# Patient Record
Sex: FEMALE | Race: WHITE | NOT HISPANIC OR LATINO | Employment: OTHER | URBAN - METROPOLITAN AREA
[De-identification: names, ages, dates, MRNs, and addresses within clinical notes are randomized per-mention and may not be internally consistent; named-entity substitution may affect disease eponyms.]

---

## 2017-12-20 NOTE — TELEPHONE ENCOUNTER
APPT INFO    Date /Time: 1/9/18 2:30PM   Reason for Appt: Mid- low back pain   Ref Provider/Clinic: TITA VIZCAINO/ [facility]    Are there internal records? Yes/No?  IF YES, list clinic names: NO   Are there outside records? Yes/No? YES   Patient Contact (Y/N) & Call Details: NO- referral   Action: Reviewed records      OUTSIDE RECORDS CHECKLIST     CLINIC NAME COMMENTS REC (x) IMG (x)   [facility]  OFFICE NOTES: 11/6/17, 12/14/16, 3/9/17, 5/8/17, 5/18/17, 9/5/17, 9/25/17, 11/29/16 12/16/16 4/12/17 6/7/17 8/23/17 11/7/17 12/12/17  RADIOLOGY: xray c-arm 12/4/17, CT lumbar 9/5/17, CT thoracic 9/5/17, MRI lumbar 8/1/17, MRI thoracic 7/25/17  CT cervical 4/8/16  CT thoracic 4/8/16  CT lumbar 4/8/16, 9/3/16  MRI lumbar 4/8/16, 8/1/17  MRI cervical 4/8/16  xray thoraculumbar 7/11/16  MRI thoracic 7/25/17  ER/HOSP: 3/2017-9/2017 x x             Records Received From: [facility]     Date/Exam/Location  (specify location if different)   Office Notes: 10/24/17     ACTION    What did you do? Faxed request for additional records

## 2018-01-09 ENCOUNTER — PRE VISIT (OUTPATIENT)
Dept: ORTHOPEDICS | Facility: CLINIC | Age: 53
End: 2018-01-09

## 2018-01-12 NOTE — TELEPHONE ENCOUNTER
Records Received From: [facility]     Date/Exam/Location  (specify location if different)   Office Notes: 5/16/16, 6/16/16, 7/11/16, 7/12/16,    ED/Hosp Notes: 12/4/17-12/8/17   Operative Notes & Implant Records: - T10-L4 posterolateral fusion, allograft, autograft 4/10/16

## 2018-01-19 ENCOUNTER — OFFICE VISIT (OUTPATIENT)
Dept: ORTHOPEDICS | Facility: CLINIC | Age: 53
End: 2018-01-19
Payer: MEDICARE

## 2018-01-19 VITALS — HEIGHT: 71 IN

## 2018-01-19 DIAGNOSIS — G89.29 CHRONIC MIDLINE LOW BACK PAIN WITHOUT SCIATICA: Primary | ICD-10-CM

## 2018-01-19 DIAGNOSIS — M54.50 CHRONIC MIDLINE LOW BACK PAIN WITHOUT SCIATICA: Primary | ICD-10-CM

## 2018-01-19 RX ORDER — ERGOCALCIFEROL 1.25 MG/1
50000 CAPSULE, LIQUID FILLED ORAL DAILY
Status: ON HOLD | COMMUNITY
Start: 2017-09-24 | End: 2024-04-10

## 2018-01-19 RX ORDER — CLONIDINE HYDROCHLORIDE 0.1 MG/1
0.1 TABLET ORAL AT BEDTIME
Status: ON HOLD | COMMUNITY
End: 2024-04-22

## 2018-01-19 RX ORDER — CITALOPRAM HYDROBROMIDE 20 MG/1
20 TABLET ORAL AT BEDTIME
Status: ON HOLD | COMMUNITY
Start: 2015-09-11 | End: 2024-04-22

## 2018-01-19 RX ORDER — ALBUTEROL SULFATE 90 UG/1
2 AEROSOL, METERED RESPIRATORY (INHALATION) PRN
COMMUNITY
Start: 2015-09-11

## 2018-01-19 RX ORDER — ALPRAZOLAM 2 MG
2 TABLET ORAL AT BEDTIME
Status: ON HOLD | COMMUNITY
Start: 2017-12-08 | End: 2024-04-22

## 2018-01-19 RX ORDER — FENOFIBRATE 48 MG/1
48 TABLET, COATED ORAL ONCE
Status: ON HOLD | COMMUNITY
Start: 2017-10-06 | End: 2024-04-11

## 2018-01-19 RX ORDER — CYCLOBENZAPRINE HCL 10 MG
10 TABLET ORAL 3 TIMES DAILY
Status: ON HOLD | COMMUNITY
Start: 2018-01-19 | End: 2024-04-10

## 2018-01-19 RX ORDER — ALPRAZOLAM 0.25 MG
0.25 TABLET ORAL 3 TIMES DAILY
Status: ON HOLD | COMMUNITY
Start: 2015-09-11 | End: 2024-04-11

## 2018-01-19 RX ORDER — CLONIDINE HYDROCHLORIDE 0.3 MG/1
0.9 TABLET ORAL AT BEDTIME
Status: ON HOLD | COMMUNITY
Start: 2015-09-11 | End: 2024-04-22

## 2018-01-19 ASSESSMENT — ENCOUNTER SYMPTOMS
DEPRESSION: 1
SKIN CHANGES: 0
DECREASED APPETITE: 0
WEIGHT GAIN: 0
ARTHRALGIAS: 1
STIFFNESS: 0
SHORTNESS OF BREATH: 0
PALPITATIONS: 1
HYPERTENSION: 1
CHILLS: 1
DIFFICULTY URINATING: 0
BACK PAIN: 1
SPEECH CHANGE: 0
POSTURAL DYSPNEA: 1
JOINT SWELLING: 0
HOT FLASHES: 1
DYSPNEA ON EXERTION: 1
JAUNDICE: 0
PANIC: 1
DIZZINESS: 1
SYNCOPE: 0
BLOATING: 1
HEADACHES: 0
INSOMNIA: 1
COUGH DISTURBING SLEEP: 1
BOWEL INCONTINENCE: 0
NAIL CHANGES: 1
POLYDIPSIA: 1
WHEEZING: 0
SEIZURES: 0
SNORES LOUDLY: 0
DYSURIA: 1
EXERCISE INTOLERANCE: 1
TREMORS: 0
MEMORY LOSS: 1
HYPOTENSION: 0
LEG PAIN: 1
DISTURBANCES IN COORDINATION: 1
NAUSEA: 1
FATIGUE: 1
MUSCLE WEAKNESS: 1
POLYPHAGIA: 0
ORTHOPNEA: 0
WEAKNESS: 1
MYALGIAS: 1
RECTAL PAIN: 0
ALTERED TEMPERATURE REGULATION: 1
BLOOD IN STOOL: 0
INCREASED ENERGY: 0
FEVER: 0
NERVOUS/ANXIOUS: 1
TINGLING: 1
HEMATURIA: 0
COUGH: 1
HALLUCINATIONS: 0
SLEEP DISTURBANCES DUE TO BREATHING: 0
NIGHT SWEATS: 1
VOMITING: 0
DECREASED LIBIDO: 0
NUMBNESS: 1
MUSCLE CRAMPS: 1
POOR WOUND HEALING: 0
SPUTUM PRODUCTION: 1
NECK PAIN: 1
LIGHT-HEADEDNESS: 1
FLANK PAIN: 0
WEIGHT LOSS: 1
PARALYSIS: 0
CONSTIPATION: 0
ABDOMINAL PAIN: 1
DIARRHEA: 1
HEARTBURN: 1
HEMOPTYSIS: 0
LOSS OF CONSCIOUSNESS: 0
DECREASED CONCENTRATION: 1
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
## 2018-01-19 NOTE — LETTER
Date:January 23, 2018      Patient was self referred, no letter generated. Do not send.        [facility] Information
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
## 2023-10-24 ENCOUNTER — HOSPITAL ENCOUNTER (OUTPATIENT)
Age: 58
End: 2023-10-24
Payer: COMMERCIAL

## 2023-10-25 ENCOUNTER — TELEPHONE (OUTPATIENT)
Dept: ORTHOPEDICS | Facility: CLINIC | Age: 58
End: 2023-10-25
Payer: MEDICARE

## 2023-10-25 NOTE — TELEPHONE ENCOUNTER
Received a call from Dr. Garcia at [facility] in [place], [place], asking for a call from Dr. Tian's team regarding patient. Dr. Tian had requested a call from Dr. Garcia to discuss case.    Please call Dr. Garica on his personal cell phone at [phone], and okay to leave a msg, he may be in the OR. Dr. Garcia is asking for a direct number, if possible, back to the care team.

## 2023-10-25 NOTE — TELEPHONE ENCOUNTER
[facility] Call Center    Phone Message    May a detailed message be left on voicemail: yes     Reason for Call: Other: Dr. Devin Corrigan of [place] was reaching out. He has additional information.  No further information is needed on his end.  He can be reached at [phone].      Action Taken: Message routed to:  [facility] ortho     Travel Screening: Not Applicable

## 2023-10-25 NOTE — CONFIDENTIAL NOTE
Dr. Tian consulted on case while on call yesterday. He was able to speak with orthopedic doc at [facility] and it was determined patient would follow up outpatient. LVM relaying information. Clinic phone number provided.    Tara Holter, RNCC

## 2023-10-26 NOTE — CONFIDENTIAL NOTE
Returned call to Devin Corrigan MD. He consulted with Dr. Jaylan Thomas while on call. It was determined patient would follow up outpatient at the [facility]. Asked to place referral and we could schedule with appropriate provider.    Tara Holter RNCC

## 2023-10-27 NOTE — TELEPHONE ENCOUNTER
Records Requested     October 27, 2023 10:04 AM   [address] MMB   Facility  [facility]   Outcome Sent urgent request for all imaging       DIAGNOSIS: Sepsic left elbow following procedure,    APPOINTMENT DATE: 10/30/23   NOTES STATUS DETAILS   OFFICE NOTE from other specialist Care Everywhere [facility] CL[facility]IC:  - 10/12/23    :  - 10/09/23   DISCHARGE REPORT from the ER Care Everywhere [facility]:  - 10/13/23, 09/22/23   OPERATIVE REPORT Care Everywhere [facility]:  -  09/23/23  - Left elbow irrigation and debridement   - Devin Morin MD      - 04/27/21  - Left elbow triceps tendon reconstruction with allograft   - Avery Barker MD     - 03/02/21  - Left elbow total arthroplasty  - Avery Barker MD     - 12/04/17  - Left elbow ulnar nerve decompression, heterotopic bone removal, contracture release  - Avery Barker MD    - 04/21/16  - Repair ligament elbow  - Gato Gabriel MD    + more in Epic     MEDICATION LIST Internal    XRAYS  & INJECTIONS (IMAGES & REPORTS) PACS [facility]:  - XR L ELBOW:  - 10/13/23, 09/23/23, 09/22/23, 04/21/21, 04/13/21, 03/16/21, 01/18/21, 10/24/17, 09/03/16, 08/01/16, 06/20/16, 05/10/16, 04/21/16, 04/13/16, 04/08/16

## 2023-10-30 ENCOUNTER — LAB (OUTPATIENT)
Dept: LAB | Facility: CLINIC | Age: 58
End: 2023-10-30
Payer: COMMERCIAL

## 2023-10-30 ENCOUNTER — PRE VISIT (OUTPATIENT)
Dept: ORTHOPEDICS | Facility: CLINIC | Age: 58
End: 2023-10-30

## 2023-10-30 ENCOUNTER — OFFICE VISIT (OUTPATIENT)
Dept: ORTHOPEDICS | Facility: CLINIC | Age: 58
End: 2023-10-30
Payer: COMMERCIAL

## 2023-10-30 ENCOUNTER — ANCILLARY PROCEDURE (OUTPATIENT)
Dept: GENERAL RADIOLOGY | Facility: CLINIC | Age: 58
End: 2023-10-30
Attending: STUDENT IN AN ORGANIZED HEALTH CARE EDUCATION/TRAINING PROGRAM
Payer: COMMERCIAL

## 2023-10-30 DIAGNOSIS — T84.59XA INFECTION ASSOCIATED WITH PROSTHESIS OF LEFT ELBOW JOINT (H): ICD-10-CM

## 2023-10-30 DIAGNOSIS — M25.522 ELBOW PAIN, LEFT: Primary | ICD-10-CM

## 2023-10-30 DIAGNOSIS — M25.522 ELBOW PAIN, LEFT: ICD-10-CM

## 2023-10-30 DIAGNOSIS — Z96.622 INFECTION ASSOCIATED WITH PROSTHESIS OF LEFT ELBOW JOINT (H): ICD-10-CM

## 2023-10-30 DIAGNOSIS — T84.59XA INFECTION ASSOCIATED WITH PROSTHESIS OF LEFT ELBOW JOINT (H): Primary | ICD-10-CM

## 2023-10-30 DIAGNOSIS — Z96.622 INFECTION ASSOCIATED WITH PROSTHESIS OF LEFT ELBOW JOINT (H): Primary | ICD-10-CM

## 2023-10-30 LAB
ALBUMIN SERPL BCG-MCNC: 4.1 G/DL (ref 3.5–5.2)
ALP SERPL-CCNC: 147 U/L (ref 35–104)
ALT SERPL W P-5'-P-CCNC: 29 U/L (ref 0–50)
ANION GAP SERPL CALCULATED.3IONS-SCNC: 12 MMOL/L (ref 7–15)
APPEARANCE FLD: ABNORMAL
AST SERPL W P-5'-P-CCNC: 40 U/L (ref 0–45)
BILIRUB SERPL-MCNC: 0.3 MG/DL
BUN SERPL-MCNC: 21.7 MG/DL (ref 8–23)
CALCIUM SERPL-MCNC: 9.6 MG/DL (ref 8.6–10)
CELL COUNT BODY FLUID SOURCE: ABNORMAL
CHLORIDE SERPL-SCNC: 96 MMOL/L (ref 98–107)
COLOR FLD: ABNORMAL
CREAT SERPL-MCNC: 1.26 MG/DL (ref 0.51–0.95)
CRP SERPL-MCNC: 37.3 MG/L
CRYSTALS SNV MICRO: NORMAL
DEPRECATED HCO3 PLAS-SCNC: 32 MMOL/L (ref 22–29)
EGFRCR SERPLBLD CKD-EPI 2021: 49 ML/MIN/1.73M2
ERYTHROCYTE [DISTWIDTH] IN BLOOD BY AUTOMATED COUNT: 14.6 % (ref 10–15)
ERYTHROCYTE [SEDIMENTATION RATE] IN BLOOD BY WESTERGREN METHOD: 51 MM/HR (ref 0–30)
GLUCOSE SERPL-MCNC: 159 MG/DL (ref 70–99)
HBA1C MFR BLD: 10.6 %
HCT VFR BLD AUTO: 35.7 % (ref 35–47)
HGB BLD-MCNC: 11.4 G/DL (ref 11.7–15.7)
LYMPHOCYTES NFR FLD MANUAL: 1 %
MCH RBC QN AUTO: 25.3 PG (ref 26.5–33)
MCHC RBC AUTO-ENTMCNC: 31.9 G/DL (ref 31.5–36.5)
MCV RBC AUTO: 79 FL (ref 78–100)
MONOS+MACROS NFR FLD MANUAL: 3 %
NEUTS BAND NFR FLD MANUAL: 97 %
PLATELET # BLD AUTO: 350 10E3/UL (ref 150–450)
POTASSIUM SERPL-SCNC: 3.4 MMOL/L (ref 3.4–5.3)
PROT SERPL-MCNC: 8.2 G/DL (ref 6.4–8.3)
RBC # BLD AUTO: 4.51 10E6/UL (ref 3.8–5.2)
SODIUM SERPL-SCNC: 140 MMOL/L (ref 135–145)
WBC # BLD AUTO: 10.9 10E3/UL (ref 4–11)
WBC # FLD AUTO: ABNORMAL /UL

## 2023-10-30 PROCEDURE — 85027 COMPLETE CBC AUTOMATED: CPT | Performed by: PATHOLOGY

## 2023-10-30 PROCEDURE — 73080 X-RAY EXAM OF ELBOW: CPT | Mod: LT | Performed by: RADIOLOGY

## 2023-10-30 PROCEDURE — 87205 SMEAR GRAM STAIN: CPT | Performed by: STUDENT IN AN ORGANIZED HEALTH CARE EDUCATION/TRAINING PROGRAM

## 2023-10-30 PROCEDURE — 87070 CULTURE OTHR SPECIMN AEROBIC: CPT | Mod: XS | Performed by: STUDENT IN AN ORGANIZED HEALTH CARE EDUCATION/TRAINING PROGRAM

## 2023-10-30 PROCEDURE — 87040 BLOOD CULTURE FOR BACTERIA: CPT | Performed by: STUDENT IN AN ORGANIZED HEALTH CARE EDUCATION/TRAINING PROGRAM

## 2023-10-30 PROCEDURE — 83036 HEMOGLOBIN GLYCOSYLATED A1C: CPT | Performed by: STUDENT IN AN ORGANIZED HEALTH CARE EDUCATION/TRAINING PROGRAM

## 2023-10-30 PROCEDURE — 99000 SPECIMEN HANDLING OFFICE-LAB: CPT | Performed by: PATHOLOGY

## 2023-10-30 PROCEDURE — 80053 COMPREHEN METABOLIC PANEL: CPT | Performed by: PATHOLOGY

## 2023-10-30 PROCEDURE — 87206 SMEAR FLUORESCENT/ACID STAI: CPT | Mod: 90 | Performed by: PATHOLOGY

## 2023-10-30 PROCEDURE — 36415 COLL VENOUS BLD VENIPUNCTURE: CPT | Performed by: PATHOLOGY

## 2023-10-30 PROCEDURE — 85652 RBC SED RATE AUTOMATED: CPT | Performed by: PATHOLOGY

## 2023-10-30 PROCEDURE — 20605 DRAIN/INJ JOINT/BURSA W/O US: CPT | Mod: LT | Performed by: STUDENT IN AN ORGANIZED HEALTH CARE EDUCATION/TRAINING PROGRAM

## 2023-10-30 PROCEDURE — 99205 OFFICE O/P NEW HI 60 MIN: CPT | Mod: 25 | Performed by: STUDENT IN AN ORGANIZED HEALTH CARE EDUCATION/TRAINING PROGRAM

## 2023-10-30 PROCEDURE — 89051 BODY FLUID CELL COUNT: CPT | Performed by: STUDENT IN AN ORGANIZED HEALTH CARE EDUCATION/TRAINING PROGRAM

## 2023-10-30 PROCEDURE — 89060 EXAM SYNOVIAL FLUID CRYSTALS: CPT | Performed by: STUDENT IN AN ORGANIZED HEALTH CARE EDUCATION/TRAINING PROGRAM

## 2023-10-30 PROCEDURE — 87075 CULTR BACTERIA EXCEPT BLOOD: CPT | Mod: XS | Performed by: STUDENT IN AN ORGANIZED HEALTH CARE EDUCATION/TRAINING PROGRAM

## 2023-10-30 PROCEDURE — 87102 FUNGUS ISOLATION CULTURE: CPT | Performed by: STUDENT IN AN ORGANIZED HEALTH CARE EDUCATION/TRAINING PROGRAM

## 2023-10-30 PROCEDURE — 86140 C-REACTIVE PROTEIN: CPT | Performed by: PATHOLOGY

## 2023-10-30 PROCEDURE — 87116 MYCOBACTERIA CULTURE: CPT | Mod: 90 | Performed by: PATHOLOGY

## 2023-10-30 RX ORDER — CLONAZEPAM 2 MG/1
4 TABLET ORAL AT BEDTIME
Status: ON HOLD | COMMUNITY
Start: 2023-09-19 | End: 2024-04-22

## 2023-10-30 RX ORDER — ATORVASTATIN CALCIUM 10 MG/1
10 TABLET, FILM COATED ORAL DAILY
COMMUNITY
Start: 2023-08-31

## 2023-10-30 RX ORDER — LIDOCAINE HYDROCHLORIDE 10 MG/ML
5 INJECTION, SOLUTION EPIDURAL; INFILTRATION; INTRACAUDAL; PERINEURAL
Status: DISCONTINUED | OUTPATIENT
Start: 2023-10-30 | End: 2024-04-22

## 2023-10-30 RX ADMIN — LIDOCAINE HYDROCHLORIDE 5 ML: 10 INJECTION, SOLUTION EPIDURAL; INFILTRATION; INTRACAUDAL; PERINEURAL at 12:34

## 2023-10-30 NOTE — NURSING NOTE
"Reason For Visit:   Chief Complaint   Patient presents with    Consult     Septic Left Elbow post surgery 9/23/23       Primary MD: Alo Giang  Ref. MD: ED    Age: 58 year old    ?  No      There were no vitals taken for this visit.      Pain Assessment  Patient Currently in Pain: Yes  0-10 Pain Scale: 10  Primary Pain Location: Elbow (left)  Pain Descriptors: Constant, Burning, Radiating    Hand Dominance Evaluation  Hand Dominance: Right          QuickDASH Assessment       No data to display                   Current Outpatient Medications   Medication Sig Dispense Refill    atorvastatin (LIPITOR) 10 MG tablet Take 10 mg by mouth      clonazePAM (KLONOPIN) 2 MG tablet       albuterol (PROAIR HFA/PROVENTIL HFA/VENTOLIN HFA) 108 (90 BASE) MCG/ACT Inhaler Inhale 2 puffs into the lungs as needed      ALPRAZolam (XANAX) 0.25 MG tablet Take 0.25 mg by mouth 3 times daily      ALPRAZolam (XANAX) 1 MG tablet Take 2 tablets by mouth At Bedtime      citalopram (CELEXA) 40 MG tablet Take 40 mg by mouth 3 times daily      cloNIDine (CATAPRES) 0.2 MG tablet Take 0.2 mg by mouth 2 times daily      cloNIDine (CATAPRES) 0.3 MG tablet Take 0.3 mg by mouth 2 times daily      cyclobenzaprine (FLEXERIL) 10 MG tablet Take 10 mg by mouth 3 times daily      fenofibrate 48 MG tablet Take 48 mg by mouth once      omeprazole (PRILOSEC) 20 MG CR capsule Take 20 mg by mouth daily      ranitidine (ZANTAC) 150 MG tablet Take 150 mg by mouth 2 times daily      vitamin D (ERGOCALCIFEROL) 89617 UNIT capsule Take 50,000 Units by mouth daily         Allergies   Allergen Reactions    Hydromorphone Anaphylaxis     hallucinations    Morphine Anaphylaxis     Throat swells shut  **Has taken hydrocodone/APAP and hydromorphone in the past during previous hospitalizations with no issues.  Takes oxycodone at home    Succinylcholine Shortness Of Breath     \"it affected my breathing\"    Fentanyl     Methadone Nausea and Vomiting    Prednisone " "Swelling     \"it overrides my psych meds and makes me crazy\"    Pregabalin     Quetiapine     Trazodone     Sumatriptan Rash       MARIVEL BARRETT, ATC    "
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
## 2023-10-30 NOTE — PROGRESS NOTES
Medium Joint Injection/Arthrocentesis: L elbow    Date/Time: 10/30/2023 12:34 PM    Performed by: Marcos Sarmiento MD  Authorized by: Marcos Sarmiento MD    Indications:  Diagnostic evaluation  Needle Size:  25 G  Guidance: surface landmarks    Location:  Elbow  Site:  L elbow  Medications:  5 mL lidocaine (PF) 1 %  Aspirate amount (mL):  7  Aspirate analysis: sent for lab analysis    Outcome:  Tolerated well, no immediate complications  Procedure discussed: discussed risks, benefits, and alternatives    Consent Given by:  Patient  Timeout: timeout called immediately prior to procedure    Prep: patient was prepped and draped in usual sterile fashion        [facility] [address]  [address] [facility] [id number] [phone]  Dept: [phone]  ______________________________________________________________________________    Patient: Mitali Robles   : 1965   MRN: [id number]   2023    INVASIVE PROCEDURE SAFETY CHECKLIST    Date: 10/30/2023   Procedure:Lidocaine 1% injection for pain block for left elbow aspiration.  Patient Name: Mitali Robles  MRN: [id number]  YOB: 1965    Action: Complete sections as appropriate. Any discrepancy results in a HARD COPY until resolved.     PRE PROCEDURE:  Patient ID verified with 2 identifiers (name and  or MRN): Yes  Procedure and site verified with patient/designee (when able): Yes  Accurate consent documentation in medical record: Yes  H&P (or appropriate assessment) documented in medical record: Yes  H&P must be up to 20 days prior to procedure and updates within 24 hours of procedure as applicable: Yes  Relevant diagnostic and radiology test results appropriately labeled and displayed as applicable: NA  Procedure site(s) marked with provider initials: NA    TIMEOUT:  Time-Out performed immediately prior to starting procedure, including verbal and active participation of all team  members addressing the following:Yes  * Correct patient identify  * Confirmed that the correct side and site are marked  * An accurate procedure consent form  * Agreement on the procedure to be done  * Correct patient position  * Relevant images and results are properly labeled and appropriately displayed  * The need to administer antibiotics or fluids for irrigation purposes during the procedure as applicable   * Safety precautions based on patient history or medication use    DURING PROCEDURE: Verification of correct person, site, and procedures any time the responsibility for care of the patient is transferred to another member of the care team.       The following medications were given:         Prior to injection, verified patient identity using patient's name and date of birth.  Due to injection administration, patient instructed to remain in clinic for 15 minutes  afterwards, and to report any adverse reaction to me immediately.    Left elbow aspiration with Lidocaine 1% injection for pain block.      Medication Name Lidocaine 1% NDC 72675-367-66    Scribed by MARIVEL BARRETT, ATC for Dr. Sarmiento on October 30, 2023 at 12:38p based on the provider's statements to me.     MARIVEL BARRETT, ATC
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
## 2023-11-04 LAB
BACTERIA BLD CULT: NO GROWTH
BACTERIA BLD CULT: NO GROWTH
BACTERIA SNV CULT: NO GROWTH
GRAM STAIN RESULT: NORMAL
GRAM STAIN RESULT: NORMAL

## 2023-11-13 LAB — BACTERIA SNV CULT: NORMAL

## 2023-11-27 LAB — BACTERIA SNV CULT: NO GROWTH

## 2023-12-26 LAB
ACID FAST STAIN (ARUP): NORMAL

## 2024-02-06 ENCOUNTER — TELEPHONE (OUTPATIENT)
Dept: ORTHOPEDICS | Facility: CLINIC | Age: 59
End: 2024-02-06
Payer: COMMERCIAL

## 2024-02-06 NOTE — TELEPHONE ENCOUNTER
Hello,    I'm with ortho con.  Pt of Dr. Sarmiento.  Dr. Dejesus is calling from [facility] in [place], ph# [phone].  She is wanting to discuss with the team the pts current condition and having the pt come in sooner.  She is asking about a medical transfer with medical transport.    Pt is currently hospitalized at [facility].

## 2024-02-06 NOTE — TELEPHONE ENCOUNTER
Shruthi MARTIN reviewed and advised that this call should be directed to our on-call ortho team, today is Dr Moran.  Provided  number to Dr Chavez at [phone].  She will call this number today.     Also gave a heads up for this provider that [facility] does not accept [employer] insurance, patient had [employer] insurance last year, so to please verify that she has new plan prior to trying a transfer.  Dr Chavez will check this also. Hailee Oliveira RN

## 2024-02-12 ENCOUNTER — LAB (OUTPATIENT)
Dept: LAB | Facility: CLINIC | Age: 59
End: 2024-02-12
Payer: COMMERCIAL

## 2024-02-12 ENCOUNTER — OFFICE VISIT (OUTPATIENT)
Dept: ORTHOPEDICS | Facility: CLINIC | Age: 59
End: 2024-02-12
Payer: COMMERCIAL

## 2024-02-12 VITALS
DIASTOLIC BLOOD PRESSURE: 84 MMHG | OXYGEN SATURATION: 98 % | TEMPERATURE: 97.8 F | HEART RATE: 72 BPM | SYSTOLIC BLOOD PRESSURE: 154 MMHG

## 2024-02-12 DIAGNOSIS — Z96.622 INFECTION ASSOCIATED WITH PROSTHESIS OF LEFT ELBOW JOINT (H): Primary | ICD-10-CM

## 2024-02-12 DIAGNOSIS — T84.59XA INFECTION ASSOCIATED WITH PROSTHESIS OF LEFT ELBOW JOINT (H): ICD-10-CM

## 2024-02-12 DIAGNOSIS — Z96.622 INFECTION ASSOCIATED WITH PROSTHESIS OF LEFT ELBOW JOINT (H): ICD-10-CM

## 2024-02-12 DIAGNOSIS — Z79.4 TYPE 2 DIABETES MELLITUS WITH HYPERGLYCEMIA, WITH LONG-TERM CURRENT USE OF INSULIN (H): ICD-10-CM

## 2024-02-12 DIAGNOSIS — E11.65 TYPE 2 DIABETES MELLITUS WITH HYPERGLYCEMIA, WITH LONG-TERM CURRENT USE OF INSULIN (H): ICD-10-CM

## 2024-02-12 DIAGNOSIS — T84.59XA INFECTION ASSOCIATED WITH PROSTHESIS OF LEFT ELBOW JOINT (H): Primary | ICD-10-CM

## 2024-02-12 PROBLEM — T84.50XA PROSTHETIC JOINT INFECTION (H): Status: ACTIVE | Noted: 2023-10-13

## 2024-02-12 PROBLEM — E11.9 TYPE 2 DIABETES MELLITUS WITHOUT COMPLICATION, WITH LONG-TERM CURRENT USE OF INSULIN (H): Status: ACTIVE | Noted: 2018-04-24

## 2024-02-12 PROBLEM — R29.898 MUSCULAR DECONDITIONING: Status: ACTIVE | Noted: 2019-09-16

## 2024-02-12 PROBLEM — Z86.19 HISTORY OF CLOSTRIDIOIDES DIFFICILE INFECTION: Status: ACTIVE | Noted: 2020-04-08

## 2024-02-12 PROBLEM — E78.5 DYSLIPIDEMIA: Status: ACTIVE | Noted: 2017-09-26

## 2024-02-12 PROBLEM — A04.0 ENTEROPATHOGENIC ESCHERICHIA COLI INFECTION: Status: ACTIVE | Noted: 2023-01-05

## 2024-02-12 PROBLEM — R07.89 ATYPICAL CHEST PAIN: Status: ACTIVE | Noted: 2020-12-20

## 2024-02-12 PROBLEM — M54.50 CHRONIC LOW BACK PAIN: Status: ACTIVE | Noted: 2017-11-28

## 2024-02-12 PROBLEM — I10 ESSENTIAL HYPERTENSION: Status: ACTIVE | Noted: 2020-01-29

## 2024-02-12 PROBLEM — M25.522 ELBOW PAIN, LEFT: Status: ACTIVE | Noted: 2021-03-03

## 2024-02-12 PROBLEM — E66.01 SEVERE OBESITY (BMI 35.0-39.9) WITH COMORBIDITY (H): Status: ACTIVE | Noted: 2018-06-07

## 2024-02-12 PROBLEM — M19.90 ARTHRITIS: Status: ACTIVE | Noted: 2024-01-31

## 2024-02-12 PROBLEM — G89.29 CHRONIC LOW BACK PAIN: Status: ACTIVE | Noted: 2017-11-28

## 2024-02-12 PROBLEM — R19.7 DIARRHEA OF PRESUMED INFECTIOUS ORIGIN: Status: ACTIVE | Noted: 2018-12-26

## 2024-02-12 PROBLEM — F40.01 PANIC DISORDER WITH AGORAPHOBIA: Status: ACTIVE | Noted: 2017-05-18

## 2024-02-12 PROBLEM — R51.9 INTRACTABLE HEADACHE: Status: ACTIVE | Noted: 2018-07-19

## 2024-02-12 PROBLEM — R31.29 MICROHEMATURIA: Status: ACTIVE | Noted: 2019-09-16

## 2024-02-12 PROBLEM — S54.00XA INJURY OF ULNAR NERVE: Status: ACTIVE | Noted: 2017-12-06

## 2024-02-12 PROBLEM — G31.84 MILD COGNITIVE IMPAIRMENT: Status: ACTIVE | Noted: 2018-06-06

## 2024-02-12 PROBLEM — F43.10 PTSD (POST-TRAUMATIC STRESS DISORDER): Status: ACTIVE | Noted: 2018-07-17

## 2024-02-12 PROBLEM — R74.01 ELEVATED AST (SGOT): Status: ACTIVE | Noted: 2019-09-16

## 2024-02-12 PROBLEM — R11.15 EMESIS, PERSISTENT: Status: ACTIVE | Noted: 2019-11-06

## 2024-02-12 PROBLEM — L03.114 CELLULITIS OF LEFT ELBOW: Status: ACTIVE | Noted: 2023-09-23
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
## 2024-02-12 PROBLEM — G47.00 INSOMNIA: Status: ACTIVE | Noted: 2018-07-18

## 2024-02-12 PROBLEM — E83.42 HYPOMAGNESEMIA: Status: ACTIVE | Noted: 2018-07-19

## 2024-02-12 PROBLEM — E83.51 HYPOCALCEMIA: Status: ACTIVE | Noted: 2024-02-12

## 2024-02-12 PROBLEM — F31.9 BIPOLAR DISEASE, CHRONIC (H): Status: ACTIVE | Noted: 2017-05-18

## 2024-02-12 PROBLEM — R00.1 BRADYCARDIA: Status: ACTIVE | Noted: 2021-01-04

## 2024-02-12 PROBLEM — K21.00 GASTROESOPHAGEAL REFLUX DISEASE WITH ESOPHAGITIS: Status: ACTIVE | Noted: 2017-11-28

## 2024-02-12 PROBLEM — S21.209A BACK WOUND: Status: ACTIVE | Noted: 2017-10-27

## 2024-02-12 PROBLEM — M00.9 SEPTIC ARTHRITIS (H): Status: ACTIVE | Noted: 2023-11-01

## 2024-02-12 PROBLEM — R52 UNCONTROLLED PAIN: Status: ACTIVE | Noted: 2019-11-24

## 2024-02-12 PROBLEM — Z98.1 S/P SPINAL FUSION: Status: ACTIVE | Noted: 2022-09-09

## 2024-02-12 PROBLEM — J44.9 CHRONIC OBSTRUCTIVE PULMONARY DISEASE (H): Status: ACTIVE | Noted: 2023-10-12

## 2024-02-12 PROBLEM — L98.491: Status: ACTIVE | Noted: 2019-06-26

## 2024-02-12 PROBLEM — F33.1 MAJOR DEPRESSIVE DISORDER, RECURRENT EPISODE, MODERATE (H): Chronic | Status: ACTIVE | Noted: 2018-04-06

## 2024-02-12 PROBLEM — E66.01 MORBID OBESITY (H): Status: ACTIVE | Noted: 2019-01-04

## 2024-02-12 LAB
ANION GAP SERPL CALCULATED.3IONS-SCNC: 13 MMOL/L (ref 7–15)
BUN SERPL-MCNC: 11.4 MG/DL (ref 8–23)
CALCIUM SERPL-MCNC: 9.4 MG/DL (ref 8.6–10)
CHLORIDE SERPL-SCNC: 97 MMOL/L (ref 98–107)
CREAT SERPL-MCNC: 0.82 MG/DL (ref 0.51–0.95)
CRP SERPL-MCNC: 140 MG/L
DEPRECATED HCO3 PLAS-SCNC: 27 MMOL/L (ref 22–29)
EGFRCR SERPLBLD CKD-EPI 2021: 82 ML/MIN/1.73M2
ERYTHROCYTE [DISTWIDTH] IN BLOOD BY AUTOMATED COUNT: 20.5 % (ref 10–15)
ERYTHROCYTE [SEDIMENTATION RATE] IN BLOOD BY WESTERGREN METHOD: 59 MM/HR (ref 0–30)
GLUCOSE SERPL-MCNC: 158 MG/DL (ref 70–99)
HBA1C MFR BLD: 7.4 %
HCT VFR BLD AUTO: 33 % (ref 35–47)
HGB BLD-MCNC: 10.5 G/DL (ref 11.7–15.7)
MCH RBC QN AUTO: 24.2 PG (ref 26.5–33)
MCHC RBC AUTO-ENTMCNC: 31.8 G/DL (ref 31.5–36.5)
MCV RBC AUTO: 76 FL (ref 78–100)
PLATELET # BLD AUTO: 493 10E3/UL (ref 150–450)
POTASSIUM SERPL-SCNC: 3.4 MMOL/L (ref 3.4–5.3)
RBC # BLD AUTO: 4.34 10E6/UL (ref 3.8–5.2)
SODIUM SERPL-SCNC: 137 MMOL/L (ref 135–145)
WBC # BLD AUTO: 12.3 10E3/UL (ref 4–11)

## 2024-02-12 PROCEDURE — 99214 OFFICE O/P EST MOD 30 MIN: CPT | Performed by: STUDENT IN AN ORGANIZED HEALTH CARE EDUCATION/TRAINING PROGRAM

## 2024-02-12 PROCEDURE — 99000 SPECIMEN HANDLING OFFICE-LAB: CPT | Performed by: PATHOLOGY

## 2024-02-12 PROCEDURE — 36415 COLL VENOUS BLD VENIPUNCTURE: CPT | Performed by: PATHOLOGY

## 2024-02-12 PROCEDURE — 83036 HEMOGLOBIN GLYCOSYLATED A1C: CPT | Performed by: STUDENT IN AN ORGANIZED HEALTH CARE EDUCATION/TRAINING PROGRAM

## 2024-02-12 PROCEDURE — 85027 COMPLETE CBC AUTOMATED: CPT | Performed by: PATHOLOGY

## 2024-02-12 PROCEDURE — 80048 BASIC METABOLIC PNL TOTAL CA: CPT | Performed by: PATHOLOGY

## 2024-02-12 PROCEDURE — 85652 RBC SED RATE AUTOMATED: CPT | Performed by: PATHOLOGY

## 2024-02-12 PROCEDURE — 86140 C-REACTIVE PROTEIN: CPT | Performed by: PATHOLOGY

## 2024-02-12 NOTE — NURSING NOTE
Teaching Flowsheet   Relevant Diagnosis: Septic left elbow post surgery, 9-23-23  Teaching Topic: Explant left total elbow arthroplasty, irrigation and debridement of left elbow, possible antibiotic spacer insertion     [place] under General anesthesia with Dr Marcos Sarmiento.   Patient from [place], [place]  Will be hospitalized post op.   DM Type 2- Collecting HgbA1C today at [facility]  Patient in wheelchair today, weak, pale.  Vital signs collected per Dr Sarmiento  Getting lab work today, Dr Sarmiento will monitor for results.    Person(s) involved in teaching:   Patient.  Spouse out with the car and will pick her up after lab work.     Motivation Level:  Asks Questions: Yes  Eager to Learn: Yes  Cooperative: Yes  Receptive (willing/able to accept information): Yes  Any cultural factors/[employer] beliefs that may influence understanding or compliance? No    Patient demonstrates understanding of the following:  Reason for the appointment, diagnosis and treatment plan: Yes  Knowledge of proper use of medications and conditions for which they are ordered (with special attention to potential side effects or drug interactions): Yes  Which situations necessitate calling provider and whom to contact: Yes    Teaching Concerns Addressed:   Proper use and care of  (medical equip, care aids, etc.): Yes  Nutritional needs and diet plan: Yes  Pain management techniques: Yes  Wound Care: Yes  How and/when to access community resources: Yes     Instructional Materials Used/Given: Preoperative surgery packet, antibacterial Chlorhexidine soap. Stop Light Tool reviewed, after-hours number provided, patient verbalized understanding, had no immediate questions. Hailee Oliveira RN

## 2024-02-12 NOTE — PROGRESS NOTES
Orthopaedic Surgery Hand and Upper Extremity Clinic H&P Note:  Date: Feb 12, 2024    Patient Name: Mitali Robles  MRN: [id number]    CHIEF COMPLAINT: Left elbow drainage, concern for deep prosthetic infection of TEA    Dominant Hand: Right  Occupation: None      HPI:  Ms. Mitali Robles is a 58 year old female right hand dominant with a very complex medical history who presents for follow-up regarding chronic left elbow drainage. Patient underwent a left TEA for post-traumatic arthritis 3/2/21 by Dr. Avery Barker. She had had multiple prior surgeries for fracture and dislocation in the past. She then underwent a left triceps tendon reconstruction with achilles allograft for rupture 4/27/23. She reports developing a chronic wound and drainage 4-5 months postop.    The patient was admitted to [facility] in [place], [place] for a worsening let elbow wound associated with drainage. The patient was taken to the OR by Dr. Morin who performed a superficial I&D, excision of sinus tract, and olecranon bursectomy on 9/23/23. Intraoperative cultures positive for Staph epi and candida albicans. Blood culture negative. Patient was treated with rocephin, vancomycin, and fluconazole. The patient was admitted for IV antibiotics for about 2 weeks after the surgery.     I last saw the patient October 30, 2023.  At that time I performed an aspiration of the elbow which revealed 24,485 WBCs, 97% PMNs.  Interestingly, all cultures returned negative, likely due to her chronic antibiotic use.  Her hemoglobin A1c was 10.6 and so she was not a surgical candidate at that time.  This is since improved to 6.9.  She is here to discuss options.    The elbow continues to cause her considerable problems.  The patient was admitted 1/31/2024 to 2/8/2024 at [facility] as the elbow flexion was making her sick.  I did appear from notes that she was becoming septic, so she was taken for an irrigation and debridement 2/5/2024 with Dr. Gabriel.   Operative note is not available, however brief op note reports thick purulent material within the olecranon bursa.    She states 2 days after she got home, she has started to develop significant serosanguineous drainage again from the elbow wound.  She is saturating multiple bath towels a night.  Continues to have elbow pain.    The patient's records from her admission 9/22/23-10/6/23 and 1/31/24 to 2/8/24 were reviewed in detail in care everywhere. These included operative reports, ED notes, and infectious disease consult notes.    Surgical History:  1. 4/8/2016 - open reduction internal fixation of left elbow dislocation and left distal radius fracture  2. 4/21/2016 - surgical reduction of left elbow subluxation with repair of lateral ligament complex  3. 6/1/2017- reduction around the left elbow for stiffness and heterotropic ossification  4. 3/2/2021 - left total elbow arthroplasty for posttraumatic arthritis and left ulnar nerve neuropathy  5. 4/7/2021 - Reconstruction of left elbow triceps tendon with Achilles tendon allograft for rupture of the left triceps tendon following total elbow arthroplasty  6. 9/23/2023 - I&D for wound dehiscence and draining sinus tract of the left elbow with olecranon bursectomy and primary closure of the open wound of the left elbow   7. I&D left elbow    VITALS:  There were no vitals filed for this visit.    EXAM:  General: NAD, A&Ox3  HEENT: NC/AT  CV: RRR by peripheral pulse  Pulmonary: Non-labored breathing on RA  LUE:  Mild erythema of posterior elbow incision that is approximated with nylon sutures.  Serosanguineous drainage from the proximal aspect of the incision  No significant warmth of the elbow  Extension limited to 45 (baseline), full flexion  Full pronation, supination  Minimal tenderness along the joint lines  Intact EPL, FPL, HI  SILT m/r/u  WWP CR< 2s      IMAGING:  XRs of the left elbow from 10/30/23 demonstrates total elbow prosthesis without any evidence of  loosening. Cement mantle appears intact    MRI 2/4/24  IMPRESSION:   1. Complex periarticular fluid collections, posterior and lateral to the distal humerus, loculated collection measuring 5 x 2 cm in size is concerning for an abscess. Pockets of signal dropout within the fluid may represent gas relating to gas forming organism.   2.  There is an additional fluid collection noted to insinuate throughout the subcutaneous soft tissues dorsal to the forearm emanating from the region of the subcutaneous soft tissues of the olecranon. This could represent a bursal fluid collection, infected fluid collection difficult to exclude.   3.  There is edema identified within the radial neck. No obvious fracture is seen. No cortical destruction or obvious periosteal changes evident, findings may reflect osteomyelitis. No obvious fracture.   4.  There is fluid surrounding the radial head representing an elbow joint effusion, septic arthritis not excluded.   5.  Edematous changes noted to involve the musculature surrounding the elbow joint suggests infectious myositis without pyomyositis or pyotenosynovitis.     Labs today 2/12/24  CRP 37.30 --> 140  ESR 51 --> 59  WBC 10.9 --> 12.3  Hb/Hct 10.5/33.0    Synovial fluid 10/30/24  No crystals,   24,485 WBCs, 97% PMNs  Cultures negative    I have personally reviewed the above images and labs.       IMPRESSION AND RECOMMENDATIONS:  Ms. Mitali Robles is a 58 year old female right hand dominant with chronic left total elbow prosthetic infection, failing chronic suppressive antibiotic management.      I had a long discussion with the patient regarding the state of her elbow and treatment options.  We discussed both debridement, chronic suppressive antibiotic treatment, and implant retention versus radical debridement and hardware removal.    I am having the MRI from her hospital stay pushed to our system for review.  However given her clinical course and review of outside records,  it seems that the infection is quite aggressive.  I am not hopeful that improvement and implant retention will fully treat this infection, particularly given her complex surgical history on this elbow and her multiple medical comorbidities.  I did advise that she would be at risk of a recurrent infection and may ultimately need explant in the future.  She is not interested in this.    The patient is clearly suffering from the chronic drainage and pain from the infection. She just wants it resolved.  She wishes to proceed with explant of the prosthesis.  I did advise that this would result in a completely unstable elbow and that she would need to use a splint or brace afterwards for life.  She is willing to accept this.  She states she does not want the prosthesis reimplanted. Her HBA1c is now within acceptable limits so we will proceed.    The indications for surgery were discussed with the patient. The benefits, risks, and alternatives of operative management were discussed in detail with the patient. The patient understands that the risks of surgery include, but are not limited to: persistent infection, bleeding, injury to nearby structures (such as nerves, blood vessels, and tendons), elbow instability, need for additional surgery, pain, stiffness, scarring, need for rehabilitation, and anesthetic complications.  The patient understands that she has significant risk for for postoperative complicationsincluding diabetes, multiple medical comorbidities and a poor social situation.     Patient expressed understanding and elected to proceed with surgery. All questions were answered to the patient's satisfaction.    The patient states that she has felt quite fatigued and ill due to the significant drainage.  Her vitals appeared to be within normal limits and were not consistent with anemia. H/H also reassuring.    All questions answered.  The patient voiced understanding and agreement.    Marcos Sarmiento MD  Assistant  Professor Moya, Upper Extremity & Microvascular Surgery  Department of Orthopaedic Surgery  [facility]

## 2024-02-12 NOTE — LETTER
2/12/2024         RE: Mitali Robles  [address]  [facility] [address]  [place] [address]        Dear Colleague,    Thank you for referring your patient, Mitali Robles, to the [facility] [place]. Please see a copy of my visit note below.    Orthopaedic Surgery Hand and Upper Extremity Clinic H&P Note:  Date: Feb 12, 2024    Patient Name: Mitali Robles  MRN: [id number]    CHIEF COMPLAINT: Left elbow drainage, concern for deep prosthetic infection of TEA    Dominant Hand: Right  Occupation: None      HPI:  Ms. Mitali Robles is a 58 year old female right hand dominant with a very complex medical history who presents for follow-up regarding chronic left elbow drainage. Patient underwent a left TEA for post-traumatic arthritis 3/2/21 by Dr. Avery Barker. She had had multiple prior surgeries for fracture and dislocation in the past. She then underwent a left triceps tendon reconstruction with achilles allograft for rupture 4/27/23. She reports developing a chronic wound and drainage 4-5 months postop.    The patient was admitted to [facility] in [place], [place] for a worsening let elbow wound associated with drainage. The patient was taken to the OR by Dr. Morin who performed a superficial I&D, excision of sinus tract, and olecranon bursectomy on 9/23/23. Intraoperative cultures positive for Staph epi and candida albicans. Blood culture negative. Patient was treated with rocephin, vancomycin, and fluconazole. The patient was admitted for IV antibiotics for about 2 weeks after the surgery.     I last saw the patient October 30, 2023.  At that time I performed an aspiration of the elbow which revealed 24,485 WBCs, 97% PMNs.  Interestingly, all cultures returned negative, likely due to her chronic antibiotic use.  Her hemoglobin A1c was 10.6 and so she was not a surgical candidate at that time.  This is since improved to 6.9.  She is here to discuss options.    The elbow continues to cause  her considerable problems.  The patient was admitted 1/31/2024 to 2/8/2024 at [facility] in [place] as the elbow flexion was making her sick.  I did appear from notes that she was becoming septic, so she was taken for an irrigation and debridement 2/5/2024 with Dr. Gabriel.  Operative note is not available, however brief op note reports thick purulent material within the olecranon bursa.    She states 2 days after she got home, she has started to develop significant serosanguineous drainage again from the elbow wound.  She is saturating multiple bath towels a night.  Continues to have elbow pain.    The patient's records from her admission 9/22/23-10/6/23 and 1/31/24 to 2/8/24 were reviewed in detail in care everywhere. These included operative reports, ED notes, and infectious disease consult notes.    Surgical History:  1. 4/8/2016 - open reduction internal fixation of left elbow dislocation and left distal radius fracture  2. 4/21/2016 - surgical reduction of left elbow subluxation with repair of lateral ligament complex  3. 6/1/2017- reduction around the left elbow for stiffness and heterotropic ossification  4. 3/2/2021 - left total elbow arthroplasty for posttraumatic arthritis and left ulnar nerve neuropathy  5. 4/7/2021 - Reconstruction of left elbow triceps tendon with Achilles tendon allograft for rupture of the left triceps tendon following total elbow arthroplasty  6. 9/23/2023 - I&D for wound dehiscence and draining sinus tract of the left elbow with olecranon bursectomy and primary closure of the open wound of the left elbow   7. I&D left elbow    VITALS:  There were no vitals filed for this visit.    EXAM:  General: NAD, A&Ox3  HEENT: NC/AT  CV: RRR by peripheral pulse  Pulmonary: Non-labored breathing on RA  LUE:  Mild erythema of posterior elbow incision that is approximated with nylon sutures.  Serosanguineous drainage from the proximal aspect of the incision  No significant warmth of the elbow  Extension  limited to 45 (baseline), full flexion  Full pronation, supination  Minimal tenderness along the joint lines  Intact EPL, FPL, HI  SILT m/r/u  WWP CR< 2s      IMAGING:  XRs of the left elbow from 10/30/23 demonstrates total elbow prosthesis without any evidence of loosening. Cement mantle appears intact    MRI 2/4/24  IMPRESSION:   1. Complex periarticular fluid collections, posterior and lateral to the distal humerus, loculated collection measuring 5 x 2 cm in size is concerning for an abscess. Pockets of signal dropout within the fluid may represent gas relating to gas forming organism.   2.  There is an additional fluid collection noted to insinuate throughout the subcutaneous soft tissues dorsal to the forearm emanating from the region of the subcutaneous soft tissues of the olecranon. This could represent a bursal fluid collection, infected fluid collection difficult to exclude.   3.  There is edema identified within the radial neck. No obvious fracture is seen. No cortical destruction or obvious periosteal changes evident, findings may reflect osteomyelitis. No obvious fracture.   4.  There is fluid surrounding the radial head representing an elbow joint effusion, septic arthritis not excluded.   5.  Edematous changes noted to involve the musculature surrounding the elbow joint suggests infectious myositis without pyomyositis or pyotenosynovitis.     Labs today 2/12/24  CRP 37.30 --> 140  ESR 51 --> 59  WBC 10.9 --> 12.3  Hb/Hct 10.5/33.0    Synovial fluid 10/30/24  No crystals,   24,485 WBCs, 97% PMNs  Cultures negative    I have personally reviewed the above images and labs.       IMPRESSION AND RECOMMENDATIONS:  Ms. Mitali Robles is a 58 year old female right hand dominant with chronic left total elbow prosthetic infection, failing chronic suppressive antibiotic management.      I had a long discussion with the patient regarding the state of her elbow and treatment options.  We discussed both  debridement, chronic suppressive antibiotic treatment, and implant retention versus radical debridement and hardware removal.    I am having the MRI from her hospital stay pushed to our system for review.  However given her clinical course and review of outside records, it seems that the infection is quite aggressive.  I am not hopeful that improvement and implant retention will fully treat this infection, particularly given her complex surgical history on this elbow and her multiple medical comorbidities.  I did advise that she would be at risk of a recurrent infection and may ultimately need explant in the future.  She is not interested in this.    The patient is clearly suffering from the chronic drainage and pain from the infection. She just wants it resolved.  She wishes to proceed with explant of the prosthesis.  I did advise that this would result in a completely unstable elbow and that she would need to use a splint or brace afterwards for life.  She is willing to accept this.  She states she does not want the prosthesis reimplanted. Her HBA1c is now within acceptable limits so we will proceed.    The indications for surgery were discussed with the patient. The benefits, risks, and alternatives of operative management were discussed in detail with the patient. The patient understands that the risks of surgery include, but are not limited to: persistent infection, bleeding, injury to nearby structures (such as nerves, blood vessels, and tendons), elbow instability, need for additional surgery, pain, stiffness, scarring, need for rehabilitation, and anesthetic complications.  The patient understands that she has significant risk for for postoperative complicationsincluding diabetes, multiple medical comorbidities and a poor social situation.     Patient expressed understanding and elected to proceed with surgery. All questions were answered to the patient's satisfaction.    The patient states that she has felt  quite fatigued and ill due to the significant drainage.  Her vitals appeared to be within normal limits and were not consistent with anemia. H/H also reassuring.    All questions answered.  The patient voiced understanding and agreement.    Marcos Sarmiento MD    Hand, Upper Extremity & Microvascular Surgery  Department of Orthopaedic Surgery  [facility]

## 2024-02-12 NOTE — NURSING NOTE
Reason For Visit:   Chief Complaint   Patient presents with    RECHECK     Follow-up Septic Left Elbow post surgery 9/23/23       Primary MD: Alo Giang  Ref. MD: Mary    Age: 58 year old    ?  No      There were no vitals taken for this visit.      Pain Assessment  Patient Currently in Pain: Yes  0-10 Pain Scale: 10  Primary Pain Location: Elbow (left)  Pain Descriptors: Constant  Alleviating Factors: NSAIDS    Hand Dominance Evaluation  Hand Dominance: Right          QuickDASH Assessment      2/12/2024     3:29 PM   QuickDASH Main   1. Open a tight or new jar Severe difficulty   2. Do heavy household chores (e.g., wash walls, floors) Moderate difficulty   3. Carry a shopping bag or briefcase No difficulty   4. Wash your back Mild difficulty   5. Use a knife to cut food Moderate difficulty   6. Recreational activities in which you take some force or impact through your arm, shoulder or hand (e.g., golf, hammering, tennis, etc.) Severe difficulty   7. During the past week, to what extent has your arm, shoulder or hand problem interfered with your normal social activities with family, friends, neighbours or groups Extremely   8. During the past week, were you limited in your work or other regular daily activities as a result of your arm, shoulder or hand problem Unable   9. Arm, shoulder or hand pain Severe   10.Tingling (pins and needles) in your arm,shoulder or hand Moderate   11. During the past week, how much difficulty have you had sleeping because of the pain in your arm, shoulder or hand Moderate difficulty   Quickdash Ability Score 59.09          Current Outpatient Medications   Medication Sig Dispense Refill    albuterol (PROAIR HFA/PROVENTIL HFA/VENTOLIN HFA) 108 (90 BASE) MCG/ACT Inhaler Inhale 2 puffs into the lungs as needed      ALPRAZolam (XANAX) 0.25 MG tablet Take 0.25 mg by mouth 3 times daily      ALPRAZolam (XANAX) 1 MG tablet Take 2 tablets by mouth At Bedtime      atorvastatin  "(LIPITOR) 10 MG tablet Take 10 mg by mouth      citalopram (CELEXA) 40 MG tablet Take 40 mg by mouth 3 times daily      clonazePAM (KLONOPIN) 2 MG tablet       cloNIDine (CATAPRES) 0.2 MG tablet Take 0.2 mg by mouth 2 times daily      cloNIDine (CATAPRES) 0.3 MG tablet Take 0.3 mg by mouth 2 times daily      cyclobenzaprine (FLEXERIL) 10 MG tablet Take 10 mg by mouth 3 times daily      fenofibrate 48 MG tablet Take 48 mg by mouth once      omeprazole (PRILOSEC) 20 MG CR capsule Take 20 mg by mouth daily      ranitidine (ZANTAC) 150 MG tablet Take 150 mg by mouth 2 times daily      vitamin D (ERGOCALCIFEROL) 04377 UNIT capsule Take 50,000 Units by mouth daily         Allergies   Allergen Reactions    Hydromorphone Anaphylaxis     hallucinations    Morphine Anaphylaxis     Throat swells shut  **Has taken hydrocodone/APAP and hydromorphone in the past during previous hospitalizations with no issues.  Takes oxycodone at home    Succinylcholine Shortness Of Breath     \"it affected my breathing\"    Fentanyl     Methadone Nausea and Vomiting    Prednisone Swelling     \"it overrides my psych meds and makes me crazy\"    Pregabalin     Quetiapine     Trazodone     Sumatriptan Rash       MARIVEL BARRETT, ATC    "

## 2024-02-13 ENCOUNTER — TELEPHONE (OUTPATIENT)
Dept: ORTHOPEDICS | Facility: CLINIC | Age: 59
End: 2024-02-13

## 2024-02-13 NOTE — TELEPHONE ENCOUNTER
Patient has been scheduled for surgery. Details are below.    Date of Surgery: 03/13/24    Approximate Arrival Time: SURGERY CENTER WILL CALL 3/4 DAYS PRIOR TO CONFIRM A TIME   Surgeon:  DR. ALEXANDRA CHA     Procedure: EXPLANT LEFT TOTAL ELBOW ARTHROPLASTY , IRRIGATION AND DEBRIDEMENT, ELBOW, POSSIBLE ANTIBIOTIC SPACER INSERTION   Location: [facility].  [address] [place] [id number]  Surgery Consult: NA  PreOp Physical: CALLING PCP IN [place]  PostOp: 03/29/24  Packet Mailed/MyChart Sent: YES  Added to [facility]: YES

## 2024-02-26 ENCOUNTER — TELEPHONE (OUTPATIENT)
Dept: ORTHOPEDICS | Facility: CLINIC | Age: 59
End: 2024-02-26
Payer: COMMERCIAL

## 2024-02-26 NOTE — TELEPHONE ENCOUNTER
[facility] Call Center    Phone Message    May a detailed message be left on voicemail: no     Reason for Call: VICTORIA having pre op on 02/27/24 @ [facility]

## 2024-03-07 ENCOUNTER — TELEPHONE (OUTPATIENT)
Dept: ORTHOPEDICS | Facility: CLINIC | Age: 59
End: 2024-03-07
Payer: COMMERCIAL

## 2024-03-07 NOTE — TELEPHONE ENCOUNTER
"Spoke with Mitali.  She states she has a medical ride from [place] scheduled for 3-12-24 with an agency called \"[employer]\".  Surgery 3-13-24 at [facility].    The ride will bring her to a hotel so she can get to surgery the next morning.   She does not have access to internet right now, her phone was not working right to find lodging.      Assisted patient with this.  Gave names and numbers of several places, but she settled on options of 2 hotels in the area.  She will call for a reservation.    She was informed she will need to arrange her ride to the hospital on day of surgery.     She states she does not have a ride after surgery, \"Dr. Sarmiento said I will be in the hospital for about 3-5 days and then go back to [place].\"    RN could not find documentation to support this.  Will patient need a hospital bed, and for how long?  If not then she needs to have the medical ride wait and make other arrangements.   Hailee Oliveira RN        "

## 2024-03-07 NOTE — TELEPHONE ENCOUNTER
[facility] Call Center    Phone Message    May a detailed message be left on voicemail: yes     Reason for Call: Other: Patient would like a call back to discuss lodging.     Action Taken: Message routed to:  [facility] ([facility] Orthopedics    Travel Screening: Not Applicable

## 2024-03-07 NOTE — TELEPHONE ENCOUNTER
"Patient has Medicaid through [place]. States her surgery has been pre-approved.       [place] is sending her down here on the 12th through \"[employer] ride.  It costs $1000 but is reimbursable through Medicaid. She does not have a family member to drive her back.     She is on fixed income with social security, only has $1300/month for living expenses and cannot afford a hotel the night before surgery.     On top of this she thought she was told by [place] that the [facility]  would be [facility] her back to [place], but this is listed as a same day surgery, so she is not sure if she will be admitted to the hospital now, or need transportation home.      She is now wondering if this surgery should be scheduled still or moved out to figure out the rides, etc.  Should she be admitted 1 day prior to surgery to help her get prepped?      I told her not to cancel anything yet until I can speak with provider. Patient agrees. Hailee Oliveira RN    "

## 2024-03-07 NOTE — TELEPHONE ENCOUNTER
Pt called with numerous pre surgery concerns.     Pt is not sure how she going to take the pre surgery shower, because she is traveling and cannot afford hotel accomodation at this time.    Pt is also concerned about POST OP transportation, from [place] to [place]. Pt needs transportation from [place] to [place] ([place]).    Please CALL pt to discuss her concerns. Thank you.    Marked as HIGH PRIORITY, due to timing.

## 2024-03-08 ENCOUNTER — TELEPHONE (OUTPATIENT)
Dept: ORTHOPEDICS | Facility: CLINIC | Age: 59
End: 2024-03-08
Payer: COMMERCIAL

## 2024-03-08 NOTE — TELEPHONE ENCOUNTER
Phoned patient back regarding question about pre-op showering.    Patient is scheduled for a left total elbow explant arthroplasty, I&D of elbow and possible abx spacer placement DOS 3/13/24.    Writer clarified that she should take a shower at home before she arrives with the special soap and we will help her with the 2nd scrub once she arrives.  Writer explained to patient she will need to take precautions to avoid her PICC line dressing from getting wet.  Writer explained importance of keeping this area dry.  Writer also explained that after surgery she will likely be admitted to the hospital for a few days or until she is medically stable to go home. Reassured her that we have a team of social workers and care coordinators in the hospital setting that will work on a safe discharge home including medical transportation.    Patient inquires whether the PICC line would be removed at that time. Writer educated patient that PICC antibiotic plan will be determined by infectious disease and they should let her know once it can be removed.  Patient verbalized understanding of this and has no further questions.    Yuliya Bello RN on 3/8/2024 at 12:36 PM

## 2024-03-08 NOTE — TELEPHONE ENCOUNTER
Incoming call from [facility] again to discuss patient's surgery. Yuliya Bello RN took call and discussed PICC line placement and indications. Discussed patient's NPO status, admission to the hospital, and potential discharge disposition. Discussed that social work will meet with patient in the hospital to discuss medical ride coverage, safe discharge planning, etc.     Argelia Vallejo RN

## 2024-03-08 NOTE — TELEPHONE ENCOUNTER
[facility] Call Center    Phone Message    May a detailed message be left on voicemail: yes     Reason for Call: Other: Mitali is calling because she was told by her infectious disease doctor that there is a place for her to shower and use the special soap prior to surgery. Is this true? Please call patient to discuss     Action Taken: Other: [facility]    Travel Screening: Not Applicable

## 2024-03-08 NOTE — TELEPHONE ENCOUNTER
Dr. Suresh Dominguez, Infectious Disease, called to update Dr. Sarmiento and Shruthi Badillo about patient. Please call him at [phone]     Argelia Vallejo RN

## 2024-03-11 NOTE — TELEPHONE ENCOUNTER
Per Dr Sarmiento, will be admitting after her procedure. Length of stay not pre-determined, surgeon suspects will need at least 5 days for final antibiotic recommendation and coordination. She will also need PT/OT and anticipate she will need a TCU.   Dr Sarmiento called and spoke with this patient over the phone about this today. Hailee Oliveira RN         18

## 2024-03-11 NOTE — TELEPHONE ENCOUNTER
[facility] Call Center    Phone Message    May a detailed message be left on voicemail: yes     Reason for Call: Other: April calling with [place], needs CPT code and additional information about pt upcoming surgery. She needs to submit this to the sate within 20 minutes. Please call her back at [phone].      Action Taken: Message routed to:  [facility]    Travel Screening: Not Applicable                                                                   
[facility] Call [facility]    Phone Message    May a detailed message be left on voicemail: yes     Reason for Call: Other: Angela from [facility] Nurse care manager called to confirm the DOS appt. Per pt told Angela that surgery appt was next month, would like clarification from the care team asap. [phone]     Action Taken: Other: te    Travel Screening: Not Applicable                                                                   
Spoke with Uyen Roberts RN Care Coordinator at [facility] @ [phone]. Informed her that the surgery scheduled for 3-13-24 is requiring some additional planning, and that we are moving the surgery date later.  She is the nurse in the clinic there that is coordinating everything pre-operatively.  As soon as we have a new date Uyen will be informed.    Nurse Shanelle from [facility] called back during this call and left a message for RN to return her call at [phone]. Spoke with nurse Dale.  She had obtained the codes for the surgery and does not need anything further at this time. Hailee Oliveira, RN      
no chest pain, no cough, and no shortness of breath.

## 2024-03-15 ENCOUNTER — TELEPHONE (OUTPATIENT)
Dept: ORTHOPEDICS | Facility: CLINIC | Age: 59
End: 2024-03-15
Payer: COMMERCIAL

## 2024-03-15 NOTE — TELEPHONE ENCOUNTER
[facility] Call Center    Phone Message    May a detailed message be left on voicemail: yes     Reason for Call: Sincere Stephen... Patient calling Back . She asking for Call Back about Surgery Date. She Stated She Need to Set up for Ride Since She live [place]. Please call Patient        Action Taken: Message routed to:  [facility]    Travel Screening: Not Applicable

## 2024-03-20 NOTE — TELEPHONE ENCOUNTER
[facility] Call [facility]    Phone Message    May a detailed message be left on voicemail: yes     Reason for Call: Other: pt calling would like to schedule surgery date. Pt is needing to set up for ride. Please call pt.      Action Taken: Other: te    Travel Screening: Not Applicable

## 2024-03-26 ENCOUNTER — TELEPHONE (OUTPATIENT)
Dept: ORTHOPEDICS | Facility: CLINIC | Age: 59
End: 2024-03-26
Payer: COMMERCIAL

## 2024-03-26 NOTE — TELEPHONE ENCOUNTER
Called Uyen thomas and shruthim to fax form requested to ortho fax number, provided direct number to call back with questions.

## 2024-03-26 NOTE — TELEPHONE ENCOUNTER
Uyen Walton is calling with other information they need medcaid ID number and , the admission history and physical, the surgery date, any follow up appt that are scheduled now. Please get faxed today or tomorrrow at [phone] [facility]

## 2024-03-26 NOTE — TELEPHONE ENCOUNTER
Uyen calling in today to get date/time/info for pt's upcoming surgery with Dr. Sarmiento. For some reason, she's unable to see it in Epic. Please call her back

## 2024-03-26 NOTE — TELEPHONE ENCOUNTER
"Uyen calling back to give us an update. Per caller, she spoke with [employer]. Per the [employer] rep, [facility] needs to fill out a \"out of state medical care form\". Surgery will not be approved until this is filled. Please call Uyen back for further details on how to fill out this form properly. Once filled out, it needs to be faxed to [phone]  "

## 2024-04-05 ENCOUNTER — TELEPHONE (OUTPATIENT)
Dept: ORTHOPEDICS | Facility: CLINIC | Age: 59
End: 2024-04-05
Payer: COMMERCIAL

## 2024-04-05 NOTE — TELEPHONE ENCOUNTER
Question regarding specialist protocol  Please follow protocols- only utilize this documentation for questions or concerns that are not clear in the protocol.  Contact clinic directly to clarify question(s) via phone or Appifier message.   Was Clinic Available: no  Question regarding protocol:  Question is does pt arrive 2 or 3 hours early.  Also needs review on when to shower and apply Hibiclens  Is there a referral for the requested specialist/specialty? yes  Name of referring provider: Dr. Sarmiento is surgeon  Location of referring provider: [facility]    Could we send this information to you in [email or web address] or would you prefer to receive a phone call?:   Patient would prefer a phone call   Okay to leave a detailed message?: Yes at Other phone number:  Nurse care manager is calling, Uyen from [facility] is at  [phone].  She is needing to set up a medical assistance ride.  Also pt is stress and she wants to review the prep for cleaning.

## 2024-04-05 NOTE — TELEPHONE ENCOUNTER
Called and spoke to Uyen. Discussed that if patient is traveling from out of state the AM of the procedure she should aim to arrive 3 hours prior to surgery to account for any unexpected delays.     The patient should shower with surgical scrub the night before the procedure. If she is unable to shower the morning of due to travel, the second scrub can be completed in the pre op area.     Uyen reports that they are working on finding the patient a ride to [place] as well as the option for a hotel room the night before surgery. Uyen will contact the clinic should the patient be arriving the night before.    CHANG BULL PA-C  Orthopaedic Surgery

## 2024-04-09 ENCOUNTER — TELEPHONE (OUTPATIENT)
Dept: ORTHOPEDICS | Facility: CLINIC | Age: 59
End: 2024-04-09
Payer: COMMERCIAL

## 2024-04-09 ENCOUNTER — ANESTHESIA EVENT (OUTPATIENT)
Dept: SURGERY | Facility: CLINIC | Age: 59
DRG: 463 | End: 2024-04-09
Payer: COMMERCIAL

## 2024-04-09 NOTE — TELEPHONE ENCOUNTER
"3:29 pm  Uyen LEIVA called back to say she has the patient on the other line.  She was concerned when she heard patient wasn't answering her calls today.    Spoke with Mitali.  Verified she is ready for surgery.  She is packing her things now, has her instructions on fasting and showering.  She states she has a ride arranged for 4:45 AM.      The only thing she wanted to say was that it's almost impossible to start an IV on her.  She remembers \"26 tries once\".  She said someone mentioned that she was going to have a port placed.     She had no further questions.  Advised her that if she does end up coming a little early for her surgery it's OK to try to check in early.  She will do this.      Dr Sarmiento informed that patient confirmed her arrival for tomorrow. Hailee Oliveira RN      "

## 2024-04-09 NOTE — TELEPHONE ENCOUNTER
Dr Sarmiento asking RN if patient ready for tomorrow, informed him of Uyen's call and that patient wasn't answering.   Attempted to reach patient again now but she did not answer. Hailee Oliveira RN

## 2024-04-09 NOTE — TELEPHONE ENCOUNTER
Left voice mail for Uyen Roberts RN just to see if there were any more details about Mitali today, since she has not been answering her phone.  Asked if Uyen might be able to call back today if possible. Hailee Oliveira RN

## 2024-04-09 NOTE — TELEPHONE ENCOUNTER
"Uyen Roberts RN Care Coordinator at patient's clinic in [place] called to leave a voice mail for RN that Mitali is \"very sure\" she will be there for surgery with Dr Sarmiento on 4-10-24.   She states patient plans to arrive to surgery center by 10 AM.  She was just leaving this as an FYI.  Hailee Oliveira RN    4-9-24 11:34 AM  Left voice mail back for Uyen, thanking her for all of the work, if there is anything new today that she needs to let us know please contact us.  Hailee Oliveira RN    4-9-24 11:36 AM  Left voice mail for Mitali that we are expecting her at 10 AM for surgery tomorrow, and if she has any questions to call the RN here, left RN direct number. Hailee Oliveira RN    "

## 2024-04-10 ENCOUNTER — HOSPITAL ENCOUNTER (INPATIENT)
Facility: CLINIC | Age: 59
LOS: 12 days | Discharge: NURSING, REHAB OR LONG-TERM CARE | DRG: 463 | End: 2024-04-22
Attending: STUDENT IN AN ORGANIZED HEALTH CARE EDUCATION/TRAINING PROGRAM | Admitting: STUDENT IN AN ORGANIZED HEALTH CARE EDUCATION/TRAINING PROGRAM
Payer: COMMERCIAL

## 2024-04-10 ENCOUNTER — ANESTHESIA (OUTPATIENT)
Dept: SURGERY | Facility: CLINIC | Age: 59
DRG: 463 | End: 2024-04-10
Payer: COMMERCIAL

## 2024-04-10 ENCOUNTER — APPOINTMENT (OUTPATIENT)
Dept: GENERAL RADIOLOGY | Facility: CLINIC | Age: 59
DRG: 463 | End: 2024-04-10
Attending: STUDENT IN AN ORGANIZED HEALTH CARE EDUCATION/TRAINING PROGRAM
Payer: COMMERCIAL

## 2024-04-10 DIAGNOSIS — Z98.890 STATUS POST SURGERY: Primary | ICD-10-CM

## 2024-04-10 DIAGNOSIS — T84.50XA INFECTION OF TOTAL JOINT PROSTHESIS, INITIAL ENCOUNTER (H): ICD-10-CM

## 2024-04-10 LAB
GLUCOSE BLDC GLUCOMTR-MCNC: 105 MG/DL (ref 70–99)
GLUCOSE BLDC GLUCOMTR-MCNC: 158 MG/DL (ref 70–99)
GLUCOSE BLDC GLUCOMTR-MCNC: 159 MG/DL (ref 70–99)
GRAM STAIN RESULT: NORMAL

## 2024-04-10 PROCEDURE — 370N000017 HC ANESTHESIA TECHNICAL FEE, PER MIN: Performed by: STUDENT IN AN ORGANIZED HEALTH CARE EDUCATION/TRAINING PROGRAM

## 2024-04-10 PROCEDURE — 999N000180 XR SURGERY CARM FLUORO LESS THAN 5 MIN: Mod: TC

## 2024-04-10 PROCEDURE — 250N000013 HC RX MED GY IP 250 OP 250 PS 637: Performed by: ANESTHESIOLOGY

## 2024-04-10 PROCEDURE — 64415 NJX AA&/STRD BRCH PLXS IMG: CPT | Mod: 59 | Performed by: ANESTHESIOLOGY

## 2024-04-10 PROCEDURE — 0MB40ZX EXCISION OF LEFT ELBOW BURSA AND LIGAMENT, OPEN APPROACH, DIAGNOSTIC: ICD-10-PCS | Performed by: STUDENT IN AN ORGANIZED HEALTH CARE EDUCATION/TRAINING PROGRAM

## 2024-04-10 PROCEDURE — 250N000011 HC RX IP 250 OP 636: Performed by: NURSE ANESTHETIST, CERTIFIED REGISTERED

## 2024-04-10 PROCEDURE — 120N000002 HC R&B MED SURG/OB UMMC

## 2024-04-10 PROCEDURE — 999N000065 XR ELBOW LEFT 2 VIEWS: Mod: LT

## 2024-04-10 PROCEDURE — 0RPM0JZ REMOVAL OF SYNTHETIC SUBSTITUTE FROM LEFT ELBOW JOINT, OPEN APPROACH: ICD-10-PCS | Performed by: STUDENT IN AN ORGANIZED HEALTH CARE EDUCATION/TRAINING PROGRAM

## 2024-04-10 PROCEDURE — P9045 ALBUMIN (HUMAN), 5%, 250 ML: HCPCS | Mod: JZ | Performed by: NURSE ANESTHETIST, CERTIFIED REGISTERED

## 2024-04-10 PROCEDURE — 250N000011 HC RX IP 250 OP 636: Performed by: ANESTHESIOLOGY

## 2024-04-10 PROCEDURE — 20670 REMOVAL IMPLANT SUPERFICIAL: CPT | Performed by: STUDENT IN AN ORGANIZED HEALTH CARE EDUCATION/TRAINING PROGRAM

## 2024-04-10 PROCEDURE — 87075 CULTR BACTERIA EXCEPT BLOOD: CPT | Performed by: STUDENT IN AN ORGANIZED HEALTH CARE EDUCATION/TRAINING PROGRAM

## 2024-04-10 PROCEDURE — 87102 FUNGUS ISOLATION CULTURE: CPT | Performed by: STUDENT IN AN ORGANIZED HEALTH CARE EDUCATION/TRAINING PROGRAM

## 2024-04-10 PROCEDURE — 82962 GLUCOSE BLOOD TEST: CPT

## 2024-04-10 PROCEDURE — 0JBH0ZZ EXCISION OF LEFT LOWER ARM SUBCUTANEOUS TISSUE AND FASCIA, OPEN APPROACH: ICD-10-PCS | Performed by: STUDENT IN AN ORGANIZED HEALTH CARE EDUCATION/TRAINING PROGRAM

## 2024-04-10 PROCEDURE — 258N000001 HC RX 258: Performed by: STUDENT IN AN ORGANIZED HEALTH CARE EDUCATION/TRAINING PROGRAM

## 2024-04-10 PROCEDURE — 258N000003 HC RX IP 258 OP 636: Performed by: ANESTHESIOLOGY

## 2024-04-10 PROCEDURE — 250N000025 HC SEVOFLURANE, PER MIN: Performed by: STUDENT IN AN ORGANIZED HEALTH CARE EDUCATION/TRAINING PROGRAM

## 2024-04-10 PROCEDURE — 01N40ZZ RELEASE ULNAR NERVE, OPEN APPROACH: ICD-10-PCS | Performed by: STUDENT IN AN ORGANIZED HEALTH CARE EDUCATION/TRAINING PROGRAM

## 2024-04-10 PROCEDURE — 258N000003 HC RX IP 258 OP 636: Performed by: NURSE ANESTHETIST, CERTIFIED REGISTERED

## 2024-04-10 PROCEDURE — 710N000010 HC RECOVERY PHASE 1, LEVEL 2, PER MIN: Performed by: STUDENT IN AN ORGANIZED HEALTH CARE EDUCATION/TRAINING PROGRAM

## 2024-04-10 PROCEDURE — 250N000011 HC RX IP 250 OP 636: Performed by: STUDENT IN AN ORGANIZED HEALTH CARE EDUCATION/TRAINING PROGRAM

## 2024-04-10 PROCEDURE — 87070 CULTURE OTHR SPECIMN AEROBIC: CPT | Performed by: STUDENT IN AN ORGANIZED HEALTH CARE EDUCATION/TRAINING PROGRAM

## 2024-04-10 PROCEDURE — C9290 INJ, BUPIVACAINE LIPOSOME: HCPCS | Performed by: ANESTHESIOLOGY

## 2024-04-10 PROCEDURE — 87205 SMEAR GRAM STAIN: CPT | Performed by: STUDENT IN AN ORGANIZED HEALTH CARE EDUCATION/TRAINING PROGRAM

## 2024-04-10 PROCEDURE — 20670 REMOVAL IMPLANT SUPERFICIAL: CPT | Performed by: NURSE ANESTHETIST, CERTIFIED REGISTERED

## 2024-04-10 PROCEDURE — 250N000009 HC RX 250: Performed by: ANESTHESIOLOGY

## 2024-04-10 PROCEDURE — 250N000009 HC RX 250: Performed by: NURSE ANESTHETIST, CERTIFIED REGISTERED

## 2024-04-10 PROCEDURE — 250N000011 HC RX IP 250 OP 636: Performed by: PHYSICIAN ASSISTANT

## 2024-04-10 PROCEDURE — 99205 OFFICE O/P NEW HI 60 MIN: CPT | Performed by: INTERNAL MEDICINE

## 2024-04-10 PROCEDURE — 360N000083 HC SURGERY LEVEL 3 W/ FLUORO, PER MIN: Performed by: STUDENT IN AN ORGANIZED HEALTH CARE EDUCATION/TRAINING PROGRAM

## 2024-04-10 PROCEDURE — 272N000001 HC OR GENERAL SUPPLY STERILE: Performed by: STUDENT IN AN ORGANIZED HEALTH CARE EDUCATION/TRAINING PROGRAM

## 2024-04-10 PROCEDURE — 250N000009 HC RX 250: Performed by: STUDENT IN AN ORGANIZED HEALTH CARE EDUCATION/TRAINING PROGRAM

## 2024-04-10 PROCEDURE — 0RHM08Z INSERTION OF SPACER INTO LEFT ELBOW JOINT, OPEN APPROACH: ICD-10-PCS | Performed by: STUDENT IN AN ORGANIZED HEALTH CARE EDUCATION/TRAINING PROGRAM

## 2024-04-10 PROCEDURE — 250N000011 HC RX IP 250 OP 636: Mod: JZ | Performed by: NURSE ANESTHETIST, CERTIFIED REGISTERED

## 2024-04-10 PROCEDURE — C1713 ANCHOR/SCREW BN/BN,TIS/BN: HCPCS | Performed by: STUDENT IN AN ORGANIZED HEALTH CARE EDUCATION/TRAINING PROGRAM

## 2024-04-10 PROCEDURE — 999N000141 HC STATISTIC PRE-PROCEDURE NURSING ASSESSMENT: Performed by: STUDENT IN AN ORGANIZED HEALTH CARE EDUCATION/TRAINING PROGRAM

## 2024-04-10 DEVICE — IMPLANTABLE DEVICE: Type: IMPLANTABLE DEVICE | Site: ELBOW | Status: FUNCTIONAL

## 2024-04-10 DEVICE — TOBRA FULL DOSE ANTIBIOTIC BONE CEMENT, 10 PACK CATALOG NUMBER IS 6197-9-010
Type: IMPLANTABLE DEVICE | Site: ELBOW | Status: FUNCTIONAL
Brand: SIMPLEX

## 2024-04-10 DEVICE — WIRE SURGICAL STEEL 18GA DS-18: Type: IMPLANTABLE DEVICE | Site: ELBOW | Status: FUNCTIONAL

## 2024-04-10 RX ORDER — METOPROLOL TARTRATE 1 MG/ML
1-2 INJECTION, SOLUTION INTRAVENOUS EVERY 5 MIN PRN
Status: DISCONTINUED | OUTPATIENT
Start: 2024-04-10 | End: 2024-04-11 | Stop reason: HOSPADM

## 2024-04-10 RX ORDER — KETOROLAC TROMETHAMINE 15 MG/ML
15 INJECTION, SOLUTION INTRAMUSCULAR; INTRAVENOUS EVERY 6 HOURS
Qty: 4 ML | Refills: 0 | Status: COMPLETED | OUTPATIENT
Start: 2024-04-11 | End: 2024-04-11

## 2024-04-10 RX ORDER — HALOPERIDOL 5 MG/ML
1 INJECTION INTRAMUSCULAR
Status: DISCONTINUED | OUTPATIENT
Start: 2024-04-10 | End: 2024-04-11 | Stop reason: HOSPADM

## 2024-04-10 RX ORDER — NALOXONE HYDROCHLORIDE 0.4 MG/ML
0.2 INJECTION, SOLUTION INTRAMUSCULAR; INTRAVENOUS; SUBCUTANEOUS
Status: DISCONTINUED | OUTPATIENT
Start: 2024-04-10 | End: 2024-04-10 | Stop reason: HOSPADM

## 2024-04-10 RX ORDER — KETOROLAC TROMETHAMINE 30 MG/ML
INJECTION, SOLUTION INTRAMUSCULAR; INTRAVENOUS PRN
Status: DISCONTINUED | OUTPATIENT
Start: 2024-04-10 | End: 2024-04-10

## 2024-04-10 RX ORDER — ESMOLOL HYDROCHLORIDE 10 MG/ML
INJECTION INTRAVENOUS PRN
Status: DISCONTINUED | OUTPATIENT
Start: 2024-04-10 | End: 2024-04-10

## 2024-04-10 RX ORDER — GABAPENTIN 600 MG/1
600 TABLET ORAL 3 TIMES DAILY
Status: DISCONTINUED | OUTPATIENT
Start: 2024-04-11 | End: 2024-04-11

## 2024-04-10 RX ORDER — FLUMAZENIL 0.1 MG/ML
0.2 INJECTION, SOLUTION INTRAVENOUS
Status: DISCONTINUED | OUTPATIENT
Start: 2024-04-10 | End: 2024-04-10 | Stop reason: HOSPADM

## 2024-04-10 RX ORDER — DEXMEDETOMIDINE HYDROCHLORIDE 4 UG/ML
.1-1.2 INJECTION, SOLUTION INTRAVENOUS CONTINUOUS
Status: DISCONTINUED | OUTPATIENT
Start: 2024-04-10 | End: 2024-04-10 | Stop reason: HOSPADM

## 2024-04-10 RX ORDER — DEXAMETHASONE SODIUM PHOSPHATE 4 MG/ML
INJECTION, SOLUTION INTRA-ARTICULAR; INTRALESIONAL; INTRAMUSCULAR; INTRAVENOUS; SOFT TISSUE PRN
Status: DISCONTINUED | OUTPATIENT
Start: 2024-04-10 | End: 2024-04-10

## 2024-04-10 RX ORDER — CEFAZOLIN SODIUM/WATER 2 G/20 ML
2 SYRINGE (ML) INTRAVENOUS SEE ADMIN INSTRUCTIONS
Status: DISCONTINUED | OUTPATIENT
Start: 2024-04-10 | End: 2024-04-10 | Stop reason: HOSPADM

## 2024-04-10 RX ORDER — ONDANSETRON 4 MG/1
4 TABLET, ORALLY DISINTEGRATING ORAL EVERY 6 HOURS PRN
Status: DISCONTINUED | OUTPATIENT
Start: 2024-04-10 | End: 2024-04-22 | Stop reason: HOSPADM

## 2024-04-10 RX ORDER — CEFAZOLIN SODIUM 2 G/100ML
2 INJECTION, SOLUTION INTRAVENOUS EVERY 8 HOURS
Qty: 1200 ML | Refills: 0 | Status: DISCONTINUED | OUTPATIENT
Start: 2024-04-11 | End: 2024-04-12

## 2024-04-10 RX ORDER — NALOXONE HYDROCHLORIDE 0.4 MG/ML
0.4 INJECTION, SOLUTION INTRAMUSCULAR; INTRAVENOUS; SUBCUTANEOUS
Status: DISCONTINUED | OUTPATIENT
Start: 2024-04-10 | End: 2024-04-10 | Stop reason: HOSPADM

## 2024-04-10 RX ORDER — INSULIN ASPART 100 [IU]/ML
4-12 INJECTION, SOLUTION INTRAVENOUS; SUBCUTANEOUS
COMMUNITY

## 2024-04-10 RX ORDER — ACETAMINOPHEN 325 MG/1
650 TABLET ORAL EVERY 4 HOURS PRN
Status: DISCONTINUED | OUTPATIENT
Start: 2024-04-13 | End: 2024-04-22 | Stop reason: HOSPADM

## 2024-04-10 RX ORDER — SODIUM CHLORIDE, SODIUM LACTATE, POTASSIUM CHLORIDE, CALCIUM CHLORIDE 600; 310; 30; 20 MG/100ML; MG/100ML; MG/100ML; MG/100ML
INJECTION, SOLUTION INTRAVENOUS CONTINUOUS
Status: ACTIVE | OUTPATIENT
Start: 2024-04-11 | End: 2024-04-11

## 2024-04-10 RX ORDER — ONDANSETRON 2 MG/ML
4 INJECTION INTRAMUSCULAR; INTRAVENOUS EVERY 6 HOURS PRN
Status: DISCONTINUED | OUTPATIENT
Start: 2024-04-10 | End: 2024-04-22 | Stop reason: HOSPADM

## 2024-04-10 RX ORDER — ASPIRIN 81 MG/1
162 TABLET ORAL DAILY
Status: DISCONTINUED | OUTPATIENT
Start: 2024-04-11 | End: 2024-04-22 | Stop reason: HOSPADM

## 2024-04-10 RX ORDER — DIPHENHYDRAMINE HCL 25 MG
25 CAPSULE ORAL EVERY 6 HOURS PRN
Status: ON HOLD | COMMUNITY
End: 2024-05-23

## 2024-04-10 RX ORDER — LIDOCAINE HYDROCHLORIDE 20 MG/ML
INJECTION, SOLUTION INFILTRATION; PERINEURAL PRN
Status: DISCONTINUED | OUTPATIENT
Start: 2024-04-10 | End: 2024-04-10

## 2024-04-10 RX ORDER — ACETAMINOPHEN 325 MG/1
975 TABLET ORAL ONCE
Status: COMPLETED | OUTPATIENT
Start: 2024-04-10 | End: 2024-04-11

## 2024-04-10 RX ORDER — BISACODYL 10 MG
10 SUPPOSITORY, RECTAL RECTAL DAILY PRN
Status: DISCONTINUED | OUTPATIENT
Start: 2024-04-13 | End: 2024-04-22 | Stop reason: HOSPADM

## 2024-04-10 RX ORDER — DIAZEPAM 10 MG
10 TABLET ORAL
Status: DISCONTINUED | OUTPATIENT
Start: 2024-04-11 | End: 2024-04-15

## 2024-04-10 RX ORDER — MAGNESIUM SULFATE HEPTAHYDRATE 40 MG/ML
2 INJECTION, SOLUTION INTRAVENOUS ONCE
Status: COMPLETED | OUTPATIENT
Start: 2024-04-10 | End: 2024-04-10

## 2024-04-10 RX ORDER — ACETAMINOPHEN 325 MG/1
975 TABLET ORAL EVERY 8 HOURS
Qty: 27 TABLET | Refills: 0 | Status: COMPLETED | OUTPATIENT
Start: 2024-04-11 | End: 2024-04-13

## 2024-04-10 RX ORDER — VANCOMYCIN HYDROCHLORIDE 1 G/20ML
INJECTION, POWDER, LYOPHILIZED, FOR SOLUTION INTRAVENOUS PRN
Status: DISCONTINUED | OUTPATIENT
Start: 2024-04-10 | End: 2024-04-10 | Stop reason: HOSPADM

## 2024-04-10 RX ORDER — TOBRAMYCIN 1.2 G/30ML
INJECTION, POWDER, LYOPHILIZED, FOR SOLUTION INTRAVENOUS PRN
Status: DISCONTINUED | OUTPATIENT
Start: 2024-04-10 | End: 2024-04-10 | Stop reason: HOSPADM

## 2024-04-10 RX ORDER — LORAZEPAM 2 MG/ML
0.5 INJECTION INTRAMUSCULAR ONCE
Status: COMPLETED | OUTPATIENT
Start: 2024-04-10 | End: 2024-04-10

## 2024-04-10 RX ORDER — ALPRAZOLAM 0.25 MG
0.25 TABLET ORAL 3 TIMES DAILY
Status: DISCONTINUED | OUTPATIENT
Start: 2024-04-11 | End: 2024-04-15

## 2024-04-10 RX ORDER — GLYCOPYRROLATE 0.2 MG/ML
INJECTION, SOLUTION INTRAMUSCULAR; INTRAVENOUS PRN
Status: DISCONTINUED | OUTPATIENT
Start: 2024-04-10 | End: 2024-04-10

## 2024-04-10 RX ORDER — SODIUM CHLORIDE, SODIUM LACTATE, POTASSIUM CHLORIDE, CALCIUM CHLORIDE 600; 310; 30; 20 MG/100ML; MG/100ML; MG/100ML; MG/100ML
INJECTION, SOLUTION INTRAVENOUS CONTINUOUS
Status: DISCONTINUED | OUTPATIENT
Start: 2024-04-10 | End: 2024-04-11 | Stop reason: HOSPADM

## 2024-04-10 RX ORDER — ONDANSETRON 2 MG/ML
INJECTION INTRAMUSCULAR; INTRAVENOUS PRN
Status: DISCONTINUED | OUTPATIENT
Start: 2024-04-10 | End: 2024-04-10

## 2024-04-10 RX ORDER — DIAZEPAM 10 MG
30 TABLET ORAL AT BEDTIME
Status: ON HOLD | COMMUNITY
End: 2024-04-22

## 2024-04-10 RX ORDER — PROPOFOL 10 MG/ML
INJECTION, EMULSION INTRAVENOUS CONTINUOUS PRN
Status: DISCONTINUED | OUTPATIENT
Start: 2024-04-10 | End: 2024-04-10

## 2024-04-10 RX ORDER — PROPOFOL 10 MG/ML
INJECTION, EMULSION INTRAVENOUS PRN
Status: DISCONTINUED | OUTPATIENT
Start: 2024-04-10 | End: 2024-04-10

## 2024-04-10 RX ORDER — METHOCARBAMOL 500 MG/1
500 TABLET, FILM COATED ORAL EVERY 6 HOURS PRN
Status: DISCONTINUED | OUTPATIENT
Start: 2024-04-10 | End: 2024-04-22 | Stop reason: HOSPADM

## 2024-04-10 RX ORDER — BUPIVACAINE HYDROCHLORIDE 2.5 MG/ML
INJECTION, SOLUTION EPIDURAL; INFILTRATION; INTRACAUDAL
Status: COMPLETED | OUTPATIENT
Start: 2024-04-10 | End: 2024-04-10

## 2024-04-10 RX ORDER — ONDANSETRON 2 MG/ML
4 INJECTION INTRAMUSCULAR; INTRAVENOUS ONCE
Status: COMPLETED | OUTPATIENT
Start: 2024-04-10 | End: 2024-04-10

## 2024-04-10 RX ORDER — METOCLOPRAMIDE 5 MG/1
5 TABLET ORAL 4 TIMES DAILY PRN
Status: ON HOLD | COMMUNITY
End: 2024-04-22

## 2024-04-10 RX ORDER — FENTANYL CITRATE 50 UG/ML
25-50 INJECTION, SOLUTION INTRAMUSCULAR; INTRAVENOUS
Status: DISCONTINUED | OUTPATIENT
Start: 2024-04-10 | End: 2024-04-10 | Stop reason: HOSPADM

## 2024-04-10 RX ORDER — CLONAZEPAM 2 MG/1
2 TABLET ORAL
Status: DISCONTINUED | OUTPATIENT
Start: 2024-04-11 | End: 2024-04-15

## 2024-04-10 RX ORDER — GABAPENTIN 400 MG/1
800 CAPSULE ORAL 3 TIMES DAILY
Status: ON HOLD | COMMUNITY
End: 2024-04-22

## 2024-04-10 RX ORDER — SODIUM CHLORIDE, SODIUM LACTATE, POTASSIUM CHLORIDE, CALCIUM CHLORIDE 600; 310; 30; 20 MG/100ML; MG/100ML; MG/100ML; MG/100ML
INJECTION, SOLUTION INTRAVENOUS CONTINUOUS PRN
Status: DISCONTINUED | OUTPATIENT
Start: 2024-04-10 | End: 2024-04-10

## 2024-04-10 RX ORDER — MAGNESIUM HYDROXIDE 1200 MG/15ML
LIQUID ORAL PRN
Status: DISCONTINUED | OUTPATIENT
Start: 2024-04-10 | End: 2024-04-10 | Stop reason: HOSPADM

## 2024-04-10 RX ORDER — CELECOXIB 200 MG/1
200 CAPSULE ORAL ONCE
Status: COMPLETED | OUTPATIENT
Start: 2024-04-10 | End: 2024-04-10

## 2024-04-10 RX ORDER — EPHEDRINE SULFATE 50 MG/ML
INJECTION, SOLUTION INTRAMUSCULAR; INTRAVENOUS; SUBCUTANEOUS PRN
Status: DISCONTINUED | OUTPATIENT
Start: 2024-04-10 | End: 2024-04-10

## 2024-04-10 RX ORDER — MAGNESIUM SULFATE HEPTAHYDRATE 40 MG/ML
2 INJECTION, SOLUTION INTRAVENOUS ONCE
Status: CANCELLED | OUTPATIENT
Start: 2024-04-10 | End: 2024-04-10

## 2024-04-10 RX ORDER — HYDRALAZINE HYDROCHLORIDE 20 MG/ML
2.5-5 INJECTION INTRAMUSCULAR; INTRAVENOUS EVERY 10 MIN PRN
Status: DISCONTINUED | OUTPATIENT
Start: 2024-04-10 | End: 2024-04-11 | Stop reason: HOSPADM

## 2024-04-10 RX ORDER — ACETAMINOPHEN 325 MG/1
975 TABLET ORAL ONCE
Status: COMPLETED | OUTPATIENT
Start: 2024-04-10 | End: 2024-04-10

## 2024-04-10 RX ORDER — CEFAZOLIN SODIUM/WATER 2 G/20 ML
2 SYRINGE (ML) INTRAVENOUS
Status: COMPLETED | OUTPATIENT
Start: 2024-04-10 | End: 2024-04-10

## 2024-04-10 RX ORDER — NALOXONE HYDROCHLORIDE 0.4 MG/ML
0.1 INJECTION, SOLUTION INTRAMUSCULAR; INTRAVENOUS; SUBCUTANEOUS
Status: DISCONTINUED | OUTPATIENT
Start: 2024-04-10 | End: 2024-04-11 | Stop reason: HOSPADM

## 2024-04-10 RX ORDER — ALBUTEROL SULFATE 90 UG/1
2 AEROSOL, METERED RESPIRATORY (INHALATION)
Status: DISCONTINUED | OUTPATIENT
Start: 2024-04-10 | End: 2024-04-22 | Stop reason: HOSPADM

## 2024-04-10 RX ORDER — PROCHLORPERAZINE MALEATE 10 MG
10 TABLET ORAL EVERY 6 HOURS PRN
Status: DISCONTINUED | OUTPATIENT
Start: 2024-04-10 | End: 2024-04-14

## 2024-04-10 RX ADMIN — FENTANYL CITRATE 50 MCG: 50 INJECTION, SOLUTION INTRAMUSCULAR; INTRAVENOUS at 11:20

## 2024-04-10 RX ADMIN — DEXAMETHASONE SODIUM PHOSPHATE 4 MG: 4 INJECTION, SOLUTION INTRA-ARTICULAR; INTRALESIONAL; INTRAMUSCULAR; INTRAVENOUS; SOFT TISSUE at 14:50

## 2024-04-10 RX ADMIN — PHENYLEPHRINE HYDROCHLORIDE 100 MCG: 10 INJECTION INTRAVENOUS at 18:49

## 2024-04-10 RX ADMIN — DEXMEDETOMIDINE HYDROCHLORIDE 0.3 MCG/KG/HR: 4 INJECTION, SOLUTION INTRAVENOUS at 20:02

## 2024-04-10 RX ADMIN — Medication 20 MG: at 17:10

## 2024-04-10 RX ADMIN — LIDOCAINE HYDROCHLORIDE 100 MG: 20 INJECTION, SOLUTION INFILTRATION; PERINEURAL at 12:21

## 2024-04-10 RX ADMIN — SODIUM CHLORIDE 150 MG: 9 INJECTION, SOLUTION INTRAVENOUS at 13:10

## 2024-04-10 RX ADMIN — GLYCOPYRROLATE 0.2 MG: 0.2 INJECTION, SOLUTION INTRAMUSCULAR; INTRAVENOUS at 12:21

## 2024-04-10 RX ADMIN — MIDAZOLAM HYDROCHLORIDE 1 MG: 1 INJECTION, SOLUTION INTRAMUSCULAR; INTRAVENOUS at 11:20

## 2024-04-10 RX ADMIN — ACETAMINOPHEN 975 MG: 325 TABLET ORAL at 10:34

## 2024-04-10 RX ADMIN — PROPOFOL 100 MCG/KG/MIN: 10 INJECTION, EMULSION INTRAVENOUS at 16:33

## 2024-04-10 RX ADMIN — EPHEDRINE SULFATE 5 MG: 5 INJECTION INTRAVENOUS at 18:55

## 2024-04-10 RX ADMIN — ALBUMIN HUMAN: 0.05 INJECTION, SOLUTION INTRAVENOUS at 16:59

## 2024-04-10 RX ADMIN — Medication 2 G: at 16:25

## 2024-04-10 RX ADMIN — PROPOFOL 30 MG: 10 INJECTION, EMULSION INTRAVENOUS at 17:30

## 2024-04-10 RX ADMIN — Medication 20 MG: at 13:24

## 2024-04-10 RX ADMIN — ONDANSETRON 4 MG: 2 INJECTION INTRAMUSCULAR; INTRAVENOUS at 21:29

## 2024-04-10 RX ADMIN — HYDROMORPHONE HYDROCHLORIDE 0.5 MG: 1 INJECTION, SOLUTION INTRAMUSCULAR; INTRAVENOUS; SUBCUTANEOUS at 21:44

## 2024-04-10 RX ADMIN — KETOROLAC TROMETHAMINE 30 MG: 30 INJECTION, SOLUTION INTRAMUSCULAR at 21:37

## 2024-04-10 RX ADMIN — PROPOFOL 200 MG: 10 INJECTION, EMULSION INTRAVENOUS at 12:21

## 2024-04-10 RX ADMIN — BUPIVACAINE HYDROCHLORIDE 10 ML: 2.5 INJECTION, SOLUTION EPIDURAL; INFILTRATION; INTRACAUDAL at 11:54

## 2024-04-10 RX ADMIN — ESMOLOL HYDROCHLORIDE 20 MG: 10 INJECTION, SOLUTION INTRAVENOUS at 18:10

## 2024-04-10 RX ADMIN — Medication 10 MG: at 15:18

## 2024-04-10 RX ADMIN — GLYCOPYRROLATE 0.1 MG: 0.2 INJECTION, SOLUTION INTRAMUSCULAR; INTRAVENOUS at 14:19

## 2024-04-10 RX ADMIN — ESMOLOL HYDROCHLORIDE 20 MG: 10 INJECTION, SOLUTION INTRAVENOUS at 18:22

## 2024-04-10 RX ADMIN — MIDAZOLAM 2 MG: 1 INJECTION INTRAMUSCULAR; INTRAVENOUS at 12:17

## 2024-04-10 RX ADMIN — MAGNESIUM SULFATE HEPTAHYDRATE 2 G: 40 INJECTION, SOLUTION INTRAVENOUS at 13:58

## 2024-04-10 RX ADMIN — PROPOFOL 100 MCG/KG/MIN: 10 INJECTION, EMULSION INTRAVENOUS at 12:30

## 2024-04-10 RX ADMIN — SODIUM CHLORIDE, POTASSIUM CHLORIDE, SODIUM LACTATE AND CALCIUM CHLORIDE: 600; 310; 30; 20 INJECTION, SOLUTION INTRAVENOUS at 12:11

## 2024-04-10 RX ADMIN — Medication 20 MG: at 14:16

## 2024-04-10 RX ADMIN — BUPIVACAINE 10 ML: 13.3 INJECTION, SUSPENSION, LIPOSOMAL INFILTRATION at 11:54

## 2024-04-10 RX ADMIN — PROPOFOL 120 MCG/KG/MIN: 10 INJECTION, EMULSION INTRAVENOUS at 15:10

## 2024-04-10 RX ADMIN — PROPOFOL 50 MG: 10 INJECTION, EMULSION INTRAVENOUS at 21:19

## 2024-04-10 RX ADMIN — PHENYLEPHRINE HYDROCHLORIDE 100 MCG: 10 INJECTION INTRAVENOUS at 18:44

## 2024-04-10 RX ADMIN — Medication 50 MG: at 12:21

## 2024-04-10 RX ADMIN — Medication 2 G: at 20:25

## 2024-04-10 RX ADMIN — LORAZEPAM 0.5 MG: 2 INJECTION, SOLUTION INTRAMUSCULAR; INTRAVENOUS at 11:05

## 2024-04-10 RX ADMIN — PROPOFOL 50 MCG/KG/MIN: 10 INJECTION, EMULSION INTRAVENOUS at 20:11

## 2024-04-10 RX ADMIN — HYDROMORPHONE HYDROCHLORIDE 0.5 MG: 1 INJECTION, SOLUTION INTRAMUSCULAR; INTRAVENOUS; SUBCUTANEOUS at 21:20

## 2024-04-10 RX ADMIN — PHENYLEPHRINE HYDROCHLORIDE 200 MCG: 10 INJECTION INTRAVENOUS at 18:52

## 2024-04-10 RX ADMIN — SODIUM CHLORIDE, POTASSIUM CHLORIDE, SODIUM LACTATE AND CALCIUM CHLORIDE: 600; 310; 30; 20 INJECTION, SOLUTION INTRAVENOUS at 16:00

## 2024-04-10 RX ADMIN — Medication 2 G: at 12:21

## 2024-04-10 RX ADMIN — DEXMEDETOMIDINE HYDROCHLORIDE 0.5 MCG/KG/HR: 4 INJECTION, SOLUTION INTRAVENOUS at 13:22

## 2024-04-10 RX ADMIN — EPHEDRINE SULFATE 5 MG: 5 INJECTION INTRAVENOUS at 20:47

## 2024-04-10 RX ADMIN — Medication 20 MG: at 16:03

## 2024-04-10 RX ADMIN — PHENYLEPHRINE HYDROCHLORIDE 100 MCG: 10 INJECTION INTRAVENOUS at 12:21

## 2024-04-10 RX ADMIN — ONDANSETRON 4 MG: 2 INJECTION INTRAMUSCULAR; INTRAVENOUS at 11:07

## 2024-04-10 ASSESSMENT — ACTIVITIES OF DAILY LIVING (ADL)
ADLS_ACUITY_SCORE: 33
ADLS_ACUITY_SCORE: 35
ADLS_ACUITY_SCORE: 33
ADLS_ACUITY_SCORE: 31
ADLS_ACUITY_SCORE: 33

## 2024-04-10 ASSESSMENT — COPD QUESTIONNAIRES: COPD: 1

## 2024-04-10 NOTE — ANESTHESIA PROCEDURE NOTES
Airway       Patient location during procedure: OR       Procedure Start/Stop Times: 4/10/2024 12:28 PM  Staff -        Performed By: CRNA  Consent for Airway        Urgency: elective  Indications and Patient Condition       Induction type:intravenous       Mask difficulty assessment: 1 - vent by mask    Final Airway Details       Final airway type: endotracheal airway       Successful airway: ETT - single and Oral  Endotracheal Airway Details        ETT size (mm): 7.0       Cuffed: yes       Successful intubation technique: video laryngoscopy       VL Blade Size: Glidescope 3       Grade View of Cords: 1       Adjucts: stylet       Position: Right    Post intubation assessment        Placement verified by: capnometry, equal breath sounds and chest rise        Number of attempts at approach: 1       Number of other approaches attempted: 0       Secured with: tape       Ease of procedure: easy    Medication(s) Administered   Medication Administration Time: 4/10/2024 12:28 PM

## 2024-04-10 NOTE — ANESTHESIA PREPROCEDURE EVALUATION
"Anesthesia Pre-Procedure Evaluation    Patient: Mitali Robles   MRN: [id number] : 1965        Procedure : Procedure(s):  EXPLANT LEFT TOTAL ELBOW ARTHROPLASTY  IRRIGATION AND DEBRIDEMENT, ELBOW, POSSIBLE ANTIBIOTIC SPACER INSERTION          Past Medical History:   Diagnosis Date    Back injury     Depressive disorder     Hearing problem     Hyperlipidemia     Hypertension     Neck injuries     Stomach ulcer       Past Surgical History:   Procedure Laterality Date    BACK SURGERY      ME SPINE SURGERY PROCEDURE UNLISTED      ZZC HAND/FINGER SURGERY UNLISTED      ZZC SHOULDER SURG PROC UNLISTED      ZZC STOMACH SURGERY PROCEDURE UNLISTED        Allergies   Allergen Reactions    Hydromorphone Anaphylaxis     hallucinations    Morphine Anaphylaxis     Throat swells shut  **Has taken hydrocodone/APAP and hydromorphone in the past during previous hospitalizations with no issues.  Takes oxycodone at home    Succinylcholine Shortness Of Breath     \"it affected my breathing\"    Fentanyl     Methadone Nausea and Vomiting    Prednisone Swelling     \"it overrides my psych meds and makes me crazy\"    Pregabalin     Quetiapine     Trazodone     Sumatriptan Rash      Social History     Tobacco Use    Smoking status: Former     Current packs/day: 0.00     Average packs/day: 1.5 packs/day for 40.2 years (60.3 ttl pk-yrs)     Types: Cigarettes     Start date: 1983     Quit date: 2023     Years since quittin.0    Smokeless tobacco: Never   Substance Use Topics    Alcohol use: No      Wt Readings from Last 1 Encounters:   No data found for Wt        Anesthesia Evaluation   Pt has had prior anesthetic. Type: General.    History of anesthetic complications  - PONV.  shivering.    ROS/MED HX  ENT/Pulmonary:     (+)                          COPD,    recent URI,          Neurologic:     (+)    peripheral neuropathy,  migraines,                          Cardiovascular:     (+)  hypertension- -   -  - -                " "                   (-) CAD   METS/Exercise Tolerance:     Hematologic:     (+)      anemia,          Musculoskeletal: Comment: Left elbow prosthesis infection s/p washouts      GI/Hepatic:       Renal/Genitourinary:       Endo:     (+)  type II DM,             Obesity,       Psychiatric/Substance Use:     (+) psychiatric history depression, bipolar and anxiety       Infectious Disease:       Malignancy:       Other:            Physical Exam    Airway        Mallampati: II   TM distance: > 3 FB    Mouth opening: > 3 cm    Respiratory Devices and Support         Dental       (+) Multiple visibly decayed, broken teeth    B=Bridge, C=Chipped, L=Loose, M=Missing    Cardiovascular          Rhythm and rate: regular and normal     Pulmonary           breath sounds clear to auscultation           OUTSIDE LABS:  CBC:   Lab Results   Component Value Date    WBC 12.3 (H) 02/12/2024    WBC 10.9 10/30/2023    HGB 10.5 (L) 02/12/2024    HGB 11.4 (L) 10/30/2023    HCT 33.0 (L) 02/12/2024    HCT 35.7 10/30/2023     (H) 02/12/2024     10/30/2023     BMP:   Lab Results   Component Value Date     02/12/2024     10/30/2023    POTASSIUM 3.4 02/12/2024    POTASSIUM 3.4 10/30/2023    CHLORIDE 97 (L) 02/12/2024    CHLORIDE 96 (L) 10/30/2023    CO2 27 02/12/2024    CO2 32 (H) 10/30/2023    BUN 11.4 02/12/2024    BUN 21.7 10/30/2023    CR 0.82 02/12/2024    CR 1.26 (H) 10/30/2023     (H) 02/12/2024     (H) 10/30/2023     COAGS: No results found for: \"PTT\", \"INR\", \"FIBR\"  POC: No results found for: \"BGM\", \"HCG\", \"HCGS\"  HEPATIC:   Lab Results   Component Value Date    ALBUMIN 4.1 10/30/2023    PROTTOTAL 8.2 10/30/2023    ALT 29 10/30/2023    AST 40 10/30/2023    ALKPHOS 147 (H) 10/30/2023    BILITOTAL 0.3 10/30/2023     OTHER:   Lab Results   Component Value Date    A1C 7.4 (H) 02/12/2024    DIANE 9.4 02/12/2024    SED 59 (H) 02/12/2024       Anesthesia Plan    ASA Status:  3    NPO Status:  NPO " Appropriate    Anesthesia Type: General.     - Airway: ETT   Induction: Intravenous, Propofol.   Maintenance: Balanced.   Techniques and Equipment:     - Airway: Video-Laryngoscope     - Lines/Monitors: BIS     - Drips/Meds: Dexmed. infusion     Consents    Anesthesia Plan(s) and associated risks, benefits, and realistic alternatives discussed. Questions answered and patient/representative(s) expressed understanding.     - Discussed:     - Discussed with:  Patient      - Extended Intubation/Ventilatory Support Discussed: No.      - Patient is DNR/DNI Status: Yes             Suspend during perioperative period? Yes.             Agree to: chemical resuscitation, chest compression/defibrillation.        Postoperative Care    Pain management: IV analgesics, Oral pain medications, Multi-modal analgesia, Peripheral nerve block (Single Shot).   PONV prophylaxis: Ondansetron (or other 5HT-3), Dexamethasone or Solumedrol, Background Propofol Infusion, Aprepitant     Comments:               AMENA COLLINS MD    I have reviewed the pertinent notes and labs in the chart from the past 30 days and (re)examined the patient.  Any updates or changes from those notes are reflected in this note.              # DMII: A1C = 7.4 % (Ref range: <5.7 %) within past 6 months

## 2024-04-10 NOTE — ANESTHESIA PROCEDURE NOTES
Brachial plexus Procedure Note    Pre-Procedure   Staff -        Anesthesiologist:  Ashok Morrison MD       Performed By: anesthesiologist       Location: pre-op       Procedure Start/Stop Times: 4/10/2024 11:54 AM       Pre-Anesthestic Checklist: patient identified, IV checked, site marked, risks and benefits discussed, informed consent, monitors and equipment checked, pre-op evaluation, at physician/surgeon's request and post-op pain management  Timeout:       Correct Patient: Yes        Correct Procedure: Yes        Correct Site: Yes        Correct Position: Yes        Correct Laterality: Yes        Site Marked: Yes  Procedure Documentation  Procedure: Brachial plexus       Diagnosis: LEFT ELBOW HARDWARE REMOVAL       Laterality: left       Patient Position: supine       Skin prep: Chloraprep       Local skin infiltrated with 4 mL of 1% lidocaine.  (interscalene approach).       Needle Type: short bevel       Needle Gauge: 21.        Needle Length (Inches): 4        Ultrasound guided       1. Ultrasound was used to identify targeted nerve, plexus, vascular marker, or fascial plane and place a needle adjacent to it in real-time.       2. Ultrasound was used to visualize the spread of anesthetic in close proximity to the above referenced structure.       3. A permanent image is entered into the patient's record.    Assessment/Narrative         The placement was negative for: blood aspirated, painful injection and site bleeding       Paresthesias: No.       Bolus given via needle..        Secured via.        Insertion/Infusion Method: Single Shot       Complications: none    Medication(s) Administered   Bupivacaine 0.25% PF (Infiltration) - Infiltration   10 mL - 4/10/2024 11:54:00 AM  Bupivacaine liposome (Exparel) 1.3% LA inj susp (Infiltration) - Infiltration   10 mL - 4/10/2024 11:54:00 AM  Medication Administration Time: 4/10/2024 11:54 AM      FOR [facility] ([facility] ONLY:   Pain Team Contact  "information: please page the Pain Team Via [facility]. Search \"Pain\". During daytime hours, please page the attending first. At night please page the resident first.      "

## 2024-04-10 NOTE — BRIEF OP NOTE
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  (H24) [T84.59XA, Z96.622]  Post-operative diagnosis Same as pre-operative diagnosis    Procedure: EXPLANT LEFT TOTAL ELBOW ARTHROPLASTY, Left - Arm  IRRIGATION AND DEBRIDEMENT, ELBOW, ANTIBIOTIC SPACER INSERTION, Left - Elbow    Surgeon: Surgeons and Role:     * Marcos Sarmiento MD - Primary     * Diana Pleitez MD - Resident - Assisting     * Only, MD Philip - Resident - Assisting  Anesthesia: General   Estimated Blood Loss: 600 ml   Tourniquet Time: up for 120 min at 250 mm hg, down for 30 min, then up for 120 min at 250 mm Hg    Drains: Hemovac  Specimens:   ID Type Source Tests Collected by Time Destination   A : Olecranon bursa Tissue Elbow, Left ANAEROBIC BACTERIAL CULTURE ROUTINE, GRAM STAIN, FUNGAL OR YEAST CULTURE ROUTINE, AEROBIC BACTERIAL CULTURE ROUTINE Marcos Sarmiento MD 4/10/2024  1:58 PM    B : Triceps Allograft Tissue Elbow, Left ANAEROBIC BACTERIAL CULTURE ROUTINE, GRAM STAIN, FUNGAL OR YEAST CULTURE ROUTINE, AEROBIC BACTERIAL CULTURE ROUTINE Marcos Sarmiento MD 4/10/2024  1:58 PM    C : Left Elbow joint Fluid Synovial fluid Elbow, Left ANAEROBIC BACTERIAL CULTURE ROUTINE, GRAM STAIN, FUNGAL OR YEAST CULTURE ROUTINE, AEROBIC BACTERIAL CULTURE ROUTINE Marcos Sarmiento MD 4/10/2024  2:00 PM    D : Left Elbow Bone Tissue Elbow, Left ANAEROBIC BACTERIAL CULTURE ROUTINE, GRAM STAIN, FUNGAL OR YEAST CULTURE ROUTINE, AEROBIC BACTERIAL CULTURE ROUTINE Marcos Sarmiento MD 4/10/2024  2:53 PM    E : Left Elbow Synovium Tissue Elbow, Left ANAEROBIC BACTERIAL CULTURE ROUTINE, GRAM STAIN, FUNGAL OR YEAST CULTURE ROUTINE, AEROBIC BACTERIAL CULTURE ROUTINE Marcos Sarmiento MD 4/10/2024  3:14 PM    F : Left Humerus Canal Tissue Elbow, Left ANAEROBIC BACTERIAL CULTURE ROUTINE, GRAM STAIN, FUNGAL OR YEAST CULTURE ROUTINE, AEROBIC BACTERIAL CULTURE ROUTINE Marcos Sarmiento MD 4/10/2024   3:30 PM      Findings:   See op report .  Complications: None.  Implants:   Implant Name Type Inv. Item Serial No.  Lot No. LRB No. Used Action   TOTAL ELBOW ARTHROPLASTY AND CEMENT Total Joint Component/Insert   FARRAH  Left 5 Explanted   BONE CEMENT SIMPLEX W/TOBRAMYCIN 6197-9-001 - [id number] Cement, Bone BONE CEMENT SIMPLEX W/TOBRAMYCIN 6197-9-001  [employer] [id number] Left 2 Implanted   WIRE SURGICAL STEEL 18GA DS-18 - [id number] Wire WIRE SURGICAL STEEL 18GA DS-18  [employer]- N/A Left 4 Implanted   WIRE SURGICAL STEEL 18GA DS-18 - [id number] Wire WIRE SURGICAL STEEL 18GA DS-18  [employer]- N/A Left 1 Used as a Supply   PIN FIXATION 7/64IN STEINMANN 1 END THREAD 9IN -88-64D - [id number] Metallic Hardware/[place] PIN FIXATION 7/64IN STEINMANN 1 END THREAD 9IN -20-10Z  [employer]  Left 1 Implanted   PIN FIXATION 3/32IN STEINMANN 1 END THREAD 9IN -88-71A - [id number] Metallic Hardware/[place] PIN FIXATION 3/32IN STEINMANN 1 END THREAD 9IN -63-06W  [employer]  Left 2 Implanted   PIN FIXATION 5/64IN STEINMANN 1 END THREAD 9IN -91-15D - [id number] Metallic Hardware/[place] PIN FIXATION 5/64IN STEINMANN 1 END THREAD 9IN -48-38H  [employer]  Left 1 Implanted         Post-Op Plan:  Assessment/Plan: Mitali Robles is a 59 year old female with complex PMH including fracture dislocation of the left elbow c/b hardware failure and 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 Sarmiento.    . Monitor vitals closely overnight. Check hgb if gets tachycardic or hypotensive.     6 cultures sent.     Activity: Up with assist.  Weight bearing status: Minimize use of LUE  Antibiotics: Continue ancef. Appreciate ID recs.  Diet: Begin with clear fluids and progress diet as tolerated.   DVT prophylaxis: aspirin 162 qd  and mechanical while in the hospital, discharge on aspirin 162 x 4 weeks.  Bracing/Splinting: LUE posterior slab splint to be kept clean, dry, and intact until follow-up.   Elevation: Elevate LUE on pillows to keep on pillows as much as possible.   Wound Care: Keep splint c/d/I until 2 week follow up.  Drains: Document output per shift, discontinue at discretion of orthopedics  Pain management: transition from IV to orals as tolerated.    X-rays:  POD1 left elbow XR (AP and lateral)  Physical Therapy:  Eval and treat  Occupational Therapy:   Labs: Trend Hgb on POD #1, 2, then PRN  Cultures: Pending, follow culture results closely.   Consults: PT, OT. Medicine, Infectious disease.  appreciate assistance in caring for this patient.  May need pain team as has many narcotic allergies listed  Follow-up: Clinic with Dr. Sarmiento's team in 2 weeks for wound check with left elbow xrays    Disposition: Pending progress with therapies, pain control on orals, and medical stability, anticipate discharge to TCU on POD #4-5.     Diana Pleitez MD, PGY-4  Orthopedics  Pager: [phone]

## 2024-04-10 NOTE — LETTER
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          Discharge Needs Assessment:  Needs      Flowsheet Row Most Recent Value   Anticipated Changes Related to Illness other (see comments)  [TBD]   Equipment Currently Used at Home walker, rolling, wheelchair, manual, shower chair   Current Discharge Risk lives alone   # of Referrals Placed by CM --  [[facility] Liaison]              Follow-up plan:    Future Appointments   Date Time Provider Department Center   2024  8:15 AM Maximino Denson, PT [facility] [place] [facility]   2024 11:00 AM Keira Garcia OT [facility] [place] [facility]   2024  3:00 PM Boni Long MD [facility] [place]       Any outstanding tests or procedures:        Referrals       Future Labs/Procedures    Occupational Therapy  Referral     Process Instructions:    If ordering DME please utilize the DME ordering process. If you are ordering services for pediatric feeding, please utilize order: Speech Therapy  Referral [[id number]]    Comments:    If you have not heard from the scheduling office within 2 business days, please call [phone] for all locations, with the exception of [place], please call [phone] and [place], please call [phone].    Please be aware that  coverage of these services is subject to the terms and limitations of your health insurance plan. Call member services at your health plan with any benefit or coverage questions.  [facility] will call you to coordinate your care as prescribed by your provider. If you don't hear from a representative within 2 business days, please call [phone].    OPAT enrollment and ID Clinic Referral     Scheduling Instructions:    Patient has appointment scheduled 5/7/24 with Dr. Fernandez - referral ordered for OPAT episode creation    Comments:    You are being prescribed a strong antibiotic treatment for an infection. This treatment requires close monitoring, and you have been recommended to follow up with a specialist in the Infectious Diseases Clinic within 2 weeks of your hospital discharge.   Our team of Infectious Diseases specialists looks forward to seeing you in clinic. A representative will call you within 3 business days to help schedule your appointment. If you do not receive a call to schedule, or if you have any questions about or problems with your clinical care, please contact us by phone at [phone].                [place] Recommendations:      KADEN Agustin    AVS/Discharge Summary is the source of truth; this is a helpful guide for improved communication of patient story

## 2024-04-11 ENCOUNTER — APPOINTMENT (OUTPATIENT)
Dept: OCCUPATIONAL THERAPY | Facility: CLINIC | Age: 59
DRG: 463 | End: 2024-04-11
Attending: STUDENT IN AN ORGANIZED HEALTH CARE EDUCATION/TRAINING PROGRAM
Payer: COMMERCIAL

## 2024-04-11 PROBLEM — T84.50XA INFECTION OF TOTAL JOINT PROSTHESIS (H): Status: ACTIVE | Noted: 2024-04-11

## 2024-04-11 LAB
ANION GAP SERPL CALCULATED.3IONS-SCNC: 14 MMOL/L (ref 7–15)
ANION GAP SERPL CALCULATED.3IONS-SCNC: 14 MMOL/L (ref 7–15)
BASE EXCESS BLDV CALC-SCNC: 0.9 MMOL/L (ref -3–3)
BUN SERPL-MCNC: 11.1 MG/DL (ref 8–23)
BUN SERPL-MCNC: 8 MG/DL (ref 8–23)
CALCIUM SERPL-MCNC: 8.2 MG/DL (ref 8.8–10.2)
CALCIUM SERPL-MCNC: 8.3 MG/DL (ref 8.8–10.2)
CHLORIDE SERPL-SCNC: 103 MMOL/L (ref 98–107)
CHLORIDE SERPL-SCNC: 104 MMOL/L (ref 98–107)
CREAT SERPL-MCNC: 0.98 MG/DL (ref 0.51–0.95)
CREAT SERPL-MCNC: 1.03 MG/DL (ref 0.51–0.95)
DEPRECATED HCO3 PLAS-SCNC: 19 MMOL/L (ref 22–29)
DEPRECATED HCO3 PLAS-SCNC: 22 MMOL/L (ref 22–29)
EGFRCR SERPLBLD CKD-EPI 2021: 62 ML/MIN/1.73M2
EGFRCR SERPLBLD CKD-EPI 2021: 66 ML/MIN/1.73M2
ERYTHROCYTE [DISTWIDTH] IN BLOOD BY AUTOMATED COUNT: 17.7 % (ref 10–15)
GLUCOSE BLDC GLUCOMTR-MCNC: 147 MG/DL (ref 70–99)
GLUCOSE BLDC GLUCOMTR-MCNC: 166 MG/DL (ref 70–99)
GLUCOSE BLDC GLUCOMTR-MCNC: 172 MG/DL (ref 70–99)
GLUCOSE BLDC GLUCOMTR-MCNC: 172 MG/DL (ref 70–99)
GLUCOSE BLDC GLUCOMTR-MCNC: 173 MG/DL (ref 70–99)
GLUCOSE BLDC GLUCOMTR-MCNC: 233 MG/DL (ref 70–99)
GLUCOSE SERPL-MCNC: 160 MG/DL (ref 70–99)
GLUCOSE SERPL-MCNC: 161 MG/DL (ref 70–99)
HCO3 BLDV-SCNC: 27 MMOL/L (ref 21–28)
HCT VFR BLD AUTO: 32.3 % (ref 35–47)
HGB BLD-MCNC: 10.1 G/DL (ref 11.7–15.7)
HGB BLD-MCNC: 9.1 G/DL (ref 11.7–15.7)
MCH RBC QN AUTO: 24.3 PG (ref 26.5–33)
MCHC RBC AUTO-ENTMCNC: 31.3 G/DL (ref 31.5–36.5)
MCV RBC AUTO: 78 FL (ref 78–100)
O2/TOTAL GAS SETTING VFR VENT: 2 %
OXYHGB MFR BLDV: 78 % (ref 70–75)
PCO2 BLDV: 50 MM HG (ref 40–50)
PH BLDV: 7.34 [PH] (ref 7.32–7.43)
PLATELET # BLD AUTO: 324 10E3/UL (ref 150–450)
PO2 BLDV: 49 MM HG (ref 25–47)
POTASSIUM SERPL-SCNC: 3.6 MMOL/L (ref 3.4–5.3)
POTASSIUM SERPL-SCNC: 3.7 MMOL/L (ref 3.4–5.3)
RBC # BLD AUTO: 4.16 10E6/UL (ref 3.8–5.2)
SAO2 % BLDV: 79.8 % (ref 70–75)
SODIUM SERPL-SCNC: 136 MMOL/L (ref 135–145)
SODIUM SERPL-SCNC: 140 MMOL/L (ref 135–145)
WBC # BLD AUTO: 10.5 10E3/UL (ref 4–11)

## 2024-04-11 PROCEDURE — 99223 1ST HOSP IP/OBS HIGH 75: CPT | Performed by: INTERNAL MEDICINE

## 2024-04-11 PROCEDURE — 250N000012 HC RX MED GY IP 250 OP 636 PS 637: Performed by: INTERNAL MEDICINE

## 2024-04-11 PROCEDURE — 97165 OT EVAL LOW COMPLEX 30 MIN: CPT | Mod: GO

## 2024-04-11 PROCEDURE — 99207 PR NO CHARGE LOS: CPT | Performed by: STUDENT IN AN ORGANIZED HEALTH CARE EDUCATION/TRAINING PROGRAM

## 2024-04-11 PROCEDURE — 250N000013 HC RX MED GY IP 250 OP 250 PS 637: Performed by: ANESTHESIOLOGY

## 2024-04-11 PROCEDURE — 250N000011 HC RX IP 250 OP 636: Performed by: STUDENT IN AN ORGANIZED HEALTH CARE EDUCATION/TRAINING PROGRAM

## 2024-04-11 PROCEDURE — 97535 SELF CARE MNGMENT TRAINING: CPT | Mod: GO

## 2024-04-11 PROCEDURE — 82374 ASSAY BLOOD CARBON DIOXIDE: CPT | Performed by: STUDENT IN AN ORGANIZED HEALTH CARE EDUCATION/TRAINING PROGRAM

## 2024-04-11 PROCEDURE — 82805 BLOOD GASES W/O2 SATURATION: CPT | Performed by: INTERNAL MEDICINE

## 2024-04-11 PROCEDURE — 99233 SBSQ HOSP IP/OBS HIGH 50: CPT | Performed by: STUDENT IN AN ORGANIZED HEALTH CARE EDUCATION/TRAINING PROGRAM

## 2024-04-11 PROCEDURE — 85027 COMPLETE CBC AUTOMATED: CPT | Performed by: INTERNAL MEDICINE

## 2024-04-11 PROCEDURE — 85018 HEMOGLOBIN: CPT | Performed by: STUDENT IN AN ORGANIZED HEALTH CARE EDUCATION/TRAINING PROGRAM

## 2024-04-11 PROCEDURE — 250N000013 HC RX MED GY IP 250 OP 250 PS 637: Performed by: STUDENT IN AN ORGANIZED HEALTH CARE EDUCATION/TRAINING PROGRAM

## 2024-04-11 PROCEDURE — 97530 THERAPEUTIC ACTIVITIES: CPT | Mod: GO

## 2024-04-11 PROCEDURE — 80048 BASIC METABOLIC PNL TOTAL CA: CPT | Performed by: INTERNAL MEDICINE

## 2024-04-11 PROCEDURE — 250N000013 HC RX MED GY IP 250 OP 250 PS 637: Performed by: INTERNAL MEDICINE

## 2024-04-11 PROCEDURE — 120N000002 HC R&B MED SURG/OB UMMC

## 2024-04-11 PROCEDURE — 36415 COLL VENOUS BLD VENIPUNCTURE: CPT | Performed by: STUDENT IN AN ORGANIZED HEALTH CARE EDUCATION/TRAINING PROGRAM

## 2024-04-11 RX ORDER — GABAPENTIN 800 MG/1
800 TABLET ORAL 3 TIMES DAILY
Status: DISCONTINUED | OUTPATIENT
Start: 2024-04-11 | End: 2024-04-15

## 2024-04-11 RX ORDER — DEXTROSE MONOHYDRATE 25 G/50ML
25-50 INJECTION, SOLUTION INTRAVENOUS
Status: DISCONTINUED | OUTPATIENT
Start: 2024-04-11 | End: 2024-04-22 | Stop reason: HOSPADM

## 2024-04-11 RX ORDER — NICOTINE POLACRILEX 4 MG
15-30 LOZENGE BUCCAL
Status: DISCONTINUED | OUTPATIENT
Start: 2024-04-11 | End: 2024-04-22 | Stop reason: HOSPADM

## 2024-04-11 RX ORDER — CITALOPRAM HYDROBROMIDE 20 MG/1
20 TABLET ORAL AT BEDTIME
Status: DISCONTINUED | OUTPATIENT
Start: 2024-04-11 | End: 2024-04-22 | Stop reason: HOSPADM

## 2024-04-11 RX ORDER — CLONIDINE HYDROCHLORIDE 0.1 MG/1
0.2 TABLET ORAL 2 TIMES DAILY
Status: DISCONTINUED | OUTPATIENT
Start: 2024-04-11 | End: 2024-04-14

## 2024-04-11 RX ORDER — FLUTICASONE PROPIONATE AND SALMETEROL 100; 50 UG/1; UG/1
1 POWDER RESPIRATORY (INHALATION) EVERY 12 HOURS
Status: ON HOLD | COMMUNITY
End: 2024-04-11

## 2024-04-11 RX ORDER — LIDOCAINE 40 MG/G
CREAM TOPICAL
Status: DISCONTINUED | OUTPATIENT
Start: 2024-04-11 | End: 2024-04-13

## 2024-04-11 RX ORDER — POLYETHYLENE GLYCOL 3350 17 G/17G
17 POWDER, FOR SOLUTION ORAL DAILY
Status: DISCONTINUED | OUTPATIENT
Start: 2024-04-11 | End: 2024-04-13

## 2024-04-11 RX ORDER — NALOXONE HYDROCHLORIDE 0.4 MG/ML
0.2 INJECTION, SOLUTION INTRAMUSCULAR; INTRAVENOUS; SUBCUTANEOUS
Status: DISCONTINUED | OUTPATIENT
Start: 2024-04-11 | End: 2024-04-22 | Stop reason: HOSPADM

## 2024-04-11 RX ORDER — NALOXONE HYDROCHLORIDE 0.4 MG/ML
0.4 INJECTION, SOLUTION INTRAMUSCULAR; INTRAVENOUS; SUBCUTANEOUS
Status: DISCONTINUED | OUTPATIENT
Start: 2024-04-11 | End: 2024-04-22 | Stop reason: HOSPADM

## 2024-04-11 RX ORDER — HYDRALAZINE HYDROCHLORIDE 20 MG/ML
10 INJECTION INTRAMUSCULAR; INTRAVENOUS EVERY 6 HOURS PRN
Status: DISCONTINUED | OUTPATIENT
Start: 2024-04-11 | End: 2024-04-22 | Stop reason: HOSPADM

## 2024-04-11 RX ORDER — FERROUS SULFATE 325(65) MG
325 TABLET, DELAYED RELEASE (ENTERIC COATED) ORAL EVERY OTHER DAY
Status: ON HOLD | COMMUNITY
End: 2024-04-22

## 2024-04-11 RX ORDER — AMOXICILLIN 250 MG
1 CAPSULE ORAL 2 TIMES DAILY
Status: DISCONTINUED | OUTPATIENT
Start: 2024-04-11 | End: 2024-04-22 | Stop reason: HOSPADM

## 2024-04-11 RX ORDER — HYDROMORPHONE HCL IN WATER/PF 6 MG/30 ML
.2-.4 PATIENT CONTROLLED ANALGESIA SYRINGE INTRAVENOUS
Status: DISCONTINUED | OUTPATIENT
Start: 2024-04-11 | End: 2024-04-15

## 2024-04-11 RX ADMIN — METHOCARBAMOL 500 MG: 500 TABLET ORAL at 19:54

## 2024-04-11 RX ADMIN — ACETAMINOPHEN 975 MG: 325 TABLET, FILM COATED ORAL at 08:01

## 2024-04-11 RX ADMIN — ACETAMINOPHEN 975 MG: 325 TABLET, FILM COATED ORAL at 13:50

## 2024-04-11 RX ADMIN — ALPRAZOLAM 0.25 MG: 0.25 TABLET ORAL at 13:50

## 2024-04-11 RX ADMIN — INSULIN ASPART 1 UNITS: 100 INJECTION, SOLUTION INTRAVENOUS; SUBCUTANEOUS at 22:18

## 2024-04-11 RX ADMIN — HYDROMORPHONE HYDROCHLORIDE 0.4 MG: 0.2 INJECTION, SOLUTION INTRAMUSCULAR; INTRAVENOUS; SUBCUTANEOUS at 14:52

## 2024-04-11 RX ADMIN — KETOROLAC TROMETHAMINE 15 MG: 15 INJECTION, SOLUTION INTRAMUSCULAR; INTRAVENOUS at 15:53

## 2024-04-11 RX ADMIN — KETOROLAC TROMETHAMINE 15 MG: 15 INJECTION, SOLUTION INTRAMUSCULAR; INTRAVENOUS at 22:18

## 2024-04-11 RX ADMIN — SENNOSIDES AND DOCUSATE SODIUM 1 TABLET: 8.6; 5 TABLET ORAL at 08:02

## 2024-04-11 RX ADMIN — GABAPENTIN 800 MG: 800 TABLET, FILM COATED ORAL at 15:54

## 2024-04-11 RX ADMIN — CEFAZOLIN SODIUM 2 G: 2 INJECTION, SOLUTION INTRAVENOUS at 03:17

## 2024-04-11 RX ADMIN — ACETAMINOPHEN 975 MG: 325 TABLET, FILM COATED ORAL at 22:19

## 2024-04-11 RX ADMIN — GABAPENTIN 800 MG: 800 TABLET, FILM COATED ORAL at 12:09

## 2024-04-11 RX ADMIN — CITALOPRAM 20 MG: 20 TABLET ORAL at 22:19

## 2024-04-11 RX ADMIN — ALPRAZOLAM 0.25 MG: 0.25 TABLET ORAL at 08:24

## 2024-04-11 RX ADMIN — METHOCARBAMOL 500 MG: 500 TABLET ORAL at 13:50

## 2024-04-11 RX ADMIN — KETOROLAC TROMETHAMINE 15 MG: 15 INJECTION, SOLUTION INTRAMUSCULAR; INTRAVENOUS at 09:40

## 2024-04-11 RX ADMIN — ALPRAZOLAM 0.25 MG: 0.25 TABLET ORAL at 19:55

## 2024-04-11 RX ADMIN — CEFAZOLIN SODIUM 2 G: 2 INJECTION, SOLUTION INTRAVENOUS at 21:17

## 2024-04-11 RX ADMIN — CLONIDINE HYDROCHLORIDE 0.2 MG: 0.2 TABLET ORAL at 19:55

## 2024-04-11 RX ADMIN — OMEPRAZOLE 20 MG: 20 CAPSULE, DELAYED RELEASE ORAL at 08:01

## 2024-04-11 RX ADMIN — HYDRALAZINE HYDROCHLORIDE 10 MG: 20 INJECTION INTRAMUSCULAR; INTRAVENOUS at 15:52

## 2024-04-11 RX ADMIN — ACETAMINOPHEN 975 MG: 325 TABLET, FILM COATED ORAL at 00:44

## 2024-04-11 RX ADMIN — CEFAZOLIN SODIUM 2 G: 2 INJECTION, SOLUTION INTRAVENOUS at 12:40

## 2024-04-11 RX ADMIN — ASPIRIN 162 MG: 81 TABLET, COATED ORAL at 08:01

## 2024-04-11 RX ADMIN — CLONIDINE HYDROCHLORIDE 0.2 MG: 0.2 TABLET ORAL at 05:35

## 2024-04-11 RX ADMIN — KETOROLAC TROMETHAMINE 15 MG: 15 INJECTION, SOLUTION INTRAMUSCULAR; INTRAVENOUS at 03:17

## 2024-04-11 RX ADMIN — METHOCARBAMOL 500 MG: 500 TABLET ORAL at 04:46

## 2024-04-11 ASSESSMENT — ACTIVITIES OF DAILY LIVING (ADL)
ADLS_ACUITY_SCORE: 41
ADLS_ACUITY_SCORE: 42
ADLS_ACUITY_SCORE: 42
ADLS_ACUITY_SCORE: 41
ADLS_ACUITY_SCORE: 41
ADLS_ACUITY_SCORE: 42
ADLS_ACUITY_SCORE: 41
ADLS_ACUITY_SCORE: 42
ADLS_ACUITY_SCORE: 42
ADLS_ACUITY_SCORE: 41
ADLS_ACUITY_SCORE: 41
ADLS_ACUITY_SCORE: 42
ADLS_ACUITY_SCORE: 35
ADLS_ACUITY_SCORE: 41
ADLS_ACUITY_SCORE: 35
ADLS_ACUITY_SCORE: 42
ADLS_ACUITY_SCORE: 41
ADLS_ACUITY_SCORE: 42
ADLS_ACUITY_SCORE: 41
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
## 2024-04-11 NOTE — PLAN OF CARE
"Goal Outcome Evaluation:          VS: Elevated BP, other vitals stable    O2: Wean O2 to 1L .  Pt tolerated  well.    Output: Galvan cath inplace.    Last BM: 4/10   Activity: Not OOB   Pt stated she can walk short distance without aide at baseline.  Spent most of the time in the wheelchair per pt report    Skin: Redness to groin and under breast    Pain: Given Toradol and tylenol schedule.    Pt is asking  more pain meds aside for above med s. On call resident ortho informed and responded\" pt is allergic to narcotics supposedly. Likey need a pain med consult. Day team will order.    Neuro: Alert and orientedx4- forgetful  No sensation to L arm due to block    Dressing: ACE bandage: CDI    Diet: Regular    LDA: R hand PIV: infusing   BALIEY    Equipment: 1 large black bag, cellphone and personal belongings at bedside    Plan: Cont POC      Additional Info:      "

## 2024-04-11 NOTE — ANESTHESIA POSTPROCEDURE EVALUATION
Patient: Mitali Robles    Procedure: Procedure(s):  EXPLANT LEFT TOTAL ELBOW ARTHROPLASTY  IRRIGATION AND DEBRIDEMENT, ELBOW, ANTIBIOTIC SPACER INSERTION       Anesthesia Type:  General    Note:  Disposition: Admission   Postop Pain Control: Uneventful            Sign Out: Well controlled pain   PONV: No   Neuro/Psych: Uneventful            Sign Out: Acceptable/Baseline neuro status   Airway/Respiratory: Uneventful            Sign Out: Acceptable/Baseline resp. status   CV/Hemodynamics: Uneventful            Sign Out: Acceptable CV status; No obvious hypovolemia; No obvious fluid overload   Other NRE: NONE   DID A NON-ROUTINE EVENT OCCUR? No           Last vitals:  Vitals Value Taken Time   /89 04/11/24 0115   Temp 36.7  C (98.1  F) 04/11/24 0045   Pulse 67 04/11/24 0118   Resp 11 04/11/24 0118   SpO2 97 % 04/11/24 0118   Vitals shown include unfiled device data.    Electronically Signed By: Neha Cobian MD  April 11, 2024  1:18 AM

## 2024-04-11 NOTE — PROGRESS NOTES
Orthopaedic Surgery Progress Note 04/11/2024    S: OR yesterday. HTN overnight, medicine assisting.  Pain well controlled this AM, block still in place and patient reports this is the best pain control she has had from an elbow surgery. Did not sleep well, hoping to sleep better today. Questions answered. POC reviewed.    O:  Temp: 98  F (36.7  C) Temp src: Oral BP: (!) 188/88 (Clonidine given) Pulse: 72   Resp: 14 SpO2: 99 % O2 Device: Nasal cannula Oxygen Delivery: 1 LPM    Exam:  Gen: No acute distress, resting comfortably in bed.  Resp: Non-labored breathing  MSK:  LUE:  - Splint/Dressings c/d/i  - SILT medial/radial/ulnar/axillary nerves  - Fires EPL, FPL, Intrinsics  - Radial pulse 2+, hand wwp      Drain output:   Output by Drain (mL) 04/09/24 0700 - 04/09/24 1459 04/09/24 1500 - 04/09/24 2259 04/09/24 2300 - 04/10/24 0659 04/10/24 0700 - 04/10/24 1459 04/10/24 1500 - 04/10/24 2259 04/10/24 2300 - 04/11/24 0640   Closed/Suction Drain 1 Left Elbow Bulb 15 Guyanese      230     .    Recent Labs   Lab 04/11/24  0030   WBC 10.5   HGB 10.1*          Culture results: pending    Assessment/Plan: Mitali Robles is a 59 year old female with complex PMH including fracture dislocation of the left elbow c/b hardware failure and 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 Sarmiento.        Activity: Up with assist.  Weight bearing status: Minimize use of LUE  Antibiotics: Continue ancef. Appreciate ID recs.  Diet: Begin with clear fluids and progress diet as tolerated.   DVT prophylaxis: aspirin 162 qd and mechanical while in the hospital, discharge on aspirin 162 x 4 weeks.  Bracing/Splinting: LUE posterior slab splint to be kept clean, dry, and intact until follow-up.   Elevation: Elevate LUE on pillows to keep on pillows as much as possible.   Wound Care: Keep splint c/d/I until 2 week follow  up.  Drains: Document output per shift, discontinue at discretion of orthopedics  Pain management: transition from IV to orals as tolerated.    X-rays:  POD1 left elbow XR (AP and lateral)  Physical Therapy:  Eval and treat  Occupational Therapy:   Labs: Trend Hgb on POD #1, 2, then PRN  Cultures: Pending, follow culture results closely.   Consults: PT, OT. Medicine, Infectious disease.  appreciate assistance in caring for this patient.  May need pain team as has many narcotic allergies listed  Follow-up: Clinic with Dr. Sarmiento's team in 2 weeks for wound check with left elbow xrays     Disposition: Pending progress with therapies, pain control on orals, and medical stability, anticipate discharge to TCU on POD #4-5.     Richard Dunaway MD  Orthopaedic Surgery, PGY-4  Pager: [phone]

## 2024-04-11 NOTE — CONSULTS
[facility]  Consult Note - Hospitalist Service, GOLD TEAM   Date of Admission:  4/10/2024  Consult Requested by: Dr. Sarmiento  Reason for Consult: post op co-management    Assessment & Plan   Mitali Robles is a 59 year old female admitted on 4/10/2024. She has hx of bipolar disorder, PTSD, MDD, on high dose benzodiazepines, hyperlipidemia, hypertension, and type 2 diabetes on insulin with recurrent left elbow prosthetic joint infection s/p explantation of L total elbow arthroplasty admitted to ortho post op.    L elbow prosthetic joint infection s/p explantation of L total elbow arthroplasty  Reported EBL 600ml per OR note. Per outside ID note, s/p 7 weeks of IV daptomycin, ertapnem and fluconazole. currently ordered cefazolin 2g IV 8h per ortho.   - post op wound care and pain control per ortho  - check CBC, BMP, VBG now (ordered)  - follow up cultures  - consider ID consultation     Somnolence   Still waking up from surgery. Bradypnea with RR 8-10 with occasional desaturation.  - capnography overnight    Complex psychiatric history including bipolar disorder, PTSD, MDD, on high dose benzodiazepines  PTA meds include valium 30mg HS, xanax 1mg HS prn, clonazepam 2mg BID prn, celexa 20mg hs, and lumateperone 84mg hs.  checked, and last filled xanax 2mg 30 tabs, valiume 10mg 90 tabs and clonazepam 2mg 90 tabs on 2/12/24. Currently ordered xanax 0.25mg TID per ortho.   - at this time, still quite sedated post op and was on precedex, received ativan intra-op, so will hold off resuming home dose, but will need adjustment to prevent withdrawal  - recommend psych consult    Type 2 DM  A1c 7.4% on 2/12. On tresiba hs and aspart  - sliding scale insulin for now  - resume tresiba in am    Hypertension  On Clonidine 1mg HS. Currently BP soft post op  - monitor BP and resume at lower dose tomorrow    Neuropathic pain  - PTA gabapentin 800mg TID    Hyperlipidemia  - continue  "atorvastatin       Clinically Significant Risk Factors Present on Admission                  # Hypertension: Noted on problem list     # DMII: A1C = 7.4 % (Ref range: <5.7 %) within past 6 months   # Obesity: Estimated body mass index is 32.23 kg/m  as calculated from the following:    Height as of this encounter: 1.753 m (5' 9\").    Weight as of this encounter: 99 kg (218 lb 4.1 oz).              Odalys Weeks MD  Hospitalist Service, GOLD TEAM   Securely message with Vocera (more info)  Text page via [facility] Paging/Directory   See signed in provider for up to date coverage information  ______________________________________________________________________    Chief Complaint   Post op    History is obtained from the patient and chart review    History of Present Illness   Mitali Robles is a 59 year old female who has hx of bipolar disorder, PTSD, MDD, on high dose benzodiazepines, hyperlipidemia, hypertension, and type 2 diabetes on insulin with recurrent left elbow prosthetic joint infection s/p explantation of L total elbow arthroplasty admitted to ortho post op. She has been having multiple surgeries to L elbow with ongoing infection. Per outside ID note, s/p 7 weeks course of iv ertapenem and daptomycin. She underwent explantation by ortho today with EBL 600ml. When seen in the PACU, she is somnolent, still waking up from anesthesia and unable to provide much history. She staes she has headache, and abdominal discomfort.  Per chart review, she is on multiple benzodiazepines including scheduled nightly valium 30mg, prn xanax hs and clonazepam prn for anxiety and PTSD. She also takes clonazepam, clonidine 1mg and gabapentin 800mg TID.       Past Medical History    Past Medical History:   Diagnosis Date    Back injury     Depressive disorder     Hearing problem     Hyperlipidemia     Hypertension     Neck injuries     Stomach ulcer        Past Surgical History   Past Surgical History:   Procedure Laterality " "Date    BACK SURGERY      [facility] SPINE SURGERY PROCEDURE UNLISTED      [facility] HAND/FINGER SURGERY UNLISTED      [facility] SHOULDER SURG PROC UNLISTED      [facility] STOMACH SURGERY PROCEDURE UNLISTED         Medications   I have reviewed this patient's current medications       Allergies   Allergies   Allergen Reactions    Morphine Anaphylaxis     Throat swells shut  **Has taken hydrocodone/APAP and hydromorphone in the past during previous hospitalizations with no issues.  Takes oxycodone at home    Succinylcholine Shortness Of Breath     \"it affected my breathing\"    Fentanyl Nausea and Vomiting    Hydromorphone      Received 4/10 in OR without issue (was listed as allergy but has had in past without incident)    Methadone Nausea and Vomiting    Prednisone Swelling     \"it overrides my psych meds and makes me crazy\"    Pregabalin     Quetiapine     Trazodone     Sumatriptan Rash        Physical Exam   Vital Signs: Temp: 98.4  F (36.9  C) Temp src: Oral BP: 110/85 Pulse: 67   Resp: 10 SpO2: 97 % O2 Device: Nasal cannula Oxygen Delivery: 2 LPM  Weight: 218 lbs 4.09 oz    General Appearance: Pale appearing, somnolent, easily arousable to verbal stimuli  Respiratory: bradypnea, shallow breathing lungs clear bilaterally, no wheezing or stridor,   Cardiovascular: regular rate, no murmur  GI: soft non tender  Skin: no edema  Other: Oriented to time place     Medical Decision Making       70 MINUTES SPENT BY ME on the date of service doing chart review, history, exam, documentation & further activities per the note.      Data         Imaging results reviewed over the past 24 hrs:   Recent Results (from the past 24 hour(s))   XR Surgery CASSIE L/T 5 Min Fluoro    Narrative    This exam was marked as non-reportable because it will not be read by a   radiologist or a [facility] non-radiologist provider.           "

## 2024-04-11 NOTE — OR NURSING
"PACU to Inpatient Nursing Handoff    Patient Mitali Robles is a 59 year old female who speaks English.   Procedure Procedure(s):  EXPLANT LEFT TOTAL ELBOW ARTHROPLASTY  IRRIGATION AND DEBRIDEMENT, ELBOW, ANTIBIOTIC SPACER INSERTION   Surgeon(s) Primary: Marcos Sarmiento MD  Resident - Assisting: Philip Godoy MD; Diana Pleitez MD     Allergies   Allergen Reactions    Morphine Anaphylaxis     Throat swells shut  **Has taken hydrocodone/APAP and hydromorphone in the past during previous hospitalizations with no issues.  Takes oxycodone at home    Succinylcholine Shortness Of Breath     \"it affected my breathing\"    Fentanyl Nausea and Vomiting    Hydromorphone      Received 4/10 in OR without issue (was listed as allergy but has had in past without incident)    Methadone Nausea and Vomiting    Prednisone Swelling     \"it overrides my psych meds and makes me crazy\"    Pregabalin     Quetiapine     Trazodone     Sumatriptan Rash       Isolation  [unfilled]     Past Medical History   has a past medical history of Back injury, Depressive disorder, Hearing problem, Hyperlipidemia, Hypertension, Neck injuries, and Stomach ulcer.    Anesthesia General   Dermatome Level     Preop Meds Tylenol 975 mg at  10:34 Am  Fentanyl 50 mcg at 11:20 am  Versed 2 mg at 11:20 am  Zofran 4 mg at 11:07 am  Ativan 0.5 mg at 1105 am   Nerve block Brachial plexus  .  Location:left. Med:Exparel (liposomal bupivacaine). Time given: 1154   Intraop Meds OR meds are as follows:   Local Meds midazolam 1 mg/mL (mg)   Total dose: 2 mg  Date/Time Rate/Dose/Volume Action Route Admin User Audit    04/10/24 1217 2 mg Given Intravenous Allison Richmond APRN CRNA     lidocaine 2% (mg)   Total dose: 100 mg  Date/Time Rate/Dose/Volume Action Route Admin User Audit    04/10/24 1221 100 mg Given Intravenous Allison Richmond APRN CRNA     propofol 10 mg/mL (mg)   Total dose: 280 mg  Date/Time Rate/Dose/Volume Action Route Admin User Audit  "   04/10/24 1221 200 mg Given Intravenous Allison Richmond APRN CRNA edited    1730 30 mg Given Intravenous Ar Rasheed APRN CRNA     2119 50 mg Given Intravenous Isabel Wu APRN CRNA edited    propofol drip mcg/kg/min (mcg/kg/min)   Total dose: 4,836.15 mg Dosing weight: 99  Date/Time Rate/Dose/Volume Action Route Admin User Audit    04/10/24 1230 100 mcg/kg/min - 59.4 mL/hr New Bag Intravenous Allison Richmond APRN CRNA     1250 120 mcg/kg/min - 71.28 mL/hr Rate Change Intravenous Allison Richmond APRN CRNA     1435 100 mcg/kg/min - 59.4 mL/hr Rate Change Intravenous Ar Rasheed APRN CRNA     1510 120 mcg/kg/min - 71.28 mL/hr New Bag Intravenous Ar Rasheed APRN CRNA     1517 120 mcg/kg/min - 71.28 mL/hr Rate Change Intravenous Ar Rasheed APRN CRNA     1547 100 mcg/kg/min - 59.4 mL/hr Rate Change Intravenous Ar Rasheed APRN CRNA     1633 100 mcg/kg/min - 59.4 mL/hr New Bag Intravenous Ar Rasheed APRN CRNA     1710 120 mcg/kg/min - 71.28 mL/hr Rate Change Intravenous Ar Rasheed APRN CRNA     1830 110 mcg/kg/min - 65.34 mL/hr Rate Change Intravenous Isabel Wu APRESTELA CRNA     1849 75 mcg/kg/min - 44.55 mL/hr Rate Change Intravenous Isabel Wu APRN CRNA     1853 30 mcg/kg/min - 17.82 mL/hr Rate Change Intravenous Isabel Wu APRN CRNA     1857  Stopped Intravenous Isabel Wu APRN CRNA     1900 50 mcg/kg/min - 29.7 mL/hr Restarted Intravenous Isabel Wu APRN CRNA     2011 50 mcg/kg/min - 29.7 mL/hr New Bag Intravenous Isabel Wu APRN CRNA     2058  Stopped Intravenous Isabel Wu APRN CRNA     rocuronium 10 mg/mL (mg)   Total dose: 140 mg  Date/Time Rate/Dose/Volume Action Route Admin User Audit    04/10/24 1221 50 mg Given Intravenous Matilda Segal MD edited    1324 20 mg Given  Intravenous Ar Rasheed APRN CRNA     1416 20 mg Given Intravenous Ar Rasheed APRN CRNA     1518 10 mg Given Intravenous Ar Rasheed APRN CRNA     1603 20 mg Given Intravenous Ar Rasheed APRN CRNA     1710 20 mg Given Intravenous Ar Rasheed APRN CRNA     ePHEDrine 5 mg/mL in NS (mg)   Total dose: 10 mg  Date/Time Rate/Dose/Volume Action Route Admin User Audit    04/10/24 1855 5 mg Given Intravenous Isabel Wu APRN CRNA     2047 5 mg Given Intravenous Isabel Wu APRN CRNA     phenylephrine (PARKER-SYNEPHRINE) injection (mcg)   Total dose: 500 mcg  Date/Time Rate/Dose/Volume Action Route Admin User Audit    04/10/24 1221 100 mcg New Bag Intravenous Matilda Segal MD     1844 100 mcg Bolus Intravenous Isabel Wu APRN CRNA     1849 100 mcg Bolus Intravenous Isabel Wu APRN CRNA     1852 200 mcg Bolus Intravenous Isabel Wu APRN CRNA     ondansetron 2 mg/mL (mg)   Total dose: 4 mg  Date/Time Rate/Dose/Volume Action Route Admin User Audit    04/10/24 2129 4 mg Given Intravenous Isabel Wu APRN CRNA     glycopyrrolate 0.2 mg/mL (mg)   Total dose: 0.3 mg  Date/Time Rate/Dose/Volume Action Route Admin User Audit    04/10/24 1221 0.2 mg Given Intravenous Allison Richmond APRN CRNA edited    1419 0.1 mg Given Intravenous Ar Rasheed APRN CRNA     ceFAZolin Sodium (ANCEF) injection 2 g (g)   Total dose: 6 g  Date/Time Rate/Dose/Volume Action Route Admin User Audit    04/10/24 1221 2 g (over 5 min) Given Intravenous Matilda Segal MD edited    1625 2 g (over 5 min) Given Intravenous Ar Rasheed APRN CRNA     2025 2 g Given Intravenous Park Ko APRN CRNA     Bupivacaine 0.25% PF (Infiltration) (mL)   Total volume: 10 mL  Date/Time Rate/Dose/Volume Action Route Admin User Audit    04/10/24 1154 10 mL Given Infiltration Dustin Johnson,  Ashok Shoemaker MD     Bupivacaine liposome (Exparel) 1.3% LA inj susp (Infiltration) (mL)   Total volume: 10 mL  Date/Time Rate/Dose/Volume Action Route Admin User Audit    04/10/24 1154 10 mL Given Infiltration Ashok Morrison MD     dexmedeTOMIDine (PRECEDEX) 4 mcg/mL in sodium chloride 0.9 % 100 mL infusion (mcg/kg/hr)   Total dose: 447.48 mcg Dosing weight: 99  Date/Time Rate/Dose/Volume Action Route Admin User Audit    04/10/24 1322 0.5 mcg/kg/hr - 12.375 mL/hr New Bag Intravenous Ar Rasheed APRN CRNA     1348 0.7 mcg/kg/hr - 17.325 mL/hr Rate Change Intravenous Ar Rasheed APRN CRNA     1540 0.5 mcg/kg/hr - 12.375 mL/hr Rate Change Intravenous Ar Rasheed APRN CRNA     1657 0.3 mcg/kg/hr - 7.425 mL/hr Rate Change Intravenous Ar Rasheed APRN CRNA     1721 0.5 mcg/kg/hr - 12.375 mL/hr Rate Change Intravenous Ar Rasheed APRN CRNA     1751 0.7 mcg/kg/hr - 17.325 mL/hr Rate Change Intravenous Isabel Wu APRN CRNA edited    1849 0.3 mcg/kg/hr - 7.425 mL/hr Rate Change Intravenous Isabel Wu APRN CRNA     1853  Stopped Intravenous Isabel Wu APRN CRNA     1956 0.3 mcg/kg/hr - 7.425 mL/hr Restarted Intravenous Isabel Wu APRN CRNA     2002 0.3 mcg/kg/hr - 7.425 mL/hr New Bag Intravenous Isabel Wu APRN CRNA     2008 0.5 mcg/kg/hr - 12.375 mL/hr Rate Change Intravenous Isabel Wu APRN CRNA     2110 0.7 mcg/kg/hr - 17.325 mL/hr Rate Change Intravenous Isabel Wu APRN CRNA     fosaprepitant (EMEND) 150 mg in sodium chloride 0.9 % 275 mL intermittent infusion (mg)   Total dose: 150 mg Dosing weight: 99  Date/Time Rate/Dose/Volume Action Route Admin User Audit    04/10/24 1310 150 mg (over 20 min) - 275 mL New Bag Intravenous Ar Rasheed APRN CRNA edited    magnesium sulfate 2 g in 50 mL sterile water intermittent infusion (g)    Total dose: 2 g Dosing weight: 99  Date/Time Rate/Dose/Volume Action Route Admin User Audit    04/10/24 1358 2 g - 50 mL Given Intravenous Allison Richmond APRN CRNA edited    dexAMETHasone (DECADRON) 4 mg/mL (mg)   Total dose: 4 mg  Date/Time Rate/Dose/Volume Action Route Admin User Audit    04/10/24 1450 4 mg Given Intravenous Ar Rasheed APRN CRNA edited    Comment: per Dr Philipp MD request     esmolol 10 mg/mL (mg)   Total dose: 40 mg  Date/Time Rate/Dose/Volume Action Route Admin User Audit    04/10/24 1810 20 mg Given Intravenous JasonimaIsabel jacobs APRN CRNA     1822 20 mg Given Intravenous Isabel Wu APRN CRNA     HYDROmorphone 1 mg/ml (mg)   Total dose: 1 mg  Date/Time Rate/Dose/Volume Action Route Admin User Audit    04/10/24 2120 0.5 mg Given Intravenous Isabel Wu APRN CRNA     2144 0.5 mg Given Intravenous Isabel Wu APRN CRNA edited    ketorolac 30 mg/mL (mg)   Total dose: 30 mg  Date/Time Rate/Dose/Volume Action Route Admin User Audit    04/10/24 2137 30 mg Given Intravenous Isabel Wu APRN CRNA     LR (mL)   Total volume: 2,000 mL  Date/Time Rate/Dose/Volume Action Route Admin User Audit    04/10/24 1211  New Bag Intravenous Allison Richmond APRN CRNA     1330 350 mL Anesthesia Volume Adjustment Intravenous Ar Rasheed APRN CRNA     1600 650 mL New Bag Intravenous Ar Rasheed APRN CRNA     1700 600 mL Anesthesia Volume Adjustment Intravenous Ar Rasheed APRN CRNA     2113 300 mL Anesthesia Volume Adjustment Intravenous Isabel Wu APRN CRNA     2130 100 mL Anesthesia Volume Adjustment Intravenous Isabel Wu APRN CRNA     albumin 5% (mL)   Total volume: 250 mL  Date/Time Rate/Dose/Volume Action Route Admin User Audit    04/10/24 1659  New Bag Intravenous Ar Rasheed APRN CRNA     1730 250 mL Stopped Intravenous Ar Rasheed  "SAI Alba CRNA        Antibiotics cefazolin (Ancef) - last given at 2025     Pain Patient Currently in Pain: yes   PACU meds  acetaminophen (Tylenol): 975 mg (total dose) last given at 0044    PCA / epidural No   Capnography  Ordered for the floor     Telemetry ECG Rhythm: Sinus rhythm;Bundle branch block (BBB)   Inpatient Telemetry Monitor Ordered? No        Labs Glucose Lab Results   Component Value Date     04/11/2024     04/10/2024       Hgb Lab Results   Component Value Date    HGB 10.1 04/11/2024       INR No results found for: \"INR\"   PACU Imaging Completed     Wound/Incision Incision/Surgical Site 04/10/24 Left Elbow (Active)   Closure Sutures;Approximated;Liquid bandage;Adhesive strip(s);Other (Comment) 04/11/24 0051   Dressing Intervention New dressing applied 04/11/24 0051   Number of days: 1       Incision/Surgical Site 04/10/24 Left;Upper Arm (Active)   Closure Other (Comment) 04/11/24 0051   Dressing Intervention Clean, dry, intact 04/11/24 0051   Number of days: 1      CMS        Equipment ice pack and shoulder sling   Other LDA       IV Access Peripheral IV 04/10/24 Anterior;Right Lower forearm (Active)   Site Assessment WDL 04/10/24 2155   Line Status Infusing 04/10/24 2155   Dressing Transparent 04/10/24 2155   Dressing Status clean;dry;intact 04/10/24 2155   Dressing Intervention New dressing  04/10/24 1000   Line Intervention Flushed 04/10/24 1000   Phlebitis Scale 0-->no symptoms 04/10/24 1000   Number of days: 1      Blood Products Not applicable  mL   Intake/Output    Drains / Galvan Closed/Suction Drain 1 Left Elbow Bulb 15 [place] (Active)   Site Description UTV 04/11/24 0051   Dressing Status Normal: Clean, Dry & Intact 04/11/24 0051   Drainage Appearance Serosanguenous 04/11/24 0051   Status To bulb suction 04/11/24 0051   Output (ml) 130 ml 04/11/24 0051   Number of days: 1       Urethral Catheter 04/10/24 Non-latex;Double-lumen;Straight-tip 16 fr (Active)   Tube " Description UTV 04/10/24 2300   Collection Container Standard 04/11/24 0051   Securement Method Securing device (Describe) 04/11/24 0051   Rationale for Continued Use Surgical procedure 04/11/24 0051   Urine Output 90 mL 04/11/24 0051   Number of days: 1      Time of void PreOp Time of Void Prior to Procedure: 0930 (04/10/24 1009)    PostOp  Galvan in place, emptied 90 mL at 0051     Diapered? No   Bladder Scan     PO    tolerating sips and apple sauce     Vitals    B/P: (!) 165/101  T: 98.1  F (36.7  C)    Temp src: Oral  P:  Pulse: 73 (04/11/24 0045)          R: 13  O2:  SpO2: 98 %    O2 Device: Nasal cannula (04/11/24 0045)    Oxygen Delivery: 2 LPM (04/11/24 0045)         Family/support present No family in facility, left a message with aunt per pt request   Patient belongings  Large suitcase and one belongings bag transferred with pt, purse and cell phone included.   Patient transported on bed   DC meds/scripts (obs/outpt) Not applicable   Inpatient Pain Meds Released? Yes       Special needs/considerations Resident verbally stated the pt's EBL was 600 mL, it is documented at 200 mL on the OR record. The pt received dilaudid in the OR which is listed as an allergy with anaphylaxix as a result. The pt did tolerate the 2 doses in the OR without a problem per CRNA and PADMAJA Cardenas.    Tasks needing completion None       Hoda Crowe RN  ASCOM [id number]

## 2024-04-11 NOTE — PROGRESS NOTES
Cross Cover    Notified regarding hypertension and low urine output.  Will restart clonidine and follow as intake improves in the morning.    Will need to closely monitor BZD and hopefully clarify regimen    Davin Valentine MD

## 2024-04-11 NOTE — PROGRESS NOTES
[facility]    Medicine Progress Note - Hospitalist Service, GOLD TEAM 16    Date of Admission:  4/10/2024    Assessment & Plan      Mitali Robles is a 59 year old female admitted on 4/10/2024. She has hx of bipolar disorder, PTSD, MDD, on high dose benzodiazepines, hyperlipidemia, hypertension, and type 2 diabetes on insulin with recurrent left elbow prosthetic joint infection s/p explantation of L total elbow arthroplasty admitted to ortho post op.    New today   - Elevated blood pressure, as needed hydralazine  - Continue IV antibiotic therapy     L elbow prosthetic joint infection s/p explantation of L total elbow arthroplasty  Reported EBL 600ml per OR note. Per outside ID note, s/p 7 weeks of IV daptomycin, ertapnem and fluconazole. currently ordered cefazolin 2g IV 8h per ortho.   - post op wound care and pain control per ortho  -Monitor routine labs   - follow up cultures  - consider ID consultation      Somnolence improved  Still waking up from surgery. Bradypnea with RR 8-10 with occasional desaturation.  - capnography overnight  -This is improved.     Complex psychiatric history including bipolar disorder, PTSD, MDD, on high dose benzodiazepines  PTA meds include valium 30mg HS, xanax 1mg HS prn, clonazepam 2mg BID prn, celexa 20mg hs, and lumateperone 84mg hs.  checked, and last filled xanax 2mg 30 tabs, valiume 10mg 90 tabs and clonazepam 2mg 90 tabs on 2/12/24. Currently ordered xanax 0.25mg TID per ortho.   -Postop mentation improved now on the floor.  -Previously sedated post op and was on precedex, received ativan intra-op,  -Currently on Xanax 0.25 mg 3 times daily   - Possibly need adjustment to prevent withdrawal (no signs of withdrawal currently)  - recommend psych consult     Type 2 DM  A1c 7.4% on 2/12. On tresiba hs and aspart  - sliding scale insulin for now  - resume tresiba in am  -Blood glucose in acceptable limits  Recent Labs   Lab  "04/11/24  1205 04/11/24  0814 04/11/24  0235 04/11/24  0030 04/10/24  2159 04/10/24  2038   * 166* 147* 160* 159* 158*          Hypertension  On Clonidine 1mg HS. Currently BP soft post op  -Resume home BP meds including clonidine.  -Elevated blood pressure into the 190s today  - Address underlying pain.  - Add IV hydralazine for SBP greater than 180       Neuropathic pain  - PTA gabapentin 800mg TID     Hyperlipidemia  - continue atorvastatin              Diet: Advance Diet as Tolerated: Regular Diet Adult    DVT Prophylaxis: ASA per primary team  Galvan Catheter: PRESENT, indication: Surgical procedure  Lines: None     Cardiac Monitoring: None  Code Status: Full Code      Clinically Significant Risk Factors Present on Admission                  # Hypertension: Noted on problem list     # DMII: A1C = 7.4 % (Ref range: <5.7 %) within past 6 months   # Obesity: Estimated body mass index is 32.23 kg/m  as calculated from the following:    Height as of this encounter: 1.753 m (5' 9\").    Weight as of this encounter: 99 kg (218 lb 4.1 oz).              Disposition Plan     Medically Ready for Discharge: Anticipated in 2-4 Days             Arvind Falcon MD  Hospitalist Service, GOLD TEAM 16  [facility]  Securely message with [employer] (more info)  Text page via [employer] Paging/Directory   See signed in provider for up to date coverage information  ______________________________________________________________________    Interval History   Patient seen and examined at bedside no acute distress.  She denies any new symptoms.  States she is in pain however this is managed on current regimen.  Denies any headache lightheadedness or dizziness.    Physical Exam   Vital Signs: Temp: 97.9  F (36.6  C) Temp src: Oral BP: (!) 159/82 Pulse: 53   Resp: 16 SpO2: 99 % O2 Device: Nasal cannula Oxygen Delivery: 1 LPM  Weight: 218 lbs 4.09 oz    General Appearance: Appears " comfortable.  Respiratory: Without wheezes rhonchi or rales.  CTA  Cardiovascular: RRR, without murmurs  GI: Soft, nontender, plus BS  Skin: Without obvious bleeding, bruising or excoriations      Medical Decision Making       57 MINUTES SPENT BY ME on the date of service doing chart review, history, exam, documentation & further activities per the note.      Data   ------------------------- PAST 24 HR DATA REVIEWED -----------------------------------------------

## 2024-04-11 NOTE — ANESTHESIA CARE TRANSFER NOTE
Patient: Mitali Robles    Procedure: Procedure(s):  EXPLANT LEFT TOTAL ELBOW ARTHROPLASTY  IRRIGATION AND DEBRIDEMENT, ELBOW, ANTIBIOTIC SPACER INSERTION       Diagnosis: Infection associated with prosthesis of left elbow joint  (H24) [T84.59XA, Z96.622]  Diagnosis Additional Information: No value filed.    Anesthesia Type:   General     Note:    Oropharynx: oropharynx clear of all foreign objects  Level of Consciousness: drowsy and awake  Oxygen Supplementation: face mask  Level of Supplemental Oxygen (L/min / FiO2): 6  Independent Airway: airway patency satisfactory and stable  Dentition: dentition unchanged  Vital Signs Stable: post-procedure vital signs reviewed and stable    Patient transferred to: PACU  Comments: Regular respirations and patent airway. VSS. IV patent and infusing. Pt resting comfortably. Report given to RN    Handoff Report: Identifed the Patient, Identified the Reponsible Provider, Reviewed the pertinent medical history, Discussed the surgical course, Reviewed Intra-OP anesthesia mangement and issues during anesthesia, Set expectations for post-procedure period and Allowed opportunity for questions and acknowledgement of understanding      Vitals:  Vitals Value Taken Time   /67 04/10/24 2154   Temp 37    Pulse 68 04/10/24 2200   Resp 11 04/10/24 2200   SpO2 99 % 04/10/24 2200   Vitals shown include unfiled device data.    Electronically Signed By: SAI Goddard CRNA  April 10, 2024  10:01 PM

## 2024-04-11 NOTE — PROGRESS NOTES
"   Occupational Therapy Evaluation: 04/11/24 0928   Appointment Info   Signing Clinician's Name / Credentials (OT) Lydia Acosta OTD, OTR/L   Rehab Comments (OT) NWB LUE, no L elbow ROM (shoulder/wrist/hand ok)   Living Environment   People in Home alone   Current Living Arrangements apartment   Home Accessibility stairs to enter home   Number of Stairs, Main Entrance 6   Stair Railings, Main Entrance railings safe and in good condition;railings on both sides of stairs   Transportation Anticipated agency   Living Environment Comments Pt reports living alone in small apartment. Once inside, no stairs. Bathroom with tub shower in apartment. Uses 4ww at baseline for mobility, has manual WC for when feeling weak/longer distances.   Self-Care   Usual Activity Tolerance fair   Current Activity Tolerance poor   Regular Exercise No   Equipment Currently Used at Home wheelchair, manual;walker, rolling;hospital bed;tub bench;grab bar, tub/shower;grab bar, toilet   Fall history within last six months yes   Number of times patient has fallen within last six months   (Pt reports has fallen approx 1x each month)   Activity/Exercise/Self-Care Comment Pt reports mod I with ADLs at baseline, uses WC at night to/from bathroom.   Instrumental Activities of Daily Living (IADL)   Previous Responsibilities housekeeping;meal prep;laundry;shopping;yardwork;medication management;finances   IADL Comments Pt reports ind with most IADLs at baseline, has  to assist with cleaning, has not driven since first surgery in Oct.   General Information   Onset of Illness/Injury or Date of Surgery 04/10/24   Referring Physician Diana Pleitez MD   Patient/Family Therapy Goal Statement (OT) Gain strength prior to return home   Additional Occupational Profile Info/Pertinent History of Current Problem \"Mitali Robles is a 59 year old female admitted on 4/10/2024. She has hx of bipolar disorder, PTSD, MDD, on high dose benzodiazepines, " "hyperlipidemia, hypertension, and type 2 diabetes on insulin with recurrent left elbow prosthetic joint infection s/p explantation of L total elbow arthroplasty admitted to ortho post op.\"   Existing Precautions/Restrictions fall;weight bearing;other (see comments)  (NWB LUE, no L elbow ROM)   Left Upper Extremity (Weight-bearing Status) non weight-bearing (NWB)   Right Upper Extremity (Weight-bearing Status) full weight-bearing (FWB)   Left Lower Extremity (Weight-bearing Status) full weight-bearing (FWB)   Right Lower Extremity (Weight-bearing Status) full weight-bearing (FWB)   General Observations and Info Activity: up with assist   Cognitive Status Examination   Orientation Status place;person   Cognitive Status Comments Pt not aware of date/day when asked, however throughout eval and treatment did not seem confused. Will continue to monitor.   Visual Perception   Visual Impairment/Limitations corrective lenses full-time   Sensory   Sensory Quick Adds sensation intact   Pain Assessment   Patient Currently in Pain Yes, see Vital Sign flowsheet   Posture   Posture forward head position;protracted shoulders   Range of Motion Comprehensive   Comment, General Range of Motion R WNL this date, no L elbow ROM, L shoulder/wrist/hand ROM limited this date due to nerve block and surgery.   Strength Comprehensive (MMT)   Comment, General Manual Muscle Testing (MMT) Assessment R strength WNL, LUE not assessed due to precautions   Coordination   Upper Extremity Coordination Left UE impaired   Bed Mobility   Bed Mobility supine-sit   Comment (Bed Mobility) CGA-Min A   Transfers   Transfers toilet transfer   Transfer Comments CGA-Min A per clinical judgement   Activities of Daily Living   BADL Assessment/Intervention bathing;upper body dressing;lower body dressing;grooming;toileting   Bathing Assessment/Intervention   [place] Level (Bathing) minimum assist (75% patient effort)   Comment, (Bathing) Per clinical judgement "   Upper Body Dressing Assessment/Training   Comment, (Upper Body Dressing) Per clinical judgement   [place] Level (Upper Body Dressing) maximum assist (25% patient effort)   Lower Body Dressing Assessment/Training   Comment, (Lower Body Dressing) Per clinical judgement   [place] Level (Lower Body Dressing) maximum assist (25% patient effort)   Grooming Assessment/Training   [place] Level (Grooming) contact guard assist   Comment, (Grooming) Per clinical judgement   Toileting   Comment, (Toileting) Per clinical judgement   [place] Level (Toileting) minimum assist (75% patient effort)   Clinical Impression   Criteria for Skilled Therapeutic Interventions Met (OT) Yes, treatment indicated   OT Diagnosis Decreased ind with ADL/IADLs following surgery, decreased activity tolerance due to fatigue   OT Problem List-Impairments impacting ADL problems related to;activity tolerance impaired;strength;pain;post-surgical precautions;mobility   Assessment of Occupational Performance 5 or more Performance Deficits   Identified Performance Deficits bathing transfer, dressing, g/h, toileting, decreased g/h, decrease coordination L hand   Planned Therapy Interventions (OT) ADL retraining;IADL retraining;bed mobility training;progressive activity/exercise;home program guidelines;risk factor education;fine motor coordination training;ROM;strengthening   Clinical Decision Making Complexity (OT) problem focused assessment/low complexity   Risk & Benefits of therapy have been explained evaluation/treatment results reviewed;care plan/treatment goals reviewed;risks/benefits reviewed;current/potential barriers reviewed;participants voiced agreement with care plan;participants included;patient   Clinical Impression Comments Pt would benefit from skilled IP OT services to increase ind with ADL/IADLs and activity tolerance/ROM within precautions.   OT Total Evaluation Time   OT Eval, Low Complexity Minutes (45497) 6   OT  Goals   Therapy Frequency (OT) Daily   OT Predicted Duration/Target Date for Goal Attainment 04/25/24   OT Goals Hygiene/Grooming;Upper Body Dressing;Lower Body Dressing;Transfers;Bed Mobility;Toilet Transfer/Toileting;Meal Preparation;Home Management;OT Goal 1;OT Goal 2   OT: Hygiene/Grooming modified independent   OT: Upper Body Dressing Modified independent   OT: Lower Body Dressing Modified independent   OT: Bed Mobility Modified independent   OT: Transfer Modified independent  (Tub transfer)   OT: Toilet Transfer/Toileting Modified independent   OT: Meal Preparation Modified independent   OT: Goal 1 Pt will complete 6 stairs ind prior to discharge from IP to increase safety with household mobility.   OT Discharge Planning   OT Plan Ambulation trial with cane (PT needs?), BSC transfer, FB dressing, L shoulder/wrist/hand ROM (review hand ROM sheet in room)   OT Discharge Recommendation (DC Rec) Transitional Care Facility;home with assist;home with home care occupational therapy   OT Rationale for DC Rec Anticipate if pt were to discharge this date, would need TCU to progress ind and safety with ADL/IADLs and activity tolerance prior to return home. Pending progress with IP OT, pt may be safe to discharge home with assist and HHOT /PT for safety eval. Will update recs as pt progresses. Pt limited by pain and extreme fatigue this date.   OT Brief overview of current status CGA-Min A bed mobility and STS

## 2024-04-11 NOTE — PHARMACY-ADMISSION MEDICATION HISTORY
Pharmacist Admission Medication History    Admission medication history is complete. The information provided in this note is only as accurate as the sources available at the time of the update.    Information Source(s): Patient and Patient's pharmacy via in-person    Pertinent Information: Very good historian. Reported taking all meds at [facility] except for gabapentin. Confirmed benzo dosages with patient and pharmacy - takes 3 different ones: xanaz, klonopin and valium.     Changes made to PTA medication list:  Added: Caplyta, ferrous sulfate  Deleted: fenofibrate, ranitidine  Changed: alprazolam, gabapentin, clonidine    Allergies reviewed with patient and updates made in EHR: unable to assess    Medication History Completed By: Harpreet Serrano [facility] 4/11/2024 10:52 AM    Prior to Admission medications    Medication Sig Last Dose Taking? Auth Provider Long Term End Date   albuterol (PROAIR HFA/PROVENTIL HFA/VENTOLIN HFA) 108 (90 BASE) MCG/ACT Inhaler Inhale 2 puffs into the lungs as needed More than a month Yes Reported, Patient Yes    ALPRAZolam (XANAX) 2 MG tablet Take 2 mg by mouth at bedtime 4/9/2024 at 2000 Yes Reported, Patient     atorvastatin (LIPITOR) 10 MG tablet Take 10 mg by mouth 4/9/2024 at 2000 Yes Reported, Patient     citalopram (CELEXA) 20 MG tablet Take 20 mg by mouth at bedtime 4/9/2024 at 2000 Yes Reported, Patient Yes    clonazePAM (KLONOPIN) 2 MG tablet Take 4 mg by mouth at bedtime 4/9/2024 at 2000 Yes Reported, Patient Yes    cloNIDine (CATAPRES) 0.1 MG tablet Take 0.1 mg by mouth at bedtime Take with 0.9mg clonidine for total of 1mg at bedtime 4/9/2024 at 2000 Yes Reported, Patient Yes    diazepam (VALIUM) 10 MG tablet Take 30 mg by mouth at bedtime 4/9/2024 at 2000 Yes Reported, Patient     diphenhydrAMINE (BENADRYL) 25 MG capsule Take 25 mg by mouth every 6 hours as needed for itching or allergies Past Month Yes Reported, Patient     ferrous sulfate (FE TABS) 325 (65 Fe) MG EC tablet Take  325 mg by mouth every other day 4/9/2024 at 2000 Yes Unknown, Entered By History     gabapentin (NEURONTIN) 400 MG capsule Take 800 mg by mouth 3 times daily Take at breakfast, noon and supper 4/9/2024 at 1600 Yes Reported, Patient     insulin aspart (NOVOLOG FLEXPEN) 100 UNIT/ML pen Inject 4-12 Units Subcutaneous 3 times daily (with meals) + correction 4/9/2024 at 1700 Yes Reported, Patient     insulin degludec (TRESIBA) 100 UNIT/ML pen Inject 25 Units Subcutaneous at bedtime 4/9/2024 at 2000 Yes Reported, Patient     lumateperone (CAPLYTA) 42 MG capsule Take 84 mg by mouth at bedtime 4/9/2024 at 2000 Yes Unknown, Entered By History     metoclopramide (REGLAN) 5 MG tablet Take 5 mg by mouth 4 times daily as needed Past Week Yes Reported, Patient     omeprazole (PRILOSEC) 20 MG DR capsule Take 20 mg by mouth daily 4/9/2024 at 2000 Yes Unknown, Entered By History     cloNIDine (CATAPRES) 0.3 MG tablet Take 0.9 mg by mouth at bedtime Take with 0.1mg clonidine for total dose of 1mg at bedtime 4/9/2024 at 2000  Reported, Patient Yes

## 2024-04-11 NOTE — PLAN OF CARE
"  VS: Blood pressure (!) 159/82, pulse 53, temperature 97.9  F (36.6  C), resp. rate 16, height 1.753 m (5' 9\"), weight 99 kg (218 lb 4.1 oz), SpO2 99%.   O2: RA   Output: Galvan catheter in place   Last BM: 4/10   Activity: Ax1 w/ walker+GB   Skin: L arm surgical incision, R hand bruise   Pain: Managed with scheduled Tylenol, Toradol, and PRN dilaudid  Per chart, pt was allergic to narcotics supposedly. Pt states being able to receive narcotics without problem. Hospitalist aware.     CMS: A&O x 4; numbness and tingling in LUE   Dressing: ACE bandage, CDI   Diet: Regular   LDA: R PIV SL; BAILEY bulb   Equipment: IV pole, personal belongings   Plan: Continue POC.   Additional Info: The pt received Dilaudid in the OR which is listed as an allergy with anaphylaxis as result.The pt did tolerate the 2 doses int he OR without a problem per CRNA and PADMAJA Cardenas.          "

## 2024-04-11 NOTE — PLAN OF CARE
"Goal Outcome Evaluation:          VS: Elevated BP: MD aware, clonidine given  other vitals stable    O2: Wean O2 to 1L.  Tolerating well.   Output: Galvan cath inplace.   5am: Urine output since 0200 was 100mls-MD aware. COnt to monitor.     Last BM: 4/10   Activity: Not OOB   Pt stated she can walk short distance without aide at baseline.  Spent most of the time in the wheelchair per pt report    Skin: Redness to groin and under breast    Pain: Given Toradol and tylenol schedule.    Pt is asking  more pain meds aside for above med s. On call resident ortho informed and responded\" pt is allergic to narcotics supposedly. Likey need a pain med consult. Day team will order.      Neuro: Alert and orientedx4-forgetful at times per pt report  No sensation to L arm due to block    Dressing: ACE bandage: CDI    Diet: Regular    LDA: R hand PIV: infusing   BAILEY    Equipment: 1 large black luggage bag, cellphone and personal belongings at bedside    Plan: Cont POC    Additional Info: Per PACU nurse \" Resident verbally stated the pt's EBL was 600mls, its documented at 200mls on the OR record. The pt re ceived Dialudid in the OR which is listed as an allergy with anaphylaxis as result.The pt did tolerate the 2 doses int he OR without a problem per CRNA and PADMAJA Cardenas. \"          "

## 2024-04-11 NOTE — OR NURSING
Pt transferred to [address] [place] [address] without incident. Pt monitored with capnography during transport. Transfer of care complete at 0210 to Angella Nguyễn RN

## 2024-04-12 ENCOUNTER — APPOINTMENT (OUTPATIENT)
Dept: OCCUPATIONAL THERAPY | Facility: CLINIC | Age: 59
DRG: 463 | End: 2024-04-12
Attending: STUDENT IN AN ORGANIZED HEALTH CARE EDUCATION/TRAINING PROGRAM
Payer: COMMERCIAL

## 2024-04-12 LAB
ALBUMIN SERPL BCG-MCNC: 2.9 G/DL (ref 3.5–5.2)
ALP SERPL-CCNC: 122 U/L (ref 40–150)
ALT SERPL W P-5'-P-CCNC: <5 U/L (ref 0–50)
AST SERPL W P-5'-P-CCNC: 20 U/L (ref 0–45)
BACTERIA TISS BX CULT: ABNORMAL
BILIRUB DIRECT SERPL-MCNC: <0.2 MG/DL (ref 0–0.3)
BILIRUB SERPL-MCNC: <0.2 MG/DL
CREAT SERPL-MCNC: 1.11 MG/DL (ref 0.51–0.95)
EGFRCR SERPLBLD CKD-EPI 2021: 57 ML/MIN/1.73M2
GLUCOSE BLDC GLUCOMTR-MCNC: 148 MG/DL (ref 70–99)
GLUCOSE BLDC GLUCOMTR-MCNC: 149 MG/DL (ref 70–99)
GLUCOSE BLDC GLUCOMTR-MCNC: 172 MG/DL (ref 70–99)
GLUCOSE BLDC GLUCOMTR-MCNC: 183 MG/DL (ref 70–99)
GLUCOSE BLDC GLUCOMTR-MCNC: 235 MG/DL (ref 70–99)
HGB BLD-MCNC: 8.4 G/DL (ref 11.7–15.7)
PROT SERPL-MCNC: 5.4 G/DL (ref 6.4–8.3)

## 2024-04-12 PROCEDURE — 93010 ELECTROCARDIOGRAM REPORT: CPT | Performed by: INTERNAL MEDICINE

## 2024-04-12 PROCEDURE — 120N000002 HC R&B MED SURG/OB UMMC

## 2024-04-12 PROCEDURE — 250N000013 HC RX MED GY IP 250 OP 250 PS 637: Performed by: INTERNAL MEDICINE

## 2024-04-12 PROCEDURE — 80076 HEPATIC FUNCTION PANEL: CPT | Performed by: STUDENT IN AN ORGANIZED HEALTH CARE EDUCATION/TRAINING PROGRAM

## 2024-04-12 PROCEDURE — 36415 COLL VENOUS BLD VENIPUNCTURE: CPT | Performed by: STUDENT IN AN ORGANIZED HEALTH CARE EDUCATION/TRAINING PROGRAM

## 2024-04-12 PROCEDURE — 250N000011 HC RX IP 250 OP 636: Performed by: STUDENT IN AN ORGANIZED HEALTH CARE EDUCATION/TRAINING PROGRAM

## 2024-04-12 PROCEDURE — 258N000003 HC RX IP 258 OP 636: Performed by: STUDENT IN AN ORGANIZED HEALTH CARE EDUCATION/TRAINING PROGRAM

## 2024-04-12 PROCEDURE — 250N000011 HC RX IP 250 OP 636: Mod: JZ | Performed by: STUDENT IN AN ORGANIZED HEALTH CARE EDUCATION/TRAINING PROGRAM

## 2024-04-12 PROCEDURE — 250N000013 HC RX MED GY IP 250 OP 250 PS 637: Performed by: STUDENT IN AN ORGANIZED HEALTH CARE EDUCATION/TRAINING PROGRAM

## 2024-04-12 PROCEDURE — 82565 ASSAY OF CREATININE: CPT | Performed by: STUDENT IN AN ORGANIZED HEALTH CARE EDUCATION/TRAINING PROGRAM

## 2024-04-12 PROCEDURE — 99232 SBSQ HOSP IP/OBS MODERATE 35: CPT | Performed by: STUDENT IN AN ORGANIZED HEALTH CARE EDUCATION/TRAINING PROGRAM

## 2024-04-12 PROCEDURE — 999N000127 HC STATISTIC PERIPHERAL IV START W US GUIDANCE

## 2024-04-12 PROCEDURE — 99232 SBSQ HOSP IP/OBS MODERATE 35: CPT | Performed by: INTERNAL MEDICINE

## 2024-04-12 PROCEDURE — 85018 HEMOGLOBIN: CPT | Performed by: STUDENT IN AN ORGANIZED HEALTH CARE EDUCATION/TRAINING PROGRAM

## 2024-04-12 PROCEDURE — 97535 SELF CARE MNGMENT TRAINING: CPT | Mod: GO

## 2024-04-12 RX ORDER — CEFAZOLIN SODIUM 1 G/50ML
1750 SOLUTION INTRAVENOUS EVERY 24 HOURS
Status: DISCONTINUED | OUTPATIENT
Start: 2024-04-13 | End: 2024-04-17

## 2024-04-12 RX ORDER — LIDOCAINE 40 MG/G
CREAM TOPICAL
Status: DISCONTINUED | OUTPATIENT
Start: 2024-04-12 | End: 2024-04-12

## 2024-04-12 RX ORDER — METHOCARBAMOL 500 MG/1
500 TABLET, FILM COATED ORAL EVERY 6 HOURS PRN
Qty: 20 TABLET | Refills: 0 | Status: ON HOLD | DISCHARGE
Start: 2024-04-12 | End: 2024-05-23

## 2024-04-12 RX ORDER — POLYETHYLENE GLYCOL 3350 17 G/17G
17 POWDER, FOR SOLUTION ORAL DAILY
DISCHARGE
Start: 2024-04-12

## 2024-04-12 RX ORDER — ACETAMINOPHEN 325 MG/1
650 TABLET ORAL EVERY 4 HOURS PRN
Qty: 100 TABLET | DISCHARGE
Start: 2024-04-13

## 2024-04-12 RX ORDER — ASPIRIN 81 MG/1
81 TABLET ORAL 2 TIMES DAILY
Qty: 60 TABLET | Status: ON HOLD | DISCHARGE
Start: 2024-04-12 | End: 2024-05-23

## 2024-04-12 RX ORDER — ERTAPENEM 1 G/1
1 INJECTION, POWDER, LYOPHILIZED, FOR SOLUTION INTRAMUSCULAR; INTRAVENOUS EVERY 24 HOURS
Status: DISCONTINUED | OUTPATIENT
Start: 2024-04-12 | End: 2024-04-22 | Stop reason: HOSPADM

## 2024-04-12 RX ORDER — ASPIRIN 81 MG/1
81 TABLET ORAL 2 TIMES DAILY
Qty: 60 TABLET | Refills: 0 | Status: SHIPPED | OUTPATIENT
Start: 2024-04-12 | End: 2024-04-12

## 2024-04-12 RX ORDER — CEFAZOLIN SODIUM 1 G/50ML
2000 SOLUTION INTRAVENOUS ONCE
Status: COMPLETED | OUTPATIENT
Start: 2024-04-12 | End: 2024-04-12

## 2024-04-12 RX ORDER — FLUCONAZOLE 200 MG/1
400 TABLET ORAL DAILY
Status: DISCONTINUED | OUTPATIENT
Start: 2024-04-12 | End: 2024-04-16

## 2024-04-12 RX ADMIN — VANCOMYCIN HYDROCHLORIDE 2000 MG: 1 INJECTION, POWDER, LYOPHILIZED, FOR SOLUTION INTRAVENOUS at 18:02

## 2024-04-12 RX ADMIN — ACETAMINOPHEN 975 MG: 325 TABLET, FILM COATED ORAL at 08:17

## 2024-04-12 RX ADMIN — GABAPENTIN 800 MG: 800 TABLET, FILM COATED ORAL at 12:05

## 2024-04-12 RX ADMIN — GABAPENTIN 800 MG: 800 TABLET, FILM COATED ORAL at 15:48

## 2024-04-12 RX ADMIN — OMEPRAZOLE 20 MG: 20 CAPSULE, DELAYED RELEASE ORAL at 08:19

## 2024-04-12 RX ADMIN — ALPRAZOLAM 0.25 MG: 0.25 TABLET ORAL at 08:18

## 2024-04-12 RX ADMIN — ERTAPENEM SODIUM 1 G: 1 INJECTION, POWDER, LYOPHILIZED, FOR SOLUTION INTRAMUSCULAR; INTRAVENOUS at 16:57

## 2024-04-12 RX ADMIN — ALPRAZOLAM 0.25 MG: 0.25 TABLET ORAL at 20:08

## 2024-04-12 RX ADMIN — SENNOSIDES AND DOCUSATE SODIUM 1 TABLET: 8.6; 5 TABLET ORAL at 20:08

## 2024-04-12 RX ADMIN — CEFAZOLIN SODIUM 2 G: 2 INJECTION, SOLUTION INTRAVENOUS at 04:52

## 2024-04-12 RX ADMIN — FLUCONAZOLE 400 MG: 200 TABLET ORAL at 16:41

## 2024-04-12 RX ADMIN — HYDROMORPHONE HYDROCHLORIDE 0.2 MG: 0.2 INJECTION, SOLUTION INTRAMUSCULAR; INTRAVENOUS; SUBCUTANEOUS at 01:32

## 2024-04-12 RX ADMIN — CITALOPRAM 20 MG: 20 TABLET ORAL at 21:41

## 2024-04-12 RX ADMIN — ASPIRIN 162 MG: 81 TABLET, COATED ORAL at 08:18

## 2024-04-12 RX ADMIN — HYDROMORPHONE HYDROCHLORIDE 0.4 MG: 0.2 INJECTION, SOLUTION INTRAMUSCULAR; INTRAVENOUS; SUBCUTANEOUS at 08:18

## 2024-04-12 RX ADMIN — HYDROMORPHONE HYDROCHLORIDE 0.4 MG: 0.2 INJECTION, SOLUTION INTRAMUSCULAR; INTRAVENOUS; SUBCUTANEOUS at 19:22

## 2024-04-12 RX ADMIN — GABAPENTIN 800 MG: 800 TABLET, FILM COATED ORAL at 08:18

## 2024-04-12 RX ADMIN — CLONIDINE HYDROCHLORIDE 0.2 MG: 0.2 TABLET ORAL at 20:08

## 2024-04-12 RX ADMIN — CEFAZOLIN SODIUM 2 G: 2 INJECTION, SOLUTION INTRAVENOUS at 12:06

## 2024-04-12 RX ADMIN — CLONIDINE HYDROCHLORIDE 0.2 MG: 0.2 TABLET ORAL at 08:17

## 2024-04-12 RX ADMIN — SENNOSIDES AND DOCUSATE SODIUM 1 TABLET: 8.6; 5 TABLET ORAL at 08:17

## 2024-04-12 RX ADMIN — LUMATEPERONE 42 MG: 42 CAPSULE ORAL at 21:41

## 2024-04-12 ASSESSMENT — ACTIVITIES OF DAILY LIVING (ADL)
ADLS_ACUITY_SCORE: 44
ADLS_ACUITY_SCORE: 41
ADLS_ACUITY_SCORE: 41
ADLS_ACUITY_SCORE: 44
ADLS_ACUITY_SCORE: 44
ADLS_ACUITY_SCORE: 41
ADLS_ACUITY_SCORE: 44
ADLS_ACUITY_SCORE: 44
ADLS_ACUITY_SCORE: 41
ADLS_ACUITY_SCORE: 44
ADLS_ACUITY_SCORE: 41
ADLS_ACUITY_SCORE: 44
ADLS_ACUITY_SCORE: 41
ADLS_ACUITY_SCORE: 44
DEPENDENT_IADLS:: INDEPENDENT
ADLS_ACUITY_SCORE: 41
ADLS_ACUITY_SCORE: 44
ADLS_ACUITY_SCORE: 41
ADLS_ACUITY_SCORE: 44
ADLS_ACUITY_SCORE: 41
ADLS_ACUITY_SCORE: 44

## 2024-04-12 NOTE — CONSULTS
"Consult received for Vascular access care.  See LDA for details.  For additional needs place \"Nursing to Consult for Vascular Access\" [id number] order in EPIC.  "

## 2024-04-12 NOTE — PLAN OF CARE
"[id number]     Goal Outcome Evaluation:      Plan of Care Reviewed With: patient    Overall Patient Progress: no changeOverall Patient Progress: no change    Outcome Evaluation: Pt AOx4 but forgetful at times. During initial meeting and vitals - found to have elevated BP of 213/95, all other vital signs stable. Pt reported a headache but otherwise \"felt ok\". PRN hydralazine ordered, MD notified. Advised to continue monitoring, recheck BP which would eventually trand down to 146/74 post hydralazine admin. Plan to continue POC for pain management and intermittent IV antibiotics.    BP (!) 146/74 (BP Location: Right arm)   Pulse 73   Temp 97.6  F (36.4  C) (Oral)   Resp 18   Ht 1.753 m (5' 9\")   Wt 99 kg (218 lb 4.1 oz)   SpO2 98%   BMI 32.23 kg/m    O2>90% ORA, denies feeling SOB, lightheadedness. Lungs clear, equal bilateral    Output: Galvan catheter in place.   Last BM: 4/10   Activity: Ax1 w/ walker and GB   Skin: L shoulder surgical incision   Pain: Per chart pt states being allergic to narcotics, was given IV dilaudid - tolerated well during stay. No dilaudid given by author this shift.   CMS: CMS intact, denies N&T except to LUE    Dressing: ACE bandaged L shoulder dressing   Diet: Reg, denies nausea and vomiting   On glucose checks before meals   LDA: R PIV SL  BAILEY drain          "

## 2024-04-12 NOTE — CONSULTS
Care Management Initial Consult    General Information  Assessment completed with: Patient,    Type of CM/SW Visit: Initial Assessment    Primary Care Provider verified and updated as needed: Yes   Readmission within the last 30 days: no previous admission in last 30 days      Reason for Consult: discharge planning  Advance Care Planning: Advance Care Planning Reviewed: questions answered          Communication Assessment  Patient's communication style: spoken language (English or Bilingual)    Hearing Difficulty or Deaf: no   Wear Glasses or Blind: yes    Cognitive  Cognitive/Neuro/Behavioral: WDL  Level of Consciousness: alert  Arousal Level: opens eyes spontaneously  Orientation: oriented x 4  Mood/Behavior: calm, cooperative  Best Language: 0 - No aphasia  Speech: spontaneous, logical    Living Environment:   People in home: alone     Current living Arrangements: apartment      Able to return to prior arrangements: yes       Family/Social Support:  Care provided by: self  Provides care for: no one  Marital Status:  (Pt engaged)  Other (specify) (Fiance,  and community support.)          Description of Support System: Supportive, Involved    Support Assessment: Adequate family and caregiver support    Current Resources:   Patient receiving home care services: No     Community Resources: None  Equipment currently used at home: walker, rolling, wheelchair, manual, shower chair  Supplies currently used at home: Other (Home IV)    Employment/Financial:  Employment Status: disabled        Financial Concerns: none   Referral to Financial Worker: No       Does the patient's insurance plan have a 3 day qualifying hospital stay waiver?  No    Lifestyle & Psychosocial Needs:  Social Determinants of Health     Food Insecurity: No Food Insecurity (2/19/2024)    Received from [facility] and [facility], [facility]    Hunger Vital Sign     Worried About Running Out of Food in the Last Year:  Never true     Ran Out of Food in the Last Year: Never true   Depression: Not at risk (2/12/2024)    PHQ-2     PHQ-2 Score: 0   Recent Concern: Depression - At risk (1/31/2024)    Received from [facility], [facility]    PHQ-2     PHQ-9 Total (Adult): 19   Housing Stability: Low Risk  (2/19/2024)    Received from [facility], [facility]    Housing Stability Vital Sign     Unable to Pay for Housing in the Last Year: No     Number of Places Lived in the Last Year: 1     In the last 12 months, was there a time when you did not have a steady place to sleep or slept in a shelter (including now)?: No   Tobacco Use: Medium Risk (4/9/2024)    Received from [facility], [facility]    Patient History     Smoking Tobacco Use: Former     Smokeless Tobacco Use: Never     Passive Exposure: Not on file   Financial Resource Strain: Low Risk  (2/19/2024)    Received from [facility], [facility]    Overall Financial Resource Strain (CARDIA)     Difficulty of Paying Living Expenses: Not hard at all   Alcohol Use: Not At Risk (2/17/2024)    Received from [facility], [facility]    AUDIT-C     Frequency of Alcohol Consumption: Never     Average Number of Drinks: Patient does not drink     Frequency of Binge Drinking: Never   Transportation Needs: No Transportation Needs (2/19/2024)    Received from [facility], [facility]    PRAPARE - Transportation     Lack of Transportation (Medical): No     Lack of Transportation (Non-Medical): No   Physical Activity: Inactive (5/12/2021)    Received from [facility], [facility]    Exercise Vital Sign     Days of Exercise per Week: 0 days     Minutes of Exercise per Session: 0 min   Interpersonal Safety: Not At Risk (5/12/2021)    Received from  [facility], [facility]    Humiliation, Afraid, Rape, and Kick questionnaire     Fear of Current or Ex-Partner: No     Emotionally Abused: No     Physically Abused: No     Sexually Abused: No   Stress: No Stress Concern Present (5/12/2021)    [facility] from [facility], [facility]    [employer] [place] [employer] - Occupational Stress Questionnaire     Feeling of Stress : Only a little   Social Connections: Socially Isolated (5/12/2021)    Received from [facility], [facility]    Social Connection and Isolation Panel [NHANES]     Frequency of Communication with Friends and Family: More than three times a week     Frequency of Social Gatherings with Friends and Family: More than three times a week     Attends Judaism Services: Never     Active Member of Clubs or Organizations: No     Attends Club or Organization Meetings: Never     Marital Status:    Health Literacy: Not on file       Functional Status:  Prior to admission patient needed assistance:   Dependent ADLs:: Independent  Dependent IADLs:: Independent       Mental Health Status:  Mental Health Status: Current Concern  Mental Health Management: Medication, Psychiatrist    Chemical Dependency Status:  Chemical Dependency Status: No Current Concerns             Values/Beliefs:  Spiritual, Cultural Beliefs, Judaism Practices, Values that affect care: yes (Pt is [place])               Additional Information:  SW met with Pt at bedside.  SW introduced himself and his role on the unit.  Pt is a 59 year old engaged female, who lives independently in an apartment in [place], [place].  Pt identified that she completes her own IV-infusion and that she has a  receiving transportation thru [facility]. Pt identified being independent with ADLs and IADls. Pt identified some mental health sxs and identified that she sees a  psychiatrist for sxs management including medications. Pt identifies that she understands she will likely need TCU and is requesting a [facility] TCU referral.  Pt identified that originally her Fiance was going to provide discharge transportation back to [place] but he was in a car accident and Pt is identifying that she will likely require a Greyhound ride home. Pt identified that she is on SSDI and has not received a check for the last  2  months. SW will  continue to follow for safe discharge placement and planning.            Alvin Wright, MaineGeneral Medical CenterSW  [facility] ICU & [facility]   Ph: [phone]  Pager: [phone]

## 2024-04-12 NOTE — PLAN OF CARE
"Goal Outcome Evaluation:           Overall Patient Progress: no changeOverall Patient Progress: no change         Pt stated she wanted Xanax and IV Dilaudid at the same time this AM. This writer monitored for sedation.     Pt had OT today and became too weak to sit up in bed. Eventually was able to sit up after much insistence. Pt stood for several seconds but sat down.     VS: BP (!) 142/55   Pulse 63   Temp 97.5  F (36.4  C)   Resp 16   Ht 1.753 m (5' 9\")   Wt 99 kg (218 lb 4.1 oz)   SpO2 96%   BMI 32.23 kg/m      O2: Stats >92% on RA   Output: Galvan removed at 830 am. 2x Bladder scanners completed during shift. Pt denies    Last BM: 4/10 per pt report    Activity: Non WB on L arm  Up with A2 with walker and gait belt. Not OOB this shift   Skin: Intact except redness to groin and under breast    Pain: Given 1x Dilaudid PRN    Neuro: Alert and oriented x4. Denies N/T   Dressing: LUE dressing: CDI    Diet: Regular    LDA: Loss of PIV  BAILYE drain   Equipment: Cellphone, purse and personal belongings at bedside    Plan: TBD    Additional Info:        "

## 2024-04-12 NOTE — PROGRESS NOTES
Orthopaedic Surgery Progress Note 04/12/2024    S: Patient states she is doing very well, up and out of bed. Block beginning to wear most of her pain is in her back, minimal arm pain. Remains intermittently HTN, medicine assisting. ID saw and made recs.  Questions answered. POC reviewed.    O:  Temp: 98  F (36.7  C) Temp src: Oral BP: 132/66 Pulse: 91   Resp: 16 SpO2: 98 % O2 Device: None (Room air) Oxygen Delivery: 1 LPM    Exam:  Gen: No acute distress, resting comfortably in bed.  Resp: Non-labored breathing  MSK:  LUE:  - Splint/Dressings c/d/i  - SILT medial/radial/ulnar/axillary nerves  - Fires EPL, FPL, Intrinsics  - Radial pulse 2+, hand wwp      Drain output:   Output by Drain (mL) 04/10/24 0700 - 04/10/24 1459 04/10/24 1500 - 04/10/24 2259 04/10/24 2300 - 04/11/24 0659 04/11/24 0700 - 04/11/24 1459 04/11/24 1500 - 04/11/24 2259 04/11/24 2300 - 04/12/24 0633   Closed/Suction Drain 1 Left Elbow Bulb 15 Faroese   230 125 225 60     .    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 Sarmiento.     Activity: Up with assist.  Weight bearing status: Minimize use of LUE  Antibiotics: Per ID recs:  Recommendations:  - Stop Cefazolin   - Start IV Vancomycin (pharmacy to dose)   - Start IV Ertapenem 1g Q24H   - Start Fluconazole PO/IV 400mg Q24H   - Will follow intraoperative tissue cultures   Diet: Begin with clear fluids and progress diet as tolerated.   DVT prophylaxis: aspirin 162 qd and mechanical while in the hospital, discharge on aspirin 162 x 4 weeks.  Bracing/Splinting: LUE posterior slab  splint to be kept clean, dry, and intact until follow-up.   Elevation: Elevate LUE on pillows to keep on pillows as much as possible.   Wound Care: Keep splint c/d/I until 2 week follow up.  Drains: Document output per shift, discontinue at discretion of orthopedics  Pain management: transition from IV to orals as tolerated.    X-rays:  POD1 left elbow XR (AP and lateral)  Physical Therapy:  Eval and treat  Occupational Therapy:   Labs: Trend Hgb on POD #1, 2, then PRN  Cultures: Pending, follow culture results closely.   Consults: PT, OT. Medicine, Infectious disease.  appreciate assistance in caring for this patient.  May need pain team as has many narcotic allergies listed  Follow-up: Clinic with Dr. Sarmiento's team in 2 weeks for wound check with left elbow xrays     Disposition: Pending progress with therapies, pain control on orals, and medical stability, anticipate discharge to TCU on POD #4-5.     Richard Dunaway MD  Orthopaedic Surgery, PGY-4  Pager: [phone]

## 2024-04-12 NOTE — PLAN OF CARE
Goal Outcome Evaluation:                        VS: VSS and afebrile    O2: Stats >92% on RA   Output: Adequate output in vargas cath    Last BM: 4/10 per pt report    Activity: Non WB on L arm  Up with A1   Skin: Intact except redness to groin and under breast    Pain: Given 1x Dilaudid PRN   Neuro: Alert and oriented x4.  No sensation to L arm due to block.    Dressing: LUE dressing: CDI    Diet: Regular    LDA: R hand PIV: SL  BAILEY drain  Vargas cath   Equipment: Cellphone, purse and personal belongings at bedside    Plan: TBD    Additional Info:
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
## 2024-04-12 NOTE — PROGRESS NOTES
[facility]    Medicine Progress Note - Hospitalist Service, GOLD TEAM 16    Date of Admission:  4/10/2024    Assessment & Plan      Mitali Robles is a 59 year old female admitted on 4/10/2024. She has hx of bipolar disorder, PTSD, MDD, on high dose benzodiazepines, hyperlipidemia, hypertension, and type 2 diabetes on insulin with recurrent left elbow prosthetic joint infection s/p explantation of L total elbow arthroplasty admitted to ortho post op.    New today   -Continue therapies.  -Twelve-lead EKG, post tachycardia and right-sided chest pain.     L elbow prosthetic joint infection s/p explantation of L total elbow arthroplasty  Reported EBL 600ml per OR note. Per outside ID note, s/p 7 weeks of IV daptomycin, ertapnem and fluconazole. currently ordered cefazolin 2g IV 8h per ortho.   - post op wound care and pain control per ortho  -Monitor routine labs   - follow up cultures  - consider ID consultation   -Primary team is planning per below  Activity: Up with assist.  Weight bearing status: Minimize use of LUE  Antibiotics: Per ID recs:  Recommendations:  - Stop Cefazolin   - Start IV Vancomycin (pharmacy to dose)   - Start IV Ertapenem 1g Q24H   - Start Fluconazole PO/IV 400mg Q24H   - Will follow intraoperative tissue cultures   Diet: Begin with clear fluids and progress diet as tolerated.   DVT prophylaxis: aspirin 162 qd and mechanical while in the hospital, discharge on aspirin 162 x 4 weeks.  Bracing/Splinting: LUE posterior slab splint to be kept clean, dry, and intact until follow-up.   Elevation: Elevate LUE on pillows to keep on pillows as much as possible.   Wound Care: Keep splint c/d/I until 2 week follow up.  Drains: Document output per shift, discontinue at discretion of orthopedics  Pain management: transition from IV to orals as tolerated.    X-rays:  POD1 left elbow XR (AP and lateral)  Physical Therapy:  Eval and treat  Occupational Therapy:    Labs: Trend Hgb on POD #1, 2, then PRN  Cultures: Pending, follow culture results closely.   Consults: PT, OT. Medicine, Infectious disease.  appreciate assistance in caring for this patient.  May need pain team as has many narcotic allergies listed  Follow-up: Clinic with Dr. Sarmiento's team in 2 weeks for wound check with left elbow xrays     Chest pain  Diaphoresis  Tachycardia  -- Likely as a result of exertion, was self-limiting.  -- On examination right-sided chest pain was reproducible on exam.  -- Vitals self resolved.  -- Obtain a twelve-lead EKG.  Monitor for continued symptoms    Somnolence improved  Still waking up from surgery. Bradypnea with RR 8-10 with occasional desaturation.  - capnography overnight  -This is improved.     Complex psychiatric history including bipolar disorder, PTSD, MDD, on high dose benzodiazepines  PTA meds include valium 30mg HS, xanax 1mg HS prn, clonazepam 2mg BID prn, celexa 20mg hs, and lumateperone 84mg hs.  checked, and last filled xanax 2mg 30 tabs, valiume 10mg 90 tabs and clonazepam 2mg 90 tabs on 2/12/24. Currently ordered xanax 0.25mg TID per ortho.   -Postop mentation improved now on the floor.  -Previously sedated post op and was on precedex, received ativan intra-op,  -Currently on Xanax 0.25 mg 3 times daily   - Possibly need adjustment to prevent withdrawal (no signs of withdrawal currently)  - recommend psych consult     Type 2 DM  A1c 7.4% on 2/12. On tresiba hs and aspart  - sliding scale insulin for now  - resume tresiba in am  -Blood glucose in acceptable limits  Recent Labs   Lab 04/12/24  0720 04/12/24  0151 04/11/24  2147 04/11/24  1731 04/11/24  1635 04/11/24  1530   * 149* 233* 172* 161* 173*          Hypertension  On Clonidine 1mg HS. Currently BP soft post op  -Resume home BP meds including clonidine.  -Elevated blood pressure into the 190s today  - Address underlying pain.  - Add IV hydralazine for SBP greater than 180       Neuropathic  "pain  - PTA gabapentin 800mg TID     Hyperlipidemia  - continue atorvastatin              Diet: Advance Diet as Tolerated: Regular Diet Adult    DVT Prophylaxis: ASA per primary team  Galvan Catheter: Not present  Lines: None     Cardiac Monitoring: None  Code Status: Full Code      Clinically Significant Risk Factors                  # Hypertension: Noted on problem list       # DMII: A1C = 7.4 % (Ref range: <5.7 %) within past 6 months   # Obesity: Estimated body mass index is 32.23 kg/m  as calculated from the following:    Height as of this encounter: 1.753 m (5' 9\").    Weight as of this encounter: 99 kg (218 lb 4.1 oz)., PRESENT ON ADMISSION            Disposition Plan     Medically Ready for Discharge: Anticipated in 2-4 Days             Arvind Falcon MD  Hospitalist Service, GOLD TEAM 16  [facility]  Securely message with [employer] (more info)  Text page via [facility] Paging/Directory   See signed in provider for up to date coverage information  ______________________________________________________________________    Interval History   Seen and examined, no overnight events.  Nursing documentation reviewed.  No new symptoms.  Primary team's recommendations and plan reviewed.  Patient seen in conjunction with physical therapy, patient apparently was diaphoretic and complains of right-sided chest soreness and chest pain.  Was tachycardic into the 130s.  Repeat vitals normalized.  Physical Exam   Vital Signs: Temp: 97.5  F (36.4  C) Temp src: Oral BP: (!) 142/55 Pulse: 63   Resp: 16 SpO2: 97 % O2 Device: None (Room air)    Weight: 218 lbs 4.09 oz    General Appearance: Appears comfortable.  Respiratory: Without wheezes rhonchi or rales.  CTA  Cardiovascular: RRR, without murmurs  GI: Soft, nontender, plus BS  Skin: Without obvious bleeding, bruising or excoriations      Medical Decision Making       57 MINUTES SPENT BY ME on the date of service doing chart review, " history, exam, documentation & further activities per the note.      Data   ------------------------- PAST 24 HR DATA REVIEWED -----------------------------------------------

## 2024-04-12 NOTE — PROGRESS NOTES
Pt stating that she was having more back pain than L arm pain this AM. 9 out of 10 pain. Ortho paged.

## 2024-04-12 NOTE — PHARMACY-VANCOMYCIN DOSING SERVICE
Pharmacy Vancomycin Initial Note  Date of Service 2024  Patient's  1965  59 year old, female    Indication: Bone and Joint Infection and Skin and Soft Tissue Infection    Current estimated CrCl = Estimated Creatinine Clearance: 72.7 mL/min (A) (based on SCr of 1.03 mg/dL (H)).    Creatinine for last 3 days  2024: 12:30 AM Creatinine 0.98 mg/dL;  4:35 PM Creatinine 1.03 mg/dL    Recent Vancomycin Level(s) for last 3 days  No results found for requested labs within last 3 days.      Vancomycin IV Administrations (past 72 hours)                     vancomycin (VANCOCIN) topical powder (g) 3 g Given 04/10/24 2003                    Nephrotoxins and other renal medications (From now, onward)      None            Contrast Orders - past 72 hours (72h ago, onward)      None            [employer] Prediction of Planned Initial Vancomycin Regimen  Loading dose: 2000mg IV x 1 dose  Regimen: 1750 mg IV every 24 hours.  Start time: 18:00 on 2024  Exposure target: AUC24 (range)400-600 mg/L.hr   AUC24,ss: 506 mg/L.hr  Probability of AUC24 > 400: 75 %  Ctrough,ss: 14.6 mg/L  Probability of Ctrough,ss > 20: 24 %  Probability of nephrotoxicity (Lodise CHANDAN ): 10 %        Plan:  Start vancomycin 2000mg x 1 dose given before creat was drawn & came back in case it took a while for the lab draw (~20mg/kg/dose). Then will continue dosing at 1750 IV q24h (17.7mg/kg/dose) tomorrow since creat has been slowly trending up.   Vancomycin monitoring method: AUC  Vancomycin therapeutic monitoring goal: 400-600 mg*h/L  Pharmacy will check vancomycin levels as appropriate in 1-3 Days.    Serum creatinine levels will be ordered every 48 hours.      Sissy Nobles, PharmD, BCPS

## 2024-04-13 ENCOUNTER — APPOINTMENT (OUTPATIENT)
Dept: GENERAL RADIOLOGY | Facility: CLINIC | Age: 59
DRG: 463 | End: 2024-04-13
Payer: COMMERCIAL

## 2024-04-13 ENCOUNTER — APPOINTMENT (OUTPATIENT)
Dept: ULTRASOUND IMAGING | Facility: CLINIC | Age: 59
DRG: 463 | End: 2024-04-13
Payer: COMMERCIAL

## 2024-04-13 ENCOUNTER — APPOINTMENT (OUTPATIENT)
Dept: PHYSICAL THERAPY | Facility: CLINIC | Age: 59
DRG: 463 | End: 2024-04-13
Attending: STUDENT IN AN ORGANIZED HEALTH CARE EDUCATION/TRAINING PROGRAM
Payer: COMMERCIAL

## 2024-04-13 ENCOUNTER — APPOINTMENT (OUTPATIENT)
Dept: CT IMAGING | Facility: CLINIC | Age: 59
DRG: 463 | End: 2024-04-13
Payer: COMMERCIAL

## 2024-04-13 LAB
ALBUMIN SERPL BCG-MCNC: 3.1 G/DL (ref 3.5–5.2)
ALBUMIN UR-MCNC: NEGATIVE MG/DL
ALLEN'S TEST: NO
ALLEN'S TEST: YES
ALP SERPL-CCNC: 120 U/L (ref 40–150)
ALT SERPL W P-5'-P-CCNC: <5 U/L (ref 0–50)
ANION GAP SERPL CALCULATED.3IONS-SCNC: 9 MMOL/L (ref 7–15)
APPEARANCE UR: CLEAR
AST SERPL W P-5'-P-CCNC: 20 U/L (ref 0–45)
BASE EXCESS BLDA CALC-SCNC: 1.2 MMOL/L (ref -3–3)
BASE EXCESS BLDA CALC-SCNC: 1.8 MMOL/L (ref -3–3)
BASE EXCESS BLDV CALC-SCNC: 1.8 MMOL/L (ref -3–3)
BASE EXCESS BLDV CALC-SCNC: 2.3 MMOL/L (ref -3–3)
BILIRUB SERPL-MCNC: 0.9 MG/DL
BILIRUB UR QL STRIP: NEGATIVE
BUN SERPL-MCNC: 19.3 MG/DL (ref 8–23)
CA-I BLD-MCNC: 4.8 MG/DL (ref 4.4–5.2)
CALCIUM SERPL-MCNC: 8.4 MG/DL (ref 8.8–10.2)
CHLORIDE SERPL-SCNC: 102 MMOL/L (ref 98–107)
COHGB MFR BLD: 92.1 % (ref 96–97)
COHGB MFR BLD: 99.4 % (ref 96–97)
COLOR UR AUTO: ABNORMAL
CREAT SERPL-MCNC: 0.94 MG/DL (ref 0.51–0.95)
CREAT SERPL-MCNC: 0.96 MG/DL (ref 0.51–0.95)
DEPRECATED HCO3 PLAS-SCNC: 26 MMOL/L (ref 22–29)
EGFRCR SERPLBLD CKD-EPI 2021: 67 ML/MIN/1.73M2
EGFRCR SERPLBLD CKD-EPI 2021: 69 ML/MIN/1.73M2
ERYTHROCYTE [DISTWIDTH] IN BLOOD BY AUTOMATED COUNT: 18.7 % (ref 10–15)
FLUAV RNA SPEC QL NAA+PROBE: NEGATIVE
FLUBV RNA RESP QL NAA+PROBE: NEGATIVE
GLUCOSE BLDC GLUCOMTR-MCNC: 132 MG/DL (ref 70–99)
GLUCOSE BLDC GLUCOMTR-MCNC: 137 MG/DL (ref 70–99)
GLUCOSE BLDC GLUCOMTR-MCNC: 140 MG/DL (ref 70–99)
GLUCOSE BLDC GLUCOMTR-MCNC: 146 MG/DL (ref 70–99)
GLUCOSE BLDC GLUCOMTR-MCNC: 160 MG/DL (ref 70–99)
GLUCOSE BLDC GLUCOMTR-MCNC: 167 MG/DL (ref 70–99)
GLUCOSE BLDC GLUCOMTR-MCNC: 175 MG/DL (ref 70–99)
GLUCOSE SERPL-MCNC: 154 MG/DL (ref 70–99)
GLUCOSE UR STRIP-MCNC: NEGATIVE MG/DL
HCO3 BLD-SCNC: 27 MMOL/L (ref 21–28)
HCO3 BLD-SCNC: 27 MMOL/L (ref 21–28)
HCO3 BLDV-SCNC: 29 MMOL/L (ref 21–28)
HCO3 BLDV-SCNC: 29 MMOL/L (ref 21–28)
HCT VFR BLD AUTO: 27.5 % (ref 35–47)
HGB BLD-MCNC: 8.3 G/DL (ref 11.7–15.7)
HGB UR QL STRIP: NEGATIVE
KETONES UR STRIP-MCNC: NEGATIVE MG/DL
LACTATE SERPL-SCNC: 0.6 MMOL/L (ref 0.7–2)
LACTATE SERPL-SCNC: 1.3 MMOL/L (ref 0.7–2)
LEUKOCYTE ESTERASE UR QL STRIP: ABNORMAL
MAGNESIUM SERPL-MCNC: 1.6 MG/DL (ref 1.7–2.3)
MCH RBC QN AUTO: 24.5 PG (ref 26.5–33)
MCHC RBC AUTO-ENTMCNC: 30.2 G/DL (ref 31.5–36.5)
MCV RBC AUTO: 81 FL (ref 78–100)
MRSA DNA SPEC QL NAA+PROBE: NEGATIVE
MUCOUS THREADS #/AREA URNS LPF: PRESENT /LPF
NITRATE UR QL: NEGATIVE
NT-PROBNP SERPL-MCNC: 879 PG/ML (ref 0–900)
O2/TOTAL GAS SETTING VFR VENT: 20 %
O2/TOTAL GAS SETTING VFR VENT: 28 %
O2/TOTAL GAS SETTING VFR VENT: 35 %
O2/TOTAL GAS SETTING VFR VENT: 35 %
OXYHGB MFR BLDV: 22 % (ref 70–75)
OXYHGB MFR BLDV: 43 % (ref 70–75)
PCO2 BLD: 46 MM HG (ref 35–45)
PCO2 BLD: 49 MM HG (ref 35–45)
PCO2 BLDV: 61 MM HG (ref 40–50)
PCO2 BLDV: 61 MM HG (ref 40–50)
PEEP: 5 CM H2O
PH BLD: 7.35 [PH] (ref 7.35–7.45)
PH BLD: 7.38 [PH] (ref 7.35–7.45)
PH BLDV: 7.29 [PH] (ref 7.32–7.43)
PH BLDV: 7.29 [PH] (ref 7.32–7.43)
PH UR STRIP: 6.5 [PH] (ref 5–7)
PHOSPHATE SERPL-MCNC: 2.7 MG/DL (ref 2.5–4.5)
PLATELET # BLD AUTO: 270 10E3/UL (ref 150–450)
PO2 BLD: 127 MM HG (ref 80–105)
PO2 BLD: 65 MM HG (ref 80–105)
PO2 BLDV: 20 MM HG (ref 25–47)
PO2 BLDV: 29 MM HG (ref 25–47)
POTASSIUM SERPL-SCNC: 3.3 MMOL/L (ref 3.4–5.3)
PROT SERPL-MCNC: 6.1 G/DL (ref 6.4–8.3)
RBC # BLD AUTO: 3.39 10E6/UL (ref 3.8–5.2)
RBC URINE: 1 /HPF
RSV RNA SPEC NAA+PROBE: NEGATIVE
SA TARGET DNA: NEGATIVE
SAO2 % BLDA: 90 % (ref 92–100)
SAO2 % BLDA: 98 % (ref 92–100)
SAO2 % BLDV: 22.2 % (ref 70–75)
SAO2 % BLDV: 44.1 % (ref 70–75)
SARS-COV-2 RNA RESP QL NAA+PROBE: NEGATIVE
SODIUM SERPL-SCNC: 137 MMOL/L (ref 135–145)
SP GR UR STRIP: 1.01 (ref 1–1.03)
SQUAMOUS EPITHELIAL: 1 /HPF
T4 FREE SERPL-MCNC: 0.76 NG/DL (ref 0.9–1.7)
TROPONIN T SERPL HS-MCNC: 25 NG/L
TSH SERPL DL<=0.005 MIU/L-ACNC: 6.71 UIU/ML (ref 0.3–4.2)
UROBILINOGEN UR STRIP-MCNC: NORMAL MG/DL
WBC # BLD AUTO: 12.1 10E3/UL (ref 4–11)
WBC URINE: 1 /HPF

## 2024-04-13 PROCEDURE — 250N000011 HC RX IP 250 OP 636: Mod: JZ | Performed by: STUDENT IN AN ORGANIZED HEALTH CARE EDUCATION/TRAINING PROGRAM

## 2024-04-13 PROCEDURE — 120N000003 HC R&B IMCU UMMC

## 2024-04-13 PROCEDURE — 82565 ASSAY OF CREATININE: CPT | Performed by: STUDENT IN AN ORGANIZED HEALTH CARE EDUCATION/TRAINING PROGRAM

## 2024-04-13 PROCEDURE — 258N000003 HC RX IP 258 OP 636: Performed by: STUDENT IN AN ORGANIZED HEALTH CARE EDUCATION/TRAINING PROGRAM

## 2024-04-13 PROCEDURE — 82565 ASSAY OF CREATININE: CPT

## 2024-04-13 PROCEDURE — 84100 ASSAY OF PHOSPHORUS: CPT

## 2024-04-13 PROCEDURE — 5A09357 ASSISTANCE WITH RESPIRATORY VENTILATION, LESS THAN 24 CONSECUTIVE HOURS, CONTINUOUS POSITIVE AIRWAY PRESSURE: ICD-10-PCS

## 2024-04-13 PROCEDURE — 71045 X-RAY EXAM CHEST 1 VIEW: CPT | Mod: 26 | Performed by: RADIOLOGY

## 2024-04-13 PROCEDURE — 87640 STAPH A DNA AMP PROBE: CPT

## 2024-04-13 PROCEDURE — 250N000011 HC RX IP 250 OP 636: Performed by: STUDENT IN AN ORGANIZED HEALTH CARE EDUCATION/TRAINING PROGRAM

## 2024-04-13 PROCEDURE — 97530 THERAPEUTIC ACTIVITIES: CPT | Mod: GP

## 2024-04-13 PROCEDURE — 83605 ASSAY OF LACTIC ACID: CPT

## 2024-04-13 PROCEDURE — 250N000009 HC RX 250: Performed by: STUDENT IN AN ORGANIZED HEALTH CARE EDUCATION/TRAINING PROGRAM

## 2024-04-13 PROCEDURE — 71045 X-RAY EXAM CHEST 1 VIEW: CPT

## 2024-04-13 PROCEDURE — 84146 ASSAY OF PROLACTIN: CPT

## 2024-04-13 PROCEDURE — 82805 BLOOD GASES W/O2 SATURATION: CPT

## 2024-04-13 PROCEDURE — 99232 SBSQ HOSP IP/OBS MODERATE 35: CPT | Performed by: STUDENT IN AN ORGANIZED HEALTH CARE EDUCATION/TRAINING PROGRAM

## 2024-04-13 PROCEDURE — 36415 COLL VENOUS BLD VENIPUNCTURE: CPT

## 2024-04-13 PROCEDURE — 85027 COMPLETE CBC AUTOMATED: CPT

## 2024-04-13 PROCEDURE — 70450 CT HEAD/BRAIN W/O DYE: CPT | Mod: 26 | Performed by: RADIOLOGY

## 2024-04-13 PROCEDURE — 97161 PT EVAL LOW COMPLEX 20 MIN: CPT | Mod: GP

## 2024-04-13 PROCEDURE — 82330 ASSAY OF CALCIUM: CPT

## 2024-04-13 PROCEDURE — 36600 WITHDRAWAL OF ARTERIAL BLOOD: CPT

## 2024-04-13 PROCEDURE — 83880 ASSAY OF NATRIURETIC PEPTIDE: CPT

## 2024-04-13 PROCEDURE — 250N000013 HC RX MED GY IP 250 OP 250 PS 637: Performed by: STUDENT IN AN ORGANIZED HEALTH CARE EDUCATION/TRAINING PROGRAM

## 2024-04-13 PROCEDURE — 93970 EXTREMITY STUDY: CPT

## 2024-04-13 PROCEDURE — 87637 SARSCOV2&INF A&B&RSV AMP PRB: CPT

## 2024-04-13 PROCEDURE — 999N000127 HC STATISTIC PERIPHERAL IV START W US GUIDANCE

## 2024-04-13 PROCEDURE — 84484 ASSAY OF TROPONIN QUANT: CPT

## 2024-04-13 PROCEDURE — 250N000013 HC RX MED GY IP 250 OP 250 PS 637

## 2024-04-13 PROCEDURE — 84439 ASSAY OF FREE THYROXINE: CPT

## 2024-04-13 PROCEDURE — 70450 CT HEAD/BRAIN W/O DYE: CPT

## 2024-04-13 PROCEDURE — 93010 ELECTROCARDIOGRAM REPORT: CPT | Performed by: INTERNAL MEDICINE

## 2024-04-13 PROCEDURE — 81001 URINALYSIS AUTO W/SCOPE: CPT

## 2024-04-13 PROCEDURE — 84443 ASSAY THYROID STIM HORMONE: CPT

## 2024-04-13 PROCEDURE — 999N000157 HC STATISTIC RCP TIME EA 10 MIN

## 2024-04-13 PROCEDURE — 36415 COLL VENOUS BLD VENIPUNCTURE: CPT | Performed by: STUDENT IN AN ORGANIZED HEALTH CARE EDUCATION/TRAINING PROGRAM

## 2024-04-13 PROCEDURE — 99291 CRITICAL CARE FIRST HOUR: CPT

## 2024-04-13 PROCEDURE — 93970 EXTREMITY STUDY: CPT | Mod: 26 | Performed by: RADIOLOGY

## 2024-04-13 PROCEDURE — 97542 WHEELCHAIR MNGMENT TRAINING: CPT | Mod: GP

## 2024-04-13 PROCEDURE — 999N000285 HC STATISTIC VASC ACCESS LAB DRAW WITH PIV START

## 2024-04-13 PROCEDURE — 87040 BLOOD CULTURE FOR BACTERIA: CPT

## 2024-04-13 PROCEDURE — 250N000013 HC RX MED GY IP 250 OP 250 PS 637: Performed by: INTERNAL MEDICINE

## 2024-04-13 PROCEDURE — 94660 CPAP INITIATION&MGMT: CPT

## 2024-04-13 PROCEDURE — 83735 ASSAY OF MAGNESIUM: CPT

## 2024-04-13 RX ORDER — POTASSIUM CHLORIDE 1.5 G/1.58G
40 POWDER, FOR SOLUTION ORAL ONCE
Status: COMPLETED | OUTPATIENT
Start: 2024-04-13 | End: 2024-04-13

## 2024-04-13 RX ORDER — NICOTINE POLACRILEX 4 MG
15-30 LOZENGE BUCCAL
Status: DISCONTINUED | OUTPATIENT
Start: 2024-04-13 | End: 2024-04-13

## 2024-04-13 RX ORDER — LIDOCAINE 40 MG/G
CREAM TOPICAL
Status: DISCONTINUED | OUTPATIENT
Start: 2024-04-13 | End: 2024-04-22 | Stop reason: HOSPADM

## 2024-04-13 RX ORDER — POLYETHYLENE GLYCOL 3350 17 G/17G
17 POWDER, FOR SOLUTION ORAL 2 TIMES DAILY
Status: DISCONTINUED | OUTPATIENT
Start: 2024-04-13 | End: 2024-04-22 | Stop reason: HOSPADM

## 2024-04-13 RX ORDER — BISACODYL 10 MG
10 SUPPOSITORY, RECTAL RECTAL DAILY PRN
Status: DISCONTINUED | OUTPATIENT
Start: 2024-04-13 | End: 2024-04-22 | Stop reason: HOSPADM

## 2024-04-13 RX ORDER — IOPAMIDOL 755 MG/ML
100 INJECTION, SOLUTION INTRAVASCULAR ONCE
Status: COMPLETED | OUTPATIENT
Start: 2024-04-13 | End: 2024-04-13

## 2024-04-13 RX ORDER — POTASSIUM CHLORIDE 7.45 MG/ML
10 INJECTION INTRAVENOUS
Status: DISCONTINUED | OUTPATIENT
Start: 2024-04-13 | End: 2024-04-13

## 2024-04-13 RX ORDER — DEXTROSE MONOHYDRATE 25 G/50ML
25-50 INJECTION, SOLUTION INTRAVENOUS
Status: DISCONTINUED | OUTPATIENT
Start: 2024-04-13 | End: 2024-04-13

## 2024-04-13 RX ORDER — MAGNESIUM SULFATE HEPTAHYDRATE 40 MG/ML
2 INJECTION, SOLUTION INTRAVENOUS ONCE
Status: COMPLETED | OUTPATIENT
Start: 2024-04-13 | End: 2024-04-14

## 2024-04-13 RX ORDER — FLUMAZENIL 0.1 MG/ML
0.2 INJECTION, SOLUTION INTRAVENOUS ONCE
Status: DISCONTINUED | OUTPATIENT
Start: 2024-04-13 | End: 2024-04-13

## 2024-04-13 RX ADMIN — POTASSIUM CHLORIDE 40 MEQ: 1.5 POWDER, FOR SOLUTION ORAL at 20:29

## 2024-04-13 RX ADMIN — VANCOMYCIN HYDROCHLORIDE 1750 MG: 10 INJECTION, POWDER, LYOPHILIZED, FOR SOLUTION INTRAVENOUS at 21:01

## 2024-04-13 RX ADMIN — PROCHLORPERAZINE EDISYLATE 10 MG: 5 INJECTION INTRAMUSCULAR; INTRAVENOUS at 11:56

## 2024-04-13 RX ADMIN — SENNOSIDES AND DOCUSATE SODIUM 1 TABLET: 8.6; 5 TABLET ORAL at 20:01

## 2024-04-13 RX ADMIN — ONDANSETRON 4 MG: 4 TABLET, ORALLY DISINTEGRATING ORAL at 06:18

## 2024-04-13 RX ADMIN — NALOXONE HYDROCHLORIDE 0.8 MG: 0.4 INJECTION, SOLUTION INTRAMUSCULAR; INTRAVENOUS; SUBCUTANEOUS at 21:55

## 2024-04-13 RX ADMIN — ASPIRIN 162 MG: 81 TABLET, COATED ORAL at 08:00

## 2024-04-13 RX ADMIN — NALOXONE HYDROCHLORIDE 0.4 MG: 0.4 INJECTION, SOLUTION INTRAMUSCULAR; INTRAVENOUS; SUBCUTANEOUS at 13:46

## 2024-04-13 RX ADMIN — NALOXONE HYDROCHLORIDE 0.4 MG: 0.4 INJECTION, SOLUTION INTRAMUSCULAR; INTRAVENOUS; SUBCUTANEOUS at 14:56

## 2024-04-13 RX ADMIN — HYDROMORPHONE HYDROCHLORIDE 0.4 MG: 0.2 INJECTION, SOLUTION INTRAMUSCULAR; INTRAVENOUS; SUBCUTANEOUS at 09:20

## 2024-04-13 RX ADMIN — CLONIDINE HYDROCHLORIDE 0.2 MG: 0.2 TABLET ORAL at 07:59

## 2024-04-13 RX ADMIN — GABAPENTIN 800 MG: 800 TABLET, FILM COATED ORAL at 11:31

## 2024-04-13 RX ADMIN — NALOXONE HYDROCHLORIDE 0.2 MG: 0.4 INJECTION, SOLUTION INTRAMUSCULAR; INTRAVENOUS; SUBCUTANEOUS at 20:55

## 2024-04-13 RX ADMIN — HYDRALAZINE HYDROCHLORIDE 10 MG: 20 INJECTION INTRAMUSCULAR; INTRAVENOUS at 20:20

## 2024-04-13 RX ADMIN — ONDANSETRON 4 MG: 2 INJECTION INTRAMUSCULAR; INTRAVENOUS at 13:06

## 2024-04-13 RX ADMIN — NALOXONE HYDROCHLORIDE 0.4 MG: 0.4 INJECTION, SOLUTION INTRAMUSCULAR; INTRAVENOUS; SUBCUTANEOUS at 13:52

## 2024-04-13 RX ADMIN — SODIUM CHLORIDE 50 ML: 9 INJECTION, SOLUTION INTRAVENOUS at 19:32

## 2024-04-13 RX ADMIN — HYDROMORPHONE HYDROCHLORIDE 0.4 MG: 0.2 INJECTION, SOLUTION INTRAMUSCULAR; INTRAVENOUS; SUBCUTANEOUS at 05:34

## 2024-04-13 RX ADMIN — ERTAPENEM SODIUM 1 G: 1 INJECTION, POWDER, LYOPHILIZED, FOR SOLUTION INTRAMUSCULAR; INTRAVENOUS at 20:22

## 2024-04-13 RX ADMIN — GABAPENTIN 800 MG: 800 TABLET, FILM COATED ORAL at 08:00

## 2024-04-13 RX ADMIN — FLUCONAZOLE 400 MG: 200 TABLET ORAL at 08:00

## 2024-04-13 RX ADMIN — NALOXONE HYDROCHLORIDE 0.2 MG: 0.4 INJECTION, SOLUTION INTRAMUSCULAR; INTRAVENOUS; SUBCUTANEOUS at 20:53

## 2024-04-13 RX ADMIN — HYDROMORPHONE HYDROCHLORIDE 0.4 MG: 0.2 INJECTION, SOLUTION INTRAMUSCULAR; INTRAVENOUS; SUBCUTANEOUS at 13:06

## 2024-04-13 RX ADMIN — OMEPRAZOLE 20 MG: 20 CAPSULE, DELAYED RELEASE ORAL at 08:00

## 2024-04-13 RX ADMIN — IOPAMIDOL 50 ML: 755 INJECTION, SOLUTION INTRAVENOUS at 19:31

## 2024-04-13 RX ADMIN — NALOXONE HYDROCHLORIDE 0.4 MG: 0.4 INJECTION, SOLUTION INTRAMUSCULAR; INTRAVENOUS; SUBCUTANEOUS at 15:41

## 2024-04-13 RX ADMIN — ALPRAZOLAM 0.25 MG: 0.25 TABLET ORAL at 08:00

## 2024-04-13 RX ADMIN — ACETAMINOPHEN 975 MG: 325 TABLET, FILM COATED ORAL at 08:00

## 2024-04-13 RX ADMIN — ACETAMINOPHEN 650 MG: 325 TABLET, FILM COATED ORAL at 20:01

## 2024-04-13 ASSESSMENT — ACTIVITIES OF DAILY LIVING (ADL)
ADLS_ACUITY_SCORE: 44
ADLS_ACUITY_SCORE: 43
ADLS_ACUITY_SCORE: 45
ADLS_ACUITY_SCORE: 43
ADLS_ACUITY_SCORE: 45
ADLS_ACUITY_SCORE: 44
ADLS_ACUITY_SCORE: 43
ADLS_ACUITY_SCORE: 45
ADLS_ACUITY_SCORE: 45
ADLS_ACUITY_SCORE: 44
ADLS_ACUITY_SCORE: 43
ADLS_ACUITY_SCORE: 43
ADLS_ACUITY_SCORE: 44
ADLS_ACUITY_SCORE: 45
ADLS_ACUITY_SCORE: 43
ADLS_ACUITY_SCORE: 45
ADLS_ACUITY_SCORE: 43
ADLS_ACUITY_SCORE: 44
ADLS_ACUITY_SCORE: 45

## 2024-04-13 NOTE — CODE DOCUMENTATION
Rapid response called 1343.  Patient had change in LOC after adminstration of dilaudid.  . VS wdl. Narcan admin .4mg x2.  Patient became alert and was able to answer questions.  Disoriented to time.  Labs were collected.  Patient then became only responsive again to painful stimuli.  Additional .4mg narcan administered. She became responsive again to verbal commands without.  Patient is very labile.  Taken to CT with RN transport.  Then settled in ICU.  Patient became only responsive to painful stimuli.  An add'l .4mg narcan was administered.  Patient once again alert and cooperative.  Transfer of care to ICU staff at 1600.    Jona Perez RN

## 2024-04-13 NOTE — PROGRESS NOTES
ED placed and labs obtained.  2nd set of blood cultures were unable to be obtained due to pt leaving to go to CT

## 2024-04-13 NOTE — PROGRESS NOTES
Orthopaedic Surgery Progress Note 04/13/2024    S: Patient reports flu like symptoms. Requests some nausea medication this AM. No other acute events overnight. Patient states she is doing very well overall, up and out of bed although with some difficulties imposed by arm. Questions answered regarding utility of antibiotic spacer to help clear as much infection as possible.    O:  Temp: 97.4  F (36.3  C) Temp src: Oral BP: 123/63 Pulse: 77   Resp: 16 SpO2: 94 % O2 Device: None (Room air)      Exam:  Gen: No acute distress, resting comfortably in bed.  Resp: Non-labored breathing  MSK:  LUE:  - Splint/Dressings c/d/i  - SILT medial/radial/ulnar/axillary nerves  - Fires EPL, FPL, Intrinsics  - Hand wwp    Drain output:   Output by Drain (mL) 04/11/24 0700 - 04/11/24 1459 04/11/24 1500 - 04/11/24 2259 04/11/24 2300 - 04/12/24 0659 04/12/24 0700 - 04/12/24 1459 04/12/24 1500 - 04/12/24 2259 04/12/24 2300 - 04/13/24 0537   Closed/Suction Drain 1 Left Elbow Bulb 15 Nigerian 125 225 60 65 50      Recent Labs   Lab 04/12/24  0541 04/11/24  1635 04/11/24  0030   WBC  --   --  10.5   HGB 8.4* 9.1* 10.1*   PLT  --   --  324     Recent Labs   Lab 04/10/24  1530 04/10/24  1514 04/10/24  1453 04/10/24  1400 04/10/24  1358   CULTURE No growth after 2 days  No anaerobic organisms isolated after 2 days  Culture in progress  1+ Staphylococcus epidermidis*  1+ Staphylococcus capitis* No growth after 2 days  No anaerobic organisms isolated after 2 days  1+ Staphylococcus epidermidis* No growth after 2 days  No anaerobic organisms isolated after 2 days  No growth after 1 day No growth after 2 days  No anaerobic organisms isolated after 2 days  No growth after 1 day No growth after 2 days  No anaerobic organisms isolated after 2 days  No growth after 2 days  No anaerobic organisms isolated after 1 day  Isolated in broth only Gram positive cocci*  Culture in progress  1+ Staphylococcus capitis*  No growth after 1 day       Assessment/Plan:     Mitali Robles is a 59 year old female with complex PMH including fracture dislocation of the left elbow c/b hardware failure and 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 Sarmiento.     ID recommends continued Vanco, Meropenem, and Fluconazole.     Activity: Up with assist.  Weight bearing status: Minimize use of LUE  Antibiotics: Per ID recs:  Recommendations:  - Stop Cefazolin   - Start IV Vancomycin (pharmacy to dose)   - Start IV Ertapenem 1g Q24H   - Start Fluconazole PO/IV 400mg Q24H   - Will follow intraoperative tissue cultures   Diet: Begin with clear fluids and progress diet as tolerated.   DVT prophylaxis: aspirin 162 qd and mechanical while in the hospital, discharge on aspirin 162 x 4 weeks.  Bracing/Splinting: LUE posterior slab splint to be kept clean, dry, and intact until follow-up.   Elevation: Elevate LUE on pillows to keep on pillows as much as possible.   Wound Care: Keep splint c/d/I until 2 week follow up.  Drains: Document output per shift, discontinue at discretion of orthopedics  Pain management: transition from IV to orals as tolerated.    X-rays:  POD1 left elbow XR (AP and lateral)  Physical Therapy:  Eval and treat  Occupational Therapy:   Labs: Trend Hgb on POD #1, 2, then PRN  Cultures: Pending, follow culture results closely.   Consults: PT, OT. Medicine, Infectious disease.  appreciate assistance in caring for this patient.  May need pain team as has many narcotic allergies listed  Follow-up: Clinic with Dr. Sarmiento's team in 2 weeks for wound check with left elbow xrays     Disposition: Pending progress with therapies, pain control on orals, and medical stability, anticipate discharge to TCU on POD #4-5.     Douglas Perez MD  Resident Physician, PGY-1  Orthopedic Surgery

## 2024-04-13 NOTE — CODE/RAPID RESPONSE
[facility]    RRT Note  4/13/2024   Time Called: 1443    RRT called for: minimal responsiveness x 2    Assessment & Plan     - After 2nd RRT pt transferred to ICU, see todays H&P for details.    Rita Loja, CNP

## 2024-04-13 NOTE — PROGRESS NOTES
"   04/13/24 1103   Appointment Info   Signing Clinician's Name / Credentials (PT) Fallon Rhodes, PT, DPT   Living Environment   People in Home alone   Current Living Arrangements apartment   Home Accessibility stairs to enter home   Number of Stairs, Main Entrance 6   Stair Railings, Main Entrance railings safe and in good condition;railings on both sides of stairs   Transportation Anticipated other (see comments)   Self-Care   Usual Activity Tolerance good   Current Activity Tolerance moderate   Equipment Currently Used at Home walker, rolling;wheelchair, manual;shower chair  (4ww)   Fall history within last six months yes   Number of times patient has fallen within last six months 7   Activity/Exercise/Self-Care Comment Patient reports previous mod I with 4WW for mobility and ADLs. Has cleaning lady to help with laundry and cleaning, does own cooking.   General Information   Onset of Illness/Injury or Date of Surgery 04/10/24   Referring Physician Diana Pleitez MD   Patient/Family Therapy Goals Statement (PT) To go to TCU   Pertinent History of Current Problem (include personal factors and/or comorbidities that impact the POC) Per chart review \"Mitali Robles is a 59 year old female with complex PMH including fracture dislocation of the left elbow c/b hardware failure and 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 Sarmiento.\"   Existing Precautions/Restrictions fall;weight bearing   Weight-Bearing Status - LUE nonweight-bearing   Weight-Bearing Status - RUE full weight-bearing   Weight-Bearing Status - LLE full weight-bearing   Weight-Bearing Status - RLE full weight-bearing   Cognition   Affect/Mental Status (Cognition) WFL   Orientation Status (Cognition) oriented x 3   Follows Commands (Cognition) WFL   Range of Motion (ROM)   Range of Motion ROM deficits secondary to weakness   Strength " (Manual Muscle Testing)   Strength (Manual Muscle Testing) Deficits observed during functional mobility   Bed Mobility   Bed Mobility supine-sit   Supine-Sit [place] (Bed Mobility) minimum assist (75% patient effort);1 person assist   Bed Mobility Limitations decreased ability to use arms for pushing/pulling;decreased ability to use legs for bridging/pushing   Impairments Contributing to Impaired Bed Mobility other (see comments);decreased strength  (LUE WB status)   Assistive Device (Bed Mobility) bed rails   Transfers   Transfers sit-stand transfer   Sit-Stand Transfer   Sit-Stand [place] (Transfers) contact guard;1 person assist   Assistive Device (Sit-Stand Transfers) other (see comments)  (Bed rail)   Gait/Stairs (Locomotion)   [place] Level (Gait) maximum assist (25% patient effort);1 person assist   Assistive Device (Gait) cane, straight   Distance in Feet (Gait) 1   Comment, (Gait/Stairs) Attempted gait with SEC, patient demonstrated a significant LOB in posterior direction, max aX 1 required to prevent a fall. Unable to progress to stairs this session due safety concerns.   Balance   Balance Comments Significant posterior lean noted in standing, max Ax  1 required to prevent a LOB backwards   Clinical Impression   Criteria for Skilled Therapeutic Intervention Yes, treatment indicated   PT Diagnosis (PT) Impaired functional mobility   Influenced by the following impairments Impaired balance, UE WB status, weakness   Functional limitations due to impairments Bed mobility, transfers, gait, stairs   Clinical Presentation (PT Evaluation Complexity) stable   Clinical Presentation Rationale Per clinical judgement   Clinical Decision Making (Complexity) low complexity   Planned Therapy Interventions (PT) balance training;bed mobility training;gait training;home exercise program;neuromuscular re-education;patient/family education;stair training;strengthening;transfer training;wheelchair  management/propulsion training   Risk & Benefits of therapy have been explained evaluation/treatment results reviewed;care plan/treatment goals reviewed;participants voiced agreement with care plan;participants included;patient   PT Total Evaluation Time   PT Eval, Low Complexity Minutes (38942) 10   Physical Therapy Goals   PT Frequency 6x/week   PT Predicted Duration/Target Date for Goal Attainment 04/19/24   PT Goals Bed Mobility;Transfers;Gait;Wheelchair Mobility   PT: Bed Mobility Supervision/stand-by assist;Supine to/from sit;Within precautions   PT: Transfers Supervision/stand-by assist;Sit to/from stand;Bed to/from chair;Assistive device   PT: Gait 25 feet;Moderate assist;Assistive device;Within precautions   PT: Wheelchair Mobility Greater than 500 feet;Caregiver SBA;manual wheelchair   Interventions   Interventions Quick Adds Therapeutic Activity;Wheelchair Mgmt/Training   Therapeutic Activity   Therapeutic Activities: dynamic activities to improve functional performance Minutes (03099) 26   Symptoms Noted During/After Treatment Fatigue   Treatment Detail/Skilled Intervention Patient supine in bed on arrival, agreeable to therapy. Hands on physical assistance required at BLE and trunk for bed mobility. Patient performed an additional 7 sit <> stands with CGA  x 1 and use of the bed rail. Pivot transfer to the R with mod ax 1 to wheelchair. Stand step transfer from wheelchair to bed surface with min - mod Ax 1. Educated patient on safe transfer technique, patient verbalized understanding. Sit to supine with SBA x 1. Patient supine in bed with call light within reach at end of session.   Wheelchair Managment/Training   Wheelchair Mgmt/Training Minutes (65910) 10   Symptoms Noted During/After Treatment Fatigue   Treatment Detail/Skilled Intervention Patient self propelled a manual wheelchair in her room and hallway with SBA x 1 for 100'. Increased LE fatigue reported at end of session.   PT Discharge Planning    PT Plan Transfers and gait with hemiwalker, wheelchair mobility   PT Discharge Recommendation (DC Rec) Transitional Care Facility   PT Rationale for DC Rec Patient currently requires assist of 1 for bed mobility and transfers. She is currently unable to ambulate safely due to impaired balance and weakness. Recommend patient discharges to TCU at this time to improve sterngth and overall independence with mobility.   PT Brief overview of current status Assist of 1 for bed mobility, transfers; recommend nursing uses assist of 1-2 for pivot transfers the R to commode and ehsan stedy back to bed.   Total Session Time   Timed Code Treatment Minutes 36   Total Session Time (sum of timed and untimed services) 46     Fallon Rhodes, PT, DPT

## 2024-04-13 NOTE — PLAN OF CARE
"Goal Outcome Evaluation:           Overall Patient Progress: no changeOverall Patient Progress: no change         VS: /49 (BP Location: Right arm)   Pulse 75   Temp 97.5  F (36.4  C) (Oral)   Resp 16   Ht 1.753 m (5' 9\")   Wt 99 kg (218 lb 4.1 oz)   SpO2 93%   BMI 32.23 kg/m      O2: Stats >92% on RA   Output: Urinated 1 x during shift; output of 50ml. Bladder scan was 0.   Last BM: 4/10 per pt report. Pt states having mild abdominal pain.    Activity: Non WB on L arm  Up with A2 with walker and gait belt. Worked with OT and was in wheelchair today.    Skin: Intact except blanchable redness to groin and under breast   Pressure injury R arm. Possibly due to repeated BP's during code.    Pain: Given 2x Dilaudid PRN    Neuro: Alert and oriented x4 at beginning of shift. At end of shift, was Ax4 after 3 Narcan had been given. Denies N/T   Dressing: LUE dressing: CDI    Diet: Regular   Had nausea. Zofran was given prior to start of shift.  Nausea continued and pt stated she felt \"bile in her throat\" and requested IV Compazine. IV Compazine given at 12 pm.    LDA: R PIV  BAILEY drain   Equipment: Cellphone, purse and personal belongings at bedside    Plan: TBD    Additional Info:  Rapid response occurred two times during shift. The first rapid response, 2 Narcan was given. The second rapid response, 1 narcan was given. See other progress note.                   "

## 2024-04-13 NOTE — H&P
[facility]    ICU History and Physical    Primary Team: Ortho; [facility] [place] [facility]  Reason for Critical Care Admission: Lethargy  Date of Admission:  4/10/2024    Assessment: Critical Care   Mitali Robles is a 59 year old female admitted on 4/10/2024. She has a pmhx of hld, neuropathic pain, htn, complex psychiatric hx including bipolar, PTSD, MDD, on high dose benzodiazepines, who is s/p Left elbow prosthetic joint infection s/p explantation of L total elbow arthroplasty 4/10/24 w Dr. Sarmiento d/t recurrent infections and s/p mx I&Ds (last 2/24), transferred to [facility] [place] [facility] d/t  RRT x 2 after periods of minimal responsiveness refractory to narcan.     Plan: Critical Care   Neuro/ pain/ sedation:  # Encephalopathy d/t hypercapnia d/t sedatives vs polypharmacy  RRT x 2 4/13 for being found minimally responsive w respiratory rate of 6-8 breaths per minute by nursing. Initially thought this was from combination of compazine and IV dilaudid. First RRT received 2 doses of Narcan 0.4 mg and was awake, alert and orientated x 4. Second RRT, again minimally responsive narcan give and pt woke up again alert and orientated, VBG back with CO2 61, most likely CO2 Narcosis, ABG sent while pt minimally responsive. Plan to bring pt to ICU for increased monitoring and bipap.  - 4/13 Head CT: w no acute intercranial pathology  - ETCO2 monitoring  - Hold all sedating medications  - Monitor neurological status. Notify provider for any acute changes in exam  - Check VBG, ABG, tsh, cmp, mg, phos to look for metabolic derangement to explain encephalopathy    #bipolar   #PTSD   #MDD  # Neuropathic pain  # Post operative pain s/p left elbow prosthetic joint infection s/p explantation of L total elbow arthroplasty 4/10/24  - Hold xanax 0.25 mg TID  - Hold Clonidine 0.2 mg BID  - Hold Neurontin 800 mg TID  - Hold Lumateperone 42 mg at HS  - Holding PRN clonazepam 2 mg at HS, PRN valium 10 mg at HS, PRN  Dilaudid 0.2-0.4 mg q 3 hours    Pulmonary:  # Acute hypoxic and hypercarbic respiratory failure; possibility d/t sedatives, polypharmacy   - Minimal c/f PE given no tachycardia, but given post op and hypoxia will start with scanning lower extremities.   - Bipap for hypercapnia  - supplemental oxygen to keep saturation about 92%  - ETCO2 monitoring  - ABG/VBG as needed  - CXR    Cardiovascular:  # HTN  # HLD  - Monitor hemodynamic status, stable bp and heart rate.   - Lactic wnl.  - Check EKG    GI/Nutrition:  # GERD  - Diet: Advance Diet as Tolerated: Regular Diet Adult  Diet    - Nutrition consulted. Appreciate recommendations.   - Continue PTA prilosec  - NPO except meds and ice chips for now    Renal/ Fluid Balance:   # Hypokalemia  # Hypomagnesia   - Will monitor strict intake and output  - ICU electrolyte replacement protocol  - Trend bmp, mag and phos    Endocrine:  #DM 2  - BG q 4 hours  - sliding scale insulin available   - check TSH    ID:  # S/p explantation of L total elbow arthroplasty, d/t recurrent infections and s/p mx I&Ds (last 2/24)   - Pt afebrile, w slight leukocytosis, stable vital signs other than RR; ABX continued; cx from OR w + cutibacterium acnes and mx staph  - Send blood cx, urine and sputum, check mrsa, covid, flu on ICU admit  - Ertapenam 1 gm q day  - Vancomycin q day  - Fluconazole 400 q day    Hematology:  # Leukocytosis  # Anemia  - hgb 8.3 this afternoon, stable 8.4 yesterday am  - monitor cbc  - Lower extremity ultrasounds    MSK:   #Explant of TEA and placement of antibiotic spacer on 4/10 with Dr. Sarmiento.   Per ortho 4/13: Activity: Up with assist.  Weight bearing status: Minimize use of LUE  Antibiotics: Per ID recs:  Recommendations:  - Stop Cefazolin   - Start IV Vancomycin (pharmacy to dose)   - Start IV Ertapenem 1g Q24H   - Start Fluconazole PO/IV 400mg Q24H   - Will follow intraoperative tissue cultures   Diet: Begin with clear fluids and progress diet as tolerated.   DVT  "prophylaxis: aspirin 162 qd and mechanical while in the hospital, discharge on aspirin 162 x 4 weeks.  Bracing/Splinting: LUE posterior slab splint to be kept clean, dry, and intact until follow-up.   Elevation: Elevate LUE on pillows to keep on pillows as much as possible.   Wound Care: Keep splint c/d/I until 2 week follow up.  Drains: Document output per shift, discontinue at discretion of orthopedics  Pain management: transition from IV to orals as tolerated.    X-rays:  POD1 left elbow XR (AP and lateral)  Physical Therapy:  Eval and treat  Occupational Therapy:   Labs: Trend Hgb on POD #1, 2, then PRN  Cultures: Pending, follow culture results closely.   Consults: PT, OT. Medicine, Infectious disease.  appreciate assistance in caring for this patient.  May need pain team as has many narcotic allergies listed  Follow-up: Clinic with Dr. Sarmiento's team in 2 weeks for wound check with left elbow xrays     Disposition: Pending progress with therapies, pain control on orals, and medical stability, anticipate discharge to TCU on POD #4-5    Lines/ tubes/ drains:  Galvan Catheter: Not present  Lines: None       Prophylaxis:  - DVT Prophylaxis: Defer to primary service- per ortho 162 q day and mechanical devices  - PUD Prophylaxis: PTA prilosec    Code Status: Full Code      Disposition:  - [facility] [place] [facility]    The patient's care was discussed with the Attending Physician, Dr. Mueller .  Critical care time exclusive of procedures: 55 minutes    Clinically Significant Risk Factors              # Hypoalbuminemia: Lowest albumin = 2.9 g/dL at 4/12/2024  5:17 PM, will monitor as appropriate     # Hypertension: Noted on problem list       # DMII: A1C = 7.4 % (Ref range: <5.7 %) within past 6 months   # Obesity: Estimated body mass index is 32.23 kg/m  as calculated from the following:    Height as of this encounter: 1.753 m (5' 9\").    Weight as of this encounter: 99 kg (218 lb 4.1 oz)., PRESENT ON ADMISSION     # " Financial/Environmental Concerns: none              SAI Summers CNP  M [facility]  Securely message with [employer] (more info)  Text page via [facility] Paging/Directory     ______________________________________________________________________    Chief Complaint   Minimally responsiveness    Unable to obtain a history from the patient due to lethargy    History of Present Illness     Mitali Robles is a 59 year old female admitted on 4/10/2024. She has a pmhx of hld, neuropathic pain, htn, complex psychiatric hx including bipolar, PTSD, MDD, on high dose benzodiazepines, who is s/p Left elbow prosthetic joint infection s/p explantation of L total elbow arthroplasty 4/10/24 w Dr. Sarmiento d/t recurrent infections and s/p mx I&Ds (last 2/24), transferred to [facility] ICU d/t period of minimal responsiveness.     RRT x 2 4/13 for being found minimally responsive w respiratory rate of 6-8 breaths per minute by nursing. Initially thought this was from combination of compazine and IV dilaudid. First RRT received 2 doses of Narcan 0.4 mg and was awake, alert and orientated x 4. Second RRT, again minimally responsive narcan give and pt woke up again alert and orientated, VBG back with CO2 61, most likely CO2 Narcosis, ABG sent while pt minimally responsive. Stat head CT. Plan to bring pt to ICU for increased monitoring and bipap.      Review of Systems    The 10 point Review of Systems is negative other than noted in the HPI or here.     Past Medical History    I have reviewed this patient's medical history and updated it with pertinent information if needed.   Past Medical History:   Diagnosis Date    Back injury     Depressive disorder     Hearing problem     Hyperlipidemia     Hypertension     Neck injuries     Stomach ulcer        Past Surgical History   I have reviewed this patient's surgical history and updated it with pertinent information if needed.  Past Surgical History:    Procedure Laterality Date    BACK SURGERY      IRRIGATION AND DEBRIDEMENT ELBOW, PLACE ANTIBIOTIC CEMENT BEADS / SPAC Left 4/10/2024    Procedure: IRRIGATION AND DEBRIDEMENT LEFT ELBOW WITH ANTIBIOTIC SPACER INSERTION;  Surgeon: Marcos Sarmiento MD;  Location: [facility] OR    DC SPINE SURGERY PROCEDURE UNLISTED      REMOVE HARDWARE ELBOW Left 4/10/2024    Procedure: EXPLANT LEFT TOTAL ELBOW ARTHROPLASTY;  Surgeon: Marcos Sarmiento MD;  Location: [facility] OR    [facility] HAND/FINGER SURGERY UNLISTED      [facility] SHOULDER SURG PROC UNLISTED      [facility] STOMACH SURGERY PROCEDURE UNLISTED         Social History   I have reviewed this patient's social history and updated it with pertinent information if needed.  Social History     Tobacco Use    Smoking status: Former     Current packs/day: 0.00     Average packs/day: 1.5 packs/day for 40.2 years (60.3 ttl pk-yrs)     Types: Cigarettes     Start date: 1983     Quit date: 2023     Years since quittin.0    Smokeless tobacco: Never   Substance Use Topics    Alcohol use: No    Drug use: No       Family History   I have reviewed this patient's family history and updated it with pertinent information if needed.  Family History   Problem Relation Age of Onset    Anxiety Disorder Sister     Hypertension Sister     Hyperlipidemia Sister        Prior to Admission Medications   Prior to Admission Medications   Prescriptions Last Dose Informant Patient Reported? Taking?   ALPRAZolam (XANAX) 2 MG tablet 4/10/2024 at   Yes Yes   Sig: Take 2 mg by mouth at bedtime   albuterol (PROAIR HFA/PROVENTIL HFA/VENTOLIN HFA) 108 (90 BASE) MCG/ACT Inhaler More than a month  Yes Yes   Sig: Inhale 2 puffs into the lungs as needed   atorvastatin (LIPITOR) 10 MG tablet 2024 at   Yes Yes   Sig: Take 10 mg by mouth daily   citalopram (CELEXA) 20 MG tablet 2024 at   Yes Yes   Sig: Take 20 mg by mouth at bedtime   cloNIDine (CATAPRES) 0.1 MG tablet 2024 at   Yes Yes   Sig: Take 0.1 mg by mouth at  "bedtime Take with 0.9mg clonidine for total of 1mg at bedtime   cloNIDine (CATAPRES) 0.3 MG tablet 4/9/2024 at 2000  Yes No   Sig: Take 0.9 mg by mouth at bedtime Take with 0.1mg clonidine for total dose of 1mg at bedtime   clonazePAM (KLONOPIN) 2 MG tablet 4/9/2024 at 2000  Yes Yes   Sig: Take 4 mg by mouth at bedtime   diazepam (VALIUM) 10 MG tablet 4/9/2024 at 2000  Yes Yes   Sig: Take 30 mg by mouth at bedtime   diphenhydrAMINE (BENADRYL) 25 MG capsule Past Month  Yes Yes   Sig: Take 25 mg by mouth every 6 hours as needed for itching or allergies   ferrous sulfate (FE TABS) 325 (65 Fe) MG EC tablet 4/9/2024 at 2000  Yes Yes   Sig: Take 325 mg by mouth every other day   gabapentin (NEURONTIN) 400 MG capsule 4/9/2024 at 1600  Yes Yes   Sig: Take 800 mg by mouth 3 times daily Take at breakfast, noon and supper   insulin aspart (NOVOLOG FLEXPEN) 100 UNIT/ML pen 4/9/2024 at 1700  Yes Yes   Sig: Inject 4-12 Units Subcutaneous 3 times daily (with meals) + correction   insulin degludec (TRESIBA) 100 UNIT/ML pen 4/9/2024 at 2000  Yes Yes   Sig: Inject 25 Units Subcutaneous at bedtime   lumateperone (CAPLYTA) 42 MG capsule 4/9/2024 at 2000  Yes Yes   Sig: Take 84 mg by mouth at bedtime   metoclopramide (REGLAN) 5 MG tablet Past Week  Yes Yes   Sig: Take 5 mg by mouth 4 times daily as needed   omeprazole (PRILOSEC) 20 MG DR capsule 4/9/2024 at 2000  Yes Yes   Sig: Take 20 mg by mouth daily      Facility-Administered Medications Last Administration Doses Remaining   lidocaine (PF) (XYLOCAINE) 1 % injection 5 mL 10/30/2023 12:34 PM         Allergies   Allergies   Allergen Reactions    Morphine Anaphylaxis     Throat swells shut  **Has taken hydrocodone/APAP and hydromorphone in the past during previous hospitalizations with no issues.  Takes oxycodone at home    Succinylcholine Shortness Of Breath     \"it affected my breathing\"    Fentanyl Nausea and Vomiting    Hydromorphone      Received 4/10 in OR without issue (was " "listed as allergy but has had in past without incident)    Methadone Nausea and Vomiting    Prednisone Swelling     \"it overrides my psych meds and makes me crazy\"    Pregabalin     Quetiapine     Trazodone     Sumatriptan Rash       Physical Exam   Vital Signs: Temp: 97.8  F (36.6  C) Temp src: Oral BP: 113/63 Pulse: 63   Resp: 12 SpO2: 100 % O2 Device: Nasal cannula Oxygen Delivery: 2 LPM  Weight: 218 lbs 4.09 oz    Physical Exam  Vitals and nursing note reviewed.   Constitutional:       Appearance: She is ill-appearing and toxic-appearing.      Interventions: Face mask in place.   Cardiovascular:      Rate and Rhythm: Normal rate and regular rhythm.      Pulses:           Radial pulses are 1+ on the right side and 1+ on the left side.        Dorsalis pedis pulses are 1+ on the right side and 1+ on the left side.      Heart sounds: Normal heart sounds.   Pulmonary:      Effort: Bradypnea present.      Breath sounds: Normal breath sounds.   Abdominal:      General: Bowel sounds are normal.   Musculoskeletal:      Right lower leg: No edema.      Left lower leg: No edema.      Comments: Left arm in sling and splint   Skin:     General: Skin is warm and dry.      Capillary Refill: Capillary refill takes less than 2 seconds.      Coloration: Skin is pale.   Neurological:      Mental Status: She is lethargic.      Comments: When awake follows commands and ROSARIO.         Data   I reviewed all medications, new labs and imaging results over the last 24 hours.  Arterial Blood Gases   Recent Labs   Lab 04/13/24  1504   PH 7.35   PCO2 49*   PO2 65*   HCO3 27       Complete Blood Count   Recent Labs   Lab 04/13/24  1442 04/12/24  0541 04/11/24  1635 04/11/24  0030   WBC 12.1*  --   --  10.5   HGB 8.3* 8.4* 9.1* 10.1*     --   --  324       Basic Metabolic Panel  Recent Labs   Lab 04/13/24  1445 04/13/24  1345 04/13/24  0827 04/13/24  0615 04/13/24  0204 04/12/24  1821 04/12/24  1717 04/11/24  1731 04/11/24  1635 " 04/11/24  0235 04/11/24  0030   NA  --   --   --   --   --   --   --   --  136  --  140   POTASSIUM  --   --   --   --   --   --   --   --  3.7  --  3.6   CHLORIDE  --   --   --   --   --   --   --   --  103  --  104   CO2  --   --   --   --   --   --   --   --  19*  --  22   BUN  --   --   --   --   --   --   --   --  11.1  --  8.0   CR  --   --   --  0.96*  --   --  1.11*  --  1.03*  --  0.98*   * 175* 167*  --  146*   < >  --    < > 161*   < > 160*    < > = values in this interval not displayed.       Liver Function Tests  Recent Labs   Lab 04/12/24  1717   AST 20   ALT <5   ALKPHOS 122   BILITOTAL <0.2   ALBUMIN 2.9*       Pancreatic Enzymes  No lab results found in last 7 days.    Coagulation Profile  No lab results found in last 7 days.    IMAGING:  No results found for this or any previous visit (from the past 24 hour(s)).

## 2024-04-13 NOTE — PLAN OF CARE
Goal Outcome Evaluation:      Plan of Care Reviewed With: patient    Overall Patient Progress: improvingOverall Patient Progress: improving    Outcome Evaluation: IV dilaudid given x1 this shift for pain. Pt up heavy 1 assist with gait belt to bedside commode. Pt very unsteady. Arm in sling, NWB .

## 2024-04-13 NOTE — PROGRESS NOTES
"Pt states she is having abdominal pain. Began this morning. Pt also has a cough. Pt states, \"I feel like I have the flu.\" MD paged.   "

## 2024-04-13 NOTE — PROGRESS NOTES
At 12 pm, pt completed OT.     IV Compazine was given at 12 pm. IV Dilaudid was given at 1300. At 1300, pt was given a bed bath. Pt then developed low RR and low O2. Pt did not respond to stimulation.  Rapid response was called. 2 Narcan was administered and pt became Ax4. Code decided IV Compazine should not be given again as pt may be sensitive to sedative effects.     About 15 minutes after last Narcan was administered, pt stopped responding to stimuli. Ax0. Another rapid response was called and another Narcan was given.     Pt transferred to CT scan and ICU.    Paged Ortho to see if pain meds need to be adjusted.

## 2024-04-13 NOTE — PROGRESS NOTES
[facility]    Medicine Progress Note - Hospitalist Service, GOLD TEAM 16    Date of Admission:  4/10/2024    Assessment & Plan      Mitali Robles is a 59 year old female admitted on 4/10/2024. She has hx of bipolar disorder, PTSD, MDD, on high dose benzodiazepines, hyperlipidemia, hypertension, and type 2 diabetes on insulin with recurrent left elbow prosthetic joint infection s/p explantation of L total elbow arthroplasty admitted to ortho post op.    New today   -ID adjusting antibiotic therapy  -Continue therapies       L elbow prosthetic joint infection s/p explantation of L total elbow arthroplasty  Reported EBL 600ml per OR note. Per outside ID note, s/p 7 weeks of IV daptomycin, ertapnem and fluconazole. currently ordered cefazolin 2g IV 8h per ortho.   - post op wound care and pain control per ortho  -Monitor routine labs   - follow up cultures  - consider ID consultation   -Primary team is planning per below  Activity: Up with assist.  Weight bearing status: Minimize use of LUE  Antibiotics: Per ID recs:  Recommendations:  - Stop Cefazolin   - Start IV Vancomycin (pharmacy to dose)   - Start IV Ertapenem 1g Q24H   - Start Fluconazole PO/IV 400mg Q24H   - Will follow intraoperative tissue cultures   Diet: Begin with clear fluids and progress diet as tolerated.   DVT prophylaxis: aspirin 162 qd and mechanical while in the hospital, discharge on aspirin 162 x 4 weeks.  Bracing/Splinting: LUE posterior slab splint to be kept clean, dry, and intact until follow-up.   Elevation: Elevate LUE on pillows to keep on pillows as much as possible.   Wound Care: Keep splint c/d/I until 2 week follow up.  Drains: Document output per shift, discontinue at discretion of orthopedics  Pain management: transition from IV to orals as tolerated.    X-rays:  POD1 left elbow XR (AP and lateral)  Physical Therapy:  Eval and treat  Occupational Therapy:   Labs: Trend Hgb on POD #1,  2, then PRN  Cultures: Pending, follow culture results closely.   Consults: PT, OT. Medicine, Infectious disease.  appreciate assistance in caring for this patient.  May need pain team as has many narcotic allergies listed  Follow-up: Clinic with Dr. Sarmiento's team in 2 weeks for wound check with left elbow xrays     Chest pain  Diaphoresis  Tachycardia  -- Likely as a result of exertion, was self-limiting.  -- On examination right-sided chest pain was reproducible on exam.  -- Vitals self resolved.  -- Obtain a twelve-lead EKG.  Monitor for continued symptoms (normal sinus, right bundle branch block)  -- Some subsided    Somnolence improved  Still waking up from surgery. Bradypnea with RR 8-10 with occasional desaturation.  - capnography overnight  -This is improved.     Complex psychiatric history including bipolar disorder, PTSD, MDD, on high dose benzodiazepines  PTA meds include valium 30mg HS, xanax 1mg HS prn, clonazepam 2mg BID prn, celexa 20mg hs, and lumateperone 84mg hs.  checked, and last filled xanax 2mg 30 tabs, valiume 10mg 90 tabs and clonazepam 2mg 90 tabs on 2/12/24. Currently ordered xanax 0.25mg TID per ortho.   -Postop mentation improved now on the floor.  -Previously sedated post op and was on precedex, received ativan intra-op,  -Currently on Xanax 0.25 mg 3 times daily   - Possibly need adjustment to prevent withdrawal (no signs of withdrawal currently)  - recommend psych consult     Type 2 DM  A1c 7.4% on 2/12. On tresiba hs and aspart  - sliding scale insulin for now  - resume tresiba in am  -Blood glucose in acceptable limits  Recent Labs   Lab 04/13/24  0827 04/13/24  0204 04/12/24  2139 04/12/24  1821 04/12/24  1339 04/12/24  0720   * 146* 148* 235* 172* 183*          Hypertension  On Clonidine 1mg HS. Currently BP soft post op  -Resume home BP meds including clonidine.  -Elevated blood pressure into the 190s today  - Address underlying pain.  - Add IV hydralazine for SBP greater  "than 180       Neuropathic pain  - PTA gabapentin 800mg TID     Hyperlipidemia  - continue atorvastatin              Diet: Advance Diet as Tolerated: Regular Diet Adult  Diet    DVT Prophylaxis: ASA per primary team  Galvan Catheter: Not present  Lines: None     Cardiac Monitoring: None  Code Status: Full Code      Clinically Significant Risk Factors              # Hypoalbuminemia: Lowest albumin = 2.9 g/dL at 4/12/2024  5:17 PM, will monitor as appropriate     # Hypertension: Noted on problem list       # DMII: A1C = 7.4 % (Ref range: <5.7 %) within past 6 months   # Obesity: Estimated body mass index is 32.23 kg/m  as calculated from the following:    Height as of this encounter: 1.753 m (5' 9\").    Weight as of this encounter: 99 kg (218 lb 4.1 oz)., PRESENT ON ADMISSION     # Financial/Environmental Concerns: none         Disposition Plan     Medically Ready for Discharge: Anticipated in 2-4 Days             Arvind Falcon MD  Hospitalist Service, GOLD TEAM 16  M [facility]  Securely message with [employer] (more info)  Text page via [employer] Paging/Directory   See signed in provider for up to date coverage information  ______________________________________________________________________    Interval History   Patient seen and examined at bedside no acute distress.  During my exam patient states she is feel like she is improving.  Has ongoing pain.  Patient reported to nursing cough symptoms of the flu.  Physical Exam   Vital Signs: Temp: 97.5  F (36.4  C) Temp src: Oral BP: 102/49 Pulse: 75     SpO2: 93 % O2 Device: None (Room air)    Weight: 218 lbs 4.09 oz    General Appearance: Appears comfortable.  Respiratory: Without wheezes rhonchi or rales.  CTA  Cardiovascular: RRR, without murmurs  GI: Soft, nontender, plus BS  Skin: Without obvious bleeding, bruising or excoriations      Medical Decision Making       57 MINUTES SPENT BY ME on the date of service doing chart " review, history, exam, documentation & further activities per the note.      Data   ------------------------- PAST 24 HR DATA REVIEWED -----------------------------------------------

## 2024-04-13 NOTE — PLAN OF CARE
"[id number]    /63 (BP Location: Right arm, Patient Position: Semi-Amaya's, Cuff Size: Adult Large)   Pulse 71   Temp 97.5  F (36.4  C) (Oral)   Resp 16   Ht 1.753 m (5' 9\")   Wt 99 kg (218 lb 4.1 oz)   SpO2 96%   BMI 32.23 kg/m       Orientation: Alert and oriented, able to make needs known  Bowel: Last BM 4/10/24  Bladder: Continent. Voided x1 BS PVR  Pain:10/10 Left arm/ PRN IV Dilaudid given x1  Ambulation/Transfers: NWB LUE, Assist of 2/ BSC  Blood sugars:Latest blood sugar 235mg/dl, Insulin given per scale  Diet/ Liquids:Regular diet, take medications whole   Tubes/ Lines/ Drains: Right PIV/ BAILEY Drain  Oxygen:On room air, denies chest pain or shortness of breath  Call light within reach. Continue with POC.  "

## 2024-04-14 LAB
AMMONIA PLAS-SCNC: 34 UMOL/L (ref 11–51)
ANION GAP SERPL CALCULATED.3IONS-SCNC: 7 MMOL/L (ref 7–15)
BASE EXCESS BLDV CALC-SCNC: 1.6 MMOL/L (ref -3–3)
BUN SERPL-MCNC: 13.4 MG/DL (ref 8–23)
CALCIUM SERPL-MCNC: 8 MG/DL (ref 8.8–10.2)
CHLORIDE SERPL-SCNC: 104 MMOL/L (ref 98–107)
CREAT SERPL-MCNC: 0.72 MG/DL (ref 0.51–0.95)
DEPRECATED HCO3 PLAS-SCNC: 25 MMOL/L (ref 22–29)
EGFRCR SERPLBLD CKD-EPI 2021: >90 ML/MIN/1.73M2
ERYTHROCYTE [DISTWIDTH] IN BLOOD BY AUTOMATED COUNT: 18.2 % (ref 10–15)
GLUCOSE BLDC GLUCOMTR-MCNC: 120 MG/DL (ref 70–99)
GLUCOSE BLDC GLUCOMTR-MCNC: 128 MG/DL (ref 70–99)
GLUCOSE BLDC GLUCOMTR-MCNC: 130 MG/DL (ref 70–99)
GLUCOSE BLDC GLUCOMTR-MCNC: 139 MG/DL (ref 70–99)
GLUCOSE BLDC GLUCOMTR-MCNC: 140 MG/DL (ref 70–99)
GLUCOSE BLDC GLUCOMTR-MCNC: 147 MG/DL (ref 70–99)
GLUCOSE SERPL-MCNC: 123 MG/DL (ref 70–99)
HCO3 BLDV-SCNC: 27 MMOL/L (ref 21–28)
HCT VFR BLD AUTO: 22.9 % (ref 35–47)
HGB BLD-MCNC: 7.3 G/DL (ref 11.7–15.7)
MAGNESIUM SERPL-MCNC: 1.8 MG/DL (ref 1.7–2.3)
MCH RBC QN AUTO: 25.2 PG (ref 26.5–33)
MCHC RBC AUTO-ENTMCNC: 31.9 G/DL (ref 31.5–36.5)
MCV RBC AUTO: 79 FL (ref 78–100)
O2/TOTAL GAS SETTING VFR VENT: 2 %
OXYHGB MFR BLDV: 89 % (ref 70–75)
PCO2 BLDV: 47 MM HG (ref 40–50)
PH BLDV: 7.37 [PH] (ref 7.32–7.43)
PHOSPHATE SERPL-MCNC: 2.8 MG/DL (ref 2.5–4.5)
PLATELET # BLD AUTO: 264 10E3/UL (ref 150–450)
PO2 BLDV: 59 MM HG (ref 25–47)
POTASSIUM SERPL-SCNC: 3.6 MMOL/L (ref 3.4–5.3)
POTASSIUM SERPL-SCNC: 3.6 MMOL/L (ref 3.4–5.3)
PROLACTIN SERPL 3RD IS-MCNC: 26 NG/ML (ref 5–23)
PROLACTIN SERPL 3RD IS-MCNC: 78 NG/ML (ref 5–23)
RBC # BLD AUTO: 2.9 10E6/UL (ref 3.8–5.2)
SAO2 % BLDV: 90 % (ref 70–75)
SODIUM SERPL-SCNC: 136 MMOL/L (ref 135–145)
TROPONIN T SERPL HS-MCNC: 17 NG/L
VANCOMYCIN SERPL-MCNC: 26 UG/ML
WBC # BLD AUTO: 8.8 10E3/UL (ref 4–11)

## 2024-04-14 PROCEDURE — 36415 COLL VENOUS BLD VENIPUNCTURE: CPT

## 2024-04-14 PROCEDURE — 84484 ASSAY OF TROPONIN QUANT: CPT

## 2024-04-14 PROCEDURE — 120N000002 HC R&B MED SURG/OB UMMC

## 2024-04-14 PROCEDURE — 85027 COMPLETE CBC AUTOMATED: CPT

## 2024-04-14 PROCEDURE — 250N000013 HC RX MED GY IP 250 OP 250 PS 637: Performed by: PHYSICIAN ASSISTANT

## 2024-04-14 PROCEDURE — 250N000013 HC RX MED GY IP 250 OP 250 PS 637

## 2024-04-14 PROCEDURE — 84146 ASSAY OF PROLACTIN: CPT

## 2024-04-14 PROCEDURE — 250N000011 HC RX IP 250 OP 636: Mod: JZ | Performed by: STUDENT IN AN ORGANIZED HEALTH CARE EDUCATION/TRAINING PROGRAM

## 2024-04-14 PROCEDURE — 82140 ASSAY OF AMMONIA: CPT

## 2024-04-14 PROCEDURE — 80202 ASSAY OF VANCOMYCIN: CPT | Performed by: STUDENT IN AN ORGANIZED HEALTH CARE EDUCATION/TRAINING PROGRAM

## 2024-04-14 PROCEDURE — 80048 BASIC METABOLIC PNL TOTAL CA: CPT

## 2024-04-14 PROCEDURE — 250N000011 HC RX IP 250 OP 636

## 2024-04-14 PROCEDURE — 250N000011 HC RX IP 250 OP 636: Performed by: STUDENT IN AN ORGANIZED HEALTH CARE EDUCATION/TRAINING PROGRAM

## 2024-04-14 PROCEDURE — 83735 ASSAY OF MAGNESIUM: CPT

## 2024-04-14 PROCEDURE — 250N000013 HC RX MED GY IP 250 OP 250 PS 637: Performed by: STUDENT IN AN ORGANIZED HEALTH CARE EDUCATION/TRAINING PROGRAM

## 2024-04-14 PROCEDURE — 93010 ELECTROCARDIOGRAM REPORT: CPT | Mod: GC | Performed by: INTERNAL MEDICINE

## 2024-04-14 PROCEDURE — 99233 SBSQ HOSP IP/OBS HIGH 50: CPT

## 2024-04-14 PROCEDURE — 84100 ASSAY OF PHOSPHORUS: CPT

## 2024-04-14 PROCEDURE — 93005 ELECTROCARDIOGRAM TRACING: CPT

## 2024-04-14 PROCEDURE — 87040 BLOOD CULTURE FOR BACTERIA: CPT

## 2024-04-14 PROCEDURE — 250N000013 HC RX MED GY IP 250 OP 250 PS 637: Performed by: INTERNAL MEDICINE

## 2024-04-14 PROCEDURE — 258N000003 HC RX IP 258 OP 636: Performed by: STUDENT IN AN ORGANIZED HEALTH CARE EDUCATION/TRAINING PROGRAM

## 2024-04-14 PROCEDURE — 36416 COLLJ CAPILLARY BLOOD SPEC: CPT

## 2024-04-14 PROCEDURE — 82805 BLOOD GASES W/O2 SATURATION: CPT

## 2024-04-14 RX ORDER — MAGNESIUM SULFATE HEPTAHYDRATE 40 MG/ML
2 INJECTION, SOLUTION INTRAVENOUS ONCE
Status: COMPLETED | OUTPATIENT
Start: 2024-04-14 | End: 2024-04-14

## 2024-04-14 RX ORDER — POTASSIUM CHLORIDE 1.5 G/1.58G
20 POWDER, FOR SOLUTION ORAL ONCE
Status: COMPLETED | OUTPATIENT
Start: 2024-04-14 | End: 2024-04-14

## 2024-04-14 RX ORDER — ACETAMINOPHEN 325 MG/1
975 TABLET ORAL EVERY 8 HOURS
Status: DISCONTINUED | OUTPATIENT
Start: 2024-04-14 | End: 2024-04-22 | Stop reason: HOSPADM

## 2024-04-14 RX ORDER — KETOROLAC TROMETHAMINE 15 MG/ML
15 INJECTION, SOLUTION INTRAMUSCULAR; INTRAVENOUS ONCE
Status: DISCONTINUED | OUTPATIENT
Start: 2024-04-14 | End: 2024-04-14

## 2024-04-14 RX ORDER — GABAPENTIN 400 MG/1
400 CAPSULE ORAL ONCE
Status: COMPLETED | OUTPATIENT
Start: 2024-04-14 | End: 2024-04-14

## 2024-04-14 RX ORDER — KETOROLAC TROMETHAMINE 30 MG/ML
30 INJECTION, SOLUTION INTRAMUSCULAR; INTRAVENOUS EVERY 6 HOURS
Status: COMPLETED | OUTPATIENT
Start: 2024-04-14 | End: 2024-04-19

## 2024-04-14 RX ORDER — FERROUS SULFATE 325(65) MG
325 TABLET ORAL EVERY OTHER DAY
Status: DISCONTINUED | OUTPATIENT
Start: 2024-04-14 | End: 2024-04-22 | Stop reason: HOSPADM

## 2024-04-14 RX ORDER — ATORVASTATIN CALCIUM 10 MG/1
10 TABLET, FILM COATED ORAL EVERY EVENING
Status: DISCONTINUED | OUTPATIENT
Start: 2024-04-14 | End: 2024-04-22 | Stop reason: HOSPADM

## 2024-04-14 RX ORDER — CLONIDINE HYDROCHLORIDE 0.1 MG/1
0.1 TABLET ORAL 2 TIMES DAILY
Status: DISCONTINUED | OUTPATIENT
Start: 2024-04-14 | End: 2024-04-15

## 2024-04-14 RX ADMIN — ACETAMINOPHEN 650 MG: 325 TABLET, FILM COATED ORAL at 05:09

## 2024-04-14 RX ADMIN — MAGNESIUM SULFATE HEPTAHYDRATE 2 G: 40 INJECTION, SOLUTION INTRAVENOUS at 04:41

## 2024-04-14 RX ADMIN — MAGNESIUM SULFATE HEPTAHYDRATE 2 G: 40 INJECTION, SOLUTION INTRAVENOUS at 01:12

## 2024-04-14 RX ADMIN — HYDRALAZINE HYDROCHLORIDE 10 MG: 20 INJECTION INTRAMUSCULAR; INTRAVENOUS at 17:05

## 2024-04-14 RX ADMIN — KETOROLAC TROMETHAMINE 30 MG: 30 INJECTION, SOLUTION INTRAMUSCULAR at 11:43

## 2024-04-14 RX ADMIN — SENNOSIDES AND DOCUSATE SODIUM 1 TABLET: 8.6; 5 TABLET ORAL at 08:27

## 2024-04-14 RX ADMIN — FLUCONAZOLE 400 MG: 200 TABLET ORAL at 08:27

## 2024-04-14 RX ADMIN — ACETAMINOPHEN 975 MG: 325 TABLET, FILM COATED ORAL at 12:48

## 2024-04-14 RX ADMIN — HYDRALAZINE HYDROCHLORIDE 10 MG: 20 INJECTION INTRAMUSCULAR; INTRAVENOUS at 23:15

## 2024-04-14 RX ADMIN — ACETAMINOPHEN 650 MG: 325 TABLET, FILM COATED ORAL at 01:08

## 2024-04-14 RX ADMIN — KETOROLAC TROMETHAMINE 30 MG: 30 INJECTION, SOLUTION INTRAMUSCULAR at 23:16

## 2024-04-14 RX ADMIN — CLONIDINE HYDROCHLORIDE 0.1 MG: 0.1 TABLET ORAL at 21:06

## 2024-04-14 RX ADMIN — CLONIDINE HYDROCHLORIDE 0.1 MG: 0.1 TABLET ORAL at 11:43

## 2024-04-14 RX ADMIN — CITALOPRAM 20 MG: 20 TABLET ORAL at 21:37

## 2024-04-14 RX ADMIN — ACETAMINOPHEN 975 MG: 325 TABLET, FILM COATED ORAL at 21:06

## 2024-04-14 RX ADMIN — VANCOMYCIN HYDROCHLORIDE 1750 MG: 10 INJECTION, POWDER, LYOPHILIZED, FOR SOLUTION INTRAVENOUS at 18:07

## 2024-04-14 RX ADMIN — ASPIRIN 162 MG: 81 TABLET, COATED ORAL at 08:27

## 2024-04-14 RX ADMIN — FERROUS SULFATE TAB 325 MG (65 MG ELEMENTAL FE) 325 MG: 325 (65 FE) TAB at 12:48

## 2024-04-14 RX ADMIN — ATORVASTATIN CALCIUM 10 MG: 10 TABLET, FILM COATED ORAL at 21:06

## 2024-04-14 RX ADMIN — OMEPRAZOLE 20 MG: 20 CAPSULE, DELAYED RELEASE ORAL at 08:27

## 2024-04-14 RX ADMIN — Medication 2.5 MG: at 21:20

## 2024-04-14 RX ADMIN — POTASSIUM CHLORIDE 20 MEQ: 1.5 POWDER, FOR SOLUTION ORAL at 04:40

## 2024-04-14 RX ADMIN — Medication 2.5 MG: at 12:48

## 2024-04-14 RX ADMIN — KETOROLAC TROMETHAMINE 30 MG: 30 INJECTION, SOLUTION INTRAMUSCULAR at 18:06

## 2024-04-14 RX ADMIN — ERTAPENEM SODIUM 1 G: 1 INJECTION, POWDER, LYOPHILIZED, FOR SOLUTION INTRAMUSCULAR; INTRAVENOUS at 17:10

## 2024-04-14 RX ADMIN — GABAPENTIN 400 MG: 400 CAPSULE ORAL at 21:39

## 2024-04-14 ASSESSMENT — ACTIVITIES OF DAILY LIVING (ADL)
ADLS_ACUITY_SCORE: 46
ADLS_ACUITY_SCORE: 44
ADLS_ACUITY_SCORE: 43
ADLS_ACUITY_SCORE: 44
ADLS_ACUITY_SCORE: 43
ADLS_ACUITY_SCORE: 46
ADLS_ACUITY_SCORE: 44
ADLS_ACUITY_SCORE: 46
ADLS_ACUITY_SCORE: 46
ADLS_ACUITY_SCORE: 44
ADLS_ACUITY_SCORE: 44
ADLS_ACUITY_SCORE: 46
ADLS_ACUITY_SCORE: 43
ADLS_ACUITY_SCORE: 44
ADLS_ACUITY_SCORE: 46
ADLS_ACUITY_SCORE: 46
ADLS_ACUITY_SCORE: 44
ADLS_ACUITY_SCORE: 46
ADLS_ACUITY_SCORE: 44
ADLS_ACUITY_SCORE: 43
ADLS_ACUITY_SCORE: 46
ADLS_ACUITY_SCORE: 46
ADLS_ACUITY_SCORE: 44

## 2024-04-14 NOTE — PLAN OF CARE
ICU End of Shift Summary. See flowsheets for vital signs and detailed assessment.    Changes this shift:     Neuro: Patient intermittently becomes lethargic and then would become alert shortly after. 1.2 mg total of narcan given overnight, with minimal effects. Patient woke up at 0430 stating that she is DNR and did not need to be started on BiPAP, she also expressed that she did not want to receive CPR. Staff explained to patient that she did not receive CPR, patient continued to disagree. A&O x4 when alert    Cardiac: NSR to SB overnight. Lowest HR 58 overnight. Hypertensive during shift, requiring PRN hydralazine.     Respiratory: Patient initially on BiPAP, but requested to switch 2L Nasal Cannula, currently on Room air. Patient verbalized not wanting BiPAP even with increased O2 need.     GI/: Adequate urine output from vargas. Good urine output.No BM overnight    Diet/appetite: NPO. Passed bedside swallow evaluation when alert.     Activity: Patient refused turns overnight    Pain: Unable to control pain overnight due to somnolence. PRN tylenol given instead  Skin: Bruising from venipuncture in R upper extremity. Domingo of L upper extremity.      Plan: Continue with POC. Notify primary team with changes.

## 2024-04-14 NOTE — PROGRESS NOTES
Critical Care Update  Patient has had waxing and waning LOC overnight and yesterday. Per nursing she was alert and oriented and conversational at 2000, then became completely obtunded again. She had also done this earlier during the day. ABG was completed 2110 on 4/13 and had normalized in comparison to VBG at 1847 on 4/13 after ipap had been increased. Patient was given several doses of Narcan which was ineffective at improving her responsiveness. Repeated stimulation and sternal rubbing could get the patient to briefly intermittently respond. Through this it was noted she had no focal deficits, she would move all four extremities. She was able to briefly open her eyes and at one point could answer that she was at the hospital. Her pupils were also equal and reactive. At 0110 on 4/14 patient was again completely awake, alert, oriented. Was able to fully participate in a neurological exam and had no deficits. Patient reported she had not slept well in the last several days. She did have the BiPap off for a while, but when she fell asleep she was again hypoventilating and bedside apnea alarms were going off. BiPap was placed back on. As of 3 am, she remains alert and oriented with BiPap on.     Etiology unclear, given that despite normalization of blood gas patient was again unresponsive, but suspect 2/2 polypharmacy/medication accumulation and poor quality of sleep in the previous days. Was concerned for nonconvulsive seizures, but patient very clear and alert and oriented when she is awake. EEG was contacted but would be unable to get her an EEG until Monday. At this time given that she is currently alert and oriented, no urgent need to transfer her for an EEG.     Plan:   - Patient had previously had a CT PE study ordered but it was unable to be completed because her extended dwell IV infiltrated and she did not have a big enough IV to administer contrast through. She has remained vitally stable, no tachycardia,  afebrile, no hypotension, and bilateral lower extremity US was negative for PE.  - Troponin, BNP, and Prolactin were added on to lab samples from the RRT on 4/13.   Troponin mildly elevated at 25. EKG showing right bundle branch block and t wave abnormality, but this is unchanged from previous EKG on 4/12 and also had a similar EKG in February 2024.   - Repeat troponin ordered but lab has been unable to obtain the sample.   - .   - Ammonia ordered but lab has been unable to obtain the sample  - Prolactin add on is pending.   - Lactic was added on to ABG collected at 2110 and resulted WNL. It was also WNL during rapid response earlier during the day.    - Tried to order EEG, but as above was unable to be obtained. Patient is now currently alert and oriented.   - Will need to continue to monitor closely. Due to apnea when sleeping she should continue to wear BiPap when asleep.   - Continue to hold any sedating medications      Delaney Harp NP

## 2024-04-14 NOTE — PROGRESS NOTES
Admission to ICU   End of Shift Summary. See flowsheets for vital signs and detailed assessment.      Major Events this shift: Patient came up from 5 ortho 1545 after an RRT not responsive needed vigorous sternal rub to wake, she received a dose of narcan from the flyer post head CT and was briefly awake responded to a couple questions and went back to not being responsive.    Neuro: Alert & oriented now  CV: normotensive but bradied down to 38 while in CT but came right back up into the 60s and remained there.   PU: On BiPAP 14/7 FiO2 35%  GI:  No BM since she came up  : Galvan placed on arrival and has adequate output.   Skin: R upper arm skin tear abrasion what appears to be from blood pressure cuff    Lines: R PIV, R extended dwell infiltrated while down at CT with contrast administration        Drip/BAILEY drain: Draining     Plan: Replace Mag, Potassium and administer, ABXs  which are late but provider aware and ok to administer when patient comes back from CT, call vascular and get another line or LILIYA to place a central line as patient is a very hark poke

## 2024-04-14 NOTE — PLAN OF CARE
"Goal Outcome Evaluation:  [id number]    Plan of Care Reviewed With: patient    Overall Patient Progress: no changeOverall Patient Progress: no change       ADMISSION to [facility]:    Mitali Robles was admitted from ICU for Infection of total joint prosthesis at 1600.  2 RN skin assessment: completed by Cha Good and Hailee Banda  Result of skin assessment and interventions/actions: Bruise to right upper forearm and Left surgical incision to Left arm  Height, weight: completed? yes  Patient belongings & admission documents: see Flowsheets, completed? No  MDRO education added to care plan: Yes       Pt is A&Ox4, able to make needs known, c/o pain scheduled toradol given. Denied chest pain, SOB, N/V, N/T. Pt is up with 2 assist using stand pivot to BSC. Pt is continent of bowel and bladder-LBM 4/14. On regular diet/thin liquids, takes pills whole. Cast to Left arm. Pt is on RN managed- lab check in the AM. BG check and sliding scale ( 130). Hyrdalazine given for high bp rechecked after still elevated. BAILEY drain with minimal output. Right PIV x2. No other concerns as of now, continue with POC.     Patient most recent vitals;  BP (!) 165/91 (BP Location: Right arm, Patient Position: Semi-Amaya's, Cuff Size: Adult Large)   Pulse 88   Temp 98.7  F (37.1  C) (Oral)   Resp 16   Ht 1.753 m (5' 9\")   Wt 105.5 kg (232 lb 9.4 oz)   SpO2 97%   BMI 34.35 kg/m     "

## 2024-04-14 NOTE — PROGRESS NOTES
Saw patient bedside and discussed code status, narcotics, and oxygen supplementation.    The patient would like to be DNR - full DNR, without any aides, supplementations, or interventions. She is fine with nasal cannula oxygen supplementation, but under no circumstances should she receive oxygen mask, BiPap, or anything other than nasal cannula oxygen supplementation.    The patient and I discussed the pros and cons of narcotics. Although they are able to relieve pain, they have respiratory depression side effects. The patient understands this upon our discussion (the ICU never discussed the pros and cons of narcotics with the patient). Patient would like to try low dose narcotics with nasal cannula oxygen supplementation.    Plan:    - Low dose oxycodone (2.5 mg Q 6 hours PRN)  - RN to place nasal cannula in patient's nostrils, start supplemental oxygen accordingly  - RN/ICU team to upload patient DNR document to chart, patient has it bedside    Discussed with Dr. Sarmiento and Dr. Pleitez.    Douglas Perez MD  Resident Physician, PGY-1  Orthopedic Surgery  Pager: [phone]     Please page me directly with any questions/concerns during regular weekday hours before 5 pm. If there is no response, if it is a weekend, or if it is during evening hours then please page the orthopedic surgery resident on call.

## 2024-04-14 NOTE — PHARMACY-VANCOMYCIN DOSING SERVICE
Pharmacy Vancomycin Note  Date of Service 2024  Patient's  1965   59 year old, female    Indication: Bone and Joint Infection and Skin and Soft Tissue Infection  Day of Therapy: Since 24   Current vancomycin regimen:  1750 mg IV q24h  Current vancomycin monitoring method: AUC  Current vancomycin therapeutic monitoring goal: 400-600 mg*h/L    Insight[employer] Prediction of Current Vancomycin Regimen  Loading dose: N/A  Regimen: 1750 mg IV every 24 hours.  Start time: 21:01 on 2024  Exposure target: AUC24 (range)400-600 mg/L.hr   AUC24,ss: 451 mg/L.hr  Probability of AUC24 > 400: 75 %  Ctrough,ss: 10.6 mg/L  Probability of Ctrough,ss > 20: 2 %  Probability of nephrotoxicity (Lodise CHANDAN ): 6 %      Current estimated CrCl = Estimated Creatinine Clearance: 107.4 mL/min (based on SCr of 0.72 mg/dL).    Creatinine for last 3 days  2024:  4:35 PM Creatinine 1.03 mg/dL  2024:  5:17 PM Creatinine 1.11 mg/dL  2024:  6:15 AM Creatinine 0.96 mg/dL;  2:42 PM Creatinine 0.94 mg/dL  2024:  2:57 AM Creatinine 0.72 mg/dL    Recent Vancomycin Levels (past 3 days)  2024:  2:57 AM Vancomycin 26.0 ug/mL    Vancomycin IV Administrations (past 72 hours)                     vancomycin (VANCOCIN) 1,750 mg in sodium chloride 0.9 % 500 mL intermittent infusion (mg) 1,750 mg Given 24 210    vancomycin (VANCOCIN) 2,000 mg in sodium chloride 0.9 % 500 mL intermittent infusion (mg) 2,000 mg New Bag 24 180                    Nephrotoxins and other renal medications (From now, onward)      Start     Dose/Rate Route Frequency Ordered Stop    24 1800  vancomycin (VANCOCIN) 1,750 mg in sodium chloride 0.9 % 500 mL intermittent infusion         1,750 mg  over 2 Hours Intravenous EVERY 24 HOURS 24 1817                 Contrast Orders - past 72 hours (72h ago, onward)      Start     Dose/Rate Route Frequency Stop    24 1900  iopamidol (ISOVUE-370) solution 100 mL          100 mL Intravenous ONCE 04/13/24 1931            Interpretation of levels and current regimen:  Vancomycin level is reflective of -600    Has serum creatinine changed greater than 50% in last 72 hours: No    Urine output:  unable to determine    Renal Function: Stable      Plan:  Continue Current Dose  Vancomycin monitoring method: AUC  Vancomycin therapeutic monitoring goal: 400-600 mg*h/L  Pharmacy will check vancomycin levels as appropriate in 1-3 Days.  Serum creatinine levels will be ordered a minimum of twice weekly.    Bren Kamara RPH

## 2024-04-14 NOTE — PROGRESS NOTES
Orthopaedic Surgery Progress Note 04/14/2024    S: Patient highly somnolent yesterday. Believed to be due to pain medication administration. Some response to Narcan. Currently receiving only Tylenol in the ICU (she was transferred to ICU for closer monitoring). Not receiving any pressors or other infusions. In the ICU for closer monitoring.    We will be sure to respect DNR going forward and to clarify to patient that sternal rub is not chest compressions (there was some confusion about this). After a discussion with patient, she does not want any interventions going forward. No oxygen supplementation, no chest compressions, no oxygen mask, no supplemental oxygen. Will revisit discussion later in the day about pros and cons of narcotics and need to supplement oxygen if breathing status declines.    As far as left arm, patient states she is doing well overall. Questions answered regarding utility of antibiotic spacer to help clear as much infection as possible. Somnolent throughout encounter.    O:  Temp: 97.6  F (36.4  C) Temp src: Axillary BP: (!) 166/78 Pulse: 77   Resp: 13 SpO2: 95 % O2 Device: BiPAP/CPAP Oxygen Delivery: 2 LPM    Exam:  Gen: No acute distress, resting comfortably in bed.  Resp: Non-labored breathing  MSK:  LUE:  - Splint/Dressings c/d/i  - SILT medial/radial/ulnar/axillary nerves  - Fires EPL, FPL, Intrinsics  - Hand wwp    Drain output:   Output by Drain (mL) 04/12/24 0700 - 04/12/24 1459 04/12/24 1500 - 04/12/24 2259 04/12/24 2300 - 04/13/24 0659 04/13/24 0700 - 04/13/24 1459 04/13/24 1500 - 04/13/24 2259 04/13/24 2300 - 04/14/24 0659   Closed/Suction Drain 1 Left Elbow Bulb 15 Kiswahili 65 50 75 90 110 40     Recent Labs   Lab 04/14/24  0257 04/13/24  1442 04/12/24  0541 04/11/24  1635 04/11/24  0030   WBC 8.8 12.1*  --   --  10.5   HGB 7.3* 8.3* 8.4*   < > 10.1*    270  --   --  324    < > = values in this interval not displayed.     Recent Labs   Lab 04/10/24  1530 04/10/24  1516  04/10/24  1453 04/10/24  1400 04/10/24  1358   CULTURE No growth after 3 days  1+ Cutibacterium (Propionibacterium) acnes*  Culture in progress  1+ Staphylococcus epidermidis*  1+ Staphylococcus capitis* No growth after 3 days  1+ Cutibacterium (Propionibacterium) acnes*  1+ Staphylococcus epidermidis* No growth after 3 days  No anaerobic organisms isolated after 3 days  Culture in progress  Isolated in broth only Gram positive cocci* No growth after 3 days  No anaerobic organisms isolated after 3 days  No growth after 2 days No growth after 3 days  No anaerobic organisms isolated after 3 days  No growth after 3 days  Culture in progress  Isolated in broth only Staphylococcus epidermidis*  1+ Cutibacterium (Propionibacterium) acnes*  No growth after 2 days  Culture in progress  1+ Staphylococcus capitis*      Assessment/Plan:     Mitali Robles is a 59 year old female with complex PMH including fracture dislocation of the left elbow c/b hardware failure and 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 Sarmiento.     ID recommends continued Vanco, Meropenem, and Fluconazole. Continue to respect DNR wishes.    Activity: Up with assist.  Weight bearing status: Minimize use of LUE  Antibiotics: Per ID recs:  Recommendations:  - Stop Cefazolin   - Start IV Vancomycin (pharmacy to dose)   - Start IV Ertapenem 1g Q24H   - Start Fluconazole PO/IV 400mg Q24H   - Will follow intraoperative tissue cultures   Diet: Begin with clear fluids and progress diet as tolerated.   DVT prophylaxis: aspirin 162 qd and mechanical while in the hospital, discharge on aspirin 162 x 4 weeks.  Bracing/Splinting: LUE posterior slab splint to be kept clean, dry, and intact until follow-up.   Elevation: Elevate LUE on pillows to keep on pillows as much as possible.   Wound Care: Keep splint c/d/I until 2 week  follow up.  Drains: Document output per shift, discontinue at discretion of orthopedics  Pain management: transition from IV to orals as tolerated.    X-rays:  POD1 left elbow XR (AP and lateral)  Physical Therapy:  Eval and treat  Occupational Therapy:   Labs: Trend Hgb on POD #1, 2, then PRN  Cultures: Pending, follow culture results closely.   Consults: PT, OT. Medicine, Infectious disease.  appreciate assistance in caring for this patient.  May need pain team as has many narcotic allergies listed  Follow-up: Clinic with Dr. Sarmiento's team in 2 weeks for wound check with left elbow xrays     Disposition: Pending progress with therapies, pain control on orals, and medical stability, anticipate discharge to TCU on POD #4-5.     Douglas Perez MD  Resident Physician, PGY-1  Orthopedic Surgery

## 2024-04-14 NOTE — PROGRESS NOTES
MEDICAL ICU PROGRESS NOTE  04/14/2024      Date of Service (when I saw the patient): 04/14/2024    ASSESSMENT: Mitali Robles is a 59 year old female admitted on 4/10/2024. She has a pmhx of hld, neuropathic pain, htn, complex psychiatric hx including bipolar, PTSD, MDD, on high dose benzodiazepines, who is s/p Left elbow prosthetic joint infection s/p explantation of L total elbow arthroplasty 4/10/24 w Dr. Sarmiento d/t recurrent infections and s/p mx I&Ds (last 2/24), transferred to [facility] ICU d/t  RRT x 2 after periods of minimal responsiveness refractory to narcan.        CHANGES and MAJOR THINGS TODAY:   - Psych consult for medication review  - Start scheduled ketorolac for pain  - Recommend bipap when sleeping  - Restart clonidine at 0.1 mg BID for HTN  - Restart PTA Lipitor and Iron   - Regular diet  - Continue to hold sedating medications   - Transfer back to medicine       PLAN:    Neuro:  # Encephalopathy likely multifactorial 2/2 REM rebound, hypercapnia, sedatives, and polypharmacy   Initially admitted after 2 RRTs on 4/13 for being minimally responsive, refractory to narcan. Continued to have episodes of somnolence overnight. Patient likely has undiagnosed KENNY/OHS and has not worn a BIPAP when sleeping prior to this hospitalization. The increased somnolence likely d/t REM rebound effect and combination of the sedative/benzos/antipsychotics.   - 4/13 Head CT: w/ no acute intercranial pathology  - Hold sedating medications  - Monitor neurological status. Notify provider for any acute changes in exam  - BIPAP when sleeping     # Bipolar Disorder   #PTSD   #MDD  # Neuropathic pain  # Post operative pain  S/p left elbow prosthetic joint infection s/p explantation of L total elbow arthroplasty 4/10/24. Patient very verbal that she thought yesterday was her time to die and upset that we brought her to the ICU.  - Psych consult for medication review and to assess patient   - Continue PTA Citalopram   -  Continue scheduled tylenol  - Start scheduled ketorolac for pain control 30 mg q6hrs for 5 days   - Currently holding xanax 0.25 mg TID, Neurontin 800 mg TID, Lumateperone 42 mg at HS, PRN clonazepam, valium, and dilaudid.      Pulmonary:  # Acute hypoxic and hypercarbic respiratory failure likely 2/2 undiagnosed KENNY, OHS, sedatives, and polypharmacy   VBG on admission showing some hypercapnia that corrected with bipap adjustments. CT PE was ordered; however, unable to complete d/t access issues. Low c/f PE at this time. Bilateral US of LE's negative for DVT. CXR without any acute findings.   - Intermittent BIPAP for when patient sleeps - if patient refusing bipap, use NC   - Supplemental oxygen to keep saturation about 92%  - ABG/VBG as needed  - Avoid sedating medications       Cardiovascular:  # HTN  # Mild tropinemia, likely demand   Patient has remain hemodynamically stable during her somnolent episodes. EKG on admission showed RBBB with T wave abnormality, unchanged from previous EKG. Troponin up to 25 with repeat at 17. . Negative lactic acid.   - Monitor hemodynamic status  - Restart clonidine at 0.1 mg BID  - PRN hydralazine for HTN ordered       GI/Nutrition:  # GERD  # HLD  # Obesity  # Hypoalbuminemia   - Diet: Regular diet     - Nutrition consulted. Appreciate recommendations.   - Continue PTA Prilosec and Lipitor        Renal/Fluids/Electrolytes:  # Hypokalemia  # Hypomagnesia   - Strict I/O, daily weights   - RN managed electrolyte replacement protocol  - Trend bmp, mag and phos  - Remove vargas today      Endocrine:  # DM 2  # Hypothyroidism   TSH 6.71 and free T4 0.76.   - BG q 4 hours  - Sliding scale insulin available   - Recommend follow up with PCP to recheck thyroid studies and for treatment if necessary       ID:  # S/p explantation of L total elbow arthroplasty, d/t recurrent infections and s/p mx I&Ds (last 2/24)   Pt afebrile, w/ slight leukocytosis.  - ID following-  recommendations:  - Continue IV Vanco and Ertapenem 1 g Q24hrs  - Continue Fluconazole PO/ mg Q24hrs  - Culture from OR positive with cutibacterium acnes and mx staph   - UA- bland, BC- NGTD, COVID/RSV/FLU- NEG on admission  - Daily CBC  - CTM fever curve and WBC trend       Hematology:    # Leukocytosis  # Acute on chronic anemia  Hgb 8.3 on admission. Drain remains in place with serosanguinous output   - Daily CBC  - Transfuse for hgb < 7 or s/s of bleeding  - Continue PTA iron       Musculoskeletal:  #Explant of TEA and placement of antibiotic spacer on 4/10   - Per ortho 4/14:   - Activity: Up with assist.  - Weight bearing status: Minimize use of LUE  - PT/OT eval and treat     Skin:  # Post op incisions and drains   - Keep splint c/d/I until 2 week follow up  - Drain: Document output per shift, discontinue at discretion of orthopedics       General Cares/Prophylaxis:    DVT Prophylaxis: Defer to primary- per ortho aspirin 162 qd and mechanical while in the hospital, discharge on aspirin 162 x 4 weeks   GI Prophylaxis: PTA Prilosec   Restraints: NA  Family Communication: Patient updated   Code Status: DNR/DNI    Per ortho note from today: The patient would like to be DNR - full DNR, without any aides, supplementations, or interventions. She is fine with nasal cannula oxygen supplementation, but under no circumstances should she receive oxygen mask, BiPap, or anything other than nasal cannula oxygen supplementation.     Lines/tubes/drains:  - PIVs  - Galvan- remove today  - BAILEY drain     Disposition:  - Transfer back to medicine     Patient seen and findings/plan discussed with medical ICU staff, Dr. Mueller.    Critical Care time: 50 min     SAI Jeffers CNP    Clinically Significant Risk Factors        # Hypokalemia: Lowest K = 3.3 mmol/L in last 2 days, will replace as needed     # Hypomagnesemia: Lowest Mg = 1.6 mg/dL in last 2 days, will replace as needed   # Hypoalbuminemia: Lowest albumin = 2.9  "g/dL at 4/12/2024  5:17 PM, will monitor as appropriate     # Hypertension: Noted on problem list       # DMII: A1C = 7.4 % (Ref range: <5.7 %) within past 6 months   # Obesity: Estimated body mass index is 34.35 kg/m  as calculated from the following:    Height as of this encounter: 1.753 m (5' 9\").    Weight as of this encounter: 105.5 kg (232 lb 9.4 oz)., PRESENT ON ADMISSION     # Financial/Environmental Concerns: none            ====================================  INTERVAL HISTORY:   Patient intermittently somnolent t/o the night. However, patient has woken up this morning and is alert and oriented. She is very addiment that she does not want any further life sustaining care. Ortho discussed with patient and made her a DNR/DNI. Will transfer back to med/surg today.     OBJECTIVE:   1. VITAL SIGNS:   Temp:  [97  F (36.1  C)-98  F (36.7  C)] 98  F (36.7  C)  Pulse:  [50-80] 77  Resp:  [7-22] 13  BP: (103-195)/(54-98) 166/78  FiO2 (%):  [35 %] 35 %  SpO2:  [77 %-100 %] 95 %  FiO2 (%): 35 %  Resp: 13    2. INTAKE/ OUTPUT:   I/O last 3 completed shifts:  In: 650 [I.V.:650]  Out: 1605 [Urine:1365; Drains:240]    3. PHYSICAL EXAMINATION:  General: Awake, laying in bed, in no acute distress   HEENT: Normocephalic, nodule to forehead, decay t/o teeth, moist mucous membranes  Neuro:  A/Ox4, no focal deficits, following commands   Pulm/Resp: Clear breath sounds bilaterally without rhonchi, crackles or wheeze, breathing non-labored on RA   CV: RRR, no murmur or rub   Abdomen: Soft, non-distended, non-tender; normoactive BS  : Galvan catheter in place, urine yellow and clear  Incisions/Skin: Pale, warm, dry; left arm in sling and splint, unable to visualize incision     4. LABS:   Arterial Blood Gases   Recent Labs   Lab 04/13/24  2110 04/13/24  1504   PH 7.38 7.35   PCO2 46* 49*   PO2 127* 65*   HCO3 27 27     Complete Blood Count   Recent Labs   Lab 04/14/24  0257 04/13/24  1442 04/12/24  0541 04/11/24  1635 " 04/11/24  0030   WBC 8.8 12.1*  --   --  10.5   HGB 7.3* 8.3* 8.4* 9.1* 10.1*    270  --   --  324     Basic Metabolic Panel  Recent Labs   Lab 04/14/24  0756 04/14/24  0406 04/14/24  0257 04/14/24  0002 04/13/24  1445 04/13/24  1442 04/13/24  0827 04/13/24  0615 04/12/24  1821 04/12/24  1717 04/11/24  1731 04/11/24  1635 04/11/24  0235 04/11/24  0030   NA  --   --  136  --   --  137  --   --   --   --   --  136  --  140   POTASSIUM  --   --  3.6  3.6  --   --  3.3*  --   --   --   --   --  3.7  --  3.6   CHLORIDE  --   --  104  --   --  102  --   --   --   --   --  103  --  104   CO2  --   --  25  --   --  26  --   --   --   --   --  19*  --  22   BUN  --   --  13.4  --   --  19.3  --   --   --   --   --  11.1  --  8.0   CR  --   --  0.72  --   --  0.94  --  0.96*  --  1.11*  --  1.03*  --  0.98*   * 120* 123* 128*   < > 154*   < >  --    < >  --    < > 161*   < > 160*    < > = values in this interval not displayed.     Liver Function Tests  Recent Labs   Lab 04/13/24  1442 04/12/24  1717   AST 20 20   ALT <5 <5   ALKPHOS 120 122   BILITOTAL 0.9 <0.2   ALBUMIN 3.1* 2.9*     Coagulation Profile  No lab results found in last 7 days.    5. RADIOLOGY:   Recent Results (from the past 24 hour(s))   CT Head w/o Contrast    Narrative    EXAM: CT HEAD W/O CONTRAST  4/13/2024 3:35 PM     HISTORY: Altered mental status       COMPARISON:     TECHNIQUE: Using multidetector thin collimation helical acquisition  technique, axial, coronal and sagittal CT images from the skull base  to the vertex were obtained without intravenous contrast.   (topogram) image(s) also obtained and reviewed.    FINDINGS:  No acute intracranial hemorrhage, mass effect, or midline shift. No  acute loss of gray-white matter differentiation in the cerebral  hemispheres. Ventricles are proportionate to the cerebral sulci. Clear  basal cisterns.    The bony calvaria and the bones of the skull base are normal. The  visualized portions of  the paranasal sinuses and mastoid air cells are  clear. Grossly normal orbits.       Impression    IMPRESSION: No acute intracranial pathology.    I have personally reviewed the examination and initial interpretation  and I agree with the findings.    KARMA MEDINA MD         SYSTEM ID:  [id number]   XR Chest Port 1 View    Narrative    Portable chest    INDICATION: Altered mental status    COMPARISON: None    FINDINGS: Heart size normal. Multiple rib plating septal left.  Thoracolumbar spinal rodding. No suspicious opacities or abnormal air  collections in the chest. Mild atherosclerotic calcification at the  aortic knob. Heart size upper normal.      Impression    IMPRESSION: Multiple previous plate settings left rib fractures with  thoracolumbar spinal rodding. Borderline cardiomegaly. Aortic  atherosclerosis. Otherwise negative.    MANUEL ROCHA MD         SYSTEM ID:  [id number]   US Lower Extremity Venous Duplex Bilateral    Narrative    EXAMINATION: DOPPLER VENOUS ULTRASOUND OF BILATERAL LOWER EXTREMITIES,  4/13/2024 6:45 PM     COMPARISON: None.    HISTORY: Swelling    TECHNIQUE:  Gray-scale evaluation with compression, spectral flow and  color Doppler assessment of the deep venous system of both legs from  groin to knee, and then at the ankles.    FINDINGS:  In both lower extremities, the common femoral, femoral, popliteal and  posterior tibial veins demonstrate normal compressibility and blood  flow.      Impression    IMPRESSION: No evidence of deep venous thrombosis in either lower  extremity.    I have personally reviewed the examination and initial interpretation  and I agree with the findings.    MANUEL ROCHA MD         SYSTEM ID:  [id number]

## 2024-04-14 NOTE — PLAN OF CARE
ICU End of Shift Summary. See flowsheets for vital signs and detailed assessment.      Major Events this shift: Patient was lethargic and somnolent initially at the beginning of the shift. However, woke up and was able to be reoriented and have conversation. Patient was agitated and was complaining that she shouldn't be up in the ICU because she was sleeping yesterday. Explained to the patient that she was having trouble breathing and unable to arouse when the rapid was called and she needed closer monitoring so she was sent to ICU.     Neuro: A&Ox4, complained of pain and received prn pain medications but she says it was not effective. Providers are hesitant giving her a lot of opioids due to patient's unresponsiveness yesterday. They did however schedule her tylenol.   CV: Hypertensive but restarted her clonidine at 0.1mg, SR  PU: RA  GI:  Regular diet, she has had BM this shift.   : Galvan removed this morning and patient is voiding spontaneously   Skin: R upper arm bruising remains in place, doing BPs on RLL to let arm heal.     Lines: R PIVx2     Drip/drian: BAILEY on L arm- serosanguineous output     Plan: Downgrade to med/surg, transfer to 5 Med/Surg and continue current POC    Goal Outcome Evaluation:      Plan of Care Reviewed With: patient

## 2024-04-14 NOTE — PROGRESS NOTES
We received a medicine consult at 1:40PM today from the ICU for the [facility] team to take back over as primary for Mrs. Robles. She was sent to the ICU from [place] [id number] on 4/13/24 and will continue care with Dr. Falcon of [place] [id number] ongoing.     VERONICA Boyce

## 2024-04-15 LAB
ATRIAL RATE - MUSE: 62 BPM
ATRIAL RATE - MUSE: 63 BPM
ATRIAL RATE - MUSE: 94 BPM
BACTERIA SNV CULT: NO GROWTH
BACTERIA TISS BX CULT: ABNORMAL
BACTERIA TISS BX CULT: NO GROWTH
DIASTOLIC BLOOD PRESSURE - MUSE: NORMAL MMHG
GLUCOSE BLDC GLUCOMTR-MCNC: 145 MG/DL (ref 70–99)
GLUCOSE BLDC GLUCOMTR-MCNC: 147 MG/DL (ref 70–99)
GLUCOSE BLDC GLUCOMTR-MCNC: 154 MG/DL (ref 70–99)
GLUCOSE BLDC GLUCOMTR-MCNC: 171 MG/DL (ref 70–99)
GLUCOSE BLDC GLUCOMTR-MCNC: 180 MG/DL (ref 70–99)
GLUCOSE BLDC GLUCOMTR-MCNC: 185 MG/DL (ref 70–99)
INTERPRETATION ECG - MUSE: NORMAL
P AXIS - MUSE: 87 DEGREES
P AXIS - MUSE: 92 DEGREES
P AXIS - MUSE: 92 DEGREES
PR INTERVAL - MUSE: 172 MS
PR INTERVAL - MUSE: 176 MS
PR INTERVAL - MUSE: 180 MS
QRS DURATION - MUSE: 132 MS
QRS DURATION - MUSE: 138 MS
QRS DURATION - MUSE: 142 MS
QT - MUSE: 408 MS
QT - MUSE: 496 MS
QT - MUSE: 502 MS
QTC - MUSE: 507 MS
QTC - MUSE: 509 MS
QTC - MUSE: 510 MS
R AXIS - MUSE: 20 DEGREES
R AXIS - MUSE: 27 DEGREES
R AXIS - MUSE: 47 DEGREES
SYSTOLIC BLOOD PRESSURE - MUSE: NORMAL MMHG
T AXIS - MUSE: 44 DEGREES
T AXIS - MUSE: 68 DEGREES
T AXIS - MUSE: 92 DEGREES
VENTRICULAR RATE- MUSE: 62 BPM
VENTRICULAR RATE- MUSE: 63 BPM
VENTRICULAR RATE- MUSE: 94 BPM

## 2024-04-15 PROCEDURE — 250N000013 HC RX MED GY IP 250 OP 250 PS 637: Performed by: STUDENT IN AN ORGANIZED HEALTH CARE EDUCATION/TRAINING PROGRAM

## 2024-04-15 PROCEDURE — 250N000013 HC RX MED GY IP 250 OP 250 PS 637

## 2024-04-15 PROCEDURE — 999N000127 HC STATISTIC PERIPHERAL IV START W US GUIDANCE

## 2024-04-15 PROCEDURE — 99232 SBSQ HOSP IP/OBS MODERATE 35: CPT | Performed by: STUDENT IN AN ORGANIZED HEALTH CARE EDUCATION/TRAINING PROGRAM

## 2024-04-15 PROCEDURE — 250N000011 HC RX IP 250 OP 636: Performed by: STUDENT IN AN ORGANIZED HEALTH CARE EDUCATION/TRAINING PROGRAM

## 2024-04-15 PROCEDURE — 250N000011 HC RX IP 250 OP 636

## 2024-04-15 PROCEDURE — 258N000003 HC RX IP 258 OP 636: Performed by: STUDENT IN AN ORGANIZED HEALTH CARE EDUCATION/TRAINING PROGRAM

## 2024-04-15 PROCEDURE — 250N000011 HC RX IP 250 OP 636: Mod: JZ | Performed by: STUDENT IN AN ORGANIZED HEALTH CARE EDUCATION/TRAINING PROGRAM

## 2024-04-15 PROCEDURE — 250N000013 HC RX MED GY IP 250 OP 250 PS 637: Performed by: INTERNAL MEDICINE

## 2024-04-15 PROCEDURE — 99232 SBSQ HOSP IP/OBS MODERATE 35: CPT | Performed by: INTERNAL MEDICINE

## 2024-04-15 PROCEDURE — 99223 1ST HOSP IP/OBS HIGH 75: CPT

## 2024-04-15 PROCEDURE — 99222 1ST HOSP IP/OBS MODERATE 55: CPT | Performed by: ANESTHESIOLOGY

## 2024-04-15 PROCEDURE — 120N000002 HC R&B MED SURG/OB UMMC

## 2024-04-15 PROCEDURE — 999N000007 HC SITE CHECK

## 2024-04-15 RX ORDER — CLONIDINE HYDROCHLORIDE 0.1 MG/1
0.1 TABLET ORAL 3 TIMES DAILY
Status: DISCONTINUED | OUTPATIENT
Start: 2024-04-15 | End: 2024-04-18

## 2024-04-15 RX ORDER — HYDROMORPHONE HCL IN WATER/PF 6 MG/30 ML
.1-.2 PATIENT CONTROLLED ANALGESIA SYRINGE INTRAVENOUS DAILY PRN
Status: DISCONTINUED | OUTPATIENT
Start: 2024-04-15 | End: 2024-04-22 | Stop reason: HOSPADM

## 2024-04-15 RX ORDER — DIAZEPAM 5 MG
5 TABLET ORAL AT BEDTIME
Status: DISCONTINUED | OUTPATIENT
Start: 2024-04-15 | End: 2024-04-22 | Stop reason: HOSPADM

## 2024-04-15 RX ORDER — OXYCODONE HYDROCHLORIDE 5 MG/1
5-10 TABLET ORAL EVERY 6 HOURS PRN
Status: DISCONTINUED | OUTPATIENT
Start: 2024-04-15 | End: 2024-04-22 | Stop reason: HOSPADM

## 2024-04-15 RX ORDER — NYSTATIN 100000 U/G
OINTMENT TOPICAL 2 TIMES DAILY
Status: DISCONTINUED | OUTPATIENT
Start: 2024-04-15 | End: 2024-04-22 | Stop reason: HOSPADM

## 2024-04-15 RX ORDER — GABAPENTIN 800 MG/1
800 TABLET ORAL 3 TIMES DAILY
Status: DISCONTINUED | OUTPATIENT
Start: 2024-04-15 | End: 2024-04-22 | Stop reason: HOSPADM

## 2024-04-15 RX ORDER — FLUMAZENIL 0.1 MG/ML
0.2 INJECTION, SOLUTION INTRAVENOUS DAILY PRN
Status: DISCONTINUED | OUTPATIENT
Start: 2024-04-15 | End: 2024-04-22 | Stop reason: HOSPADM

## 2024-04-15 RX ADMIN — OXYCODONE HYDROCHLORIDE 10 MG: 5 TABLET ORAL at 12:24

## 2024-04-15 RX ADMIN — ACETAMINOPHEN 975 MG: 325 TABLET, FILM COATED ORAL at 12:24

## 2024-04-15 RX ADMIN — GABAPENTIN 800 MG: 800 TABLET, FILM COATED ORAL at 16:30

## 2024-04-15 RX ADMIN — OXYCODONE HYDROCHLORIDE 10 MG: 5 TABLET ORAL at 20:15

## 2024-04-15 RX ADMIN — FLUCONAZOLE 400 MG: 200 TABLET ORAL at 10:14

## 2024-04-15 RX ADMIN — CLONIDINE HYDROCHLORIDE 0.1 MG: 0.1 TABLET ORAL at 20:03

## 2024-04-15 RX ADMIN — CLONIDINE HYDROCHLORIDE 0.1 MG: 0.1 TABLET ORAL at 14:09

## 2024-04-15 RX ADMIN — OMEPRAZOLE 20 MG: 20 CAPSULE, DELAYED RELEASE ORAL at 08:07

## 2024-04-15 RX ADMIN — CLONIDINE HYDROCHLORIDE 0.1 MG: 0.1 TABLET ORAL at 08:07

## 2024-04-15 RX ADMIN — KETOROLAC TROMETHAMINE 30 MG: 30 INJECTION, SOLUTION INTRAMUSCULAR at 12:24

## 2024-04-15 RX ADMIN — ERTAPENEM SODIUM 1 G: 1 INJECTION, POWDER, LYOPHILIZED, FOR SOLUTION INTRAMUSCULAR; INTRAVENOUS at 17:38

## 2024-04-15 RX ADMIN — ATORVASTATIN CALCIUM 10 MG: 10 TABLET, FILM COATED ORAL at 20:03

## 2024-04-15 RX ADMIN — HYDROMORPHONE HYDROCHLORIDE 0.2 MG: 0.2 INJECTION, SOLUTION INTRAMUSCULAR; INTRAVENOUS; SUBCUTANEOUS at 14:16

## 2024-04-15 RX ADMIN — ASPIRIN 162 MG: 81 TABLET, COATED ORAL at 08:07

## 2024-04-15 RX ADMIN — CITALOPRAM 20 MG: 20 TABLET ORAL at 21:54

## 2024-04-15 RX ADMIN — DIAZEPAM 5 MG: 5 TABLET ORAL at 21:54

## 2024-04-15 RX ADMIN — ACETAMINOPHEN 975 MG: 325 TABLET, FILM COATED ORAL at 20:03

## 2024-04-15 RX ADMIN — HYDRALAZINE HYDROCHLORIDE 10 MG: 20 INJECTION INTRAMUSCULAR; INTRAVENOUS at 10:15

## 2024-04-15 RX ADMIN — LUMATEPERONE 42 MG: 42 CAPSULE ORAL at 22:13

## 2024-04-15 RX ADMIN — KETOROLAC TROMETHAMINE 30 MG: 30 INJECTION, SOLUTION INTRAMUSCULAR at 18:36

## 2024-04-15 RX ADMIN — VANCOMYCIN HYDROCHLORIDE 1750 MG: 10 INJECTION, POWDER, LYOPHILIZED, FOR SOLUTION INTRAVENOUS at 18:37

## 2024-04-15 ASSESSMENT — ACTIVITIES OF DAILY LIVING (ADL)
ADLS_ACUITY_SCORE: 44
ADLS_ACUITY_SCORE: 40
ADLS_ACUITY_SCORE: 44
ADLS_ACUITY_SCORE: 40
ADLS_ACUITY_SCORE: 44
ADLS_ACUITY_SCORE: 40
ADLS_ACUITY_SCORE: 44
ADLS_ACUITY_SCORE: 44
ADLS_ACUITY_SCORE: 40
ADLS_ACUITY_SCORE: 44
ADLS_ACUITY_SCORE: 40
ADLS_ACUITY_SCORE: 44
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
## 2024-04-15 NOTE — CONSULTS
Pain Service Consultation Note  [facility]      Patient Name: Mitali Robles  MRN: [id number]   Age: 59 year old  Sex: female  Date: April 15, 2024                                      Reviewed: Yes    Referring Service:  Ortho  Reason for Consultation: Post operative pain    Assessment/Recommendations:  59 year old female admitted on 4/10/2024. She has hx of bipolar disorder, PTSD, MDD, on multiple high dose benzodiazepines, hyperlipidemia, hypertension, and type 2 diabetes on insulin with recurrent left elbow prosthetic joint infection s/p explantation of L total elbow arthroplasty admitted to ortho post op.   Over the weekend had 2 RRT called on her where she was given narcan.    Long history of PTSD currently managed by Dr. Nilo Butler from [facility] in [place].     Patient currently in 15/10 pain in her arm and back. Stating she needs to be restarted on her benzo medications and talking about suicide.    Patient stated she had been on methadone in the past for a month but is no longer taking any opioids. PDMP is negative for [place] but does not cover [place].     Plan:   Increase her oxycodone to 5-10mg q6  Hydromorphone 0.1-0.2mg for breakthrough once a day  Encourage oral use before any IV  Keep on a pulse ox or capnography   Restart Gabapentin  Schedule acetaminophen   Continue to hold robaxin  Consider pysch and addiction consults.   Possibly discuss benzos with her primary    Thank you for the opportunity to participate in the care of Mitali Robles  Pain Service will continue to follow.    Primary Service Contacted with Recommendations? Yes    Jacuqes Velasquez, DO  4/15/2024      Chief Complaint:  Back and left arm pain      History of Present Illness:  Mitali Robles is a 59 year old female.  The pain is reported to be acute on chronic, located in thebacka nd left arm.  Current pain is rated at 15/10 and goal is 8/10. The patient is with  "chronic pain. The patient has a unknown opioid tolerance. Opioid induced side effects noted, including sedation.     Pharmacological treatments (in past) have included methadone.      Pain Assessment:   Description of Pain: ache  Location: left arm  Current Pain: 1510  Goal: 8/10  Baseline Pain Level: 8/10  Onset:chronic     Per [place]  review pulled from system on 04/15/24.       Past Medical History:  Past Medical History:   Diagnosis Date    Back injury     Depressive disorder     Hearing problem     Hyperlipidemia     Hypertension     Neck injuries     Stomach ulcer          Family History:    Family History   Problem Relation Age of Onset    Anxiety Disorder Sister     Hypertension Sister     Hyperlipidemia Sister        Social History:  Social History     Tobacco Use    Smoking status: Former     Current packs/day: 0.00     Average packs/day: 1.5 packs/day for 40.2 years (60.3 ttl pk-yrs)     Types: Cigarettes     Start date: 1983     Quit date: 2023     Years since quittin.0    Smokeless tobacco: Never   Substance Use Topics    Alcohol use: No             Review of Systems:  Complete ROS reviewed. Unless otherwise noted, all other systems found to be negative.        Laboratory Results:  Recent Labs   Lab Test 24  0257      BUN 13.4       Allergies:  Allergies   Allergen Reactions    Morphine Anaphylaxis     Throat swells shut  **Has taken hydrocodone/APAP and hydromorphone in the past during previous hospitalizations with no issues.  Takes oxycodone at home    Succinylcholine Shortness Of Breath     \"it affected my breathing\"    Fentanyl Nausea and Vomiting    Hydromorphone      Received 4/10 in OR without issue (was listed as allergy but has had in past without incident)    Methadone Nausea and Vomiting    Prednisone Swelling     \"it overrides my psych meds and makes me crazy\"    Pregabalin     Quetiapine     Trazodone     Sumatriptan Rash         Current Pain Related " Medications:  Medications related to Pain Management (From now, onward)      Start     Dose/Rate Route Frequency Ordered Stop    04/14/24 1230  acetaminophen (TYLENOL) tablet 975 mg         975 mg Oral EVERY 8 HOURS 04/14/24 1206      04/14/24 1215  oxyCODONE IR (ROXICODONE) half-tab 2.5 mg         2.5 mg Oral EVERY 6 HOURS PRN 04/14/24 1216      04/14/24 1200  ketorolac (TORADOL) injection 30 mg         30 mg Intravenous EVERY 6 HOURS 04/14/24 1132 04/19/24 1129    04/13/24 2000  polyethylene glycol (MIRALAX) Packet 17 g         17 g Oral 2 TIMES DAILY 04/13/24 1536      04/13/24 1534  bisacodyl (DULCOLAX) suppository 10 mg         10 mg Rectal DAILY PRN 04/13/24 1536      04/13/24 1445  lidocaine 1 % 0.1-1 mL         0.1-1 mL Other EVERY 1 HOUR PRN 04/13/24 1446      04/13/24 1445  lidocaine (LMX4) cream          Topical EVERY 1 HOUR PRN 04/13/24 1446      04/13/24 0000  bisacodyl (DULCOLAX) suppository 10 mg         10 mg Rectal DAILY PRN 04/10/24 2300      04/13/24 0000  acetaminophen (TYLENOL) tablet 650 mg         650 mg Oral EVERY 4 HOURS PRN 04/10/24 2300      04/13/24 0000  acetaminophen (TYLENOL) 325 MG tablet         650 mg Oral EVERY 4 HOURS PRN 04/12/24 1427      04/12/24 0000  magnesium hydroxide (MILK OF MAGNESIA) suspension 30 mL         30 mL Oral DAILY PRN 04/10/24 2300      04/12/24 0000  methocarbamol (ROBAXIN) 500 MG tablet         500 mg Oral EVERY 6 HOURS PRN 04/12/24 1427      04/12/24 0000  polyethylene glycol (MIRALAX) 17 GM/Dose powder         17 g Oral DAILY 04/12/24 1427      04/12/24 0000  aspirin 81 MG EC tablet         81 mg Oral 2 TIMES DAILY 04/12/24 1427      04/11/24 1800  [Held by provider]  clonazePAM (klonoPIN) tablet 2 mg        (On hold since Sat 4/13/2024 at 1525 until manually unheld; held by Rita Loja APRN CNPHold Reason: Other)    2 mg Oral AT BEDTIME PRN 04/10/24 2300      04/11/24 1800  [Held by provider]  diazepam (VALIUM) tablet 10 mg        (On hold since Sat  "4/13/2024 at 1525 until manually unheld; held by Rita Loja APRN CNPHold Reason: Other)   Note to Pharmacy: PTA Sig:Take 30 mg by mouth at bedtime      10 mg Oral AT BEDTIME PRN 04/10/24 2300      04/11/24 1200  [Held by provider]  gabapentin (NEURONTIN) tablet 800 mg        (On hold since Sat 4/13/2024 at 1525 until manually unheld; held by Rita Loja APRN CNPHold Reason: Other)   Note to Pharmacy: PTA Sig:Take 600 mg by mouth 3 times daily      800 mg Oral 3 TIMES DAILY 04/11/24 0009      04/11/24 0800  [Held by provider]  ALPRAZolam (XANAX) tablet 0.25 mg        (On hold since Sat 4/13/2024 at 1525 until manually unheld; held by Rita Loja APRN CNPHold Reason: Other)   Note to Pharmacy: PTA Sig:Take 0.25 mg by mouth 3 times daily      0.25 mg Oral 3 TIMES DAILY 04/10/24 2300      04/11/24 0800  senna-docusate (SENOKOT-S/PERICOLACE) 8.6-50 MG per tablet 1 tablet         1 tablet Oral 2 TIMES DAILY 04/11/24 0008      04/11/24 0800  aspirin EC tablet 162 mg         162 mg Oral DAILY 04/10/24 2300      04/11/24 0552  [Held by provider]  HYDROmorphone (DILAUDID) injection 0.2-0.4 mg        (On hold since Sat 4/13/2024 at 1525 until manually unheld; held by Rita Loja APRN CNPHold Reason: Other)   Note to Pharmacy: Discussed w/ chief resident Diana Pleitez.    0.2-0.4 mg Intravenous EVERY 3 HOURS PRN 04/11/24 0554      04/10/24 2300  [Held by provider]  methocarbamol (ROBAXIN) tablet 500 mg        (On hold since Sat 4/13/2024 at 1525 until manually unheld; held by Rita Loja APRN CNPHold Reason: Other)    500 mg Oral EVERY 6 HOURS PRN 04/10/24 2300                Physical Exam:  Vitals: BP (!) 180/79 (BP Location: Right leg)   Pulse 92   Temp 97.9  F (36.6  C) (Oral)   Resp 18   Ht 1.753 m (5' 9\")   Wt 105.5 kg (232 lb 9.4 oz)   SpO2 91%   BMI 34.35 kg/m      Physical Exam:     CONSTITUTIONAL/GENERAL APPEARANCE:  NAD  EYES: EOMI, sclera anicteric,   ENT/NECK: atraumatic, lips and " "oral mucous membranes dry  RESPIRATORY: non-labored breathing. No cough, wheeze  CV: RRR, no audible rubs, gallops, or murmurs  ABDOMEN: non-distended  MUSCULOSKELETAL/BACK/SPINE/EXTREMITIES: Moves all extremities purposefully.  No edema or obvious joint deformities   NEURO: Alert and Oriented x3. Answers questions appropriately  SKIN/VASCULAR EXAM:  No jaundice, no visible rashes or lesions      Please see A&P for additional details of medical decision making.      Acute Inpatient Pain Service [facility]  Hours of pain coverage 24/7   Page via Amcom- Please Page the Pain Team Via Amcom: \"PAIN MANAGEMENT ACUTE INPATIENT/ [facility] [place]\"            "

## 2024-04-15 NOTE — PROGRESS NOTES
Brief cross cover note    Please note that patient is a gold 16 patient encouraged to go to the respective provider    RN notified that patient is experiencing markedly elevated blood pressure in the setting of pain and was taken off opioids due to significant sedation.  She was recently transferred out from ICU    Hypertensive urgency  Occurs in the setting of significant pain response.  Patient is receiving multiple antihypertensives and is about to receive clonidine  -Consider increasing clonidine to 3 times daily given potential for rebound effect  -Gabapentin 400 mg x 1 as patient is currently alert (was taking 800 mg per dose previously)  -Twelve-lead EKG  -Troponin  -Repeat blood pressure now and approximately 45 to 60 minutes after medication administration  -Patient has as needed hydralazine for refractory hypertension which may be needed    Ana Choudhary PA-C  [facility]  Contact information available via [facility] Paging/Directory

## 2024-04-15 NOTE — PROGRESS NOTES
[facility]    Medicine Progress Note - Hospitalist Service, GOLD TEAM 16    Date of Admission:  4/10/2024    Assessment & Plan      Mitali Robles is a 59 year old female admitted on 4/10/2024. She has hx of bipolar disorder, PTSD, MDD, on high dose benzodiazepines, hyperlipidemia, hypertension, and type 2 diabetes on insulin with recurrent left elbow prosthetic joint infection s/p explantation of L total elbow arthroplasty admitted to ortho post op.    New today   -Transferred out of the ICU 4/14/2024 for hypercapnic respiratory failure requiring BiPAP  -Psych consultation pending regarding psychiatry meds  -Recommend pain management consultation        L elbow prosthetic joint infection s/p explantation of L total elbow arthroplasty  Reported EBL 600ml per OR note. Per outside ID note, s/p 7 weeks of IV daptomycin, ertapnem and fluconazole. currently ordered cefazolin 2g IV 8h per ortho.   - post op wound care and pain control per ortho  -Monitor routine labs   - follow up cultures  - consider ID consultation   -Primary team is planning per below  Activity: Up with assist.  Weight bearing status: Minimize use of LUE  Antibiotics: Per ID recs:  Recommendations:  - Stop Cefazolin   - Start IV Vancomycin (pharmacy to dose)   - Start IV Ertapenem 1g Q24H   - Start Fluconazole PO/IV 400mg Q24H   - Will follow intraoperative tissue cultures   Diet: Begin with clear fluids and progress diet as tolerated.   DVT prophylaxis: aspirin 162 qd and mechanical while in the hospital, discharge on aspirin 162 x 4 weeks.  Bracing/Splinting: LUE posterior slab splint to be kept clean, dry, and intact until follow-up.   Elevation: Elevate LUE on pillows to keep on pillows as much as possible.   Wound Care: Keep splint c/d/I until 2 week follow up.  Drains: Document output per shift, discontinue at discretion of orthopedics  Pain management: transition from IV to orals as tolerated.     X-rays:  POD1 left elbow XR (AP and lateral)  Physical Therapy:  Eval and treat  Occupational Therapy:   Labs: Trend Hgb on POD #1, 2, then PRN  Cultures: Pending, follow culture results closely.   Consults: PT, OT. Medicine, Infectious disease.  appreciate assistance in caring for this patient.  May need pain team as has many narcotic allergies listed  Follow-up: Clinic with Dr. Sarmiento's team in 2 weeks for wound check with left elbow xrays     Chest pain-result  Diaphoresis-resolved  Tachycardia-resolved  -- Likely as a result of exertion, was self-limiting.  -- On examination right-sided chest pain was reproducible on exam.  -- Vitals self resolved.  -- Obtain a twelve-lead EKG.  Monitor for continued symptoms (normal sinus, right bundle branch block)  -- Some subsided    Somnolence   Hypercapnic respiratory failure  CO2 narcosis  Patient became acutely altered less responsive on 4/13/2024, multiple rapids called.  ICU team and myself responded.  Multiple rounds of Narcan, patient momentarily improved however ultimately became too unstable for the floor and was transferred to the ICU.  In the ICU patient was placed on BiPAP.  Pain medication was discontinued.  Ultimately transferred back to the medicine team on 4/14/2024.  While in the ICU CODE STATUS was addressed.  Patient is now DNR as she discussed this with multiple teams including ICU and primary service orthopedic surgery.    -BiPAP overnight (if she is agreeable)  -Refer to outpatient pulmonary medicine on discharge  -Monitor for lethargy, somnolence     Complex psychiatric history including bipolar disorder, PTSD, MDD, on high dose benzodiazepines  PTA meds include valium 30mg HS, xanax 1mg HS prn, clonazepam 2mg BID prn, celexa 20mg hs, and lumateperone 84mg hs.  checked, and last filled xanax 2mg 30 tabs, valiume 10mg 90 tabs and clonazepam 2mg 90 tabs on 2/12/24. Currently ordered xanax 0.25mg TID per ortho.   -Postop mentation improved now on the  "floor.  -Previously sedated post op and was on precedex, received ativan intra-op,  -Currently on Xanax 0.25 mg 3 times daily   - Possibly need adjustment to prevent withdrawal (no signs of withdrawal currently)  - recommend psych consult     Type 2 DM  A1c 7.4% on 2/12. On tresiba hs and aspart  - sliding scale insulin for now  - resume tresiba in am  -Blood glucose in acceptable limits  Recent Labs   Lab 04/15/24  1021 04/15/24  0814 04/14/24  2210 04/14/24  1724 04/14/24  1148 04/14/24  0756   * 154* 140* 130* 139* 147*          Hypertension  On Clonidine 1mg HS. Currently BP soft post op  -Now significantly elevated above 180 systolic, and 1 instance above 200s.  -Likely related to ongoing pain  -Continue clonidine. (Increased 2.1 mg 3 times daily)  -As needed hydralazine  - Address underlying pain.  - Add IV hydralazine for SBP greater than 180       Neuropathic pain  - PTA gabapentin 800mg TID-->     Hyperlipidemia  - continue atorvastatin              Diet: Diet  Regular Diet Adult    DVT Prophylaxis: ASA per primary team  Galvan Catheter: Not present  Lines: None     Cardiac Monitoring: ACTIVE order. Indication: ICU  Code Status: No CPR- Do NOT Intubate      Clinically Significant Risk Factors        # Hypokalemia: Lowest K = 3.3 mmol/L in last 2 days, will replace as needed     # Hypomagnesemia: Lowest Mg = 1.6 mg/dL in last 2 days, will replace as needed   # Hypoalbuminemia: Lowest albumin = 2.9 g/dL at 4/12/2024  5:17 PM, will monitor as appropriate     # Hypertension: Noted on problem list       # DMII: A1C = 7.4 % (Ref range: <5.7 %) within past 6 months   # Obesity: Estimated body mass index is 34.35 kg/m  as calculated from the following:    Height as of this encounter: 1.753 m (5' 9\").    Weight as of this encounter: 105.5 kg (232 lb 9.4 oz).      # Financial/Environmental Concerns: none         Disposition Plan     Medically Ready for Discharge: Anticipated in 2-4 Days             Mustaf M. " MD E[place]  Hospitalist Service, GOLD TEAM 16  [facility]  Securely message with [employer] (more info)  Text page via [employer] Paging/Directory   See signed in provider for up to date coverage information  ______________________________________________________________________    Interval History   Patient seen and examined at bedside no acute distress.  Patient complains about worsening pain in her left arm, states she is unable to move her arm.  Denies any headache lightheadedness or dizziness.    Physical Exam   Vital Signs: Temp: 97.9  F (36.6  C) Temp src: Oral BP: (!) 180/81 Pulse: 92   Resp: 18 SpO2: 91 % O2 Device: None (Room air)    Weight: 232 lbs 9.36 oz    General Appearance: Appears comfortable.  Respiratory: Without wheezes rhonchi or rales.  CTA  Cardiovascular: RRR, without murmurs  GI: Soft, nontender, plus BS  Skin: Without obvious bleeding, bruising or excoriations      Medical Decision Making       57 MINUTES SPENT BY ME on the date of service doing chart review, history, exam, documentation & further activities per the note.      Data   ------------------------- PAST 24 HR DATA REVIEWED -----------------------------------------------

## 2024-04-15 NOTE — PLAN OF CARE
"Mitali Robles is a 59 year old female patient.  1. Status post surgery      Past Medical History:   Diagnosis Date    Back injury     Depressive disorder     Hearing problem     Hyperlipidemia     Hypertension     Neck injuries     Stomach ulcer      Current Outpatient Medications   Medication Sig Dispense Refill    acetaminophen (TYLENOL) 325 MG tablet Take 2 tablets (650 mg) by mouth every 4 hours as needed for other (For optimal non-opioid multimodal pain management to improve pain control.) 100 tablet     aspirin 81 MG EC tablet Take 1 tablet (81 mg) by mouth 2 times daily 60 tablet     methocarbamol (ROBAXIN) 500 MG tablet Take 1 tablet (500 mg) by mouth every 6 hours as needed for muscle spasms 20 tablet 0    polyethylene glycol (MIRALAX) 17 GM/Dose powder Take 17 g by mouth daily       Allergies   Allergen Reactions    Morphine Anaphylaxis     Throat swells shut  **Has taken hydrocodone/APAP and hydromorphone in the past during previous hospitalizations with no issues.  Takes oxycodone at home    Succinylcholine Shortness Of Breath     \"it affected my breathing\"    Fentanyl Nausea and Vomiting    Hydromorphone      Received 4/10 in OR without issue (was listed as allergy but has had in past without incident)    Methadone Nausea and Vomiting    Prednisone Swelling     \"it overrides my psych meds and makes me crazy\"    Pregabalin     Quetiapine     Trazodone     Sumatriptan Rash     Active Problems:    Infection of total joint prosthesis (H24)    Blood pressure (!) 211/172, pulse 89, temperature 98.3  F (36.8  C), temperature source Oral, resp. rate 18, height 1.753 m (5' 9\"), weight 105.5 kg (232 lb 9.4 oz), SpO2 100%.    Pain management and blood pressure management during the shift. Over all no acute changes. Bp currently elevated. Other  VS in normal range.      Florida Barlow, RN  4/15/2024        "

## 2024-04-15 NOTE — PROGRESS NOTES
Orthopaedic Surgery Progress Note 04/15/2024    S: Transferred out of ICU yesterday. Stating her pain has not been well controlled, intermittently tearful this AM.  Continues to have severe HTN overnight, medicine assisting.    O:  Temp: 98.3  F (36.8  C) Temp src: Oral BP: (!) 166/65 Pulse: 89   Resp: 18 SpO2: 100 % O2 Device: None (Room air)      Exam:  Gen: No acute distress, resting comfortably in bed.  Resp: Non-labored breathing  MSK:  LUE:  - Splint/Dressings c/d/i  - SILT medial/radial/ulnar/axillary nerves  - Fires EPL, FPL, Intrinsics  - Hand wwp    Drain output:   Output by Drain (mL) 04/13/24 0700 - 04/13/24 1459 04/13/24 1500 - 04/13/24 2259 04/13/24 2300 - 04/14/24 0659 04/14/24 0700 - 04/14/24 1459 04/14/24 1500 - 04/14/24 2259 04/14/24 2300 - 04/15/24 0659 04/15/24 0700 - 04/15/24 0700   Closed/Suction Drain 1 Left Elbow Bulb 15 Upper sorbian 90 110 40 60  60      Recent Labs   Lab 04/14/24  0257 04/13/24  1442 04/12/24  0541 04/11/24  1635 04/11/24  0030   WBC 8.8 12.1*  --   --  10.5   HGB 7.3* 8.3* 8.4*   < > 10.1*    270  --   --  324    < > = values in this interval not displayed.     Recent Labs   Lab 04/14/24  0621 04/13/24  1847 04/10/24  1530 04/10/24  1514 04/10/24  1453 04/10/24  1400 04/10/24  1358   CULTURE No growth after 12 hours No growth after 1 day No growth after 4 days  Culture in progress  1+ Staphylococcus epidermidis*  1+ Staphylococcus capitis*  1+ Cutibacterium (Propionibacterium) acnes* No growth after 4 days  1+ Cutibacterium (Propionibacterium) acnes*  1+ Staphylococcus epidermidis* Culture in progress  Isolated in broth only Staphylococcus epidermidis*  No growth after 4 days  No anaerobic organisms isolated after 4 days No growth after 4 days  No anaerobic organisms isolated after 4 days  No growth after 3 days No growth after 4 days  No anaerobic organisms isolated after 4 days  No growth after 4 days  No growth after 3 days  Culture in progress  1+  Staphylococcus capitis*  Culture in progress  Isolated in broth only Staphylococcus epidermidis*  1+ Cutibacterium (Propionibacterium) acnes*      Assessment/Plan:     Mitali Robles is a 59 year old female with complex PMH including fracture dislocation of the left elbow c/b hardware failure and 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 Sarmiento.     ID recommends continued Vanco, Meropenem, and Fluconazole. Continue to respect DNR wishes.    Will consult pain team today to assist with pain control.    Activity: Up with assist.  Weight bearing status: Minimize use of LUE  Antibiotics: Per ID recs:  Recommendations:  - Stop Cefazolin   - Start IV Vancomycin (pharmacy to dose)   - Start IV Ertapenem 1g Q24H   - Start Fluconazole PO/IV 400mg Q24H   - Will follow intraoperative tissue cultures   Diet: Begin with clear fluids and progress diet as tolerated.   DVT prophylaxis: aspirin 162 qd and mechanical while in the hospital, discharge on aspirin 162 x 4 weeks.  Bracing/Splinting: LUE posterior slab splint to be kept clean, dry, and intact until follow-up.   Elevation: Elevate LUE on pillows to keep on pillows as much as possible.   Wound Care: Keep splint c/d/I until 2 week follow up.  Drains: Document output per shift, discontinue at discretion of orthopedics  Pain management: transition from IV to orals as tolerated.    X-rays:  POD1 left elbow XR (AP and lateral)  Physical Therapy:  Eval and treat  Occupational Therapy:   Labs: Trend Hgb on POD #1, 2, then PRN  Cultures: Pending, follow culture results closely.   Consults: PT, OT. Medicine, Infectious disease.  appreciate assistance in caring for this patient.  May need pain team as has many narcotic allergies listed  Follow-up: Clinic with Dr. Sarmiento's team in 2 weeks for wound check with left elbow xrays     Disposition: Pending progress  with therapies, pain control on orals, and medical stability, anticipate discharge to TCU on POD #4-5.     Richard Dunaway MD  Orthopaedic Surgery, PGY-4  Pager: [phone]

## 2024-04-15 NOTE — PLAN OF CARE
Goal Outcome Evaluation:      Plan of Care Reviewed With: patient    Overall Patient Progress: no change    End of shift Summary: See flowsheet for VS and detail assessments.     Changes this Shift: A&Ox4, VSS x HTN, sats maintained on RA.  Tele NSR. HR WDL. PRN hydralazine given x1. Clonidine changed to TID. BP remains elevated after 1400 dose of clonidine, MD made aware.  Denies chest pain, SOB, cough. RRR.  Voiding well. LBM this shift.  Pivoting to commode, unsteady, SBA.  Bruise to RUE. LUE casted and in sling. Skin otherwise intact.  Pain unmanaged in AM. Accepting scheduled tylenol and Toradol once oxy and dilaudid were added. PRNs given once this shift.  2 PIV infiltrated. 1 RUE PIV placed by vascular access. Fluids TKO and abx.  Refused labs x2, MD aware.  Call light remains within reach, able to make needs known.     Plan: HTN management. Pain management. Cont POC.
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
## 2024-04-15 NOTE — PROGRESS NOTES
Pain Service Consultation Note  [facility]      Patient Name: Mitali Robles  MRN: [id number]   Age: 59 year old  Sex: female  Date: April 15, 2024                                      Reviewed: Yes    Referring Service:  Ortho  Reason for Consultation: Post operative pain    Assessment/Recommendations:  59 year old female admitted on 4/10/2024. She has hx of bipolar disorder, PTSD, MDD, on multiple high dose benzodiazepines, hyperlipidemia, hypertension, and type 2 diabetes on insulin with recurrent left elbow prosthetic joint infection s/p explantation of L total elbow arthroplasty admitted to ortho post op.   Over the weekend had 2 RRT called on her where she was given narcan.    Long history of PTSD currently managed by Dr. Nilo Butler from [facility] in [place].     Patient currently in 15/10 pain in her arm and back. Stating she needs to be restarted on her benzo medications and talking about suicide.    Patient stated she had been on methadone in the past for a month but is no longer taking any opioids. PDMP is negative for [place] but does not cover [place].     Plan:   Increase her oxycodone to 5-10mg q6  Hydromorphone 0.1-0.2mg for breakthrough once a day  Encourage oral use before any IV  Keep on a pulse ox or capnography   Restart Gabapentin  Schedule acetaminophen   Continue to hold robaxin  Consider pysch and addiction consults.   Possibly discuss benzos with her primary    Thank you for the opportunity to participate in the care of Mitali Robles  Pain Service will continue to follow.    Primary Service Contacted with Recommendations? Yes    Jacques Velasquez, DO  4/15/2024      Chief Complaint:  Back and left arm pain      History of Present Illness:  Mitali Robles is a 59 year old female.  The pain is reported to be acute on chronic, located in thebacka nd left arm.  Current pain is rated at 15/10 and goal is 8/10. The patient is with  "chronic pain. The patient has a unknown opioid tolerance. Opioid induced side effects noted, including sedation.     Pharmacological treatments (in past) have included methadone.      Pain Assessment:   Description of Pain: ache  Location: left arm  Current Pain: 1510  Goal: 8/10  Baseline Pain Level: 8/10  Onset:chronic     Per [place]  review pulled from system on 04/15/24.       Past Medical History:  Past Medical History:   Diagnosis Date    Back injury     Depressive disorder     Hearing problem     Hyperlipidemia     Hypertension     Neck injuries     Stomach ulcer          Family History:    Family History   Problem Relation Age of Onset    Anxiety Disorder Sister     Hypertension Sister     Hyperlipidemia Sister        Social History:  Social History     Tobacco Use    Smoking status: Former     Current packs/day: 0.00     Average packs/day: 1.5 packs/day for 40.2 years (60.3 ttl pk-yrs)     Types: Cigarettes     Start date: 1983     Quit date: 2023     Years since quittin.0    Smokeless tobacco: Never   Substance Use Topics    Alcohol use: No             Review of Systems:  Complete ROS reviewed. Unless otherwise noted, all other systems found to be negative.        Laboratory Results:  Recent Labs   Lab Test 24  0257      BUN 13.4       Allergies:  Allergies   Allergen Reactions    Morphine Anaphylaxis     Throat swells shut  **Has taken hydrocodone/APAP and hydromorphone in the past during previous hospitalizations with no issues.  Takes oxycodone at home    Succinylcholine Shortness Of Breath     \"it affected my breathing\"    Fentanyl Nausea and Vomiting    Hydromorphone      Received 4/10 in OR without issue (was listed as allergy but has had in past without incident)    Methadone Nausea and Vomiting    Prednisone Swelling     \"it overrides my psych meds and makes me crazy\"    Pregabalin     Quetiapine     Trazodone     Sumatriptan Rash         Current Pain Related " Medications:  Medications related to Pain Management (From now, onward)      Start     Dose/Rate Route Frequency Ordered Stop    04/14/24 1230  acetaminophen (TYLENOL) tablet 975 mg         975 mg Oral EVERY 8 HOURS 04/14/24 1206      04/14/24 1215  oxyCODONE IR (ROXICODONE) half-tab 2.5 mg         2.5 mg Oral EVERY 6 HOURS PRN 04/14/24 1216      04/14/24 1200  ketorolac (TORADOL) injection 30 mg         30 mg Intravenous EVERY 6 HOURS 04/14/24 1132 04/19/24 1129    04/13/24 2000  polyethylene glycol (MIRALAX) Packet 17 g         17 g Oral 2 TIMES DAILY 04/13/24 1536      04/13/24 1534  bisacodyl (DULCOLAX) suppository 10 mg         10 mg Rectal DAILY PRN 04/13/24 1536      04/13/24 1445  lidocaine 1 % 0.1-1 mL         0.1-1 mL Other EVERY 1 HOUR PRN 04/13/24 1446      04/13/24 1445  lidocaine (LMX4) cream          Topical EVERY 1 HOUR PRN 04/13/24 1446      04/13/24 0000  bisacodyl (DULCOLAX) suppository 10 mg         10 mg Rectal DAILY PRN 04/10/24 2300      04/13/24 0000  acetaminophen (TYLENOL) tablet 650 mg         650 mg Oral EVERY 4 HOURS PRN 04/10/24 2300      04/13/24 0000  acetaminophen (TYLENOL) 325 MG tablet         650 mg Oral EVERY 4 HOURS PRN 04/12/24 1427      04/12/24 0000  magnesium hydroxide (MILK OF MAGNESIA) suspension 30 mL         30 mL Oral DAILY PRN 04/10/24 2300      04/12/24 0000  methocarbamol (ROBAXIN) 500 MG tablet         500 mg Oral EVERY 6 HOURS PRN 04/12/24 1427      04/12/24 0000  polyethylene glycol (MIRALAX) 17 GM/Dose powder         17 g Oral DAILY 04/12/24 1427      04/12/24 0000  aspirin 81 MG EC tablet         81 mg Oral 2 TIMES DAILY 04/12/24 1427      04/11/24 1800  [Held by provider]  clonazePAM (klonoPIN) tablet 2 mg        (On hold since Sat 4/13/2024 at 1525 until manually unheld; held by Rita Loja APRN CNPHold Reason: Other)    2 mg Oral AT BEDTIME PRN 04/10/24 2300      04/11/24 1800  [Held by provider]  diazepam (VALIUM) tablet 10 mg        (On hold since Sat  "4/13/2024 at 1525 until manually unheld; held by Rita Ljoa APRN CNPHold Reason: Other)   Note to Pharmacy: PTA Sig:Take 30 mg by mouth at bedtime      10 mg Oral AT BEDTIME PRN 04/10/24 2300      04/11/24 1200  [Held by provider]  gabapentin (NEURONTIN) tablet 800 mg        (On hold since Sat 4/13/2024 at 1525 until manually unheld; held by Rita Loja APRN CNPHold Reason: Other)   Note to Pharmacy: PTA Sig:Take 600 mg by mouth 3 times daily      800 mg Oral 3 TIMES DAILY 04/11/24 0009      04/11/24 0800  [Held by provider]  ALPRAZolam (XANAX) tablet 0.25 mg        (On hold since Sat 4/13/2024 at 1525 until manually unheld; held by Rita Loja APRN CNPHold Reason: Other)   Note to Pharmacy: PTA Sig:Take 0.25 mg by mouth 3 times daily      0.25 mg Oral 3 TIMES DAILY 04/10/24 2300      04/11/24 0800  senna-docusate (SENOKOT-S/PERICOLACE) 8.6-50 MG per tablet 1 tablet         1 tablet Oral 2 TIMES DAILY 04/11/24 0008      04/11/24 0800  aspirin EC tablet 162 mg         162 mg Oral DAILY 04/10/24 2300      04/11/24 0552  [Held by provider]  HYDROmorphone (DILAUDID) injection 0.2-0.4 mg        (On hold since Sat 4/13/2024 at 1525 until manually unheld; held by Rita Loja APRN CNPHold Reason: Other)   Note to Pharmacy: Discussed w/ chief resident Diana Pleitez.    0.2-0.4 mg Intravenous EVERY 3 HOURS PRN 04/11/24 0554      04/10/24 2300  [Held by provider]  methocarbamol (ROBAXIN) tablet 500 mg        (On hold since Sat 4/13/2024 at 1525 until manually unheld; held by Rita Loja APRN CNPHold Reason: Other)    500 mg Oral EVERY 6 HOURS PRN 04/10/24 2300                Physical Exam:  Vitals: BP (!) 180/79 (BP Location: Right leg)   Pulse 92   Temp 97.9  F (36.6  C) (Oral)   Resp 18   Ht 1.753 m (5' 9\")   Wt 105.5 kg (232 lb 9.4 oz)   SpO2 91%   BMI 34.35 kg/m      Physical Exam:     CONSTITUTIONAL/GENERAL APPEARANCE:  NAD  EYES: EOMI, sclera anicteric,   ENT/NECK: atraumatic, lips and " "oral mucous membranes dry  RESPIRATORY: non-labored breathing. No cough, wheeze  CV: RRR, no audible rubs, gallops, or murmurs  ABDOMEN: non-distended  MUSCULOSKELETAL/BACK/SPINE/EXTREMITIES: Moves all extremities purposefully.  No edema or obvious joint deformities   NEURO: Alert and Oriented x3. Answers questions appropriately  SKIN/VASCULAR EXAM:  No jaundice, no visible rashes or lesions      Please see A&P for additional details of medical decision making.      Acute Inpatient Pain Service [facility]  Hours of pain coverage 24/7   Page via Amcom- Please Page the Pain Team Via Amcom: \"PAIN MANAGEMENT ACUTE INPATIENT/ [facility] [place]\"            "

## 2024-04-15 NOTE — OP NOTE
Patient: Mitali Robles  : 1965  MRN: [id number]    DATE OF OPERATION: April 10, 2024      OPERATIVE REPORT       PREOPERATIVE DIAGNOSIS:  Deep infection left total elbow arthroplasty     POSTOPERATIVE DIAGNOSIS:  Deep infection left total elbow arthroplasty     PROCEDURE:  1) Removal of left total elbow prosthesis with radical debridement and synovectomy, CPT 66060, 22 modifier   2) Resection of achilles tendon allograft from prior triceps tendon reconstruction CPT 80171  3) Ulnar nerve neurolysis CPT 30074  4) Placement of custom articulating antibiotic spacer    22 modifier is indicated due to significantly greater effort and time spent on prosthesis and allograft removal due to severe scarring from multiple prior surgeries. Additional time was also spent on debridement and cement removal due to the severe nature of the infection. The surgery required significantly greater time and effort to remove the prosthesis, fix the ulnar and humeral fractures resulting from removal of the hardware, and fabricate the custom articulating spacer.    SURGEON:  Marcos Sarmiento MD     ASSISTANT(S):  Diana Pleitez MD    ANESTHESIA:  General + Regional      IMPLANTS:   18 gauge stainless steel cerclage wire x5  Steinmann pins x3    ESTIMATED BLOOD LOSS:  600cc    DVT PROPHYLAXIS:  SCDs    TOURNIQUET TIME:  120 minutes, down for 48 minutes, then re-inflated for 120 minutes    SPECIMENS REMOVED:  Cultures of olecranon bursa, triceps allograft, synovial fluid, bone (medial condyle), intra-articular synovium, and humeral canal for aerobic, anaerobic, AFB, fungal    INTRAOPERATIVE FINDINGS:  Draining sinus through middle of prior incision communicating with olecranon bursa, elbow joint, and prosthesis through rent in the medial capsule. Gross purulence and loose flakes of chronically inflamed synovium within the joint. Pseudomembrane around the olecranon bursa and joint. Well-fixed ulnar and humeral components. Prior ulnar nerve  transposition scarred to the outside of the medial capsule.    COMPLICATIONS:  Fracture of the humerus and ulna during explantation of the components.    DISPOSITION:  Stable to PACU.     INDICATIONS:  The patient is a 59 year old female with multiple medical comorbidities who was referred to me for a chronically draining elbow wound in the setting of prior left total elbow arthroplasty. The patient has a complex surgical history of the left elbow including multiple prior surgeries for fracture and instability. She underwent a left total elbow arthroplasty for post-traumatic arthritis 3/2/21 and then triceps tendon reconstruction with achilles allograft 4/27/21. She developed the draining wound about 4-5 months postop. I first saw her in October 2023 and performed an aspiration of the elbow consistent with deep prosthetic joint infection. Unfortunately her diabetes was not well-controlled making any surgery high risk. She was managed with antibiotics by her infectious disease doctor locally while working to correct her HbA1c. She underwent two superficial I&Ds at [place] with recurrence each time of drainage. She returned to see me after getting her HbA1c under control. She had recently been hospitalized for sepsis due to the elbow and continues to have copious drainage from her elbow wound. Given the chronic deep prosthetic joint infection causing systemic illness, she is indicated for surgical intervention.    I had a long discussion in the clinic with the patient regarding surgical options. Debridement, antibiotics, and implant retention is unlikely to be successful given the chronicity and severity of her infection. We therefore discussed explant of the prosthesis, irrigation and debridement, and antibiotic spacer placement. The indications for surgery were discussed with the patient. The benefits, risks, and alternatives of operative management were discussed in detail with the patient. The patient understands  that the risks of surgery include, but are not limited to: persistent infection, bleeding, injury to nearby structures (such as nerves, blood vessels, and tendons), elbow instability, need for additional surgery, pain, stiffness, scarring, need for rehabilitation, and anesthetic complications.  The patient understands that she has significant risk for for postoperative complications  including diabetes, multiple medical comorbidities and a poor social situation.      Patient expressed understanding and elected to proceed with surgery. All questions were answered to the patient's satisfaction.     Consent was obtained for the procedure.     DESCRIPTION OF PROCEDURE IN DETAIL:  The patient was seen in the preoperative care unit. Patient identity, consent, procedure to be performed, and operative site were verified with the patient. The left upper extremity was marked. The regional anesthesia team performed a preoperative block.     The patient was brought to the operating room and placed supine on the operating room table. General anesthesia was induced. Galvan was placed. The patient was then positioned right lateral decubitus on a half body bean bag. All bony prominences were well-padded. The left upper extremity was supported on a radiolucent armboard. SCDs were placed on the bilateral lower extremities. A well-padded tourniquet was placed on the upper arm.     Retained nylon sutures from her most recent surgery were removed.  A draining sinus was noted along the subcutaneous border of the ulna in the center of the incision.  The left upper extremity was prepped and draped in the usual sterile fashion. A timeout was performed confirming the correct patient, procedure, and operative site.  All were in agreement.  Prophylactic antibiotics were mistakenly administered despite instructions not to do so.    The limb was exsanguinated and the tourniquet inflated to 250 mmHg.  A longitudinal incision was made at the  posterior humerus through the prior scar, curving laterally around the olecranon.  This was continued along the subcutaneous border of the ulna where the prior incision and draining sinus tract was ellipsed and excised.  We immediately encountered an empty space well encapsulated by chronically inflamed pseudomembrane where prior fluid had collected.  There were loose chronically inflamed flakes of synovium in the space.  These along with a portion of the pseudomembrane of the olecranon bursa was excised and sent for culture.  Full-thickness subcutaneous flaps were then elevated radially and ulnarly.  We observed that the draining sinus communicated with the space as well as a rent in the medial capsule.  We expressed at least 10 to 15 cc of purulent fluid with more flakes of chronically inflamed synovium  from within the joint.  The fluid was aspirated and sent for culture.  The triceps allograft was then identified and noted to have nearly ruptured again off of the insertion on the olecranon.  It appeared unhealthy and in poor condition.  The allograft was therefore elevated and released from the olecranon.  It was carefully released from postoperative scar proximally.  The Ethibond sutures where it had been sutured to the triceps were identified and removed with a rongeur.  The allograft was then excised in full with electrocautery until healthy muscle was encountered.  Parts of the allograft were sent for culture.  This exposed the total elbow prosthesis.  The entire articular space was encapsulated in the same chronically inflamed appearing pseudomembrane.  This was carefully peeled off of the bone medially and laterally.  Portions of this were sent for culture.  The anchor in the medial epicondyle was identified and removed with a rongeur.  Bone at the medial epicondyle communicating with the capsular disruption was then sent for culture.  Attention was then turned to finding the ulnar nerve.  Dissection was  performed in the anterior subcutaneous tissues and the nerve identified proximally.  It was carefully neurolysed distally where it had been transposed.  It appeared that it had been sutured to the anterior flexor pronator fascia with a nerve wrap and Prolene sutures.  The nerve was carefully dissected free of this as well as the surrounding scar and intact with a vessel loop.  The nerve wrap and the Prolene sutures were removed.  Set screws were then removed and the prosthesis was next disarticulated.  The polyethylene bearings were removed.  Both the humeral and ulnar components were well-fixed without any loosening.  We next proceeded to remove the humeral component.  Osteotomes and a high-speed bur were used to remove the cement around the canal.  Flexible osteotomes and a pencil tip mayur were then used within the intramedullary canal around the proximal edge of the implant. While impacting the osteotomes, a fracture was created along the medial metaphysis extending across the posterior cortex. While not the cortical window we had planned, this allowed exposure of the humeral stem within the canal. The high speed mayur was used to remove the cement along the implant. The proximal extent of the stem remained within the canal and was not visualized.  We tried to remove the cement around the stem here with flexible osteotomes but unfortunately propagated the fracture. However this fully exposed the implant. After further cement removal, we finally were able to back slap the stem out of the bone.  Membranous tissue from the inside of the condyles and the proximal edge of the humeral condyle were then excised and sent for culture.  We next used a high-speed bur to remove as much of the cement from within the canal as we could.  Curettes, cement removal instruments, drills, and flexible reamers were used to remove the remaining cement plug from within the canal proximally.  Attention was then turned to the ulnar  component.  The cement around the stem proximally was removed with flexible osteotomes and a high-speed bur.  Flexible osteotomes were then used circumferentially around the ulnar stem within the canal. We finally were able to back slap the stem out of the ulna, along with a large amount of cement around the prosthesis. Unfortunately this caused a fracture of the coronoid and anterior cortex of the ulna.  A high-speed bur, curettes, cement removal instruments, and flexible reamers were then used to remove as much of the remaining cement plug as possible from within the canal distally.  Next, the remaining pseudomembrane encapsulating the joint was carefully excised circumferentially around the elbow joint.  The wound was then copiously irrigated with 3 L of normal saline using pulse lavage.  It was then irrigated with 2 L of Bactisure, taking care to scrub all bony surfaces well.  It was irrigated with an additional 3 L of normal saline, followed by a 3-minute soak with dilute Betadine.  This was followed by a final 3 L of normal saline.  We next proceeded to fix the fracture of the posterior humeral cortex to restore integrity of the canal to allow for spacer placement.  3 18-gauge stainless steel cerclage wires were looped around the humerus and tightened down along the posterior cortex.  This provided stable and excellent fixation.  We next did the same for the ulna, placing 2 18-gauge cerclage wires around the ulnar diaphysis and tightening the wires down along the medial cortex of the ulna.  Excellent fixation was achieved. Two drill holes were then made in the posterior subcutaneous border of the ulna with a 1 cm bone bridge. A #2 PDS suture was then placed through the holes and used to suture the brachialis insertion and capsule attached to the coronoid. These sutures were tagged for later fixation.  We next turned our attention to fabricating the custom spacer. A Steinmann pin was first bent and fashioned to  create a U with with the proximal ends fitting into the canal. Two additional Steinmann pins were then cut to appropriate length to fit into the humerus and ulnar canals. 3 additional grams of vancomycin and an additional gram of tobramycin were added to 2 batches of cement each containing 1 gram of tobramycin.  The cement was mixed and then placed into suction tubing and a chest tube using a 50cc syringe. The cut Steinmann pins were then placed into the cement within the tubes and the cement allowed to harden.  The tubes were then cut with a scalpel and the spacers removed. The other batch of cement was placed around the U shaped Steinmann pin and fashioned to fit between the lateral and medial condyles.  The spacer and pin  to go into the humeral canal was then placed into this cement, which was allowed to harden. This spacer was then placed up the humeral canal. The pin for the spacer to into the ulna was then bent into a loop which was linked to the distal end of U shaped Steinnman pin in the humeral spacer. The spacer was then impacted into the ulnar canal, completing the placement of the articulating spacer. This provided some stability to the elbow and allowed for some flexion and extension as well. The PDS sutures were then used to reduce the coronoid, and the sutures tied over the bone bridge on the subcutaneous border of the ulna. The wound was then copiously irrigated.  The ECU and FCU interval along the subcutaneous border of the ulna was closed as much as possible with 0 PDS.  Full coverage of the cerclage wires was achieved.  Proximally we closed as much of the triceps musculature over the bone as possible.  Full coverage of the cerclage wires was achieved.  A deep drain was placed and sutured in place.  The ulnar nerve was left transposed in the anterior subcutaneous tissues. The incision was then reapproximated with deep dermal 2-0 PDS sutures.  The skin was then closed with interrupted horizontal  mattress 3-0 nylon sutures.  Prineo and a sterile dressing were applied.  The left upper extremity was then placed in a well-padded long-arm splint.    All needle and sponge counts were correct at the end of the procedure. There were no complications.     The patient was awoken from anesthesia and taken to the postoperative recovery unit in stable condition.     I was present and scrubbed for the entire procedure.     POSTOPERATIVE PLAN:  The patient will be admitted for pain control and postoperative monitoring.  Cultures will be followed and infectious disease consulted.  Final antibiotic plan will be confirmed before discharge.  Anticipate she will need to be on chronic suppression.  Drain output will be monitored and removed prior to discharge.  The patient will work with PT and OT.  The patient will return at the 3-week postoperative marcellus.  X-rays of the left elbow needed out of plaster.  Sutures will be removed.  She will then see hand therapy for custom thermoplastic long-arm splint.    Marcos Sarmiento MD     Hand, Upper Extremity & Microvascular Surgery  Department of Orthopedic Surgery  [facility]

## 2024-04-15 NOTE — CONSULTS
"Consult received for Vascular access care.  See LDA for details.  For additional needs place \"Nursing to Consult for Vascular Access\" [id number] order in EPIC.  "

## 2024-04-16 ENCOUNTER — APPOINTMENT (OUTPATIENT)
Dept: OCCUPATIONAL THERAPY | Facility: CLINIC | Age: 59
DRG: 463 | End: 2024-04-16
Attending: STUDENT IN AN ORGANIZED HEALTH CARE EDUCATION/TRAINING PROGRAM
Payer: COMMERCIAL

## 2024-04-16 LAB
ABO/RH(D): NORMAL
ANION GAP SERPL CALCULATED.3IONS-SCNC: 9 MMOL/L (ref 7–15)
ANTIBODY SCREEN: NEGATIVE
ATRIAL RATE - MUSE: 90 BPM
BASE EXCESS BLDV CALC-SCNC: -3.1 MMOL/L (ref -3–3)
BLD PROD TYP BPU: NORMAL
BLOOD COMPONENT TYPE: NORMAL
BUN SERPL-MCNC: 15.1 MG/DL (ref 8–23)
CALCIUM SERPL-MCNC: 8.3 MG/DL (ref 8.8–10.2)
CHLORIDE SERPL-SCNC: 104 MMOL/L (ref 98–107)
CODING SYSTEM: NORMAL
CREAT SERPL-MCNC: 0.83 MG/DL (ref 0.51–0.95)
CREAT SERPL-MCNC: 0.83 MG/DL (ref 0.51–0.95)
CROSSMATCH: NORMAL
DEPRECATED HCO3 PLAS-SCNC: 24 MMOL/L (ref 22–29)
DIASTOLIC BLOOD PRESSURE - MUSE: NORMAL MMHG
EGFRCR SERPLBLD CKD-EPI 2021: 80 ML/MIN/1.73M2
EGFRCR SERPLBLD CKD-EPI 2021: 80 ML/MIN/1.73M2
ERYTHROCYTE [DISTWIDTH] IN BLOOD BY AUTOMATED COUNT: 19 % (ref 10–15)
GLUCOSE BLDC GLUCOMTR-MCNC: 146 MG/DL (ref 70–99)
GLUCOSE BLDC GLUCOMTR-MCNC: 147 MG/DL (ref 70–99)
GLUCOSE BLDC GLUCOMTR-MCNC: 154 MG/DL (ref 70–99)
GLUCOSE BLDC GLUCOMTR-MCNC: 162 MG/DL (ref 70–99)
GLUCOSE BLDC GLUCOMTR-MCNC: 167 MG/DL (ref 70–99)
GLUCOSE BLDC GLUCOMTR-MCNC: 187 MG/DL (ref 70–99)
GLUCOSE SERPL-MCNC: 163 MG/DL (ref 70–99)
HCO3 BLDV-SCNC: 25 MMOL/L (ref 21–28)
HCT VFR BLD AUTO: 21.6 % (ref 35–47)
HGB BLD-MCNC: 6.6 G/DL (ref 11.7–15.7)
INTERPRETATION ECG - MUSE: NORMAL
ISSUE DATE AND TIME: NORMAL
LACTATE SERPL-SCNC: 1.9 MMOL/L (ref 0.7–2)
MAGNESIUM SERPL-MCNC: 1.4 MG/DL (ref 1.7–2.3)
MCH RBC QN AUTO: 24.9 PG (ref 26.5–33)
MCHC RBC AUTO-ENTMCNC: 30.6 G/DL (ref 31.5–36.5)
MCV RBC AUTO: 82 FL (ref 78–100)
O2/TOTAL GAS SETTING VFR VENT: 28 %
OXYHGB MFR BLDV: 69 % (ref 70–75)
P AXIS - MUSE: 94 DEGREES
PCO2 BLDV: 67 MM HG (ref 40–50)
PH BLDV: 7.19 [PH] (ref 7.32–7.43)
PHOSPHATE SERPL-MCNC: 3.9 MG/DL (ref 2.5–4.5)
PLATELET # BLD AUTO: 272 10E3/UL (ref 150–450)
PO2 BLDV: 43 MM HG (ref 25–47)
POTASSIUM SERPL-SCNC: 4.2 MMOL/L (ref 3.4–5.3)
PR INTERVAL - MUSE: 182 MS
QRS DURATION - MUSE: 134 MS
QT - MUSE: 420 MS
QTC - MUSE: 513 MS
R AXIS - MUSE: 25 DEGREES
RBC # BLD AUTO: 2.65 10E6/UL (ref 3.8–5.2)
SAO2 % BLDV: 70 % (ref 70–75)
SODIUM SERPL-SCNC: 137 MMOL/L (ref 135–145)
SPECIMEN EXPIRATION DATE: NORMAL
SYSTOLIC BLOOD PRESSURE - MUSE: NORMAL MMHG
T AXIS - MUSE: 72 DEGREES
TROPONIN T SERPL HS-MCNC: 21 NG/L
UNIT ABO/RH: NORMAL
UNIT NUMBER: NORMAL
UNIT STATUS: NORMAL
UNIT TYPE ISBT: 5100
VANCOMYCIN SERPL-MCNC: 14.6 UG/ML
VENTRICULAR RATE- MUSE: 90 BPM
WBC # BLD AUTO: 12.6 10E3/UL (ref 4–11)

## 2024-04-16 PROCEDURE — 83735 ASSAY OF MAGNESIUM: CPT | Performed by: INTERNAL MEDICINE

## 2024-04-16 PROCEDURE — 99233 SBSQ HOSP IP/OBS HIGH 50: CPT | Performed by: INTERNAL MEDICINE

## 2024-04-16 PROCEDURE — 83605 ASSAY OF LACTIC ACID: CPT | Performed by: INTERNAL MEDICINE

## 2024-04-16 PROCEDURE — 36569 INSJ PICC 5 YR+ W/O IMAGING: CPT

## 2024-04-16 PROCEDURE — 272N000454 HC KIT, 3FR SV SINGLE LUMEN POWERPICC

## 2024-04-16 PROCEDURE — 999N000007 HC SITE CHECK

## 2024-04-16 PROCEDURE — 36415 COLL VENOUS BLD VENIPUNCTURE: CPT

## 2024-04-16 PROCEDURE — 80202 ASSAY OF VANCOMYCIN: CPT | Performed by: INTERNAL MEDICINE

## 2024-04-16 PROCEDURE — 250N000013 HC RX MED GY IP 250 OP 250 PS 637

## 2024-04-16 PROCEDURE — 999N000040 HC STATISTIC CONSULT NO CHARGE VASC ACCESS

## 2024-04-16 PROCEDURE — 272N000276 HC DEVICE 3FR SECURACATH

## 2024-04-16 PROCEDURE — 250N000013 HC RX MED GY IP 250 OP 250 PS 637: Performed by: STUDENT IN AN ORGANIZED HEALTH CARE EDUCATION/TRAINING PROGRAM

## 2024-04-16 PROCEDURE — 999N000157 HC STATISTIC RCP TIME EA 10 MIN

## 2024-04-16 PROCEDURE — 84100 ASSAY OF PHOSPHORUS: CPT | Performed by: INTERNAL MEDICINE

## 2024-04-16 PROCEDURE — 84484 ASSAY OF TROPONIN QUANT: CPT | Performed by: INTERNAL MEDICINE

## 2024-04-16 PROCEDURE — 99233 SBSQ HOSP IP/OBS HIGH 50: CPT

## 2024-04-16 PROCEDURE — 250N000011 HC RX IP 250 OP 636

## 2024-04-16 PROCEDURE — 94660 CPAP INITIATION&MGMT: CPT

## 2024-04-16 PROCEDURE — 250N000011 HC RX IP 250 OP 636: Performed by: STUDENT IN AN ORGANIZED HEALTH CARE EDUCATION/TRAINING PROGRAM

## 2024-04-16 PROCEDURE — 82805 BLOOD GASES W/O2 SATURATION: CPT | Performed by: INTERNAL MEDICINE

## 2024-04-16 PROCEDURE — 99232 SBSQ HOSP IP/OBS MODERATE 35: CPT | Performed by: PHYSICIAN ASSISTANT

## 2024-04-16 PROCEDURE — 86900 BLOOD TYPING SEROLOGIC ABO: CPT | Performed by: STUDENT IN AN ORGANIZED HEALTH CARE EDUCATION/TRAINING PROGRAM

## 2024-04-16 PROCEDURE — 258N000003 HC RX IP 258 OP 636: Performed by: STUDENT IN AN ORGANIZED HEALTH CARE EDUCATION/TRAINING PROGRAM

## 2024-04-16 PROCEDURE — 250N000009 HC RX 250

## 2024-04-16 PROCEDURE — 80048 BASIC METABOLIC PNL TOTAL CA: CPT | Performed by: INTERNAL MEDICINE

## 2024-04-16 PROCEDURE — 86923 COMPATIBILITY TEST ELECTRIC: CPT | Performed by: INTERNAL MEDICINE

## 2024-04-16 PROCEDURE — 85027 COMPLETE CBC AUTOMATED: CPT | Performed by: INTERNAL MEDICINE

## 2024-04-16 PROCEDURE — 258N000003 HC RX IP 258 OP 636: Performed by: INTERNAL MEDICINE

## 2024-04-16 PROCEDURE — 250N000011 HC RX IP 250 OP 636: Mod: JZ | Performed by: STUDENT IN AN ORGANIZED HEALTH CARE EDUCATION/TRAINING PROGRAM

## 2024-04-16 PROCEDURE — 120N000002 HC R&B MED SURG/OB UMMC

## 2024-04-16 PROCEDURE — 97535 SELF CARE MNGMENT TRAINING: CPT | Mod: GO | Performed by: OCCUPATIONAL THERAPIST

## 2024-04-16 PROCEDURE — P9016 RBC LEUKOCYTES REDUCED: HCPCS | Performed by: INTERNAL MEDICINE

## 2024-04-16 RX ORDER — HEPARIN SODIUM,PORCINE 10 UNIT/ML
5-20 VIAL (ML) INTRAVENOUS
Status: DISCONTINUED | OUTPATIENT
Start: 2024-04-16 | End: 2024-04-22 | Stop reason: HOSPADM

## 2024-04-16 RX ORDER — METOCLOPRAMIDE HYDROCHLORIDE 5 MG/ML
5 INJECTION INTRAMUSCULAR; INTRAVENOUS EVERY 6 HOURS PRN
Status: DISCONTINUED | OUTPATIENT
Start: 2024-04-16 | End: 2024-04-22 | Stop reason: HOSPADM

## 2024-04-16 RX ORDER — CARBOXYMETHYLCELLULOSE SODIUM 5 MG/ML
1 SOLUTION/ DROPS OPHTHALMIC
Status: DISCONTINUED | OUTPATIENT
Start: 2024-04-16 | End: 2024-04-22 | Stop reason: HOSPADM

## 2024-04-16 RX ORDER — HEPARIN SODIUM,PORCINE 10 UNIT/ML
5-20 VIAL (ML) INTRAVENOUS EVERY 24 HOURS
Status: DISCONTINUED | OUTPATIENT
Start: 2024-04-16 | End: 2024-04-22 | Stop reason: HOSPADM

## 2024-04-16 RX ORDER — LIDOCAINE 40 MG/G
CREAM TOPICAL
Status: ACTIVE | OUTPATIENT
Start: 2024-04-16 | End: 2024-04-19

## 2024-04-16 RX ADMIN — KETOROLAC TROMETHAMINE 30 MG: 30 INJECTION, SOLUTION INTRAMUSCULAR at 06:12

## 2024-04-16 RX ADMIN — NYSTATIN: 100000 OINTMENT TOPICAL at 08:18

## 2024-04-16 RX ADMIN — ACETAMINOPHEN 975 MG: 325 TABLET, FILM COATED ORAL at 12:23

## 2024-04-16 RX ADMIN — GABAPENTIN 800 MG: 800 TABLET, FILM COATED ORAL at 12:22

## 2024-04-16 RX ADMIN — ACETAMINOPHEN 975 MG: 325 TABLET, FILM COATED ORAL at 03:47

## 2024-04-16 RX ADMIN — VANCOMYCIN HYDROCHLORIDE 1750 MG: 10 INJECTION, POWDER, LYOPHILIZED, FOR SOLUTION INTRAVENOUS at 23:47

## 2024-04-16 RX ADMIN — NYSTATIN: 100000 OINTMENT TOPICAL at 00:11

## 2024-04-16 RX ADMIN — ERTAPENEM SODIUM 1 G: 1 INJECTION, POWDER, LYOPHILIZED, FOR SOLUTION INTRAMUSCULAR; INTRAVENOUS at 17:18

## 2024-04-16 RX ADMIN — KETOROLAC TROMETHAMINE 30 MG: 30 INJECTION, SOLUTION INTRAMUSCULAR at 17:22

## 2024-04-16 RX ADMIN — LIDOCAINE HYDROCHLORIDE ANHYDROUS 2 ML: 10 INJECTION, SOLUTION INFILTRATION at 15:09

## 2024-04-16 RX ADMIN — GABAPENTIN 800 MG: 800 TABLET, FILM COATED ORAL at 17:20

## 2024-04-16 RX ADMIN — KETOROLAC TROMETHAMINE 30 MG: 30 INJECTION, SOLUTION INTRAMUSCULAR at 23:47

## 2024-04-16 RX ADMIN — OXYCODONE HYDROCHLORIDE 10 MG: 5 TABLET ORAL at 04:19

## 2024-04-16 RX ADMIN — KETOROLAC TROMETHAMINE 30 MG: 30 INJECTION, SOLUTION INTRAMUSCULAR at 00:11

## 2024-04-16 RX ADMIN — ASPIRIN 162 MG: 81 TABLET, COATED ORAL at 08:18

## 2024-04-16 RX ADMIN — OMEPRAZOLE 20 MG: 20 CAPSULE, DELAYED RELEASE ORAL at 08:18

## 2024-04-16 RX ADMIN — SODIUM CHLORIDE, POTASSIUM CHLORIDE, SODIUM LACTATE AND CALCIUM CHLORIDE 500 ML: 600; 310; 30; 20 INJECTION, SOLUTION INTRAVENOUS at 22:10

## 2024-04-16 RX ADMIN — FERROUS SULFATE TAB 325 MG (65 MG ELEMENTAL FE) 325 MG: 325 (65 FE) TAB at 10:27

## 2024-04-16 RX ADMIN — GABAPENTIN 800 MG: 800 TABLET, FILM COATED ORAL at 08:18

## 2024-04-16 RX ADMIN — FLUCONAZOLE 400 MG: 200 TABLET ORAL at 08:18

## 2024-04-16 RX ADMIN — OXYCODONE HYDROCHLORIDE 10 MG: 5 TABLET ORAL at 10:24

## 2024-04-16 RX ADMIN — KETOROLAC TROMETHAMINE 30 MG: 30 INJECTION, SOLUTION INTRAMUSCULAR at 12:22

## 2024-04-16 ASSESSMENT — ACTIVITIES OF DAILY LIVING (ADL)
ADLS_ACUITY_SCORE: 40
ADLS_ACUITY_SCORE: 39
ADLS_ACUITY_SCORE: 40
ADLS_ACUITY_SCORE: 39
ADLS_ACUITY_SCORE: 40
ADLS_ACUITY_SCORE: 40
ADLS_ACUITY_SCORE: 39

## 2024-04-16 NOTE — CONSULTS
"Consult received for Vascular access care.  For additional needs place \"Nursing to Consult for Vascular Access\" [id number] order in EPIC.  "

## 2024-04-16 NOTE — CONSULTS
"Consult received for Vascular access care.  See LDA for details.  For additional needs place \"Nursing to Consult for Vascular Access\" [id number] order in EPIC.  "

## 2024-04-16 NOTE — PROGRESS NOTES
[facility]    Medicine Progress Note - Hospitalist Service, GOLD TEAM 16    Date of Admission:  4/10/2024    Assessment & Plan      Mitali Robles is a 59 year old female admitted on 4/10/2024. She has hx of bipolar disorder, PTSD, MDD, on high dose benzodiazepines, hyperlipidemia, hypertension, and type 2 diabetes on insulin with recurrent left elbow prosthetic joint infection s/p explantation of L total elbow arthroplasty admitted to ortho post op.        Acute perioperative blood loss anemia.  Hemoglobin down to 6.6 on 4/16/2024.  Patient is symptomatic.  Has been having low blood pressure, fatigue.  She has left arm surgical incision site drain still in place.  Consent to transfer is obtained.  Will do type and screen, transfuse with 1 unit of packed red blood cell and check H&H tomorrow in AM.     L elbow prosthetic joint infection s/p explantation of L total elbow arthroplasty  Reported EBL 600ml per OR note. Per outside ID note, s/p 7 weeks of IV daptomycin, ertapnem and fluconazole. currently ordered cefazolin 2g IV 8h per ortho.   - post op wound care and pain control per ortho  -Monitor routine labs   - follow up cultures  Infectious disease consult was obtained prior to transfer.  Recommendations:  - Stop Cefazolin   - Start IV Vancomycin (pharmacy to dose)   - Start IV Ertapenem 1g Q24H   - Start Fluconazole PO/IV 400mg Q24H   - Will follow intraoperative tissue cultures   Hypercapnic respiratory failure with CO2 narcosis and lethargy.  Currently on 2 L of oxygen and satting okay.  Awake alert and oriented x 4.  No benzodiazepine per psych recommendation.  Acute metabolic encephalopathy, CO2 narcosis.  Resolved.  Patient became acutely altered less responsive on 4/13/2024, multiple rapids called.  ICU team and myself responded.  Multiple rounds of Narcan, patient momentarily improved however ultimately became too unstable for the floor and was transferred  to the ICU.  In the ICU patient was placed on BiPAP.  Pain medication was discontinued.  Ultimately transferred back to the medicine team on 4/14/2024.  While in the ICU CODE STATUS was addressed.  Patient is now DNR as she discussed this with multiple teams including ICU and primary service orthopedic surgery.  Dyspepsia, epigastric discomfort.  Patient complains ferrous sulfate and potassium oral supplementation causing her GI upset and requested Reglan IV as needed.  Ordered Reglan 5 mg IV every 6 as needed to be administered for the symptoms.  Low blood pressure episodes.  Complex psychiatric history including bipolar disorder, PTSD, MDD, on high dose benzodiazepines  PTA meds include valium 30mg HS, xanax 1mg HS prn, clonazepam 2mg BID prn, celexa 20mg hs, and lumateperone 84mg hs.  checked, and last filled xanax 2mg 30 tabs, valiume 10mg 90 tabs and clonazepam 2mg 90 tabs on 2/12/24. Currently ordered xanax 0.25mg TID per ortho.   -Postop mentation improved now on the floor.  -Previously sedated post op and was on precedex, received ativan intra-op,  -Currently on Xanax 0.25 mg 3 times daily   Psychiatry was consulted and seen patient.  Psychiatry recommendation include spiritual health consult for emotional support, continue Caplyta 42 mg at bedtime dose adjusted from 84 mg daily due to interaction with fluconazole, continue Celexa 20 mg at bedtime, continue Valium 5 mg at bedtime.  Psychiatry recommend not to restart any other benzodiazepines at this time.  Also ordered flumazenil as needed.  Recommended to continue pulse ox.     Type 2 DM  A1c 7.4% on 2/12. On tresiba hs and aspart  - sliding scale insulin for now  - resume tresiba in am  -Blood glucose in acceptable limits  Recent Labs   Lab 04/16/24  0742 04/16/24  0213 04/15/24  2212 04/15/24  2113 04/15/24  1635 04/15/24  1252   * 147* 145* 147* 180* 171*          Hypertension  On Clonidine 1mg HS. Currently BP soft post op  -Now significantly  "elevated above 180 systolic, and 1 instance above 200s.  -Likely related to ongoing pain  -Continue clonidine. (Increased 2.1 mg 3 times daily)  -As needed hydralazine  - Address underlying pain.  - Add IV hydralazine for SBP greater than 180       Neuropathic pain  - PTA gabapentin 800mg TID-->     Hyperlipidemia  - continue atorvastatin              Diet: Diet  Regular Diet Adult    DVT Prophylaxis: ASA per primary team  Galvan Catheter: Not present  Lines: None     Cardiac Monitoring: ACTIVE order. Indication: hypoxia  Code Status: No CPR- Do NOT Intubate      Clinically Significant Risk Factors              # Hypoalbuminemia: Lowest albumin = 2.9 g/dL at 4/12/2024  5:17 PM, will monitor as appropriate     # Hypertension: Noted on problem list       # DMII: A1C = 7.4 % (Ref range: <5.7 %) within past 6 months   # Obesity: Estimated body mass index is 34.35 kg/m  as calculated from the following:    Height as of this encounter: 1.753 m (5' 9\").    Weight as of this encounter: 105.5 kg (232 lb 9.4 oz).      # Financial/Environmental Concerns: none         Disposition Plan     Medically Ready for Discharge: Anticipated in 2-4 Days             Radha Banks MD  Hospitalist Service, GOLD TEAM 16  [facility]  Securely message with [employer] (more info)  Text page via [employer] Paging/Directory   See signed in provider for up to date coverage information  ______________________________________________________________________    Interval History   Is awake alert and oriented x 3.  She did have difficulty with finding veins for a.m. lab draw.  She did have a PICC line prior to her current hospitalization which was removed.  Currently has a right forearm small peripheral IV to which IV medications being given.  She is a hard stick.  She would like a PICC line placed if possible for antibiotics and also for blood draws.  Patient seen and examined at bedside no acute distress.  Patient " complains about worsening pain in her left arm, states she is unable to move her arm.  Denies any headache lightheadedness or dizziness.    Physical Exam   Vital Signs: Temp: 98.2  F (36.8  C) Temp src: Oral BP: 95/56 Pulse: 107   Resp: 16 SpO2: 98 % O2 Device: Nasal cannula Oxygen Delivery: 2 LPM  Weight: 232 lbs 9.36 oz    General Appearance: Appears comfortable.  Respiratory: Without wheezes rhonchi or rales.  CTA  Cardiovascular: RRR, without murmurs  GI: Soft, nontender, plus BS  Skin: Without obvious bleeding, bruising or excoriations      Medical Decision Making   55 minutes spent to review her chart, examining the patient, and formulating plan of care.  Data   ------------------------- PAST 24 HR DATA REVIEWED -----------------------------------------------  CBC RESULTS:   Recent Labs   Lab Test 04/16/24  1145   WBC 12.6*   RBC 2.65*   HGB 6.6*   HCT 21.6*   MCV 82   MCH 24.9*   MCHC 30.6*   RDW 19.0*        Last Comprehensive Metabolic Panel:  Lab Results   Component Value Date     04/16/2024    POTASSIUM 4.2 04/16/2024    CHLORIDE 104 04/16/2024    CO2 24 04/16/2024    ANIONGAP 9 04/16/2024     (H) 04/16/2024    BUN 15.1 04/16/2024    CR 0.83 04/16/2024    CR 0.83 04/16/2024    GFRESTIMATED 80 04/16/2024    GFRESTIMATED 80 04/16/2024    DIANE 8.3 (L) 04/16/2024

## 2024-04-16 NOTE — PROGRESS NOTES
Alert and Oriented x4.  Assist x1.  Monitoring tele, HR, & BP.  Troponin elevated-21/Tachy.  BP low Clonidine-held.  Vitals on 2L O2 nasal cannula.  Right-PIV infusing NS and antibiotics.  Left arm wrapped in a sling.  BAILEY drain in place- 100 ml output.  RUE hematoma occurred from a blown vein  Pain rated a 9/10-managed with Tylenol, Tramadol, and Oxycodone.  Loose bowel movement today held laxative meds.  Redness assessed on groin, nystatin cream applied.  Pt did not eat only drank fluids-regular diet.  Hemoglobin lab low 6.6-blood transfusion-ordered obtained 1 unit of RBC-consent obtained. Reassess hemoglobin  Poor veins-PICC line will be inserted.  PT/OT canceled.

## 2024-04-16 NOTE — PROGRESS NOTES
Care Management Follow Up    Length of Stay (days): 6    Expected Discharge Date: 4/18/24     Concerns to be Addressed: Discharge planning     Patient plan of care discussed at interdisciplinary rounds: Yes    Anticipated Discharge Disposition: Transitional Care     Anticipated Discharge Services: None  Anticipated Discharge DME: None    Patient/family educated on Medicare website which has current facility and service quality ratings: No  Education Provided on the Discharge Plan: Yes  Patient/Family in Agreement with the Plan: Yes    Referrals Placed by CM/SW: External Care Coordination  Private pay costs discussed: Not applicable    Additional Information:  Patient not yet medically ready to discharge. Per IDT rounds, JAYANT is 2 days. At this time, patient has recs for TCU per PT/OT. Previous SW (Alvin) had sent a referral to  TCU when patient was still admitted to the ICU. SW confirmed with  TCU admissions that they received the referral. Today I met with patient at bedside to confirm her plan. Patient plans to discharge to a local TCU with a preferred for  TCU. After she is stable enough and IV ABX duration is complete, patient plans to take a bus back to [place], [place] where she resides.     Care management will continue to follow and assist with discharge planning.    Pending:  [facility]  [address] [place] [address]  [id number]  P: [phone]  F: [phone]    MAX Mauro, LGSW  5 Med Surg   [facility]  Phone: [phone]  Pager: [phone]

## 2024-04-16 NOTE — CONSULTS
"      Psychiatry Consultation; Follow up              Reason for Consult, requesting source:    Follow up   Requesting source: Marcos Sarmiento    Labs and imaging reviewed, seen by SAI Paz CNP    Total time spent in chart review, patient interview and coordination of care; 60 minutes - all time was spent on the date of the encounter that I saw patient             Interim history:    Mitali Robles is a 59 year old female who lives in [place] admitted on 4/10/2024 for L elbow prosthetic joint infection s/p explantation of L total elbow arthroplasty.      Patient has a history of severe PTSD managed by a  In [place] with a complex psychotropic regimen:   --Xanax 2mg at bedtime  --Celexa 20mg at bedtime  --Klonopin 4mg at bedtime  --Clonidine 1mg at bedtime  --Valium 30mg at bedtime  --Gabapentin 800mg TID  --Caplyta 84mg at bedtime     Patient became acutely altered less responsive on 4/13/2024, multiple rapids called. Multiple rounds of Narcan, was transferred to the ICU. In the ICU patient was placed on BiPAP. Opioids were discontinued. Ultimately transferred back to the medicine team on 4/14/2024. While in the ICU CODE STATUS was addressed. Patient is now DNR as she discussed this with multiple teams including ICU and primary service orthopedic surgery. Her Psych meds were placed on hold 4/13. Pain consult 4/15 recommended oxycodone 5-10mg PO q6 and Dilaudid 0.1-0.2mg PO IV daily PRN.      Patient reported to me 4/15 her need for her PTA medications, she's been following with Dr. Butler and on that regimen for 15 years. She reported her severe PTSD and trauma (documented below) and stated that without the medications her PTSD would take over and she would \"surely become suicidal.\" She received Dilaudid 0.2mg IV today 1416 and was tired on exam. Since admission 4/10, she has received Xanax 0.25mg TID x6 doses with the last dose being 0800 on 4/13. She has not received any klonopin during this " "hospitalization. Clonidine was restarted at a lower dose.     I restarted Valium 5mg and no other benzodiazepines 4/15 at bedtime. Also restarted Capylta at a lower dose due to interaction with flucanozole. This morning, she reports she is \"doing well\" and got sleep last night because \"I received the right medications.\" She denies feeling anxious and her blood pressures have decreased. Denies feeling suicidal         Current Medications:     Current Facility-Administered Medications   Medication Dose Route Frequency Provider Last Rate Last Admin    acetaminophen (TYLENOL) tablet 975 mg  975 mg Oral Q8H Sveta Mcqueen APRN CNP   975 mg at 04/16/24 0347    aspirin EC tablet 162 mg  162 mg Oral Daily Marcos Sarmiento MD   162 mg at 04/16/24 0818    atorvastatin (LIPITOR) tablet 10 mg  10 mg Oral QPM Sveta Mcqueen APRN CNP   10 mg at 04/15/24 2003    citalopram (celeXA) tablet 20 mg  20 mg Oral At Bedtime Odalys Weeks MD   20 mg at 04/15/24 2154    cloNIDine (CATAPRES) tablet 0.1 mg  0.1 mg Oral TID Radha Banks MD   0.1 mg at 04/15/24 2003    diazepam (VALIUM) tablet 5 mg  5 mg Oral At Bedtime Nenita Garcia APRN CNP   5 mg at 04/15/24 2154    ertapenem (INVanz) 1 g vial to attach to  mL bag  1 g Intravenous Q24H Richard Dunaway  mL/hr at 04/14/24 1710 1 g at 04/15/24 1738    ferrous sulfate (FEROSUL) tablet 325 mg  325 mg Oral Every Other Day Sveta Mcqueen APRN CNP   325 mg at 04/16/24 1027    fluconazole (DIFLUCAN) tablet 400 mg  400 mg Oral Daily Richard Dunaway MD   400 mg at 04/16/24 0818    gabapentin (NEURONTIN) tablet 800 mg  800 mg Oral TID Sloan Stanford APRN CNP   800 mg at 04/16/24 0818    insulin aspart (NovoLOG) injection (RAPID ACTING)  1-7 Units Subcutaneous TID AC Odalys Weeks MD   1 Units at 04/16/24 0817    insulin aspart (NovoLOG) injection (RAPID ACTING)  1-5 Units Subcutaneous At Bedtime Odalys Weeks MD   1 Units at 04/11/24 2218    " ketorolac (TORADOL) injection 30 mg  30 mg Intravenous Q6H Sveta Mcqueen APRN CNP   30 mg at 04/16/24 0612    lumateperone (CAPLYTA) capsule 42 mg  42 mg Oral At Bedtime Nenita Garcia APRN CNP   42 mg at 04/15/24 2213    nystatin (MYCOSTATIN) ointment   Topical BID Only, MD Philip   Given at 04/16/24 0818    omeprazole (PriLOSEC) CR capsule 20 mg  20 mg Oral Daily Marcos Sarmiento MD   20 mg at 04/16/24 0818    polyethylene glycol (MIRALAX) Packet 17 g  17 g Oral BID Rita Loja APRN CNP        senna-docusate (SENOKOT-S/PERICOLACE) 8.6-50 MG per tablet 1 tablet  1 tablet Oral BID Marcos Sarmiento MD   1 tablet at 04/14/24 0827    sodium chloride (PF) 0.9% PF flush 3 mL  3 mL Intracatheter Q8H Arvind Falcon MD   3 mL at 04/16/24 0837    sodium chloride (PF) 0.9% PF flush 3 mL  3 mL Intracatheter Q8H Arvind Falcon MD   3 mL at 04/14/24 1600    sodium chloride (PF) 0.9% PF flush 3 mL  3 mL Intracatheter Q8H Marcos Sarmiento MD   3 mL at 04/15/24 1644    vancomycin (VANCOCIN) 1,750 mg in sodium chloride 0.9 % 500 mL intermittent infusion  1,750 mg Intravenous Q24H Marcos Sarmiento MD   1,750 mg at 04/15/24 1837              MSE:   Appearance: awake, alert  Attitude:  cooperative  Eye Contact:  good  Mood:   ok  Affect:  appropriate and in normal range and mood congruent  Speech:  clear, coherent  Language: Fluent in english   Psychomotor Behavior:  no evidence of tardive dyskinesia, dystonia, or tics  Thought Process:  logical and tangental  Associations:  no loose associations  Thought Content:  no evidence of suicidal ideation or homicidal ideation and no evidence of psychotic thought  Insight:  fair  Judgement:  fair  Oriented to:  time, person, and place  Attention Span and Concentration:  intact  Recent and Remote Memory:  intact  Fund of Knowledge: Appropriate   Gait and Station: baseline    Vital signs:  Temp: 98.2  F (36.8  C) Temp src: Oral BP: 95/56 Pulse: 107   Resp: 16 SpO2: 98 % O2 Device: Nasal  "cannula Oxygen Delivery: 2 LPM Height: 175.3 cm (5' 9\") Weight: 105.5 kg (232 lb 9.4 oz)  Estimated body mass index is 34.35 kg/m  as calculated from the following:    Height as of this encounter: 1.753 m (5' 9\").    Weight as of this encounter: 105.5 kg (232 lb 9.4 oz).             DSM-5 Diagnosis:   PTSD          Assessment:   Mitali is a 59 year old female with history of severe PTSD on a home regimen of 3 different benzodiazepines at very high doses. She has not received any of her home Klonopin, and has received very little xanax compared to her home dose. Over the weekend, she required ICU transfer due to respiratory failure related to opioids. There is concern restarting her home meds that she is requesting given continued opioid agonist use, lack of obvious benzodiazepine withdrawal sxs, and recently changing her code status to DNR/DNI. I did restart her Valium at a lower dose of 5mg and today she states she feels much better, denies anxiety/suicidality, BP improved, states she slept well and is still tired. I will not be increasing any further given he subjective report today and lack of objective signs of benzodiazepine use. She denies having any home supply of medications in her room. Will continue to monitor her closely for withdrawal symptoms.             Summary of Recommendations:     Ordered spiritual health consult for emotional support   Continue Caplyta 42mg at bedtime (dose adjusted from 84mg daily due to interaction with Flucanozole)  Continue Celexa 20mg at bedtime   Continue Valium 5mg at bedtime. Will not restart any other benzodiazepines at this time given reasons above  Ordered Flumazenil PRN   Recommend continuous pulse ox         Nenita Garcia, PMHNP-BC  Consult/Liaison Psychiatry   [facility]           "
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
## 2024-04-16 NOTE — PROGRESS NOTES
"Pain Service Progress Note  [facility]  Date: 04/16/2024       Patient Name: Mitali Robles  MRN: [id number]  Age: 59 year old  Sex: female      Assessment:  Mitali Robles is a 59 year old female who has PMH of bipolar disorder, PTSD, MDD, on multiple high dose benzodiazepines, hyperlipidemia, hypertension, and type 2 diabetes on insulin with recurrent left elbow prosthetic joint infection admitted on 4/10/24 for explantation of L total elbow arthroplasty admitted to ortho post op.   Over the weekend had 2 RRT called on her where she was given narcan.    Mitali states pain is \"much better\" today with pain level at 9.5/10 whereas yesterday it was >>>10/10.  She has been using oxycodone 10mg doses.  She is requesting not to have her pain medication regimen changed.  She is trying to avoid using IV Dilaudid but would still like to have it there.       Plan/Recommendations:  Oxycodone 5-10mg PO Q 6 hours PRN.  Dilaudid 0.1-0.2mg IV Once per day PRN  Continue gabapentin  Acetaminophen 975mg PO Q 8 hours  Discontinue Robaxin-been held due to hypotension concerns.  Not using and pain is manageable.  Bowel regimen-using senna.   BMs x 3 yesterday.    Pain Service will sign off.    Discussed with attending anesthesiologist    Ekaterina Bledsoe PA-C  04/16/2024         Diet: Diet  Regular Diet Adult    Relevant Labs:  Recent Labs   Lab Test 04/16/24  1145      BUN 15.1       Physical Exam:  Vitals: BP 95/56 (BP Location: Left leg)   Pulse 107   Temp 98.2  F (36.8  C)   Resp 16   Ht 1.753 m (5' 9\")   Wt 105.5 kg (232 lb 9.4 oz)   SpO2 98%   BMI 34.35 kg/m      Physical Exam:   CONSTITUTIONAL/GENERAL APPEARANCE: Conversant.  EYES: EOMI, sclerae clear  ENT/NECK: neck is supple  RESPIRATORY: on nasal cannula  CARDIOVASCULAR: HR within normal limits  GI:soft, nontender,   MUSCULOSKELETAL/BACK/SPINE/EXTREMITIES: Moving UE and LE independently.     NEURO:  AAOx3.   SKIN/VASCULAR EXAM:  " Dry and warm.  PSYCHIATRIC/BEHAVIORAL/OBSERVATIONS:  No objective signs of pain observed during our interview.   Judgment/Insight -fair   Orientation - x3   Memory -fair   Mood and affect - calm, pleasant, cooperative         Relevant Medications:  Current Pain Medications:  Medications related to Pain Management (From now, onward)      Start     Dose/Rate Route Frequency Ordered Stop    04/15/24 2200  diazepam (VALIUM) tablet 5 mg         5 mg Oral AT BEDTIME 04/15/24 1658      04/15/24 1600  gabapentin (NEURONTIN) tablet 800 mg        Note to Pharmacy: PTA Sig:Take 600 mg by mouth 3 times daily      800 mg Oral 3 TIMES DAILY 04/15/24 1204      04/15/24 1202  HYDROmorphone (DILAUDID) injection 0.1-0.2 mg        Note to Pharmacy: Discussed w/ chief resident Diana Pleitez.    0.1-0.2 mg Intravenous DAILY PRN 04/15/24 1204      04/15/24 1202  oxyCODONE (ROXICODONE) tablet 5-10 mg         5-10 mg Oral EVERY 6 HOURS PRN 04/15/24 1204      04/14/24 1230  acetaminophen (TYLENOL) tablet 975 mg         975 mg Oral EVERY 8 HOURS 04/14/24 1206      04/14/24 1200  ketorolac (TORADOL) injection 30 mg         30 mg Intravenous EVERY 6 HOURS 04/14/24 1132 04/19/24 1129    04/13/24 2000  polyethylene glycol (MIRALAX) Packet 17 g         17 g Oral 2 TIMES DAILY 04/13/24 1536      04/13/24 1534  bisacodyl (DULCOLAX) suppository 10 mg         10 mg Rectal DAILY PRN 04/13/24 1536      04/13/24 1445  lidocaine 1 % 0.1-1 mL         0.1-1 mL Other EVERY 1 HOUR PRN 04/13/24 1446      04/13/24 1445  lidocaine (LMX4) cream          Topical EVERY 1 HOUR PRN 04/13/24 1446      04/13/24 0000  bisacodyl (DULCOLAX) suppository 10 mg         10 mg Rectal DAILY PRN 04/10/24 2300      04/13/24 0000  acetaminophen (TYLENOL) tablet 650 mg         650 mg Oral EVERY 4 HOURS PRN 04/10/24 2300      04/13/24 0000  acetaminophen (TYLENOL) 325 MG tablet         650 mg Oral EVERY 4 HOURS PRN 04/12/24 1427      04/12/24 0000  magnesium hydroxide (MILK OF  "MAGNESIA) suspension 30 mL         30 mL Oral DAILY PRN 04/10/24 2300      04/12/24 0000  methocarbamol (ROBAXIN) 500 MG tablet         500 mg Oral EVERY 6 HOURS PRN 04/12/24 1427      04/12/24 0000  polyethylene glycol (MIRALAX) 17 GM/Dose powder         17 g Oral DAILY 04/12/24 1427      04/12/24 0000  aspirin 81 MG EC tablet         81 mg Oral 2 TIMES DAILY 04/12/24 1427      04/11/24 0800  senna-docusate (SENOKOT-S/PERICOLACE) 8.6-50 MG per tablet 1 tablet         1 tablet Oral 2 TIMES DAILY 04/11/24 0008      04/11/24 0800  aspirin EC tablet 162 mg         162 mg Oral DAILY 04/10/24 2300      04/10/24 2300  [Held by provider]  methocarbamol (ROBAXIN) tablet 500 mg        (On hold since Sat 4/13/2024 at 1525 until manually unheld; held by Rita Loja APRN CNPHold Reason: Other)    500 mg Oral EVERY 6 HOURS PRN 04/10/24 2300                      Acute Inpatient Pain Service [facility]  Hours of pain coverage 24/7   Page via Amcom- Please Page the Pain Team Via Amcom: \"PAIN MANAGEMENT ACUTE INPATIENT/ [facility]\"            "

## 2024-04-16 NOTE — PROCEDURES
[facility]    Single Lumen PICC Placement    Date/Time: 4/16/2024 3:12 PM    Performed by: Lyn Buchanan RN  Authorized by: Sloan Stanford APRN CNP  Indications: vascular access      UNIVERSAL PROTOCOL   Site Marked: Yes  Prior Images Obtained and Reviewed:  Yes  Required items: Required blood products, implants, devices and special equipment available    Patient identity confirmed:  Verbally with patient, arm band and hospital-assigned identification number  NA - No sedation, light sedation, or local anesthesia  Confirmation Checklist:  Patient's identity using two indicators, relevant allergies, procedure was appropriate and matched the consent or emergent situation and correct equipment/implants were available  Time out: Immediately prior to the procedure a time out was called    Universal Protocol: the Joint Commission Universal Protocol was followed    Preparation: Patient was prepped and draped in usual sterile fashion    ESBL (mL):  1     ANESTHESIA    Anesthesia:  Local infiltration  Local Anesthetic:  Lidocaine 1% without epinephrine  Anesthetic Total (mL):  2      SEDATION    Patient Sedated: No        Skin prep agent: skin prep agent completely dried prior to procedure  Sterile barriers: maximum sterile barriers were used: cap, mask, sterile gown, sterile gloves, and large sterile sheet  Hand hygiene: hand hygiene performed prior to central venous catheter insertion  Type of line used: PICC  Catheter type: single lumen  Lumen type: non-valved and power PICC  Catheter size: 3 Fr  Brand: Darudar  Lot number: [id number]  Placement method: venipuncture, MST, tip navigation system and ultrasound  Number of attempts: 1  Difficulty threading catheter: no  Successful placement: yes  Orientation: right  Catheter to Vein (%): 20  Location: basilic vein  Tip Location: SVC/RA Junction  : 12.  Extremity circumference: 43  Visible catheter length: 5  Total catheter length:  45  Dressing and securement: blood cleaned with CHG, chlorhexidine disc applied, gloves changed prior to final dressing, site cleansed, subcutaneous anchor securement system and transparent securement dressing  Post procedure assessment: blood return through all ports, free fluid flow and placement verified by 3CG technology  PROCEDURE   Patient Tolerance:  Patient tolerated the procedure well with no immediate complicationsDescribe Procedure: Single lumen 3Fr placed in the (R) basilic vein. CVR of 20%. Tip placement verified in the SVC/RA junction using 3CG Technology. PICC line is good to use.  Disposal: sharps and needle count correct at the end of procedure, needles and guidewire disposed in sharps container

## 2024-04-16 NOTE — PROGRESS NOTES
"Mitali Robles is a 59 year old female admitted on 4/10/2024. PMH including bipolar disorder, PTSD, MDD,  hyperlipidemia, hypertension, and type 2 diabetes on insulin fracture dislocation of the left elbow c/b hardware failure and conversion to TEA in 2021, recurrent infections and s/p multiple I&Ds (last 2/2024) now s/p explant of TEA and placement of antibiotic spacer on 4/10 with Dr. Sarmiento.     Blood pressure 96/64, pulse 107, temperature 98.2  F (36.8  C), resp. rate 16, height 1.753 m (5' 9\"), weight 105.5 kg (232 lb 9.4 oz), SpO2 98%.    Hgb 6.6 today 4/16/24  1 unit blood ordered and recheck in AM    Gen: Awake and in NAD  Resp: Non labored  LUE: Splint/dressing is c/di    Seen by psych and pain team yesterday and a slight modification made to her medications  Patient stated pain control is better than yesterday and was able to sleep last night.  .     ID following:   Recommendations:  continue  IV Vancomycin and Ertapenem 1g Q24H 6 weeks post surgery  Stop Fluconazole due to prolonged QTc ( unclear if Candida albicans isolated in 9/2023 from a wound dehiscence was secondary to contamination vs infection). so far no Candida isolated   Will follow intraoperative tissue cultures   uncontrolled pain   need need PICC line before discharge    PICC line ordered  Per patient's preference she would like to transition to TCU in [place] before going back to [facility]  ID physician Dr. Dominguez in [place] updated by inpatient ID regarding antibiotic plan when the patient finally returned to [facility]-See ID note.     Sloan Stanford DNP-CNP  4/16/2024 3:12 PM  Orthopaedic Surgery  Thank you for allowing me to participate in this patient's care. Please page me directly any questions/concerns.   Securely message with the Vocera Web Console (learn more here).Text page via [employer] Paging/Directory  If there is no response, if it is a weekend, or if it is during evening hours, please page the orthopaedic surgery resident on call via [employer] " Paging/Directory

## 2024-04-16 NOTE — CONSULTS
"SPIRITUAL HEALTH SERVICES  SPIRITUAL ASSESSMENT consult Note  [facility] ([facility] [place]) 5MED  ON-CALL     REFERRAL SOURCE: Routine consult    Spoke with Mitali in her room who shared that \"I wish they would have let the Lord take me\" and how \"My wishes are to leave the world with everything I came here with, but that won't be happening now. I don't want anymore. No more amputations, no more oxygen, no more. I'm ready.\" She also shared that she recently learned that she needs a blood transfusion.     I asked Mitali if she had her paperwork done to make her wishes clear and she affirmed that she did.     Mitali shared that \"I've lost three children\" and that she's ready to see them again.     She also asked for communion, which I assured her I would work to make sure she got on a list. Mitali is from [facility] and is too far for her [employer]  to visit.     I oriented pt to spiritual health services and they know they can request a visit at any time.     Our visit ended short as she was getting a PIC Line put in.     PLAN: Will communicate communion request to spiritual care team and care to unit chaplain Klein. Spiritual health services remains available for any follow-up or requests. For further visits please place spiritual health consults.    Niki Guo, [facility]   Pager: [phone]    "

## 2024-04-16 NOTE — PROGRESS NOTES
Orthopaedic Surgery Progress Note 04/16/2024    S: Pain and psych teams saw yesterday and made their recs. Doing much better this AM with new pain regimen. Remains intermittently HTN, medicine assisting. Awaiting PICC line and placement. POC reviewed.    O:  Temp: 98.1  F (36.7  C) Temp src: Oral BP: (!) 140/83 Pulse: 107   Resp: 16 SpO2: 98 % O2 Device: Nasal cannula Oxygen Delivery: 2 LPM    Exam:  Gen: No acute distress, resting comfortably in bed.  Resp: Non-labored breathing  MSK:  LUE:  - Splint/Dressings c/d/i  - SILT medial/radial/ulnar/axillary nerves  - Fires EPL, FPL, Intrinsics  - Hand wwp    Drain output:   Output by Drain (mL) 04/14/24 0700 - 04/14/24 1459 04/14/24 1500 - 04/14/24 2259 04/14/24 2300 - 04/15/24 0659 04/15/24 0700 - 04/15/24 1459 04/15/24 1500 - 04/15/24 2259 04/15/24 2300 - 04/16/24 0659 04/16/24 0700 - 04/16/24 0718   Closed/Suction Drain 1 Left Elbow Bulb 15 [place] 60  60  40 150      Recent Labs   Lab 04/14/24  0257 04/13/24  1442 04/12/24  0541 04/11/24  1635 04/11/24  0030   WBC 8.8 12.1*  --   --  10.5   HGB 7.3* 8.3* 8.4*   < > 10.1*    270  --   --  324    < > = values in this interval not displayed.     Recent Labs   Lab 04/14/24  0621 04/13/24  1847 04/10/24  1530 04/10/24  1514 04/10/24  1453 04/10/24  1400 04/10/24  1358   CULTURE No growth after 1 day No growth after 2 days No growth after 5 days  Culture in progress  1+ Staphylococcus epidermidis*  1+ Staphylococcus capitis*  1+ Cutibacterium (Propionibacterium) acnes* No growth after 5 days  1+ Cutibacterium (Propionibacterium) acnes*  1+ Staphylococcus epidermidis* No growth after 5 days  No anaerobic organisms isolated after 5 days  Isolated in broth only Staphylococcus epidermidis* No growth after 5 days  No anaerobic organisms isolated after 5 days  No Growth No growth after 5 days  No anaerobic organisms isolated after 5 days  No growth after 5 days  Culture in progress  1+ Staphylococcus  capitis*  No Growth  Culture in progress  Isolated in broth only Staphylococcus epidermidis*  1+ Cutibacterium (Propionibacterium) acnes*      Assessment/Plan:     Mitali Robles is a 59 year old female with complex PMH including fracture dislocation of the left elbow c/b hardware failure and 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 Sarmiento.     ID recommends continued Vanco, Meropenem, and Fluconazole. Continue to respect DNR wishes. Pain team and psych teams on board and assisting with pain/psychogenic med regimen. Awaiting PICC line and placement.    Activity: Up with assist.  Weight bearing status: Minimize use of LUE  Antibiotics: Per ID recs:  Recommendations:  - Stop Cefazolin   - Start IV Vancomycin (pharmacy to dose)   - Start IV Ertapenem 1g Q24H   - Start Fluconazole PO/IV 400mg Q24H   - Will follow intraoperative tissue cultures   Diet: Begin with clear fluids and progress diet as tolerated.   DVT prophylaxis: aspirin 162 qd and mechanical while in the hospital, discharge on aspirin 162 x 4 weeks.  Bracing/Splinting: LUE posterior slab splint to be kept clean, dry, and intact until follow-up.   Elevation: Elevate LUE on pillows to keep on pillows as much as possible.   Wound Care: Keep splint c/d/I until 2 week follow up.  Drains: Document output per shift, discontinue at discretion of orthopedics  Pain management: transition from IV to orals as tolerated.    X-rays:  POD1 left elbow XR (AP and lateral)  Physical Therapy:  Eval and treat  Occupational Therapy:   Labs: Trend Hgb on POD #1, 2, then PRN  Cultures: Pending, follow culture results closely.   Consults: PT, OT. Medicine, Infectious disease.  appreciate assistance in caring for this patient.  May need pain team as has many narcotic allergies listed  Follow-up: Clinic with Dr. Sarmiento's team in 2 weeks for wound check with  left elbow xrays     Disposition: Pending progress with therapies, pain control on orals, and medical stability, anticipate discharge to TCU on POD #4-5.     Richard Dunaway MD  Orthopaedic Surgery, PGY-4  Pager: [phone]

## 2024-04-16 NOTE — PLAN OF CARE
End of Shift Summary: See flowsheets for VS and assessment details.      1900-0730    A&Ox4, calm and cooperative; able to make needs known with call light in reach.  Stand pivot to bedside commode or ehsan steady assist of one with weakness.  VSS on tele x HR & BP; see flowsheets and MAR, scheduled BP meds given, sats maintained WNL on 1LO2 nasal cannula, voided spontaneously, LBM 4/16.  RPIV infused NS TKO and antibiotics.  LUE wrapped and in sling, dressings CDI.  RUE bruising unchanged.  Redness blanchable to groin, nystatin cream applied.  Pain managed with scheduled meds, tramadol & oxycodone 10mg.  Pt hair washed and clothes changed this morning.  BAILEY drain in place, patent.  Sleep and hydration promoted.  Continue POC.

## 2024-04-17 ENCOUNTER — APPOINTMENT (OUTPATIENT)
Dept: PHYSICAL THERAPY | Facility: CLINIC | Age: 59
DRG: 463 | End: 2024-04-17
Attending: STUDENT IN AN ORGANIZED HEALTH CARE EDUCATION/TRAINING PROGRAM
Payer: COMMERCIAL

## 2024-04-17 ENCOUNTER — APPOINTMENT (OUTPATIENT)
Dept: CT IMAGING | Facility: CLINIC | Age: 59
DRG: 463 | End: 2024-04-17
Attending: PHYSICIAN ASSISTANT
Payer: COMMERCIAL

## 2024-04-17 ENCOUNTER — APPOINTMENT (OUTPATIENT)
Dept: GENERAL RADIOLOGY | Facility: CLINIC | Age: 59
DRG: 463 | End: 2024-04-17
Attending: INTERNAL MEDICINE
Payer: COMMERCIAL

## 2024-04-17 ENCOUNTER — APPOINTMENT (OUTPATIENT)
Dept: CT IMAGING | Facility: CLINIC | Age: 59
DRG: 463 | End: 2024-04-17
Attending: INTERNAL MEDICINE
Payer: COMMERCIAL

## 2024-04-17 PROBLEM — N17.9 ACUTE KIDNEY FAILURE, UNSPECIFIED (H): Status: ACTIVE | Noted: 2024-04-17

## 2024-04-17 LAB
ANION GAP SERPL CALCULATED.3IONS-SCNC: 12 MMOL/L (ref 7–15)
BACTERIA TISS BX CULT: ABNORMAL
BACTERIA TISS BX CULT: NORMAL
BACTERIA TISS BX CULT: NORMAL
BASE EXCESS BLDV CALC-SCNC: -3.4 MMOL/L (ref -3–3)
BASE EXCESS BLDV CALC-SCNC: -5.5 MMOL/L (ref -3–3)
BUN SERPL-MCNC: 17.9 MG/DL (ref 8–23)
CALCIUM SERPL-MCNC: 8.6 MG/DL (ref 8.8–10.2)
CHLORIDE SERPL-SCNC: 103 MMOL/L (ref 98–107)
CREAT SERPL-MCNC: 1.09 MG/DL (ref 0.51–0.95)
DEPRECATED HCO3 PLAS-SCNC: 21 MMOL/L (ref 22–29)
EGFRCR SERPLBLD CKD-EPI 2021: 58 ML/MIN/1.73M2
ERYTHROCYTE [DISTWIDTH] IN BLOOD BY AUTOMATED COUNT: 19.1 % (ref 10–15)
GLUCOSE BLDC GLUCOMTR-MCNC: 106 MG/DL (ref 70–99)
GLUCOSE BLDC GLUCOMTR-MCNC: 121 MG/DL (ref 70–99)
GLUCOSE BLDC GLUCOMTR-MCNC: 128 MG/DL (ref 70–99)
GLUCOSE BLDC GLUCOMTR-MCNC: 145 MG/DL (ref 70–99)
GLUCOSE BLDC GLUCOMTR-MCNC: 176 MG/DL (ref 70–99)
GLUCOSE BLDC GLUCOMTR-MCNC: 176 MG/DL (ref 70–99)
GLUCOSE SERPL-MCNC: 140 MG/DL (ref 70–99)
HCO3 BLDV-SCNC: 22 MMOL/L (ref 21–28)
HCO3 BLDV-SCNC: 22 MMOL/L (ref 21–28)
HCT VFR BLD AUTO: 24.7 % (ref 35–47)
HGB BLD-MCNC: 6.6 G/DL (ref 11.7–15.7)
HGB BLD-MCNC: 7.4 G/DL (ref 11.7–15.7)
MAGNESIUM SERPL-MCNC: 1.4 MG/DL (ref 1.7–2.3)
MCH RBC QN AUTO: 25.2 PG (ref 26.5–33)
MCHC RBC AUTO-ENTMCNC: 30 G/DL (ref 31.5–36.5)
MCV RBC AUTO: 84 FL (ref 78–100)
O2/TOTAL GAS SETTING VFR VENT: 30 %
O2/TOTAL GAS SETTING VFR VENT: 88 %
OXYHGB MFR BLDV: 65 % (ref 70–75)
OXYHGB MFR BLDV: 82 % (ref 70–75)
PCO2 BLDV: 43 MM HG (ref 40–50)
PCO2 BLDV: 52 MM HG (ref 40–50)
PH BLDV: 7.23 [PH] (ref 7.32–7.43)
PH BLDV: 7.32 [PH] (ref 7.32–7.43)
PLATELET # BLD AUTO: 323 10E3/UL (ref 150–450)
PO2 BLDV: 38 MM HG (ref 25–47)
PO2 BLDV: 49 MM HG (ref 25–47)
POTASSIUM SERPL-SCNC: 4.2 MMOL/L (ref 3.4–5.3)
RBC # BLD AUTO: 2.94 10E6/UL (ref 3.8–5.2)
SAO2 % BLDV: 66.7 % (ref 70–75)
SAO2 % BLDV: 83.2 % (ref 70–75)
SODIUM SERPL-SCNC: 136 MMOL/L (ref 135–145)
WBC # BLD AUTO: 13.5 10E3/UL (ref 4–11)

## 2024-04-17 PROCEDURE — 82805 BLOOD GASES W/O2 SATURATION: CPT | Performed by: STUDENT IN AN ORGANIZED HEALTH CARE EDUCATION/TRAINING PROGRAM

## 2024-04-17 PROCEDURE — 71045 X-RAY EXAM CHEST 1 VIEW: CPT | Mod: 26 | Performed by: RADIOLOGY

## 2024-04-17 PROCEDURE — 85027 COMPLETE CBC AUTOMATED: CPT | Performed by: INTERNAL MEDICINE

## 2024-04-17 PROCEDURE — 70496 CT ANGIOGRAPHY HEAD: CPT | Mod: 26 | Performed by: RADIOLOGY

## 2024-04-17 PROCEDURE — 0042T CT HEAD PERFUSION W CONTRAST: CPT

## 2024-04-17 PROCEDURE — 97530 THERAPEUTIC ACTIVITIES: CPT | Mod: GP

## 2024-04-17 PROCEDURE — 83735 ASSAY OF MAGNESIUM: CPT | Performed by: INTERNAL MEDICINE

## 2024-04-17 PROCEDURE — 250N000013 HC RX MED GY IP 250 OP 250 PS 637: Performed by: INTERNAL MEDICINE

## 2024-04-17 PROCEDURE — 0042T CT HEAD PERFUSION W CONTRAST: CPT | Mod: GC | Performed by: RADIOLOGY

## 2024-04-17 PROCEDURE — 70496 CT ANGIOGRAPHY HEAD: CPT

## 2024-04-17 PROCEDURE — 99233 SBSQ HOSP IP/OBS HIGH 50: CPT

## 2024-04-17 PROCEDURE — 250N000013 HC RX MED GY IP 250 OP 250 PS 637

## 2024-04-17 PROCEDURE — 71045 X-RAY EXAM CHEST 1 VIEW: CPT

## 2024-04-17 PROCEDURE — 250N000013 HC RX MED GY IP 250 OP 250 PS 637: Performed by: STUDENT IN AN ORGANIZED HEALTH CARE EDUCATION/TRAINING PROGRAM

## 2024-04-17 PROCEDURE — 250N000011 HC RX IP 250 OP 636

## 2024-04-17 PROCEDURE — 250N000011 HC RX IP 250 OP 636: Performed by: STUDENT IN AN ORGANIZED HEALTH CARE EDUCATION/TRAINING PROGRAM

## 2024-04-17 PROCEDURE — 999N000157 HC STATISTIC RCP TIME EA 10 MIN

## 2024-04-17 PROCEDURE — 80048 BASIC METABOLIC PNL TOTAL CA: CPT | Performed by: INTERNAL MEDICINE

## 2024-04-17 PROCEDURE — 250N000011 HC RX IP 250 OP 636: Mod: JZ | Performed by: STUDENT IN AN ORGANIZED HEALTH CARE EDUCATION/TRAINING PROGRAM

## 2024-04-17 PROCEDURE — 70498 CT ANGIOGRAPHY NECK: CPT | Mod: 26 | Performed by: RADIOLOGY

## 2024-04-17 PROCEDURE — 250N000009 HC RX 250: Performed by: STUDENT IN AN ORGANIZED HEALTH CARE EDUCATION/TRAINING PROGRAM

## 2024-04-17 PROCEDURE — 258N000003 HC RX IP 258 OP 636: Performed by: STUDENT IN AN ORGANIZED HEALTH CARE EDUCATION/TRAINING PROGRAM

## 2024-04-17 PROCEDURE — 99207 PR NO CHARGE LOS: CPT | Performed by: PHYSICIAN ASSISTANT

## 2024-04-17 PROCEDURE — 999N000127 HC STATISTIC PERIPHERAL IV START W US GUIDANCE

## 2024-04-17 PROCEDURE — 36415 COLL VENOUS BLD VENIPUNCTURE: CPT | Performed by: STUDENT IN AN ORGANIZED HEALTH CARE EDUCATION/TRAINING PROGRAM

## 2024-04-17 PROCEDURE — 70450 CT HEAD/BRAIN W/O DYE: CPT

## 2024-04-17 PROCEDURE — 99232 SBSQ HOSP IP/OBS MODERATE 35: CPT | Performed by: INTERNAL MEDICINE

## 2024-04-17 PROCEDURE — 250N000011 HC RX IP 250 OP 636: Performed by: INTERNAL MEDICINE

## 2024-04-17 PROCEDURE — 94660 CPAP INITIATION&MGMT: CPT

## 2024-04-17 PROCEDURE — 120N000002 HC R&B MED SURG/OB UMMC

## 2024-04-17 RX ORDER — IOPAMIDOL 755 MG/ML
50 INJECTION, SOLUTION INTRAVASCULAR ONCE
Status: COMPLETED | OUTPATIENT
Start: 2024-04-17 | End: 2024-04-17

## 2024-04-17 RX ORDER — IOPAMIDOL 755 MG/ML
100 INJECTION, SOLUTION INTRAVASCULAR ONCE
Status: COMPLETED | OUTPATIENT
Start: 2024-04-17 | End: 2024-04-17

## 2024-04-17 RX ORDER — MAGNESIUM SULFATE HEPTAHYDRATE 40 MG/ML
4 INJECTION, SOLUTION INTRAVENOUS ONCE
Qty: 100 ML | Refills: 0 | Status: COMPLETED | OUTPATIENT
Start: 2024-04-17 | End: 2024-04-17

## 2024-04-17 RX ADMIN — CITALOPRAM 20 MG: 20 TABLET ORAL at 22:04

## 2024-04-17 RX ADMIN — IOPAMIDOL 50 ML: 755 INJECTION, SOLUTION INTRAVENOUS at 15:50

## 2024-04-17 RX ADMIN — NYSTATIN: 100000 OINTMENT TOPICAL at 19:50

## 2024-04-17 RX ADMIN — SENNOSIDES AND DOCUSATE SODIUM 1 TABLET: 8.6; 5 TABLET ORAL at 19:40

## 2024-04-17 RX ADMIN — OMEPRAZOLE 20 MG: 20 CAPSULE, DELAYED RELEASE ORAL at 08:18

## 2024-04-17 RX ADMIN — KETOROLAC TROMETHAMINE 30 MG: 30 INJECTION, SOLUTION INTRAMUSCULAR at 19:40

## 2024-04-17 RX ADMIN — SODIUM CHLORIDE 99 ML: 9 INJECTION, SOLUTION INTRAVENOUS at 15:38

## 2024-04-17 RX ADMIN — HEPARIN, PORCINE (PF) 10 UNIT/ML INTRAVENOUS SYRINGE 5 ML: at 07:58

## 2024-04-17 RX ADMIN — KETOROLAC TROMETHAMINE 30 MG: 30 INJECTION, SOLUTION INTRAMUSCULAR at 08:18

## 2024-04-17 RX ADMIN — ERTAPENEM SODIUM 1 G: 1 INJECTION, POWDER, LYOPHILIZED, FOR SOLUTION INTRAMUSCULAR; INTRAVENOUS at 16:40

## 2024-04-17 RX ADMIN — POLYETHYLENE GLYCOL 3350 17 G: 17 POWDER, FOR SOLUTION ORAL at 19:39

## 2024-04-17 RX ADMIN — ASPIRIN 162 MG: 81 TABLET, COATED ORAL at 08:18

## 2024-04-17 RX ADMIN — ATORVASTATIN CALCIUM 10 MG: 10 TABLET, FILM COATED ORAL at 19:40

## 2024-04-17 RX ADMIN — ACETAMINOPHEN 975 MG: 325 TABLET, FILM COATED ORAL at 15:08

## 2024-04-17 RX ADMIN — KETOROLAC TROMETHAMINE 30 MG: 30 INJECTION, SOLUTION INTRAMUSCULAR at 13:32

## 2024-04-17 RX ADMIN — ACETAMINOPHEN 975 MG: 325 TABLET, FILM COATED ORAL at 08:19

## 2024-04-17 RX ADMIN — IOPAMIDOL 67 ML: 755 INJECTION, SOLUTION INTRAVENOUS at 15:36

## 2024-04-17 RX ADMIN — HYDROMORPHONE HYDROCHLORIDE 0.2 MG: 0.2 INJECTION, SOLUTION INTRAMUSCULAR; INTRAVENOUS; SUBCUTANEOUS at 15:09

## 2024-04-17 RX ADMIN — LUMATEPERONE 42 MG: 42 CAPSULE ORAL at 22:04

## 2024-04-17 RX ADMIN — VANCOMYCIN HYDROCHLORIDE 1500 MG: 10 INJECTION, POWDER, LYOPHILIZED, FOR SOLUTION INTRAVENOUS at 19:52

## 2024-04-17 RX ADMIN — MAGNESIUM SULFATE HEPTAHYDRATE 4 G: 40 INJECTION, SOLUTION INTRAVENOUS at 12:16

## 2024-04-17 RX ADMIN — NYSTATIN: 100000 OINTMENT TOPICAL at 08:29

## 2024-04-17 RX ADMIN — DIAZEPAM 5 MG: 5 TABLET ORAL at 22:04

## 2024-04-17 ASSESSMENT — ACTIVITIES OF DAILY LIVING (ADL)
ADLS_ACUITY_SCORE: 40
ADLS_ACUITY_SCORE: 47
ADLS_ACUITY_SCORE: 47
ADLS_ACUITY_SCORE: 39
ADLS_ACUITY_SCORE: 40
ADLS_ACUITY_SCORE: 39
ADLS_ACUITY_SCORE: 40
ADLS_ACUITY_SCORE: 39
ADLS_ACUITY_SCORE: 40
ADLS_ACUITY_SCORE: 47
ADLS_ACUITY_SCORE: 41
ADLS_ACUITY_SCORE: 48
ADLS_ACUITY_SCORE: 47
ADLS_ACUITY_SCORE: 40
ADLS_ACUITY_SCORE: 39
ADLS_ACUITY_SCORE: 40
ADLS_ACUITY_SCORE: 47

## 2024-04-17 NOTE — CONSULTS
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      Patient has a history of severe PTSD managed by a  In [place] with a complex psychotropic regimen:   --Xanax 2mg at bedtime  --Celexa 20mg at bedtime  --Klonopin 4mg at bedtime  --Clonidine 1mg at bedtime  --Valium 30mg at bedtime  --Gabapentin 800mg TID  --Caplyta 84mg at bedtime     Patient became acutely altered less responsive on 4/13/2024, multiple rapids called. Multiple rounds of Narcan, was transferred to the ICU. In the ICU patient was placed on BiPAP. Opioids were discontinued. Ultimately transferred back to the medicine team on 4/14/2024. While in the ICU CODE STATUS was addressed. Patient is now DNR as she discussed this with multiple teams including ICU and primary service orthopedic surgery. Her Psych meds were placed on hold 4/13. Pain consult 4/15 recommended oxycodone 5-10mg PO q6 and Dilaudid 0.1-0.2mg PO IV daily PRN.      Patient reported to me 4/15 her need for her PTA medications, she's been following with Dr. Butler and on that regimen for 15 years. She reported her severe PTSD and trauma (documented below) and stated that without the medications her PTSD would take over and she would \"surely become suicidal.\" She received Dilaudid 0.2mg IV today 1416 and was tired on exam. Since admission 4/10, she has received Xanax 0.25mg TID x6 doses with the last dose being 0800 on 4/13. She has not received any klonopin during this " "hospitalization. Clonidine was restarted at a lower dose.      I restarted Valium 5mg and no other benzodiazepines 4/15 at bedtime. Also restarted Capylta at a lower dose due to interaction with flucanozole. This morning, she reports she is \"doing well\" and got sleep last night because \"I received the right medications.\" She denies feeling anxious and her blood pressures have decreased. Denies feeling suicidal     Update 4/17 valium was held last night due to somnolence, pt needed CPAP and a blood transfusion. She states she's feeling fine now, just wishes she didn't have to be NPO. Denies anxiety, states she is tired, slept a lot last night. Spiritual health consult copleted yesterday reports patient stated she was ready to \"go\" and \"I've lost three children\" and that she's ready to see them again. She denies active suicidal ideation on exam today.        Current Medications:     Current Facility-Administered Medications   Medication Dose Route Frequency Provider Last Rate Last Admin    acetaminophen (TYLENOL) tablet 975 mg  975 mg Oral Q8H Sveta Mcqueen APRN CNP   975 mg at 04/17/24 0819    aspirin EC tablet 162 mg  162 mg Oral Daily Marcos Sarmiento MD   162 mg at 04/17/24 0818    atorvastatin (LIPITOR) tablet 10 mg  10 mg Oral QPM Sveta Mcqueen APRN CNP   10 mg at 04/15/24 2003    citalopram (celeXA) tablet 20 mg  20 mg Oral At Bedtime Odalys Weeks MD   20 mg at 04/15/24 2154    [Held by provider] cloNIDine (CATAPRES) tablet 0.1 mg  0.1 mg Oral TID Radha Banks MD   0.1 mg at 04/15/24 2003    [Held by provider] diazepam (VALIUM) tablet 5 mg  5 mg Oral At Bedtime Nenita Garcia APRN CNP   5 mg at 04/15/24 2154    ertapenem (INVanz) 1 g vial to attach to  mL bag  1 g Intravenous Q24H Richard Dunaway  mL/hr at 04/14/24 1710 1 g at 04/16/24 1718    ferrous sulfate (FEROSUL) tablet 325 mg  325 mg Oral Every Other Day Sveta Mcqueen, SAI CNP   325 mg at 04/16/24 1027    [Held by " provider] gabapentin (NEURONTIN) tablet 800 mg  800 mg Oral TID Sloan Stanford APRN CNP   800 mg at 04/16/24 1720    heparin lock flush 10 UNIT/ML injection 5-20 mL  5-20 mL Intracatheter Q24H Sloan Stanford APRN CNP        insulin aspart (NovoLOG) injection (RAPID ACTING)  1-7 Units Subcutaneous TID AC dOalys Weeks MD   1 Units at 04/16/24 1748    insulin aspart (NovoLOG) injection (RAPID ACTING)  1-5 Units Subcutaneous At Bedtime Odalys Weeks MD   1 Units at 04/11/24 2218    ketorolac (TORADOL) injection 30 mg  30 mg Intravenous Q6H Sveta Mcqueen APRN CNP   30 mg at 04/17/24 0818    lumateperone (CAPLYTA) capsule 42 mg  42 mg Oral At Bedtime Nenita Garcia APRN CNP   42 mg at 04/15/24 2213    magnesium sulfate 4 g in 100 mL sterile water intermittent infusion  4 g Intravenous Once Radha Banks MD        nystatin (MYCOSTATIN) ointment   Topical BID Only, MD Philip   Given at 04/17/24 0829    omeprazole (PriLOSEC) CR capsule 20 mg  20 mg Oral Daily Marcos Sarmiento MD   20 mg at 04/17/24 0818    polyethylene glycol (MIRALAX) Packet 17 g  17 g Oral BID Rita Loja APRN CNP        senna-docusate (SENOKOT-S/PERICOLACE) 8.6-50 MG per tablet 1 tablet  1 tablet Oral BID Marcos Sarmiento MD   1 tablet at 04/14/24 0827    sodium chloride (PF) 0.9% PF flush 10-40 mL  10-40 mL Intracatheter Q8H Sloan Stanford APRN CNP   10 mL at 04/17/24 0827    sodium chloride (PF) 0.9% PF flush 3 mL  3 mL Intracatheter Q8H Arvind Falcon MD   3 mL at 04/16/24 2223    sodium chloride (PF) 0.9% PF flush 3 mL  3 mL Intracatheter Q8H Arvind Falcon MD   3 mL at 04/16/24 2224    sodium chloride (PF) 0.9% PF flush 3 mL  3 mL Intracatheter Q8H Marcos Sarmiento MD   3 mL at 04/16/24 0507    vancomycin (VANCOCIN) 1,500 mg in 0.9% NaCl 250 mL intermittent infusion  1,500 mg Intravenous Q24H Marcos Sarmiento MD                  MSE:   Appearance: awake, alert  Attitude:  cooperative  Eye Contact:  good  Mood:   ok  Affect:   "appropriate and in normal range and mood congruent  Speech:  clear, coherent  Language: Fluent in english   Psychomotor Behavior:  no evidence of tardive dyskinesia, dystonia, or tics  Thought Process:  logical and tangental  Associations:  no loose associations  Thought Content:  no evidence of suicidal ideation or homicidal ideation and no evidence of psychotic thought  Insight:  fair  Judgement:  fair  Oriented to:  time, person, and place  Attention Span and Concentration:  intact  Recent and Remote Memory:  intact  Fund of Knowledge: Appropriate   Gait and Station: baseline    Vital signs:  Temp: 98.7  F (37.1  C) Temp src: Oral BP: (!) 155/70 Pulse: 104   Resp: 13 SpO2: 91 % O2 Device: Nasal cannula Oxygen Delivery: 2 LPM Height: 175.3 cm (5' 9\") Weight: 105.5 kg (232 lb 9.4 oz)  Estimated body mass index is 34.35 kg/m  as calculated from the following:    Height as of this encounter: 1.753 m (5' 9\").    Weight as of this encounter: 105.5 kg (232 lb 9.4 oz).             DSM-5 Diagnosis:   PTSD           Assessment:   Mitali is a 59 year old female with history of severe PTSD on a home regimen of 3 different benzodiazepines at very high doses. She has not received any of her home Klonopin, and has received very little xanax compared to her home dose. Over the weekend, she required ICU transfer due to respiratory failure related to opioids. There is concern restarting her home meds that she is requesting given continued opioid agonist use, lack of obvious benzodiazepine withdrawal sxs, and recently changing her code status to DNR/DNI. I did restart her Valium at a lower dose of 5mg and today she states she feels much better, denies anxiety/suicidality, BP improved, states she slept well and is still tired. I will not be increasing any further given he subjective report today and lack of objective signs of benzodiazepine use. She denies having any home supply of medications in her room. Will continue to monitor " her closely for withdrawal symptoms.             Summary of Recommendations:     Continue Caplyta 42mg at bedtime (dose adjusted from 84mg daily due to interaction with Flucanozole) -- we can increase back to 84 in a few days now that Flucanozole was stopped   Continue Celexa 20mg at bedtime   Continue Valium 5mg at bedtime. Will not restart any other benzodiazepines at this time given reasons above  Ordered Flumazenil PRN   Recommend continuous pulse ox       Nenita Garcia, PMAGAP-BC  Consult/Liaison Psychiatry   [facility]

## 2024-04-17 NOTE — PLAN OF CARE
"Goal Outcome Evaluation:      Plan of Care Reviewed With: patient    Overall Patient Progress: declining    End of shift Summary: See flowsheet for VS and detail assessments.     Changes this Shift: A&Ox4, VSS. Lethargic. Intermittently opening eyes spontaneously, but mostly arousing to voice.  Denies chest pain, SOB, cough. Intermittently using 2 L NC.  Voiding spontaneously. LBM today.  Significantly weaker today, using ehsan steady OOB to commode and recliner.  RUE scabbing, redness, irritation from previous IVs and lab draws.  Pain managed with tylenol & toradol. IV dilaudid used once before CT.  LUE casted and in sling, CDI.   R PICC drsg CDI.  Soft touch call light placed within reach, able to make needs known.    Upon morning assessment pt was unable to use RUE. No ROM, weak  strengths, said it felt \"numb.\" Dr Banks page[place]adford, head CT obtained.   1400 pt is still unable to use RUE, RN noted slight facial drooping, more balance issues. Dr Banks paged again and code stroke activated. CTA obtained, code stroke deescalated.   1900 pt still unable to fully use RUE, however RN notes pt using arm more often and ROM is improving. Able to hold cup of water, needs assistance feeding.     Plan: Cont POC.    "

## 2024-04-17 NOTE — PROGRESS NOTES
Stroke code 1445 arrived 1447.  Patient had a CT 11:12 that was negative. Patient had no new deficits.  Floor nurse reports that prior to 11:12 CT she noted right sided weakness. This is why the 11:12 CT was done and was negative for stroke. 1445 stroke the patient had no new deficits.  .  Patient was had been labile over the weekend.     ICU LILIYA's present and deferred to ortho and hospitalists.  No new signs of stroke. Floor nurse and charge present. Patient stable. Writer left the room.

## 2024-04-17 NOTE — PLAN OF CARE
End of shift Summary: See flowsheet for VS and detail assessments.     Changes this Shift: 5548-9578: A&Ox4, VSS x hypotension, sats maintained on RA.  Denies SOB, infrequent productive cough, expiratory wheezing. RRR. Diaphoretic.  Voiding spontaneously. LBM this shift.  SBA x2 Gait belt pivoting to commode. Weak.  R PICC purple single lumen placed.  Hgb 6.6. 1 U RBC ordered, started at 1830. Given late due to type and screen delay.    1800: Pt became increasingly more lethargic, pale, and hypotensive. MD made aware, likely due to needing blood transfusion. Pt had 800 mg gabapentin at 1700. Advised RN to page again if pt does start to improve after transfusion and keep her on continuous pulse ox for close monitoring.     Plan: Cont POC.

## 2024-04-17 NOTE — CONSULTS
[facility]    Stroke Telephone Note    I was called via stroke code by Dr. Banks on 04/17/24 regarding patient Mitali Robles. The patient is a 59 year old female who had been noted to have some right arm weakness since around 9 am. It was noted on rounds this morning. It is unclear when it was last fully strong because the patient is medically complex and has been on and off Bipap as well as on and off multiple psychiatric medications. She was admitted [facility] after having a left elbow surgery and then having some lethargy and hypoxia. On exam for Dr. Banks he reports she still is weak in the R arm ant it has been fluctuating. She also complained this morning of having difficulty using her hand to use her cell phone. A head CT was done a few hours ago and was unremarkable; she then refused MRI due to claustrophobia    Vitals  BP: 133/65   Pulse: 99   Resp: 16   Temp: 98.7  F (37.1  C)   Weight: 105.5 kg (232 lb 9.4 oz)    Stroke Code Data (for stroke code without tele)  Stroke code activated 04/17/24  1445   Stroke provider first response 04/17/24  1445   Last known normal 04/16/24     Unknown   Time of discovery (or onset of symptoms) 04/17/24  0900   Head CT read by Stroke Neuro Provider 04/17/24  1446   Was stroke code de-escalated? Yes  04/17/24  1615     Imaging Findings  CT head: no acute finding  CTA head/neck: no  intracranial LVO but some R vert atherosclerosis with some areas of possible occlusion  CTP head: no regional perfusion defect    Intravenous Thrombolysis  Not given due to:   - surgery/trauma within the past 14 days  - unclear or unfavorable risk-benefit profile for extended window thrombolysis beyond the conventional 4.5 hour time window    Endovascular Treatment  Not initiated due to absence of proximal vessel occlusion    Impression  Right arm weakness, possibly due to stroke versus peripheral such as cervical  "spine    Recommendations   - General neurology consultation for in-person examination of patient and consideration of brain MRI if able to tolerate    Case discussed with vascular neurology attending Dr. Contreras.    My recommendations are based on the information provided over the phone by Mitali Robles's in-person providers. They are not intended to replace the clinical judgment of her in-person providers. I was not requested to personally see or examine the patient at this time.     Fallon Brown PA-C  Vascular Neurology    To page me or covering stroke neurology team member, click here: AMCOM  Choose \"On Call\" tab at top, then select \"NEUROLOGY/ALL SITES\" from middle drop-down box, press Enter, then look for \"stroke\" or \"telestroke\" for your site.    "

## 2024-04-17 NOTE — CONSULTS
[facility]  Neurology Consultation    Patient Name:  Mitali Robles  MRN:  [id number]    :  1965  Date of Service:  2024  Primary care provider:  Alo Giang      Neurology consultation service was asked to see Mitali Robles by Dr. Banks to evaluate for left arm weakness and confusion.    Chief Complaint:  right arm weakness    History of Present Illness:   Mitali Robles is a 59 year old female admitted 4/10/2024 for left elbow prosthetic joint infection s/p left total elbow arthroplasty.  Her hospitalization has been complicated by episodic periods of encephalopathy attributed to use of opiates.  Psychiatry has been helpful in helping to reduce and manage her CNS acting medications, as she has an extensive home regimen for her severe PTSD.    When she awoke  she was found to have right arm weakness at about 9 AM (this is the nonoperative arm).  Stroke code was called and a CTA and CT perfusion was performed which did not show evidence of an acute stroke.  There was a recommendation for an MRI brain, though the patient opted out of this.  General neurology was then consulted to assess for right arm weakness.    Dilaudid last at 15:09 .  Has been receiving oxycodone 1000 mg twice daily on 4/15 and  and has not been given since. Diazepam 5 mg given last night at 2200.     ROS  A comprehensive ROS was performed and pertinent findings were included in HPI.     PMH  Past Medical History:   Diagnosis Date    Back injury     Depressive disorder     Hearing problem     Hyperlipidemia     Hypertension     Neck injuries     Stomach ulcer      Past Surgical History:   Procedure Laterality Date    BACK SURGERY      IRRIGATION AND DEBRIDEMENT ELBOW, PLACE ANTIBIOTIC CEMENT BEADS / SPAC Left 4/10/2024    Procedure: IRRIGATION AND DEBRIDEMENT LEFT ELBOW WITH ANTIBIOTIC SPACER INSERTION;  Surgeon: Marcos Sarmiento MD;  Location: [facility] OR    MA SPINE SURGERY  PROCEDURE UNLISTED      REMOVE HARDWARE ELBOW Left 4/10/2024    Procedure: EXPLANT LEFT TOTAL ELBOW ARTHROPLASTY;  Surgeon: Marcos Sarmiento MD;  Location:  OR    [facility] HAND/FINGER SURGERY UNLISTED      [facility] SHOULDER SURG PROC UNLISTED      [facility] STOMACH SURGERY PROCEDURE UNLISTED         Medications   I have personally reviewed the patient's medication list.     Allergies  I have personally reviewed the patient's allergy list.     Social History     Social History     Socioeconomic History    Marital status:      Spouse name: Not on file    Number of children: Not on file    Years of education: Not on file    Highest education level: Not on file   Occupational History    Not on file   Tobacco Use    Smoking status: Former     Current packs/day: 0.00     Average packs/day: 1.5 packs/day for 40.2 years (60.3 ttl pk-yrs)     Types: Cigarettes     Start date: 1983     Quit date: 2023     Years since quittin.0    Smokeless tobacco: Never   Substance and Sexual Activity    Alcohol use: No    Drug use: No    Sexual activity: Never   Other Topics Concern    Not on file   Social History Narrative    Not on file     Social Determinants of Health     Financial Resource Strain: Low Risk  (2024)    [facility]    Overall Financial Resource Strain (CARDIA)     Difficulty of Paying Living Expenses: Not hard at all   Food Insecurity: No Food Insecurity (2024)    [facility] from [facility]    Hunger Vital Sign     Worried About Running Out of Food in the Last Year: Never true     Ran Out of Food in the Last Year: Never true   Transportation Needs: No Transportation Needs (2024)    Received from [facility], [facility]    PRAPARE - Transportation     Lack of Transportation (Medical): No     Lack of Transportation (Non-Medical): No   Physical Activity: Inactive  "(5/12/2021)    [facility]    Exercise Vital Sign     Days of Exercise per Week: 0 days     Minutes of Exercise per Session: 0 min   Stress: No Stress Concern Present (5/12/2021)    [facility]    [place] [employer] - Occupational Stress Questionnaire     Feeling of Stress : Only a little   Social Connections: Socially Isolated (5/12/2021)    [facility] from [facility]    Social Connection and Isolation Panel [NHANES]     Frequency of Communication with Friends and Family: More than three times a week     Frequency of Social Gatherings with Friends and Family: More than three times a week     Attends [employer] Services: Never     Active Member of Clubs or Organizations: No     Attends Club or Organization Meetings: Never     Marital Status:    Interpersonal Safety: Not At Risk (5/12/2021)    [facility]    Humiliation, Afraid, Rape, and Kick questionnaire     Fear of Current or Ex-Partner: No     Emotionally Abused: No     Physically Abused: No     Sexually Abused: No   Housing Stability: Low Risk  (2/19/2024)    [facility]    Housing Stability Vital Sign     Unable to Pay for Housing in the Last Year: No     Number of Places Lived in the Last Year: 1     In the last 12 months, was there a time when you did not have a steady place to sleep or slept in a shelter (including now)?: No         Family History    Family History   Problem Relation Age of Onset    Anxiety Disorder Sister     Hypertension Sister     Hyperlipidemia Sister           Physical Examination   Vitals: /65   Pulse 99   Temp 98.7  F (37.1  C) (Oral)   Resp 16   Ht 1.753 m (5' 9\")   Wt 105.5 kg (232 lb 9.4 oz)   " SpO2 (!) 87%   BMI 34.35 kg/m    General: Lying in bed, NAD  Head: NC/AT  Eyes: no icterus, op pink and moist  Cardiac: RRR. Extremities warm, no edema.   Respiratory: non-labored,  nasal cannula 2 L  GI: S/NT/ND  Skin: No rash or lesion on exposed skin  Neuro:  Mental status:   Somnolent, though awakes to her name.  If not being repeatedly stimulated, she goes back to sleep.  Able to follow simple commands.  Not oriented to place, declines to answer year.  Cranial nerves:   Pupils are equal, reactive, face appears symmetrical.  Motor:   Able to dorsiflex bilaterally, minimally lift legs off of the bed via knee extension bilaterally.  Spontaneously uses right arm to brush her hair prior to my examination.  When formally testing the right arm, there is impersistent activation and it flops to bed.  Her  strength is strong bilaterally.  Reflexes:   Toes are downgoing, no clonus bilaterally  Sensory: Withdraws both legs in response to noxious stimulation  Coordination: Unable to assess  Gait: Unable to assess due to somnolence    Investigations   I have personally reviewed pertinent labs, tests, and radiological imaging. Discussion of notable findings is included under Impression.     Impression:   1. No evidence of ischemia or acute infarction on the CT Perfusion  examination.  2. Head CTA demonstrates no aneurysm or stenosis of the major  intracranial arteries.   3. Neck CTA demonstrates short segment foci of decreased contrast  density within the proximal V2 segment of the right vertebral artery  without definite stenosis, that is only seen on the axial images and  not well demonstrated on the 3-D rotating minutes. This finding may  represent artifact or possibly short segment stenosis. No definite  occlusions identified. The remainder of the major cervical arteries  are widely patent.    Was patient transferred from outside hospital?   No    Impression  #Encephalopathy, suspect toxic metabolic  #PTA use of  multiple centrally active medications  #CO2 retention    I was asked to see Ms. Robles to comment on the right arm weakness that was noted yesterday and the reason for the stroke code.  Her hospitalization has been complicated by more than 1 episode of severe encephalopathy including requiring transfer to the ICU.  On my examination today Ms. Robles remains encephalopathic, requires repeat repeated stimulation to maintain wakefulness.  On her strength exam I am able to observe spontaneous movement of the right upper extremity including lifting it above her head and adjusting her nasal cannula.  However, when I formally test strength of the right arm she does not give full participation, most likely secondary to her level of encephalopathy.  Note that her labs show repeated CO2 retention, and a CPAP was needed during one of her episodes of somnolence.    Based on my examination today, I do not appreciate focal weakness, though her reduced level of consciousness interferes with the remainder of the neurologic exam.  Even her low-dose of diazepam last night may be contributing.    Recommendations  -Use of CPAP while sleeping or napping day or night  -In agreement with optimization of psychotropic medications outlined by psychiatry  - Delirium precautions including maintaining wakefulness during the day, minimize interruptions at night  - Minimize use of opiate pain medications is much as able  -Minimize use of benzodiazepines, consider holding Valium nightly   -Inpatient neurology will sign off at this time, please call if questions arise    Thank you for involving Neurology in the care of Mitali Robles.  Please do not hesitate to call with questions.     Oswaldo Terry MD    Billed as a level 4 visit due to patient presenting with acute on chronic disease leading to severe loss of bodily function (PTSD with multiple psychotropic medications).  I have reviewed imaging including the CTA, discussed the  case with the hospitalist, reviewed her notes from psychiatry, the hospitalist service, and labs including repeatedly elevated CO2, anemia yesterday to 6.6 which corrected to 10.1 after transfusion.  Creatinine of 1.0.

## 2024-04-17 NOTE — PROGRESS NOTES
Addendum about the telestroke    Patient had CT angiogram head and neck done.  Discussed with telestroke  Neurology.  CTA head and neck is negative for any hemodynamically significant stenosis   Patient does have severe claustrophobia.  She has left elbow explantation and placement of antibiotic spacer with still drains in place.  Patient not a candidate for tPA    I would obtain general neurology consult

## 2024-04-17 NOTE — PROGRESS NOTES
Pt more alert now. A&O x4, able to hold a conversation. Off BIPAP now, on 2L NC. VSS and denies pain. Continue POC.

## 2024-04-17 NOTE — PROGRESS NOTES
Cross Cover    Repeat gas ~2AM normalized. Patient sleeping comfortably but easier to wake now per RN. Hgb 6.6 on the VBG - had been transfused a unit of RBC on 4/16 for Hgb 6.6. She was given a 500 ml bolus earlier. She is vitally stable, not hypotensive.    OK to continue BIPAP as long as she tolerates. If she wakes and prefers to remove BIPAP we can remove and check VBG with morning labs. Vitally stable and the 6.6 hgb might be partly dilutional with her bolus - will recheck with morning labs before transfusing.    Schuyler Matthew MD ([place])  Internal Medicine/Pediatrics  Hospitalist
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
## 2024-04-17 NOTE — PROGRESS NOTES
"Care Management Follow Up     Length of Stay (days): 7     Expected Discharge Date: 4/18/24     Concerns to be Addressed: Discharge planning     Patient plan of care discussed at interdisciplinary rounds: Yes     Anticipated Discharge Disposition: Transitional Care     Anticipated Discharge Services: None  Anticipated Discharge DME: None     Patient/family educated on Medicare website which has current facility and service quality ratings: No  Education Provided on the Discharge Plan: Yes  Patient/Family in Agreement with the Plan: Yes     Referrals Placed by CM/SW: External Care Coordination  Private pay costs discussed: Not applicable     Additional Information:  Per IDT rounds, patient still needs \"several days\" before she is medically ready for discharge. SW updated rehab liaison. Patient will need a PAS prior to discharge to  TCU. Care management will continue to follow.      Pending:  [address]  [address]  P: [phone]  F: [phone]     MAX Mauro, LGSW  5 Med Surg   [facility]  Phone: [phone]  Pager: [phone]  "

## 2024-04-17 NOTE — PHARMACY-VANCOMYCIN DOSING SERVICE
Pharmacy Vancomycin Note  Date of Service 2024  Patient's  1965   59 year old, female    Indication: Bone and Joint Infection and Skin and Soft Tissue Infection  Day of Therapy: Started 24  Current vancomycin regimen:  1750 mg IV q24h  Current vancomycin monitoring method: AUC  Current vancomycin therapeutic monitoring goal: 400-600 mg*h/L    InsightRX Prediction of Current Vancomycin Regimen  Regimen: 1750 mg IV every 24 hours.  Start time: 23:47 on 2024  Exposure target: AUC24 (range)400-600 mg/L.hr   AUC24,ss: 588 mg/L.hr  Probability of AUC24 > 400: 100 %  Ctrough,ss: 15.7 mg/L  Probability of Ctrough,ss > 20: 5 %  Probability of nephrotoxicity (Lodise CHANDAN ): 11 %      Current estimated CrCl = Estimated Creatinine Clearance: 71 mL/min (A) (based on SCr of 1.09 mg/dL (H)).    Creatinine for last 3 days  2024: 11:45 AM Creatinine 0.83 mg/dL; 11:45 AM Creatinine 0.83 mg/dL  2024:  7:59 AM Creatinine 1.09 mg/dL    Recent Vancomycin Levels (past 3 days)  2024: 11:45 AM Vancomycin 14.6 ug/mL    Vancomycin IV Administrations (past 72 hours)                     vancomycin (VANCOCIN) 1,750 mg in sodium chloride 0.9 % 500 mL intermittent infusion (mg) 1,750 mg Given 24 2347     1,750 mg Given 04/15/24 1837     1,750 mg Given 24 1807                    Nephrotoxins and other renal medications (From now, onward)      Start     Dose/Rate Route Frequency Ordered Stop    24 1200  ketorolac (TORADOL) injection 30 mg         30 mg Intravenous EVERY 6 HOURS 24 1132 24 1129    24 1800  vancomycin (VANCOCIN) 1,750 mg in sodium chloride 0.9 % 500 mL intermittent infusion         1,750 mg  over 2 Hours Intravenous EVERY 24 HOURS 24 1817                 Contrast Orders - past 72 hours (72h ago, onward)      None            Interpretation of levels and current regimen:  Vancomycin level is reflective of -600    Has serum creatinine changed  greater than 50% in last 72 hours: No    Urine output:  unable to determine    Renal Function: Worsening    InsightRX Prediction of Planned New Vancomycin Regimen  Regimen:1500 mg IV every 24 hours.  Start time: 23:47 on 04/17/2024  Exposure target: AUC24 (range)400-600 mg/L.hr   AUC24,ss: 508 mg/L.hr  Probability of AUC24 > 400: 99 %  Ctrough,ss: 13.4 mg/L  Probability of Ctrough,ss > 20: 1 %  Probability of nephrotoxicity (Lodise CHANDAN 2009): 9 %      Plan:  Decrease Dose to 1500mg IV q24hr to reduce risk of nephrotoxicity.  Vancomycin monitoring method: AUC  Vancomycin therapeutic monitoring goal: 400-600 mg*h/L  Pharmacy will check vancomycin levels as appropriate in 1-3 Days.  Serum creatinine levels will be ordered daily for the first week of therapy and at least twice weekly for subsequent weeks.    Arnold Chahal RPH

## 2024-04-17 NOTE — PROGRESS NOTES
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 Sarmiento d/t recurrent infections and s/p mx I&Ds (last 2/24), transferred to [facility] ICU d/t  RRT x 2 after periods of minimal responsiveness refractory to narcan.     PMH: hld, neuropathic pain, htn, complex psychiatric hx including bipolar, PTSD, MDD, on high dose benzodiazepines     Respiratory: BiPAP    NUTRITION HISTORY  Met with patient at bedside. Pt reports modifying diet to lower A1c. Noted pt lowered A1c from 10.6 (10/2023) to current 7.4 so she could have surgery. Pt states she has one meal daily and drinks carbonated water all day. Pt describes her dinner meal: one tub 8 oz cottage cheese, mushrooms, black olives, chicken, and 2 Tbs dressing.   Pt had questions about limiting carbohydrates. Pt states at previous hospitalization she was on a 90 gram/meal carbohydrate-controlled diet. Discussed carbohydrate choices (15 grams each) and limiting portion sizes. Discussed how she can include carbohydrates in her meals but she has to be mindful and limit. Discussed how one cup of cooked rice or pasta is about 45 grams carbohydrate. Also discussed if she goes over on carbohydrates on one meal she should limit more at the next meal. This was in context of pt inquiring about desserts.     CURRENT NUTRITION ORDERS  Diet:  Regular  Intake/Tolerance: Good 75%-100% per I/Os when able  Recommend carbohydrate-controlled diet     LABS  Hypomagnesemia noted  Current replacement of electrolyte- RN managed High protocol replaces Magnesium level when </=  2 mg/dL, Phosphorous level when </= 2.7 mg/dL, potassium level when </= 3.8 mmol/L   Electrolytes  Potassium (mmol/L)   Date Value   04/17/2024 4.2   04/16/2024 4.2   04/14/2024 3.6   04/14/2024 3.6     Phosphorus (mg/dL)   Date Value   04/16/2024 3.9   04/14/2024 2.8   04/13/2024 2.7    Blood Glucose  Glucose (mg/dL)   Date Value   04/17/2024 140 (H)   04/16/2024 163 (H)     GLUCOSE BY METER POCT (mg/dL)   Date Value   04/17/2024 121 (H)   04/17/2024 176 (H)   04/16/2024 162 (H)   04/16/2024 167 (H)   04/16/2024 146 (H)     Hemoglobin A1C (%)   Date Value   02/12/2024 7.4 (H)   10/30/2023 10.6 (H)    Inflammatory Markers  WBC Count (10e3/uL)   Date Value   04/17/2024 13.5 (H)   04/16/2024 12.6 (H)   04/14/2024 8.8     Albumin (g/dL)   Date Value   04/13/2024 3.1 (L)   04/12/2024 2.9 (L)   10/30/2023 4.1      Magnesium (mg/dL)   Date Value   04/17/2024 1.4 (L)   04/16/2024 1.4 (L)   04/14/2024 1.8     Sodium (mmol/L)   Date Value   04/17/2024 136   04/16/2024 137   04/14/2024 136    Renal  Urea Nitrogen (mg/dL)   Date Value   04/17/2024 17.9   04/16/2024 15.1   04/14/2024 13.4     Creatinine (mg/dL)   Date Value   04/17/2024 1.09 (H)   04/16/2024 0.83   04/16/2024 0.83     Additional  Ketones Urine (mg/dL)   Date Value   04/13/2024 Negative     Platelet Count (10e3/uL)   Date Value   04/17/2024 323          RENAL  No acute concerns    GASTROINTESTINAL  Last BM: 4/17  Bowel regimen: Scheduled  GI concerns reported None reported  Dentition:  No concerns    SKIN  Vamsi:17,  Nutrition 2  Surgical wounds    MEDICATIONS  Medications reviewed  Ferosul 325 mg q 2 days  Novolog daily at meals and bedtime  Mg Sulfate  Prilosec  Miralax BID  Senna  BID    PRN:  Dulcolax  Zofran  Compazine  Reglan  Current Facility-Administered Medications   Medication Dose Route Frequency Provider Last Rate Last Admin    acetaminophen (TYLENOL) tablet 650 mg  650 mg Oral Q4H PRN Marcos Sarmiento MD   650 mg at 04/14/24 0509    acetaminophen (TYLENOL) tablet 975 mg  975 mg Oral Q8H Sveta Mcqueen APRN CNP   975 mg at 04/17/24 0819    albuterol (PROVENTIL HFA/VENTOLIN HFA) inhaler  2 puff Inhalation Q2H PRN Marcos Sarmiento MD        aspirin EC tablet 162 mg  162 mg Oral Daily Marcos Sarmiento MD   162 mg at 04/17/24 0818    atorvastatin (LIPITOR) tablet 10 mg  10 mg Oral QPM Sveta Mcqueen APRN CNP   10 mg at 04/15/24 2003    benzocaine-menthol (CHLORASEPTIC) 6-10 MG lozenge 1 lozenge  1 lozenge Buccal Q1H PRN Marcos Sarmiento MD        bisacodyl (DULCOLAX) suppository 10 mg  10 mg Rectal Daily PRN Rita Loja APRN CNP        bisacodyl (DULCOLAX) suppository 10 mg  10 mg Rectal Daily PRN Marcos Sarmiento MD        carboxymethylcellulose PF (REFRESH PLUS) 0.5 % ophthalmic solution 1 drop  1 drop Both Eyes Q1H PRN Oziel Ibarra MD        citalopram (celeXA) tablet 20 mg  20 mg Oral At Bedtime Odalys Weeks MD   20 mg at 04/15/24 2154    [Held by provider] cloNIDine (CATAPRES) tablet 0.1 mg  0.1 mg Oral TID Radha Banks MD   0.1 mg at 04/15/24 2003    glucose gel 15-30 g  15-30 g Oral Q15 Min PRN Odalys Weeks MD        Or    dextrose 50 % injection 25-50 mL  25-50 mL Intravenous Q15 Min PRN Odalys Weeks MD        Or    glucagon injection 1 mg  1 mg Subcutaneous Q15 Min PRN Odalys Weeks MD        [Held by provider] diazepam (VALIUM) tablet 5 mg  5 mg Oral At Bedtime Nenita Garcia APRN CNP   5 mg at 04/15/24 2154    ertapenem (INVanz) 1 g vial to attach to  mL bag  1 g Intravenous Q24H Richard Dunaway  mL/hr at 04/14/24 1710 1 g at 04/16/24 1718    ferrous sulfate (FEROSUL) tablet 325 mg  325 mg Oral Every Other Day Richar  SAI Bangura CNP   325 mg at 04/16/24 1027    flumazenil (ROMAZICON) injection 0.2 mg  0.2 mg Intravenous Daily PRN Nenita Garcia APRN CNP        [Held by provider] gabapentin (NEURONTIN) tablet 800 mg  800 mg Oral TID Sloan Stanford APRN CNP   800 mg at 04/16/24 1720    heparin lock flush 10 UNIT/ML injection 5-20 mL  5-20 mL Intracatheter Q24H AbdoulayeSloan murphy APRN CNP        heparin lock flush 10 UNIT/ML injection 5-20 mL  5-20 mL Intracatheter Q1H PRN Sloan Stanford APRN CNP   5 mL at 04/17/24 0758    hydrALAZINE (APRESOLINE) injection 10 mg  10 mg Intravenous Q6H PRN Arvind Falcon MD   10 mg at 04/15/24 1015    HYDROmorphone (DILAUDID) injection 0.1-0.2 mg  0.1-0.2 mg Intravenous Daily PRN Sloan Stanford APRN CNP   0.2 mg at 04/15/24 1416    insulin aspart (NovoLOG) injection (RAPID ACTING)  1-7 Units Subcutaneous TID AC Odalys Weeks MD   1 Units at 04/16/24 1748    insulin aspart (NovoLOG) injection (RAPID ACTING)  1-5 Units Subcutaneous At Bedtime Odalys Weeks MD   1 Units at 04/11/24 2218    ketorolac (TORADOL) injection 30 mg  30 mg Intravenous Q6H Sveta Mcqueen APRN CNP   30 mg at 04/17/24 0818    lidocaine (LMX4) cream   Topical Q1H PRN Sloan Stanford APRN CNP        lidocaine (LMX4) cream   Topical Q1H PRN Arvind Falcon MD        lidocaine 1 % 0.1-1 mL  0.1-1 mL Other Q1H PRN Arvind Falcon MD        lidocaine 1 % 0.1-5 mL  0.1-5 mL Other Q1H PRN Sloan Stanford APRN CNP   2 mL at 04/16/24 1509    lumateperone (CAPLYTA) capsule 42 mg  42 mg Oral At Bedtime Nenita Garcia APRN CNP   42 mg at 04/15/24 2213    magnesium hydroxide (MILK OF MAGNESIA) suspension 30 mL  30 mL Oral Daily PRN Marcos Sarmiento MD        magnesium sulfate 4 g in 100 mL sterile water intermittent infusion  4 g Intravenous Once Radha Banks MD        [Held by provider] methocarbamol (ROBAXIN) tablet 500 mg  500 mg Oral Q6H PRN Marcos Sarmiento MD   500 mg at 04/11/24 1954    metoclopramide (REGLAN)  injection 5 mg  5 mg Intravenous Q6H PRN Radha Banks MD        naloxone (NARCAN) injection 0.2 mg  0.2 mg Intravenous Q2 Min PRN Only, MD Philip   0.2 mg at 04/13/24 2055    Or    naloxone (NARCAN) injection 0.4 mg  0.4 mg Intravenous Q2 Min PRN Only, MD Philip   0.8 mg at 04/13/24 2155    Or    naloxone (NARCAN) injection 0.2 mg  0.2 mg Intramuscular Q2 Min PRN Only, MD Philip        Or    naloxone (NARCAN) injection 0.4 mg  0.4 mg Intramuscular Q2 Min PRN Only, MD Philip   0.4 mg at 04/13/24 1352    No lozenges or gum should be given while patient on BIPAP/AVAPS/AVAPS AE   Does not apply Continuous PRN Oziel Ibarra MD        nystatin (MYCOSTATIN) ointment   Topical BID Only, MD Philip   Given at 04/17/24 0829    omeprazole (PriLOSEC) CR capsule 20 mg  20 mg Oral Daily Marcos Sarmiento MD   20 mg at 04/17/24 0818    ondansetron (ZOFRAN ODT) ODT tab 4 mg  4 mg Oral Q6H PRN Marcos Sarmiento MD   4 mg at 04/13/24 0618    Or    ondansetron (ZOFRAN) injection 4 mg  4 mg Intravenous Q6H PRN aMrcos Sarmiento MD   4 mg at 04/13/24 1306    oxyCODONE (ROXICODONE) tablet 5-10 mg  5-10 mg Oral Q6H PRN Sloan Stanford APRN CNP   10 mg at 04/16/24 1024    Patient may continue current oral medications   Does not apply Continuous PRN Oziel Ibarra MD        polyethylene glycol (MIRALAX) Packet 17 g  17 g Oral BID Rita Loja APRN CNP        senna-docusate (SENOKOT-S/PERICOLACE) 8.6-50 MG per tablet 1 tablet  1 tablet Oral BID Marcos Sarmiento MD   1 tablet at 04/14/24 0827    sodium chloride (PF) 0.9% PF flush 10-20 mL  10-20 mL Intracatheter q1 min prn Sloan Stanford APRN CNP        sodium chloride (PF) 0.9% PF flush 10-40 mL  10-40 mL Intracatheter Once PRN Abdoulaye, Sloan, APRN CNP        sodium chloride (PF) 0.9% PF flush 10-40 mL  10-40 mL Intracatheter Q8H Abdoulaye, Sloan, APRN CNP   10 mL at 04/17/24 0827    sodium chloride (PF) 0.9% PF flush 3 mL  3 mL Intracatheter Q8H Arvind Falcon MD   3 mL at 04/16/24 2223    sodium  "chloride (PF) 0.9% PF flush 3 mL  3 mL Intracatheter q1 min prn Arvind Falcon MD   3 mL at 04/14/24 2315    sodium chloride (PF) 0.9% PF flush 3 mL  3 mL Intracatheter Q8H Arvind Falcon MD   3 mL at 04/16/24 2224    sodium chloride (PF) 0.9% PF flush 3 mL  3 mL Intracatheter q1 min karrien Arvind Falcon MD   3 mL at 04/14/24 2317    sodium chloride (PF) 0.9% PF flush 3 mL  3 mL Intracatheter Q8H Marcos Sarmiento MD   3 mL at 04/16/24 2347    sodium chloride (PF) 0.9% PF flush 3 mL  3 mL Intracatheter q1 min prn Marcos Sarmiento MD   3 mL at 04/12/24 0452    vancomycin (VANCOCIN) 1,500 mg in 0.9% NaCl 250 mL intermittent infusion  1,500 mg Intravenous Q24H Marcos Sarmiento MD           ANTHROPOMETRICS  Height: 175.3 cm (5' 9\")  Most Recent Weight: 105.5 kg (232 lb 9.4 oz)    IBW: 66 kg  Body mass index is 34.35 kg/m .  BMI: Obesity Grade I BMI 30-34.9  Weight History:  8.2 kg (7.2%) weight loss over 2 months.  Pt reports weight loss intentional r/t modified diet decreased carbohydrate intakes  Wt Readings from Last 15 Encounters:   04/13/24 105.5 kg (232 lb 9.4 oz)   Per Care Everywhere:  02/23/2024 113.7 kg (250 lb 10.6 oz)- 8.2 kg 7.2%  01/31/2024 115.1 kg (253 lb 12 oz)  11/04/2023      118 kg (260 lb 1.6 oz)   09/23/2023 116.5 kg (256 lb 13.4 oz)  07/16/2023 116.1 kg (256 lb)   05/02/2023 110.5 kg (243 lb 8 oz)     Dosing Weight: IBW for protein, Actual Body Weight for kcals 66 kg/105.5 kg    ASSESSED NUTRITION NEEDS  Estimated Energy Needs: 1480 - 1795 kcals/day (14 - 17 kcals/kg)  Justification: Obese  Estimated Protein Needs: 99 - 132 grams protein/day (1.5 - 2 grams of pro/kg)  Justification: Obesity guidelines  Estimated Fluid Needs: 1480 - 1795  mL/day (1 mL/kcal)   Justification: Maintenance    PHYSICAL FINDINGS  See malnutrition section below.  No abnormal nutrition-related physical findings observed.     MALNUTRITION  % Intake: Decreased intake does not meet criteria  % Weight Loss: intentional weight " loss  Subcutaneous Fat Loss: None observed  Muscle Loss: None observed  Fluid Accumulation/Edema: None noted  Malnutrition Diagnosis: Patient does not meet two of the established criteria necessary for diagnosing malnutrition    NUTRITION DIAGNOSIS  No nutrition diagnosis at this time     INTERVENTIONS  Implementation  Nutrition Education: Per provider order if indicated       Goals  Patient to consume % of nutritionally adequate meal trays TID, or the equivalent with supplements/snacks.     Monitoring/Evaluation  RD to sign off and will remain available by consult if new nutrition concerns arise  Jeanette Porter RDN [facility] 5B/10A ICU   Available by Attila Berrios, desk [phone]  Weekend/Holiday Yash: Weekend Holiday Clinical Dietitian [Multi Site Groups]

## 2024-04-17 NOTE — PROGRESS NOTES
Cross cover:     Per RN patient very sleepy, difficult to arouse.  Patient seen and evaluated.  Would briefly open her eyes on verbal command, goes back to sleep right away.  Appears to be maintaining airways and oxygenating well on 2 L.  No acute distress.  Noted blood pressure soft. . Lactic: 1.9.   -Hold all sedatives for now including gabapentin, Valium.  -Check VBG  -500 mL fluid bolus    Vitals:    04/16/24 2130 04/16/24 2135 04/16/24 2200 04/16/24 2215   BP: 113/73  111/61 111/61   BP Location:    Left leg   Patient Position:    Semi-Amaya's   Cuff Size:    Adult Large   Pulse: 96 97 101 100   Resp: 11 13 16 15   Temp:    99.8  F (37.7  C)   TempSrc:    Oral   SpO2: 96% 97% 93% 96%   Weight:       Height:          Recent Labs   Lab 04/16/24  2212 04/16/24  1931 04/16/24  1644 04/16/24  1221 04/16/24  1145 04/16/24  0742   * 167* 146* 154* 163* 187*        Addendum:  Patient's blood gas returned.   Venous Blood Gas  Recent Labs   Lab 04/16/24  2155 04/14/24  0258 04/13/24  2110 04/13/24  1847 04/13/24  1504 04/13/24  1442   PHV 7.19* 7.37  --  7.29*  --  7.29*   PCO2V 67* 47  --  61*  --  61*   PO2V 43 59*  --  29  --  20*   HCO3V 25 27  --  29*  --  29*   MELVIN -3.1* 1.6  --  2.3  --  1.8   O2PER 28 2 35 35   < > 20    < > = values in this interval not displayed.      -- try bipap for couple hours. Follow-up vbg around midnight. Npo until more awake and gas improves.   -- kennedy RN.     Oziel Ibarra MD  [facility]  Contact information available via [facility] Paging/Directory

## 2024-04-17 NOTE — PROGRESS NOTES
Orthopaedic Surgery Progress Note 04/17/2024    S: Yesterday Hgb 6.6 and patient noted to be drowsy and difficult to rouse/. Received 1uPRBC, became more rousable overnight. Hgb recheck pending today. Started on BiPAP while she waws so sleepy. Reports she is doing well this AM, feels she might have a cold coming on. Got PICC yesterday.  SW to work on placement. POC reviewed.    O:  Temp: 98.4  F (36.9  C) Temp src: Axillary BP: 138/62 Pulse: 88   Resp: 16 SpO2: 99 % O2 Device: BiPAP/CPAP Oxygen Delivery: 2 LPM    Exam:  Gen: No acute distress, resting comfortably in bed.  Resp: Non-labored breathing  MSK:  LUE:  - Splint/Dressings c/d/i  - SILT medial/radial/ulnar/axillary nerves  - Fires EPL, FPL, Intrinsics  - Hand wwp    Drain output:   Output by Drain (mL) 04/15/24 0700 - 04/15/24 1459 04/15/24 1500 - 04/15/24 2259 04/15/24 2300 - 04/16/24 0659 04/16/24 0700 - 04/16/24 1459 04/16/24 1500 - 04/16/24 2259 04/16/24 2300 - 04/17/24 0625   Closed/Suction Drain 1 Left Elbow Bulb 15 [place]  40 150 100  30     Recent Labs   Lab 04/17/24  0203 04/16/24  1145 04/14/24  0257 04/13/24  1442   WBC  --  12.6* 8.8 12.1*   HGB 6.6* 6.6* 7.3* 8.3*   PLT  --  272 264 270     Recent Labs   Lab 04/14/24  0621 04/13/24  1847 04/10/24  1530 04/10/24  1514 04/10/24  1453 04/10/24  1400 04/10/24  1358   CULTURE No growth after 2 days No growth after 3 days No growth after 6 days  Culture in progress  1+ Staphylococcus epidermidis*  1+ Staphylococcus capitis*  1+ Cutibacterium (Propionibacterium) acnes* No growth after 6 days  1+ Cutibacterium (Propionibacterium) acnes*  1+ Staphylococcus epidermidis* No growth after 6 days  No anaerobic organisms isolated after 6 days  Isolated in broth only Staphylococcus epidermidis* No growth after 6 days  No anaerobic organisms isolated after 6 days  No Growth No growth after 6 days  No anaerobic organisms isolated after 6 days  No growth after 6 days  Culture in progress  Isolated in  broth only Staphylococcus epidermidis*  1+ Cutibacterium (Propionibacterium) acnes*  Culture in progress  1+ Staphylococcus capitis*  No Growth      Assessment/Plan:     Mitali Robles is a 59 year old female with complex PMH including fracture dislocation of the left elbow c/b hardware failure and 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 Sarmiento.    Post operative course complicated by intermittent lethargy, transferred briefly to SICU for closer observation. Also with ABLA s/p 1uPRBCs on 4/16.      ID recommends continued Vanco, ertapenem x 6 wks, and. Continue to respect DNR wishes. Pain team and psych teams on board and assisting with pain/psychogenic med regimen.     Discharge pending medical stability and placement.     Activity: Up with assist.  Weight bearing status: Minimize use of LUE  Antibiotics: Per ID recs:  Recommendations:  - continue  IV Vancomycin and Ertapenem 1g Q24H duration ~6 weeks post surgery    - stop Fluconazole due to prolonged QTc ( unclear if Candida albicans isolated in 9/2023 from a wound dehiscence was secondary to contamination vs infection). so far no Candida isolated   - Will follow intraoperative tissue cultures   - need PICC line before discharge  , has brusies from PIV   - Discharge planning pending at this time. Pt would like to stay in [place] for her F/U with Dr Sarmiento. Called and updated her ID physician in [place] , Dr Suresh Dominguez. he will assume ID care when patient returns to [place].   Diet: Begin with clear fluids and progress diet as tolerated.   DVT prophylaxis: aspirin 162 qd and mechanical while in the hospital, discharge on aspirin 162 x 4 weeks.  Bracing/Splinting: LUE posterior slab splint to be kept clean, dry, and intact until follow-up.   Elevation: Elevate LUE on pillows to keep on pillows as much as possible.   Wound Care: Keep  splint c/d/I until 2 week follow up.  Drains: Document output per shift, discontinue at discretion of orthopedics  Pain management: transition from IV to orals as tolerated.    X-rays:  POD1 left elbow XR (AP and lateral)  Physical Therapy:  Eval and treat  Occupational Therapy:   Labs: Trend Hgb on POD #1, 2, then PRN  Cultures: Pending, follow culture results closely.   Consults: PT, OT. Medicine, Infectious disease.  appreciate assistance in caring for this patient.  May need pain team as has many narcotic allergies listed  Follow-up: Clinic with Dr. Sarmiento's team in 2 weeks for wound check with left elbow xrays     Disposition: Pending progress with therapies, pain control on orals, and medical stability, anticipate discharge to TCU     Richard Dunaway MD  Orthopaedic Surgery, PGY-4  Pager: [phone]

## 2024-04-18 ENCOUNTER — APPOINTMENT (OUTPATIENT)
Dept: OCCUPATIONAL THERAPY | Facility: CLINIC | Age: 59
DRG: 463 | End: 2024-04-18
Attending: STUDENT IN AN ORGANIZED HEALTH CARE EDUCATION/TRAINING PROGRAM
Payer: COMMERCIAL

## 2024-04-18 LAB
ALBUMIN UR-MCNC: 20 MG/DL
ANION GAP SERPL CALCULATED.3IONS-SCNC: 9 MMOL/L (ref 7–15)
APPEARANCE UR: CLEAR
BACTERIA #/AREA URNS HPF: ABNORMAL /HPF
BACTERIA BLD CULT: NO GROWTH
BACTERIA TISS BX CULT: ABNORMAL
BACTERIA TISS BX CULT: ABNORMAL
BASE EXCESS BLDV CALC-SCNC: -1.9 MMOL/L (ref -3–3)
BILIRUB UR QL STRIP: NEGATIVE
BUN SERPL-MCNC: 16.4 MG/DL (ref 8–23)
CALCIUM SERPL-MCNC: 8.2 MG/DL (ref 8.8–10.2)
CHLORIDE SERPL-SCNC: 108 MMOL/L (ref 98–107)
COLOR UR AUTO: ABNORMAL
CREAT SERPL-MCNC: 1 MG/DL (ref 0.51–0.95)
DEPRECATED HCO3 PLAS-SCNC: 23 MMOL/L (ref 22–29)
EGFRCR SERPLBLD CKD-EPI 2021: 64 ML/MIN/1.73M2
ERYTHROCYTE [DISTWIDTH] IN BLOOD BY AUTOMATED COUNT: 19.4 % (ref 10–15)
GLUCOSE BLDC GLUCOMTR-MCNC: 135 MG/DL (ref 70–99)
GLUCOSE BLDC GLUCOMTR-MCNC: 152 MG/DL (ref 70–99)
GLUCOSE BLDC GLUCOMTR-MCNC: 152 MG/DL (ref 70–99)
GLUCOSE BLDC GLUCOMTR-MCNC: 165 MG/DL (ref 70–99)
GLUCOSE BLDC GLUCOMTR-MCNC: 202 MG/DL (ref 70–99)
GLUCOSE SERPL-MCNC: 183 MG/DL (ref 70–99)
GLUCOSE UR STRIP-MCNC: NEGATIVE MG/DL
HCO3 BLDV-SCNC: 25 MMOL/L (ref 21–28)
HCT VFR BLD AUTO: 32.6 % (ref 35–47)
HGB BLD-MCNC: 10.1 G/DL (ref 11.7–15.7)
HGB UR QL STRIP: NEGATIVE
KETONES UR STRIP-MCNC: NEGATIVE MG/DL
LEUKOCYTE ESTERASE UR QL STRIP: NEGATIVE
MAGNESIUM SERPL-MCNC: 2.4 MG/DL (ref 1.7–2.3)
MCH RBC QN AUTO: 25.3 PG (ref 26.5–33)
MCHC RBC AUTO-ENTMCNC: 31 G/DL (ref 31.5–36.5)
MCV RBC AUTO: 82 FL (ref 78–100)
MUCOUS THREADS #/AREA URNS LPF: PRESENT /LPF
NITRATE UR QL: NEGATIVE
O2/TOTAL GAS SETTING VFR VENT: 28 %
OXYHGB MFR BLDV: 75 % (ref 70–75)
PCO2 BLDV: 53 MM HG (ref 40–50)
PH BLDV: 7.29 [PH] (ref 7.32–7.43)
PH UR STRIP: 5.5 [PH] (ref 5–7)
PHOSPHATE SERPL-MCNC: 3.9 MG/DL (ref 2.5–4.5)
PLATELET # BLD AUTO: 250 10E3/UL (ref 150–450)
PO2 BLDV: 46 MM HG (ref 25–47)
POTASSIUM SERPL-SCNC: 4.2 MMOL/L (ref 3.4–5.3)
RBC # BLD AUTO: 3.99 10E6/UL (ref 3.8–5.2)
RBC URINE: <1 /HPF
SAO2 % BLDV: 76.4 % (ref 70–75)
SODIUM SERPL-SCNC: 140 MMOL/L (ref 135–145)
SP GR UR STRIP: 1.03 (ref 1–1.03)
SQUAMOUS EPITHELIAL: <1 /HPF
UROBILINOGEN UR STRIP-MCNC: NORMAL MG/DL
WBC # BLD AUTO: 9.9 10E3/UL (ref 4–11)
WBC URINE: 1 /HPF

## 2024-04-18 PROCEDURE — 250N000011 HC RX IP 250 OP 636: Mod: JZ | Performed by: STUDENT IN AN ORGANIZED HEALTH CARE EDUCATION/TRAINING PROGRAM

## 2024-04-18 PROCEDURE — 81001 URINALYSIS AUTO W/SCOPE: CPT | Performed by: INTERNAL MEDICINE

## 2024-04-18 PROCEDURE — 250N000013 HC RX MED GY IP 250 OP 250 PS 637

## 2024-04-18 PROCEDURE — 99232 SBSQ HOSP IP/OBS MODERATE 35: CPT | Performed by: INTERNAL MEDICINE

## 2024-04-18 PROCEDURE — 99222 1ST HOSP IP/OBS MODERATE 55: CPT | Performed by: STUDENT IN AN ORGANIZED HEALTH CARE EDUCATION/TRAINING PROGRAM

## 2024-04-18 PROCEDURE — 80048 BASIC METABOLIC PNL TOTAL CA: CPT | Performed by: INTERNAL MEDICINE

## 2024-04-18 PROCEDURE — 83735 ASSAY OF MAGNESIUM: CPT | Performed by: INTERNAL MEDICINE

## 2024-04-18 PROCEDURE — 99233 SBSQ HOSP IP/OBS HIGH 50: CPT

## 2024-04-18 PROCEDURE — 250N000011 HC RX IP 250 OP 636

## 2024-04-18 PROCEDURE — 36415 COLL VENOUS BLD VENIPUNCTURE: CPT | Performed by: STUDENT IN AN ORGANIZED HEALTH CARE EDUCATION/TRAINING PROGRAM

## 2024-04-18 PROCEDURE — 250N000013 HC RX MED GY IP 250 OP 250 PS 637: Performed by: STUDENT IN AN ORGANIZED HEALTH CARE EDUCATION/TRAINING PROGRAM

## 2024-04-18 PROCEDURE — 97535 SELF CARE MNGMENT TRAINING: CPT | Mod: GO | Performed by: OCCUPATIONAL THERAPIST

## 2024-04-18 PROCEDURE — 99207 PR NO CHARGE LOS: CPT | Performed by: INTERNAL MEDICINE

## 2024-04-18 PROCEDURE — 258N000003 HC RX IP 258 OP 636: Performed by: STUDENT IN AN ORGANIZED HEALTH CARE EDUCATION/TRAINING PROGRAM

## 2024-04-18 PROCEDURE — 120N000002 HC R&B MED SURG/OB UMMC

## 2024-04-18 PROCEDURE — 85027 COMPLETE CBC AUTOMATED: CPT | Performed by: INTERNAL MEDICINE

## 2024-04-18 PROCEDURE — 36415 COLL VENOUS BLD VENIPUNCTURE: CPT | Performed by: INTERNAL MEDICINE

## 2024-04-18 PROCEDURE — 82805 BLOOD GASES W/O2 SATURATION: CPT | Performed by: STUDENT IN AN ORGANIZED HEALTH CARE EDUCATION/TRAINING PROGRAM

## 2024-04-18 PROCEDURE — 84100 ASSAY OF PHOSPHORUS: CPT | Performed by: INTERNAL MEDICINE

## 2024-04-18 PROCEDURE — 250N000011 HC RX IP 250 OP 636: Performed by: STUDENT IN AN ORGANIZED HEALTH CARE EDUCATION/TRAINING PROGRAM

## 2024-04-18 PROCEDURE — 250N000013 HC RX MED GY IP 250 OP 250 PS 637: Performed by: INTERNAL MEDICINE

## 2024-04-18 PROCEDURE — 97530 THERAPEUTIC ACTIVITIES: CPT | Mod: GO | Performed by: OCCUPATIONAL THERAPIST

## 2024-04-18 RX ORDER — CLONIDINE HYDROCHLORIDE 0.1 MG/1
0.1 TABLET ORAL 2 TIMES DAILY
Status: DISCONTINUED | OUTPATIENT
Start: 2024-04-18 | End: 2024-04-22 | Stop reason: HOSPADM

## 2024-04-18 RX ADMIN — CITALOPRAM 20 MG: 20 TABLET ORAL at 21:33

## 2024-04-18 RX ADMIN — NYSTATIN: 100000 OINTMENT TOPICAL at 19:43

## 2024-04-18 RX ADMIN — ATORVASTATIN CALCIUM 10 MG: 10 TABLET, FILM COATED ORAL at 19:42

## 2024-04-18 RX ADMIN — OXYCODONE HYDROCHLORIDE 10 MG: 5 TABLET ORAL at 19:42

## 2024-04-18 RX ADMIN — NYSTATIN: 100000 OINTMENT TOPICAL at 09:45

## 2024-04-18 RX ADMIN — SENNOSIDES AND DOCUSATE SODIUM 1 TABLET: 8.6; 5 TABLET ORAL at 09:44

## 2024-04-18 RX ADMIN — HEPARIN, PORCINE (PF) 10 UNIT/ML INTRAVENOUS SYRINGE 5 ML: at 06:06

## 2024-04-18 RX ADMIN — ACETAMINOPHEN 975 MG: 325 TABLET, FILM COATED ORAL at 06:37

## 2024-04-18 RX ADMIN — ACETAMINOPHEN 975 MG: 325 TABLET, FILM COATED ORAL at 13:58

## 2024-04-18 RX ADMIN — FERROUS SULFATE TAB 325 MG (65 MG ELEMENTAL FE) 325 MG: 325 (65 FE) TAB at 09:44

## 2024-04-18 RX ADMIN — HEPARIN, PORCINE (PF) 10 UNIT/ML INTRAVENOUS SYRINGE 5 ML: at 16:13

## 2024-04-18 RX ADMIN — ACETAMINOPHEN 975 MG: 325 TABLET, FILM COATED ORAL at 21:32

## 2024-04-18 RX ADMIN — ASPIRIN 162 MG: 81 TABLET, COATED ORAL at 09:44

## 2024-04-18 RX ADMIN — VANCOMYCIN HYDROCHLORIDE 1500 MG: 10 INJECTION, POWDER, LYOPHILIZED, FOR SOLUTION INTRAVENOUS at 19:43

## 2024-04-18 RX ADMIN — LUMATEPERONE 42 MG: 42 CAPSULE ORAL at 21:32

## 2024-04-18 RX ADMIN — KETOROLAC TROMETHAMINE 30 MG: 30 INJECTION, SOLUTION INTRAMUSCULAR at 04:30

## 2024-04-18 RX ADMIN — KETOROLAC TROMETHAMINE 30 MG: 30 INJECTION, SOLUTION INTRAMUSCULAR at 09:53

## 2024-04-18 RX ADMIN — KETOROLAC TROMETHAMINE 30 MG: 30 INJECTION, SOLUTION INTRAMUSCULAR at 16:14

## 2024-04-18 RX ADMIN — OMEPRAZOLE 20 MG: 20 CAPSULE, DELAYED RELEASE ORAL at 09:44

## 2024-04-18 RX ADMIN — POLYETHYLENE GLYCOL 3350 17 G: 17 POWDER, FOR SOLUTION ORAL at 09:45

## 2024-04-18 RX ADMIN — KETOROLAC TROMETHAMINE 30 MG: 30 INJECTION, SOLUTION INTRAMUSCULAR at 21:33

## 2024-04-18 RX ADMIN — ERTAPENEM SODIUM 1 G: 1 INJECTION, POWDER, LYOPHILIZED, FOR SOLUTION INTRAMUSCULAR; INTRAVENOUS at 16:13

## 2024-04-18 ASSESSMENT — ACTIVITIES OF DAILY LIVING (ADL)
ADLS_ACUITY_SCORE: 40

## 2024-04-18 NOTE — PROGRESS NOTES
[facility]    Medicine Progress Note - Hospitalist Service, GOLD TEAM 16    Date of Admission:  4/10/2024    Assessment & Plan      Mitali Robles is a 59 year old female admitted on 4/10/2024. She has hx of bipolar disorder, PTSD, MDD, on high dose benzodiazepines, hyperlipidemia, hypertension, and type 2 diabetes on insulin with recurrent left elbow prosthetic joint infection s/p explantation of L total elbow arthroplasty admitted to ortho post op.        Acute perioperative blood loss anemia.  Hemoglobin down to 6.6 on 4/16/2024.  Patient is symptomatic.  Has been having low blood pressure, fatigue.  She has left arm surgical incision site drain still in place.  Consent to transfer is obtained.  Will do type and screen, transfuse with 1 unit of packed red blood cell and check H&H tomorrow in AM.     L elbow prosthetic joint infection s/p explantation of L total elbow arthroplasty  Reported EBL 600ml per OR note. Per outside ID note, s/p 7 weeks of IV daptomycin, ertapnem and fluconazole. currently ordered cefazolin 2g IV 8h per ortho.   - post op wound care and pain control per ortho  -Monitor routine labs   - follow up cultures  Infectious disease consult was obtained prior to transfer.  Recommendations:  - Stop Cefazolin   - Start IV Vancomycin (pharmacy to dose)   - Start IV Ertapenem 1g Q24H   - Start Fluconazole PO/IV 400mg Q24H   - Will follow intraoperative tissue cultures   Hypercapnic respiratory failure with CO2 narcosis and lethargy.  Currently on 2 L of oxygen and satting okay.  Awake alert and oriented x 4.  No benzodiazepine per psych recommendation.  Acute metabolic encephalopathy, CO2 narcosis.  Resolved.  Patient became acutely altered less responsive on 4/13/2024, multiple rapids called.  ICU team and myself responded.  Multiple rounds of Narcan, patient momentarily improved however ultimately became too unstable for the floor and was transferred  to the ICU.  In the ICU patient was placed on BiPAP.  Pain medication was discontinued.  Ultimately transferred back to the medicine team on 4/14/2024.  While in the ICU CODE STATUS was addressed.  Patient is now DNR as she discussed this with multiple teams including ICU and primary service orthopedic surgery.  Dyspepsia, epigastric discomfort.  Patient complains ferrous sulfate and potassium oral supplementation causing her GI upset and requested Reglan IV as needed.  Ordered Reglan 5 mg IV every 6 as needed to be administered for the symptoms.  Low blood pressure episodes.  Complex psychiatric history including bipolar disorder, PTSD, MDD, on high dose benzodiazepines  PTA meds include valium 30mg HS, xanax 1mg HS prn, clonazepam 2mg BID prn, celexa 20mg hs, and lumateperone 84mg hs.  checked, and last filled xanax 2mg 30 tabs, valiume 10mg 90 tabs and clonazepam 2mg 90 tabs on 2/12/24. Currently ordered xanax 0.25mg TID per ortho.   -Postop mentation improved now on the floor.  -Previously sedated post op and was on precedex, received ativan intra-op,  -Currently on Xanax 0.25 mg 3 times daily   Psychiatry was consulted and seen patient.  Psychiatry recommendation include spiritual health consult for emotional support, continue Caplyta 42 mg at bedtime dose adjusted from 84 mg daily due to interaction with fluconazole, continue Celexa 20 mg at bedtime, continue Valium 5 mg at bedtime.  Psychiatry recommend not to restart any other benzodiazepines at this time.  Also ordered flumazenil as needed.  Recommended to continue pulse ox.     Type 2 DM  A1c 7.4% on 2/12. On tresiba hs and aspart  - sliding scale insulin for now  - resume tresiba in am  -Blood glucose in acceptable limits  Recent Labs   Lab 04/18/24  1253 04/18/24  0945 04/18/24  0612 04/18/24  0203 04/17/24  2203 04/17/24  1827   * 165* 183* 202* 176* 145*          Hypertension  On Clonidine 1mg HS. Currently BP soft post op  -Now significantly  "elevated above 180 systolic, and 1 instance above 200s.  -Likely related to ongoing pain  -Continue clonidine. (Increased 2.1 mg 3 times daily)  -As needed hydralazine  - Address underlying pain.  - Add IV hydralazine for SBP greater than 180       Neuropathic pain  - PTA gabapentin 800mg TID-->     Hyperlipidemia  - continue atorvastatin     Right upper extremity weakness. Stroke symptoms absent except for weakness of the right upper ext which can could to radiculopathy vs CVA.   Telestroke consulted      Patient had CT angiogram head and neck done.  Discussed with telestroke  Neurology.  CTA head and neck is negative for any hemodynamically significant stenosis   Patient does have severe claustrophobia.  She has left elbow explantation and placement of antibiotic spacer with still drains in place.  Patient not a candidate for tPA  General neurology consult given and discussed with the on call neurologist , pt will be formally evaluated the following day.               Diet: Diet  Regular Diet Adult    DVT Prophylaxis: ASA per primary team  Galvan Catheter: Not present  Lines: PRESENT      PICC 04/16/24 Single Lumen Right Basilic Medications/Antibiotics-Site Assessment: WDL    Cardiac Monitoring: None  Code Status: No CPR- Do NOT Intubate      Clinically Significant Risk Factors            # Hypomagnesemia: Lowest Mg = 1.4 mg/dL in last 2 days, will replace as needed   # Hypoalbuminemia: Lowest albumin = 2.9 g/dL at 4/12/2024  5:17 PM, will monitor as appropriate     # Hypertension: Noted on problem list       # DMII: A1C = 7.4 % (Ref range: <5.7 %) within past 6 months   # Obesity: Estimated body mass index is 34.35 kg/m  as calculated from the following:    Height as of this encounter: 1.753 m (5' 9\").    Weight as of this encounter: 105.5 kg (232 lb 9.4 oz).      # Financial/Environmental Concerns: none         Disposition Plan     Medically Ready for Discharge: Anticipated in 2-4 Days             Radha Banks, " MD  Hospitalist Service, GOLD TEAM 16  [facility]  Securely message with [employer] (more info)  Text page via [employer] Paging/Directory   See signed in provider for up to date coverage information  ______________________________________________________________________    Interval History   Is awake alert and oriented x 3.  She did have difficulty with finding veins for a.m. lab draw.  She did have a PICC line prior to her current hospitalization which was removed.  Currently has a right forearm small peripheral IV to which IV medications being given.  She is a hard stick.  She would like a PICC line placed if possible for antibiotics and also for blood draws.  Patient seen and examined at bedside no acute distress.  Patient complains about worsening pain in her left arm, states she is unable to move her arm.  Denies any headache lightheadedness or dizziness.    Physical Exam   Vital Signs: Temp: 98.2  F (36.8  C) Temp src: Oral BP: (!) 158/61 Pulse: 96   Resp: 16 SpO2: 100 % O2 Device: Nasal cannula Oxygen Delivery: 2 LPM  Weight: 232 lbs 9.36 oz    General Appearance: Appears comfortable.  Respiratory: Without wheezes rhonchi or rales.  CTA  Cardiovascular: RRR, without murmurs  GI: Soft, nontender, plus BS  Skin: Without obvious bleeding, bruising or excoriations  Neurology 2/5 weakness of the RUE  Psych somnolent but conversant, no agitation     Medical Decision Making   55 minutes spent to review her chart, examining the patient, and formulating plan of care.  Data   ------------------------- PAST 24 HR DATA REVIEWED -----------------------------------------------  CBC RESULTS:   Recent Labs   Lab Test 04/16/24  1145   WBC 12.6*   RBC 2.65*   HGB 6.6*   HCT 21.6*   MCV 82   MCH 24.9*   MCHC 30.6*   RDW 19.0*        Last Comprehensive Metabolic Panel:  Lab Results   Component Value Date     04/18/2024    POTASSIUM 4.2 04/18/2024    CHLORIDE 108 (H) 04/18/2024     CO2 23 04/18/2024    ANIONGAP 9 04/18/2024     (H) 04/18/2024    BUN 16.4 04/18/2024    CR 1.00 (H) 04/18/2024    GFRESTIMATED 64 04/18/2024    DIANE 8.2 (L) 04/18/2024

## 2024-04-18 NOTE — PLAN OF CARE
"Goal Outcome Evaluation:  /55 (BP Location: Right leg)   Pulse 98   Temp 98.6  F (37  C) (Oral)   Resp 16   Ht 1.753 m (5' 9\")   Wt 105.5 kg (232 lb 9.4 oz)   SpO2 100%   BMI 34.35 kg/m     More awake as the night go by.  Desatts to 80s on room air when asleep.Kept on 2L/NC with satts mainting on the high 90s.  LUE casted.Denies numbness/tingling.Good capillary refill response.Moving all fingers well.Pain on affected arm managed with IV toradol and tylenol.  RUE with single lumen PICC line for IV abx.Swelling,rashes and bruising notable.Able to lift arm better and numbness is diminishing  as the night go by.Kept both UEs elevated with pillows.  Up to commode with heavy assist of 2,gait belt and ehsan stedi.  X1 BM on commode and another one on bedpan.  Unable to void,scanned 975ml.  Straight cathed for 1000ml,clear,yaima.Specimen obtained for UA/UC.Awaiting result.  Per pt,she had numerous UTIs over the past and thinks she has one currently.  K/Phos/Mg protocol.  Bed alarm.Soft touch callight is near reach.  Plan:TCU once medically stable.                              "

## 2024-04-18 NOTE — PROGRESS NOTES
Orthopaedic Surgery Progress Note 04/18/2024    S: Early yesterday afternoon, patient began to complain of increasing left arm numbness/tingling/weakness. Code stroke called but advanced imaging with no acute pathology. Remains intermittently difficult to rouse. UA concerning for UTI overnight. SW to work on placement pending medical stability. POC reviewed.    O:  Temp: 98.6  F (37  C) Temp src: Oral BP: 120/55 Pulse: 98   Resp: 16 SpO2: 100 % O2 Device: Nasal cannula Oxygen Delivery: 2 LPM    Exam:  Gen: No acute distress, resting comfortably in bed.  Resp: Non-labored breathing  MSK:  LUE:  - Splint/Dressings c/d/i  - SILT medial/radial/ulnar/axillary nerves  - Fires EPL, FPL, Intrinsics  - Hand wwp    Drain output:   Output by Drain (mL) 04/16/24 0700 - 04/16/24 1459 04/16/24 1500 - 04/16/24 2259 04/16/24 2300 - 04/17/24 0659 04/17/24 0700 - 04/17/24 1459 04/17/24 1500 - 04/17/24 2259 04/17/24 2300 - 04/18/24 0636   Closed/Suction Drain 1 Left Elbow Bulb 15 [place] 100 40 30 50 60 70     Recent Labs   Lab 04/18/24  0630 04/17/24  0800 04/17/24  0203 04/16/24  1145   WBC 9.9 13.5*  --  12.6*   HGB 10.1* 7.4* 6.6* 6.6*    323  --  272     Recent Labs   Lab 04/14/24  0621 04/13/24  1847   CULTURE No growth after 3 days No growth after 4 days      Assessment/Plan:     Mitali Robles is a 59 year old female with complex PMH including fracture dislocation of the left elbow c/b hardware failure and 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 Sarmiento.    Post operative course complicated by intermittent lethargy, transferred briefly to SICU for closer observation. Also with ABLA s/p 1uPRBCs on 4/16.      ID recommends continued Vanco, ertapenem x 6 wks. Continue to respect DNR wishes. Pain team and psych teams on board and assisting with pain/psychogenic med regimen.     Discharge pending  medical stability and placement.     Activity: Up with assist.  Weight bearing status: Minimize use of LUE  Antibiotics: Per ID recs:  Recommendations:  - continue  IV Vancomycin and Ertapenem 1g Q24H duration ~6 weeks post surgery    - stop Fluconazole due to prolonged QTc ( unclear if Candida albicans isolated in 9/2023 from a wound dehiscence was secondary to contamination vs infection). so far no Candida isolated   - Will follow intraoperative tissue cultures   - need PICC line before discharge  , has brusies from PIV   - Discharge planning pending at this time. Pt would like to stay in [place] for her F/U with Dr Sarmiento. Called and updated her ID physician in [place] , Dr Suresh Dominguez. he will assume ID care when patient returns to [place].   Diet: Begin with clear fluids and progress diet as tolerated.   DVT prophylaxis: aspirin 162 qd and mechanical while in the hospital, discharge on aspirin 162 x 4 weeks.  Bracing/Splinting: LUE posterior slab splint to be kept clean, dry, and intact until follow-up.   Elevation: Elevate LUE on pillows to keep on pillows as much as possible.   Wound Care: Keep splint c/d/I until 2 week follow up.  Drains: Document output per shift, discontinue at discretion of orthopedics  Pain management: transition from IV to orals as tolerated.    X-rays:  POD1 left elbow XR (AP and lateral)  Physical Therapy:  Eval and treat  Occupational Therapy:   Labs: Trend Hgb on POD #1, 2, then PRN  Cultures: Pending, follow culture results closely.   Consults: PT, OT. Medicine, Infectious disease.  appreciate assistance in caring for this patient.  May need pain team as has many narcotic allergies listed  Follow-up: Clinic with Dr. Sarmiento's team in 2 weeks for wound check with left elbow xrays     Disposition: Pending progress with therapies, pain control on orals, and medical stability, anticipate discharge to [facility]     Richard Dunaway MD  Orthopaedic Surgery, PGY-4  Pager: [phone]

## 2024-04-18 NOTE — CONSULTS
"      Psychiatry Consultation; Follow up              Reason for Consult, requesting source:    Follow up   Requesting source: Marcos Sarmiento    Labs and imaging reviewed, seen by SAI Paz CNP    Total time spent in chart review, patient interview and coordination of care; 60 minutes - all time was spent on the date of the encounter that I saw patient. Discussed with pharmacy and Dr. Too Lopes                Interim history:    Mitali Robles is a 59 year old female who lives in [place] admitted on 4/10/2024 for L elbow prosthetic joint infection s/p explantation of L total elbow arthroplasty.      Patient has a history of severe PTSD managed by a  In [place] with a complex psychotropic regimen:   --Xanax 2mg at bedtime  --Celexa 20mg at bedtime  --Klonopin 4mg at bedtime  --Clonidine 1mg at bedtime  --Valium 30mg at bedtime  --Gabapentin 800mg TID  --Caplyta 84mg at bedtime     Patient became acutely altered less responsive on 4/13/2024, multiple rapids called. Multiple rounds of Narcan, was transferred to the ICU. In the ICU patient was placed on BiPAP. Opioids were discontinued. Ultimately transferred back to the medicine team on 4/14/2024. While in the ICU CODE STATUS was addressed. Patient is now DNR as she discussed this with multiple teams including ICU and primary service orthopedic surgery. Her Psych meds were placed on hold 4/13. Pain consult 4/15 recommended oxycodone 5-10mg PO q6 and Dilaudid 0.1-0.2mg PO IV daily PRN.      Patient reported to me 4/15 her need for her PTA medications, she's been following with Dr. Butler and on that regimen for 15 years. She reported her severe PTSD and trauma (documented below) and stated that without the medications her PTSD would take over and she would \"surely become suicidal.\" She received Dilaudid 0.2mg IV today 1416 and was tired on exam. Since admission 4/10, she has received Xanax 0.25mg TID x6 doses with the last dose being 0800 on 4/13. She has not " "received any klonopin during this hospitalization. Clonidine was restarted at a lower dose.      I restarted Valium 5mg and no other benzodiazepines 4/15 at bedtime. Also restarted Capylta at a lower dose due to interaction with flucanozole. This morning, she reports she is \"doing well\" and got sleep last night because \"I received the right medications.\" She denies feeling anxious and her blood pressures have decreased. Denies feeling suicidal      Update 4/17 valium was held last night due to somnolence, pt needed CPAP and a blood transfusion. She states she's feeling fine now, just wishes she didn't have to be NPO. Denies anxiety, states she is tired, slept a lot last night. Spiritual health consult copleted yesterday reports patient stated she was ready to \"go\" and \"I've lost three children\" and that she's ready to see them again. She denies active suicidal ideation on exam today.    4/18: Neurology became involved since last psychiatric consult due to AMS -- stroke code was called last afternoon due to Right arm weakness. Neurologist recommended use of CPAP, delirium precautions, and limiting opioids/benzodiazepines. On exam today, patient was somnolent and disoriented. She asked \"did it take you long to get here?\" She could not answer questions and kept falling asleep.         Current Medications:     Current Facility-Administered Medications   Medication Dose Route Frequency Provider Last Rate Last Admin    acetaminophen (TYLENOL) tablet 975 mg  975 mg Oral Q8H Sveta Mcqueen APRN CNP   975 mg at 04/18/24 0637    aspirin EC tablet 162 mg  162 mg Oral Daily Marcos Sarmiento MD   162 mg at 04/18/24 0944    atorvastatin (LIPITOR) tablet 10 mg  10 mg Oral QPM Sveta Mcqueen APRN CNP   10 mg at 04/17/24 1940    citalopram (celeXA) tablet 20 mg  20 mg Oral At Bedtime Odalys Weeks MD   20 mg at 04/17/24 2204    [Held by provider] cloNIDine (CATAPRES) tablet 0.1 mg  0.1 mg Oral TID Radha Banks MD   " 0.1 mg at 04/15/24 2003    diazepam (VALIUM) tablet 5 mg  5 mg Oral At Bedtime Radha Banks MD   5 mg at 04/17/24 2204    ertapenem (INVanz) 1 g vial to attach to  mL bag  1 g Intravenous Q24H Richard Dunaway  mL/hr at 04/14/24 1710 1 g at 04/17/24 1640    ferrous sulfate (FEROSUL) tablet 325 mg  325 mg Oral Every Other Day Sveta Mcqueen APRN CNP   325 mg at 04/18/24 0944    [Held by provider] gabapentin (NEURONTIN) tablet 800 mg  800 mg Oral TID Sloan Stanford APRN CNP   800 mg at 04/16/24 1720    heparin lock flush 10 UNIT/ML injection 5-20 mL  5-20 mL Intracatheter Q24H Sloan Stanford APRN CNP        insulin aspart (NovoLOG) injection (RAPID ACTING)  1-7 Units Subcutaneous TID AC Odalys Weeks MD   1 Units at 04/18/24 0946    insulin aspart (NovoLOG) injection (RAPID ACTING)  1-5 Units Subcutaneous At Bedtime Odalys Weeks MD   1 Units at 04/11/24 2218    ketorolac (TORADOL) injection 30 mg  30 mg Intravenous Q6H Sveta Mcqueen APRN CNP   30 mg at 04/18/24 0953    lumateperone (CAPLYTA) capsule 42 mg  42 mg Oral At Bedtime Nenita Garcia APRN CNP   42 mg at 04/17/24 2204    nystatin (MYCOSTATIN) ointment   Topical BID Only, MD Philip   Given at 04/18/24 0945    omeprazole (PriLOSEC) CR capsule 20 mg  20 mg Oral Daily Marcos Sarmiento MD   20 mg at 04/18/24 0944    polyethylene glycol (MIRALAX) Packet 17 g  17 g Oral BID Rita Loja APRN CNP   17 g at 04/18/24 0945    senna-docusate (SENOKOT-S/PERICOLACE) 8.6-50 MG per tablet 1 tablet  1 tablet Oral BID Marcos Sarmiento MD   1 tablet at 04/18/24 0944    sodium chloride (PF) 0.9% PF flush 10-40 mL  10-40 mL Intracatheter Q8H Abdoulaye, Sloan, APRN CNP   10 mL at 04/18/24 0946    sodium chloride (PF) 0.9% PF flush 3 mL  3 mL Intracatheter Q8H Arvind Falcon MD   3 mL at 04/18/24 0945    sodium chloride (PF) 0.9% PF flush 3 mL  3 mL Intracatheter Q8H Arvind Falcon MD   3 mL at 04/18/24 0946    sodium chloride (PF) 0.9%  "PF flush 3 mL  3 mL Intracatheter Q8H Marcos Sarmiento MD   3 mL at 04/18/24 0946    vancomycin (VANCOCIN) 1,500 mg in 0.9% NaCl 250 mL intermittent infusion  1,500 mg Intravenous Q24H Marcos Sarmiento MD   1,500 mg at 04/17/24 1952              MSE:   Appearance:  pale, somnolent   Attitude:  somewhat cooperative  Eye Contact:  poor   Mood:   tired  Affect:   somnolent   Speech:  mumbling  Psychomotor Behavior:  no evidence of tardive dyskinesia, dystonia, or tics  Muscle strength and tone: recent weakness in R arm   Thought Process:  illogical  Associations:  no loose associations  Thought Content:  no evidence of suicidal ideation or homicidal ideation  Insight:  partial  Judgement:  limited  Oriented to:   person only on exam today   Attention Span and Concentration:  poor  Recent and Remote Memory:  limited    Vital signs:  Temp: 98.2  F (36.8  C) Temp src: Oral BP: (!) 158/61 Pulse: 96   Resp: 16 SpO2: 100 % O2 Device: Nasal cannula Oxygen Delivery: 2 LPM   Weight: 105.5 kg (232 lb 9.4 oz)  Estimated body mass index is 34.35 kg/m  as calculated from the following:    Height as of this encounter: 1.753 m (5' 9\").    Weight as of this encounter: 105.5 kg (232 lb 9.4 oz).             DSM-5 Diagnosis:   PTSD  Delirium           Assessment:   Mitali is a 59 year old female with history of severe PTSD on a home regimen of 3 different benzodiazepines at very high doses. Over the weekend, she required ICU transfer due to respiratory failure related to opioids. There is concern restarting her home meds that she is requesting given continued opioid agonist use, lack of obvious benzodiazepine withdrawal sxs, and recently changing her code status to DNR/DNI. It is very difficult to quantify the amount of benzodiazepines she was taking prior to admission given amount of hospitalizations over the last 6 months. Over the last few days, I have not increased benzodiazepines to reflect her reported PTA doses given her subjective report " 4/17 and lack of objective signs of benzodiazepine withdrawal. Given longer half lives of valium and klonopin, we should still be monitoring patient closely for signs of withdrawal, however with her CO2 retention and current delirium, I agree with neurology to hold Benzodiazepines and continue to monitor closely.      Last benzodiazepines admin was Valium 5mg 4/17 at 2204; 4/15 at 2154.   Last dialudid admin was 0.2mg IV at 1509   Last oxycodone administration was 4/16 at 1024            Summary of Recommendations:     Continue Caplyta 42mg at bedtime (dose adjusted from 84mg daily due to interaction with Flucanozole) -- we can increase back to 84 in a few days now that Flucanozole was stopped   Continue Celexa 20mg at bedtime   Held Valium 5mg at bedtime given reasons above  Ordered Flumazenil PRN   Recommend continuous pulse ox         Nenita Garcia, PMHNP-BC  Consult/Liaison Psychiatry   [facility]

## 2024-04-18 NOTE — PLAN OF CARE
Patient is alert and oriented.  Left arm in sling.  Neuros and CMS are positive.  Gets up to BSC with assist of 2.  Had 2 bowel movements today.  Is voiding good amounts.  Is tolerating regular diet.  Using Tylenol for pain.  Call light is within reach.  Is able to make needs known.  Will continue with plan of care.

## 2024-04-18 NOTE — PLAN OF CARE
Goal Outcome Evaluation:    [id number]  No acute changes   Cont. To be lethargic but arousable  Voided and BM x1 via ehsan steady / A2  RPICC single lumen purple infused vancomycin   Elevated LUE with pillow -dressing continues to be CDI  Has soft touch light- calls appropriately

## 2024-04-18 NOTE — CONSULTS
SPIRITUAL HEALTH SERVICES Progress Note  I met with Mitali this afternoon to offer her communion at her request. She appreciated being able to receive this today. No other needs at this time.       Rashad Guevara  Associate

## 2024-04-18 NOTE — PROGRESS NOTES
[facility] Medicine Cross Cover Note    April 18, 2024 4:32 AM    I was notified by ABBIE Frausto that this patient had urinary retention -reportedly bladder scanned for 975. She has h/o urinary tract infections that had retention as a symptom so the patient is worried that this is a UTI.     Action taken:   -reviewed chart and noted she is on strong antibiotics, many of which would cover urinary pathogens  -reviewed her medication list for common agents that can cause retention (e.g. opioids or anti-cholinergics) and didn't find many   -ordered UA with reflex to UC  -RN already has order to straight cath if volume is >500  mL    RN was notified via secure messaging.         Jo Gonzales MD on 4/18/2024 at 4:32 AM

## 2024-04-18 NOTE — PROGRESS NOTES
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   < > 35   < > 28    < > = values in this interval not displayed.           Imagin/10/24 XRAY L ELBOW  IMPRESSION: Limited osseous detail, due to overlying cast material.     Postoperative changes of left elbow arthroplasty explantation, with placement of antibiotic cement and cerclage fixation of a periprosthetic fracture involving the distal left humeral shaft.      Instrumented fracture of the distal left radius, with volar plate and screw construct.      Surgical drainage catheter at the elbow operative site.

## 2024-04-18 NOTE — PROGRESS NOTES
[facility]    Medicine Progress Note - Hospitalist Service, GOLD TEAM 16    Date of Admission:  4/10/2024    Assessment & Plan      Mitali Robles is a 59 year old female admitted on 4/10/2024. She has hx of bipolar disorder, PTSD, MDD, on high dose benzodiazepines, hyperlipidemia, hypertension, and type 2 diabetes on insulin with recurrent left elbow prosthetic joint infection s/p explantation of L total elbow arthroplasty admitted to ortho post op.        Acute perioperative blood loss anemia.  Hemoglobin down to 6.6 on 4/16/2024.  Patient is symptomatic.  Has been having low blood pressure, fatigue.  She has left arm surgical incision site drain still in place.  Consent to transfer is obtained.  S/p 1 unit of PRBC transfusion      L elbow prosthetic joint infection s/p explantation of L total elbow arthroplasty  Reported EBL 600ml per OR note. Per outside ID note, s/p 7 weeks of IV daptomycin, ertapnem and fluconazole. currently ordered cefazolin 2g IV 8h per ortho.   - post op wound care and pain control per ortho  -Monitor routine labs   - cultures from the left elbow tissue culture reviewed, growth and sensitivities noted  Infectious disease consult was obtained prior to transfer.  Recommendations:  - Stop Cefazolin   - Start IV Vancomycin (pharmacy to dose)   - Start IV Ertapenem 1g Q24H   - Start Fluconazole PO/IV 400mg Q24H   - Will follow intraoperative tissue cultures   Infectious disease recommending 6 weeks IV antibiotics and signed off 4/18    Hypercapnic respiratory failure with CO2 narcosis and lethargy.  Currently on 2 L of oxygen and satting okay.  Awake alert and oriented x 4.  No benzodiazepine per psych recommendation.  Acute metabolic encephalopathy, CO2 narcosis.  Resolved.  Patient became acutely altered less responsive on 4/13/2024, multiple rapids called.  ICU team and myself responded.  Multiple rounds of Narcan, patient momentarily improved  however ultimately became too unstable for the floor and was transferred to the ICU.  In the ICU patient was placed on BiPAP.  Pain medication was discontinued.  Ultimately transferred back to the medicine team on 4/14/2024.  While in the ICU CODE STATUS was addressed.  Patient is now DNR as she discussed this with multiple teams including ICU and primary service orthopedic surgery.  Dyspepsia, epigastric discomfort.  Patient complains ferrous sulfate and potassium oral supplementation causing her GI upset and requested Reglan IV as needed.  Ordered Reglan 5 mg IV every 6 as needed to be administered for the symptoms.  Low blood pressure episodes.  Complex psychiatric history including bipolar disorder, PTSD, MDD, on high dose benzodiazepines  PTA meds include valium 30mg HS, xanax 1mg HS prn, clonazepam 2mg BID prn, celexa 20mg hs, and lumateperone 84mg hs.  checked, and last filled xanax 2mg 30 tabs, valiume 10mg 90 tabs and clonazepam 2mg 90 tabs on 2/12/24. Currently ordered xanax 0.25mg TID per ortho.   -Postop mentation improved now on the floor.  -Previously sedated post op and was on precedex, received ativan intra-op,  -Currently on Xanax 0.25 mg 3 times daily   Psychiatry was consulted and seen patient.  Psychiatry recommendation include spiritual health consult for emotional support, continue Caplyta 42 mg at bedtime dose adjusted from 84 mg daily due to interaction with fluconazole, continue Celexa 20 mg at bedtime, continue Valium 5 mg at bedtime.  Psychiatry recommend not to restart any other benzodiazepines at this time.  Also ordered flumazenil as needed.  Recommended to continue pulse ox.     Type 2 DM  A1c 7.4% on 2/12. On tresiba hs and aspart  - sliding scale insulin for now  - resume tresiba in am  -Blood glucose in acceptable limits  Recent Labs   Lab 04/18/24  1253 04/18/24  0945 04/18/24  0612 04/18/24  0203 04/17/24  2203 04/17/24  1827   * 165* 183* 202* 176* 145*         "  Hypertension  On Clonidine 1mg HS. Currently BP soft post op  -Now significantly elevated above 180 systolic, and 1 instance above 200s.  -Likely related to ongoing pain  -Continue clonidine. (Increased 2.1 mg 3 times daily)  -As needed hydralazine  - Address underlying pain.  - Add IV hydralazine for SBP greater than 180       Neuropathic pain  - PTA gabapentin 800mg TID-->     Hyperlipidemia  - continue atorvastatin     Right upper extremity weakness. Stroke symptoms absent except for weakness of the right upper ext which can could to radiculopathy vs CVA.   Telestroke consulted      Patient had CT angiogram head and neck done.  Discussed with telestroke  Neurology.  CTA head and neck is negative for any hemodynamically significant stenosis   Patient does have severe claustrophobia.  She has left elbow explantation and placement of antibiotic spacer with still drains in place.  Patient not a candidate for tPA  General neurology consult given and discussed with the on call neurologist , pt will be formally evaluated the following day.               Diet: Diet  Regular Diet Adult    DVT Prophylaxis: ASA per primary team  Galvan Catheter: Not present  Lines: PRESENT      PICC 04/16/24 Single Lumen Right Basilic Medications/Antibiotics-Site Assessment: WDL    Cardiac Monitoring: None  Code Status: No CPR- Do NOT Intubate      Clinically Significant Risk Factors            # Hypomagnesemia: Lowest Mg = 1.4 mg/dL in last 2 days, will replace as needed   # Hypoalbuminemia: Lowest albumin = 2.9 g/dL at 4/12/2024  5:17 PM, will monitor as appropriate     # Hypertension: Noted on problem list       # DMII: A1C = 7.4 % (Ref range: <5.7 %) within past 6 months   # Obesity: Estimated body mass index is 34.35 kg/m  as calculated from the following:    Height as of this encounter: 1.753 m (5' 9\").    Weight as of this encounter: 105.5 kg (232 lb 9.4 oz).      # Financial/Environmental Concerns: none         Disposition Plan "     Medically Ready for Discharge: Anticipated in 2-4 Days             Radha Banks MD  Hospitalist Service, GOLD TEAM 16  [facility]  Securely message with [employer] (more info)  Text page via [employer] Paging/Directory   See signed in provider for up to date coverage information  ______________________________________________________________________    Interval History   Is awake alert and oriented x 3.  She did have difficulty with finding veins for a.m. lab draw.  She did have a PICC line prior to her current hospitalization which was removed.  Currently has a right forearm small peripheral IV to which IV medications being given.  She is a hard stick.  She would like a PICC line placed if possible for antibiotics and also for blood draws.  Patient seen and examined at bedside no acute distress.  Patient complains about worsening pain in her left arm, states she is unable to move her arm.  Denies any headache lightheadedness or dizziness.    Physical Exam   Vital Signs: Temp: 98.2  F (36.8  C) Temp src: Oral BP: (!) 158/61 Pulse: 96   Resp: 16 SpO2: 100 % O2 Device: Nasal cannula Oxygen Delivery: 2 LPM  Weight: 232 lbs 9.36 oz    General Appearance: Appears comfortable.  Respiratory: Without wheezes rhonchi or rales.  CTA  Cardiovascular: RRR, without murmurs  GI: Soft, nontender, plus BS  Skin: Without obvious bleeding, bruising or excoriations  Neurology 2/5 weakness of the RUE  Psych somnolent but conversant, no agitation     Medical Decision Making   55 minutes spent to review her chart, examining the patient, and formulating plan of care.  Data   ------------------------- PAST 24 HR DATA REVIEWED -----------------------------------------------  CBC RESULTS:   Recent Labs   Lab Test 04/16/24  1145   WBC 12.6*   RBC 2.65*   HGB 6.6*   HCT 21.6*   MCV 82   MCH 24.9*   MCHC 30.6*   RDW 19.0*        Last Comprehensive Metabolic Panel:  Lab Results   Component Value  Date     04/18/2024    POTASSIUM 4.2 04/18/2024    CHLORIDE 108 (H) 04/18/2024    CO2 23 04/18/2024    ANIONGAP 9 04/18/2024     (H) 04/18/2024    BUN 16.4 04/18/2024    CR 1.00 (H) 04/18/2024    GFRESTIMATED 64 04/18/2024    DIANE 8.2 (L) 04/18/2024

## 2024-04-19 ENCOUNTER — APPOINTMENT (OUTPATIENT)
Dept: PHYSICAL THERAPY | Facility: CLINIC | Age: 59
DRG: 463 | End: 2024-04-19
Attending: STUDENT IN AN ORGANIZED HEALTH CARE EDUCATION/TRAINING PROGRAM
Payer: COMMERCIAL

## 2024-04-19 LAB
ANION GAP SERPL CALCULATED.3IONS-SCNC: 9 MMOL/L (ref 7–15)
BACTERIA BLD CULT: NO GROWTH
BASE EXCESS BLDV CALC-SCNC: -0.1 MMOL/L (ref -3–3)
BLD PROD TYP BPU: NORMAL
BLOOD COMPONENT TYPE: NORMAL
BUN SERPL-MCNC: 10.7 MG/DL (ref 8–23)
CALCIUM SERPL-MCNC: 8.1 MG/DL (ref 8.8–10.2)
CHLORIDE SERPL-SCNC: 107 MMOL/L (ref 98–107)
CODING SYSTEM: NORMAL
CREAT SERPL-MCNC: 0.87 MG/DL (ref 0.51–0.95)
CROSSMATCH: NORMAL
DEPRECATED HCO3 PLAS-SCNC: 24 MMOL/L (ref 22–29)
EGFRCR SERPLBLD CKD-EPI 2021: 76 ML/MIN/1.73M2
ERYTHROCYTE [DISTWIDTH] IN BLOOD BY AUTOMATED COUNT: 19.3 % (ref 10–15)
GLUCOSE BLDC GLUCOMTR-MCNC: 137 MG/DL (ref 70–99)
GLUCOSE BLDC GLUCOMTR-MCNC: 142 MG/DL (ref 70–99)
GLUCOSE BLDC GLUCOMTR-MCNC: 146 MG/DL (ref 70–99)
GLUCOSE BLDC GLUCOMTR-MCNC: 163 MG/DL (ref 70–99)
GLUCOSE BLDC GLUCOMTR-MCNC: 172 MG/DL (ref 70–99)
GLUCOSE SERPL-MCNC: 160 MG/DL (ref 70–99)
HCO3 BLDV-SCNC: 25 MMOL/L (ref 21–28)
HCT VFR BLD AUTO: 20.7 % (ref 35–47)
HCT VFR BLD AUTO: 21 % (ref 35–47)
HGB BLD-MCNC: 6.4 G/DL (ref 11.7–15.7)
HGB BLD-MCNC: 6.6 G/DL (ref 11.7–15.7)
ISSUE DATE AND TIME: NORMAL
MAGNESIUM SERPL-MCNC: 2.1 MG/DL (ref 1.7–2.3)
MCH RBC QN AUTO: 25.6 PG (ref 26.5–33)
MCHC RBC AUTO-ENTMCNC: 31.4 G/DL (ref 31.5–36.5)
MCV RBC AUTO: 81 FL (ref 78–100)
O2/TOTAL GAS SETTING VFR VENT: 89 %
OXYHGB MFR BLDV: 72 % (ref 70–75)
PCO2 BLDV: 41 MM HG (ref 40–50)
PH BLDV: 7.39 [PH] (ref 7.32–7.43)
PHOSPHATE SERPL-MCNC: 4.1 MG/DL (ref 2.5–4.5)
PLATELET # BLD AUTO: 348 10E3/UL (ref 150–450)
PO2 BLDV: 42 MM HG (ref 25–47)
POTASSIUM SERPL-SCNC: 3.6 MMOL/L (ref 3.4–5.3)
RBC # BLD AUTO: 2.58 10E6/UL (ref 3.8–5.2)
SAO2 % BLDV: 74.8 % (ref 70–75)
SODIUM SERPL-SCNC: 140 MMOL/L (ref 135–145)
UNIT ABO/RH: NORMAL
UNIT NUMBER: NORMAL
UNIT STATUS: NORMAL
UNIT TYPE ISBT: 5100
WBC # BLD AUTO: 12.5 10E3/UL (ref 4–11)

## 2024-04-19 PROCEDURE — 99233 SBSQ HOSP IP/OBS HIGH 50: CPT

## 2024-04-19 PROCEDURE — 82805 BLOOD GASES W/O2 SATURATION: CPT | Performed by: STUDENT IN AN ORGANIZED HEALTH CARE EDUCATION/TRAINING PROGRAM

## 2024-04-19 PROCEDURE — 84100 ASSAY OF PHOSPHORUS: CPT | Performed by: STUDENT IN AN ORGANIZED HEALTH CARE EDUCATION/TRAINING PROGRAM

## 2024-04-19 PROCEDURE — 250N000011 HC RX IP 250 OP 636: Mod: JZ | Performed by: STUDENT IN AN ORGANIZED HEALTH CARE EDUCATION/TRAINING PROGRAM

## 2024-04-19 PROCEDURE — 36415 COLL VENOUS BLD VENIPUNCTURE: CPT | Performed by: STUDENT IN AN ORGANIZED HEALTH CARE EDUCATION/TRAINING PROGRAM

## 2024-04-19 PROCEDURE — 36592 COLLECT BLOOD FROM PICC: CPT | Performed by: INTERNAL MEDICINE

## 2024-04-19 PROCEDURE — 258N000003 HC RX IP 258 OP 636: Performed by: STUDENT IN AN ORGANIZED HEALTH CARE EDUCATION/TRAINING PROGRAM

## 2024-04-19 PROCEDURE — 250N000013 HC RX MED GY IP 250 OP 250 PS 637

## 2024-04-19 PROCEDURE — 250N000013 HC RX MED GY IP 250 OP 250 PS 637: Performed by: INTERNAL MEDICINE

## 2024-04-19 PROCEDURE — 250N000011 HC RX IP 250 OP 636: Performed by: STUDENT IN AN ORGANIZED HEALTH CARE EDUCATION/TRAINING PROGRAM

## 2024-04-19 PROCEDURE — 120N000002 HC R&B MED SURG/OB UMMC

## 2024-04-19 PROCEDURE — 250N000013 HC RX MED GY IP 250 OP 250 PS 637: Performed by: STUDENT IN AN ORGANIZED HEALTH CARE EDUCATION/TRAINING PROGRAM

## 2024-04-19 PROCEDURE — P9016 RBC LEUKOCYTES REDUCED: HCPCS | Performed by: INTERNAL MEDICINE

## 2024-04-19 PROCEDURE — 85014 HEMATOCRIT: CPT | Performed by: INTERNAL MEDICINE

## 2024-04-19 PROCEDURE — 97530 THERAPEUTIC ACTIVITIES: CPT | Mod: GP

## 2024-04-19 PROCEDURE — 250N000011 HC RX IP 250 OP 636

## 2024-04-19 PROCEDURE — 99232 SBSQ HOSP IP/OBS MODERATE 35: CPT | Performed by: INTERNAL MEDICINE

## 2024-04-19 PROCEDURE — 80048 BASIC METABOLIC PNL TOTAL CA: CPT | Performed by: INTERNAL MEDICINE

## 2024-04-19 PROCEDURE — 83735 ASSAY OF MAGNESIUM: CPT | Performed by: INTERNAL MEDICINE

## 2024-04-19 PROCEDURE — 85018 HEMOGLOBIN: CPT | Performed by: INTERNAL MEDICINE

## 2024-04-19 PROCEDURE — 250N000011 HC RX IP 250 OP 636: Performed by: INTERNAL MEDICINE

## 2024-04-19 RX ORDER — HYDROMORPHONE HCL IN WATER/PF 6 MG/30 ML
0.2 PATIENT CONTROLLED ANALGESIA SYRINGE INTRAVENOUS ONCE
Status: COMPLETED | OUTPATIENT
Start: 2024-04-19 | End: 2024-04-19

## 2024-04-19 RX ADMIN — CLONIDINE HYDROCHLORIDE 0.1 MG: 0.1 TABLET ORAL at 01:06

## 2024-04-19 RX ADMIN — HYDROMORPHONE HYDROCHLORIDE 0.2 MG: 0.2 INJECTION, SOLUTION INTRAMUSCULAR; INTRAVENOUS; SUBCUTANEOUS at 01:53

## 2024-04-19 RX ADMIN — KETOROLAC TROMETHAMINE 30 MG: 30 INJECTION, SOLUTION INTRAMUSCULAR at 03:54

## 2024-04-19 RX ADMIN — NYSTATIN: 100000 OINTMENT TOPICAL at 22:15

## 2024-04-19 RX ADMIN — VANCOMYCIN HYDROCHLORIDE 1500 MG: 10 INJECTION, POWDER, LYOPHILIZED, FOR SOLUTION INTRAVENOUS at 22:02

## 2024-04-19 RX ADMIN — ERTAPENEM SODIUM 1 G: 1 INJECTION, POWDER, LYOPHILIZED, FOR SOLUTION INTRAMUSCULAR; INTRAVENOUS at 18:49

## 2024-04-19 RX ADMIN — ACETAMINOPHEN 975 MG: 325 TABLET, FILM COATED ORAL at 22:01

## 2024-04-19 RX ADMIN — ONDANSETRON 4 MG: 4 TABLET, ORALLY DISINTEGRATING ORAL at 10:14

## 2024-04-19 RX ADMIN — CLONIDINE HYDROCHLORIDE 0.1 MG: 0.1 TABLET ORAL at 20:19

## 2024-04-19 RX ADMIN — CLONIDINE HYDROCHLORIDE 0.1 MG: 0.1 TABLET ORAL at 08:11

## 2024-04-19 RX ADMIN — HEPARIN, PORCINE (PF) 10 UNIT/ML INTRAVENOUS SYRINGE 5 ML: at 09:59

## 2024-04-19 RX ADMIN — HYDRALAZINE HYDROCHLORIDE 10 MG: 20 INJECTION INTRAMUSCULAR; INTRAVENOUS at 01:22

## 2024-04-19 RX ADMIN — HYDROMORPHONE HYDROCHLORIDE 0.2 MG: 0.2 INJECTION, SOLUTION INTRAMUSCULAR; INTRAVENOUS; SUBCUTANEOUS at 15:00

## 2024-04-19 RX ADMIN — ASPIRIN 162 MG: 81 TABLET, COATED ORAL at 08:10

## 2024-04-19 RX ADMIN — NYSTATIN: 100000 OINTMENT TOPICAL at 08:11

## 2024-04-19 RX ADMIN — OXYCODONE HYDROCHLORIDE 10 MG: 5 TABLET ORAL at 18:54

## 2024-04-19 RX ADMIN — LUMATEPERONE 42 MG: 42 CAPSULE ORAL at 22:01

## 2024-04-19 RX ADMIN — OXYCODONE HYDROCHLORIDE 10 MG: 5 TABLET ORAL at 10:14

## 2024-04-19 RX ADMIN — OMEPRAZOLE 20 MG: 20 CAPSULE, DELAYED RELEASE ORAL at 08:11

## 2024-04-19 RX ADMIN — ATORVASTATIN CALCIUM 10 MG: 10 TABLET, FILM COATED ORAL at 20:19

## 2024-04-19 RX ADMIN — HEPARIN, PORCINE (PF) 10 UNIT/ML INTRAVENOUS SYRINGE 5 ML: at 18:32

## 2024-04-19 RX ADMIN — OXYCODONE HYDROCHLORIDE 10 MG: 5 TABLET ORAL at 02:24

## 2024-04-19 RX ADMIN — ONDANSETRON 4 MG: 2 INJECTION INTRAMUSCULAR; INTRAVENOUS at 01:17

## 2024-04-19 RX ADMIN — ACETAMINOPHEN 975 MG: 325 TABLET, FILM COATED ORAL at 05:36

## 2024-04-19 RX ADMIN — CITALOPRAM 20 MG: 20 TABLET ORAL at 22:01

## 2024-04-19 RX ADMIN — ACETAMINOPHEN 975 MG: 325 TABLET, FILM COATED ORAL at 14:35

## 2024-04-19 RX ADMIN — HEPARIN, PORCINE (PF) 10 UNIT/ML INTRAVENOUS SYRINGE 5 ML: at 13:15

## 2024-04-19 ASSESSMENT — ACTIVITIES OF DAILY LIVING (ADL)
ADLS_ACUITY_SCORE: 43
ADLS_ACUITY_SCORE: 41
ADLS_ACUITY_SCORE: 36
ADLS_ACUITY_SCORE: 35
ADLS_ACUITY_SCORE: 42
ADLS_ACUITY_SCORE: 43
ADLS_ACUITY_SCORE: 41
ADLS_ACUITY_SCORE: 42
ADLS_ACUITY_SCORE: 42
ADLS_ACUITY_SCORE: 35
ADLS_ACUITY_SCORE: 41
ADLS_ACUITY_SCORE: 35
ADLS_ACUITY_SCORE: 41
ADLS_ACUITY_SCORE: 42
ADLS_ACUITY_SCORE: 35
ADLS_ACUITY_SCORE: 41

## 2024-04-19 NOTE — PLAN OF CARE
Goal Outcome Evaluation:      Plan of Care Reviewed With: patient    Overall Patient Progress: no changeOverall Patient Progress: no change    Outcome Evaluation: Patient alert and oriented x4. able to make needs known, call light within reach. patient denies SOB, and chest pain. C/o on nausea, zofran admininistered x1, patient state relief. Sats > 90 % on RA. Patient Ax1-2 stand pivot to the bedside commode. Continent of bowel and bladder, loose Bms x2 this shift, bowels meds held. Voiding without difficulty. Left surgical elbow ace wrap and in sling, NWB to that arm. Large BAILEY drain in place and draining more this afternoon. Bruising on bilateral arms,  and abrasion on right upper arm close to PICC site. Right  PICC single and infusing antibiotic.    Patient has a new rash on the right inner fore, but has not increase in size since the last assessment. Pass on to incoming nurse to continue to monitor.    Patient hemoglobin came back 6.4, provider was page and ordered for blood transfusion was obtained and completed.   Patient BP elevated, but also fluctuating to normal at times.    Continue with plan of care.    Temp: 98.2  F (36.8  C) Temp src: Axillary BP: (!) 147/68 Pulse: 97   Resp: 18 SpO2: 93 % O2 Device: None (Room air)

## 2024-04-19 NOTE — PROGRESS NOTES
Addendum the progress note    Patient low hemoglobin of 6.6.  Will repeat at 6.4.  She complains of fatigue.  1 unit of packed red blood cell has been ordered for transfusion.  We will check H&H tomorrow in the morning and replace if it is below 7.0.

## 2024-04-19 NOTE — PROGRESS NOTES
[facility]    Medicine Progress Note - Hospitalist Service, GOLD TEAM 16    Date of Admission:  4/10/2024    Assessment & Plan      Mitali Robles is a 59 year old female admitted on 4/10/2024. She has hx of bipolar disorder, PTSD, MDD, on high dose benzodiazepines, hyperlipidemia, hypertension, and type 2 diabetes on insulin with recurrent left elbow prosthetic joint infection s/p explantation of L total elbow arthroplasty admitted to ortho post op.      Status update from 4/19/2024  Patient has been having diaphoresis to significant.  She also has been having bowel accidents and loose bowel movements.  She is on 2 broad-spectrum antibiotics.  However does not have any abdominal pain complaints.  Afebrile.    She has been getting MiraLAX and Senokot scheduled twice daily.  Psychiatry is following.  Patient's multiple benzo medications including Xanax, Klonopin, Valium bedtime has been on hold neurology and psychiatry recommendations for altered mental status    Psychiatry is concerned that her loose stools could be also due to withdrawals versus from laxatives.  Holding MiraLAX and Senokot scheduled starting 4/19/24.      Acute perioperative blood loss anemia.  Hemoglobin down to 6.6 on 4/16/2024.  Patient is symptomatic.  Has been having low blood pressure, fatigue.  She has left arm surgical incision site drain still in place.  Consent to transfer is obtained.  S/p 1 unit of PRBC transfusion      L elbow prosthetic joint infection s/p explantation of L total elbow arthroplasty  Reported EBL 600ml per OR note. Per outside ID note, s/p 7 weeks of IV daptomycin, ertapnem and fluconazole. currently ordered cefazolin 2g IV 8h per ortho.   - post op wound care and pain control per ortho  -Monitor routine labs   - cultures from the left elbow tissue culture reviewed, growth and sensitivities noted  Infectious disease consult was obtained prior to  transfer.  Recommendations:  - Stop Cefazolin   - Start IV Vancomycin (pharmacy to dose)   - Start IV Ertapenem 1g Q24H   - Start Fluconazole PO/IV 400mg Q24H   - Will follow intraoperative tissue cultures   Infectious disease recommending 6 weeks IV antibiotics and signed off 4/18    Hypercapnic respiratory failure with CO2 narcosis and lethargy.  Currently on 2 L of oxygen and satting okay.  Awake alert and oriented x 4.  No benzodiazepine per psych recommendation.  Acute metabolic encephalopathy, CO2 narcosis.  Resolved.  Patient became acutely altered less responsive on 4/13/2024, multiple rapids called.  ICU team and myself responded.  Multiple rounds of Narcan, patient momentarily improved however ultimately became too unstable for the floor and was transferred to the ICU.  In the ICU patient was placed on BiPAP.  Pain medication was discontinued.  Ultimately transferred back to the medicine team on 4/14/2024.  While in the ICU CODE STATUS was addressed.  Patient is now DNR as she discussed this with multiple teams including ICU and primary service orthopedic surgery.  Dyspepsia, epigastric discomfort.  Patient complains ferrous sulfate and potassium oral supplementation causing her GI upset and requested Reglan IV as needed.  Ordered Reglan 5 mg IV every 6 as needed to be administered for the symptoms.  Low blood pressure episodes.  Complex psychiatric history including bipolar disorder, PTSD, MDD, on high dose benzodiazepines  PTA meds include valium 30mg HS, xanax 1mg HS prn, clonazepam 2mg BID prn, celexa 20mg hs, and lumateperone 84mg hs.  checked, and last filled xanax 2mg 30 tabs, valiume 10mg 90 tabs and clonazepam 2mg 90 tabs on 2/12/24. Currently ordered xanax 0.25mg TID per ortho.   -Postop mentation improved now on the floor.  -Previously sedated post op and was on precedex, received ativan intra-op,  -Currently on Xanax 0.25 mg 3 times daily   Psychiatry was consulted and seen  patient.  Psychiatry recommendation include spiritual health consult for emotional support, continue Caplyta 42 mg at bedtime dose adjusted from 84 mg daily due to interaction with fluconazole, continue Celexa 20 mg at bedtime, continue Valium 5 mg at bedtime.  Psychiatry recommend not to restart any other benzodiazepines at this time.  Also ordered flumazenil as needed.  Recommended to continue pulse ox.     Type 2 DM  A1c 7.4% on 2/12. On tresiba hs and aspart  - sliding scale insulin for now  - resume tresiba in am  -Blood glucose in acceptable limits  Recent Labs   Lab 04/19/24  1132 04/19/24  1003 04/19/24  0822 04/19/24  0255 04/18/24  2136 04/18/24  1618   * 160* 142* 163* 152* 135*          Hypertension  On Clonidine 1mg HS. Currently BP soft post op  -Now significantly elevated above 180 systolic, and 1 instance above 200s.  -Likely related to ongoing pain  -Continue clonidine. (Increased 2.1 mg 3 times daily)  -As needed hydralazine  - Address underlying pain.  - Add IV hydralazine for SBP greater than 180       Neuropathic pain  - PTA gabapentin 800mg TID-->     Hyperlipidemia  - continue atorvastatin     Right upper extremity weakness. Stroke symptoms absent except for weakness of the right upper ext which can could to radiculopathy vs CVA.   Telestroke consulted      Patient had CT angiogram head and neck done.  Discussed with telestroke  Neurology.  CTA head and neck is negative for any hemodynamically significant stenosis   Patient does have severe claustrophobia.  She has left elbow explantation and placement of antibiotic spacer with still drains in place.  Patient not a candidate for tPA  General neurology consult given and discussed with the on call neurologist , pt will be formally evaluated the following day.               Diet: Diet  Regular Diet Adult    DVT Prophylaxis: ASA per primary team  Galvan Catheter: Not present  Lines: PRESENT      PICC 04/16/24 Single Lumen Right Basilic  "Medications/Antibiotics-Site Assessment: WDL    Cardiac Monitoring: None  Code Status: No CPR- Do NOT Intubate      Clinically Significant Risk Factors              # Hypoalbuminemia: Lowest albumin = 2.9 g/dL at 4/12/2024  5:17 PM, will monitor as appropriate     # Hypertension: Noted on problem list       # DMII: A1C = 7.4 % (Ref range: <5.7 %) within past 6 months   # Obesity: Estimated body mass index is 34.35 kg/m  as calculated from the following:    Height as of this encounter: 1.753 m (5' 9\").    Weight as of this encounter: 105.5 kg (232 lb 9.4 oz).      # Financial/Environmental Concerns: none         Disposition Plan     Medically Ready for Discharge: Anticipated in 2-4 Days             Radha Banks MD  Hospitalist Service, [address]  Securely message with [employer] (more info)  Text page via [employer] Paging/Directory   See signed in provider for up to date coverage information  ______________________________________________________________________    Interval History   59-year-old female patient status post explantation of the left elbow prosthesis with placement of antibiotic spacer.  On IV cefepime and vancomycin for 6 weeks from the day of surgery.  ID has signed off with that recommendation.  She had a PICC line placed on 4/16 for long term antibiotic therapy.  She is off fluconazole due to prolonged Qtc  On multiple benzodiazepine prior to admission.  On hold due to toxic encephalopathy.  She continues to get IV and oral Dilaudid and other pain adjuvants.  Bedtime Valium is on hold  Hypercapnic respiratory failure due to toxic encephalopathy was suspected and other results some of her medications are on hold.    Review of systems: 4 points review of systems completed.  No dizziness, headaches, shortness of breath or chest pain.  No any vomiting.  Has loose bowel movements.  Scheduled stool softeners on hold.      Physical Exam   Vital Signs: " Temp: 97.8  F (36.6  C) Temp src: Oral BP: (!) 178/67 Pulse: 97   Resp: 16 SpO2: 96 % O2 Device: None (Room air)    Weight: 232 lbs 9.36 oz    General Appearance: Appears comfortable.  Respiratory: Without wheezes rhonchi or rales.  CTA  Cardiovascular: RRR, without murmurs  GI: Soft, nontender, plus BS  Skin: Without obvious bleeding, bruising or excoriations  Neurology 2/5 weakness of the RUE  Psych somnolent but conversant, no agitation     Medical Decision Making   55 minutes spent to review her chart, examining the patient, and formulating plan of care.  Data   ------------------------- PAST 24 HR DATA REVIEWED -----------------------------------------------  CBC RESULTS:   Recent Labs   Lab Test 04/16/24  1145   WBC 12.6*   RBC 2.65*   HGB 6.6*   HCT 21.6*   MCV 82   MCH 24.9*   MCHC 30.6*   RDW 19.0*        Last Comprehensive Metabolic Panel:  Lab Results   Component Value Date     04/19/2024    POTASSIUM 3.6 04/19/2024    CHLORIDE 107 04/19/2024    CO2 24 04/19/2024    ANIONGAP 9 04/19/2024     (H) 04/19/2024    BUN 10.7 04/19/2024    CR 0.87 04/19/2024    GFRESTIMATED 76 04/19/2024    DIANE 8.1 (L) 04/19/2024

## 2024-04-19 NOTE — PROGRESS NOTES
Orthopaedic Surgery Progress Note 04/19/2024    S: NAEON. Nueorlogy evaluated and did not appreciate any focal weakness, recommended limited sedating meds. Remains intermittently difficult to rouse. SW to work on placement pending medical stability. POC reviewed.    O:  Temp: 97.7  F (36.5  C) Temp src: Oral BP: (!) 172/72 Pulse: 91   Resp: 22 SpO2: 92 % O2 Device: None (Room air) Oxygen Delivery: 2 LPM    Exam:  Gen: No acute distress, resting comfortably in bed.  Resp: Non-labored breathing  MSK:  LUE:  - Splint/Dressings c/d/i  - SILT medial/radial/ulnar/axillary nerves  - Fires EPL, FPL, Intrinsics  - Hand wwp    Drain output:   Output by Drain (mL) 04/17/24 0700 - 04/17/24 1459 04/17/24 1500 - 04/17/24 2259 04/17/24 2300 - 04/18/24 0659 04/18/24 0700 - 04/18/24 1459 04/18/24 1500 - 04/18/24 2259 04/18/24 2300 - 04/19/24 0635   Closed/Suction Drain 1 Left Elbow Bulb 15 Algerian 50 60 70  50      Recent Labs   Lab 04/18/24  0630 04/17/24  0800 04/17/24  0203 04/16/24  1145   WBC 9.9 13.5*  --  12.6*   HGB 10.1* 7.4* 6.6* 6.6*    323  --  272     Recent Labs   Lab 04/14/24  0621 04/13/24  1847   CULTURE No growth after 4 days No Growth      Assessment/Plan:     Mitali Robles is a 59 year old female with complex PMH including fracture dislocation of the left elbow c/b hardware failure and 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 Sarmiento.    Post operative course complicated by intermittent lethargy, transferred briefly to SICU for closer observation. Also with ABLA s/p 1uPRBCs on 4/16.      ID recommends continued Vanco, ertapenem x 6 wks. Continue to respect DNR wishes. Pain team and psych teams on board and assisting with pain/psychogenic med regimen.     Discharge pending medical stability and placement.     Activity: Up with assist.  Weight bearing status: Minimize use of  LUE  Antibiotics: Per ID recs:  Recommendations:  - continue  IV Vancomycin and Ertapenem 1g Q24H duration ~6 weeks post surgery    - stop Fluconazole due to prolonged QTc ( unclear if Candida albicans isolated in 9/2023 from a wound dehiscence was secondary to contamination vs infection). so far no Candida isolated   - Will follow intraoperative tissue cultures   - need PICC line before discharge  , has brusies from PIV   - Discharge planning pending at this time. Pt would like to stay in [place] for her F/U with Dr Sarmiento. Called and updated her ID physician in [place] , Dr Suresh Dominguez. he will assume ID care when patient returns to [place].   Diet: Begin with clear fluids and progress diet as tolerated.   DVT prophylaxis: aspirin 162 qd and mechanical while in the hospital, discharge on aspirin 162 x 4 weeks.  Bracing/Splinting: LUE posterior slab splint to be kept clean, dry, and intact until follow-up.   Elevation: Elevate LUE on pillows to keep on pillows as much as possible.   Wound Care: Keep splint c/d/I until 2 week follow up.  Drains: Document output per shift, discontinue at discretion of orthopedics  Pain management: transition from IV to orals as tolerated.    X-rays:  POD1 left elbow XR (AP and lateral)  Physical Therapy:  Eval and treat  Occupational Therapy:   Labs: Trend Hgb on POD #1, 2, then PRN  Cultures: Pending, follow culture results closely.   Consults: PT, OT. Medicine, Infectious disease.  appreciate assistance in caring for this patient.  May need pain team as has many narcotic allergies listed  Follow-up: Clinic with Dr. Sarmiento's team in 2 weeks for wound check with left elbow xrays     Disposition: Pending progress with therapies, pain control on orals, and medical stability, anticipate discharge to [facility]     Richard Dunaway MD  Orthopaedic Surgery, PGY-4  Pager: [phone]

## 2024-04-19 NOTE — PLAN OF CARE
Goal Outcome Evaluation:      Plan of Care Reviewed With: patient        PT complaining of pain to left shoulder between 6-9/10 during shift. Administered IV and oral pain medication during shift.   SBP 180s-200s, no chest pain or headache reported, notified provider and administered 1x dose of PRN hydralazine. Clonidine resumed.   Pt unsteady on feet when transferring to bedside commode.

## 2024-04-19 NOTE — CONSULTS
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      Patient has a history of severe PTSD managed by a  In [place] with a complex psychotropic regimen:   --Xanax 2mg at bedtime  --Celexa 20mg at bedtime  --Klonopin 4mg at bedtime  --Clonidine 1mg at bedtime  --Valium 30mg at bedtime  --Gabapentin 800mg TID  --Caplyta 84mg at bedtime     Patient became acutely altered less responsive on 4/13/2024, multiple rapids called. Multiple rounds of Narcan, was transferred to the ICU. In the ICU patient was placed on BiPAP. Opioids were discontinued. Ultimately transferred back to the medicine team on 4/14/2024. While in the ICU CODE STATUS was addressed. Patient is now DNR as she discussed this with multiple teams including ICU and primary service orthopedic surgery. Her Psych meds were placed on hold 4/13. Pain consult 4/15 recommended oxycodone 5-10mg PO q6 and Dilaudid 0.1-0.2mg PO IV daily PRN.      Patient reported to me 4/15 her need for her PTA medications, she's been following with Dr. Butler and on that regimen for 15 years. She reported her severe PTSD and trauma (documented below) and stated that without the medications her PTSD would take over and she would \"surely become suicidal.\" She received Dilaudid 0.2mg IV today 1416 and was tired on exam. Since admission 4/10, she has received Xanax 0.25mg TID x6 doses with the last dose being " "0800 on 4/13. She has not received any klonopin during this hospitalization. Clonidine was restarted at a lower dose.      I restarted Valium 5mg and no other benzodiazepines 4/15 at bedtime. Also restarted Capylta at a lower dose due to interaction with flucanozole. This morning, she reports she is \"doing well\" and got sleep last night because \"I received the right medications.\" She denies feeling anxious and her blood pressures have decreased. Denies feeling suicidal      Update 4/17 valium was held last night due to somnolence, pt needed CPAP and a blood transfusion. She states she's feeling fine now, just wishes she didn't have to be NPO. Denies anxiety, states she is tired, slept a lot last night. Spiritual health consult copleted yesterday reports patient stated she was ready to \"go\" and \"I've lost three children\" and that she's ready to see them again. She denies active suicidal ideation on exam today.     4/18: Neurology became involved since last psychiatric consult due to AMS -- stroke code was called last afternoon due to Right arm weakness. Neurologist recommended use of CPAP, delirium precautions, and limiting opioids/benzodiazepines. On exam today, patient was somnolent and disoriented. She asked \"did it take you long to get here?\" She could not answer questions and kept falling asleep.     4/19-- more awake today, states she has been having trouble with diarrhea and even had an accident this morning. States she slept some last night, but reports not getting to sleep until 2 am        Current Medications:     Current Facility-Administered Medications   Medication Dose Route Frequency Provider Last Rate Last Admin    acetaminophen (TYLENOL) tablet 975 mg  975 mg Oral Q8H Sveta Mcqueen APRN CNP   975 mg at 04/19/24 0536    aspirin EC tablet 162 mg  162 mg Oral Daily Marcos Sarmiento MD   162 mg at 04/19/24 0810    atorvastatin (LIPITOR) tablet 10 mg  10 mg Oral QPM Sveta Mcqueen APRN CNP   10 mg at " 04/18/24 1942    citalopram (celeXA) tablet 20 mg  20 mg Oral At Bedtime Odalys Weeks MD   20 mg at 04/18/24 2133    cloNIDine (CATAPRES) tablet 0.1 mg  0.1 mg Oral BID Oziel Ibarra MD   0.1 mg at 04/19/24 0811    [Held by provider] diazepam (VALIUM) tablet 5 mg  5 mg Oral At Bedtime Radha Banks MD   5 mg at 04/17/24 2204    ertapenem (INVanz) 1 g vial to attach to  mL bag  1 g Intravenous Q24H Richard Dunaway  mL/hr at 04/14/24 1710 1 g at 04/18/24 1613    ferrous sulfate (FEROSUL) tablet 325 mg  325 mg Oral Every Other Day Sveta Mcqueen APRN CNP   325 mg at 04/18/24 0944    [Held by provider] gabapentin (NEURONTIN) tablet 800 mg  800 mg Oral TID Sloan Stanford APRN CNP   800 mg at 04/16/24 1720    heparin lock flush 10 UNIT/ML injection 5-20 mL  5-20 mL Intracatheter Q24H Sloan Stanford APRN CNP   5 mL at 04/18/24 1613    insulin aspart (NovoLOG) injection (RAPID ACTING)  1-7 Units Subcutaneous TID AC Odalys Weeks MD   1 Units at 04/19/24 1134    insulin aspart (NovoLOG) injection (RAPID ACTING)  1-5 Units Subcutaneous At Bedtime Odalys Weeks MD   1 Units at 04/11/24 2218    lumateperone (CAPLYTA) capsule 42 mg  42 mg Oral At Bedtime Nenita Garcia APRN CNP   42 mg at 04/18/24 2132    nystatin (MYCOSTATIN) ointment   Topical BID Only, MD Philip   Given at 04/19/24 0811    omeprazole (PriLOSEC) CR capsule 20 mg  20 mg Oral Daily Marcos Sarmiento MD   20 mg at 04/19/24 0811    [Held by provider] polyethylene glycol (MIRALAX) Packet 17 g  17 g Oral BID Rita Loja APRN CNP   17 g at 04/18/24 0945    [Held by provider] senna-docusate (SENOKOT-S/PERICOLACE) 8.6-50 MG per tablet 1 tablet  1 tablet Oral BID Marcos Sarmiento MD   1 tablet at 04/18/24 0944    sodium chloride (PF) 0.9% PF flush 10-40 mL  10-40 mL Intracatheter Q8H Sloan Stanford, APRN CNP   10 mL at 04/18/24 1613    sodium chloride (PF) 0.9% PF flush 3 mL  3 mL Intracatheter Q8H Arvind Falcon MD   " 3 mL at 04/18/24 1400    sodium chloride (PF) 0.9% PF flush 3 mL  3 mL Intracatheter Q8H Arvind Falcon MD   3 mL at 04/18/24 1400    sodium chloride (PF) 0.9% PF flush 3 mL  3 mL Intracatheter Q8H Marcos Sarmiento MD   3 mL at 04/18/24 1614    vancomycin (VANCOCIN) 1,500 mg in 0.9% NaCl 250 mL intermittent infusion  1,500 mg Intravenous Q24H Marcos Sarmiento MD   1,500 mg at 04/18/24 1943              MSE:   Appearance:  pale, more awake today   Attitude:  somewhat cooperative  Eye Contact:  better   Mood:   tired  Affect:   somnolent   Speech:  mumbling  Psychomotor Behavior:  no evidence of tardive dyskinesia, dystonia, or tics  Muscle strength and tone: recent weakness in R arm   Thought Process:  logical  Associations:  no loose associations  Thought Content:  no evidence of suicidal ideation or homicidal ideation  Insight:  partial  Judgement:  limited  Oriented to:   person, place  Attention Span and Concentration:  better  Recent and Remote Memory:  limited    Vital signs:  Temp: 97.8  F (36.6  C) Temp src: Oral BP: (!) 178/67 Pulse: 97   Resp: 16 SpO2: 96 % O2 Device: None (Room air) Oxygen Delivery: 2 LPM   Weight: 105.5 kg (232 lb 9.4 oz)  Estimated body mass index is 34.35 kg/m  as calculated from the following:    Height as of this encounter: 1.753 m (5' 9\").    Weight as of this encounter: 105.5 kg (232 lb 9.4 oz).             DSM-5 Diagnosis:   PTSD  Delirium           Assessment:   Mitali is a 59 year old female with history of severe PTSD on a home regimen of 3 different benzodiazepines at very high doses. Over the weekend, she required ICU transfer due to respiratory failure related to opioids. There is concern restarting her home meds that she is requesting given continued opioid agonist use, lack of obvious benzodiazepine withdrawal sxs, and recently changing her code status to DNR/DNI. It is very difficult to quantify the amount of benzodiazepines she was taking prior to admission given amount of " hospitalizations over the last 6 months. Over the last few days, I have not increased benzodiazepines to reflect her reported PTA doses given her subjective report 4/17 and lack of objective signs of benzodiazepine withdrawal. Given longer half lives of valium and klonopin, we should still be monitoring patient closely for signs of withdrawal, however with her CO2 retention and current delirium, I agree with neurology to hold Benzodiazepines and continue to monitor closely.         Today she reports diarrhea, this could be a sign of benzodiazepine withdrawal, however she is getting Miralax and Docusate scheduled multiple times a day. Want to decrease/hold off on these first before trying to restart benzos.             Summary of Recommendations:   Continue Caplyta 42mg -- she's off Flucanoloze for a couple days now, but still don't want to increase given her recent somnolence   Continue Celexa 20mg at bedtime  Continue to hold Valium 5mg at bedtime  Recommend decreasing/holding her bowel meds due to diarrhea     Nenita Garcia, PMAGAP-BC  Consult/Liaison Psychiatry   [facility]

## 2024-04-20 ENCOUNTER — APPOINTMENT (OUTPATIENT)
Dept: PHYSICAL THERAPY | Facility: CLINIC | Age: 59
DRG: 463 | End: 2024-04-20
Attending: STUDENT IN AN ORGANIZED HEALTH CARE EDUCATION/TRAINING PROGRAM
Payer: COMMERCIAL

## 2024-04-20 LAB
ANION GAP SERPL CALCULATED.3IONS-SCNC: 8 MMOL/L (ref 7–15)
BASE EXCESS BLDV CALC-SCNC: -0.8 MMOL/L (ref -3–3)
BUN SERPL-MCNC: 9.2 MG/DL (ref 8–23)
CALCIUM SERPL-MCNC: 7.9 MG/DL (ref 8.8–10.2)
CHLORIDE SERPL-SCNC: 106 MMOL/L (ref 98–107)
CREAT SERPL-MCNC: 0.96 MG/DL (ref 0.51–0.95)
CREAT SERPL-MCNC: 0.96 MG/DL (ref 0.51–0.95)
DEPRECATED HCO3 PLAS-SCNC: 25 MMOL/L (ref 22–29)
EGFRCR SERPLBLD CKD-EPI 2021: 67 ML/MIN/1.73M2
EGFRCR SERPLBLD CKD-EPI 2021: 67 ML/MIN/1.73M2
ERYTHROCYTE [DISTWIDTH] IN BLOOD BY AUTOMATED COUNT: 18.9 % (ref 10–15)
GLUCOSE BLDC GLUCOMTR-MCNC: 127 MG/DL (ref 70–99)
GLUCOSE BLDC GLUCOMTR-MCNC: 145 MG/DL (ref 70–99)
GLUCOSE BLDC GLUCOMTR-MCNC: 164 MG/DL (ref 70–99)
GLUCOSE BLDC GLUCOMTR-MCNC: 170 MG/DL (ref 70–99)
GLUCOSE SERPL-MCNC: 133 MG/DL (ref 70–99)
HCO3 BLDV-SCNC: 26 MMOL/L (ref 21–28)
HCT VFR BLD AUTO: 24.2 % (ref 35–47)
HGB BLD-MCNC: 7.4 G/DL (ref 11.7–15.7)
HGB BLD-MCNC: 7.6 G/DL (ref 11.7–15.7)
MAGNESIUM SERPL-MCNC: 1.4 MG/DL (ref 1.7–2.3)
MCH RBC QN AUTO: 25.8 PG (ref 26.5–33)
MCHC RBC AUTO-ENTMCNC: 31.4 G/DL (ref 31.5–36.5)
MCV RBC AUTO: 82 FL (ref 78–100)
O2/TOTAL GAS SETTING VFR VENT: 89 %
OXYHGB MFR BLDV: 67 % (ref 70–75)
PCO2 BLDV: 53 MM HG (ref 40–50)
PH BLDV: 7.3 [PH] (ref 7.32–7.43)
PHOSPHATE SERPL-MCNC: 4.5 MG/DL (ref 2.5–4.5)
PLATELET # BLD AUTO: 323 10E3/UL (ref 150–450)
PO2 BLDV: 39 MM HG (ref 25–47)
POTASSIUM SERPL-SCNC: 3.6 MMOL/L (ref 3.4–5.3)
RBC # BLD AUTO: 2.95 10E6/UL (ref 3.8–5.2)
SAO2 % BLDV: 68.9 % (ref 70–75)
SODIUM SERPL-SCNC: 139 MMOL/L (ref 135–145)
VANCOMYCIN SERPL-MCNC: 17.6 UG/ML
WBC # BLD AUTO: 12.2 10E3/UL (ref 4–11)

## 2024-04-20 PROCEDURE — 84100 ASSAY OF PHOSPHORUS: CPT | Performed by: INTERNAL MEDICINE

## 2024-04-20 PROCEDURE — 250N000013 HC RX MED GY IP 250 OP 250 PS 637: Performed by: INTERNAL MEDICINE

## 2024-04-20 PROCEDURE — 36592 COLLECT BLOOD FROM PICC: CPT | Performed by: STUDENT IN AN ORGANIZED HEALTH CARE EDUCATION/TRAINING PROGRAM

## 2024-04-20 PROCEDURE — 250N000011 HC RX IP 250 OP 636: Performed by: STUDENT IN AN ORGANIZED HEALTH CARE EDUCATION/TRAINING PROGRAM

## 2024-04-20 PROCEDURE — 85027 COMPLETE CBC AUTOMATED: CPT | Performed by: STUDENT IN AN ORGANIZED HEALTH CARE EDUCATION/TRAINING PROGRAM

## 2024-04-20 PROCEDURE — 250N000013 HC RX MED GY IP 250 OP 250 PS 637

## 2024-04-20 PROCEDURE — 83735 ASSAY OF MAGNESIUM: CPT | Performed by: INTERNAL MEDICINE

## 2024-04-20 PROCEDURE — 85014 HEMATOCRIT: CPT | Performed by: INTERNAL MEDICINE

## 2024-04-20 PROCEDURE — 80048 BASIC METABOLIC PNL TOTAL CA: CPT | Performed by: INTERNAL MEDICINE

## 2024-04-20 PROCEDURE — 99232 SBSQ HOSP IP/OBS MODERATE 35: CPT | Performed by: INTERNAL MEDICINE

## 2024-04-20 PROCEDURE — 97530 THERAPEUTIC ACTIVITIES: CPT | Mod: GP

## 2024-04-20 PROCEDURE — 250N000011 HC RX IP 250 OP 636: Mod: JZ | Performed by: STUDENT IN AN ORGANIZED HEALTH CARE EDUCATION/TRAINING PROGRAM

## 2024-04-20 PROCEDURE — 250N000013 HC RX MED GY IP 250 OP 250 PS 637: Performed by: STUDENT IN AN ORGANIZED HEALTH CARE EDUCATION/TRAINING PROGRAM

## 2024-04-20 PROCEDURE — 250N000011 HC RX IP 250 OP 636

## 2024-04-20 PROCEDURE — 80202 ASSAY OF VANCOMYCIN: CPT | Performed by: STUDENT IN AN ORGANIZED HEALTH CARE EDUCATION/TRAINING PROGRAM

## 2024-04-20 PROCEDURE — 82805 BLOOD GASES W/O2 SATURATION: CPT | Performed by: STUDENT IN AN ORGANIZED HEALTH CARE EDUCATION/TRAINING PROGRAM

## 2024-04-20 PROCEDURE — 97116 GAIT TRAINING THERAPY: CPT | Mod: GP

## 2024-04-20 PROCEDURE — 36415 COLL VENOUS BLD VENIPUNCTURE: CPT | Performed by: STUDENT IN AN ORGANIZED HEALTH CARE EDUCATION/TRAINING PROGRAM

## 2024-04-20 PROCEDURE — 250N000011 HC RX IP 250 OP 636: Performed by: INTERNAL MEDICINE

## 2024-04-20 PROCEDURE — 120N000002 HC R&B MED SURG/OB UMMC

## 2024-04-20 RX ORDER — MAGNESIUM SULFATE HEPTAHYDRATE 40 MG/ML
4 INJECTION, SOLUTION INTRAVENOUS ONCE
Status: COMPLETED | OUTPATIENT
Start: 2024-04-20 | End: 2024-04-20

## 2024-04-20 RX ORDER — POTASSIUM CHLORIDE 1500 MG/1
20 TABLET, EXTENDED RELEASE ORAL ONCE
Status: COMPLETED | OUTPATIENT
Start: 2024-04-20 | End: 2024-04-20

## 2024-04-20 RX ORDER — VANCOMYCIN HYDROCHLORIDE 1 G/200ML
1000 INJECTION, SOLUTION INTRAVENOUS EVERY 12 HOURS
Status: DISCONTINUED | OUTPATIENT
Start: 2024-04-20 | End: 2024-04-22 | Stop reason: HOSPADM

## 2024-04-20 RX ADMIN — ONDANSETRON 4 MG: 4 TABLET, ORALLY DISINTEGRATING ORAL at 09:07

## 2024-04-20 RX ADMIN — MAGNESIUM SULFATE HEPTAHYDRATE 4 G: 40 INJECTION, SOLUTION INTRAVENOUS at 20:02

## 2024-04-20 RX ADMIN — ERTAPENEM SODIUM 1 G: 1 INJECTION, POWDER, LYOPHILIZED, FOR SOLUTION INTRAMUSCULAR; INTRAVENOUS at 16:33

## 2024-04-20 RX ADMIN — HYDROMORPHONE HYDROCHLORIDE 0.2 MG: 0.2 INJECTION, SOLUTION INTRAMUSCULAR; INTRAVENOUS; SUBCUTANEOUS at 09:08

## 2024-04-20 RX ADMIN — CLONIDINE HYDROCHLORIDE 0.1 MG: 0.1 TABLET ORAL at 19:55

## 2024-04-20 RX ADMIN — CITALOPRAM 20 MG: 20 TABLET ORAL at 21:38

## 2024-04-20 RX ADMIN — OXYCODONE HYDROCHLORIDE 10 MG: 5 TABLET ORAL at 19:59

## 2024-04-20 RX ADMIN — OXYCODONE HYDROCHLORIDE 10 MG: 5 TABLET ORAL at 06:31

## 2024-04-20 RX ADMIN — ATORVASTATIN CALCIUM 10 MG: 10 TABLET, FILM COATED ORAL at 19:55

## 2024-04-20 RX ADMIN — NYSTATIN: 100000 OINTMENT TOPICAL at 20:10

## 2024-04-20 RX ADMIN — FERROUS SULFATE TAB 325 MG (65 MG ELEMENTAL FE) 325 MG: 325 (65 FE) TAB at 09:07

## 2024-04-20 RX ADMIN — VANCOMYCIN HYDROCHLORIDE 1000 MG: 1 INJECTION, SOLUTION INTRAVENOUS at 22:41

## 2024-04-20 RX ADMIN — ASPIRIN 162 MG: 81 TABLET, COATED ORAL at 09:07

## 2024-04-20 RX ADMIN — POTASSIUM CHLORIDE 20 MEQ: 1500 TABLET, EXTENDED RELEASE ORAL at 19:55

## 2024-04-20 RX ADMIN — ACETAMINOPHEN 650 MG: 325 TABLET, FILM COATED ORAL at 19:59

## 2024-04-20 RX ADMIN — OMEPRAZOLE 20 MG: 20 CAPSULE, DELAYED RELEASE ORAL at 09:07

## 2024-04-20 RX ADMIN — CLONIDINE HYDROCHLORIDE 0.1 MG: 0.1 TABLET ORAL at 09:07

## 2024-04-20 RX ADMIN — VANCOMYCIN HYDROCHLORIDE 1000 MG: 1 INJECTION, SOLUTION INTRAVENOUS at 09:14

## 2024-04-20 RX ADMIN — LUMATEPERONE 42 MG: 42 CAPSULE ORAL at 21:38

## 2024-04-20 RX ADMIN — ACETAMINOPHEN 975 MG: 325 TABLET, FILM COATED ORAL at 06:33

## 2024-04-20 ASSESSMENT — ACTIVITIES OF DAILY LIVING (ADL)
ADLS_ACUITY_SCORE: 34
ADLS_ACUITY_SCORE: 36
ADLS_ACUITY_SCORE: 37
ADLS_ACUITY_SCORE: 34
ADLS_ACUITY_SCORE: 35
ADLS_ACUITY_SCORE: 36
ADLS_ACUITY_SCORE: 34
ADLS_ACUITY_SCORE: 37
ADLS_ACUITY_SCORE: 35
ADLS_ACUITY_SCORE: 34
ADLS_ACUITY_SCORE: 36
ADLS_ACUITY_SCORE: 35
ADLS_ACUITY_SCORE: 36
ADLS_ACUITY_SCORE: 37
ADLS_ACUITY_SCORE: 34
ADLS_ACUITY_SCORE: 35
ADLS_ACUITY_SCORE: 34
ADLS_ACUITY_SCORE: 37
ADLS_ACUITY_SCORE: 34
ADLS_ACUITY_SCORE: 37
ADLS_ACUITY_SCORE: 35

## 2024-04-20 NOTE — PHARMACY-VANCOMYCIN DOSING SERVICE
Pharmacy Vancomycin Note  Date of Service 2024  Patient's  1965   59 year old, female    Indication: Bone and Joint Infection and Skin and Soft Tissue Infection  Day of Therapy: Started 24 (Day 9)  Current vancomycin regimen:  1500 mg IV q24h  Current vancomycin monitoring method: AUC  Current vancomycin therapeutic monitoring goal: 400-600 mg*h/L    InsightRX Prediction of Current Vancomycin Regimen  Regimen: 1500 mg IV every 24 hours.  Start time: 22:02 on 2024  Exposure target: AUC24 (range)400-600 mg/L.hr   AUC24,ss: 401 mg/L.hr  Probability of AUC24 > 400: 51 %  Ctrough,ss: 10.7 mg/L  Probability of Ctrough,ss > 20: 0 %  Probability of nephrotoxicity (Lodise CHANDAN ): 6 %    Current estimated CrCl = Estimated Creatinine Clearance: 84.7 mL/min (A) (based on SCr of 0.96 mg/dL (H)).    Creatinine for last 3 days  2024:  6:12 AM Creatinine 1.00 mg/dL  2024: 10:03 AM Creatinine 0.87 mg/dL  2024:  7:17 AM Creatinine 0.96 mg/dL;  7:17 AM Creatinine 0.96 mg/dL    Recent Vancomycin Levels (past 3 days)  2024:  7:17 AM Vancomycin 17.6 ug/mL    Vancomycin IV Administrations (past 72 hours)                     vancomycin (VANCOCIN) 1,500 mg in 0.9% NaCl 250 mL intermittent infusion (mg) 1,500 mg New Bag 24     1,500 mg New Bag 24     1,500 mg New Bag 24                    Nephrotoxins and other renal medications (From now, onward)      Start     Dose/Rate Route Frequency Ordered Stop    24 0900  vancomycin (VANCOCIN) 1,000 mg in 200 mL dextrose intermittent infusion         1,000 mg  200 mL/hr over 1 Hours Intravenous EVERY 12 HOURS 24 0811                 Contrast Orders - past 72 hours (72h ago, onward)      Start     Dose/Rate Route Frequency Stop    24 1530  iopamidol (ISOVUE-370) solution 100 mL         100 mL Intravenous ONCE 24 1536    24 1530  iopamidol (ISOVUE-370) solution 50 mL         50 mL  Intravenous ONCE 04/17/24 1550            Interpretation of levels and current regimen:  Vancomycin level is reflective of -600    Has serum creatinine changed greater than 50% in last 72 hours: No    Urine output:  unable to determine    Renal Function: Improving    InsightRX Prediction of Planned New Vancomycin Regimen  Regimen: 1000 mg IV every 12 hours.  Start time: 10:02 on 04/20/2024  Exposure target: AUC24 (range)400-600 mg/L.hr   AUC24,ss: 525 mg/L.hr  Probability of AUC24 > 400: 100 %  Ctrough,ss: 17.3 mg/L  Probability of Ctrough,ss > 20: 10 %  Probability of nephrotoxicity (Lodise CHANDAN 2009): 13 %    Plan:  Increase Dose to 1000 mg (8.6 mg/kg) IV every 12 hours.  Vancomycin monitoring method: AUC  Vancomycin therapeutic monitoring goal: 400-600 mg*h/L  Pharmacy will check vancomycin levels as appropriate in 1-3 Days.  Serum creatinine levels will be ordered every 48 hours.    Percy Martinez, [facility]

## 2024-04-20 NOTE — PLAN OF CARE
Goal Outcome Evaluation:      Plan of Care Reviewed With: patient    Overall Patient Progress: improvingOverall Patient Progress: improving    Pain managed with IV dilaudid x1 this morning. Refused other PO pain meds, pain tolerable all day.   Transferring Ax1 pivot to bedside commode. 1x loose stool this shift.   R PICC running TKO between abx.   BAILEY from left arm minimal serosang output.  Alert and oriented x4, VSS on RA, weaned off of O2 on RA.   K, Mag, + Phos replacement. K and Mag replacement ordered this evening.   Good intake this shift, eating well. Nausea this morning managed with zofran x1.

## 2024-04-20 NOTE — PROGRESS NOTES
Orthopaedic Surgery Progress Note 04/20/2024    S: NAEON. No difficulty with pain control. Reports diarrhea, nausea, vomit x1 yesterday. Denies cp/f/c/sob.     O:  Temp: 98.4  F (36.9  C) Temp src: Oral BP: (!) 166/66 Pulse: 90   Resp: 16 SpO2: 95 % O2 Device: Nasal cannula Oxygen Delivery: 2 LPM    Exam:  Gen: No acute distress, resting comfortably in bed.  Resp: Non-labored breathing  MSK:  LUE:  - Splint/Dressings c/d/i  - SILT medial/radial/ulnar/axillary nerves  - Fires EPL, FPL, Intrinsics  - Hand wwp    Drain output:   Output by Drain (mL) 04/18/24 0700 - 04/18/24 1459 04/18/24 1500 - 04/18/24 2259 04/18/24 2300 - 04/19/24 0659 04/19/24 0700 - 04/19/24 1459 04/19/24 1500 - 04/19/24 2259 04/19/24 2300 - 04/20/24 0659 04/20/24 0700 - 04/20/24 0831   Closed/Suction Drain 1 Left Elbow Bulb 15 [place]  50   117 0      Recent Labs   Lab 04/20/24  0717 04/20/24  0039 04/19/24  1316 04/19/24  1003 04/18/24  0630   WBC 12.2*  --   --  12.5* 9.9   HGB 7.6* 7.4* 6.4* 6.6* 10.1*     --   --  348 250     Recent Labs   Lab 04/14/24  0621 04/13/24  1847   CULTURE No Growth No Growth      Assessment/Plan:     Mitali Robles is a 59 year old female with complex PMH including fracture dislocation of the left elbow c/b hardware failure and 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 Sarmiento.    Post operative course complicated by intermittent lethargy, transferred briefly to SICU for closer observation. Also with ABLA s/p 1uPRBCs on 4/16.      ID recommends continued Vanco, ertapenem x 6 wks. Continue to respect DNR wishes. Pain team and psych teams on board and assisting with pain/psychogenic med regimen.     Now with symptoms of enteric infx vs gastroenteritis.     Discharge pending medical stability and placement.     Activity: Up with assist.  Weight bearing status: Minimize use of  LUE  Antibiotics: Per ID recs:  Recommendations:  - continue  IV Vancomycin and Ertapenem 1g Q24H duration ~6 weeks post surgery    - stop Fluconazole due to prolonged QTc ( unclear if Candida albicans isolated in 9/2023 from a wound dehiscence was secondary to contamination vs infection). so far no Candida isolated   - Will follow intraoperative tissue cultures   - need PICC line before discharge  , has brusies from PIV   - Discharge planning pending at this time. Pt would like to stay in [place] for her F/U with Dr Sarmiento. Called and updated her ID physician in [place] , Dr Suresh Dominguez. he will assume ID care when patient returns to [place].   Diet: Begin with clear fluids and progress diet as tolerated.   DVT prophylaxis: aspirin 162 qd and mechanical while in the hospital, discharge on aspirin 162 x 4 weeks.  Bracing/Splinting: LUE posterior slab splint to be kept clean, dry, and intact until follow-up.   Elevation: Elevate LUE on pillows to keep on pillows as much as possible.   Wound Care: Keep splint c/d/I until 2 week follow up.  Drains: Document output per shift, discontinue at discretion of orthopedics  Pain management: transition from IV to orals as tolerated.    X-rays:  POD1 left elbow XR (AP and lateral)  Physical Therapy:  Eval and treat  Occupational Therapy:   Labs: Trend Hgb on POD #1, 2, then PRN  Cultures: Pending, follow culture results closely.   Consults: PT, OT. Medicine, Infectious disease.  appreciate assistance in caring for this patient.  May need pain team as has many narcotic allergies listed  Follow-up: Clinic with Dr. Sarmiento's team in 2 weeks for wound check with left elbow xrays     Disposition: Pending progress with therapies, pain control on orals, and medical stability, anticipate discharge to TCU     Nenita Ndiaye MD PGY1  Orthopedic Surgery

## 2024-04-20 NOTE — PROGRESS NOTES
[facility] [place]    Medicine Progress Note - Hospitalist Service, GOLD TEAM 16    Date of Admission:  4/10/2024    Assessment & Plan      Mitali Robles is a 59 year old female admitted on 4/10/2024. She has hx of bipolar disorder, PTSD, MDD, on high dose benzodiazepines, hyperlipidemia, hypertension, and type 2 diabetes on insulin with recurrent left elbow prosthetic joint infection s/p explantation of L total elbow arthroplasty admitted to ortho post op.      Status update from 4/20/2024  Continues to have frequent watery and loose Bms. She did get stool softeners last on 04/18 morning   Patient has been having diaphoresis  which is significant.   She is on 2 broad-spectrum antibiotics.  However does not have any abdominal pain complaints.  Afebrile. Laxatives on hold     She was getting MiraLAX and Senokot scheduled twice daily.  Psychiatry is following.  Patient's multiple benzo medications including Xanax, Klonopin, Valium bedtime has been on hold neurology and psychiatry recommendations for altered mental status    Psychiatry is concerned that her loose stools could be also due to withdrawals versus from laxatives.  Holding MiraLAX and Senokot scheduled starting 4/19/24.      Acute perioperative blood loss anemia.  Hemoglobin down to 6.6 on 4/16/2024.  Patient is symptomatic.  Has been having low blood pressure, fatigue.  She has left arm surgical incision site drain still in place.  Consent to transfer is obtained.  S/p 1 unit of PRBC transfusion      L elbow prosthetic joint infection s/p explantation of L total elbow arthroplasty  Reported EBL 600ml per OR note. Per outside ID note, s/p 7 weeks of IV daptomycin, ertapnem and fluconazole. currently ordered cefazolin 2g IV 8h per ortho.   - post op wound care and pain control per ortho  -Monitor routine labs   - cultures from the left elbow tissue culture reviewed, growth and sensitivities noted  Infectious disease  consult was obtained prior to transfer.  Recommendations:  - Stop Cefazolin   - Start IV Vancomycin (pharmacy to dose)   - Start IV Ertapenem 1g Q24H   - Start Fluconazole PO/IV 400mg Q24H   - Will follow intraoperative tissue cultures   Infectious disease recommending 6 weeks IV antibiotics and signed off 4/18    Hypercapnic respiratory failure with CO2 narcosis and lethargy.  Currently on 2 L of oxygen and satting okay.  Awake alert and oriented x 4.  No benzodiazepine per psych recommendation.  Acute metabolic encephalopathy, CO2 narcosis.  Resolved.  Patient became acutely altered less responsive on 4/13/2024, multiple rapids called.  ICU team and myself responded.  Multiple rounds of Narcan, patient momentarily improved however ultimately became too unstable for the floor and was transferred to the ICU.  In the ICU patient was placed on BiPAP.  Pain medication was discontinued.  Ultimately transferred back to the medicine team on 4/14/2024.  While in the ICU CODE STATUS was addressed.  Patient is now DNR as she discussed this with multiple teams including ICU and primary service orthopedic surgery.  Dyspepsia, epigastric discomfort.  Patient complains ferrous sulfate and potassium oral supplementation causing her GI upset and requested Reglan IV as needed.  Ordered Reglan 5 mg IV every 6 as needed to be administered for the symptoms.  Low blood pressure episodes.  Complex psychiatric history including bipolar disorder, PTSD, MDD, on high dose benzodiazepines  PTA meds include valium 30mg HS, xanax 1mg HS prn, clonazepam 2mg BID prn, celexa 20mg hs, and lumateperone 84mg hs.  checked, and last filled xanax 2mg 30 tabs, valiume 10mg 90 tabs and clonazepam 2mg 90 tabs on 2/12/24. Currently ordered xanax 0.25mg TID per ortho.   -Postop mentation improved now on the floor.  -Previously sedated post op and was on precedex, received ativan intra-op,  -Currently on Xanax 0.25 mg 3 times daily   Psychiatry was  consulted and seen patient.  Psychiatry recommendation include spiritual health consult for emotional support, continue Caplyta 42 mg at bedtime dose adjusted from 84 mg daily due to interaction with fluconazole, continue Celexa 20 mg at bedtime, continue Valium 5 mg at bedtime.  Psychiatry recommend not to restart any other benzodiazepines at this time.  Also ordered flumazenil as needed.  Recommended to continue pulse ox.     Type 2 DM  A1c 7.4% on 2/12. On tresiba hs and aspart  - sliding scale insulin for now  - resume tresiba in am  -Blood glucose in acceptable limits  Recent Labs   Lab 04/20/24  1128 04/20/24  0818 04/20/24  0717 04/19/24  2200 04/19/24  1652 04/19/24  1132   * 145* 133* 146* 137* 172*          Hypertension  On Clonidine 1mg HS. Currently BP soft post op  -Now significantly elevated above 180 systolic, and 1 instance above 200s.  -Likely related to ongoing pain  -Continue clonidine. (Increased 2.1 mg 3 times daily)  -As needed hydralazine  - Address underlying pain.  - Add IV hydralazine for SBP greater than 180       Neuropathic pain  - PTA gabapentin 800mg TID-->     Hyperlipidemia  - continue atorvastatin     Right upper extremity weakness. Stroke symptoms absent except for weakness of the right upper ext which can could to radiculopathy vs CVA.   Telestroke consulted      Patient had CT angiogram head and neck done.  Discussed with telestroke  Neurology.  CTA head and neck is negative for any hemodynamically significant stenosis   Patient does have severe claustrophobia.  She has left elbow explantation and placement of antibiotic spacer with still drains in place.  Patient not a candidate for tPA  General neurology consult given and discussed with the on call neurologist , pt will be formally evaluated the following day.               Diet: Diet  Regular Diet Adult    DVT Prophylaxis: ASA per primary team  Galvan Catheter: Not present  Lines: PRESENT      PICC 04/16/24 Single Lumen  "Right Basilic Medications/Antibiotics-Site Assessment: WDL    Cardiac Monitoring: None  Code Status: No CPR- Do NOT Intubate      Clinically Significant Risk Factors            # Hypomagnesemia: Lowest Mg = 1.4 mg/dL in last 2 days, will replace as needed   # Hypoalbuminemia: Lowest albumin = 2.9 g/dL at 4/12/2024  5:17 PM, will monitor as appropriate     # Hypertension: Noted on problem list       # DMII: A1C = 7.4 % (Ref range: <5.7 %) within past 6 months   # Obesity: Estimated body mass index is 37.77 kg/m  as calculated from the following:    Height as of this encounter: 1.753 m (5' 9\").    Weight as of this encounter: 116 kg (255 lb 11.7 oz).      # Financial/Environmental Concerns: none         Disposition Plan     Medically Ready for Discharge: Anticipated in 2-4 Days             Radha Banks MD  Hospitalist Service, [address]  Securely message with [email or web address] (more info)  Text page via [employer] Paging/Directory   See signed in provider for up to date coverage information  ______________________________________________________________________    Interval History   59-year-old female patient status post explantation of the left elbow prosthesis with placement of antibiotic spacer.  On IV cefepime and vancomycin for 6 weeks from the day of surgery.  ID has signed off with that recommendation.  She had a PICC line placed on 4/16 for long term antibiotic therapy.  She is off fluconazole due to prolonged Qtc  On multiple benzodiazepine prior to admission.  On hold due to toxic encephalopathy.  She continues to get IV and oral Dilaudid and other pain adjuvants.  Bedtime Valium is on hold  Hypercapnic respiratory failure due to toxic encephalopathy was suspected and other results some of her medications are on hold.  She is from [place], [place] and would like to be in TCU around here and eventually go home.    Review of systems: 4 points review of systems completed. "  No dizziness, headaches, shortness of breath or chest pain.  No any vomiting.  Has loose bowel movements.  Scheduled stool softeners on hold.      Physical Exam   Vital Signs: Temp: 98.4  F (36.9  C) Temp src: Oral BP: (!) 144/58 Pulse: 90   Resp: 16 SpO2: 95 % O2 Device: Nasal cannula Oxygen Delivery: 2 LPM  Weight: 255 lbs 11.74 oz    General Appearance: Appears comfortable.  Respiratory: Without wheezes rhonchi or rales.  CTA  Cardiovascular: RRR, without murmurs  GI: Soft, nontender, plus BS  Skin: Without obvious bleeding, bruising or excoriations  Neurology 2/5 weakness of the RUE  Psych somnolent but conversant, no agitation     Medical Decision Making   55 minutes spent to review her chart, examining the patient, and formulating plan of care.  Data   ------------------------- PAST 24 HR DATA REVIEWED -----------------------------------------------  CBC RESULTS:   Recent Labs   Lab Test 04/16/24  1145   WBC 12.6*   RBC 2.65*   HGB 6.6*   HCT 21.6*   MCV 82   MCH 24.9*   MCHC 30.6*   RDW 19.0*        Last Comprehensive Metabolic Panel:  Lab Results   Component Value Date     04/20/2024    POTASSIUM 3.6 04/20/2024    CHLORIDE 106 04/20/2024    CO2 25 04/20/2024    ANIONGAP 8 04/20/2024     (H) 04/20/2024    BUN 9.2 04/20/2024    CR 0.96 (H) 04/20/2024    CR 0.96 (H) 04/20/2024    GFRESTIMATED 67 04/20/2024    GFRESTIMATED 67 04/20/2024    DIANE 7.9 (L) 04/20/2024

## 2024-04-20 NOTE — PLAN OF CARE
"Goal Outcome Evaluation: 6310-0205. no acute changes this shift.   Pt  A&Ox4. Able to make needs known. Denied SOB, CP, numbness or tingling on Left surgical hand. Ace wrap in place, dressing CDI with 1 bert drain. Assist of 1-2 with bedside commode. Voiding adequately. R PICC currently infusing Vancomycin. Desatting on 88 now 2 L oxygen. Plan of care continues.    BP (!) 174/72 (BP Location: Right leg, Patient Position: Semi-Amaya's)   Pulse 90   Temp 98.4  F (36.9  C) (Oral)   Resp 18   Ht 1.753 m (5' 9\")   Wt 111.3 kg (245 lb 4.8 oz)   SpO2 95%   BMI 36.22 kg/m                         "

## 2024-04-20 NOTE — PLAN OF CARE
Goal Outcome Evaluation: No change    Alert and orientedx4, A1-2 pivot. Makes needs known, alarms on.  Pt reported pain in L arm, PRN oxy given.  Continent of bowel and bladder, LBM 4/19. Loose stools.  BAILEY drain continues, no drainage during night.  No acute changes, continue with POC.

## 2024-04-20 NOTE — PROGRESS NOTES
Care Management Follow Up    Length of Stay (days): 10    Expected Discharge Date: 04/22/2024     Concerns to be Addressed: discharge planning     Patient plan of care discussed at interdisciplinary rounds: Yes    Anticipated Discharge Disposition: Transitional Care     Anticipated Discharge Services:  (Post acute therapies; IV abx)  Anticipated Discharge DME: None    Patient/family educated on Medicare website which has current facility and service quality ratings: no  Education Provided on the Discharge Plan: Yes  Patient/Family in Agreement with the Plan: yes    Referrals Placed by CM/SW:  ([facility] Liaison)    PENDING:  [facility] [id number] [place] [address]  [address] [place] [address]  Admissions: [phone]  RN Station: [phone]  TCU SW: [phone]     Private pay costs discussed: Not applicable    Additional Information:  Per IDT rounds, pt will not be medically ready to discharge over the weekend.    Unit/Covering SW to continue to follow for discharge to TCU.          Weekend Coverage   FRANCISCO J HELLER   PHONE: [phone]    Weekday 5 MS  PH: [phone]     SW Coverage SEARCHABLE in [employer] - search 5 MS SW  (or by name)  Or click on link below:  5 Med Surg Vocera

## 2024-04-21 LAB
ANION GAP SERPL CALCULATED.3IONS-SCNC: 8 MMOL/L (ref 7–15)
BASE EXCESS BLDV CALC-SCNC: 0.1 MMOL/L (ref -3–3)
BUN SERPL-MCNC: 7.7 MG/DL (ref 8–23)
CALCIUM SERPL-MCNC: 7.7 MG/DL (ref 8.8–10.2)
CHLORIDE SERPL-SCNC: 106 MMOL/L (ref 98–107)
CREAT SERPL-MCNC: 0.9 MG/DL (ref 0.51–0.95)
DEPRECATED HCO3 PLAS-SCNC: 24 MMOL/L (ref 22–29)
EGFRCR SERPLBLD CKD-EPI 2021: 73 ML/MIN/1.73M2
ERYTHROCYTE [DISTWIDTH] IN BLOOD BY AUTOMATED COUNT: 18.9 % (ref 10–15)
GLUCOSE BLDC GLUCOMTR-MCNC: 123 MG/DL (ref 70–99)
GLUCOSE BLDC GLUCOMTR-MCNC: 126 MG/DL (ref 70–99)
GLUCOSE BLDC GLUCOMTR-MCNC: 168 MG/DL (ref 70–99)
GLUCOSE SERPL-MCNC: 135 MG/DL (ref 70–99)
HCO3 BLDV-SCNC: 27 MMOL/L (ref 21–28)
HCT VFR BLD AUTO: 23 % (ref 35–47)
HGB BLD-MCNC: 7.2 G/DL (ref 11.7–15.7)
MAGNESIUM SERPL-MCNC: 2 MG/DL (ref 1.7–2.3)
MAGNESIUM SERPL-MCNC: 2.7 MG/DL (ref 1.7–2.3)
MCH RBC QN AUTO: 25.8 PG (ref 26.5–33)
MCHC RBC AUTO-ENTMCNC: 31.3 G/DL (ref 31.5–36.5)
MCV RBC AUTO: 82 FL (ref 78–100)
O2/TOTAL GAS SETTING VFR VENT: 21 %
OXYHGB MFR BLDV: 75 % (ref 70–75)
PCO2 BLDV: 53 MM HG (ref 40–50)
PH BLDV: 7.31 [PH] (ref 7.32–7.43)
PHOSPHATE SERPL-MCNC: 4.4 MG/DL (ref 2.5–4.5)
PLATELET # BLD AUTO: 331 10E3/UL (ref 150–450)
PO2 BLDV: 44 MM HG (ref 25–47)
POTASSIUM SERPL-SCNC: 3.7 MMOL/L (ref 3.4–5.3)
RBC # BLD AUTO: 2.79 10E6/UL (ref 3.8–5.2)
SAO2 % BLDV: 76.1 % (ref 70–75)
SODIUM SERPL-SCNC: 138 MMOL/L (ref 135–145)
WBC # BLD AUTO: 10.8 10E3/UL (ref 4–11)

## 2024-04-21 PROCEDURE — 250N000013 HC RX MED GY IP 250 OP 250 PS 637

## 2024-04-21 PROCEDURE — 250N000013 HC RX MED GY IP 250 OP 250 PS 637: Performed by: INTERNAL MEDICINE

## 2024-04-21 PROCEDURE — 250N000011 HC RX IP 250 OP 636: Performed by: STUDENT IN AN ORGANIZED HEALTH CARE EDUCATION/TRAINING PROGRAM

## 2024-04-21 PROCEDURE — 120N000002 HC R&B MED SURG/OB UMMC

## 2024-04-21 PROCEDURE — 36592 COLLECT BLOOD FROM PICC: CPT | Performed by: INTERNAL MEDICINE

## 2024-04-21 PROCEDURE — 99232 SBSQ HOSP IP/OBS MODERATE 35: CPT | Performed by: INTERNAL MEDICINE

## 2024-04-21 PROCEDURE — 250N000011 HC RX IP 250 OP 636: Mod: JZ | Performed by: STUDENT IN AN ORGANIZED HEALTH CARE EDUCATION/TRAINING PROGRAM

## 2024-04-21 PROCEDURE — 82805 BLOOD GASES W/O2 SATURATION: CPT | Performed by: STUDENT IN AN ORGANIZED HEALTH CARE EDUCATION/TRAINING PROGRAM

## 2024-04-21 PROCEDURE — 83735 ASSAY OF MAGNESIUM: CPT | Performed by: INTERNAL MEDICINE

## 2024-04-21 PROCEDURE — 85027 COMPLETE CBC AUTOMATED: CPT | Performed by: INTERNAL MEDICINE

## 2024-04-21 PROCEDURE — 80048 BASIC METABOLIC PNL TOTAL CA: CPT | Performed by: INTERNAL MEDICINE

## 2024-04-21 PROCEDURE — 84100 ASSAY OF PHOSPHORUS: CPT | Performed by: INTERNAL MEDICINE

## 2024-04-21 PROCEDURE — 36415 COLL VENOUS BLD VENIPUNCTURE: CPT | Performed by: STUDENT IN AN ORGANIZED HEALTH CARE EDUCATION/TRAINING PROGRAM

## 2024-04-21 PROCEDURE — 250N000011 HC RX IP 250 OP 636

## 2024-04-21 PROCEDURE — 250N000013 HC RX MED GY IP 250 OP 250 PS 637: Performed by: STUDENT IN AN ORGANIZED HEALTH CARE EDUCATION/TRAINING PROGRAM

## 2024-04-21 RX ORDER — POTASSIUM CHLORIDE 1500 MG/1
20 TABLET, EXTENDED RELEASE ORAL ONCE
Status: COMPLETED | OUTPATIENT
Start: 2024-04-21 | End: 2024-04-21

## 2024-04-21 RX ORDER — MAGNESIUM OXIDE 400 MG/1
400 TABLET ORAL EVERY 4 HOURS
Status: COMPLETED | OUTPATIENT
Start: 2024-04-21 | End: 2024-04-21

## 2024-04-21 RX ADMIN — CITALOPRAM 20 MG: 20 TABLET ORAL at 22:25

## 2024-04-21 RX ADMIN — HYDROMORPHONE HYDROCHLORIDE 0.2 MG: 0.2 INJECTION, SOLUTION INTRAMUSCULAR; INTRAVENOUS; SUBCUTANEOUS at 19:07

## 2024-04-21 RX ADMIN — VANCOMYCIN HYDROCHLORIDE 1000 MG: 1 INJECTION, SOLUTION INTRAVENOUS at 20:46

## 2024-04-21 RX ADMIN — VANCOMYCIN HYDROCHLORIDE 1000 MG: 1 INJECTION, SOLUTION INTRAVENOUS at 08:56

## 2024-04-21 RX ADMIN — ERTAPENEM SODIUM 1 G: 1 INJECTION, POWDER, LYOPHILIZED, FOR SOLUTION INTRAMUSCULAR; INTRAVENOUS at 15:43

## 2024-04-21 RX ADMIN — OXYCODONE HYDROCHLORIDE 10 MG: 5 TABLET ORAL at 08:52

## 2024-04-21 RX ADMIN — CLONIDINE HYDROCHLORIDE 0.1 MG: 0.1 TABLET ORAL at 20:35

## 2024-04-21 RX ADMIN — ATORVASTATIN CALCIUM 10 MG: 10 TABLET, FILM COATED ORAL at 20:35

## 2024-04-21 RX ADMIN — OMEPRAZOLE 20 MG: 20 CAPSULE, DELAYED RELEASE ORAL at 08:51

## 2024-04-21 RX ADMIN — CLONIDINE HYDROCHLORIDE 0.1 MG: 0.1 TABLET ORAL at 08:52

## 2024-04-21 RX ADMIN — ACETAMINOPHEN 650 MG: 325 TABLET, FILM COATED ORAL at 08:52

## 2024-04-21 RX ADMIN — OXYCODONE HYDROCHLORIDE 10 MG: 5 TABLET ORAL at 22:31

## 2024-04-21 RX ADMIN — ACETAMINOPHEN 975 MG: 325 TABLET, FILM COATED ORAL at 22:25

## 2024-04-21 RX ADMIN — NYSTATIN: 100000 OINTMENT TOPICAL at 20:52

## 2024-04-21 RX ADMIN — MAGNESIUM OXIDE TAB 400 MG (241.3 MG ELEMENTAL MG) 400 MG: 400 (241.3 MG) TAB at 20:35

## 2024-04-21 RX ADMIN — LUMATEPERONE 42 MG: 42 CAPSULE ORAL at 22:25

## 2024-04-21 RX ADMIN — ONDANSETRON 4 MG: 4 TABLET, ORALLY DISINTEGRATING ORAL at 17:22

## 2024-04-21 RX ADMIN — MAGNESIUM OXIDE TAB 400 MG (241.3 MG ELEMENTAL MG) 400 MG: 400 (241.3 MG) TAB at 17:07

## 2024-04-21 RX ADMIN — POTASSIUM CHLORIDE 20 MEQ: 1500 TABLET, EXTENDED RELEASE ORAL at 17:07

## 2024-04-21 RX ADMIN — ASPIRIN 162 MG: 81 TABLET, COATED ORAL at 08:52

## 2024-04-21 RX ADMIN — NYSTATIN: 100000 OINTMENT TOPICAL at 08:57

## 2024-04-21 RX ADMIN — OXYCODONE HYDROCHLORIDE 10 MG: 5 TABLET ORAL at 15:43

## 2024-04-21 RX ADMIN — ACETAMINOPHEN 975 MG: 325 TABLET, FILM COATED ORAL at 15:43

## 2024-04-21 ASSESSMENT — ACTIVITIES OF DAILY LIVING (ADL)
ADLS_ACUITY_SCORE: 34

## 2024-04-21 NOTE — PROGRESS NOTES
"Orthopaedic Surgery Progress Note 04/21/2024    S: CURLY. Nursing notes reviewed. No difficulty with pain control. Pleasant this morning. Vioding on own. Ambulating to commode. Denies n/v/cp/f/c/sob.     O:  Temp: 98.5  F (36.9  C) Temp src: Oral BP: (!) 163/68 Pulse: 88   Resp: 14 SpO2: 96 % O2 Device: Nasal cannula Oxygen Delivery: 2 LPM    Exam:  Gen: No acute distress, resting comfortably in bed.  Resp: Non-labored breathing  MSK:  LUE:  - Splint/Dressings c/d/i  - SILT medial/radial/ulnar/axillary nerves  - Fires EPL, FPL, Intrinsics  - Hand wwp    Drain output:   Output by Drain (mL) 04/19/24 0700 - 04/19/24 1459 04/19/24 1500 - 04/19/24 2259 04/19/24 2300 - 04/20/24 0659 04/20/24 0700 - 04/20/24 1459 04/20/24 1500 - 04/20/24 2259 04/20/24 2300 - 04/21/24 0659 04/21/24 0700 - 04/21/24 0847   Closed/Suction Drain 1 Left Elbow Bulb 15 Korean  117 0  20       Recent Labs   Lab 04/21/24  0650 04/20/24  0717 04/20/24  0039 04/19/24  1316 04/19/24  1003   WBC 10.8 12.2*  --   --  12.5*   HGB 7.2* 7.6* 7.4*   < > 6.6*    323  --   --  348    < > = values in this interval not displayed.     No results for input(s): \"CULTURE\" in the last 168 hours.     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 Sarmiento.    Post operative course complicated by intermittent lethargy, transferred briefly to SICU for closer observation. Also with ABLA s/p 1uPRBCs on 4/16.      ID recommends continued Vanco, ertapenem x 6 wks. Continue to respect DNR wishes. Pain team and psych teams on board and assisting with pain/psychogenic med regimen.     Now with symptoms of enteric infx vs gastroenteritis.     Discharge pending medical stability and placement. "     Activity: Up with assist.  Weight bearing status: Minimize use of LUE  Antibiotics: Per ID recs:  Recommendations:  - continue  IV Vancomycin and Ertapenem 1g Q24H duration ~6 weeks post surgery    - stop Fluconazole due to prolonged QTc ( unclear if Candida albicans isolated in 9/2023 from a wound dehiscence was secondary to contamination vs infection). so far no Candida isolated   - Will follow intraoperative tissue cultures   - need PICC line before discharge  , has brusies from PIV   - Discharge planning pending at this time. Pt would like to stay in [place] for her F/U with Dr Sarmiento. Called and updated her ID physician in [place] , Dr Suresh Dominguez. he will assume ID care when patient returns to [place].   Diet: Begin with clear fluids and progress diet as tolerated.   DVT prophylaxis: aspirin 162 qd and mechanical while in the hospital, discharge on aspirin 162 x 4 weeks.  Bracing/Splinting: LUE posterior slab splint to be kept clean, dry, and intact until follow-up.   Elevation: Elevate LUE on pillows to keep on pillows as much as possible.   Wound Care: Keep splint c/d/I until 2 week follow up.  Drains: Document output per shift, discontinue at discretion of orthopedics  Pain management: transition from IV to orals as tolerated.    X-rays:  POD1 left elbow XR (AP and lateral)  Physical Therapy:  Eval and treat  Occupational Therapy:   Labs: Trend Hgb on POD #1, 2, then PRN  Cultures: Pending, follow culture results closely.   Consults: PT, OT. Medicine, Infectious disease.  appreciate assistance in caring for this patient.  May need pain team as has many narcotic allergies listed  Follow-up: Clinic with Dr. Sarmiento's team in 2 weeks for wound check with left elbow xrays     Disposition: Pending progress with therapies, pain control on orals, and medical stability, anticipate discharge to TCU     Nenita Ndiaye MD PGY1  Orthopedic Surgery

## 2024-04-21 NOTE — CARE PLAN
"  VS: BP (!) 191/94 (BP Location: Left leg)   Pulse 97   Temp 98.3  F (36.8  C)   Resp 18   Ht 1.753 m (5' 9\")   Wt 116 kg (255 lb 11.7 oz)   SpO2 96%   BMI 37.77 kg/m      O2: 96% on a room air   Output: Voided adequately well today with 1 assist with commode by the bedside.    Last BM: 4/21/2024, but loose   Activity: Assist of 1 pivot to bedside commode   Skin: Has rash in her groin, but they was no redness noted   Pain: 5/10, managed with tylenol and oxycodone   CMS: Pt is alert and oriented x 4, and is a little slow, denies SOB, chest pain, numbness and tingling.    Dressing: None   Diet: Regular diet with thin liquids, takes medication whole with water, blood sugar checks before and after meals.    LDA: BAILEY drains on his left arm, IV saline locks    Equipment: IV pole, walker and gait belt   Plan: Continue pt's plan of care   Additional Info: Pt is alert and oriented x 4, but little slow. She has her left arm in a cast, complains of pain, but managed with oxycodone, and tylenol. Receives IV dilaudid too. Is on RN managed magnesium, potasium, and phosphorus. Was on a 2 liters earlier yesterday, but 96% on a room air throughout this shift. Is continent of both bowel and bladder.  Denied  nausea this shift, but was very sleepy and dizzy this shift.  Was able to make her needs known, call light was within reach, and bed alarm was on throughout this shift. Continue the pt's plan of care.      "

## 2024-04-21 NOTE — PROGRESS NOTES
[facility]    Medicine Progress Note - Hospitalist Service, GOLD TEAM 16    Date of Admission:  4/10/2024    Assessment & Plan      Mitali Robles is a 59 year old female admitted on 4/10/2024. She has hx of bipolar disorder, PTSD, MDD, on high dose benzodiazepines, hyperlipidemia, hypertension, and type 2 diabetes on insulin with recurrent left elbow prosthetic joint infection s/p explantation of L total elbow arthroplasty admitted to ortho post op.      Status update from 4/21/2024  No acute events over night. Diarrheal subsiding.   She did get stool softeners last on 04/18 morning   Patient has been having diaphoresis  which is significant.   She is on 2 broad-spectrum antibiotics.  However, does not have any abdominal pain complaints.  Afebrile. Laxatives on hold     She was getting MiraLAX and Senokot scheduled twice daily.  Psychiatry is following.  Patient's multiple benzo medications including Xanax, Klonopin, Valium bedtime has been on hold neurology and psychiatry recommendations for altered mental status    Psychiatry is concerned that her loose stools could be also due to withdrawals versus from laxatives.  Holding MiraLAX and Senokot scheduled starting 4/19/24.      Acute perioperative blood loss anemia.  Hemoglobin down to 6.6 on 4/16/2024.  Patient is symptomatic.  Has been having low blood pressure, fatigue.  She has left arm surgical incision site drain still in place.  Consent to transfer is obtained.  S/p 1 unit of PRBC transfusion      L elbow prosthetic joint infection s/p explantation of L total elbow arthroplasty  Reported EBL 600ml per OR note. Per outside ID note, s/p 7 weeks of IV daptomycin, ertapnem and fluconazole. currently ordered cefazolin 2g IV 8h per ortho.   - post op wound care and pain control per ortho  -Monitor routine labs   - cultures from the left elbow tissue culture reviewed, growth and sensitivities noted  Infectious  disease consult was obtained prior to transfer.  Recommendations:  - Stop Cefazolin   - Start IV Vancomycin (pharmacy to dose)   - Start IV Ertapenem 1g Q24H   - Start Fluconazole PO/IV 400mg Q24H   - Will follow intraoperative tissue cultures   Infectious disease recommending 6 weeks IV antibiotics and signed off 4/18    Hypercapnic respiratory failure with CO2 narcosis and lethargy.  Currently on 2 L of oxygen and satting okay.  Awake alert and oriented x 4.  No benzodiazepine per psych recommendation.  Acute metabolic encephalopathy, CO2 narcosis.  Resolved.  Patient became acutely altered less responsive on 4/13/2024, multiple rapids called.  ICU team and myself responded.  Multiple rounds of Narcan, patient momentarily improved however ultimately became too unstable for the floor and was transferred to the ICU.  In the ICU patient was placed on BiPAP.  Pain medication was discontinued.  Ultimately transferred back to the medicine team on 4/14/2024.  While in the ICU CODE STATUS was addressed.  Patient is now DNR as she discussed this with multiple teams including ICU and primary service orthopedic surgery.  Dyspepsia, epigastric discomfort.  Patient complains ferrous sulfate and potassium oral supplementation causing her GI upset and requested Reglan IV as needed.  Ordered Reglan 5 mg IV every 6 as needed to be administered for the symptoms.  Low blood pressure episodes.  Complex psychiatric history including bipolar disorder, PTSD, MDD, on high dose benzodiazepines  PTA meds include valium 30mg HS, xanax 1mg HS prn, clonazepam 2mg BID prn, celexa 20mg hs, and lumateperone 84mg hs.  checked, and last filled xanax 2mg 30 tabs, valiume 10mg 90 tabs and clonazepam 2mg 90 tabs on 2/12/24. Currently ordered xanax 0.25mg TID per ortho.   -Postop mentation improved now on the floor.  -Previously sedated post op and was on precedex, received ativan intra-op,  -Currently on Xanax 0.25 mg 3 times daily   Psychiatry  was consulted and seen patient.  Psychiatry recommendation include spiritual health consult for emotional support, continue Caplyta 42 mg at bedtime dose adjusted from 84 mg daily due to interaction with fluconazole, continue Celexa 20 mg at bedtime, continue Valium 5 mg at bedtime.  Psychiatry recommend not to restart any other benzodiazepines at this time.  Also ordered flumazenil as needed.  Recommended to continue pulse ox.     Type 2 DM  A1c 7.4% on 2/12. On tresiba hs and aspart  - sliding scale insulin for now  - resume tresiba in am  -Blood glucose in acceptable limits  Recent Labs   Lab 04/21/24  1226 04/21/24  0650 04/20/24  2154 04/20/24  1837 04/20/24  1128 04/20/24  0818   * 135* 170* 127* 164* 145*          Hypertension  On Clonidine 1mg HS. Currently BP soft post op  -Now significantly elevated above 180 systolic, and 1 instance above 200s.  -Likely related to ongoing pain  -Continue clonidine. (Increased 2.1 mg 3 times daily)  -As needed hydralazine  - Address underlying pain.  - Add IV hydralazine for SBP greater than 180       Neuropathic pain  - PTA gabapentin 800mg TID-->     Hyperlipidemia  - continue atorvastatin     Right upper extremity weakness. Stroke symptoms absent except for weakness of the right upper ext which can could to radiculopathy vs CVA.   Telestroke consulted      Patient had CT angiogram head and neck done.  Discussed with telestroke  Neurology.  CTA head and neck is negative for any hemodynamically significant stenosis   Patient does have severe claustrophobia.  She has left elbow explantation and placement of antibiotic spacer with still drains in place.  Patient not a candidate for tPA  General neurology consult given and discussed with the on call neurologist , pt will be formally evaluated the following day.               Diet: Diet  Regular Diet Adult    DVT Prophylaxis: ASA per primary team  Galvan Catheter: Not present  Lines: PRESENT           Cardiac  "Monitoring: None  Code Status: No CPR- Do NOT Intubate      Clinically Significant Risk Factors            # Hypomagnesemia: Lowest Mg = 1.4 mg/dL in last 2 days, will replace as needed   # Hypoalbuminemia: Lowest albumin = 2.9 g/dL at 4/12/2024  5:17 PM, will monitor as appropriate     # Hypertension: Noted on problem list       # DMII: A1C = 7.4 % (Ref range: <5.7 %) within past 6 months   # Obesity: Estimated body mass index is 37.77 kg/m  as calculated from the following:    Height as of this encounter: 1.753 m (5' 9\").    Weight as of this encounter: 116 kg (255 lb 11.7 oz).      # Financial/Environmental Concerns: none         Disposition Plan     Medically Ready for Discharge: Anticipated in 2-4 Days             Radha Banks MD  Hospitalist Service, GOLD TEAM [address] [place] [address]  Securely message with [employer] (more info)  Text page via [facility] Paging/Directory   See signed in provider for up to date coverage information  ______________________________________________________________________    Interval History   59-year-old female patient status post explantation of the left elbow prosthesis with placement of antibiotic spacer.  On IV cefepime and vancomycin for 6 weeks from the day of surgery.  ID has signed off with that recommendation.  She had a PICC line placed on 4/16 for long term antibiotic therapy.  She is off fluconazole due to prolonged Qtc  On multiple benzodiazepine prior to admission.  On hold due to toxic encephalopathy.  She continues to get IV and oral Dilaudid and other pain adjuvants.  Bedtime Valium is on hold  Hypercapnic respiratory failure due to toxic encephalopathy was suspected and other results some of her medications are on hold.  She is from [place], [place] and would like to be in TCU around here and eventually go home.    Review of systems: 4 points review of systems completed.  No dizziness, headaches, shortness of breath or chest pain.  No any " vomiting.  Has loose bowel movements.  Scheduled stool softeners on hold.      Physical Exam   Vital Signs: Temp: 98.5  F (36.9  C) Temp src: Oral BP: (!) 163/68 Pulse: 88   Resp: 14 SpO2: 96 % O2 Device: Nasal cannula Oxygen Delivery: 2 LPM  Weight: 255 lbs 11.74 oz    General Appearance: Appears comfortable.  Respiratory: Without wheezes rhonchi or rales.  CTA  Cardiovascular: RRR, without murmurs  GI: Soft, nontender, plus BS  Skin: Without obvious bleeding, bruising or excoriations  Neurology 2/5 weakness of the RUE  Psych somnolent but conversant, no agitation     Medical Decision Making   55 minutes spent to review her chart, examining the patient, and formulating plan of care.  Data   ------------------------- PAST 24 HR DATA REVIEWED -----------------------------------------------  CBC RESULTS:   Recent Labs   Lab Test 04/16/24  1145   WBC 12.6*   RBC 2.65*   HGB 6.6*   HCT 21.6*   MCV 82   MCH 24.9*   MCHC 30.6*   RDW 19.0*        Last Comprehensive Metabolic Panel:  Lab Results   Component Value Date     04/21/2024    POTASSIUM 3.7 04/21/2024    CHLORIDE 106 04/21/2024    CO2 24 04/21/2024    ANIONGAP 8 04/21/2024     (H) 04/21/2024    BUN 7.7 (L) 04/21/2024    CR 0.90 04/21/2024    GFRESTIMATED 73 04/21/2024    DIANE 7.7 (L) 04/21/2024

## 2024-04-21 NOTE — PLAN OF CARE
Goal Outcome Evaluation:      Plan of Care Reviewed With: patient    Overall Patient Progress: improvingOverall Patient Progress: improving    Pain managed well with tylenol and oxy this shift. IV dilaudid x1.  Transferred Ax1 to the bathroom with walker/gb.  R PICC infusing TKO between abx.   BAILEY from left arm minimal serosang output.   Alert and oriented x4, VSS on RA.   K + Mag replaced today.   Good intake. Nausea this evening managed with zofran x1.

## 2024-04-22 ENCOUNTER — APPOINTMENT (OUTPATIENT)
Dept: PHYSICAL THERAPY | Facility: CLINIC | Age: 59
DRG: 463 | End: 2024-04-22
Attending: STUDENT IN AN ORGANIZED HEALTH CARE EDUCATION/TRAINING PROGRAM
Payer: COMMERCIAL

## 2024-04-22 ENCOUNTER — HOSPITAL ENCOUNTER (INPATIENT)
Facility: SKILLED NURSING FACILITY | Age: 59
LOS: 32 days | Discharge: HOME | DRG: 559 | End: 2024-05-24
Attending: INTERNAL MEDICINE | Admitting: INTERNAL MEDICINE
Payer: COMMERCIAL

## 2024-04-22 VITALS
RESPIRATION RATE: 18 BRPM | BODY MASS INDEX: 37.88 KG/M2 | HEIGHT: 69 IN | OXYGEN SATURATION: 92 % | HEART RATE: 76 BPM | DIASTOLIC BLOOD PRESSURE: 67 MMHG | SYSTOLIC BLOOD PRESSURE: 146 MMHG | TEMPERATURE: 98.1 F | WEIGHT: 255.73 LBS

## 2024-04-22 DIAGNOSIS — G89.29 CHRONIC LOW BACK PAIN, UNSPECIFIED BACK PAIN LATERALITY, UNSPECIFIED WHETHER SCIATICA PRESENT: ICD-10-CM

## 2024-04-22 DIAGNOSIS — G56.92 NEUROPATHY OF LEFT HAND: ICD-10-CM

## 2024-04-22 DIAGNOSIS — F33.1 MAJOR DEPRESSIVE DISORDER, RECURRENT EPISODE, MODERATE (H): Chronic | ICD-10-CM

## 2024-04-22 DIAGNOSIS — F31.9 BIPOLAR DISEASE, CHRONIC (H): ICD-10-CM

## 2024-04-22 DIAGNOSIS — E11.65 TYPE 2 DIABETES MELLITUS WITH HYPERGLYCEMIA, WITH LONG-TERM CURRENT USE OF INSULIN (H): ICD-10-CM

## 2024-04-22 DIAGNOSIS — Z96.622 INFECTION ASSOCIATED WITH PROSTHESIS OF LEFT ELBOW JOINT (H): ICD-10-CM

## 2024-04-22 DIAGNOSIS — I10 ESSENTIAL HYPERTENSION: ICD-10-CM

## 2024-04-22 DIAGNOSIS — G31.84 MILD COGNITIVE IMPAIRMENT: ICD-10-CM

## 2024-04-22 DIAGNOSIS — Z79.4 TYPE 2 DIABETES MELLITUS WITH HYPERGLYCEMIA, WITH LONG-TERM CURRENT USE OF INSULIN (H): ICD-10-CM

## 2024-04-22 DIAGNOSIS — M54.50 CHRONIC LOW BACK PAIN, UNSPECIFIED BACK PAIN LATERALITY, UNSPECIFIED WHETHER SCIATICA PRESENT: ICD-10-CM

## 2024-04-22 DIAGNOSIS — E87.6 HYPOKALEMIA: ICD-10-CM

## 2024-04-22 DIAGNOSIS — T84.50XA INFECTION OF TOTAL JOINT PROSTHESIS, INITIAL ENCOUNTER (H): Primary | ICD-10-CM

## 2024-04-22 DIAGNOSIS — T84.59XA INFECTION ASSOCIATED WITH PROSTHESIS OF LEFT ELBOW JOINT (H): ICD-10-CM

## 2024-04-22 DIAGNOSIS — R53.81 PHYSICAL DECONDITIONING: ICD-10-CM

## 2024-04-22 DIAGNOSIS — R39.15 URINARY URGENCY: ICD-10-CM

## 2024-04-22 DIAGNOSIS — R19.5 LOOSE STOOLS: ICD-10-CM

## 2024-04-22 DIAGNOSIS — Z98.890 STATUS POST SURGERY: ICD-10-CM

## 2024-04-22 DIAGNOSIS — L98.491 INTERTRIGINOUS SKIN ULCER, LIMITED TO BREAKDOWN OF SKIN (H): ICD-10-CM

## 2024-04-22 LAB
ANION GAP SERPL CALCULATED.3IONS-SCNC: 9 MMOL/L (ref 7–15)
BASE EXCESS BLDV CALC-SCNC: 1.8 MMOL/L (ref -3–3)
BUN SERPL-MCNC: 7.9 MG/DL (ref 8–23)
CALCIUM SERPL-MCNC: 7.6 MG/DL (ref 8.8–10.2)
CHLORIDE SERPL-SCNC: 106 MMOL/L (ref 98–107)
CREAT SERPL-MCNC: 0.99 MG/DL (ref 0.51–0.95)
CREAT SERPL-MCNC: 0.99 MG/DL (ref 0.51–0.95)
DEPRECATED HCO3 PLAS-SCNC: 25 MMOL/L (ref 22–29)
EGFRCR SERPLBLD CKD-EPI 2021: 65 ML/MIN/1.73M2
EGFRCR SERPLBLD CKD-EPI 2021: 65 ML/MIN/1.73M2
ERYTHROCYTE [DISTWIDTH] IN BLOOD BY AUTOMATED COUNT: 19 % (ref 10–15)
GLUCOSE BLDC GLUCOMTR-MCNC: 119 MG/DL (ref 70–99)
GLUCOSE BLDC GLUCOMTR-MCNC: 124 MG/DL (ref 70–99)
GLUCOSE BLDC GLUCOMTR-MCNC: 144 MG/DL (ref 70–99)
GLUCOSE SERPL-MCNC: 131 MG/DL (ref 70–99)
HCO3 BLDV-SCNC: 28 MMOL/L (ref 21–28)
HCT VFR BLD AUTO: 22.8 % (ref 35–47)
HGB BLD-MCNC: 7 G/DL (ref 11.7–15.7)
MAGNESIUM SERPL-MCNC: 1.6 MG/DL (ref 1.7–2.3)
MCH RBC QN AUTO: 25.6 PG (ref 26.5–33)
MCHC RBC AUTO-ENTMCNC: 30.7 G/DL (ref 31.5–36.5)
MCV RBC AUTO: 84 FL (ref 78–100)
O2/TOTAL GAS SETTING VFR VENT: 21 %
OXYHGB MFR BLDV: 57 % (ref 70–75)
PCO2 BLDV: 55 MM HG (ref 40–50)
PH BLDV: 7.32 [PH] (ref 7.32–7.43)
PHOSPHATE SERPL-MCNC: 4.3 MG/DL (ref 2.5–4.5)
PLATELET # BLD AUTO: 356 10E3/UL (ref 150–450)
PO2 BLDV: 34 MM HG (ref 25–47)
POTASSIUM SERPL-SCNC: 3.6 MMOL/L (ref 3.4–5.3)
RBC # BLD AUTO: 2.73 10E6/UL (ref 3.8–5.2)
SAO2 % BLDV: 58.8 % (ref 70–75)
SODIUM SERPL-SCNC: 140 MMOL/L (ref 135–145)
WBC # BLD AUTO: 10.3 10E3/UL (ref 4–11)

## 2024-04-22 PROCEDURE — 250N000013 HC RX MED GY IP 250 OP 250 PS 637

## 2024-04-22 PROCEDURE — 84100 ASSAY OF PHOSPHORUS: CPT | Performed by: INTERNAL MEDICINE

## 2024-04-22 PROCEDURE — 250N000013 HC RX MED GY IP 250 OP 250 PS 637: Performed by: STUDENT IN AN ORGANIZED HEALTH CARE EDUCATION/TRAINING PROGRAM

## 2024-04-22 PROCEDURE — 97530 THERAPEUTIC ACTIVITIES: CPT | Mod: GP

## 2024-04-22 PROCEDURE — 82805 BLOOD GASES W/O2 SATURATION: CPT | Performed by: STUDENT IN AN ORGANIZED HEALTH CARE EDUCATION/TRAINING PROGRAM

## 2024-04-22 PROCEDURE — 999N000007 HC SITE CHECK

## 2024-04-22 PROCEDURE — 36415 COLL VENOUS BLD VENIPUNCTURE: CPT | Performed by: STUDENT IN AN ORGANIZED HEALTH CARE EDUCATION/TRAINING PROGRAM

## 2024-04-22 PROCEDURE — 250N000012 HC RX MED GY IP 250 OP 636 PS 637: Performed by: INTERNAL MEDICINE

## 2024-04-22 PROCEDURE — 250N000011 HC RX IP 250 OP 636: Performed by: STUDENT IN AN ORGANIZED HEALTH CARE EDUCATION/TRAINING PROGRAM

## 2024-04-22 PROCEDURE — 250N000013 HC RX MED GY IP 250 OP 250 PS 637: Performed by: INTERNAL MEDICINE

## 2024-04-22 PROCEDURE — 250N000011 HC RX IP 250 OP 636

## 2024-04-22 PROCEDURE — 250N000011 HC RX IP 250 OP 636: Mod: JZ | Performed by: STUDENT IN AN ORGANIZED HEALTH CARE EDUCATION/TRAINING PROGRAM

## 2024-04-22 PROCEDURE — 83735 ASSAY OF MAGNESIUM: CPT | Performed by: INTERNAL MEDICINE

## 2024-04-22 PROCEDURE — 250N000011 HC RX IP 250 OP 636: Performed by: INTERNAL MEDICINE

## 2024-04-22 PROCEDURE — 99239 HOSP IP/OBS DSCHRG MGMT >30: CPT | Performed by: INTERNAL MEDICINE

## 2024-04-22 PROCEDURE — 97116 GAIT TRAINING THERAPY: CPT | Mod: GP

## 2024-04-22 PROCEDURE — 85014 HEMATOCRIT: CPT | Performed by: INTERNAL MEDICINE

## 2024-04-22 PROCEDURE — 80048 BASIC METABOLIC PNL TOTAL CA: CPT | Performed by: INTERNAL MEDICINE

## 2024-04-22 PROCEDURE — 120N000009 HC R&B SNF

## 2024-04-22 PROCEDURE — 36592 COLLECT BLOOD FROM PICC: CPT | Performed by: INTERNAL MEDICINE

## 2024-04-22 RX ORDER — OXYCODONE HYDROCHLORIDE 5 MG/1
5-10 TABLET ORAL EVERY 6 HOURS PRN
Status: DISCONTINUED | OUTPATIENT
Start: 2024-04-22 | End: 2024-04-27

## 2024-04-22 RX ORDER — CLONIDINE HYDROCHLORIDE 0.1 MG/1
0.1 TABLET ORAL 2 TIMES DAILY
Qty: 60 TABLET | Refills: 0 | Status: ON HOLD | OUTPATIENT
Start: 2024-04-22 | End: 2024-05-23

## 2024-04-22 RX ORDER — ATORVASTATIN CALCIUM 10 MG/1
10 TABLET, FILM COATED ORAL AT BEDTIME
Status: DISCONTINUED | OUTPATIENT
Start: 2024-04-22 | End: 2024-05-24 | Stop reason: HOSPADM

## 2024-04-22 RX ORDER — NALOXONE HYDROCHLORIDE 0.4 MG/ML
0.2 INJECTION, SOLUTION INTRAMUSCULAR; INTRAVENOUS; SUBCUTANEOUS
Status: DISCONTINUED | OUTPATIENT
Start: 2024-04-22 | End: 2024-05-24 | Stop reason: HOSPADM

## 2024-04-22 RX ORDER — POTASSIUM CHLORIDE 1500 MG/1
20 TABLET, EXTENDED RELEASE ORAL ONCE
Status: COMPLETED | OUTPATIENT
Start: 2024-04-22 | End: 2024-04-22

## 2024-04-22 RX ORDER — NYSTATIN 100000 U/G
OINTMENT TOPICAL 2 TIMES DAILY
Qty: 30 G | Refills: 0 | Status: ON HOLD | OUTPATIENT
Start: 2024-04-22 | End: 2024-05-23

## 2024-04-22 RX ORDER — CLONIDINE HYDROCHLORIDE 0.1 MG/1
0.1 TABLET ORAL 2 TIMES DAILY
Status: DISCONTINUED | OUTPATIENT
Start: 2024-04-22 | End: 2024-04-23

## 2024-04-22 RX ORDER — MAGNESIUM OXIDE 400 MG/1
400 TABLET ORAL EVERY 4 HOURS
Status: DISCONTINUED | OUTPATIENT
Start: 2024-04-22 | End: 2024-04-22

## 2024-04-22 RX ORDER — ACETAMINOPHEN 325 MG/1
650 TABLET ORAL EVERY 4 HOURS PRN
Status: DISCONTINUED | OUTPATIENT
Start: 2024-04-22 | End: 2024-04-29

## 2024-04-22 RX ORDER — ERTAPENEM 1 G/1
1 INJECTION, POWDER, LYOPHILIZED, FOR SOLUTION INTRAMUSCULAR; INTRAVENOUS EVERY 24 HOURS
Qty: 300 ML | Refills: 0 | Status: ON HOLD | OUTPATIENT
Start: 2024-04-22 | End: 2024-05-23

## 2024-04-22 RX ORDER — NICOTINE POLACRILEX 4 MG
15-30 LOZENGE BUCCAL
Status: DISCONTINUED | OUTPATIENT
Start: 2024-04-22 | End: 2024-05-24 | Stop reason: HOSPADM

## 2024-04-22 RX ORDER — NALOXONE HYDROCHLORIDE 0.4 MG/ML
0.4 INJECTION, SOLUTION INTRAMUSCULAR; INTRAVENOUS; SUBCUTANEOUS
Status: DISCONTINUED | OUTPATIENT
Start: 2024-04-22 | End: 2024-05-24 | Stop reason: HOSPADM

## 2024-04-22 RX ORDER — ASPIRIN 81 MG/1
162 TABLET ORAL DAILY
Qty: 60 TABLET | Refills: 0 | Status: COMPLETED | OUTPATIENT
Start: 2024-04-23 | End: 2024-05-22

## 2024-04-22 RX ORDER — FERROUS SULFATE 325(65) MG
325 TABLET ORAL EVERY OTHER DAY
Status: DISCONTINUED | OUTPATIENT
Start: 2024-04-24 | End: 2024-05-24 | Stop reason: HOSPADM

## 2024-04-22 RX ORDER — FERROUS SULFATE 325(65) MG
325 TABLET ORAL EVERY OTHER DAY
Qty: 15 TABLET | Refills: 0 | Status: SHIPPED | OUTPATIENT
Start: 2024-04-24 | End: 2024-05-24

## 2024-04-22 RX ORDER — ACETAMINOPHEN 325 MG/1
975 TABLET ORAL EVERY 8 HOURS
Status: DISCONTINUED | OUTPATIENT
Start: 2024-04-22 | End: 2024-04-29

## 2024-04-22 RX ORDER — ACETAMINOPHEN 325 MG/1
975 TABLET ORAL EVERY 8 HOURS
Qty: 270 TABLET | Refills: 0 | Status: ON HOLD | OUTPATIENT
Start: 2024-04-22 | End: 2024-05-23

## 2024-04-22 RX ORDER — ALBUTEROL SULFATE 90 UG/1
1-2 AEROSOL, METERED RESPIRATORY (INHALATION) EVERY 4 HOURS PRN
Status: DISCONTINUED | OUTPATIENT
Start: 2024-04-22 | End: 2024-05-24 | Stop reason: HOSPADM

## 2024-04-22 RX ORDER — ERTAPENEM 1 G/1
1 INJECTION, POWDER, LYOPHILIZED, FOR SOLUTION INTRAMUSCULAR; INTRAVENOUS EVERY 24 HOURS
Status: DISCONTINUED | OUTPATIENT
Start: 2024-04-23 | End: 2024-05-23

## 2024-04-22 RX ORDER — OXYCODONE HYDROCHLORIDE 5 MG/1
5-10 TABLET ORAL EVERY 6 HOURS PRN
Qty: 15 TABLET | Refills: 0 | Status: ON HOLD | OUTPATIENT
Start: 2024-04-22 | End: 2024-05-23

## 2024-04-22 RX ORDER — MAGNESIUM SULFATE HEPTAHYDRATE 40 MG/ML
2 INJECTION, SOLUTION INTRAVENOUS ONCE
Status: COMPLETED | OUTPATIENT
Start: 2024-04-22 | End: 2024-04-22

## 2024-04-22 RX ORDER — POLYETHYLENE GLYCOL 3350 17 G/17G
17 POWDER, FOR SOLUTION ORAL DAILY
Status: DISCONTINUED | OUTPATIENT
Start: 2024-04-23 | End: 2024-04-23

## 2024-04-22 RX ORDER — VANCOMYCIN HYDROCHLORIDE 1 G/200ML
1000 INJECTION, SOLUTION INTRAVENOUS EVERY 12 HOURS
Qty: 12000 ML | Refills: 0 | Status: ON HOLD | OUTPATIENT
Start: 2024-04-22 | End: 2024-05-23

## 2024-04-22 RX ORDER — DEXTROSE MONOHYDRATE 25 G/50ML
25-50 INJECTION, SOLUTION INTRAVENOUS
Status: DISCONTINUED | OUTPATIENT
Start: 2024-04-22 | End: 2024-05-24 | Stop reason: HOSPADM

## 2024-04-22 RX ORDER — VANCOMYCIN HYDROCHLORIDE 1 G/200ML
1000 INJECTION, SOLUTION INTRAVENOUS EVERY 12 HOURS
Status: DISCONTINUED | OUTPATIENT
Start: 2024-04-22 | End: 2024-05-02

## 2024-04-22 RX ORDER — VANCOMYCIN HYDROCHLORIDE 1 G/200ML
1000 INJECTION, SOLUTION INTRAVENOUS EVERY 12 HOURS
Status: DISCONTINUED | OUTPATIENT
Start: 2024-04-22 | End: 2024-04-22

## 2024-04-22 RX ADMIN — VANCOMYCIN HYDROCHLORIDE 1000 MG: 1 INJECTION, SOLUTION INTRAVENOUS at 21:16

## 2024-04-22 RX ADMIN — CLONIDINE HYDROCHLORIDE 0.1 MG: 0.1 TABLET ORAL at 21:21

## 2024-04-22 RX ADMIN — NYSTATIN: 100000 OINTMENT TOPICAL at 08:37

## 2024-04-22 RX ADMIN — HEPARIN, PORCINE (PF) 10 UNIT/ML INTRAVENOUS SYRINGE 5 ML: at 16:45

## 2024-04-22 RX ADMIN — CLONIDINE HYDROCHLORIDE 0.1 MG: 0.1 TABLET ORAL at 08:34

## 2024-04-22 RX ADMIN — ASPIRIN 162 MG: 81 TABLET, COATED ORAL at 08:34

## 2024-04-22 RX ADMIN — ONDANSETRON 4 MG: 4 TABLET, ORALLY DISINTEGRATING ORAL at 09:08

## 2024-04-22 RX ADMIN — OXYCODONE HYDROCHLORIDE 10 MG: 5 TABLET ORAL at 14:04

## 2024-04-22 RX ADMIN — ACETAMINOPHEN 975 MG: 325 TABLET, FILM COATED ORAL at 14:05

## 2024-04-22 RX ADMIN — LUMATEPERONE 42 MG: 42 CAPSULE ORAL at 21:21

## 2024-04-22 RX ADMIN — ACETAMINOPHEN 975 MG: 325 TABLET, FILM COATED ORAL at 05:15

## 2024-04-22 RX ADMIN — POTASSIUM CHLORIDE 20 MEQ: 1500 TABLET, EXTENDED RELEASE ORAL at 14:26

## 2024-04-22 RX ADMIN — VANCOMYCIN HYDROCHLORIDE 1000 MG: 1 INJECTION, SOLUTION INTRAVENOUS at 10:28

## 2024-04-22 RX ADMIN — ATORVASTATIN CALCIUM 10 MG: 10 TABLET, FILM COATED ORAL at 21:21

## 2024-04-22 RX ADMIN — FERROUS SULFATE TAB 325 MG (65 MG ELEMENTAL FE) 325 MG: 325 (65 FE) TAB at 08:34

## 2024-04-22 RX ADMIN — INSULIN ASPART 1 UNITS: 100 INJECTION, SOLUTION INTRAVENOUS; SUBCUTANEOUS at 19:04

## 2024-04-22 RX ADMIN — MAGNESIUM SULFATE HEPTAHYDRATE 2 G: 40 INJECTION, SOLUTION INTRAVENOUS at 14:36

## 2024-04-22 RX ADMIN — ERTAPENEM SODIUM 1 G: 1 INJECTION, POWDER, LYOPHILIZED, FOR SOLUTION INTRAMUSCULAR; INTRAVENOUS at 16:01

## 2024-04-22 RX ADMIN — OMEPRAZOLE 20 MG: 20 CAPSULE, DELAYED RELEASE ORAL at 08:33

## 2024-04-22 RX ADMIN — OXYCODONE HYDROCHLORIDE 10 MG: 5 TABLET ORAL at 05:15

## 2024-04-22 RX ADMIN — ACETAMINOPHEN 975 MG: 325 TABLET, FILM COATED ORAL at 21:21

## 2024-04-22 ASSESSMENT — ACTIVITIES OF DAILY LIVING (ADL)
ADLS_ACUITY_SCORE: 39
ADLS_ACUITY_SCORE: 37
ADLS_ACUITY_SCORE: 37
ADLS_ACUITY_SCORE: 38
ADLS_ACUITY_SCORE: 37
ADLS_ACUITY_SCORE: 39
ADLS_ACUITY_SCORE: 26
ADLS_ACUITY_SCORE: 37
ADLS_ACUITY_SCORE: 37
ADLS_ACUITY_SCORE: 26
ADLS_ACUITY_SCORE: 26
ADLS_ACUITY_SCORE: 37
ADLS_ACUITY_SCORE: 27
ADLS_ACUITY_SCORE: 37
ADLS_ACUITY_SCORE: 39
ADLS_ACUITY_SCORE: 37
ADLS_ACUITY_SCORE: 39
ADLS_ACUITY_SCORE: 26

## 2024-04-22 NOTE — PROGRESS NOTES
"Orthopaedic Surgery Progress Note 04/22/2024    S: CURLY. Nursing notes reviewed. No difficulty with pain control.    O:  Temp: 98.2  F (36.8  C) Temp src: Oral BP: (!) 162/69 Pulse: 76   Resp: 16 SpO2: 92 % O2 Device: None (Room air)      Exam:  Gen: No acute distress, resting comfortably in bed.  Resp: Non-labored breathing  MSK:  LUE:  - Splint/Dressings c/d/i  - SILT medial/radial/ulnar/axillary nerves  - Fires EPL, FPL, Intrinsics  - Hand wwp    Drain output:   Output by Drain (mL) 04/20/24 0700 - 04/20/24 1459 04/20/24 1500 - 04/20/24 2259 04/20/24 2300 - 04/21/24 0659 04/21/24 0700 - 04/21/24 1459 04/21/24 1500 - 04/21/24 2259 04/21/24 2300 - 04/22/24 0659 04/22/24 0700 - 04/22/24 0737   Closed/Suction Drain 1 Left Elbow Bulb 15 Latvian  20    0      Recent Labs   Lab 04/22/24  0716 04/21/24  0650 04/20/24  0717   WBC 10.3 10.8 12.2*   HGB 7.0* 7.2* 7.6*    331 323     No results for input(s): \"CULTURE\" in the last 168 hours.     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 Sarmiento.    Post operative course complicated by intermittent lethargy, transferred briefly to SICU for closer observation. Also with ABLA s/p 1uPRBCs on 4/16.      ID recommends continued Vanco, ertapenem x 6 wks. Continue to respect DNR wishes. Pain team and psych teams on board and assisting with pain/psychogenic med regimen.     Now with symptoms of enteric infx vs gastroenteritis.     Discharge pending medical stability and placement.     Activity: Up with assist.  Weight bearing status: Minimize use of LUE  Antibiotics: Per ID recs:  Recommendations:  - continue  IV Vancomycin and Ertapenem 1g Q24H duration ~6 weeks post surgery    - stop Fluconazole due to " prolonged QTc ( unclear if Candida albicans isolated in 9/2023 from a wound dehiscence was secondary to contamination vs infection). so far no Candida isolated   - Will follow intraoperative tissue cultures   - need PICC line before discharge  , has brusies from PIV   - Discharge planning pending at this time. Pt would like to stay in [place] for her F/U with Dr Sarmiento. Called and updated her ID physician in [place] , Dr Suresh Dominguez. he will assume ID care when patient returns to [place].   Diet: Begin with clear fluids and progress diet as tolerated.   DVT prophylaxis: aspirin 162 qd and mechanical while in the hospital, discharge on aspirin 162 x 4 weeks.  Bracing/Splinting: LUE posterior slab splint to be kept clean, dry, and intact until follow-up.   Elevation: Elevate LUE on pillows to keep on pillows as much as possible.   Wound Care: Keep splint c/d/I until 2 week follow up.  Drains: Document output per shift, discontinue at discretion of orthopedics  Pain management: transition from IV to orals as tolerated.    X-rays:  POD1 left elbow XR (AP and lateral)  Physical Therapy:  Eval and treat  Occupational Therapy:   Labs: Trend Hgb on POD #1, 2, then PRN  Cultures: Pending, follow culture results closely.   Consults: PT, OT. Medicine, Infectious disease.  appreciate assistance in caring for this patient.  May need pain team as has many narcotic allergies listed  Follow-up: Clinic with Dr. Sarmiento's team in 2 weeks for wound check with left elbow xrays     Disposition: Pending progress with therapies, pain control on orals, and medical stability, anticipate discharge to [facility]     Richard Dunaway MD  Orthopaedic Surgery, PGY-4  Pager: [phone]

## 2024-04-22 NOTE — DISCHARGE SUMMARY
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  Diabetes type 2 on long-term insulin therapy  History of bipolar disorder  History of benzo dependence    Neuropathic pain  Hypertension  Hyperlipidemia           Consultations:   Orthopedic surgery  Psychiatry  Neurology  Anesthesiology/pain service         Hospital Course       Mitali Robles is a 59 year old female admitted on 4/10/2024. She has hx of bipolar disorder, PTSD, MDD, on high dose benzodiazepines, hyperlipidemia, hypertension, and type 2 diabetes on insulin with recurrent left elbow prosthetic joint infection s/p explantation of L total elbow arthroplasty admitted to ortho post op.        She was getting MiraLAX and Senokot scheduled twice daily.  Psychiatry is following.  Patient's multiple benzo medications including Xanax, Klonopin, Valium bedtime has been on hold neurology and psychiatry recommendations for altered mental status     Psychiatry is concerned that her loose stools could be also due to withdrawals versus from laxatives.  Holding MiraLAX and Senokot scheduled starting 4/19/24.        Acute perioperative blood loss anemia.  Hemoglobin down to 6.6 on 4/16/2024.  Patient is symptomatic.  Has been having low blood pressure, fatigue.  She has left arm surgical incision site drain still in place.  Consent to transfer is  obtained.  S/p 1 unit of PRBC transfusion      L elbow prosthetic joint infection s/p explantation of L total elbow arthroplasty  Patient was on different antibiotics postoperatively.  ID was consulted.  ID recommended vancomycin and ertapenem to be continued for 6 weeks from the day of surgery.  She has a right arm PICC line which was placed during this hospitalization.  Will need a PICC line care per protocol.     Hypercapnic respiratory failure with CO2 narcosis and lethargy.  Patient's hypercapnic respiratory failure postoperatively was likely precipitated by the use of multiple narcotic and psychotropic pain medications.  Patient was having uncontrolled pain issues and for that reason pain service/anesthesiology was consulted.  Psychiatry was also involved due to her encephalopathy that initiated as a result of efforts to keep patient on multiple medications to control her pain control her pain.  Uncontrolled postoperative pain.  The initial recommendation from anesthesiology was to increase her oxycodone to 5 to 10 mg every 6 hours, to give hydromorphone 0.1 2.2 mg for breakthrough pain once a day.  She was monitored on capnography and oximetry while she is on his multiple pain medication regimens she was also on scheduled acetaminophen.  Robaxin was held.  Psychiatry was consulted and addiction medicine also consulted.    She was on gabapentin high-dose which was recommended to be restarted  However, with worsening acute encephalopathy and hypercarbic respiratory failure changes were made to her medications and patient was taken off multiple benzodiazepines that she takes at home during the day and at bedtime.  Valium was discontinued as well as other benzodiazepines including the bedtime Valium.  Gabapentin was held and she was not getting.  Discussed with the accepting rehab hospitalist Dr. Chung.  Gabapentin was discontinued as she was not getting it.  If there is a need to continue gabapentin post  discharge while she is in transitional care unit, this decision can be made at the discretion of the patient's provider at TCU.    Was on a reduced dose of Caplyta 42 mg from 84 mg p.o. per Psychiatry recommendation recommendation.  Celexa 20 mg was continued.  Acute metabolic encephalopathy, CO2 narcosis.  Resolved.  Patient became acutely altered less responsive on 4/13/2024, multiple rapids called.  ICU team and myself responded.  Multiple rounds of Narcan, patient momentarily improved however ultimately became too unstable for the floor and was transferred to the ICU.  In the ICU patient was placed on BiPAP.  Pain medication was discontinued.  Ultimately transferred back to the medicine team on 4/14/2024.  While in the ICU CODE STATUS was addressed.  Patient is now DNR as she discussed this with multiple teams including ICU and primary service orthopedic surgery.        Dyspepsia, epigastric discomfort.  Patient complains ferrous sulfate and potassium oral supplementation causing her GI upset and requested Reglan IV as needed.  Ordered Reglan 5 mg IV every 6 as needed to be administered for the symptoms.  Low blood pressure episodes.  Complex psychiatric history including bipolar disorder, PTSD, MDD, on high dose benzodiazepines    Type 2 DM  A1c 7.4% on 2/12. On tresiba hs and aspart  Patient takes Tresiba 25 units daily prior to admission.  Medication was not continued and patient was on sliding scale insulin.  She was not eating much and therefore she did not need Tresiba.  Should her appetite and intake improves and she needed correctional sliding scale insulin more often, long-acting insulin she will be continued at the discretion of the treating provider at [facility].                  Recent Labs   Lab 04/21/24  1226 04/21/24  0650 04/20/24  2154 04/20/24  1837 04/20/24  1128 04/20/24  0818   * 135* 170* 127* 164* 145*            Hypertension  Patient on clonidine point 1 mg p.o. twice daily which is  "continued.   Neuropathic pain  - PTA gabapentin 800mg TID--> this medication is held during the last days of hospitalization because of encephalopathy and encephalopathy improved and this medication will was discontinued at discharge.  Can be continue in TCU will be on by patient's provider     Hyperlipidemia  - continue atorvastatin     Right upper extremity weakness. Stroke symptoms absent except for weakness of the right upper ext which can could to radiculopathy vs CVA.   Telestroke consulted       Patient had CT angiogram head and neck done.  Discussed with telestroke  Neurology.  CTA head and neck is negative for any hemodynamically significant stenosis   Patient does have severe claustrophobia.  She has left elbow explantation and placement of antibiotic spacer with still drains in place.  Patient not a candidate for tPA  General neurology consult given and discussed with the on call neurologist.  Patient was evaluated and further diagnostic testing was recommended.              On Exam ;   Alert and oriented . No acute distress  Vital signs:  Temp: 98.1  F (36.7  C) Temp src: Oral BP: (!) 146/67 Pulse: 76   Resp: 18 SpO2: 92 % O2 Device: None (Room air) Oxygen Delivery: 2 LPM   Weight: 116 kg (255 lb 11.7 oz)  Estimated body mass index is 37.77 kg/m  as calculated from the following:    Height as of this encounter: 1.753 m (5' 9\").    Weight as of this encounter: 116 kg (255 lb 11.7 oz).  Neck ; obese female supple , no JVD  Chest ; equal BS bilaterally , no rales or rhonchi   CVS; RRR, no murmur /rubs or gallops  GI ; soft abdomen, non tender, BS positive  Ext ; no edema , no cynosis  Neuro awake alert oriented.  No any cranial nerve or motor deficits in the extremities.  Psych ; appropriate mood and effect  Skin ; no rash or purpura on exposed skin     ROUTINE IP LABS (Last four results)  BMP  Recent Labs   Lab 04/22/24  0843 04/22/24  0716 04/21/24  2229 04/21/24  1714 04/21/24  1226 04/21/24  0650 " 04/20/24  0818 04/20/24  0717 04/19/24  1132 04/19/24  1003   NA  --  140  --   --   --  138  --  139  --  140   POTASSIUM  --  3.6  --   --   --  3.7  --  3.6  --  3.6   CHLORIDE  --  106  --   --   --  106  --  106  --  107   DIANE  --  7.6*  --   --   --  7.7*  --  7.9*  --  8.1*   CO2  --  25  --   --   --  24  --  25  --  24   BUN  --  7.9*  --   --   --  7.7*  --  9.2  --  10.7   CR  --  0.99*  0.99*  --   --   --  0.90  --  0.96*  0.96*  --  0.87   * 131* 168* 126*   < > 135*   < > 133*   < > 160*    < > = values in this interval not displayed.     CBC  Recent Labs   Lab 04/22/24  0716 04/21/24  0650 04/20/24  0717 04/20/24  0039 04/19/24  1316 04/19/24  1003   WBC 10.3 10.8 12.2*  --   --  12.5*   RBC 2.73* 2.79* 2.95*  --   --  2.58*   HGB 7.0* 7.2* 7.6* 7.4* 6.4* 6.6*   HCT 22.8* 23.0* 24.2*  --  20.7* 21.0*   MCV 84 82 82  --   --  81   MCH 25.6* 25.8* 25.8*  --   --  25.6*   MCHC 30.7* 31.3* 31.4*  --   --  31.4*   RDW 19.0* 18.9* 18.9*  --   --  19.3*    331 323  --   --  348     INRNo lab results found in last 7 days.                 Discharge Medications:     Current Discharge Medication List        START taking these medications    Details   !! acetaminophen (TYLENOL) 325 MG tablet Take 3 tablets (975 mg) by mouth every 8 hours for 30 days  Qty: 270 tablet, Refills: 0    Associated Diagnoses: Infection of total joint prosthesis, initial encounter (H24)      !! acetaminophen (TYLENOL) 325 MG tablet Take 2 tablets (650 mg) by mouth every 4 hours as needed for other (For optimal non-opioid multimodal pain management to improve pain control.)  Qty: 100 tablet    Associated Diagnoses: Status post surgery      aspirin 81 MG EC tablet Take 1 tablet (81 mg) by mouth 2 times daily  Qty: 60 tablet    Associated Diagnoses: Status post surgery      ertapenem (INVANZ) 1 GM vial Inject 1 g into the vein every 24 hours for 30 days  Qty: 300 mL, Refills: 0    Associated Diagnoses: Infection of total  joint prosthesis, initial encounter (H24)      ferrous sulfate (FEROSUL) 325 (65 Fe) MG tablet Take 1 tablet (325 mg) by mouth every other day for 30 days  Qty: 15 tablet, Refills: 0    Associated Diagnoses: Infection of total joint prosthesis, initial encounter (H24)      methocarbamol (ROBAXIN) 500 MG tablet Take 1 tablet (500 mg) by mouth every 6 hours as needed for muscle spasms  Qty: 20 tablet, Refills: 0    Associated Diagnoses: Status post surgery      nystatin (MYCOSTATIN) 012104 UNIT/GM external ointment Apply topically 2 times daily for 15 days  Qty: 30 g, Refills: 0    Associated Diagnoses: Infection of total joint prosthesis, initial encounter (H24)      oxyCODONE (ROXICODONE) 5 MG tablet Take 1-2 tablets (5-10 mg) by mouth every 6 hours as needed for severe pain (Provide to patient if in severe pain.)  Qty: 15 tablet, Refills: 0    Associated Diagnoses: Infection of total joint prosthesis, initial encounter (H24)      polyethylene glycol (MIRALAX) 17 GM/Dose powder Take 17 g by mouth daily    Associated Diagnoses: Status post surgery      vancomycin (VANCOCIN) 1000 mg in dextrose 5% 200 mL PREMIX Inject 1,000 mg into the vein every 12 hours for 30 days  Qty: 46451 mL, Refills: 0    Comments: Pharmacy to dose and monitor vanco trough  Associated Diagnoses: Infection of total joint prosthesis, initial encounter (H24)       !! - Potential duplicate medications found. Please discuss with provider.        CONTINUE these medications which have CHANGED    Details   cloNIDine (CATAPRES) 0.1 MG tablet Take 1 tablet (0.1 mg) by mouth 2 times daily for 30 days Take with 0.9mg clonidine for total of 1mg at bedtime  Qty: 60 tablet, Refills: 0    Associated Diagnoses: Infection of total joint prosthesis, initial encounter (H24)      lumateperone (CAPLYTA) 42 MG capsule Take 1 capsule (42 mg) by mouth at bedtime for 30 days  Qty: 30 capsule, Refills: 0    Associated Diagnoses: Infection of total joint prosthesis,  initial encounter (H24)           CONTINUE these medications which have NOT CHANGED    Details   albuterol (PROAIR HFA/PROVENTIL HFA/VENTOLIN HFA) 108 (90 BASE) MCG/ACT Inhaler Inhale 2 puffs into the lungs as needed      atorvastatin (LIPITOR) 10 MG tablet Take 10 mg by mouth daily      diphenhydrAMINE (BENADRYL) 25 MG capsule Take 25 mg by mouth every 6 hours as needed for itching or allergies      insulin aspart (NOVOLOG FLEXPEN) 100 UNIT/ML pen Inject 4-12 Units Subcutaneous 3 times daily (with meals) + correction      omeprazole (PRILOSEC) 20 MG DR capsule Take 20 mg by mouth daily           STOP taking these medications       ALPRAZolam (XANAX) 2 MG tablet Comments:   Reason for Stopping:         citalopram (CELEXA) 20 MG tablet Comments:   Reason for Stopping:         clonazePAM (KLONOPIN) 2 MG tablet Comments:   Reason for Stopping:         diazepam (VALIUM) 10 MG tablet Comments:   Reason for Stopping:         ferrous sulfate (FE TABS) 325 (65 Fe) MG EC tablet Comments:   Reason for Stopping:         gabapentin (NEURONTIN) 400 MG capsule Comments:   Reason for Stopping:         insulin degludec (TRESIBA) 100 UNIT/ML pen Comments:   Reason for Stopping:         metoclopramide (REGLAN) 5 MG tablet Comments:   Reason for Stopping:                    Discharge Instructions and Follow-Up:     Discharge Procedure Orders   Occupational Therapy  Referral   Standing Status: Future   Referral Priority: Routine Referral Type: Occupational Therapy   Number of Visits Requested: 1     General info for SNF   Order Comments: Length of Stay Estimate: Short Term Care: Estimated # of Days <30 Condition at Discharge: Stable Level of care:skilled  Rehabilitation Potential: Good Admission H&P remains valid and up-to-date: Yes Recent Chemotherapy: N/A Use Nursing Home Standing Orders: Yes     Mantoux Instructions   Order Comments: Give two-step Mantoux (PPD) Per Facility Policy {.:[id number]     Incentive Spirometry   Order  "Comments: Incentive Spirometry 10 times per hour, 4 times per day.     Reason for your hospital stay   Order Comments: Explant of TEA and placement of antibiotic spacer     When to call - Contact Surgeon Team   Order Comments: You may experience symptoms that require follow-up before your scheduled appointment. Refer to the \"Stoplight Tool\" for instructions on when to contact your Surgeon Team if you are concerned about pain control, blood clots, constipation, or if you are unable to urinate.     When to call - Reach out to Urgent Care   Order Comments: If you are not able to reach your Surgeon Team and you need immediate care, go to the Orthopedic Walk-in Clinic or Urgent Care at your Surgeon's office.  Do NOT go to the Emergency Room unless you have shortness of breath, chest pain, or other signs of a medical emergency.     When to call - Reasons to Call 911   Order Comments: Call 911 immediately if you experience sudden-onset chest pain, arm weakness/numbness, slurred speech, or shortness of breath     Symptoms - Fever Management   Order Comments: A low grade fever can be expected after surgery.  Use acetaminophen (TYLENOL) as needed for fever management.  Contact your Surgeon Team if you have a fever greater than 101.5 F, chills, and/or night sweats.     Symptoms - Constipation management   Order Comments: Constipation (hard, dry bowel movements) is expected after surgery due to the combination of being less active, the anesthetic, and the opioid pain medication.  You can do the following to help reduce constipation:  ~  FLUIDS:  Drink clear liquids (water or Gatorade), or juice (apple/prune).  ~  DIET:  Eat a fiber rich diet.    ~  ACTIVITY:  Get up and move around several times a day.  Increase your activity as you are able.  MEDICATIONS:  Reduce the risk of constipation by starting medications before you are constipated.  You can take Miralax   (1 packet as directed) and/or a stool softener (Senokot 1-2 " tablets 1-2 times a day).  If you already have constipation and these medications are not working, you can get magnesium citrate and use as directed.  If you continue to have constipation you can try an over the counter suppository or enema.  Call your Surgeon Team if it has been greater than 3 days since your last bowel movement.     Symptoms - Reduced Urine Output   Order Comments: Changes in the amount of fluids you drank before and after surgery may result in problems urinating.  It is important to stay well-hydrated after surgery and drink plenty of water. If it has been greater than 8 hours since you have urinated despite drinking plenty of water, call your Surgeon Team.     Activity - Exercises to prevent blood clots   Order Comments: Unless otherwise directed by your Surgeon team, perform the following exercises at least three times per day for the first four weeks after surgery to prevent blood clots in your legs: 1) Point and flex your feet (Ankle Pumps), 2) Move your ankle around in big circles, 3) Wiggle your toes, 4) Walk, even for short distances, several times a day, will help decrease the risk of blood clots.     Order Specific Question Answer Comments   Is discharge order? Yes      Comfort and Pain Management - Pain after Surgery   Order Comments: Pain after surgery is normal and expected.  You will have some amount of pain for several weeks after surgery.  Your pain will improve with time.  There are several things you can do to help reduce your pain including: rest, ice, elevation, and using pain medications as needed. Contact your Surgeon Team if you have pain that persists or worsens after surgery despite rest, ice, elevation, and taking your medication(s) as prescribed. Contact your Surgeon Team if you have new numbness, tingling, or weakness in your operative extremity.     Comfort and Pain Management - Swelling after Surgery   Order Comments: Swelling and/or bruising of the surgical extremity  is common and may persist for several months after surgery. In addition to frequent icing and elevation, gentle compressive support with an ACE wrap or tubigrip may help with swelling. Apply compression regularly, removing at least twice daily to perform skin checks. Contact your Surgeon Team if your swelling increases and is NOT associated with an increase in your activity level, or if your swelling increases and is associated with redness and pain.     Comfort and Pain Management - Cold therapy   Order Comments: Ice can be used to control swelling and discomfort after surgery. Place a thin towel over your operative site and apply the ice pack overtop. Leave ice pack in place for 20 minutes, then remove for 20 minutes. Repeat this 20 minutes on/20 minutes off routine as often as tolerated.     Medication Instructions - Acetaminophen (TYLENOL) Instructions   Order Comments: You were discharged with acetaminophen (TYLENOL) for pain management after surgery. Acetaminophen most effectively manages pain symptoms when it is taken on a schedule without missing doses (every four, six, or eight hours). Your Provider will prescribe a safe daily dose between 3000 - 4000 mg.  Do NOT exceed this daily dose. Most patients use acetaminophen for pain control for the first four weeks after surgery.  You can wean from this medication as your pain decreases.     Medication Instructions - NSAID Instructions   Order Comments: You were discharged with an anti-inflammatory medication for pain management to use in combination with acetaminophen (TYLENOL) and the narcotic pain medication.  Take this medication exactly as directed.  You should only take one anti-inflammatory at a time.  Some common anti-inflammatories include: ibuprofen (ADVIL, MOTRIN), naproxen (ALEVE, NAPROSYN), celecoxib (CELEBREX), meloxicam (MOBIC), ketorolac (TORADOL).  Take this medication with food and water.     Medication Instructions - Opioids - Tapering  Instructions   Order Comments: In the first three days following surgery, your symptoms may warrant use of the narcotic pain medication every four to six hours as prescribed. This is normal. As your pain symptoms improve, focus your efforts on decreasing (tapering) use of narcotic medications. The most successful tapering strategy is to first, decrease the number of tablets you take every 4-6 hours to the minimum prescribed. Then, increase the amount of time between doses.  For example:  First, taper to   or 1 tablet every 4-6 hours.  Then, taper to   or 1 tablet every 6-8 hours.  Then, taper to   or 1 tablet every 8-10 hours.  Then, taper to   or 1 tablet every 10-12 hours.  Then, taper to   or 1 tablet at bedtime.  The bedtime dose can help with comfort during sleep and is typically the last dose to be discontinued after surgery.     Follow Up Care   Order Comments: Follow-up with your Surgeon Team in 2 weeks for wound check.     Assess heels for pressure points     Shower with wound/dressing covered   Order Comments: You must COVER your dressing or incision with saran wrap (or any other non-permeable covering) to allow the incision to remain dry while showering.  You may shower 7 days after surgery as long as the surgical wound stays dry. Continue to cover your dressing or incision for showering until your first office visit.  You are strictly prohibited from soaking or submerging the surgical wound underwater.     Medication instructions -  Anticoagulation - aspirin   Order Comments: Take the aspirin as prescribed for a total of four weeks after surgery.  This is given to help minimize your risk of blood clot.     Comfort and Pain Management - UPPER extremity Elevation   Order Comments: Swelling is expected for several months after surgery. This type of swelling is usually associated with gravity and activity, and can be improved with elevation.   The best way to do this is to get your hand above your heart by  "sitting down, resting your elbow on a pillow or arm rest, with your hand in the air. Perform this elevation as often as possible especially for the first two weeks after surgery     Medication Instructions - Opioid Instructions (Less than 65 years)   Order Comments: You were discharged with an opioid medication (hydromorphone, oxycodone, hydrocodone, or tramadol). This medication should only be taken for breakthrough pain that is not controlled with acetaminophen (TYLENOL). If you rate your pain less than 3 you do not need this medication.  Pain rating 0-3:  You do not need this medication.  Pain rating 4-6:  Take 1 tablet every 4-6 hours as needed  Pain rating 7-10:  Take 2 tablets every 4-6 hours as needed.  Do not exceed 6 tablets per day     Physical Therapy Instructions   Order Comments: Begin physical therapy within one week following surgery.     Fall precautions     Diet   Order Comments: Follow this diet upon discharge: Regular     Order Specific Question Answer Comments   Is discharge order? Yes          Occupational Therapy  Referral      General info for SNF    Length of Stay Estimate: Short Term Care: Estimated # of Days <30 Condition at Discharge: Stable Level of care:skilled  Rehabilitation Potential: Good Admission H&P remains valid and up-to-date: Yes Recent Chemotherapy: N/A Use Nursing Home Standing Orders: Yes     Mantoux Instructions    Give two-step Mantoux (PPD) Per Facility Policy {.:[id number]     Incentive Spirometry    Incentive Spirometry 10 times per hour, 4 times per day.     Reason for your hospital stay    Explant of TEA and placement of antibiotic spacer     When to call - Contact Surgeon Team    You may experience symptoms that require follow-up before your scheduled appointment. Refer to the \"Stoplight Tool\" for instructions on when to contact your Surgeon Team if you are concerned about pain control, blood clots, constipation, or if you are unable to urinate.     When to call " - Reach out to Urgent Care    If you are not able to reach your Surgeon Team and you need immediate care, go to the Orthopedic Walk-in Clinic or Urgent Care at your Surgeon's office.  Do NOT go to the Emergency Room unless you have shortness of breath, chest pain, or other signs of a medical emergency.     When to call - Reasons to Call 911    Call 911 immediately if you experience sudden-onset chest pain, arm weakness/numbness, slurred speech, or shortness of breath     Symptoms - Fever Management    A low grade fever can be expected after surgery.  Use acetaminophen (TYLENOL) as needed for fever management.  Contact your Surgeon Team if you have a fever greater than 101.5 F, chills, and/or night sweats.     Symptoms - Constipation management    Constipation (hard, dry bowel movements) is expected after surgery due to the combination of being less active, the anesthetic, and the opioid pain medication.  You can do the following to help reduce constipation:  ~  FLUIDS:  Drink clear liquids (water or Gatorade), or juice (apple/prune).  ~  DIET:  Eat a fiber rich diet.    ~  ACTIVITY:  Get up and move around several times a day.  Increase your activity as you are able.  MEDICATIONS:  Reduce the risk of constipation by starting medications before you are constipated.  You can take Miralax   (1 packet as directed) and/or a stool softener (Senokot 1-2 tablets 1-2 times a day).  If you already have constipation and these medications are not working, you can get magnesium citrate and use as directed.  If you continue to have constipation you can try an over the counter suppository or enema.  Call your Surgeon Team if it has been greater than 3 days since your last bowel movement.     Symptoms - Reduced Urine Output    Changes in the amount of fluids you drank before and after surgery may result in problems urinating.  It is important to stay well-hydrated after surgery and drink plenty of water. If it has been greater than  8 hours since you have urinated despite drinking plenty of water, call your Surgeon Team.     Activity - Exercises to prevent blood clots    Unless otherwise directed by your Surgeon team, perform the following exercises at least three times per day for the first four weeks after surgery to prevent blood clots in your legs: 1) Point and flex your feet (Ankle Pumps), 2) Move your ankle around in big circles, 3) Wiggle your toes, 4) Walk, even for short distances, several times a day, will help decrease the risk of blood clots.     Comfort and Pain Management - Pain after Surgery    Pain after surgery is normal and expected.  You will have some amount of pain for several weeks after surgery.  Your pain will improve with time.  There are several things you can do to help reduce your pain including: rest, ice, elevation, and using pain medications as needed. Contact your Surgeon Team if you have pain that persists or worsens after surgery despite rest, ice, elevation, and taking your medication(s) as prescribed. Contact your Surgeon Team if you have new numbness, tingling, or weakness in your operative extremity.     Comfort and Pain Management - Swelling after Surgery    Swelling and/or bruising of the surgical extremity is common and may persist for several months after surgery. In addition to frequent icing and elevation, gentle compressive support with an ACE wrap or tubigrip may help with swelling. Apply compression regularly, removing at least twice daily to perform skin checks. Contact your Surgeon Team if your swelling increases and is NOT associated with an increase in your activity level, or if your swelling increases and is associated with redness and pain.     Comfort and Pain Management - Cold therapy    Ice can be used to control swelling and discomfort after surgery. Place a thin towel over your operative site and apply the ice pack overtop. Leave ice pack in place for 20 minutes, then remove for 20  minutes. Repeat this 20 minutes on/20 minutes off routine as often as tolerated.     Medication Instructions - Acetaminophen (TYLENOL) Instructions    You were discharged with acetaminophen (TYLENOL) for pain management after surgery. Acetaminophen most effectively manages pain symptoms when it is taken on a schedule without missing doses (every four, six, or eight hours). Your Provider will prescribe a safe daily dose between 3000 - 4000 mg.  Do NOT exceed this daily dose. Most patients use acetaminophen for pain control for the first four weeks after surgery.  You can wean from this medication as your pain decreases.     Medication Instructions - NSAID Instructions    You were discharged with an anti-inflammatory medication for pain management to use in combination with acetaminophen (TYLENOL) and the narcotic pain medication.  Take this medication exactly as directed.  You should only take one anti-inflammatory at a time.  Some common anti-inflammatories include: ibuprofen (ADVIL, MOTRIN), naproxen (ALEVE, NAPROSYN), celecoxib (CELEBREX), meloxicam (MOBIC), ketorolac (TORADOL).  Take this medication with food and water.     Medication Instructions - Opioids - Tapering Instructions    In the first three days following surgery, your symptoms may warrant use of the narcotic pain medication every four to six hours as prescribed. This is normal. As your pain symptoms improve, focus your efforts on decreasing (tapering) use of narcotic medications. The most successful tapering strategy is to first, decrease the number of tablets you take every 4-6 hours to the minimum prescribed. Then, increase the amount of time between doses.  For example:  First, taper to   or 1 tablet every 4-6 hours.  Then, taper to   or 1 tablet every 6-8 hours.  Then, taper to   or 1 tablet every 8-10 hours.  Then, taper to   or 1 tablet every 10-12 hours.  Then, taper to   or 1 tablet at bedtime.  The bedtime dose can help with comfort during  sleep and is typically the last dose to be discontinued after surgery.     Follow Up Care    Follow-up with your Surgeon Team in 2 weeks for wound check.     Assess heels for pressure points     Shower with wound/dressing covered    You must COVER your dressing or incision with saran wrap (or any other non-permeable covering) to allow the incision to remain dry while showering.  You may shower 7 days after surgery as long as the surgical wound stays dry. Continue to cover your dressing or incision for showering until your first office visit.  You are strictly prohibited from soaking or submerging the surgical wound underwater.     Medication instructions -  Anticoagulation - aspirin    Take the aspirin as prescribed for a total of four weeks after surgery.  This is given to help minimize your risk of blood clot.     Comfort and Pain Management - UPPER extremity Elevation    Swelling is expected for several months after surgery. This type of swelling is usually associated with gravity and activity, and can be improved with elevation.   The best way to do this is to get your hand above your heart by sitting down, resting your elbow on a pillow or arm rest, with your hand in the air. Perform this elevation as often as possible especially for the first two weeks after surgery     Medication Instructions - Opioid Instructions (Less than 65 years)    You were discharged with an opioid medication (hydromorphone, oxycodone, hydrocodone, or tramadol). This medication should only be taken for breakthrough pain that is not controlled with acetaminophen (TYLENOL). If you rate your pain less than 3 you do not need this medication.  Pain rating 0-3:  You do not need this medication.  Pain rating 4-6:  Take 1 tablet every 4-6 hours as needed  Pain rating 7-10:  Take 2 tablets every 4-6 hours as needed.  Do not exceed 6 tablets per day     Physical Therapy Instructions    Begin physical therapy within one week following surgery.      Fall precautions     Diet    Follow this diet upon discharge: Regular                Discharge Disposition:   40 minutes spent in discharge, including >50% in counseling and coordination of care, medication review and plan of care recommended on follow up. Questions were answered to satisfaction       Radha Banks MD  Internal Medicine Hospitalist & Staff Physician  Formerly [facility]

## 2024-04-22 NOTE — PHARMACY-ADMISSION MEDICATION HISTORY
Please see Admission Medication History note completed by pharmacist on 04/11/2024 under previous encounter at [facility] for information regarding prior to admission medications.    Spenser Lorenz, PharmD

## 2024-04-22 NOTE — PHARMACY-VANCOMYCIN DOSING SERVICE
Pharmacy Vancomycin Initial Note  Date of Service 2024  Patient's  1965  59 year old, female    Indication: Bone and Joint Infection    Current estimated CrCl = Estimated Creatinine Clearance: 80.3 mL/min (A) (based on SCr of 0.99 mg/dL (H)).    Creatinine for last 3 days  2024:  7:17 AM Creatinine 0.96 mg/dL;  7:17 AM Creatinine 0.96 mg/dL  2024:  6:50 AM Creatinine 0.90 mg/dL  2024:  7:16 AM Creatinine 0.99 mg/dL;  7:16 AM Creatinine 0.99 mg/dL    Recent Vancomycin Level(s) for last 3 days  2024:  7:17 AM Vancomycin 17.6 ug/mL      Vancomycin IV Administrations (past 72 hours)                     vancomycin (VANCOCIN) 1,000 mg in 200 mL dextrose intermittent infusion (mg) 1,000 mg New Bag 24 1028     1,000 mg New Bag 24 2046     1,000 mg New Bag  0856     1,000 mg New Bag 24 2241     1,000 mg New Bag  0914    vancomycin (VANCOCIN) 1,500 mg in 0.9% NaCl 250 mL intermittent infusion (mg) 1,500 mg New Bag 24 2202                    Nephrotoxins and other renal medications (From now, onward)      Start     Dose/Rate Route Frequency Ordered Stop    24 1800  vancomycin (VANCOCIN) 1,000 mg in 200 mL dextrose intermittent infusion        Note to Pharmacy: PTA Sig:Inject 1,000 mg into the vein every 12 hours for 30 days      1,000 mg  200 mL/hr over 1 Hours Intravenous EVERY 12 HOURS 24 1741              Contrast Orders - past 72 hours (72h ago, onward)      None            Patient was on 1000 mg IV vancomycin every 12 hours during previous admission at [facility].     Insight[employer] Prediction of Planned Initial Vancomycin Regimen  Loading dose: N/A  Regimen: 1000 mg IV every 12 hours.  Start time: 22:28 on 2024  Exposure target: AUC24 (range)400-600 mg/L.hr   AUC24,ss: 565 mg/L.hr  Probability of AUC24 > 400: 100 %  Ctrough,ss: 18.7 mg/L  Probability of Ctrough,ss > 20: 29 %  Probability of nephrotoxicity (Lodise CHANDAN 2009): 15  %      Plan:  Start vancomycin 1000 mg IV q12h.   Vancomycin monitoring method: AUC  Vancomycin therapeutic monitoring goal: 400-600 mg*h/L  Pharmacy will check vancomycin levels as appropriate in 1-3 Days.    Serum creatinine levels will be ordered a minimum of twice weekly.      Spenser Lorenz, PharmD

## 2024-04-22 NOTE — PLAN OF CARE
End of shift Summary: See flowsheet for VS and detail assessments.     Changes this Shift:     Neuro/CMS: A&Ox4. Calm and pleasant. Able to make needs known. Denies dizziness, headache and numbness/tingling. Pt c/o nausea; PRN PO zofran given with good result.      Pulmonary: On RA. Pt LS clear and equal bilaterally. Denies cough, chest pain and SOB.      Output: Continent of bowel/bladder. Pt c/o of abdominal pain and had loose stool x2 this shift.      Activity: Pt up A1 to commode with pivot transfer. Weakness to the left side.      Skin/Dressing: L shoulder dressing C/D/I    Pain: Rates pain 9/10; managed with PRN PO oxycodone and scheduled tylenol     Drains: BAILEY drain in place with no output.      IV: R PICC heparin locked with blood return noted.      Additional info: Magnesium and Potassium replaced this shift. Recheck lab scheduled for tomorrow AM (4/23).     Plan: Transfer to TCU    Pt. transferred at 1645 to  TCU, and left with personal belongings. Handoff report given to Vandana MOORE RN

## 2024-04-22 NOTE — PHARMACY
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 No  Pending Cultures/Microbiological Tests: yes Fungal tissue culture from 4/10/24    Inpatient ID involved in developing OPAT plan:  Yes - no formal OPAT note posted, no changes from the note by Dr. Long on 4/18/24    Outpatient ID Follow-up: ID OPAT Clinic Referral Placed ([facility] Ph: [phone] and Fax: [phone]) - appointment scheduled  Designated Provider: Dr. Long will follow until patient returns to [place], [place] at which time patient's ID provider Dr. Suresh Dominguez will resume care.    Antimicrobial Regimen / Route Anticipated  Duration Start Date Stop /  Reassess Date   Ertapenem 1g every 24 hours/IV 6 weeks 4/10/24 5/22/24   Vancomycin IV 1g every 12 hours/IV 6 weeks 4/10/24 5/22/24     Laboratory Tests and Monitoring Frequency: CBC with Diff, CMP, CRP Once Weekly    Therapeutic Drug Monitoring: Vancomycin   - Drug: Vancomycin   - Goal(s): -600 or trough 15-20   - Level Type & Frequency: trough weekly   - Level(s) last checked date: 4/20/24    ID Pharmacist Interventions: None                          Alden Schmidt PharmD  Pager: [phone]

## 2024-04-22 NOTE — PROGRESS NOTES
Care Management Follow Up    Length of Stay (days): 12    Expected Discharge Date: 04/24/2024     Concerns to be Addressed: discharge planning     Patient plan of care discussed at interdisciplinary rounds: Yes    Anticipated Discharge Disposition: Transitional Care     Anticipated Discharge Services:  (Post acute therapies; IV abx)  Anticipated Discharge DME: None    Patient/family educated on Medicare website which has current facility and service quality ratings: no  Education Provided on the Discharge Plan: Yes  Patient/Family in Agreement with the Plan: yes    Referrals Placed by CM/SW:     [facility]  [facility] [address] [place] [address]  [address]  P: [phone]  F: [phone]   4/22: SW messaged [facility] (Mariya) re: pt med readiness. Mariya reported that they possibly had bed for pt today but asked SW about pt duration of IV Abx and if pt has PICC in. VENICE heard from Melvin Baum who reported that pt will have IV Abx for 6 weeks duration from day of surgery and has PICC in. This information was relayed to Mariya.   ADDENDUM 1224: VENICE heard back from Ortho who reported that pt is not medically ready until likely tomorrow but requested that SW check in with them tomorrow.   Please see below for more details/conversation with admissions team.     Private pay costs discussed: Not applicable    Additional Information:  SW following for discharge planning. PT/OT currently recommending TCU. Per chart review, pt is agreeable and first preference is for [facility]. Per Melvin Baum in 5MS rounds, pt is medically ready for discharge today. Per Ortho, pt now medically ready until possibly tomorrow (4/23). Pt has PICC in for IV Abx which she will receive for at least 6 weeks from day of surgery (4/10).     VENICE met with pt at bedside to discuss discharge planning. Pt confirmed that her first choice in TCU placement was [facility]. SW shared that pt not ready to discharge today but that SW would keep pt updated on discharge planning once pt  was medically ready. SW answered pt questions about length of stay and insurance coverage. Pt had no other questions for VENICE at this time.     ADDENDUM 1315: VENICE spoke with Mariya over the phone who reported that Ortho could see pt at  TCU to change cast and take out drain, or one of the TCU providers could take out the drain. VENICE relayed information to Ortho PA who responded that pt could discharge to  TCU today and Ortho Resident would plan to see pt over at  TCU tomorrow. VENICE relayed information to Mariya that pt is med ready. Mariya reported that they will need prior auth from pt's insurance first, Mariya will call to follow up with insurance company shortly.     ADDENDUM 1436: VENICE received notification from Mariya that insurance authorization was approved. VENICE notified Ortho and [facility].     VENICE met with pt at bedside and updated her on discharge planning. VENICE shared that pt was ok to go to [facility] today and that [facility] had received prior auth from pt's insurance. VENICE shared that  TCU was able to admit pt around 1700 today. Pt had no other questions for VENICE at this time and is agreeable to discharge.     VENICE completed PAS. [id number]    Please see SW discharge note from today with more details for discharge plan.     KADEN Agustin   [facility]   Covering 5MS SW  5MS VENICE Ph: [phone]

## 2024-04-22 NOTE — PLAN OF CARE
Physical Therapy Discharge Summary    Reason for therapy discharge:    Discharged to transitional care facility.    Progress towards therapy goal(s). See goals on Care Plan in [facility] electronic health record for goal details.  Goals partially met.  Barriers to achieving goals:   limited tolerance for therapy.    Therapy recommendation(s):    Continued therapy is recommended.  Rationale/Recommendations:  to progress safety and independence with functional mobility prior to returning home.

## 2024-04-22 NOTE — PROGRESS NOTES
Goal Outcome Evaluation:    Patient progress Improving.    Pain management: scheduled Tylenol and 10 mg Oxycodone given during this shift with effective result.   Assist of 1-2 with transfer to the bedside commode with walker and gait belt.  R PICC SL.  No concerns continue current plan of care.

## 2024-04-22 NOTE — PROGRESS NOTES
"Care Management Discharge Note    Discharge Date: 04/22/2024       Discharge Disposition: Transitional Care    [facility]  [address] [place] [address]  [address]  P: [phone]  RN to RN Ph: [phone]   F: [phone]      Discharge Services:  Post acute therapies; IV abx    Discharge DME: None    Discharge Transportation: Transport will provide \"roll over\" around 1700    Private pay costs discussed: Not applicable    Does the patient's insurance plan have a 3 day qualifying hospital stay waiver?  No    PAS Confirmation Code:  [id number]  Patient/family educated on Medicare website which has current facility and service quality ratings: no    Education Provided on the Discharge Plan: Yes  Persons Notified of Discharge Plans: Pt, Ortho, [facility], Bedside RN, Charge, [facility]  Patient/Family in Agreement with the Plan: yes    Handoff Referral Completed: Yes    Additional Information:  Pt to discharge to  TCU around 1700 today.  requested CHW (Maricarmen) to provide pt with Important Message from Medicare. CHW reported to  that IMM was given to pt.     KADEN Agustin   [facility]   Covering 5MS SW  5MS SW Ph: [phone]/Yash    "

## 2024-04-22 NOTE — PLAN OF CARE
Occupational Therapy Discharge Summary    Reason for therapy discharge:    Discharged to transitional care facility.    Progress towards therapy goal(s). See goals on Care Plan in [facility] electronic health record for goal details.  Goals partially met.  Barriers to achieving goals:   discharge from facility.    Therapy recommendation(s):    Continued therapy is recommended.  Rationale/Recommendations:  to maximize safety and I with ADL.

## 2024-04-22 NOTE — PLAN OF CARE
"Patient is a 59 year old female  admitted to room 423 via hospital bed.  Patient is alert and oriented X 4. See Epic for VS and assessment.  Patient is able to transfer assist of one  using walker. Patient was settled into their room, shown call light, tv, mealtimes etc. Oriented to unit. Will continue monitoring pain level and VS. Notifying MD with any concerns.  Follow MD orders for cares and medications.    Flu Vaccine:   - Yes, up to date (patient had vaccine this flu season)      COVID Vaccine:   - Yes, up to date (had vaccine this season)       Pneumococcal Vaccine:   - Yes, up to date     5/9/24 Addendum by Emma Tobin.   Flu: current, 10/17/23  COVID: 2/7/23 - nursing to offer. Writer offered COVID booster to pt on 5/13. Pt declined booster at this time. Education on risks/benefits provided.   Pneumococcal: rcvd 15 2/7/23 and 23 10/30/17. Per CDC, \"Their pneumococcal vaccinations are complete.\"      Ethnicity:   Race: White  Dentures: No  Hearing Aid: No  Smoker:  No  Glasses: No  Falls 0-1 mo: 2 2-6 mo: 7  Prior device use: Walker   [facility] Care Directive Referral to Social Work? No    "

## 2024-04-22 NOTE — PLAN OF CARE
[id number]    Goal Outcome Evaluation:      Plan of Care Reviewed With: patient    Overall Patient Progress: improvingOverall Patient Progress: improving    Outcome Evaluation: Pain managed with oral meds overnight    Pt is Aox4. Up Ax1-2 to BS. Continues to have weakness to the left side. L shoulder dressings CDI. BAILEY drain in place with no output overnight. Pt having diarrhea and would like to avoid any BM meds. Large loose BM this shift. Pain managed with prn oxy and scheduled meds. R PICC SL. Plan to discharge to TCU.

## 2024-04-23 ENCOUNTER — APPOINTMENT (OUTPATIENT)
Dept: PHYSICAL THERAPY | Facility: SKILLED NURSING FACILITY | Age: 59
DRG: 559 | End: 2024-04-23
Attending: INTERNAL MEDICINE
Payer: COMMERCIAL

## 2024-04-23 ENCOUNTER — APPOINTMENT (OUTPATIENT)
Dept: OCCUPATIONAL THERAPY | Facility: SKILLED NURSING FACILITY | Age: 59
DRG: 559 | End: 2024-04-23
Attending: INTERNAL MEDICINE
Payer: COMMERCIAL

## 2024-04-23 LAB
CREAT SERPL-MCNC: 0.99 MG/DL (ref 0.51–0.95)
EGFRCR SERPLBLD CKD-EPI 2021: 65 ML/MIN/1.73M2
GLUCOSE BLDC GLUCOMTR-MCNC: 132 MG/DL (ref 70–99)
GLUCOSE BLDC GLUCOMTR-MCNC: 145 MG/DL (ref 70–99)
GLUCOSE BLDC GLUCOMTR-MCNC: 146 MG/DL (ref 70–99)
GLUCOSE BLDC GLUCOMTR-MCNC: 147 MG/DL (ref 70–99)
GLUCOSE BLDC GLUCOMTR-MCNC: 99 MG/DL (ref 70–99)
VANCOMYCIN SERPL-MCNC: 20.7 UG/ML

## 2024-04-23 PROCEDURE — 97165 OT EVAL LOW COMPLEX 30 MIN: CPT | Mod: GO

## 2024-04-23 PROCEDURE — 97161 PT EVAL LOW COMPLEX 20 MIN: CPT | Mod: GP

## 2024-04-23 PROCEDURE — 250N000013 HC RX MED GY IP 250 OP 250 PS 637: Performed by: INTERNAL MEDICINE

## 2024-04-23 PROCEDURE — 258N000003 HC RX IP 258 OP 636

## 2024-04-23 PROCEDURE — 250N000009 HC RX 250: Performed by: INTERNAL MEDICINE

## 2024-04-23 PROCEDURE — 99305 1ST NF CARE MODERATE MDM 35: CPT | Performed by: INTERNAL MEDICINE

## 2024-04-23 PROCEDURE — 80202 ASSAY OF VANCOMYCIN: CPT | Performed by: INTERNAL MEDICINE

## 2024-04-23 PROCEDURE — 36592 COLLECT BLOOD FROM PICC: CPT | Performed by: INTERNAL MEDICINE

## 2024-04-23 PROCEDURE — 82565 ASSAY OF CREATININE: CPT | Performed by: INTERNAL MEDICINE

## 2024-04-23 PROCEDURE — 250N000011 HC RX IP 250 OP 636: Performed by: INTERNAL MEDICINE

## 2024-04-23 PROCEDURE — 120N000009 HC R&B SNF

## 2024-04-23 PROCEDURE — 97535 SELF CARE MNGMENT TRAINING: CPT | Mod: GO

## 2024-04-23 PROCEDURE — 250N000011 HC RX IP 250 OP 636: Mod: JZ | Performed by: INTERNAL MEDICINE

## 2024-04-23 RX ORDER — CLONIDINE HYDROCHLORIDE 0.2 MG/1
0.2 TABLET ORAL 2 TIMES DAILY
Status: DISCONTINUED | OUTPATIENT
Start: 2024-04-23 | End: 2024-05-24 | Stop reason: HOSPADM

## 2024-04-23 RX ORDER — HEPARIN SODIUM,PORCINE 10 UNIT/ML
5-20 VIAL (ML) INTRAVENOUS EVERY 24 HOURS
Status: DISCONTINUED | OUTPATIENT
Start: 2024-04-23 | End: 2024-05-24 | Stop reason: HOSPADM

## 2024-04-23 RX ORDER — CITALOPRAM HYDROBROMIDE 20 MG/1
20 TABLET ORAL AT BEDTIME
Status: DISCONTINUED | OUTPATIENT
Start: 2024-04-23 | End: 2024-05-24 | Stop reason: HOSPADM

## 2024-04-23 RX ORDER — SODIUM CHLORIDE 9 MG/ML
INJECTION, SOLUTION INTRAVENOUS
Status: COMPLETED
Start: 2024-04-23 | End: 2024-04-23

## 2024-04-23 RX ORDER — HEPARIN SODIUM,PORCINE 10 UNIT/ML
5-20 VIAL (ML) INTRAVENOUS
Status: DISCONTINUED | OUTPATIENT
Start: 2024-04-23 | End: 2024-05-24 | Stop reason: HOSPADM

## 2024-04-23 RX ORDER — CLONIDINE HYDROCHLORIDE 0.1 MG/1
0.1 TABLET ORAL ONCE
Status: COMPLETED | OUTPATIENT
Start: 2024-04-23 | End: 2024-04-23

## 2024-04-23 RX ORDER — HYDRALAZINE HYDROCHLORIDE 10 MG/1
10 TABLET, FILM COATED ORAL EVERY 4 HOURS PRN
Status: DISCONTINUED | OUTPATIENT
Start: 2024-04-23 | End: 2024-05-15

## 2024-04-23 RX ADMIN — CLONIDINE HYDROCHLORIDE 0.1 MG: 0.1 TABLET ORAL at 08:46

## 2024-04-23 RX ADMIN — MICONAZOLE NITRATE: 20 POWDER TOPICAL at 21:58

## 2024-04-23 RX ADMIN — LUMATEPERONE 42 MG: 42 CAPSULE ORAL at 21:58

## 2024-04-23 RX ADMIN — HEPARIN, PORCINE (PF) 10 UNIT/ML INTRAVENOUS SYRINGE 5 ML: at 17:22

## 2024-04-23 RX ADMIN — ATORVASTATIN CALCIUM 10 MG: 10 TABLET, FILM COATED ORAL at 21:34

## 2024-04-23 RX ADMIN — ASPIRIN 162 MG: 81 TABLET, COATED ORAL at 08:46

## 2024-04-23 RX ADMIN — ACETAMINOPHEN 975 MG: 325 TABLET, FILM COATED ORAL at 21:33

## 2024-04-23 RX ADMIN — SODIUM CHLORIDE 500 ML: 9 INJECTION, SOLUTION INTRAVENOUS at 17:20

## 2024-04-23 RX ADMIN — OXYCODONE HYDROCHLORIDE 10 MG: 5 TABLET ORAL at 17:21

## 2024-04-23 RX ADMIN — TUBERCULIN PURIFIED PROTEIN DERIVATIVE 5 UNITS: 5 INJECTION, SOLUTION INTRADERMAL at 09:02

## 2024-04-23 RX ADMIN — ACETAMINOPHEN 975 MG: 325 TABLET, FILM COATED ORAL at 06:09

## 2024-04-23 RX ADMIN — OMEPRAZOLE 20 MG: 20 CAPSULE, DELAYED RELEASE ORAL at 08:46

## 2024-04-23 RX ADMIN — HYDRALAZINE HYDROCHLORIDE 10 MG: 10 TABLET, FILM COATED ORAL at 09:02

## 2024-04-23 RX ADMIN — ERTAPENEM SODIUM 1 G: 1 INJECTION, POWDER, LYOPHILIZED, FOR SOLUTION INTRAMUSCULAR; INTRAVENOUS at 17:20

## 2024-04-23 RX ADMIN — Medication 5 ML: at 06:48

## 2024-04-23 RX ADMIN — VANCOMYCIN HYDROCHLORIDE 1000 MG: 1 INJECTION, SOLUTION INTRAVENOUS at 09:03

## 2024-04-23 RX ADMIN — INSULIN ASPART 1 UNITS: 100 INJECTION, SOLUTION INTRAVENOUS; SUBCUTANEOUS at 08:49

## 2024-04-23 RX ADMIN — OXYCODONE HYDROCHLORIDE 10 MG: 5 TABLET ORAL at 08:56

## 2024-04-23 RX ADMIN — ACETAMINOPHEN 975 MG: 325 TABLET, FILM COATED ORAL at 13:23

## 2024-04-23 RX ADMIN — CLONIDINE HYDROCHLORIDE 0.1 MG: 0.1 TABLET ORAL at 11:09

## 2024-04-23 RX ADMIN — VANCOMYCIN HYDROCHLORIDE 1000 MG: 1 INJECTION, SOLUTION INTRAVENOUS at 21:58

## 2024-04-23 RX ADMIN — CLONIDINE HYDROCHLORIDE 0.2 MG: 0.1 TABLET ORAL at 21:33

## 2024-04-23 RX ADMIN — ACETAMINOPHEN 650 MG: 325 TABLET, FILM COATED ORAL at 17:21

## 2024-04-23 RX ADMIN — CITALOPRAM HYDROBROMIDE 20 MG: 20 TABLET ORAL at 21:33

## 2024-04-23 RX ADMIN — INSULIN ASPART 1 UNITS: 100 INJECTION, SOLUTION INTRAVENOUS; SUBCUTANEOUS at 13:23

## 2024-04-23 ASSESSMENT — ACTIVITIES OF DAILY LIVING (ADL)
ADLS_ACUITY_SCORE: 33
ADLS_ACUITY_SCORE: 27
ADLS_ACUITY_SCORE: 27
ADLS_ACUITY_SCORE: 33
ADLS_ACUITY_SCORE: 27
ADLS_ACUITY_SCORE: 33
ADLS_ACUITY_SCORE: 33
ADLS_ACUITY_SCORE: 27
ADLS_ACUITY_SCORE: 33
ADLS_ACUITY_SCORE: 33
BADLS,_PREVIOUS_FUNCTIONAL_LEVEL: USES DEVICE OR EQUIPMENT
ADLS_ACUITY_SCORE: 33
ADLS_ACUITY_SCORE: 33
ADLS_ACUITY_SCORE: 27
ADLS_ACUITY_SCORE: 33
ADLS_ACUITY_SCORE: 27
ADLS_ACUITY_SCORE: 33
IADLS,_PREVIOUS_FUNCTIONAL_LEVEL: PARTIAL ASSISTANCE

## 2024-04-23 NOTE — PROGRESS NOTES
Limited PT eval today due to elevated /75 during session requiring nursing intervention. Will complete eval tomorrow.

## 2024-04-23 NOTE — CARE PLAN
Care Plan updated. Bedside RN to assess on progression and update.     Problem: BADL (Basic Activities of Daily Living) Impairment  Goal: Optimal Safe BADL Performance  Description: Assess BADL (basic activity of daily living) abilities; encourage participation at maximally safe independent level.    Provide assistance and supervision needed to maintain safety; involve caregiver in BADL (basic activity of daily living) training.    Ensure effective use of equipment or devices, such as a long-handled reacher, shower seat or orthosis.    Ensure proper body mechanics and positioning for optimal task performance.    Provide set-up of items if patient is unable to retrieve; store personal care items in accessible location.    Schedule BADL (basic activity of daily living) activities when pain and fatigue are at a minimum; pace activity to conserve energy.           Problem: Skin Injury Risk Increased  Goal: Skin Health and Integrity  Description: Perform a full pressure injury risk assessment, as indicated by screening, upon admission to care unit.    Reassess skin (full inspection and injury risk, including skin temperature, consistency and color) frequently (e.g., scheduled interval, with change in condition) to provide optimal early detection and prevention.    Maintain adequate tissue perfusion (e.g., encourage fluid balance; avoid crossing legs, constrictive clothing or devices) to promote tissue oxygenation.    Maintain head of bed at lowest degree of elevation tolerated, considering medical condition and other restrictions. Use positioning supports to prevent sliding and friction. Consider low friction textiles.    Avoid positioning onto an area that remains reddened or on bony prominences.           Problem: Fall Injury Risk  Goal: Absence of Fall and Fall-Related Injury  Description: Provide a safe, barrier-free environment that encourages independent activity.    Keep care area uncluttered and  well-lighted.    Determine need for increased observation or monitoring.    Avoid use of devices that minimize mobility, such as restraints or indwelling urinary catheter.    Determine need for bed/chair alarms.         Problem: Infection  Goal: Absence of Infection Signs and Symptoms  Description: Implement transmission-based precautions and isolation, as indicated, to prevent spread of infection.    Obtain cultures prior to initiating antimicrobial therapy, when possible. Do not delay treatment for laboratory results in the presence of high suspicion or clinical indicators.    Administer ordered antimicrobial therapy promptly; reassess need regularly.    Monitor laboratory value, diagnostic test and clinical status trends for signs of infection progression.    Identify early signs of sepsis, such as increased heart rate and decreased blood pressure, as well as changes in mental state, respiratory pattern or peripheral perfusion.    Prepare for rapid sepsis management, including lactate level, intravenous access, fluid administration and oxygen therapy.    Provide fever-reduction and comfort measures.       Problem: Glycemic Control Impaired  Goal: Blood Glucose Level Within Targeted Range  Description: Establish target blood glucose levels based on patient-specific factors, such as illness severity and comorbidity.    Document blood glucose levels and monitor trend; advocate for treatment if not within targeted range.    Provide pharmacologic therapy to maintain blood glucose levels within targeted range.    Advocate for insulin therapy if blood glucose level remains elevated.    Check blood glucose level if there is a change in mental or cognitive status.         Problem: Pain Chronic (Persistent)  Goal: Optimal Pain Control and Function  Description: Evaluate pain level, effect of treatment and patient response at regular intervals.    Minimize pain stimuli; coordinate care and adjust environment (e.g., light,  noise, unnecessary movement); promote sleep/rest.    Match pharmacologic analgesia to severity and type of pain mechanism (e.g., neuropathic, muscle, inflammatory); consider multimodal approach (e.g., nonopioid, opioid, adjuvant).    Provide medication at regular intervals; titrate to patient response.    Manage breakthrough pain with additional doses; consider rotation or switching medicate         Problem: Posttraumatic Stress Disorder  Goal: Improved Mood Symptoms (Posttraumatic Stress Disorder)  Description: Provide opportunity for patient and family/caregiver to express feelings, stressors and self-perception (e.g., verbalization, journaling).    Convey caring approach, empathy and potential for change; hope is key for recovery.    Utilize existing coping strategies and assist in developing new strategies, such as relaxation, music and problem-solving; assess for ineffective strategies (e.g., substance use, isolation).    Recognize past and present achievements, successes and personal strengths.    Empower participation in planning care and activities, as well as development of attainable goals, to increase self-esteem and feelings of control.    Promote self-care; support balanced sleep, physical activity and nutrition.         Problem: Psychotic Signs/Symptoms  Goal: Improved Mood Symptoms (Psychotic Signs/Symptoms)  Description: Assess subjective and objective presentation of mood and emotion state.    Encourage and promote emotional awareness, acceptance and expression of feelings.    Provide psychoeducation and supportive therapy interventions to improve mood and emotions.    Provide opportunities for expression, such as verbalization, reflection, journaling, art and role-playing.

## 2024-04-23 NOTE — PHARMACY-MEDICATION REGIMEN REVIEW
Pharmacy Medication Regimen Review  Mitali Robles is a 59 year old female who is currently in the Transitional Care Unit.    Assessment: Upon review of the medications and patient chart the following irregularities were found:   Medications that need other dose adjustment (including age or given adverse effects from medications): Caplyta dose was reduced due to fluconazole DDI (fluconazole was discontinued 4/16) and psychiatry consult note from 4/19/24 describes not increasing dose back to PTA dosing yet due to sedation; recommend increasing dose once patient is better from a sedation standpoint   Medications that require additional monitoring include: vancomycin (pharmacy to monitor)   Other Recommendations:   Psychiatric medications: all PTA benzodiazepines (alprazolam, clonazepam, diazepam) are no longer ordered (per psych/neuro), sounds like plan for her was to not increase BZDs for now given lack of objective signs of BZD withdrawal and recent somnolence; in addition, her Caplyta (see above) not at PTA dose - would touch base with psychiatry during TCU stay to touch base regarding long term plan for BZDs, Caplyta   PTA gabapentin on hold due to somnolence (800mg TID), follow up pain / somnolence during TCU admission and consider restarting if indicated (would restart at LOWER dose if so)   Patient's blood glucoses have been stable and PTA Tresiba was not ordered during hospitalization or TCU; monitor and consider restarting if indicated.   PTA clonidine dose was 1mg at bedtime; ordered as 0.1mg BID inpatient and here - given higher blood pressures, reasonable to increase dose >> done 4/23   Patient was receiving frequent potassium replacement during hospitalization (none ordered here) - consider ordering given elevated Qtc     Per chart review, looks like both citalopram and nystatin cream for groin rash not ordered for TCU admission - will discuss with provider.     Plan:   Order citalopram and nystatin  cream - discussed with Dr Ortiz 4/23 AM. Will assess groin wound to determine if she needs anything while here. Citalopram ordered.   Touch base with psychiatry at some point regarding Caplyta dose increase and longer term plan for benzodiazepines.   Consider restarting: PTA Tresiba if blood glucoses worsen (they look OK for now), PTA gabapentin if indicated later on in admission (would start at lower dose than order that is on hold)   Increase clonidine dose given recent hypertension - discussed with Dr Ortiz 4/23 AM.   Consider electrolyte replacement protocols given elevated Qtc and frequent replacement during hospitalization.     Attending provider will be sent this note for review.  If there are any emergent issues noted above, pharmacist will contact provider directly by phone.      Pharmacy will periodically review the resident's medication regimen for any PRN medications not administered in > 72 hours and discontinue them. The pharmacist will discuss gradual dose reductions of psychopharmacologic medications with interdisciplinary team on a regular basis.    Please contact pharmacy if the above does not answer specific medication questions/concerns.    Background:  A pharmacist has reviewed all medications and pertinent medical history today.  Medications were reviewed for appropriate use and any irregularities found are listed with recommendations.      Current Facility-Administered Medications:     [START ON 4/25/2024] - Skin Test Reading -, , Does not apply, Q21 Days, Lianet Chung MD    acetaminophen (TYLENOL) tablet 650 mg, 650 mg, Oral, Q4H PRN, Lianet Chung MD    acetaminophen (TYLENOL) tablet 975 mg, 975 mg, Oral, Q8H, Lianet Chung MD, 975 mg at 04/23/24 0609    albuterol (PROVENTIL HFA/VENTOLIN HFA) inhaler, 1-2 puff, Inhalation, Q4H PRN, Lianet Chung MD    aspirin EC tablet 162 mg, 162 mg, Oral, Daily, Lianet Chung MD    atorvastatin (LIPITOR) tablet 10 mg, 10 mg, Oral, At Bedtime, Sheldon  MD Lianet, 10 mg at 04/22/24 2121    cloNIDine (CATAPRES) tablet 0.1 mg, 0.1 mg, Oral, BID, Lianet Chung MD, 0.1 mg at 04/22/24 2121    glucose gel 15-30 g, 15-30 g, Oral, Q15 Min PRN **OR** dextrose 50 % injection 25-50 mL, 25-50 mL, Intravenous, Q15 Min PRN **OR** glucagon injection 1 mg, 1 mg, Subcutaneous, Q15 Min PRN, Lianet Chung MD    ertapenem (INVanz) 1 g vial to attach to  mL bag, 1 g, Intravenous, Q24H, Lianet Chung MD    [START ON 4/24/2024] ferrous sulfate (FEROSUL) tablet 325 mg, 325 mg, Oral, Every Other Day, Lianet Chung MD    [Held by provider] gabapentin (NEURONTIN) capsule 800 mg, 800 mg, Oral, TID, Lianet Chung MD    heparin lock flush 10 UNIT/ML injection 5-20 mL, 5-20 mL, Intracatheter, Q24H, Nithya Ortiz MD    heparin lock flush 10 UNIT/ML injection 5-20 mL, 5-20 mL, Intracatheter, Q1H PRN, Nithya Ortiz MD, 5 mL at 04/23/24 0648    insulin aspart (NovoLOG) injection (RAPID ACTING), 1-7 Units, Subcutaneous, TID AC, Lianet Chung MD, 1 Units at 04/22/24 1904    insulin aspart (NovoLOG) injection (RAPID ACTING), 1-5 Units, Subcutaneous, At Bedtime, Lianet Chung MD    lumateperone (CAPLYTA) capsule 42 mg, 42 mg, Oral, At Bedtime, Lianet Chung MD, 42 mg at 04/22/24 2121    naloxone (NARCAN) injection 0.2 mg, 0.2 mg, Intravenous, Q2 Min PRN **OR** naloxone (NARCAN) injection 0.4 mg, 0.4 mg, Intravenous, Q2 Min PRN **OR** naloxone (NARCAN) injection 0.2 mg, 0.2 mg, Intramuscular, Q2 Min PRN **OR** naloxone (NARCAN) injection 0.4 mg, 0.4 mg, Intramuscular, Q2 Min PRN, Nithya Ortiz MD    Nurse may request from Pharmacy a change of form of medication (e.g. Liquid to tablet)., , Does not apply, Continuous PRN, Lianet Chung MD    omeprazole (PriLOSEC) CR capsule 20 mg, 20 mg, Oral, Daily, Lianet Chung MD    oxyCODONE (ROXICODONE) tablet 5-10 mg, 5-10 mg, Oral, Q6H PRN, Lianet Chung MD    polyethylene glycol (MIRALAX) Packet 17 g, 17 g, Oral, Daily, Lianet Chung MD    sodium  chloride (PF) 0.9% PF flush 10-20 mL, 10-20 mL, Intracatheter, q1 min prn, Nithya Ortiz MD, 20 mL at 04/23/24 0647    tuberculin injection 5 Units, 5 Units, Intradermal, Q21 Days, Lianet Chung MD    vancomycin (VANCOCIN) 1,000 mg in 200 mL dextrose intermittent infusion, 1,000 mg, Intravenous, Q12H, Oziel Ibarra MD, Last Rate: 200 mL/hr at 04/22/24 2116, 1,000 mg at 04/22/24 2116  No current outpatient prescriptions on file.   PMH: bipolar disorder, PTSD, MDD, on high dose benzodiazepines, hyperlipidemia, hypertension, and type 2 diabetes

## 2024-04-23 NOTE — PROGRESS NOTES
04/23/24 1000   Name of Certified Therapeutic Rec Specialist   Name of Certified Therapeutic Rec Specialist LUCAS Mohan   Appointment Type   Type of Therapeutic Rec Session Therapeutic Rec Assessment   General Information   Patient Profile Review See Profile for full history and prior level of function   Daily Contact with Relatives or Friends Phone call;Visit   Community Involvement   Community Involvement Disabled   Spiritual Practice [facility]   Outings Dinner;Movies   Music   Music Preferences [place]   Listens to Recorded Music Yes   Brought Own Equipment No   Media   Computer Will use tablet/phone   TV / Movies TV   Impression   Open to Socializing with Others Independent   Treatment Plan   Interested in Unit [place]? No   Type of Intervention Independent with activity   Equipment and Supplies While on Unit None needed   Assessment Assessment completed, pt was oriented to role of rec therapy and provided with leisure resource list. pt declined offers of materials at this time. Will monitor and provide materials as needed.
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
## 2024-04-23 NOTE — PLAN OF CARE
Goal Outcome Evaluation:         VS: Temp: 97.8  F (36.6  C) Temp src: Oral BP: (!) 181/82 Pulse: 83   Resp: 18 SpO2: 96 % O2 Device: None (Room air)      BP elevated this morning (see vitals in epic) PRN hydralazine given. Somewhat effective, provider aware, 0.1mg Clonidine  given to increase to 0.2mg. BP recheck 141/65    O2: Stable on RA, denies SOB or CP   Output: Voids without difficulty, continent of bladder   Up to BSC    Last BM: 4/23, loose stools, uses BSC   Continent of Bowel    Activity: SBA with GB and walker to BSC with nursing staff,   Participated in OT. Did not receive PT today d/t high BP    Skin: Intact    Pain: To LUE, rates 9 out of 10   Given PRN oxycodone    CMS: AXO x4, pleasant and cooperative with care    Dressing: RUE PICC line single lumen, Cast, ACE wrap and sling to LUE    Diet: Regular diet, BG monitoring     LDA: RUE PICC SL, dressing DCI  blood return noted, IV abx infused easily.   Heparin locked    Equipment: IV pole, walker, GB, BSC, Call light, sling, cast.    Plan: Continue POC    Additional Info: -         Patient does not have new respiratory symptoms.  Patient does not have new sore throat.  Patient does not have a fever greater than 99.5.

## 2024-04-23 NOTE — PROGRESS NOTES
"   04/23/24 0645   Appointment Info   Signing Clinician's Name / Credentials (OT) Keira Garcia OTR/L PONCHO   Rehab Comments (OT) OT alexaal completed , treatment initiated   Living Environment   People in Home significant other   Current Living Arrangements apartment   Home Accessibility stairs to enter home   Number of Stairs, Main Entrance 6   Living Environment Comments OT: pt reports living in [place], [place] in apt w/ [address], reports SO/fiance \"Rashmi Yepez\" quit his job and is moving from [facility] to [place] to live w/ her to be her caregiver, pt not working and does not drive, apt has std ht toilet and no grabbars but uses the armrest from ext tub bench to assist w/ standing from toilet, reports having tub/shower combo w/ ext tub bench but no grabbars, reports indep w/ dressing/transfers/bathing and cooking prior to admit but admits she has had mulitple falls (7) in past 6month and used 4ww at home but also owns w/c , provided conflicting info to OT and PT, reported to PT she uses w/c in house but to OT she only uses it for outdoor mob because her apt is small . unclear which is accurate, pt reports she has assist from [facility] for a \"\" who also does laundry and at time picks up pt's groceries, pt reports getting help w/ meds from [facility] but sets them up herself and manages her own finances. reports having hosp bed at home .pt has recent hx of cog deficits so pt may not be reliable .   Self-Care   Usual Activity Tolerance fair   Current Activity Tolerance poor   Fall history within last six months yes   Number of times patient has fallen within last six months 7   General Information   Onset of Illness/Injury or Date of Surgery 04/10/24   Referring Physician Nithya ortiz MD   Patient/Family Therapy Goal Statement (OT) get home w/ fiance to help her   Additional Occupational Profile Info/Pertinent History of Current Problem per chart review:Mitali Robles is a 59 year old female admitted on " 4/10/2024. She has hx of bipolar disorder, PTSD, MDD, on high dose benzodiazepines, hyperlipidemia, hypertension, and type 2 diabetes on insulin with recurrent left elbow prosthetic joint infection s/p explantation of L total elbow arthroplasty admitted to ortho post op.,dx:L elbow prosthetic joint infection s/p explantation of L total elbow arthroplasty   Existing Precautions/Restrictions fall;weight bearing  (NWB LUE)   Left Upper Extremity (Weight-bearing Status) non weight-bearing (NWB)  (no ROM L elb)   Right Upper Extremity (Weight-bearing Status) full weight-bearing (FWB)   Left Lower Extremity (Weight-bearing Status) full weight-bearing (FWB)   Right Lower Extremity (Weight-bearing Status) full weight-bearing (FWB)   General Observations and Info OT: pt reports PTA recieving home care OT/PT   Cognitive Status Examination   Cognitive Status Comments pt oriented x4 and follows directiions but demo impaired prob solving and impaired anticipation of unsafe situation,pt presents conflicting personal/social hx info indicating pt may have memory deficits, per chart review pt has recent cog deficits due to toxic encephalopathy, recommend cog,assessments   Visual Perception   Visual Impairment/Limitations corrective lenses full-time   Pain Assessment   Patient Currently in Pain   (yes, L elbow)   Posture   Posture forward head position;protracted shoulders   Range of Motion Comprehensive   Comment, General Range of Motion R dom, L UE casted and elb NO ROM, LUE NWB, Rue AROM WFL   Strength Comprehensive (MMT)   Comment, General Manual Muscle Testing (MMT) Assessment generalized weakness, LUE limited function due to casted/wt bearing/ROM restirictions and pain, , deconditioned   Bed Mobility   Comment (Bed Mobility) see gg codes   Transfers   Transfer Comments see gg codes   Balance   Balance Comments impaired static and dynamic standing, delayed reactions w/ LOB   Activities of Daily Living   Additional Documentation    (see gg codes)   Clinical Impression   Criteria for Skilled Therapeutic Interventions Met (OT) Yes, treatment indicated   OT Diagnosis decreased indep w/ adls/mob/iadls   OT Problem List-Impairments impacting ADL problems related to;activity tolerance impaired;balance;cognition;strength;pain;post-surgical precautions  (LUE NWB/No ROM/limited functional use)   Assessment of Occupational Performance 3-5 Performance Deficits   Identified Performance Deficits drg, transfers, bed mob, bathing, toileting,   Planned Therapy Interventions (OT) ADL retraining;balance training;bed mobility training;cognition;strengthening;transfer training;progressive activity/exercise   Clinical Decision Making Complexity (OT) problem focused assessment/low complexity   Risk & Benefits of therapy have been explained evaluation/treatment results reviewed;care plan/treatment goals reviewed;risks/benefits reviewed;current/potential barriers reviewed;participants voiced agreement with care plan;participants included;patient   Clinical Impression Comments OT: pt may not be accurate historian due to recent doc cog deficits and presentation of cog impairments, pt presents w/BP issues,  LUE NWB/No ROM/casted, pain, deconditioned, generalized weakness and impaired responses to LOB, impaird awareness of limitations and decreased awareness of safety issues resulting in decreased indep and safety w/adls/mob and IADLs, recommend cont OT to address deficits to increase safety and indep to d/c home w/ supports.   OT Total Evaluation Time   OT Eval Low Complexity Minutes (50739) 15   Therapy Certification   Start of Care Date 04/23/24   Certification date from 04/23/24   Certification date to 05/22/24   Medical Diagnosis L elbow prosthetic joint infection s/p explantation of L total elbow arthroplasty   OT Goals   Therapy Frequency (OT) 5 times/week   OT Predicted Duration/Target Date for Goal Attainment 05/08/24   OT Goals Hygiene/Grooming;Upper Body  Dressing;Lower Body Dressing;Upper Body Bathing;Lower Body Bathing;Bed Mobility;Transfers;Toilet Transfer/Toileting;Cognition   OT: Hygiene/Grooming supervision/stand-by assist   OT: Upper Body Dressing Minimal assist;within precautions   OT: Lower Body Dressing Minimal assist;within precautions   OT: Upper Body Bathing Minimal assist   OT: Lower Body Bathing Minimal assist;using adaptive equipment;with precautions   OT: Bed Mobility Supervision/stand-by assist;within precautions   OT: Transfer Supervision/stand-by assist;with assistive device  (sba-cga w/ assist device)   OT: Toilet Transfer/Toileting Supervision/stand-by assist;using adaptive equipment;within precautions  (sba-cga w/ assist device)   OT: Cognitive Patient/caregiver will verbalize understanding of cognitive assessment results/recommendations as needed for safe discharge planning   Self-Care/Home Management   Self-Care/Home Mgmt/ADL, Compensatory, Meal Prep Minutes (30087) 45   Treatment Detail/Skilled Intervention OT:educ pt on approach to adls w/ LUE limiations, safe approach to bed mob w/ restrictions/precautions and current level of function, discussed AE potential options, see gg codes for further detals.   OT Discharge Planning   OT Plan OT: precautions: cog deficits, LUE NWB, L elb NO ROM, falls, Rx: full body dressing, transfes, toileting, sponge bath 4/24/gg codes   OT Discharge Recommendation (DC Rec) home with assist;home with home care occupational therapy   OT Brief overview of current status see clincial impressions   Total Session Time   Timed Code Treatment Minutes 45   Total Session Time (sum of timed and untimed services) 60   Post Acute Settings Only   What unit is patient on? TCU   OT- Transitional Care Unit Time   Individual Time (minutes) - OT 60   TCU Total Session Time (minutes) - OT 60   TCU Daily Total Session Time   OT TCU Daily Total Session Time 60   Rehab TCU Daily Total Session Time 60   Bed Mobility: Turning side to  side/Roll Left and Right   Describe Performance OT: min A, bed rail,   Bed Mobility: Sit to lying   Describe Performance OT: min A   Bed Mobility: Lying to sitting on the side of bed   Describe Performance OT: min A   Transfers: Sit to Stand   Describe Performance OT: min A   Transfers: Chair/Bed transfers   Describe Performance OT: min assist and directives for safe approach, increased assist when LOB in standing   Picking up Object - Ability to bend/stoop while standing.   Reason Not Done Safety concerns   Upper Body Dressing - Ability to dress/undress above the waist, including fasteners   Describe Performance OT: max A w/ gown change due to LUE ROM/pain limiations   Lower Body Dressing - Ability to dress/undress below the waist, includes fasteners   Describe Performance OT: max A, LOB when stnading to adjust clothing, requiring assist from therapist to regain balance   Lower Body Dressing (Putting On/Taking-Off Footwear)   Describe Performance OT depend   Toileting Hygiene - Ability to maintain perineal hygiene and adjust clothes   Describe Performance OT: setup/sba   Toilet transfer - Ability to get on and off a toilet or commode   Describe Performance OT min A w/ BSC and cues for safety/directives for approach and setup of equip   Personal Hygiene - Ability to maintain personal hygiene (combing hair, shaving, apply makeup wash/dry face/hands.   Describe Performance OT: mod A   Oral Hygiene - Ability to use suitable items to clean teeth.   Describe Performance OT: declined at time of OT alexaal

## 2024-04-23 NOTE — PHARMACY-VANCOMYCIN DOSING SERVICE
Pharmacy Vancomycin Note  Date of Service 2024  Patient's  1965   59 year old, female    Indication: Bone and Joint Infection  Day of Therapy: started 24  Current vancomycin regimen:  1000 mg IV q12h  Current vancomycin monitoring method: AUC  Current vancomycin therapeutic monitoring goal: 400-600 mg*h/L    InsightRX Prediction of Current Vancomycin Regimen  Regimen: 1000 mg IV every 12 hours.  Exposure target: AUC24 (range)400-600 mg/L.hr   AUC24,ss: 548 mg/L.hr  Probability of AUC24 > 400: 100 %  Ctrough,ss: 18.8 mg/L  Probability of Ctrough,ss > 20: 32 %  Probability of nephrotoxicity (Lodise CHANDAN ): 15 %    Current estimated CrCl = Estimated Creatinine Clearance: 80.3 mL/min (A) (based on SCr of 0.99 mg/dL (H)).    Creatinine for last 3 days  2024:  6:50 AM Creatinine 0.90 mg/dL  2024:  7:16 AM Creatinine 0.99 mg/dL;  7:16 AM Creatinine 0.99 mg/dL  2024:  6:53 AM Creatinine 0.99 mg/dL    Recent Vancomycin Levels (past 3 days)  2024:  6:53 AM Vancomycin 20.7 ug/mL    Vancomycin IV Administrations (past 72 hours)                     vancomycin (VANCOCIN) 1,000 mg in 200 mL dextrose intermittent infusion (mg) 1,000 mg New Bag 24 2116    vancomycin (VANCOCIN) 1,000 mg in 200 mL dextrose intermittent infusion (mg) 1,000 mg New Bag 24 1028     1,000 mg New Bag 24 2046     1,000 mg New Bag  0856     1,000 mg New Bag 24 2241     1,000 mg New Bag  0914                    Nephrotoxins and other renal medications (From now, onward)      Start     Dose/Rate Route Frequency Ordered Stop    24 2200  vancomycin (VANCOCIN) 1,000 mg in 200 mL dextrose intermittent infusion         1,000 mg  200 mL/hr over 1 Hours Intravenous EVERY 12 HOURS 24 1840                 Contrast Orders - past 72 hours (72h ago, onward)      None            Interpretation of levels and current regimen:  Vancomycin level is reflective of -600    Has serum creatinine  changed greater than 50% in last 72 hours: No    Urine output:  unable to determine    Renal Function: Stable    Plan:  Continue Current Dose  Vancomycin monitoring method: AUC  Vancomycin therapeutic monitoring goal: 400-600 mg*h/L  Pharmacy will check vancomycin levels as appropriate in 1-3 Days.  Serum creatinine levels will be ordered daily for the first week of therapy and at least twice weekly for subsequent weeks.    EVELYN BRAY [facility]

## 2024-04-23 NOTE — PLAN OF CARE
Pt is alert and oriented x4. Able to make needs known to staff.  Assist of one with pivot to BSC.  Single lumen PICC on right arm. BAILEY drain with no output. Left arm is in cast with splint dressing is CDI. Continent of bowel and bladder. BG check x4. Last check was 144 and 119.  On a regular diet.  Pt denies SOB, chest pain and N/V. Will contiune plan of care.        Patient's most recent vital signs are:     Vital signs:  BP: 189/62  Temp: 98.1  HR: 75  RR: 18  SpO2: 93 %     Patient does not have new respiratory symptoms.  Patient does not have new sore throat.  Patient does not have a fever greater than 99.5.

## 2024-04-23 NOTE — PROGRESS NOTES
This patient does not require Contact Precautions because the patient had no hospitalization or invasive procedures outside of [place] or [place]. and hospitalization or long-term care stay was not in a high-risk state.    April 23, 2024  Amy Griffith, Infection Prevention
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
## 2024-04-23 NOTE — PLAN OF CARE
Goal Outcome Evaluation:  Pt.A&Ox4. Appeared to sleep well as noted during rounds. Uses call light appropriately. Up to BSC with assist of 1. NWB LUE - casted / ace wrap and sling in place. IV abx via SL picc - heparin lock. Also has lrg.BAILEY bulb w/small amount sang.drainage - noted unplugged x1, suction reapplied. Has Vanco level this AM.        Patient's most recent vital signs are:     Vital signs:  BP: 199/78  Temp: 97.8  HR: 88  RR: 18  SpO2: 96 %     Patient does not have new respiratory symptoms.  Patient does not have new sore throat.  Patient does not have a fever greater than 99.5.

## 2024-04-24 ENCOUNTER — APPOINTMENT (OUTPATIENT)
Dept: PHYSICAL THERAPY | Facility: SKILLED NURSING FACILITY | Age: 59
DRG: 559 | End: 2024-04-24
Attending: INTERNAL MEDICINE
Payer: COMMERCIAL

## 2024-04-24 ENCOUNTER — APPOINTMENT (OUTPATIENT)
Dept: OCCUPATIONAL THERAPY | Facility: SKILLED NURSING FACILITY | Age: 59
DRG: 559 | End: 2024-04-24
Attending: INTERNAL MEDICINE
Payer: COMMERCIAL

## 2024-04-24 LAB
BACTERIA SNV CULT: NORMAL
GLUCOSE BLDC GLUCOMTR-MCNC: 113 MG/DL (ref 70–99)
GLUCOSE BLDC GLUCOMTR-MCNC: 115 MG/DL (ref 70–99)
GLUCOSE BLDC GLUCOMTR-MCNC: 120 MG/DL (ref 70–99)
GLUCOSE BLDC GLUCOMTR-MCNC: 121 MG/DL (ref 70–99)
GLUCOSE BLDC GLUCOMTR-MCNC: 184 MG/DL (ref 70–99)

## 2024-04-24 PROCEDURE — 97116 GAIT TRAINING THERAPY: CPT | Mod: GP | Performed by: PHYSICAL THERAPIST

## 2024-04-24 PROCEDURE — 250N000011 HC RX IP 250 OP 636: Mod: JZ | Performed by: INTERNAL MEDICINE

## 2024-04-24 PROCEDURE — 87493 C DIFF AMPLIFIED PROBE: CPT | Performed by: INTERNAL MEDICINE

## 2024-04-24 PROCEDURE — 97535 SELF CARE MNGMENT TRAINING: CPT | Mod: GO

## 2024-04-24 PROCEDURE — 250N000013 HC RX MED GY IP 250 OP 250 PS 637: Performed by: INTERNAL MEDICINE

## 2024-04-24 PROCEDURE — 250N000013 HC RX MED GY IP 250 OP 250 PS 637

## 2024-04-24 PROCEDURE — 250N000011 HC RX IP 250 OP 636: Performed by: INTERNAL MEDICINE

## 2024-04-24 PROCEDURE — 120N000009 HC R&B SNF

## 2024-04-24 PROCEDURE — 97161 PT EVAL LOW COMPLEX 20 MIN: CPT | Mod: GP | Performed by: PHYSICAL THERAPIST

## 2024-04-24 PROCEDURE — 99310 SBSQ NF CARE HIGH MDM 45: CPT

## 2024-04-24 PROCEDURE — 97530 THERAPEUTIC ACTIVITIES: CPT | Mod: GP | Performed by: PHYSICAL THERAPIST

## 2024-04-24 RX ORDER — METOCLOPRAMIDE HYDROCHLORIDE 5 MG/ML
10 INJECTION INTRAMUSCULAR; INTRAVENOUS ONCE
Status: COMPLETED | OUTPATIENT
Start: 2024-04-24 | End: 2024-04-24

## 2024-04-24 RX ORDER — GABAPENTIN 300 MG/1
300 CAPSULE ORAL 3 TIMES DAILY
Status: DISCONTINUED | OUTPATIENT
Start: 2024-04-24 | End: 2024-05-01

## 2024-04-24 RX ORDER — AMLODIPINE BESYLATE 5 MG/1
5 TABLET ORAL DAILY
Status: DISCONTINUED | OUTPATIENT
Start: 2024-04-24 | End: 2024-05-19

## 2024-04-24 RX ADMIN — OMEPRAZOLE 20 MG: 20 CAPSULE, DELAYED RELEASE ORAL at 11:18

## 2024-04-24 RX ADMIN — MICONAZOLE NITRATE: 20 POWDER TOPICAL at 21:39

## 2024-04-24 RX ADMIN — MICONAZOLE NITRATE: 20 POWDER TOPICAL at 11:19

## 2024-04-24 RX ADMIN — ATORVASTATIN CALCIUM 10 MG: 10 TABLET, FILM COATED ORAL at 21:31

## 2024-04-24 RX ADMIN — CLONIDINE HYDROCHLORIDE 0.2 MG: 0.1 TABLET ORAL at 21:31

## 2024-04-24 RX ADMIN — CLONIDINE HYDROCHLORIDE 0.2 MG: 0.1 TABLET ORAL at 11:19

## 2024-04-24 RX ADMIN — METOCLOPRAMIDE HYDROCHLORIDE 10 MG: 5 INJECTION INTRAMUSCULAR; INTRAVENOUS at 23:16

## 2024-04-24 RX ADMIN — OXYCODONE HYDROCHLORIDE 10 MG: 5 TABLET ORAL at 11:18

## 2024-04-24 RX ADMIN — ASPIRIN 162 MG: 81 TABLET, COATED ORAL at 11:18

## 2024-04-24 RX ADMIN — AMLODIPINE BESYLATE 5 MG: 5 TABLET ORAL at 19:09

## 2024-04-24 RX ADMIN — GABAPENTIN 300 MG: 300 CAPSULE ORAL at 21:31

## 2024-04-24 RX ADMIN — HEPARIN, PORCINE (PF) 10 UNIT/ML INTRAVENOUS SYRINGE 5 ML: at 17:05

## 2024-04-24 RX ADMIN — ACETAMINOPHEN 975 MG: 325 TABLET, FILM COATED ORAL at 06:44

## 2024-04-24 RX ADMIN — ERTAPENEM SODIUM 1 G: 1 INJECTION, POWDER, LYOPHILIZED, FOR SOLUTION INTRAMUSCULAR; INTRAVENOUS at 17:06

## 2024-04-24 RX ADMIN — OXYCODONE HYDROCHLORIDE 10 MG: 5 TABLET ORAL at 21:30

## 2024-04-24 RX ADMIN — LUMATEPERONE 84 MG: 42 CAPSULE ORAL at 21:44

## 2024-04-24 RX ADMIN — ACETAMINOPHEN 975 MG: 325 TABLET, FILM COATED ORAL at 21:14

## 2024-04-24 RX ADMIN — ACETAMINOPHEN 975 MG: 325 TABLET, FILM COATED ORAL at 17:05

## 2024-04-24 RX ADMIN — CITALOPRAM HYDROBROMIDE 20 MG: 20 TABLET ORAL at 21:31

## 2024-04-24 RX ADMIN — FERROUS SULFATE TAB 325 MG (65 MG ELEMENTAL FE) 325 MG: 325 (65 FE) TAB at 11:19

## 2024-04-24 RX ADMIN — GABAPENTIN 300 MG: 300 CAPSULE ORAL at 17:06

## 2024-04-24 RX ADMIN — VANCOMYCIN HYDROCHLORIDE 1000 MG: 1 INJECTION, SOLUTION INTRAVENOUS at 11:19

## 2024-04-24 RX ADMIN — VANCOMYCIN HYDROCHLORIDE 1000 MG: 1 INJECTION, SOLUTION INTRAVENOUS at 21:50

## 2024-04-24 ASSESSMENT — ACTIVITIES OF DAILY LIVING (ADL)
ADLS_ACUITY_SCORE: 33
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
## 2024-04-24 NOTE — PLAN OF CARE
Goal Outcome Evaluation:  Plan of Care Reviewed With: patient  Overall Patient Progress: improving    Outcome Evaluation: Drain removed by ortho and changed dressing. PICC dressing changed this shift.    RN Shift: 0700 - 1930  Admitted on 4/10/2024 for left elbow prosthetic joint infection. Hospital course was complicated by toxic metabolic encephalopathy, hypertensive urgency, acute blood loss anemia requiring transfusions.  [facility] for further abx, rehab, and cares.     Vital Signs: Temp: 97.4  F (36.3  C) Temp src: Oral BP: (!) 189/75 Pulse: 70   Resp: 16 SpO2: 94 % O2 Device: None (Room air)  Continuous high BP - Provider added amlodipine 5 mg     CMS/Neuro: A&O x4      Cardio/Resp: No SOB or chest pain     Output: Continent of B/B - LBM: 4/24/24 - Multiple loose stools.   C-diff lab ordered - Needs to be collected.  Up to BSC     Activity: Assist x 1 w/ walker and gait belt.      Skin: Surgical incision to L elbow     Pain: 7/10 to L elbow. PRN oxycodone given     Dressing: L elbow - CDI - Changed today by provider.   PICC dressing changed today by this writer     LDA: R. Single Lumen PICC - Currently HL - Dressing changed today  L. BAILEY drain removed by ortho provider today - Upon removal, mehreen of SS fluid expressed out - Stopped after a couple of seconds.     Diet: Regular, Thin liquids, Good appetite, Medication whole  No insulin given this shift per sliding scale.     Additional Info: Call light w/in reach and able to make needs known     Plan: Continue POC - Discharge TBD       Manuela Albrecht RN, BSN, PHN

## 2024-04-24 NOTE — PLAN OF CARE
TCU/ ARU care coordination assessment:    Reason for consult: IV abx     Assessment completed with: Patient     Date Admitted to unit: 4/22/24    BACKGROUND    Admitting Dx: Chronic left elbow PJI s/p explantation of L total elbow arthroplasty     PCP:   The listed PCP is not accurate. Patient will need to be scheduled for a new PCP appointment at [facility] prior to discharge.     Additional Care Team:   ID CSC: Dr. Long,  ID in [place], Dr. Suresh Dominguez-will resume care once discharged from [facility] CM:   Uyen Roberts, RN   Ph: [phone]     Insurance:   [facility] Medicare Advantage, [employer] [place].     Living arrangements:      Lives alone- [address]     Caregiver after discharge (yes or no): No    Name/ Relationship:      Are they trainable for cares?      Previous [facility]    [facility], [place] (Ph: [phone])     Previous DME/Supplies:    Was on IV antibiotics at home. Patient thought the agency was [employer] (?). Writer will have to find previous agency. Will reach out to ID team.     CURRENT STATUS:    Functional Status/ Transfers:     Tubes/Lines/Drains:   RUE PICC   Closed suction drain at L. Elbow.     Skin/ Wounds:   Surgical site- L. Elbow.     Medications that require education or coordinations:    DM medications, currently on sliding scale insulin, and Only on [place] PTA. May need DM edu if discharge on sliding scale insulin.     Additional Medical Information:    LUE Slab Splint.     OPAT Note for IV abx (see PANDA OPAT note from 4/22 for additional details):       Barriers to discharge:   Lives alone, appears to have limited support. Ability to do IV abx independently given recent surgery. Writer spoke with community paramedics, they stated they would see her for weekly dressing changes and often find her with air in her line and her wound dressing not cared for properly.     DISCHARGE PLANNING:  Anticipated discharge date: TBD      Discharge Location: Home to [place].     Services: HC, possibly [facility] pending course and plan.     DME/ Supplies:    IV abx & supplies (pending plan).     Education needs:     DM edu (pending regimen)  IV abx (pending plan).     Other notes:     Writer met with patient to discuss home care needs, Home infusion. Patient was having community paramedics come a visit for dressing changes and PICC line changes 1x weekly on Mondays. She has a cleaning lady who comes on Fridays from [employer]. And she was doing OT off and on with a company called [employer].     Patient felt the IV abx were going well and she  was able to do those indepently. If patient were to discharge with ABX, nursing staff would have to provide education and evaluate safety.     Patient was also under the impression that she will be on life long IV abx. Patient said that it will be IVs because she cannot absorb the oral medications because she does not have a colon and got bad diarrhea when they tried to transition her before. Writer discussed with her that she will have a ID follow up and they will reassess on 5/22 for whether they can stop IVs or not.      Maddison Fernandez   Patient Care Management Coordinator  Acute Rehabilitation Unit/ Transitional Care Unit.   Ph: [phone]

## 2024-04-24 NOTE — PROGRESS NOTES
Chart check    Seen by psychiatry, appreciate help     Capyta increased to her PTA dose of 84 mg, gabapentin restarted at lower dose 300 mg tid ( was on 800 TID PTA) continue same dose celexa    Added amlodipine 5 mg 4/24 as blood pressure  up     Continue with diarrhea, check c diff    Nithya Ortiz MD

## 2024-04-24 NOTE — PROGRESS NOTES
"Orthopaedic Surgery Progress Note 04/24/2024    S: Patient discharged to [facility] yesterday. Patient seen again today for a wound check/drain removal/splint change. Patient reports she is doing well. Pain well controlled and getting stronger. No concerns.     O:  Temp: 97.4  F (36.3  C) Temp src: Oral BP: (!) 172/68 Pulse: 70   Resp: 16 SpO2: 94 % O2 Device: None (Room air)      Exam:  Gen: No acute distress, resting comfortably in bed.  Resp: Non-labored breathing  MSK:  LUE:  - Splint/Dressings c/d/i - removed today  - Incision healing well with no erythema or drainage  - SILT medial/radial/ulnar/axillary nerves  - Fires EPL, FPL, Intrinsics  - Hand wwp  - Given drain minimal output - dc'ed today 4/24. Upon removal gush of SS fluid expressed suggesting drain had likely clotted off. What drainage remained expressed from drain wound and sterile dressings applied followed by new splint            Drain output:   Output by Drain (mL) 04/22/24 0700 - 04/22/24 1459 04/22/24 1500 - 04/22/24 2259 04/22/24 2300 - 04/23/24 0659 04/23/24 0700 - 04/23/24 1459 04/23/24 1500 - 04/23/24 2259 04/23/24 2300 - 04/24/24 0659 04/24/24 0700 - 04/24/24 1459 04/24/24 1500 - 04/24/24 1821   Closed/Suction Drain 1 Left Elbow Bulb 15 [place] existing on admission 0      40      Recent Labs   Lab 04/22/24  0716 04/21/24  0650 04/20/24  0717   WBC 10.3 10.8 12.2*   HGB 7.0* 7.2* 7.6*    331 323     No results for input(s): \"CULTURE\" in the last 168 hours.     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 Sarmiento.    Patient discharged 4/23/24 to  TCU. Seen today for wound check/drain removal/splint change. Patient doing well. Incision healing appropriately. Drain " removed. New splint applied.       Activity: Up with assist.  Weight bearing status: Minimize use of LUE  Antibiotics: Per ID recs:  Recommendations:  - continue  IV Vancomycin and Ertapenem 1g Q24H duration ~6 weeks post surgery    - stop Fluconazole due to prolonged QTc ( unclear if Candida albicans isolated in 9/2023 from a wound dehiscence was secondary to contamination vs infection). so far no Candida isolated   - Will follow intraoperative tissue cultures   - need PICC line before discharge  , has brusies from PIV   - Discharge planning pending at this time. Pt would like to stay in [place] for her F/U with Dr Sarmiento. Called and updated her ID physician in [place] , Dr Suresh Dominguez. he will assume ID care when patient returns to [place].   DVT prophylaxis: aspirin 162 x 4 weeks.  Bracing/Splinting: LUE posterior slab splint to be kept clean, dry, and intact until follow-up.   Elevation: Elevate LUE on pillows to keep on pillows as much as possible.   Wound Care: Keep splint c/d/I until follow up.  Drains: Dc'ed 4/24/24.   Follow-up: Clinic with Shruthi Alvarenga PA-C on Friday, 5/3/24 with left elbow xrays     Disposition: Per TCU    Richard Dunaway MD  Orthopaedic Surgery, PGY-4  Pager: [phone]

## 2024-04-24 NOTE — PROGRESS NOTES
04/23/24 0800   Appointment Info   Signing Clinician's Name / Credentials (PT) Maximino Janiya DPT   Rehab Comments (PT) -45 elevated /75 with multiple checks, nursing intervention   Living Environment   People in Home alone   Current Living Arrangements apartment   Home Accessibility stairs to enter home   Number of Stairs, Main Entrance 6   Stair Railings, Main Entrance railings on both sides of stairs   Transportation Anticipated family or friend will provide   Living Environment Comments Pt lives in [place] in a lower level apartment. Pt lives alone, but her fiance Arelis will be coming to stay with her and has full-time availability to assist and is in good physical shape. No pets. Has a tub shower with a seat that she typically uses, walk-in also availability. W/c does not fit in bathroom.   Self-Care   Usual Activity Tolerance fair   Current Activity Tolerance poor   Equipment Currently Used at Home walker, rolling;shower chair;wheelchair, manual  (4WW)   Fall history within last six months yes   Number of times patient has fallen within last six months 7   Activity/Exercise/Self-Care Comment Started having increased falls PTA and went from 4WW to w/c foot propulsion outside of bathroom. Reports mostly sedentary lifestyle, watches a lot of movies. Has a cleaning lady to assist with household cares, did not run errands.   Post-Acute Assessment Only   Post-Acute Functional Assessment See below   Previous Level of Function/Home Environm   Bathing, Previous Functional Level uses device or equipment   Grooming, Previous Functional Level independent   Dressing, Previous Functional Level independent   Eating/Feeding, Previous Functional Level independent   Toileting, Previous Functional Level independent   BADLs, Previous Functional Level uses device or equipment   IADLs, Previous Functional Level partial assistance   Bed Mobility, Previous Functional Level independent   Transfers, Previous Functional Level  independent   Household Ambulation, Previous Functional Level uses device or equipment   Stairs, Previous Functional Level uses device or equipment   Community Ambulation, Previous Functional Level uses device or equipment   Previous Level of Function Pt states she used walker or wc indoors with ambulation depending on the day.  States she sometimes used her feet to propel wc   General Information   Onset of Illness/Injury or Date of Surgery 04/10/24   Referring Physician Dr. 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  PMH with PTSD.   Existing Precautions/Restrictions fall   Weight-Bearing Status - LUE nonweight-bearing   Heart Disease Risk Factors Lack of physical activity;High blood pressure;Diabetes;Medical history;Overweight   Cognition   Affect/Mental Status (Cognition) WFL   Cognitive Status Comments Pt able to give PMH.   Pain Assessment   Patient Currently in Pain Yes, see Vital Sign flowsheet  (9/10 elbow)   Integumentary/Edema   Integumentary/Edema Comments L UE with splint, NWB .   Posture    Posture Forward head position   Range of Motion (ROM)   ROM Comment Pt with pes planus B feet.   Strength (Manual Muscle Testing)   Strength Comments PT: hip flex 3+/5 B Les, knee ext 4/5 B, knee felx 3+/5 B LEs, ankle DF, PF about 3+/5.   Balance   Balance other (describe)   Balance Comments PT; pt able to stand briefly without ad, but tires quickly, needs cane and Eber with ambulation.  Pt unable to perform SLS in standing this PM (4/24)   Sensory Examination   Sensory Perception Comments intact to semmes sarah  monofilament testing.  Pt reports some neuropathy.   Coordination   Coordination no deficits were identified   Muscle Tone   Muscle Tone no deficits were identified   Clinical Impression   Criteria for Skilled Therapeutic Intervention Yes, treatment indicated   PT Diagnosis (PT) Impaired functional mobility   Influenced by the following impairments LUE NWB, balance impairments, grossly weak/deconditioning   Functional limitations due to impairments Bed mobility, transfers, gait, stairs, community entry   Clinical Presentation (PT Evaluation Complexity) stable   Clinical Presentation Rationale Medical stability   Clinical Decision Making (Complexity) low complexity   Planned Therapy Interventions (PT) balance training;bed mobility training;gait training;groups;home exercise program;neuromuscular re-education;patient/family education;stair training;strengthening;stretching;transfer training;wheelchair management/propulsion training;progressive activity/exercise;risk factor education;home program guidelines   Risk & Benefits of therapy have been explained evaluation/treatment results reviewed;care plan/treatment goals reviewed;risks/benefits reviewed;current/potential barriers reviewed;participants voiced agreement with care plan;participants included;patient   Clinical Impression Comments PT: Pt is a 59 year old woman , now with explant total Elbow prosthesis, and NWB LUE, who presents with deconditioning, weakness and impaired balance form [facility].  Pt is funcitoning below her previous level of function.  Pt hopes to get to Griffin level with cane for ambulation at home, and be able to climb stairs on her own with a rail.  ELOS - 10-14 days, TBD.  Pt may benefit from OP PT at discharge.   PT Total Evaluation Time   PT Jareth Low Complexity Minutes (16106) 30   Therapy Certification   Start of care date 04/23/24   Certification date from 04/23/24   Certification date to 05/22/24   Medical Diagnosis L  prosthetic elbow infection, physical deconditioning.   Physical Therapy Goals   PT Frequency 5x/week   PT Predicted Duration/Target Date for Goal Attainment 05/08/24   PT: Bed Mobility Modified independent   PT: Transfers Modified independent;Sit to/from stand;Bed to/from chair;Assistive device  (cane)   PT: Gait Modified independent;Straight cane;100 feet   PT: Stairs 6 stairs;Modified independent;Rail on both sides   PT: Goal 1 Pt able to perform a car tx with sba, cane   PT: Goal 2 Pt  able to perform a HEP Danielle for LE strengthening for improved function at home.   PT: Goal 3 PT: Pt able to state 3 fall prevention strategies after participating in fall prevention training for improved safety at home.   Therapeutic Procedure/Exercise   Treatment Detail/Skilled Intervention PT; Pt instructed in APs, pt states she has been performing.   Therapeutic Activity   Treatment Detail/Skilled Intervention Pt with LE edema in distal feet but declined spandigrips, states she does not like them, but will perform APs and LE hip abd/add, heelslides in bed. see gg  BPs monitored RLE in supine.   Gait Training   Symptoms Noted During/After Treatment (Gait Training) fatigue   Treatment Detail/Skilled Intervention see gg section.   PT Discharge Planning   PT Plan eval limited due to elevated BP and nsg intervention, complete strength testing and below/, completed 4/24. For 4/25 -check BP,  amb with cane, curb step, car tx ,  object for gg, amb with cane. TUG, Velasquez   Total Session Time   Total Session Time (sum of timed and untimed services) 30   Bed Mobility: Sit to lying   Describe Performance Eber for RLE.   Bed Mobility: Lying to sitting on the side of bed   Describe Performance Eber at trunk   Transfers: Sit to Stand   Describe Performance PT: Eber form raised bed, cane.  For stand to sit in wc, modA to control descent, poor eccentric control.  STS from  Eber, cane. uses RUE on  armrest.   Transfers: Chair/Bed transfers    Describe Performance Eber, cane.   Walk 50 Feet with Two Turns - Ability to walk at least 50 feet.   Describe Performance PT; Pt amb with cane, Eber for balance wc follow, 70 ft x 1, 10 ft x 1, seated rest between, De Oliveira byoverall fatigue, tapan LEs.  sats in 90s with activity.   Walk 150 Feet - Ability to walk 150 feet   Reason Not Done Medical condition   Describe Performance Not able , fatigue.   Wheel 50 Feet - Ability to move wheelchair/scooter   Reason Not Done Medical condition   Describe Performance NT fatigue would need to use B LEs, RUE.   Wheel 150 Feet - Ability to move wheelchair/scooter   Reason Not Done Medical condition   Describe Performance NT fatigue would need to use B LEs, RUE.   1 Step (curb) - Ability to go up/down 1 step/curb.   Reason Not Done Medical condition   Describe Performance fatigue   4 Steps - Ability to go up/down steps.   Describe Performance PT: pt amb up and down 3 stairs, step to , rail on R, cga for balance, tiring for pt , then needing seated rest   12 Steps - Ability to go up/down steps.   Reason Not Done Medical condition   Car Transfer - Ability to transfer in/out of car or van.   Reason Not Done Medical condition   Describe Performance fatigue   Picking up Object - Ability to bend/stoop while standing.   Reason Not Done Safety concerns

## 2024-04-24 NOTE — PHARMACY-TCU REVIEW OF H&P
[x] [facility]  Outpatient Rehabilitation &  Therapy  [address]  P: [phone]  F: [phone] [] [facility]  Outpatient Rehabilitation &  Therapy  [address]   [address]  P: [phone]  F: [phone]     Physical Therapy Pelvic Floor Evaluation    Date:  3/11/2024  Patient: King Ford  : 2003  MRN: [id number]  Physician: Boone Marshall    Insurance: [facility] (Auth after Eval)  Medical Diagnosis: Left testicular pain N50.812, Other fatigue R53.83, Epididymitis N45.1    Rehab Codes: M62.89, M79.1, N50.811, N50.812  Onset Date: 3/5/24                                  Next 's appt: PRN    Subjective:   CC: Pt is a 21 yo male, accompanied by mom, who presents with complaints of testicular pain which has been chronic after a bout of epididymitis in 2023. He also has occasionally difficulty with urination and he has been multiple courses of antibiotics. Despite this, he continues to have bilateral testicular pain. Pt had very flat affect and mom did most of the talking. Pt's mother states he is not eating well and is diabetic and has had a couple of episodes of low blood sugar which required EMS intervention. Pt is not working or in school. Very low level of activity at home.   HPI: (onset date: 3/5/24)     PMHx: [x] Unremarkable            [x] Diabetes         [] HTN                [] Pacemaker              [] MI/Heart Problems     [] Cancer           [] Arthritis           [] Asthma                         [x] refer to full medical chart  In [facility]    [] Other:         Comorbidities:   [] Obesity [] Dialysis  [x] N/A   [] Asthma/COPD [] Dementia [] Other:   [] Stroke [] Sleep apnea [] Other:   [] Vascular disease [] Rheumatic disease [] Other:     Tests: [] X-Ray: [] MRI:  [] Other:    Medications: [x] Refer to full medical record [] None [] Other:  Allergies:      [x] Refer to full medical record [] None [] Other:    Function:   I have reviewed this patient's TCU admission History & Physical for medication related changes/recommendations identified by the admitting provider.  I am confirming that there are no recommendations requiring changes to medication orders are indicated at this time based on the provider recommendations in the H&P.    Heather Christiansen, PharmD   Clinical Pharmacist, [facility]

## 2024-04-24 NOTE — PLAN OF CARE
Goal Outcome Evaluation:    Pt alert and oriented X4 able to make needs known to staff. Denies CP/NV/SOB. Appropriate use of call light. A 1 walker GB to BR but wants aide only present for safety. LUE casts,ace wrap and sling in place. NWB. bG checks 99 and 184. IV antibiotics Vancomycin administered X1 via SL PICC RA ,IV tolerated ,saline locked. 2 P&J sandwiches provided, 1 pop and 1 pudding as pt stated she did not order dinner. BAILEY drain with minimal drainage noted.Will continue POC.         Patient's most recent vital signs are:     Vital signs:  BP: 172/71  Temp: 98.4  HR: 74  RR: 16  SpO2: 95 %     Patient does not have new respiratory symptoms.  Patient does not have new sore throat.  Patient does not have a fever greater than 99.5.

## 2024-04-25 ENCOUNTER — APPOINTMENT (OUTPATIENT)
Dept: OCCUPATIONAL THERAPY | Facility: SKILLED NURSING FACILITY | Age: 59
DRG: 559 | End: 2024-04-25
Attending: INTERNAL MEDICINE
Payer: COMMERCIAL

## 2024-04-25 ENCOUNTER — APPOINTMENT (OUTPATIENT)
Dept: PHYSICAL THERAPY | Facility: SKILLED NURSING FACILITY | Age: 59
DRG: 559 | End: 2024-04-25
Attending: INTERNAL MEDICINE
Payer: COMMERCIAL

## 2024-04-25 LAB
ALBUMIN SERPL BCG-MCNC: 2.6 G/DL (ref 3.5–5.2)
ALP SERPL-CCNC: 153 U/L (ref 40–150)
ALT SERPL W P-5'-P-CCNC: 11 U/L (ref 0–50)
ANION GAP SERPL CALCULATED.3IONS-SCNC: 10 MMOL/L (ref 7–15)
AST SERPL W P-5'-P-CCNC: 15 U/L (ref 0–45)
BILIRUB SERPL-MCNC: <0.2 MG/DL
BUN SERPL-MCNC: 8.2 MG/DL (ref 8–23)
C DIFF TOX B STL QL: NEGATIVE
CALCIUM SERPL-MCNC: 8.2 MG/DL (ref 8.8–10.2)
CHLORIDE SERPL-SCNC: 103 MMOL/L (ref 98–107)
CREAT SERPL-MCNC: 0.77 MG/DL (ref 0.51–0.95)
DEPRECATED HCO3 PLAS-SCNC: 30 MMOL/L (ref 22–29)
EGFRCR SERPLBLD CKD-EPI 2021: 88 ML/MIN/1.73M2
ERYTHROCYTE [DISTWIDTH] IN BLOOD BY AUTOMATED COUNT: 18.6 % (ref 10–15)
GLUCOSE BLDC GLUCOMTR-MCNC: 117 MG/DL (ref 70–99)
GLUCOSE BLDC GLUCOMTR-MCNC: 120 MG/DL (ref 70–99)
GLUCOSE BLDC GLUCOMTR-MCNC: 128 MG/DL (ref 70–99)
GLUCOSE BLDC GLUCOMTR-MCNC: 135 MG/DL (ref 70–99)
GLUCOSE BLDC GLUCOMTR-MCNC: 143 MG/DL (ref 70–99)
GLUCOSE SERPL-MCNC: 137 MG/DL (ref 70–99)
HCT VFR BLD AUTO: 25.4 % (ref 35–47)
HGB BLD-MCNC: 8 G/DL (ref 11.7–15.7)
MCH RBC QN AUTO: 25.6 PG (ref 26.5–33)
MCHC RBC AUTO-ENTMCNC: 31.5 G/DL (ref 31.5–36.5)
MCV RBC AUTO: 81 FL (ref 78–100)
PLATELET # BLD AUTO: 546 10E3/UL (ref 150–450)
POTASSIUM SERPL-SCNC: 3.5 MMOL/L (ref 3.4–5.3)
PROT SERPL-MCNC: 5.9 G/DL (ref 6.4–8.3)
RBC # BLD AUTO: 3.12 10E6/UL (ref 3.8–5.2)
SODIUM SERPL-SCNC: 143 MMOL/L (ref 135–145)
VANCOMYCIN SERPL-MCNC: 19.1 UG/ML
WBC # BLD AUTO: 9.1 10E3/UL (ref 4–11)

## 2024-04-25 PROCEDURE — 85027 COMPLETE CBC AUTOMATED: CPT | Performed by: INTERNAL MEDICINE

## 2024-04-25 PROCEDURE — 97530 THERAPEUTIC ACTIVITIES: CPT | Mod: GO

## 2024-04-25 PROCEDURE — 250N000013 HC RX MED GY IP 250 OP 250 PS 637

## 2024-04-25 PROCEDURE — 120N000009 HC R&B SNF

## 2024-04-25 PROCEDURE — 250N000011 HC RX IP 250 OP 636: Performed by: INTERNAL MEDICINE

## 2024-04-25 PROCEDURE — 250N000013 HC RX MED GY IP 250 OP 250 PS 637: Performed by: INTERNAL MEDICINE

## 2024-04-25 PROCEDURE — 97110 THERAPEUTIC EXERCISES: CPT | Mod: GP

## 2024-04-25 PROCEDURE — 36592 COLLECT BLOOD FROM PICC: CPT | Performed by: INTERNAL MEDICINE

## 2024-04-25 PROCEDURE — 250N000011 HC RX IP 250 OP 636: Mod: JZ | Performed by: INTERNAL MEDICINE

## 2024-04-25 PROCEDURE — 97530 THERAPEUTIC ACTIVITIES: CPT | Mod: GP

## 2024-04-25 PROCEDURE — 82247 BILIRUBIN TOTAL: CPT | Performed by: INTERNAL MEDICINE

## 2024-04-25 PROCEDURE — 80202 ASSAY OF VANCOMYCIN: CPT | Performed by: INTERNAL MEDICINE

## 2024-04-25 PROCEDURE — 258N000003 HC RX IP 258 OP 636

## 2024-04-25 RX ORDER — SODIUM CHLORIDE 9 MG/ML
INJECTION, SOLUTION INTRAVENOUS
Status: COMPLETED
Start: 2024-04-25 | End: 2024-04-25

## 2024-04-25 RX ADMIN — ASPIRIN 162 MG: 81 TABLET, COATED ORAL at 09:04

## 2024-04-25 RX ADMIN — GABAPENTIN 300 MG: 300 CAPSULE ORAL at 13:28

## 2024-04-25 RX ADMIN — CLONIDINE HYDROCHLORIDE 0.2 MG: 0.1 TABLET ORAL at 21:17

## 2024-04-25 RX ADMIN — ACETAMINOPHEN 975 MG: 325 TABLET, FILM COATED ORAL at 13:28

## 2024-04-25 RX ADMIN — GABAPENTIN 300 MG: 300 CAPSULE ORAL at 08:55

## 2024-04-25 RX ADMIN — OXYCODONE HYDROCHLORIDE 10 MG: 5 TABLET ORAL at 09:14

## 2024-04-25 RX ADMIN — ATORVASTATIN CALCIUM 10 MG: 10 TABLET, FILM COATED ORAL at 21:17

## 2024-04-25 RX ADMIN — ERTAPENEM SODIUM 1 G: 1 INJECTION, POWDER, LYOPHILIZED, FOR SOLUTION INTRAMUSCULAR; INTRAVENOUS at 16:15

## 2024-04-25 RX ADMIN — CLONIDINE HYDROCHLORIDE 0.2 MG: 0.1 TABLET ORAL at 08:54

## 2024-04-25 RX ADMIN — OXYCODONE HYDROCHLORIDE 10 MG: 5 TABLET ORAL at 16:14

## 2024-04-25 RX ADMIN — HEPARIN, PORCINE (PF) 10 UNIT/ML INTRAVENOUS SYRINGE 5 ML: at 16:21

## 2024-04-25 RX ADMIN — AMLODIPINE BESYLATE 5 MG: 5 TABLET ORAL at 08:55

## 2024-04-25 RX ADMIN — CITALOPRAM HYDROBROMIDE 20 MG: 20 TABLET ORAL at 21:18

## 2024-04-25 RX ADMIN — MICONAZOLE NITRATE: 20 POWDER TOPICAL at 21:23

## 2024-04-25 RX ADMIN — VANCOMYCIN HYDROCHLORIDE 1000 MG: 1 INJECTION, SOLUTION INTRAVENOUS at 10:12

## 2024-04-25 RX ADMIN — ACETAMINOPHEN 975 MG: 325 TABLET, FILM COATED ORAL at 21:17

## 2024-04-25 RX ADMIN — SODIUM CHLORIDE 500 ML: 9 INJECTION, SOLUTION INTRAVENOUS at 10:13

## 2024-04-25 RX ADMIN — OMEPRAZOLE 20 MG: 20 CAPSULE, DELAYED RELEASE ORAL at 09:04

## 2024-04-25 RX ADMIN — INSULIN ASPART 1 UNITS: 100 INJECTION, SOLUTION INTRAVENOUS; SUBCUTANEOUS at 13:27

## 2024-04-25 RX ADMIN — GABAPENTIN 300 MG: 300 CAPSULE ORAL at 21:18

## 2024-04-25 RX ADMIN — VANCOMYCIN HYDROCHLORIDE 1000 MG: 1 INJECTION, SOLUTION INTRAVENOUS at 22:07

## 2024-04-25 RX ADMIN — Medication 5 ML: at 22:06

## 2024-04-25 RX ADMIN — Medication 5 ML: at 12:07

## 2024-04-25 RX ADMIN — ACETAMINOPHEN 975 MG: 325 TABLET, FILM COATED ORAL at 05:59

## 2024-04-25 RX ADMIN — Medication 5 ML: at 07:50

## 2024-04-25 RX ADMIN — LUMATEPERONE 84 MG: 42 CAPSULE ORAL at 21:17

## 2024-04-25 ASSESSMENT — ACTIVITIES OF DAILY LIVING (ADL)
ADLS_ACUITY_SCORE: 33

## 2024-04-25 NOTE — PLAN OF CARE
Goal Outcome Evaluation:    Aox4. Communicative and Cooperative. At 9pm patient complained of nausea and pain. Patient has pain at left arm and cramping with Pain Score of 10/10 managed with tylenol and oxycodone. Patient specifically requested to have Metoclopromide (Reglan) to manage nausea. Provider notified. Reglan given once IV x2 mins as per plan of care. Continent with bowel and bladder, Ax1 with walker and GB. C.Difficile stool exam sent at laboratory, awaiting for result. Hypertensive with bp: 189/75, managed with Catapres. Rechecked after an hour: 172/62.  B @ 2am.        C-Diff result  21:19 NEGATIVE.  Removed Contact Precaution.         Patient's most recent vital signs are:     Vital signs:  BP: 172/62  Temp: 97.8  HR: 67  RR: 16  SpO2: 97 %     Patient does not have new respiratory symptoms.  Patient does not have new sore throat.  Patient does not have a fever greater than 99.5.

## 2024-04-25 NOTE — PLAN OF CARE
Goal Outcome Evaluation:      Plan of Care Reviewed With: patient    Overall Patient Progress: improving    VS: Temp: 97.9  F (36.6  C) Temp src: Oral BP: (!) 152/70 Pulse: 65   Resp: 13 SpO2: 96 % O2 Device: None (Room air)      O2: On RA   Output: Continent of B/B   Last BM: 4/25/24   Activity: Assist x1 with W/GB, participated and tolerated theraphy   Skin: X LUE cast, ace wraps   Pain: Managed with scheduled tylenol and PRN oxy x1   CMS:  Neuro: Intact  A/O x4, able to communicate needs   Dressing: LUE CDI   Diet: Regular, BG checks with insulin sliding scale   LDA: R SL PICC patent, infused antibiotics without difficulty, Heparin locked   Equipment: BSC, WW, WC   Plan: Con't POC.    Additional Info: Pt is calm and cooperative with cares. Denies CP, SOB, N/V. No acute issues this shift.      Patient does not have new respiratory symptoms.  Patient does not have new sore throat.  Patient does not have a fever greater than 99.5.

## 2024-04-25 NOTE — PROGRESS NOTES
SW called [facility] [phone] Internal Medicine to try to find PCP and Care Coordinator. Pt was suppose to meet with new PCP this week but didn't.  The clinic is not [facility] but [facility]. SW left a vm for Angela to return call.  Will need to set up PCP and ask about transportation back to [place] as well as HH.     VENICE received a call from Angela/[facility] [phone].  She will ask SW/Cat to call SW back. 1-who can SW call for discharge transportation? 2-HH ? 3- set up PCP appt.      FRANCISCO J Romo   [facility], Transitional Care Unit   Social Work   [facility] [address] [place] [address]  () [phone]

## 2024-04-25 NOTE — PHARMACY-VANCOMYCIN DOSING SERVICE
Pharmacy Vancomycin Note  Date of Service 2024  Patient's  1965   59 year old, female    Indication: Bone and Joint Infection  Day of Therapy: started 24   Current vancomycin regimen:  1000 mg IV q12h  Current vancomycin monitoring method: AUC  Current vancomycin therapeutic monitoring goal: 400-600 mg*h/L    InsightRX Prediction of Current Vancomycin Regimen  Regimen: 1000 mg IV every 12 hours.  Start time: 09:50 on 2024  Exposure target: AUC24 (range)400-600 mg/L.hr   AUC24,ss: 458 mg/L.hr  Probability of AUC24 > 400: 93 %  Ctrough,ss: 15.3 mg/L  Probability of Ctrough,ss > 20: 1 %  Probability of nephrotoxicity (Lodise CHANDAN ): 11 %    Current estimated CrCl = Estimated Creatinine Clearance: 103.3 mL/min (based on SCr of 0.77 mg/dL).    Creatinine for last 3 days  2024:  6:53 AM Creatinine 0.99 mg/dL  2024:  7:53 AM Creatinine 0.77 mg/dL    Recent Vancomycin Levels (past 3 days)  2024:  6:53 AM Vancomycin 20.7 ug/mL  2024:  7:53 AM Vancomycin 19.1 ug/mL    Vancomycin IV Administrations (past 72 hours)                     vancomycin (VANCOCIN) 1,000 mg in 200 mL dextrose intermittent infusion (mg) 1,000 mg New Bag 24 2150     1,000 mg New Bag  1119     1,000 mg New Bag 24 2158     1,000 mg New Bag  0903     1,000 mg New Bag 24 2116    vancomycin (VANCOCIN) 1,000 mg in 200 mL dextrose intermittent infusion (mg) 1,000 mg New Bag 24 1028                    Nephrotoxins and other renal medications (From now, onward)      Start     Dose/Rate Route Frequency Ordered Stop    24 2200  vancomycin (VANCOCIN) 1,000 mg in 200 mL dextrose intermittent infusion         1,000 mg  200 mL/hr over 1 Hours Intravenous EVERY 12 HOURS 24 1840                 Contrast Orders - past 72 hours (72h ago, onward)      None            Interpretation of levels and current regimen:  Vancomycin level is reflective of -600    Has serum creatinine  changed greater than 50% in last 72 hours: No    Urine output:  diminished urine output    Renal Function: Stable        Plan:  Continue Current Dose of vancomycin IV 1000 mg q12h   Vancomycin monitoring method: AUC  Vancomycin therapeutic monitoring goal: 400-600 mg*h/L  Pharmacy will check vancomycin levels as appropriate in 1-3 Days.  Serum creatinine levels will be ordered daily for the first week of therapy and at least twice weekly for subsequent weeks.    Michelle Lux RP

## 2024-04-26 ENCOUNTER — APPOINTMENT (OUTPATIENT)
Dept: PHYSICAL THERAPY | Facility: SKILLED NURSING FACILITY | Age: 59
DRG: 559 | End: 2024-04-26
Attending: INTERNAL MEDICINE
Payer: COMMERCIAL

## 2024-04-26 ENCOUNTER — APPOINTMENT (OUTPATIENT)
Dept: GENERAL RADIOLOGY | Facility: CLINIC | Age: 59
End: 2024-04-26
Payer: COMMERCIAL

## 2024-04-26 ENCOUNTER — APPOINTMENT (OUTPATIENT)
Dept: OCCUPATIONAL THERAPY | Facility: SKILLED NURSING FACILITY | Age: 59
DRG: 559 | End: 2024-04-26
Attending: INTERNAL MEDICINE
Payer: COMMERCIAL

## 2024-04-26 LAB
GLUCOSE BLDC GLUCOMTR-MCNC: 115 MG/DL (ref 70–99)
GLUCOSE BLDC GLUCOMTR-MCNC: 125 MG/DL (ref 70–99)
GLUCOSE BLDC GLUCOMTR-MCNC: 149 MG/DL (ref 70–99)
GLUCOSE BLDC GLUCOMTR-MCNC: 169 MG/DL (ref 70–99)
GLUCOSE BLDC GLUCOMTR-MCNC: 171 MG/DL (ref 70–99)

## 2024-04-26 PROCEDURE — 97750 PHYSICAL PERFORMANCE TEST: CPT | Mod: GP

## 2024-04-26 PROCEDURE — 97535 SELF CARE MNGMENT TRAINING: CPT | Mod: GO | Performed by: OCCUPATIONAL THERAPIST

## 2024-04-26 PROCEDURE — 250N000011 HC RX IP 250 OP 636: Performed by: INTERNAL MEDICINE

## 2024-04-26 PROCEDURE — 73070 X-RAY EXAM OF ELBOW: CPT | Mod: LT

## 2024-04-26 PROCEDURE — 250N000013 HC RX MED GY IP 250 OP 250 PS 637: Performed by: INTERNAL MEDICINE

## 2024-04-26 PROCEDURE — 120N000009 HC R&B SNF

## 2024-04-26 PROCEDURE — 97530 THERAPEUTIC ACTIVITIES: CPT | Mod: GP

## 2024-04-26 PROCEDURE — 99309 SBSQ NF CARE MODERATE MDM 30: CPT | Performed by: INTERNAL MEDICINE

## 2024-04-26 PROCEDURE — 250N000013 HC RX MED GY IP 250 OP 250 PS 637

## 2024-04-26 PROCEDURE — 250N000011 HC RX IP 250 OP 636: Mod: JZ | Performed by: INTERNAL MEDICINE

## 2024-04-26 RX ORDER — LACTOBACILLUS RHAMNOSUS GG 10B CELL
1 CAPSULE ORAL 2 TIMES DAILY
Status: DISCONTINUED | OUTPATIENT
Start: 2024-04-26 | End: 2024-05-20

## 2024-04-26 RX ORDER — L. ACIDOPHILUS/PECTIN, CITRUS 25MM-100MG
1 TABLET ORAL
Status: DISCONTINUED | OUTPATIENT
Start: 2024-04-26 | End: 2024-04-26 | Stop reason: ALTCHOICE

## 2024-04-26 RX ADMIN — OXYCODONE HYDROCHLORIDE 10 MG: 5 TABLET ORAL at 17:53

## 2024-04-26 RX ADMIN — Medication 1 CAPSULE: at 21:30

## 2024-04-26 RX ADMIN — MICONAZOLE NITRATE: 20 POWDER TOPICAL at 08:12

## 2024-04-26 RX ADMIN — CITALOPRAM HYDROBROMIDE 20 MG: 20 TABLET ORAL at 21:30

## 2024-04-26 RX ADMIN — ACETAMINOPHEN 975 MG: 325 TABLET, FILM COATED ORAL at 05:56

## 2024-04-26 RX ADMIN — OMEPRAZOLE 20 MG: 20 CAPSULE, DELAYED RELEASE ORAL at 08:09

## 2024-04-26 RX ADMIN — INSULIN ASPART 1 UNITS: 100 INJECTION, SOLUTION INTRAVENOUS; SUBCUTANEOUS at 12:00

## 2024-04-26 RX ADMIN — GABAPENTIN 300 MG: 300 CAPSULE ORAL at 21:27

## 2024-04-26 RX ADMIN — CLONIDINE HYDROCHLORIDE 0.2 MG: 0.1 TABLET ORAL at 21:27

## 2024-04-26 RX ADMIN — GABAPENTIN 300 MG: 300 CAPSULE ORAL at 08:09

## 2024-04-26 RX ADMIN — CLONIDINE HYDROCHLORIDE 0.2 MG: 0.1 TABLET ORAL at 08:09

## 2024-04-26 RX ADMIN — VANCOMYCIN HYDROCHLORIDE 1000 MG: 1 INJECTION, SOLUTION INTRAVENOUS at 09:41

## 2024-04-26 RX ADMIN — ERTAPENEM SODIUM 1 G: 1 INJECTION, POWDER, LYOPHILIZED, FOR SOLUTION INTRAMUSCULAR; INTRAVENOUS at 16:14

## 2024-04-26 RX ADMIN — ACETAMINOPHEN 650 MG: 325 TABLET, FILM COATED ORAL at 11:15

## 2024-04-26 RX ADMIN — LUMATEPERONE 84 MG: 42 CAPSULE ORAL at 21:31

## 2024-04-26 RX ADMIN — OXYCODONE HYDROCHLORIDE 10 MG: 5 TABLET ORAL at 11:15

## 2024-04-26 RX ADMIN — ACETAMINOPHEN 975 MG: 325 TABLET, FILM COATED ORAL at 14:19

## 2024-04-26 RX ADMIN — AMLODIPINE BESYLATE 5 MG: 5 TABLET ORAL at 08:09

## 2024-04-26 RX ADMIN — FERROUS SULFATE TAB 325 MG (65 MG ELEMENTAL FE) 325 MG: 325 (65 FE) TAB at 08:09

## 2024-04-26 RX ADMIN — GABAPENTIN 300 MG: 300 CAPSULE ORAL at 14:19

## 2024-04-26 RX ADMIN — HEPARIN, PORCINE (PF) 10 UNIT/ML INTRAVENOUS SYRINGE 5 ML: at 16:54

## 2024-04-26 RX ADMIN — Medication 1 CAPSULE: at 14:19

## 2024-04-26 RX ADMIN — ATORVASTATIN CALCIUM 10 MG: 10 TABLET, FILM COATED ORAL at 21:30

## 2024-04-26 RX ADMIN — Medication 5 ML: at 22:48

## 2024-04-26 RX ADMIN — ACETAMINOPHEN 975 MG: 325 TABLET, FILM COATED ORAL at 21:29

## 2024-04-26 RX ADMIN — ASPIRIN 162 MG: 81 TABLET, COATED ORAL at 08:09

## 2024-04-26 RX ADMIN — VANCOMYCIN HYDROCHLORIDE 1000 MG: 1 INJECTION, SOLUTION INTRAVENOUS at 21:31

## 2024-04-26 RX ADMIN — MICONAZOLE NITRATE: 20 POWDER TOPICAL at 21:32

## 2024-04-26 ASSESSMENT — ACTIVITIES OF DAILY LIVING (ADL)
ADLS_ACUITY_SCORE: 33

## 2024-04-26 NOTE — PROGRESS NOTES
SW met with pt.  SW helped pt fill out [place] financial Assistance form. SW faxed all paperwork to [phone].  SW gave back the paperwork.  While in there, Pt wrote out check for over $400.  Pt only has $900 in bank account.  SW questioned pt on this check.  Pt said friend can't make rent.  Pt's boyfriend will pay her back.     FRANCISCO J Romo   [facility], Transitional Care Unit   Social Work   [facility] [address] [place] [address]  (PH) [phone]

## 2024-04-26 NOTE — PROGRESS NOTES
"Mitali Robles is a 59 year old female with complex PMH including fracture dislocation of the left elbow c/b hardware failure and 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 Sarmiento.     Informed by Dr. Ortiz on TCU that the patient \"noted clicking and more pain in left elbow over past few days. VSS.     Xray left elbow AP and Lateral ordered. Inform the on call resident when result is available    Sloan Stanford DNP-CNP  4/26/2024 3:22 PM  Orthopaedic Surgery  Thank you for allowing me to participate in this patient's care. Please page me directly any questions/concerns.   Securely message with the Vocera Web Console (learn more here)  Text page via [employer] Paging/Directory  If there is no response, if it is a weekend, or if it is during evening hours, please page the orthopaedic surgery resident on call via [employer] Paging/Directory    "

## 2024-04-26 NOTE — PROGRESS NOTES
VS: BP (!) 155/72 (BP Location: Right leg, Patient Position: Supine, Cuff Size: Adult Large)   Pulse 77   Temp 97.8  F (36.6  C) (Oral)   Resp 16   Wt 107.1 kg (236 lb 1.6 oz)   SpO2 95%   BMI 34.87 kg/m     O2: 95% on RA, no SOB or chest pain reported   Output: Continent   Last BM: 4/26   Activity: Assist x1 gb and walker   Skin: L arm surgical site MARGIE, scattered bruising   Pain: PRN medications given to manage pain, see MAR   CMS: A&O x4   Dressing: L arm   Diet: regular   LDA: R SL PICC   Equipment: Gait belt, walker   Plan: Continue POC   Additional Info:
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
## 2024-04-26 NOTE — PROGRESS NOTES
MDS Pain Assessment    The following is the pain interview as conducted by the TCU RN caring for the patient on April 26, 2024. This assessment is required by the [facility] for all patients in [place] SN[facility] (Skilled Nursing Facilities).     . Pain Presence  Have you had pain or hurting at any time in the last 5 days?   1. Yes    . Pain Frequency  How much of the time have you experienced pain or hurting over the last 5 days?   4. Almost constantly    . Pain Effect on Sleep  Over the past 5 days, how much of the time has pain made it hard for you to sleep at night?   2. Occasionally    . Pain Interference with Therapy Activities  Over the past 5 days, how often have you limited your participation in rehabilitation therapy sessions due to pain?   2. Occasionally    . Pain Interference with Day-to-Day Activities  Over the past 5 days, have you limited your day-to-day activities (excluding rehabilitation therapy sessions) because of pain?  3. Frequently    . Pain intensity   Numeric Rating Scale (00-10): Please rate your worst pain over the last 5 days on a zero to ten scale, with zero being no pain and ten as the worst pain you can imagine. 09

## 2024-04-26 NOTE — PLAN OF CARE
Goal Outcome Evaluation:    Patient is alert and oriented x4. Able to make needs known. PRN oxycodone given for pain management. Denied CP, SOB, and N/V. Takes medication whole with thin liquid. Regular diet. Continent of B/B. A1 walker and GB. Had cast on LUE. BG check with insulin sliding scale. SL PICC CDI and heparin lock. Call light within reach. No acute issue this shift. Will continue to follow POC.    BP (!) 146/57 (BP Location: Right leg)   Pulse 71   Temp 98.3  F (36.8  C) (Oral)   Resp 15   Wt 111.2 kg (245 lb 3.2 oz)   SpO2 97%   BMI 36.21 kg/m

## 2024-04-26 NOTE — PLAN OF CARE
Goal Outcome Evaluation:    Patient is alert and oriented, able to make needs known, continent for bowel and bladder. Left upper extremity cast, with ace wrap intact. Right PICC dressing CDI, denied chest pain, SOB, and N/V. Patient slept most time, expresses no concern at this time, call light in reach, will continue with the cares as planned.        Patient's most recent vital signs are:     Vital signs:  BP: 134/88  Temp: 98.3  HR: 71  RR: 15  SpO2: 97 %     Patient does not have new respiratory symptoms.  Patient does not have new sore throat.  Patient does not have a fever greater than 99.5.

## 2024-04-26 NOTE — PROGRESS NOTES
CLINICAL NUTRITION SERVICES - ASSESSMENT NOTE     Nutrition Prescription    RECOMMENDATIONS FOR MDs/PROVIDERS TO ORDER:  None    Malnutrition Status:    Patient does not meet two of the established criteria necessary for diagnosing malnutrition    Recommendations already ordered by Registered Dietitian (RD):  Encouraged intakes and consistent carb intakes.    Future/Additional Recommendations:  Monitor labs, intakes, and weight trends.     REASON FOR ASSESSMENT  Mitali Robles is a/an 59 year old female assessed by the dietitian for LOS    NUTRITION/MEDICAL HISTORY  History of left elbow prosthetic joint infection. Pt required multiple  surgeries in past, had fracture and dislocation of left elbow, hardware failure had conversion to TEA in 2021, recurrent infections status post  multiple I&Disease, had explant of TEA and antibiotic spacer placement  4/10 by Dr Sarmiento.  She has hx of bipolar disorder, PTSD, MDD, on high dose benzodiazepines, hyperlipidemia, hypertension, and type 2 diabetes on insulin. Hospital course was complicated by toxic metabolic encephalopathy, hypertensive urgency, acute blood loss anemia requiring transfusions.[facility] for further antibiotics, rehab and cares.    FINDINGS  RD met with pt at bedside.Pt reports eating well, no questions or concerns. Pt states she had diarrhea likely from IV antibiotics however the medication has been changed now. Has not seen a difference in her stooling yet. Explained role of RD. Pt states she has been trying to cut down on carbs and eat more protein. Commended pt efforts. Pt states she does have a menu that states the carbs in the menu items however she does not know where it is. Offered to provide another menu.     CURRENT NUTRITION ORDERS  Diet: Regular    Intake/Tolerance: 100% of documented meals.     Pt ordering (on average) 1335 kcal and 61 g protein per day per [employer]. With documented intakes, pt is likely  "meeting ~70% minimum energy and >75% protein needs.    LABS   04/20/24 07:17 04/21/24 00:39 04/21/24 06:50 04/22/24 07:16 04/23/24 06:53 04/25/24 07:53   Potassium 3.6  3.7 3.6  3.5   Urea Nitrogen 9.2  7.7 (L) 7.9 (L)  8.2   Creatinine 0.96 (H)  0.90 0.99 (H) 0.99 (H) 0.77   Magnesium 1.4 (L) 2.7 (H) 2.0 1.6 (L)     Phosphorus 4.5  4.4 4.3     Albumin      2.6 (L)   Glucose 133 (H)  135 (H) 131 (H)  137 (H)      04/25/24 08:15 04/25/24 12:26 04/25/24 17:35 04/25/24 21:36 04/26/24 02:22 04/26/24 07:59   GLUCOSE POCT 135 (H) 143 (H) 117 (H) 120 (H) 125 (H) 149 (H)     MEDICATIONS  Medications reviewed: ertapenem, ferrous sulfate, insulin (novolog), omeprazole, vancomycin  IV Fluids over last 7 days?  No    ANTHROPOMETRICS  Height: 175.3 cm (5' 9\")  Most Recent Weight: 107.1 kg (236 lb 1.6 oz)    IBW: 65.9 kg (163% IBW)  BMI: Obesity Grade I BMI 30-34.9  Weight History: Pt with limited weight history prior to admission, with 11 lb (4%) weight loss over 6 months.    04/26/24 0920 107.1 kg (236 lb 1.6 oz)    04/22/24 1749 111.2 kg (245 lb 3.2 oz)    04/20/24 0040 116 kg (255 lb 11.7 oz) Bed scale   04/19/24 1419 111.3 kg (245 lb 4.8 oz) Standing scale   04/13/24 1600 105.5 kg (232 lb 9.4 oz) --   04/10/24 0930 99 kg (218 lb 4.1 oz) --   01/31/24 1357 115.1 kg (253 lb 12 oz)    11/30/23 1602 118 kg (260 lb 2.3 oz)    10/23/23 112 kg (247 lb)      Dosing Weight: 76 kg - ABW using most recent weight and IBW of 65.9 kg.     ASSESSED NUTRITION NEEDS  Estimated Energy Needs: 3312-7201 kcals/day 25 - 30 kcal/kg  Justification: Maintenance   Estimated Protein Needs: 76-91+ grams protein/day (1 - 1.2+ grams of pro/kg)  Justification: Increased needs  Estimated Fluid Needs: 1 ml/kcal or per provider pending fluid status    PHYSICAL FINDINGS  See malnutrition section below.  Surgical incisions  Dentition: Patient with own teeth, no pain/difficulty chewing/swallowing    MALNUTRITION  % Intake: Decreased intake does not meet " criteria  % Weight Loss: Weight loss does not meet criteria, difficult to assess with fluid status changes  Subcutaneous Fat Loss: None observed  Muscle Loss: None observed  Fluid Accumulation/Edema: Mild  Malnutrition Diagnosis: Patient does not meet two of the established criteria necessary for diagnosing malnutrition    NUTRITION DIAGNOSIS  Predicted inadequate nutrient intake related to LOS as evidenced by potential for menu fatigue with prolonged hospitalization.     INTERVENTIONS  Implementation  Nutrition education for nutrition relationship to health/disease     Goals  Patient to consume % of nutritionally adequate meal trays TID, or the equivalent with supplements/snacks.     Monitoring/Evaluation  Progress toward goals will be monitored and evaluated per protocol.    Leanne Miguel MS, RDN, LD  TCU/OB/Ortho Clinical Dietitian  Available via phone and Vocera  Phone: [phone]  Vocera: TCU Clinical Dietitian  Weekend/Holiday Vocera: Weekend Holiday Clinical Dietitian [Multi Site Groups]

## 2024-04-27 LAB
GLUCOSE BLDC GLUCOMTR-MCNC: 122 MG/DL (ref 70–99)
GLUCOSE BLDC GLUCOMTR-MCNC: 133 MG/DL (ref 70–99)
GLUCOSE BLDC GLUCOMTR-MCNC: 135 MG/DL (ref 70–99)
GLUCOSE BLDC GLUCOMTR-MCNC: 138 MG/DL (ref 70–99)
GLUCOSE BLDC GLUCOMTR-MCNC: 157 MG/DL (ref 70–99)
GLUCOSE BLDC GLUCOMTR-MCNC: 173 MG/DL (ref 70–99)
HOLD SPECIMEN: NORMAL
VANCOMYCIN SERPL-MCNC: 21 UG/ML

## 2024-04-27 PROCEDURE — 36592 COLLECT BLOOD FROM PICC: CPT | Performed by: INTERNAL MEDICINE

## 2024-04-27 PROCEDURE — 250N000011 HC RX IP 250 OP 636: Performed by: INTERNAL MEDICINE

## 2024-04-27 PROCEDURE — 80202 ASSAY OF VANCOMYCIN: CPT | Performed by: INTERNAL MEDICINE

## 2024-04-27 PROCEDURE — 250N000011 HC RX IP 250 OP 636: Mod: JZ | Performed by: INTERNAL MEDICINE

## 2024-04-27 PROCEDURE — 250N000013 HC RX MED GY IP 250 OP 250 PS 637: Performed by: INTERNAL MEDICINE

## 2024-04-27 PROCEDURE — 250N000013 HC RX MED GY IP 250 OP 250 PS 637

## 2024-04-27 PROCEDURE — 120N000009 HC R&B SNF

## 2024-04-27 RX ORDER — OXYCODONE HYDROCHLORIDE 5 MG/1
10 TABLET ORAL EVERY 4 HOURS PRN
Status: DISCONTINUED | OUTPATIENT
Start: 2024-04-27 | End: 2024-05-10

## 2024-04-27 RX ORDER — OXYCODONE HYDROCHLORIDE 5 MG/1
5 TABLET ORAL EVERY 4 HOURS PRN
Status: DISCONTINUED | OUTPATIENT
Start: 2024-04-27 | End: 2024-05-10

## 2024-04-27 RX ORDER — OXYCODONE HYDROCHLORIDE 5 MG/1
5-10 TABLET ORAL EVERY 4 HOURS PRN
Status: DISCONTINUED | OUTPATIENT
Start: 2024-04-27 | End: 2024-04-27

## 2024-04-27 RX ADMIN — Medication 1 CAPSULE: at 09:03

## 2024-04-27 RX ADMIN — INSULIN ASPART 1 UNITS: 100 INJECTION, SOLUTION INTRAVENOUS; SUBCUTANEOUS at 09:05

## 2024-04-27 RX ADMIN — GABAPENTIN 300 MG: 300 CAPSULE ORAL at 13:10

## 2024-04-27 RX ADMIN — VANCOMYCIN HYDROCHLORIDE 1000 MG: 1 INJECTION, SOLUTION INTRAVENOUS at 09:09

## 2024-04-27 RX ADMIN — CLONIDINE HYDROCHLORIDE 0.2 MG: 0.1 TABLET ORAL at 09:03

## 2024-04-27 RX ADMIN — CLONIDINE HYDROCHLORIDE 0.2 MG: 0.1 TABLET ORAL at 21:14

## 2024-04-27 RX ADMIN — ACETAMINOPHEN 975 MG: 325 TABLET, FILM COATED ORAL at 21:14

## 2024-04-27 RX ADMIN — MICONAZOLE NITRATE: 20 POWDER TOPICAL at 09:04

## 2024-04-27 RX ADMIN — Medication 5 ML: at 06:36

## 2024-04-27 RX ADMIN — OMEPRAZOLE 20 MG: 20 CAPSULE, DELAYED RELEASE ORAL at 09:03

## 2024-04-27 RX ADMIN — GABAPENTIN 300 MG: 300 CAPSULE ORAL at 21:14

## 2024-04-27 RX ADMIN — MICONAZOLE NITRATE: 20 POWDER TOPICAL at 21:18

## 2024-04-27 RX ADMIN — HEPARIN, PORCINE (PF) 10 UNIT/ML INTRAVENOUS SYRINGE 5 ML: at 17:52

## 2024-04-27 RX ADMIN — CITALOPRAM HYDROBROMIDE 20 MG: 20 TABLET ORAL at 21:14

## 2024-04-27 RX ADMIN — VANCOMYCIN HYDROCHLORIDE 1000 MG: 1 INJECTION, SOLUTION INTRAVENOUS at 21:15

## 2024-04-27 RX ADMIN — OXYCODONE HYDROCHLORIDE 5 MG: 5 TABLET ORAL at 16:06

## 2024-04-27 RX ADMIN — AMLODIPINE BESYLATE 5 MG: 5 TABLET ORAL at 09:04

## 2024-04-27 RX ADMIN — ACETAMINOPHEN 975 MG: 325 TABLET, FILM COATED ORAL at 05:23

## 2024-04-27 RX ADMIN — ASPIRIN 162 MG: 81 TABLET, COATED ORAL at 09:04

## 2024-04-27 RX ADMIN — ACETAMINOPHEN 650 MG: 325 TABLET, FILM COATED ORAL at 16:07

## 2024-04-27 RX ADMIN — ACETAMINOPHEN 975 MG: 325 TABLET, FILM COATED ORAL at 13:10

## 2024-04-27 RX ADMIN — ATORVASTATIN CALCIUM 10 MG: 10 TABLET, FILM COATED ORAL at 21:14

## 2024-04-27 RX ADMIN — LUMATEPERONE 84 MG: 42 CAPSULE ORAL at 21:14

## 2024-04-27 RX ADMIN — Medication 1 CAPSULE: at 21:14

## 2024-04-27 RX ADMIN — INSULIN ASPART 1 UNITS: 100 INJECTION, SOLUTION INTRAVENOUS; SUBCUTANEOUS at 17:51

## 2024-04-27 RX ADMIN — GABAPENTIN 300 MG: 300 CAPSULE ORAL at 09:03

## 2024-04-27 RX ADMIN — OXYCODONE HYDROCHLORIDE 10 MG: 5 TABLET ORAL at 05:24

## 2024-04-27 RX ADMIN — ERTAPENEM SODIUM 1 G: 1 INJECTION, POWDER, LYOPHILIZED, FOR SOLUTION INTRAMUSCULAR; INTRAVENOUS at 16:05

## 2024-04-27 ASSESSMENT — ACTIVITIES OF DAILY LIVING (ADL)
ADLS_ACUITY_SCORE: 33

## 2024-04-27 NOTE — PLAN OF CARE
Goal Outcome Evaluation:      Plan of Care Reviewed With: patient    Overall Patient Progress: improvingOverall Patient Progress: improving    FOCUS/GOAL     Bowel management, Bladder management, Medication management, Wound care management, Medical management, Mobility, Skin integrity, and Safety management     ASSESSMENT, INTERVENTIONS AND CONTINUING PLAN FOR GOAL:     Pt is A&OX4, calm, & cooperative with care. Denied CP, SOB, & n/v. Elevated BP & scheduled clonidine given. Pt transfers A of 1 with walker & GB. Cast to the LUE with a slingshot. Continent for both B&B; uses BSC. LBM this shift. Takes meds whole with thin liquid. R basilic SL PICC, hep locked, & IV abx infused without difficulty. Pt ate late dinner with good appetite & no other acute concern. Able to make needs known & call light within reach. Continue with plan of care.    Patient's most recent vital signs are:     Vital signs:  BP: 158/67  Temp: 97.8  HR: 70  RR: 16  SpO2: 97 %     Patient does not have new respiratory symptoms.  Patient does not have new sore throat.  Patient does not have a fever greater than 99.5.

## 2024-04-27 NOTE — PROGRESS NOTES
"    Had XR done , showed \"Postoperative changes to the elbow with resection of the distal humerus and bone cement spacer. Additional cerclage wire and nail fixation of the humerus. Additional postoperative changes to the ulna. There is cerclage wire and nail fixation of the ulna   but there is what appears to be a new fracture of the ulna just proximal to the proximal most cerclage wire with slight angulation change. This is new since the prior study.\"    - orthopedic surgery  aware and they will se ept over the weekend  - I discussed above wit patient , she complaints of significant arm pain , increased oxycodone to q 4 hr PRN form q 6     Nithya Ortiz MD   "

## 2024-04-27 NOTE — PLAN OF CARE
Goal Outcome Evaluation:      Plan of Care Reviewed With: patient    Overall Patient Progress: improvingOverall Patient Progress: improving     Pt remains alert and oriented x 4 and able to make her needs known ,regular diet  thin liquids, takes medication whole, blood sugar checks with coverages, no coverage this shift. Assist of two with commode, last BM is today NWB, on the left hand, and running antibiotics intermittently, assist of one to two to the commode.  pt c/o left arm pain rated at 10/10, oxycodone and tylenol were administered, pt  stated oxycodone does not  work well for her pain, pt told writer to let the doctor know and also to tell the doctor to give her something stronger. BS before meals was 173, 1 unit of coverage was administered. Re-checked BS again after given insulin was 133. Will continue plan of care

## 2024-04-27 NOTE — PLAN OF CARE
Goal Outcome Evaluation:  No acute issues overnight. Has been sleeping well as noted during rounds. No c/o's pain, discomfort, CP/SOB or any other c/o's as of yet. BG @ 0200 = 138. Had (L) elbow XR yesterday (see results, appears new fx).LUE splint / ace wrap and sling in place.    0530 - Pt.called for assist to [facility] - heavy assist d/t just waking up, was unsteady and lost balance a few times needing steadying assist from staff. Continent B&B, med.loose>soft stool, also had small urgency incontinence of stool during transfer. After return to bed, medicated with Tyl./ Oxycodone for c/o LUE pain.        Patient's most recent vital signs are:     Vital signs:  BP: 158/67  Temp: 97.8  HR: 70  RR: 16  SpO2: 97 %     Patient does not have new respiratory symptoms.  Patient does not have new sore throat.  Patient does not have a fever greater than 99.5.

## 2024-04-27 NOTE — PLAN OF CARE
"Ortho Update    Notified by Dr. Ortiz, hospitalist, patient had new arm pain, \"clicking feeling\" in elbow. New XR obtained showing displacement of previous intraoperative ulna fracture. Hardware otherwise in good position.    Reviewed with Dr. Sarmiento. No need for acute intervention or repeat operation. Will assess her skin and change splint tomorrow, 4/28. Likely we will remove stitches and place a cast in a week or two. We expect the ulna fracture to heal in its current position without much deleterious effect.    Diana Pleitez, PGY-4  "

## 2024-04-28 ENCOUNTER — APPOINTMENT (OUTPATIENT)
Dept: PHYSICAL THERAPY | Facility: SKILLED NURSING FACILITY | Age: 59
DRG: 559 | End: 2024-04-28
Attending: INTERNAL MEDICINE
Payer: COMMERCIAL

## 2024-04-28 ENCOUNTER — APPOINTMENT (OUTPATIENT)
Dept: OCCUPATIONAL THERAPY | Facility: SKILLED NURSING FACILITY | Age: 59
DRG: 559 | End: 2024-04-28
Attending: INTERNAL MEDICINE
Payer: COMMERCIAL

## 2024-04-28 LAB
GLUCOSE BLDC GLUCOMTR-MCNC: 120 MG/DL (ref 70–99)
GLUCOSE BLDC GLUCOMTR-MCNC: 138 MG/DL (ref 70–99)
GLUCOSE BLDC GLUCOMTR-MCNC: 141 MG/DL (ref 70–99)
GLUCOSE BLDC GLUCOMTR-MCNC: 150 MG/DL (ref 70–99)
GLUCOSE BLDC GLUCOMTR-MCNC: 166 MG/DL (ref 70–99)

## 2024-04-28 PROCEDURE — 250N000011 HC RX IP 250 OP 636: Mod: JZ | Performed by: INTERNAL MEDICINE

## 2024-04-28 PROCEDURE — 250N000013 HC RX MED GY IP 250 OP 250 PS 637: Performed by: INTERNAL MEDICINE

## 2024-04-28 PROCEDURE — 022N000001 HC SNF RUG CODE OPNP

## 2024-04-28 PROCEDURE — 97530 THERAPEUTIC ACTIVITIES: CPT | Mod: GP

## 2024-04-28 PROCEDURE — 258N000003 HC RX IP 258 OP 636

## 2024-04-28 PROCEDURE — 97530 THERAPEUTIC ACTIVITIES: CPT | Mod: GO

## 2024-04-28 PROCEDURE — 120N000009 HC R&B SNF

## 2024-04-28 PROCEDURE — 250N000011 HC RX IP 250 OP 636: Performed by: INTERNAL MEDICINE

## 2024-04-28 PROCEDURE — 250N000013 HC RX MED GY IP 250 OP 250 PS 637

## 2024-04-28 RX ORDER — SODIUM CHLORIDE 9 MG/ML
INJECTION, SOLUTION INTRAVENOUS
Status: COMPLETED
Start: 2024-04-28 | End: 2024-04-28

## 2024-04-28 RX ADMIN — CLONIDINE HYDROCHLORIDE 0.2 MG: 0.1 TABLET ORAL at 22:06

## 2024-04-28 RX ADMIN — MICONAZOLE NITRATE: 20 POWDER TOPICAL at 10:04

## 2024-04-28 RX ADMIN — SODIUM CHLORIDE 500 ML: 9 INJECTION, SOLUTION INTRAVENOUS at 10:11

## 2024-04-28 RX ADMIN — AMLODIPINE BESYLATE 5 MG: 5 TABLET ORAL at 10:01

## 2024-04-28 RX ADMIN — OXYCODONE HYDROCHLORIDE 10 MG: 5 TABLET ORAL at 10:01

## 2024-04-28 RX ADMIN — ACETAMINOPHEN 975 MG: 325 TABLET, FILM COATED ORAL at 22:07

## 2024-04-28 RX ADMIN — Medication 1 CAPSULE: at 22:06

## 2024-04-28 RX ADMIN — VANCOMYCIN HYDROCHLORIDE 1000 MG: 1 INJECTION, SOLUTION INTRAVENOUS at 22:06

## 2024-04-28 RX ADMIN — ATORVASTATIN CALCIUM 10 MG: 10 TABLET, FILM COATED ORAL at 22:06

## 2024-04-28 RX ADMIN — CLONIDINE HYDROCHLORIDE 0.2 MG: 0.1 TABLET ORAL at 10:01

## 2024-04-28 RX ADMIN — GABAPENTIN 300 MG: 300 CAPSULE ORAL at 10:01

## 2024-04-28 RX ADMIN — CITALOPRAM HYDROBROMIDE 20 MG: 20 TABLET ORAL at 22:06

## 2024-04-28 RX ADMIN — OXYCODONE HYDROCHLORIDE 10 MG: 5 TABLET ORAL at 14:24

## 2024-04-28 RX ADMIN — OXYCODONE HYDROCHLORIDE 10 MG: 5 TABLET ORAL at 22:55

## 2024-04-28 RX ADMIN — GABAPENTIN 300 MG: 300 CAPSULE ORAL at 14:24

## 2024-04-28 RX ADMIN — FERROUS SULFATE TAB 325 MG (65 MG ELEMENTAL FE) 325 MG: 325 (65 FE) TAB at 10:02

## 2024-04-28 RX ADMIN — INSULIN ASPART 1 UNITS: 100 INJECTION, SOLUTION INTRAVENOUS; SUBCUTANEOUS at 09:59

## 2024-04-28 RX ADMIN — VANCOMYCIN HYDROCHLORIDE 1000 MG: 1 INJECTION, SOLUTION INTRAVENOUS at 10:10

## 2024-04-28 RX ADMIN — HEPARIN, PORCINE (PF) 10 UNIT/ML INTRAVENOUS SYRINGE 5 ML: at 18:31

## 2024-04-28 RX ADMIN — ERTAPENEM SODIUM 1 G: 1 INJECTION, POWDER, LYOPHILIZED, FOR SOLUTION INTRAMUSCULAR; INTRAVENOUS at 16:26

## 2024-04-28 RX ADMIN — GABAPENTIN 300 MG: 300 CAPSULE ORAL at 22:06

## 2024-04-28 RX ADMIN — LUMATEPERONE 84 MG: 42 CAPSULE ORAL at 22:55

## 2024-04-28 RX ADMIN — INSULIN ASPART 1 UNITS: 100 INJECTION, SOLUTION INTRAVENOUS; SUBCUTANEOUS at 16:26

## 2024-04-28 RX ADMIN — INSULIN ASPART 1 UNITS: 100 INJECTION, SOLUTION INTRAVENOUS; SUBCUTANEOUS at 11:59

## 2024-04-28 RX ADMIN — ASPIRIN 162 MG: 81 TABLET, COATED ORAL at 10:00

## 2024-04-28 RX ADMIN — OMEPRAZOLE 20 MG: 20 CAPSULE, DELAYED RELEASE ORAL at 10:00

## 2024-04-28 RX ADMIN — OXYCODONE HYDROCHLORIDE 10 MG: 5 TABLET ORAL at 18:31

## 2024-04-28 ASSESSMENT — ACTIVITIES OF DAILY LIVING (ADL)
ADLS_ACUITY_SCORE: 34
ADLS_ACUITY_SCORE: 34
ADLS_ACUITY_SCORE: 33
ADLS_ACUITY_SCORE: 33
ADLS_ACUITY_SCORE: 34
ADLS_ACUITY_SCORE: 33
ADLS_ACUITY_SCORE: 34
ADLS_ACUITY_SCORE: 33
ADLS_ACUITY_SCORE: 33
ADLS_ACUITY_SCORE: 34
ADLS_ACUITY_SCORE: 33
ADLS_ACUITY_SCORE: 34
ADLS_ACUITY_SCORE: 33
ADLS_ACUITY_SCORE: 34

## 2024-04-28 NOTE — PROGRESS NOTES
"Chart check    Patient  was seen by orthopedic surgery  yesterday and today , no need for acute intervention for new ulnar fracture  and ortho expects  it to heal , splint was changed 4/28 , per orthopedic surgery  stiches abdominal splint likely will be removed  in 1-2 weeks   \" LUE:  - Splint/Dressings c/d/i - removed today  - Incision healing well with no erythema or drainage  - Skin intact with no new areas of bruising or swelling  - SILT medial/radial/ulnar/axillary nerves  - Fires EPL, FPL, Intrinsics  - Hand wwp \"      Nithya Ortiz MD   "
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
## 2024-04-28 NOTE — PLAN OF CARE
Goal Outcome Evaluation:         VS: Blood pressure 125/62, pulse 75, temperature 97.7  F (36.5  C), temperature source Oral, resp. rate 18, weight 107.1 kg (236 lb 1.6 oz), SpO2 94%.    O2: RA   Output: Continent of both   Last BM: 4/27/24   Activity: Asst 2 to commode   Skin: L arm surgical incision, NWB L upper extremity   Pain: Oxy + Tylenol   CMS: A&O   Dressing: Ace wrap intact   Diet: Reg/Thin/Whole   LDA: R PIV   Equipment: Call light, personal belongings   Plan: Cont'd POC   Additional Info: Pt slept through most of the night, no acute changes ON

## 2024-04-28 NOTE — PLAN OF CARE
"Goal Outcome Evaluation:    VS: BP (!) 147/68 (BP Location: Right leg)   Pulse 58   Temp 97.7  F (36.5  C) (Axillary)   Resp 18   Wt 107.1 kg (236 lb 1.6 oz)   SpO2 97%   BMI 34.87 kg/m     O2: RA   Output: BSC   Last BM: 4/28 BSC   Activity: Therapy, TV, phone   Skin: L elbow surgery; arm in cast  R PICC   Pain: Oxy Q4H x2 10 mg   CMS:  Neuro: Intact, pt can be \"tippy backwards\"  A&O   Dressing: LUE cast and ace bandage CDI. Ortho following, assessed pt today.   R PICC CDI (needs to be changed)   Diet: Reg   and 166   LDA: R PICC, ABX infused w/ no complications   Equipment: IV pole, pump, BSC   Plan: Con't POC.    Additional Info:      Patient's most recent vital signs are:     Vital signs:  BP: 147/68  Temp: 97.7  HR: 58  RR: 18  SpO2: 97 %     Patient does not have new respiratory symptoms.  Patient does not have new sore throat.  Patient does not have a fever greater than 99.5.      "

## 2024-04-29 ENCOUNTER — APPOINTMENT (OUTPATIENT)
Dept: OCCUPATIONAL THERAPY | Facility: SKILLED NURSING FACILITY | Age: 59
DRG: 559 | End: 2024-04-29
Attending: INTERNAL MEDICINE
Payer: COMMERCIAL

## 2024-04-29 ENCOUNTER — APPOINTMENT (OUTPATIENT)
Dept: PHYSICAL THERAPY | Facility: SKILLED NURSING FACILITY | Age: 59
DRG: 559 | End: 2024-04-29
Attending: INTERNAL MEDICINE
Payer: COMMERCIAL

## 2024-04-29 LAB
ALBUMIN SERPL BCG-MCNC: 2.7 G/DL (ref 3.5–5.2)
ALP SERPL-CCNC: 129 U/L (ref 40–150)
ALT SERPL W P-5'-P-CCNC: 10 U/L (ref 0–50)
ANION GAP SERPL CALCULATED.3IONS-SCNC: 9 MMOL/L (ref 7–15)
AST SERPL W P-5'-P-CCNC: 15 U/L (ref 0–45)
BILIRUB SERPL-MCNC: <0.2 MG/DL
BUN SERPL-MCNC: 9.4 MG/DL (ref 8–23)
CALCIUM SERPL-MCNC: 8 MG/DL (ref 8.8–10.2)
CHLORIDE SERPL-SCNC: 104 MMOL/L (ref 98–107)
CREAT SERPL-MCNC: 0.84 MG/DL (ref 0.51–0.95)
CRP SERPL-MCNC: 24.71 MG/L
DEPRECATED HCO3 PLAS-SCNC: 30 MMOL/L (ref 22–29)
EGFRCR SERPLBLD CKD-EPI 2021: 79 ML/MIN/1.73M2
ERYTHROCYTE [DISTWIDTH] IN BLOOD BY AUTOMATED COUNT: 18.9 % (ref 10–15)
GLUCOSE BLDC GLUCOMTR-MCNC: 126 MG/DL (ref 70–99)
GLUCOSE BLDC GLUCOMTR-MCNC: 135 MG/DL (ref 70–99)
GLUCOSE BLDC GLUCOMTR-MCNC: 141 MG/DL (ref 70–99)
GLUCOSE BLDC GLUCOMTR-MCNC: 150 MG/DL (ref 70–99)
GLUCOSE BLDC GLUCOMTR-MCNC: 159 MG/DL (ref 70–99)
GLUCOSE SERPL-MCNC: 163 MG/DL (ref 70–99)
HCT VFR BLD AUTO: 24.1 % (ref 35–47)
HGB BLD-MCNC: 7.4 G/DL (ref 11.7–15.7)
MCH RBC QN AUTO: 25.6 PG (ref 26.5–33)
MCHC RBC AUTO-ENTMCNC: 30.7 G/DL (ref 31.5–36.5)
MCV RBC AUTO: 83 FL (ref 78–100)
PLATELET # BLD AUTO: 524 10E3/UL (ref 150–450)
POTASSIUM SERPL-SCNC: 3.3 MMOL/L (ref 3.4–5.3)
PROT SERPL-MCNC: 5.6 G/DL (ref 6.4–8.3)
RBC # BLD AUTO: 2.89 10E6/UL (ref 3.8–5.2)
SODIUM SERPL-SCNC: 143 MMOL/L (ref 135–145)
VANCOMYCIN SERPL-MCNC: 23.1 UG/ML
WBC # BLD AUTO: 9.5 10E3/UL (ref 4–11)

## 2024-04-29 PROCEDURE — 36592 COLLECT BLOOD FROM PICC: CPT | Performed by: INTERNAL MEDICINE

## 2024-04-29 PROCEDURE — 250N000013 HC RX MED GY IP 250 OP 250 PS 637: Performed by: INTERNAL MEDICINE

## 2024-04-29 PROCEDURE — 97112 NEUROMUSCULAR REEDUCATION: CPT | Mod: GP

## 2024-04-29 PROCEDURE — 250N000012 HC RX MED GY IP 250 OP 636 PS 637: Performed by: INTERNAL MEDICINE

## 2024-04-29 PROCEDURE — 97530 THERAPEUTIC ACTIVITIES: CPT | Mod: GP

## 2024-04-29 PROCEDURE — 84075 ASSAY ALKALINE PHOSPHATASE: CPT | Performed by: INTERNAL MEDICINE

## 2024-04-29 PROCEDURE — 250N000013 HC RX MED GY IP 250 OP 250 PS 637

## 2024-04-29 PROCEDURE — 80202 ASSAY OF VANCOMYCIN: CPT | Performed by: INTERNAL MEDICINE

## 2024-04-29 PROCEDURE — 84450 TRANSFERASE (AST) (SGOT): CPT | Performed by: INTERNAL MEDICINE

## 2024-04-29 PROCEDURE — 97116 GAIT TRAINING THERAPY: CPT | Mod: GP

## 2024-04-29 PROCEDURE — 85027 COMPLETE CBC AUTOMATED: CPT | Performed by: INTERNAL MEDICINE

## 2024-04-29 PROCEDURE — 97530 THERAPEUTIC ACTIVITIES: CPT | Mod: GO

## 2024-04-29 PROCEDURE — 250N000011 HC RX IP 250 OP 636: Mod: JZ | Performed by: INTERNAL MEDICINE

## 2024-04-29 PROCEDURE — 250N000011 HC RX IP 250 OP 636: Performed by: INTERNAL MEDICINE

## 2024-04-29 PROCEDURE — 120N000009 HC R&B SNF

## 2024-04-29 PROCEDURE — 86140 C-REACTIVE PROTEIN: CPT | Performed by: INTERNAL MEDICINE

## 2024-04-29 RX ORDER — POTASSIUM CHLORIDE 1.5 G/1.58G
40 POWDER, FOR SOLUTION ORAL ONCE
Status: COMPLETED | OUTPATIENT
Start: 2024-04-29 | End: 2024-04-29

## 2024-04-29 RX ORDER — ACETAMINOPHEN 325 MG/1
975 TABLET ORAL EVERY 8 HOURS PRN
Status: DISCONTINUED | OUTPATIENT
Start: 2024-04-29 | End: 2024-05-11

## 2024-04-29 RX ADMIN — OXYCODONE HYDROCHLORIDE 10 MG: 5 TABLET ORAL at 05:32

## 2024-04-29 RX ADMIN — ERTAPENEM SODIUM 1 G: 1 INJECTION, POWDER, LYOPHILIZED, FOR SOLUTION INTRAMUSCULAR; INTRAVENOUS at 17:01

## 2024-04-29 RX ADMIN — AMLODIPINE BESYLATE 5 MG: 5 TABLET ORAL at 09:51

## 2024-04-29 RX ADMIN — INSULIN GLARGINE 5 UNITS: 100 INJECTION, SOLUTION SUBCUTANEOUS at 22:10

## 2024-04-29 RX ADMIN — INSULIN ASPART 1 UNITS: 100 INJECTION, SOLUTION INTRAVENOUS; SUBCUTANEOUS at 09:49

## 2024-04-29 RX ADMIN — Medication 1 CAPSULE: at 09:51

## 2024-04-29 RX ADMIN — MICONAZOLE NITRATE: 20 POWDER TOPICAL at 09:55

## 2024-04-29 RX ADMIN — MICONAZOLE NITRATE: 20 POWDER TOPICAL at 22:07

## 2024-04-29 RX ADMIN — HEPARIN, PORCINE (PF) 10 UNIT/ML INTRAVENOUS SYRINGE 5 ML: at 17:01

## 2024-04-29 RX ADMIN — POTASSIUM CHLORIDE 40 MEQ: 1.5 FOR SOLUTION ORAL at 12:44

## 2024-04-29 RX ADMIN — CLONIDINE HYDROCHLORIDE 0.2 MG: 0.1 TABLET ORAL at 09:51

## 2024-04-29 RX ADMIN — INSULIN ASPART 1 UNITS: 100 INJECTION, SOLUTION INTRAVENOUS; SUBCUTANEOUS at 12:37

## 2024-04-29 RX ADMIN — Medication 1 CAPSULE: at 22:05

## 2024-04-29 RX ADMIN — LUMATEPERONE 84 MG: 42 CAPSULE ORAL at 22:04

## 2024-04-29 RX ADMIN — OMEPRAZOLE 20 MG: 20 CAPSULE, DELAYED RELEASE ORAL at 09:50

## 2024-04-29 RX ADMIN — CLONIDINE HYDROCHLORIDE 0.2 MG: 0.1 TABLET ORAL at 22:05

## 2024-04-29 RX ADMIN — GABAPENTIN 300 MG: 300 CAPSULE ORAL at 14:54

## 2024-04-29 RX ADMIN — GABAPENTIN 300 MG: 300 CAPSULE ORAL at 22:05

## 2024-04-29 RX ADMIN — Medication 5 ML: at 07:02

## 2024-04-29 RX ADMIN — OXYCODONE HYDROCHLORIDE 10 MG: 5 TABLET ORAL at 14:54

## 2024-04-29 RX ADMIN — GABAPENTIN 300 MG: 300 CAPSULE ORAL at 09:51

## 2024-04-29 RX ADMIN — ASPIRIN 162 MG: 81 TABLET, COATED ORAL at 09:50

## 2024-04-29 RX ADMIN — VANCOMYCIN HYDROCHLORIDE 1000 MG: 1 INJECTION, SOLUTION INTRAVENOUS at 22:36

## 2024-04-29 RX ADMIN — ACETAMINOPHEN 975 MG: 325 TABLET, FILM COATED ORAL at 05:32

## 2024-04-29 RX ADMIN — VANCOMYCIN HYDROCHLORIDE 1000 MG: 1 INJECTION, SOLUTION INTRAVENOUS at 10:40

## 2024-04-29 RX ADMIN — OXYCODONE HYDROCHLORIDE 10 MG: 5 TABLET ORAL at 09:51

## 2024-04-29 RX ADMIN — ATORVASTATIN CALCIUM 10 MG: 10 TABLET, FILM COATED ORAL at 22:05

## 2024-04-29 RX ADMIN — CITALOPRAM HYDROBROMIDE 20 MG: 20 TABLET ORAL at 22:05

## 2024-04-29 ASSESSMENT — ACTIVITIES OF DAILY LIVING (ADL)
ADLS_ACUITY_SCORE: 34

## 2024-04-29 NOTE — PLAN OF CARE
Goal Outcome Evaluation:      Plan of Care Reviewed With: patient    Overall Patient Progress: no changeOverall Patient Progress: no change    Outcome Evaluation: Pain management and CMS for left arm    Pt is A&ox4, able to make needs known, c/o pain rating it a 10/10 prn oxy given x2. Denied chest pain, SOB, N/V, N/T. Pt is up with 2 assist to BSC or bathroom- LBM 4/28. Pt is on reg/thin-pills whole. , 159- sliding scale given. LUE in a cast CMS intact. Right PICC dressing change done, blood returned noted. K+ 3.3- replacement ordered and given. No other concerns as of now, continue with POC.    Patient most recent vitals:  /73 (BP Location: Right leg)   Pulse 82   Temp 98  F (36.7  C) (Oral)   Resp 18   Wt 107.1 kg (236 lb 1.6 oz)   SpO2 95%   BMI 34.87 kg/m

## 2024-04-29 NOTE — PROGRESS NOTES
Chart check     Vitals stable, adjust blood pressure  medications   Labs, K at 3.3 , gave extra  Kcl  CRP 24.7 was 19.5  4/2 but 46.1 3/26 , no fever no leukocytosis   - continue IV antibiotics, monitor labs , will repeat labs on Thursday. Wound seen by orthopedic surgery  4/28 with splint change and per them wound  healing well no drainage     Restart  lantus  at lower dose as blood sugar now up and adjust     Nithya Ortiz MD

## 2024-04-29 NOTE — PHARMACY-VANCOMYCIN DOSING SERVICE
Pharmacy Vancomycin Note  Date of Service 2024  Patient's  1965   59 year old, female    Indication: Bone and Joint Infection  Day of Therapy: started on 2024  Current vancomycin regimen:  1000 mg IV q12h  Current vancomycin monitoring method: AUC  Current vancomycin therapeutic monitoring goal: 400-600 mg*h/L    Insight[employer] Prediction of Current Vancomycin Regimen  Loading dose: N/A  Regimen: 1000 mg IV every 12 hours.  Start time: 22:40 on 2024  Exposure target: AUC24 (range)400-600 mg/L.hr   AUC24,ss: 548 mg/L.hr  Probability of AUC24 > 400: 100 %  Ctrough,ss: 18.9 mg/L  Probability of Ctrough,ss > 20: 26 %  Probability of nephrotoxicity (Lodise CHANDAN ): 15 %      Current estimated CrCl = Estimated Creatinine Clearance: 92.9 mL/min (based on SCr of 0.84 mg/dL).    Creatinine for last 3 days  2024:  7:09 AM Creatinine 0.84 mg/dL    Recent Vancomycin Levels (past 3 days)  2024:  6:38 AM Vancomycin 21.0 ug/mL  2024:  7:09 AM Vancomycin 23.1 ug/mL    Vancomycin IV Administrations (past 72 hours)                     vancomycin (VANCOCIN) 1,000 mg in 200 mL dextrose intermittent infusion (mg) 1,000 mg New Bag 24 1040     1,000 mg New Bag 24 2206     1,000 mg New Bag  1010     1,000 mg New Bag 24 2115     1,000 mg New Bag  0909     1,000 mg New Bag 24 2131                    Nephrotoxins and other renal medications (From now, onward)      Start     Dose/Rate Route Frequency Ordered Stop    24 2200  vancomycin (VANCOCIN) 1,000 mg in 200 mL dextrose intermittent infusion         1,000 mg  200 mL/hr over 1 Hours Intravenous EVERY 12 HOURS 24 1840                 Contrast Orders - past 72 hours (72h ago, onward)      None            Interpretation of levels and current regimen:  Vancomycin level is reflective of -600    Has serum creatinine changed greater than 50% in last 72 hours: No    Urine output:  unable to determine    Renal  Function: Stable    [employer] Prediction of Planned New Vancomycin Regimen  Loading dose: N/A  Regimen: 1000 mg IV every 12 hours.  Start time: 22:40 on 04/29/2024  Exposure target: AUC24 (range)400-600 mg/L.hr   AUC24,ss: 548 mg/L.hr  Probability of AUC24 > 400: 100 %  Ctrough,ss: 18.9 mg/L  Probability of Ctrough,ss > 20: 26 %  Probability of nephrotoxicity (Lodise CHANDAN 2009): 15 %    Plan:  Continue Current Dose  Vancomycin monitoring method: AUC  Vancomycin therapeutic monitoring goal: 400-600 mg*h/L  Pharmacy will check vancomycin levels as appropriate in 3-5 Days.  Serum creatinine levels will be ordered daily for the first week of therapy and at least twice weekly for subsequent weeks.    ИВАН ZARCO RPH

## 2024-04-29 NOTE — PLAN OF CARE
Goal Outcome Evaluation:      Plan of Care Reviewed With: patient    Overall Patient Progress: no change  VS: Temp: 98  F (36.7  C) Temp src: Oral BP: 138/73 Pulse: 82   Resp: 18 SpO2: 95 % O2 Device: None (Room air)         O2: On RA   Output: Continent of B/B   Last BM: 4/28/24   Activity: Assist x1 with W/GB to BSC   Skin: X LUE cast, ace wraps in sling   Pain: Managed with scheduled tylenol and PRN oxy x1   CMS:  Neuro: Intact  A/O x4, able to communicate needs   Dressing: LUE CDI   Diet: Regular, BG checks with insulin sliding scale   LDA: R SL PICC patent, infused antibiotics without difficulty, Heparin locked   Equipment: BSC, WW, WC   Plan: Con't POC.    Additional Info: Pt is calm and cooperative with cares. Denies CP, SOB, N/V. No acute issues this shift.     Patient does not have new respiratory symptoms.  Patient does not have new sore throat.  Patient does not have a fever greater than 99.5.

## 2024-04-30 ENCOUNTER — APPOINTMENT (OUTPATIENT)
Dept: OCCUPATIONAL THERAPY | Facility: SKILLED NURSING FACILITY | Age: 59
DRG: 559 | End: 2024-04-30
Attending: INTERNAL MEDICINE
Payer: COMMERCIAL

## 2024-04-30 ENCOUNTER — APPOINTMENT (OUTPATIENT)
Dept: PHYSICAL THERAPY | Facility: SKILLED NURSING FACILITY | Age: 59
DRG: 559 | End: 2024-04-30
Attending: INTERNAL MEDICINE
Payer: COMMERCIAL

## 2024-04-30 LAB
GLUCOSE BLDC GLUCOMTR-MCNC: 127 MG/DL (ref 70–99)
GLUCOSE BLDC GLUCOMTR-MCNC: 131 MG/DL (ref 70–99)
GLUCOSE BLDC GLUCOMTR-MCNC: 140 MG/DL (ref 70–99)
GLUCOSE BLDC GLUCOMTR-MCNC: 163 MG/DL (ref 70–99)
GLUCOSE BLDC GLUCOMTR-MCNC: 183 MG/DL (ref 70–99)

## 2024-04-30 PROCEDURE — 250N000011 HC RX IP 250 OP 636: Mod: JZ | Performed by: INTERNAL MEDICINE

## 2024-04-30 PROCEDURE — 250N000013 HC RX MED GY IP 250 OP 250 PS 637

## 2024-04-30 PROCEDURE — 250N000013 HC RX MED GY IP 250 OP 250 PS 637: Performed by: INTERNAL MEDICINE

## 2024-04-30 PROCEDURE — 97112 NEUROMUSCULAR REEDUCATION: CPT | Mod: GP

## 2024-04-30 PROCEDURE — 120N000009 HC R&B SNF

## 2024-04-30 PROCEDURE — 250N000011 HC RX IP 250 OP 636: Performed by: INTERNAL MEDICINE

## 2024-04-30 PROCEDURE — 97530 THERAPEUTIC ACTIVITIES: CPT | Mod: GP

## 2024-04-30 PROCEDURE — 97530 THERAPEUTIC ACTIVITIES: CPT | Mod: GO

## 2024-04-30 RX ADMIN — LUMATEPERONE 84 MG: 42 CAPSULE ORAL at 20:51

## 2024-04-30 RX ADMIN — OXYCODONE HYDROCHLORIDE 10 MG: 5 TABLET ORAL at 13:40

## 2024-04-30 RX ADMIN — MICONAZOLE NITRATE: 20 POWDER TOPICAL at 09:09

## 2024-04-30 RX ADMIN — ERTAPENEM SODIUM 1 G: 1 INJECTION, POWDER, LYOPHILIZED, FOR SOLUTION INTRAMUSCULAR; INTRAVENOUS at 16:07

## 2024-04-30 RX ADMIN — INSULIN ASPART 1 UNITS: 100 INJECTION, SOLUTION INTRAVENOUS; SUBCUTANEOUS at 17:21

## 2024-04-30 RX ADMIN — OXYCODONE HYDROCHLORIDE 10 MG: 5 TABLET ORAL at 09:35

## 2024-04-30 RX ADMIN — CITALOPRAM HYDROBROMIDE 20 MG: 20 TABLET ORAL at 22:03

## 2024-04-30 RX ADMIN — OMEPRAZOLE 20 MG: 20 CAPSULE, DELAYED RELEASE ORAL at 08:59

## 2024-04-30 RX ADMIN — FERROUS SULFATE TAB 325 MG (65 MG ELEMENTAL FE) 325 MG: 325 (65 FE) TAB at 08:59

## 2024-04-30 RX ADMIN — VANCOMYCIN HYDROCHLORIDE 1000 MG: 1 INJECTION, SOLUTION INTRAVENOUS at 09:01

## 2024-04-30 RX ADMIN — ASPIRIN 162 MG: 81 TABLET, COATED ORAL at 08:59

## 2024-04-30 RX ADMIN — HEPARIN, PORCINE (PF) 10 UNIT/ML INTRAVENOUS SYRINGE 5 ML: at 17:21

## 2024-04-30 RX ADMIN — Medication 1 CAPSULE: at 20:44

## 2024-04-30 RX ADMIN — Medication 1 CAPSULE: at 08:59

## 2024-04-30 RX ADMIN — GABAPENTIN 300 MG: 300 CAPSULE ORAL at 20:44

## 2024-04-30 RX ADMIN — INSULIN GLARGINE 5 UNITS: 100 INJECTION, SOLUTION SUBCUTANEOUS at 22:03

## 2024-04-30 RX ADMIN — OXYCODONE HYDROCHLORIDE 10 MG: 5 TABLET ORAL at 03:26

## 2024-04-30 RX ADMIN — CLONIDINE HYDROCHLORIDE 0.2 MG: 0.1 TABLET ORAL at 08:59

## 2024-04-30 RX ADMIN — GABAPENTIN 300 MG: 300 CAPSULE ORAL at 13:40

## 2024-04-30 RX ADMIN — ATORVASTATIN CALCIUM 10 MG: 10 TABLET, FILM COATED ORAL at 22:02

## 2024-04-30 RX ADMIN — ACETAMINOPHEN 975 MG: 325 TABLET, FILM COATED ORAL at 03:26

## 2024-04-30 RX ADMIN — VANCOMYCIN HYDROCHLORIDE 1000 MG: 1 INJECTION, SOLUTION INTRAVENOUS at 22:03

## 2024-04-30 RX ADMIN — AMLODIPINE BESYLATE 5 MG: 5 TABLET ORAL at 08:59

## 2024-04-30 RX ADMIN — GABAPENTIN 300 MG: 300 CAPSULE ORAL at 08:59

## 2024-04-30 RX ADMIN — CLONIDINE HYDROCHLORIDE 0.2 MG: 0.1 TABLET ORAL at 20:48

## 2024-04-30 ASSESSMENT — ACTIVITIES OF DAILY LIVING (ADL)
ADLS_ACUITY_SCORE: 33
ADLS_ACUITY_SCORE: 34
ADLS_ACUITY_SCORE: 33
ADLS_ACUITY_SCORE: 34
ADLS_ACUITY_SCORE: 33

## 2024-04-30 NOTE — PLAN OF CARE
Goal Outcome Evaluation: Ongoing       Plan of Care Reviewed With: patient    Overall Patient Progress: no changeOverall Patient Progress: no change            Goal Outcome Evaluation:       Plan of Care Reviewed With: patient     Overall Patient Progress: no change  VS: Temp: 99  F Temp src: Oral BP: 134/52 Pulse: 69 Resp: 18 SpO2: 91% O2 Device: None (Room air)         O2: On RA   Output: Continent of B/B   Last BM: 4/28/24   Activity: Assist x1 with W/GB to BSC   Skin: X LUE cast, ace wraps in sling   Pain: Managed with scheduled tylenol and PRN oxy x2   CMS:  Neuro: Intact  A/O x4, able to communicate needs   Dressing: LUE CDI   Diet: Regular, BG checks with insulin sliding scale   LDA: R SL PICC patent, infused antibiotics without difficulty, Heparin locked   Equipment: BSC, WW, WC   Plan: Con't POC.    Additional Info: Pt is calm and cooperative with cares. Denies CP, SOB, N/V. No acute issues this shift.      Patient does not have new respiratory symptoms.  Patient does not have new sore throat.  Patient does not have a fever greater than 99.5.      I cared for patient from 1200-3:30 PM

## 2024-04-30 NOTE — PLAN OF CARE
[id number]    Pt. Is A & O X 4, Stable on room air. Denies  SOB or acute distress. Pt. Verbalized feeling pain and rates it 10/10. PRN Oxycodone and Tylenol administered and tolerated. Sling worn on left elbow for support. Rt. Single lumen PICC dressing is clean, dry and intact. Continent of bladder elimination this shift.   BG @ 0200 127. No signs and symptoms of hypoglycemia noted. Call button placed within reach. Plan of care is ongoing.

## 2024-05-01 ENCOUNTER — APPOINTMENT (OUTPATIENT)
Dept: PHYSICAL THERAPY | Facility: SKILLED NURSING FACILITY | Age: 59
DRG: 559 | End: 2024-05-01
Attending: INTERNAL MEDICINE
Payer: COMMERCIAL

## 2024-05-01 ENCOUNTER — APPOINTMENT (OUTPATIENT)
Dept: OCCUPATIONAL THERAPY | Facility: SKILLED NURSING FACILITY | Age: 59
DRG: 559 | End: 2024-05-01
Attending: INTERNAL MEDICINE
Payer: COMMERCIAL

## 2024-05-01 ENCOUNTER — APPOINTMENT (OUTPATIENT)
Dept: CT IMAGING | Facility: CLINIC | Age: 59
End: 2024-05-01
Attending: INTERNAL MEDICINE
Payer: COMMERCIAL

## 2024-05-01 LAB
ALBUMIN SERPL BCG-MCNC: 2.8 G/DL (ref 3.5–5.2)
ALP SERPL-CCNC: 136 U/L (ref 40–150)
ALT SERPL W P-5'-P-CCNC: 11 U/L (ref 0–50)
ANION GAP SERPL CALCULATED.3IONS-SCNC: 9 MMOL/L (ref 7–15)
AST SERPL W P-5'-P-CCNC: 13 U/L (ref 0–45)
BASE EXCESS BLDV CALC-SCNC: 5 MMOL/L (ref -3–3)
BASOPHILS # BLD AUTO: 0.1 10E3/UL (ref 0–0.2)
BASOPHILS NFR BLD AUTO: 1 %
BILIRUB SERPL-MCNC: <0.2 MG/DL
BUN SERPL-MCNC: 10.5 MG/DL (ref 8–23)
CALCIUM SERPL-MCNC: 8.4 MG/DL (ref 8.8–10.2)
CHLORIDE SERPL-SCNC: 97 MMOL/L (ref 98–107)
CREAT SERPL-MCNC: 0.92 MG/DL (ref 0.51–0.95)
DEPRECATED HCO3 PLAS-SCNC: 29 MMOL/L (ref 22–29)
EGFRCR SERPLBLD CKD-EPI 2021: 71 ML/MIN/1.73M2
EOSINOPHIL # BLD AUTO: 0.3 10E3/UL (ref 0–0.7)
EOSINOPHIL NFR BLD AUTO: 4 %
ERYTHROCYTE [DISTWIDTH] IN BLOOD BY AUTOMATED COUNT: 18.8 % (ref 10–15)
GLUCOSE BLDC GLUCOMTR-MCNC: 134 MG/DL (ref 70–99)
GLUCOSE BLDC GLUCOMTR-MCNC: 138 MG/DL (ref 70–99)
GLUCOSE BLDC GLUCOMTR-MCNC: 151 MG/DL (ref 70–99)
GLUCOSE BLDC GLUCOMTR-MCNC: 163 MG/DL (ref 70–99)
GLUCOSE BLDC GLUCOMTR-MCNC: 246 MG/DL (ref 70–99)
GLUCOSE SERPL-MCNC: 168 MG/DL (ref 70–99)
HCO3 BLDV-SCNC: 31 MMOL/L (ref 21–28)
HCT VFR BLD AUTO: 23.9 % (ref 35–47)
HGB BLD-MCNC: 7.4 G/DL (ref 11.7–15.7)
IMM GRANULOCYTES # BLD: 0.1 10E3/UL
IMM GRANULOCYTES NFR BLD: 1 %
LYMPHOCYTES # BLD AUTO: 2.6 10E3/UL (ref 0.8–5.3)
LYMPHOCYTES NFR BLD AUTO: 34 %
MCH RBC QN AUTO: 25.7 PG (ref 26.5–33)
MCHC RBC AUTO-ENTMCNC: 31 G/DL (ref 31.5–36.5)
MCV RBC AUTO: 83 FL (ref 78–100)
MONOCYTES # BLD AUTO: 0.7 10E3/UL (ref 0–1.3)
MONOCYTES NFR BLD AUTO: 9 %
NEUTROPHILS # BLD AUTO: 4.1 10E3/UL (ref 1.6–8.3)
NEUTROPHILS NFR BLD AUTO: 51 %
NRBC # BLD AUTO: 0 10E3/UL
NRBC BLD AUTO-RTO: 0 /100
O2/TOTAL GAS SETTING VFR VENT: 21 %
OXYHGB MFR BLDV: 87 % (ref 70–75)
PCO2 BLDV: 53 MM HG (ref 40–50)
PH BLDV: 7.37 [PH] (ref 7.32–7.43)
PLATELET # BLD AUTO: 510 10E3/UL (ref 150–450)
PO2 BLDV: 59 MM HG (ref 25–47)
POTASSIUM SERPL-SCNC: 3.5 MMOL/L (ref 3.4–5.3)
PROT SERPL-MCNC: 5.9 G/DL (ref 6.4–8.3)
RBC # BLD AUTO: 2.88 10E6/UL (ref 3.8–5.2)
SAO2 % BLDV: 88.3 % (ref 70–75)
SODIUM SERPL-SCNC: 135 MMOL/L (ref 135–145)
WBC # BLD AUTO: 7.8 10E3/UL (ref 4–11)

## 2024-05-01 PROCEDURE — 85025 COMPLETE CBC W/AUTO DIFF WBC: CPT | Performed by: INTERNAL MEDICINE

## 2024-05-01 PROCEDURE — 120N000009 HC R&B SNF

## 2024-05-01 PROCEDURE — 250N000011 HC RX IP 250 OP 636: Performed by: INTERNAL MEDICINE

## 2024-05-01 PROCEDURE — 250N000013 HC RX MED GY IP 250 OP 250 PS 637

## 2024-05-01 PROCEDURE — 250N000011 HC RX IP 250 OP 636: Mod: JZ | Performed by: INTERNAL MEDICINE

## 2024-05-01 PROCEDURE — 36415 COLL VENOUS BLD VENIPUNCTURE: CPT | Performed by: INTERNAL MEDICINE

## 2024-05-01 PROCEDURE — 70450 CT HEAD/BRAIN W/O DYE: CPT

## 2024-05-01 PROCEDURE — 70450 CT HEAD/BRAIN W/O DYE: CPT | Mod: 26 | Performed by: STUDENT IN AN ORGANIZED HEALTH CARE EDUCATION/TRAINING PROGRAM

## 2024-05-01 PROCEDURE — 97535 SELF CARE MNGMENT TRAINING: CPT | Mod: GO

## 2024-05-01 PROCEDURE — 97530 THERAPEUTIC ACTIVITIES: CPT | Mod: GP

## 2024-05-01 PROCEDURE — 99207 PR NO BILLABLE SERVICE THIS VISIT: CPT | Performed by: INTERNAL MEDICINE

## 2024-05-01 PROCEDURE — 250N000013 HC RX MED GY IP 250 OP 250 PS 637: Performed by: INTERNAL MEDICINE

## 2024-05-01 PROCEDURE — 80053 COMPREHEN METABOLIC PANEL: CPT | Performed by: INTERNAL MEDICINE

## 2024-05-01 PROCEDURE — 82805 BLOOD GASES W/O2 SATURATION: CPT | Performed by: INTERNAL MEDICINE

## 2024-05-01 RX ORDER — GABAPENTIN 300 MG/1
600 CAPSULE ORAL 3 TIMES DAILY
Status: DISCONTINUED | OUTPATIENT
Start: 2024-05-01 | End: 2024-05-11

## 2024-05-01 RX ADMIN — OXYCODONE HYDROCHLORIDE 10 MG: 5 TABLET ORAL at 03:12

## 2024-05-01 RX ADMIN — LUMATEPERONE 84 MG: 42 CAPSULE ORAL at 19:21

## 2024-05-01 RX ADMIN — OXYCODONE HYDROCHLORIDE 10 MG: 5 TABLET ORAL at 19:20

## 2024-05-01 RX ADMIN — GABAPENTIN 600 MG: 300 CAPSULE ORAL at 14:11

## 2024-05-01 RX ADMIN — AMLODIPINE BESYLATE 5 MG: 5 TABLET ORAL at 08:29

## 2024-05-01 RX ADMIN — MICONAZOLE NITRATE: 20 POWDER TOPICAL at 08:32

## 2024-05-01 RX ADMIN — ACETAMINOPHEN 975 MG: 325 TABLET, FILM COATED ORAL at 03:11

## 2024-05-01 RX ADMIN — INSULIN ASPART 1 UNITS: 100 INJECTION, SOLUTION INTRAVENOUS; SUBCUTANEOUS at 08:24

## 2024-05-01 RX ADMIN — VANCOMYCIN HYDROCHLORIDE 1000 MG: 1 INJECTION, SOLUTION INTRAVENOUS at 23:10

## 2024-05-01 RX ADMIN — ERTAPENEM SODIUM 1 G: 1 INJECTION, POWDER, LYOPHILIZED, FOR SOLUTION INTRAMUSCULAR; INTRAVENOUS at 16:12

## 2024-05-01 RX ADMIN — CITALOPRAM HYDROBROMIDE 20 MG: 20 TABLET ORAL at 22:37

## 2024-05-01 RX ADMIN — HEPARIN, PORCINE (PF) 10 UNIT/ML INTRAVENOUS SYRINGE 5 ML: at 17:38

## 2024-05-01 RX ADMIN — ATORVASTATIN CALCIUM 10 MG: 10 TABLET, FILM COATED ORAL at 22:37

## 2024-05-01 RX ADMIN — OXYCODONE HYDROCHLORIDE 10 MG: 5 TABLET ORAL at 14:11

## 2024-05-01 RX ADMIN — INSULIN GLARGINE 5 UNITS: 100 INJECTION, SOLUTION SUBCUTANEOUS at 21:42

## 2024-05-01 RX ADMIN — ASPIRIN 162 MG: 81 TABLET, COATED ORAL at 08:29

## 2024-05-01 RX ADMIN — VANCOMYCIN HYDROCHLORIDE 1000 MG: 1 INJECTION, SOLUTION INTRAVENOUS at 11:36

## 2024-05-01 RX ADMIN — OXYCODONE HYDROCHLORIDE 10 MG: 5 TABLET ORAL at 09:58

## 2024-05-01 RX ADMIN — Medication 1 CAPSULE: at 08:29

## 2024-05-01 RX ADMIN — CLONIDINE HYDROCHLORIDE 0.2 MG: 0.1 TABLET ORAL at 08:26

## 2024-05-01 RX ADMIN — GABAPENTIN 600 MG: 300 CAPSULE ORAL at 19:20

## 2024-05-01 RX ADMIN — Medication 5 ML: at 00:45

## 2024-05-01 RX ADMIN — Medication 1 CAPSULE: at 20:36

## 2024-05-01 RX ADMIN — OMEPRAZOLE 20 MG: 20 CAPSULE, DELAYED RELEASE ORAL at 08:29

## 2024-05-01 RX ADMIN — ACETAMINOPHEN 975 MG: 325 TABLET, FILM COATED ORAL at 14:11

## 2024-05-01 RX ADMIN — GABAPENTIN 300 MG: 300 CAPSULE ORAL at 08:29

## 2024-05-01 ASSESSMENT — ACTIVITIES OF DAILY LIVING (ADL)
ADLS_ACUITY_SCORE: 35
ADLS_ACUITY_SCORE: 33
ADLS_ACUITY_SCORE: 35
ADLS_ACUITY_SCORE: 33
ADLS_ACUITY_SCORE: 35
ADLS_ACUITY_SCORE: 33
ADLS_ACUITY_SCORE: 35
ADLS_ACUITY_SCORE: 33

## 2024-05-01 NOTE — PROGRESS NOTES
"Received return call from [facility] clinic staff regarding how patient was doing prior to hospitalization.  Staff state that there is no home care agency in their area who will take her any longer.  They had resorted to their Paramedic team checking in on her as she administered her own IV antibiotics at home.  Clinic staff reported that they are aware she is decisional, but she is not reliable, and does not carry through with tasks without multiple follow up from staff.  They felt she most likely was not carrying through the complete plan of care with her IV regime, and it was very difficult getting her into the [facility] for her surgery due to her behaviors and multiple cancellations of procedure.  Staff reported her to be difficult, confused, and her behaviors due to her bipolar were a challenge to get the patient the treatment that she needed.  Staff there felt she should be \"placed in a facility, but won't.\"    Will continue plan of care and evaluate course of next steps.  "

## 2024-05-01 NOTE — PLAN OF CARE
Goal Outcome Evaluation:    Plan of Care Reviewed With: patient    Overall Patient Progress: no change    Outcome Evaluation: LUE pain comfortably managed with Oxycodone 10 mg tab and Tylenol 650 mg tab x 1 this shift. LUE with splint and sling in place. CMS intact, pt able to move fingers. A of 1 to BSC with gait belt. Medium soft BM and voiding adequately. Able to do own perineal care. Pt has no c/o SOB and no s/s of respiratory issue noted at RA. Appear to be sleeping/resting between cares/ meds. Continue with plan of care    Patient's most recent vital signs are:     Vital signs:  BP: 146/59  Temp: 97.8  HR: 59  RR: 18  SpO2: 92 %     Patient does not have new respiratory symptoms.  Patient does not have new sore throat.  Patient does not have a fever greater than 99.5.
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
## 2024-05-01 NOTE — PLAN OF CARE
"  VS: BP (!) 144/62   Pulse 63   Temp 97.6  F (36.4  C) (Oral)   Resp 18   Wt 107.1 kg (236 lb 1.6 oz)   SpO2 91%   BMI 34.87 kg/m     O2: Stable on RA, denied SOB & CP   Output: Spontaneously voids to bedside commode without difficulty   Last BM: 5/1/24   Activity: A1 w/gait belt & walker   Skin: L arm surgical incision MARGIE   Pain: Managed w/ prn oxycodone & tylenol   CMS: A/O x4, denied N/T   Dressing: L arm dressing CDI   Diet: Reg/thin/whole  BG checks w/sliding scale   LDA: L PICC running antibiotics intermittently   Plan: Continue with therapies and pain mangement   Additional Info:      Problem: Adult Inpatient Plan of Care  Goal: Plan of Care Review  Description: The Plan of Care Review/Shift note should be completed every shift.  The Outcome Evaluation is a brief statement about your assessment that the patient is improving, declining, or no change.  This information will be displayed automatically on your shift  note.  Outcome: Progressing  Flowsheets (Taken 5/1/2024 1046)  Outcome Evaluation: See progress note.  Plan of Care Reviewed With: patient  Overall Patient Progress: no change  Goal: Patient-Specific Goal (Individualized)  Description: You can add care plan individualizations to a care plan. Examples of Individualization might be:  \"Parent requests to be called daily at 9am for status\", \"I have a hard time hearing out of my right ear\", or \"Do not touch me to wake me up as it startles  me\".  Outcome: Progressing  Goal: Absence of Hospital-Acquired Illness or Injury  Outcome: Progressing  Intervention: Prevent Skin Injury  Recent Flowsheet Documentation  Taken 5/1/2024 1000 by Alo Cee RN  Body Position: supine  Goal: Optimal Comfort and Wellbeing  Outcome: Progressing  Goal: Readiness for Transition of Care  Outcome: Progressing     Problem: Individualization  Goal: Patient Preferences  Outcome: Progressing     Problem: TCU Patient Goals  Goal: TCU Plan of Care  Outcome: Progressing   "   Problem: Goal Outcome Summary  Goal: Goal Outcome Summary  Outcome: Progressing     Problem: BADL (Basic Activities of Daily Living) Impairment  Goal: Optimal Safe BADL Performance  Description: Assess BADL (basic activity of daily living) abilities; encourage participation at maximally safe independent level.    Provide assistance and supervision needed to maintain safety; involve caregiver in BADL (basic activity of daily living) training.    Ensure effective use of equipment or devices, such as a long-handled reacher, shower seat or orthosis.    Ensure proper body mechanics and positioning for optimal task performance.    Provide set-up of items if patient is unable to retrieve; store personal care items in accessible location.    Schedule BADL (basic activity of daily living) activities when pain and fatigue are at a minimum; pace activity to conserve energy.      Outcome: Progressing     Problem: Skin Injury Risk Increased  Goal: Skin Health and Integrity  Description: Perform a full pressure injury risk assessment, as indicated by screening, upon admission to care unit.    Reassess skin (full inspection and injury risk, including skin temperature, consistency and color) frequently (e.g., scheduled interval, with change in condition) to provide optimal early detection and prevention.    Maintain adequate tissue perfusion (e.g., encourage fluid balance; avoid crossing legs, constrictive clothing or devices) to promote tissue oxygenation.    Maintain head of bed at lowest degree of elevation tolerated, considering medical condition and other restrictions. Use positioning supports to prevent sliding and friction. Consider low friction textiles.    Avoid positioning onto an area that remains reddened or on bony prominences.      Outcome: Progressing  Intervention: Optimize Skin Protection  Recent Flowsheet Documentation  Taken 5/1/2024 1000 by Alo Cee RN  Activity Management: activity adjusted per  tolerance  Head of Bed (HOB) Positioning: HOB at 30 degrees     Problem: Fall Injury Risk  Goal: Absence of Fall and Fall-Related Injury  Description: Provide a safe, barrier-free environment that encourages independent activity.    Keep care area uncluttered and well-lighted.    Determine need for increased observation or monitoring.    Avoid use of devices that minimize mobility, such as restraints or indwelling urinary catheter.    Determine need for bed/chair alarms.    Outcome: Progressing     Problem: Infection  Goal: Absence of Infection Signs and Symptoms  Description: Implement transmission-based precautions and isolation, as indicated, to prevent spread of infection.    Obtain cultures prior to initiating antimicrobial therapy, when possible. Do not delay treatment for laboratory results in the presence of high suspicion or clinical indicators.    Administer ordered antimicrobial therapy promptly; reassess need regularly.    Monitor laboratory value, diagnostic test and clinical status trends for signs of infection progression.    Identify early signs of sepsis, such as increased heart rate and decreased blood pressure, as well as changes in mental state, respiratory pattern or peripheral perfusion.    Prepare for rapid sepsis management, including lactate level, intravenous access, fluid administration and oxygen therapy.    Provide fever-reduction and comfort measures.    Outcome: Progressing     Problem: Glycemic Control Impaired  Goal: Blood Glucose Level Within Targeted Range  Description: Establish target blood glucose levels based on patient-specific factors, such as illness severity and comorbidity.    Document blood glucose levels and monitor trend; advocate for treatment if not within targeted range.    Provide pharmacologic therapy to maintain blood glucose levels within targeted range.    Advocate for insulin therapy if blood glucose level remains elevated.    Check blood glucose level if there  is a change in mental or cognitive status.    Outcome: Progressing     Problem: Pain Chronic (Persistent)  Goal: Optimal Pain Control and Function  Description: Evaluate pain level, effect of treatment and patient response at regular intervals.    Minimize pain stimuli; coordinate care and adjust environment (e.g., light, noise, unnecessary movement); promote sleep/rest.    Match pharmacologic analgesia to severity and type of pain mechanism (e.g., neuropathic, muscle, inflammatory); consider multimodal approach (e.g., nonopioid, opioid, adjuvant).    Provide medication at regular intervals; titrate to patient response.    Manage breakthrough pain with additional doses; consider rotation or switching medicate      Outcome: Progressing     Problem: Posttraumatic Stress Disorder  Goal: Improved Mood Symptoms (Posttraumatic Stress Disorder)  Description: Provide opportunity for patient and family/caregiver to express feelings, stressors and self-perception (e.g., verbalization, journaling).    Convey caring approach, empathy and potential for change; hope is key for recovery.    Utilize existing coping strategies and assist in developing new strategies, such as relaxation, music and problem-solving; assess for ineffective strategies (e.g., substance use, isolation).    Recognize past and present achievements, successes and personal strengths.    Empower participation in planning care and activities, as well as development of attainable goals, to increase self-esteem and feelings of control.    Promote self-care; support balanced sleep, physical activity and nutrition.    Outcome: Progressing     Problem: Psychotic Signs/Symptoms  Goal: Improved Mood Symptoms (Psychotic Signs/Symptoms)  Description: Assess subjective and objective presentation of mood and emotion state.    Encourage and promote emotional awareness, acceptance and expression of feelings.    Provide psychoeducation and supportive therapy interventions to  improve mood and emotions.    Provide opportunities for expression, such as verbalization, reflection, journaling, art and role-playing.    Outcome: Progressing   Goal Outcome Evaluation:      Plan of Care Reviewed With: patient    Overall Patient Progress: no changeOverall Patient Progress: no change    Outcome Evaluation: See progress note.

## 2024-05-01 NOTE — PROGRESS NOTES
"   05/01/24 2830   Appointment Info   Signing Clinician's Name / Credentials (PT) Olesya Delgado PTA   PT Assistant Visit Number 1   Rehab Comments (PT) PT: -35 min d/t Pt needing to toilet   Therapeutic Activity   Therapeutic Activities: dynamic activities to improve functional performance Minutes (92160) 10   Treatment Detail/Skilled Intervention PT: upon arrival, Pt supine in bed and reporting \"I am terrible, I have another fracture in my elbow. I have to have surgery on Fri\". Verified that appt on Fri was for an x-ray, not surgery. No change to orders for WB or movement restrictions. Pt verbalized \"I am tired of the commode. I want to use a 4 point cane to use the bathroom\" Educated Pt that OT would provide this type of assessment to look at safety, then if Pt can perform safely, would clear Pt to utilize quad cane for toileting in bathroom vs. using commode. Pt agreeable for this PTA to discuss with OT before today's OT session. Then Pt announced they needed to toilet. Nsg to assist Pt with commode for toileting. Will reapproach if Th has cancel this afternoon.   PT Discharge Planning   PT Plan PT:LE strengthening, standing tolerance and balance, ambulation, pre-stair training. Pt now agreeable to utilize quad cane.   PT Discharge Recommendation (DC Rec) Long term care facility  (therapy recommends nursing home or LTC, but Pt refusing and insisting on d/c home. no  agencies will work with Pt anymore)   PT Rationale for DC Rec Pt currently living alone in an apartment in [place], and continuing to need assist for balance with ambulation, transfers, and stairs, and will likely need assistance at DC.   PT Brief overview of current status CGA to min A for transfers and ambulation with SPC - limited by weakness and balance deficits   Total Session Time   Timed Code Treatment Minutes 10   Total Session Time (sum of timed and untimed services) 10   Post Acute Settings Only   What unit is patient on? TCU   PT - Transitional Care " Unit Time   Individual Time (minutes) - PT 10   Group Time (minutes) - PT 0   Concurrent Time (minutes) - PT 0   Co-Treatment Time (minutes) - PT 0   TCU Total Session Time (minutes) - PT 10   TCU Daily Total Session Time   PT TCU Daily Total Session Time 10   Rehab TCU Daily Total Session Time 10

## 2024-05-01 NOTE — PLAN OF CARE
Mitali is 59 yrs old female came in with L elbow infection. A/O x4, can make her needs known. R thin whole, continent of B/B, L lumen PICC, LUE dressing CDI. Regular BG checks with insuline sliding scale. LBM 4/28, continue with POC.

## 2024-05-02 ENCOUNTER — APPOINTMENT (OUTPATIENT)
Dept: PHYSICAL THERAPY | Facility: SKILLED NURSING FACILITY | Age: 59
DRG: 559 | End: 2024-05-02
Attending: INTERNAL MEDICINE
Payer: COMMERCIAL

## 2024-05-02 ENCOUNTER — APPOINTMENT (OUTPATIENT)
Dept: OCCUPATIONAL THERAPY | Facility: SKILLED NURSING FACILITY | Age: 59
DRG: 559 | End: 2024-05-02
Attending: INTERNAL MEDICINE
Payer: COMMERCIAL

## 2024-05-02 DIAGNOSIS — M25.522 ELBOW PAIN, LEFT: Primary | ICD-10-CM

## 2024-05-02 LAB
ALBUMIN SERPL BCG-MCNC: 3 G/DL (ref 3.5–5.2)
ALP SERPL-CCNC: 138 U/L (ref 40–150)
ALT SERPL W P-5'-P-CCNC: 11 U/L (ref 0–50)
ANION GAP SERPL CALCULATED.3IONS-SCNC: 8 MMOL/L (ref 7–15)
AST SERPL W P-5'-P-CCNC: 15 U/L (ref 0–45)
BASOPHILS # BLD AUTO: 0.1 10E3/UL (ref 0–0.2)
BASOPHILS NFR BLD AUTO: 1 %
BILIRUB SERPL-MCNC: 0.2 MG/DL
BUN SERPL-MCNC: 9.2 MG/DL (ref 8–23)
CALCIUM SERPL-MCNC: 8.4 MG/DL (ref 8.8–10.2)
CHLORIDE SERPL-SCNC: 102 MMOL/L (ref 98–107)
CREAT SERPL-MCNC: 0.82 MG/DL (ref 0.51–0.95)
CRP SERPL-MCNC: 44.22 MG/L
DEPRECATED HCO3 PLAS-SCNC: 31 MMOL/L (ref 22–29)
EGFRCR SERPLBLD CKD-EPI 2021: 81 ML/MIN/1.73M2
EOSINOPHIL # BLD AUTO: 0.3 10E3/UL (ref 0–0.7)
EOSINOPHIL NFR BLD AUTO: 4 %
ERYTHROCYTE [DISTWIDTH] IN BLOOD BY AUTOMATED COUNT: 18.6 % (ref 10–15)
GLUCOSE BLDC GLUCOMTR-MCNC: 131 MG/DL (ref 70–99)
GLUCOSE BLDC GLUCOMTR-MCNC: 138 MG/DL (ref 70–99)
GLUCOSE BLDC GLUCOMTR-MCNC: 140 MG/DL (ref 70–99)
GLUCOSE BLDC GLUCOMTR-MCNC: 158 MG/DL (ref 70–99)
GLUCOSE BLDC GLUCOMTR-MCNC: 162 MG/DL (ref 70–99)
GLUCOSE SERPL-MCNC: 136 MG/DL (ref 70–99)
HCT VFR BLD AUTO: 24.9 % (ref 35–47)
HGB BLD-MCNC: 7.6 G/DL (ref 11.7–15.7)
IMM GRANULOCYTES # BLD: 0.1 10E3/UL
IMM GRANULOCYTES NFR BLD: 1 %
LYMPHOCYTES # BLD AUTO: 2.2 10E3/UL (ref 0.8–5.3)
LYMPHOCYTES NFR BLD AUTO: 30 %
MCH RBC QN AUTO: 25.7 PG (ref 26.5–33)
MCHC RBC AUTO-ENTMCNC: 30.5 G/DL (ref 31.5–36.5)
MCV RBC AUTO: 84 FL (ref 78–100)
MONOCYTES # BLD AUTO: 0.6 10E3/UL (ref 0–1.3)
MONOCYTES NFR BLD AUTO: 8 %
NEUTROPHILS # BLD AUTO: 4.1 10E3/UL (ref 1.6–8.3)
NEUTROPHILS NFR BLD AUTO: 56 %
NRBC # BLD AUTO: 0 10E3/UL
NRBC BLD AUTO-RTO: 0 /100
PLATELET # BLD AUTO: 565 10E3/UL (ref 150–450)
POTASSIUM SERPL-SCNC: 3.5 MMOL/L (ref 3.4–5.3)
PROT SERPL-MCNC: 5.8 G/DL (ref 6.4–8.3)
RBC # BLD AUTO: 2.96 10E6/UL (ref 3.8–5.2)
SODIUM SERPL-SCNC: 141 MMOL/L (ref 135–145)
VANCOMYCIN SERPL-MCNC: 24.9 UG/ML
WBC # BLD AUTO: 7.3 10E3/UL (ref 4–11)

## 2024-05-02 PROCEDURE — 97530 THERAPEUTIC ACTIVITIES: CPT | Mod: GP

## 2024-05-02 PROCEDURE — 258N000003 HC RX IP 258 OP 636: Performed by: INTERNAL MEDICINE

## 2024-05-02 PROCEDURE — 250N000013 HC RX MED GY IP 250 OP 250 PS 637: Performed by: INTERNAL MEDICINE

## 2024-05-02 PROCEDURE — 80053 COMPREHEN METABOLIC PANEL: CPT | Performed by: INTERNAL MEDICINE

## 2024-05-02 PROCEDURE — 80202 ASSAY OF VANCOMYCIN: CPT | Performed by: INTERNAL MEDICINE

## 2024-05-02 PROCEDURE — 250N000013 HC RX MED GY IP 250 OP 250 PS 637

## 2024-05-02 PROCEDURE — 86140 C-REACTIVE PROTEIN: CPT | Performed by: INTERNAL MEDICINE

## 2024-05-02 PROCEDURE — 250N000011 HC RX IP 250 OP 636: Performed by: INTERNAL MEDICINE

## 2024-05-02 PROCEDURE — 250N000011 HC RX IP 250 OP 636: Mod: JZ | Performed by: INTERNAL MEDICINE

## 2024-05-02 PROCEDURE — 85025 COMPLETE CBC W/AUTO DIFF WBC: CPT | Performed by: INTERNAL MEDICINE

## 2024-05-02 PROCEDURE — 97110 THERAPEUTIC EXERCISES: CPT | Mod: GP

## 2024-05-02 PROCEDURE — 120N000009 HC R&B SNF

## 2024-05-02 PROCEDURE — 36592 COLLECT BLOOD FROM PICC: CPT | Performed by: INTERNAL MEDICINE

## 2024-05-02 PROCEDURE — 97535 SELF CARE MNGMENT TRAINING: CPT | Mod: GO

## 2024-05-02 RX ADMIN — INSULIN ASPART 1 UNITS: 100 INJECTION, SOLUTION INTRAVENOUS; SUBCUTANEOUS at 11:50

## 2024-05-02 RX ADMIN — ATORVASTATIN CALCIUM 10 MG: 10 TABLET, FILM COATED ORAL at 21:24

## 2024-05-02 RX ADMIN — HYDRALAZINE HYDROCHLORIDE 10 MG: 10 TABLET, FILM COATED ORAL at 01:29

## 2024-05-02 RX ADMIN — INSULIN ASPART 1 UNITS: 100 INJECTION, SOLUTION INTRAVENOUS; SUBCUTANEOUS at 17:38

## 2024-05-02 RX ADMIN — FERROUS SULFATE TAB 325 MG (65 MG ELEMENTAL FE) 325 MG: 325 (65 FE) TAB at 09:00

## 2024-05-02 RX ADMIN — Medication 5 ML: at 00:30

## 2024-05-02 RX ADMIN — VANCOMYCIN HYDROCHLORIDE 750 MG: 10 INJECTION, POWDER, LYOPHILIZED, FOR SOLUTION INTRAVENOUS at 21:16

## 2024-05-02 RX ADMIN — Medication 1 CAPSULE: at 21:24

## 2024-05-02 RX ADMIN — OXYCODONE HYDROCHLORIDE 10 MG: 5 TABLET ORAL at 07:50

## 2024-05-02 RX ADMIN — GABAPENTIN 600 MG: 300 CAPSULE ORAL at 07:51

## 2024-05-02 RX ADMIN — MICONAZOLE NITRATE: 20 POWDER TOPICAL at 07:56

## 2024-05-02 RX ADMIN — CLONIDINE HYDROCHLORIDE 0.2 MG: 0.1 TABLET ORAL at 07:50

## 2024-05-02 RX ADMIN — OXYCODONE HYDROCHLORIDE 10 MG: 5 TABLET ORAL at 01:30

## 2024-05-02 RX ADMIN — CITALOPRAM HYDROBROMIDE 20 MG: 20 TABLET ORAL at 21:24

## 2024-05-02 RX ADMIN — ACETAMINOPHEN 975 MG: 325 TABLET, FILM COATED ORAL at 23:40

## 2024-05-02 RX ADMIN — LUMATEPERONE 84 MG: 42 CAPSULE ORAL at 21:24

## 2024-05-02 RX ADMIN — GABAPENTIN 600 MG: 300 CAPSULE ORAL at 21:24

## 2024-05-02 RX ADMIN — OXYCODONE HYDROCHLORIDE 10 MG: 5 TABLET ORAL at 12:44

## 2024-05-02 RX ADMIN — ACETAMINOPHEN 975 MG: 325 TABLET, FILM COATED ORAL at 12:44

## 2024-05-02 RX ADMIN — Medication 5 ML: at 07:37

## 2024-05-02 RX ADMIN — VANCOMYCIN HYDROCHLORIDE 1000 MG: 1 INJECTION, SOLUTION INTRAVENOUS at 10:37

## 2024-05-02 RX ADMIN — Medication 1 CAPSULE: at 07:50

## 2024-05-02 RX ADMIN — HEPARIN, PORCINE (PF) 10 UNIT/ML INTRAVENOUS SYRINGE 5 ML: at 16:26

## 2024-05-02 RX ADMIN — ASPIRIN 162 MG: 81 TABLET, COATED ORAL at 07:50

## 2024-05-02 RX ADMIN — OMEPRAZOLE 20 MG: 20 CAPSULE, DELAYED RELEASE ORAL at 07:50

## 2024-05-02 RX ADMIN — ACETAMINOPHEN 975 MG: 325 TABLET, FILM COATED ORAL at 01:29

## 2024-05-02 RX ADMIN — AMLODIPINE BESYLATE 5 MG: 5 TABLET ORAL at 07:50

## 2024-05-02 RX ADMIN — GABAPENTIN 600 MG: 300 CAPSULE ORAL at 14:06

## 2024-05-02 RX ADMIN — ERTAPENEM SODIUM 1 G: 1 INJECTION, POWDER, LYOPHILIZED, FOR SOLUTION INTRAMUSCULAR; INTRAVENOUS at 16:26

## 2024-05-02 RX ADMIN — CLONIDINE HYDROCHLORIDE 0.2 MG: 0.1 TABLET ORAL at 21:24

## 2024-05-02 RX ADMIN — OXYCODONE HYDROCHLORIDE 10 MG: 5 TABLET ORAL at 23:40

## 2024-05-02 RX ADMIN — INSULIN GLARGINE 5 UNITS: 100 INJECTION, SOLUTION SUBCUTANEOUS at 21:25

## 2024-05-02 ASSESSMENT — ACTIVITIES OF DAILY LIVING (ADL)
ADLS_ACUITY_SCORE: 33
ADLS_ACUITY_SCORE: 33
ADLS_ACUITY_SCORE: 34
ADLS_ACUITY_SCORE: 35
ADLS_ACUITY_SCORE: 34
ADLS_ACUITY_SCORE: 34
ADLS_ACUITY_SCORE: 33
ADLS_ACUITY_SCORE: 34
ADLS_ACUITY_SCORE: 34
ADLS_ACUITY_SCORE: 33
ADLS_ACUITY_SCORE: 35
ADLS_ACUITY_SCORE: 34
ADLS_ACUITY_SCORE: 35
ADLS_ACUITY_SCORE: 33
ADLS_ACUITY_SCORE: 34

## 2024-05-02 NOTE — PLAN OF CARE
Goal Outcome Evaluation:    Plan of Care Reviewed With: patient    Overall Patient Progress: no change    Outcome Evaluation: No further c/o pain after receiving Tylenol and Oxycodone earlier in the shift. LUE with splint and sling in place. Elevated on pillows while in bed. BP greatly improved after PRN Hydralazine.  See flowsheet. NG at 0200= 131. Pt has no c/o SOB and no s/s of respiratory issue noted at RA. Appear to be sleeping/resting between cares/ meds. Continue with plan of care.     See Previous RN Note.    Patient's most recent vital signs are:     Vital signs:  BP: 122/47  Temp: 98.1  HR: 82  RR: 18  SpO2: 94 %     Patient does not have new respiratory symptoms.  Patient does not have new sore throat.  Patient does not have a fever greater than 99.5.

## 2024-05-02 NOTE — CARE PLAN
"Called by RN to evaluate patient given concerns for slow speech.  On arrival, patient reports pain in the left arm following surgery.  She also states she feels as though she is \" floating\".  On review of patient's medication, she had received gabapentin and oxycodone this evening.  Hospital course and TCU course has been significant for encephalopathy.  A code stroke was called on 4/17/2024, stroke workup was negative at that time.  Neurology was consulted recommended delirium precautions.    On exam, patient vitals have within normal limits.  She was lying comfortably in bed in no acute distress or discomfort.  There was no slurring of speech.  She was able to follow commands and answer questions appropriately.  She does have some semblance of confusion, but this is mild.  CN II-XII was unremarkable.  There were no appreciable weakness in either right upper extremity or bilateral lower extremity.    Patient presentation most likely represents hospital delirium. Will need to r/o hypercarbia as well.  She is adequately covered with antibiotics, so less concern for infection.    - Check VBG, if overt hypercarbia, will recommend BiPAP  -Check CBC, CMP  -Follow-up CT brain  -  Delirium precautions   - Continue to treat underlying illness per primary team  - Continue to address sensory cognitive impairments, with frequent reorientation in other sensory aids   - Decrease/prevent restraints, remove unnecessary lines or catheters.   - Promote good sleep hygiene, with use nighttime noise and intervention   -Judicious use of narcotics.    CHINYERE WASHINGTON MD  Hospitalist Service  [facility]      UPDATE from Guerline Mcdowell MD:    VBG with slightly high CO2, but normal PH  CBC and CMP unconcerning  CT brain normal on prelim read    Continue above delirium precautions    Guerline Mcdowell MD  5/1/2024  2:22 AM     "

## 2024-05-02 NOTE — PROVIDER NOTIFICATION
Pt observed to be lethargic and stated she felt dizzy and had a bad headache. Pt also claimed she felt disoriented and was unable to answer correctly when asked where she was.    Provider notified: Pt has a headache rated 9/10, states she feels dizzy, has slightly slurred speech, and states she feels disoriented. Neuro intact. Vitals stable. . Could you please come assess?    Provider came to assess pt (see provider note).

## 2024-05-02 NOTE — PLAN OF CARE
VS:  /64 (BP Location: Left leg, Patient Position: Supine)   Pulse 66   Temp 98.1  F (36.7  C) (Oral)   Resp 18   Wt 108.6 kg (239 lb 6.7 oz)   SpO2 92%   BMI 35.36 kg/m     O2: Stable on RA, denied SOB & CP   Output: Spontaneously voids to bedside commode without difficulty   Last BM: 5/2/24   Activity: A1 w/gait belt & walker for stand pivot    Skin: L arm surgical incision MARGIE   Pain: Managed w/ prn oxycodone & tylenol   CMS: A/O x4, denied N/T   Dressing: L arm dressing CDI   Diet: Reg/thin/whole  BG checks w/sliding scale   LDA: L PICC running antibiotics/SL   Plan: Continue with therapies and pain mangement   Additional Info:       Goal Outcome Evaluation:      Plan of Care Reviewed With: patient    Overall Patient Progress: no changeOverall Patient Progress: no change    Outcome Evaluation: See progress note.

## 2024-05-02 NOTE — PLAN OF CARE
Goal Outcome Evaluation:      Plan of Care Reviewed With: patient    Overall Patient Progress: no changeOverall Patient Progress: no change    A&Ox4, intermittent confusion. VSS on RA. Continent of bowel and bladder. Regular diet, good appetite. , 246. On sliding scale insulin. Takes medications whole with thin liquids. PRN oxycodone given x1. Single lumen PICC on R arm, blood return noted. Sling and cast to LUE. Denies chest pain, SOB, N/V. Call light within reach. Continue with plan of care.      Patient's most recent vital signs are:     Vital signs:  BP: 129/57  Temp: 98.1  HR: 67  RR: 16  SpO2: 92 %     Patient does not have new respiratory symptoms.  Patient does not have new sore throat.  Patient does not have a fever greater than 99.5.

## 2024-05-02 NOTE — PLAN OF CARE
Pt called around 0100 to be assisted to BSC. Alert and fully awake. A of 1 to BSC with walker and gait belt. Had small BM mixed with urine. Able to do own pericares. Pt asking if she was already done with her CT. Reminded pt that she went around midnight via stretcher and was actually sound asleep when she left. Pt had CT of head without contrast d/t episode of confusion, intense weakness, somnolence and slurred speech with evening shift around 8 pm. Per pt, she always had cotton mouth d/t oral dryness r/t being diabetic therefore, speech can sometimes be  slurred. Feeling better per pt. Vital signs C/O 10/10 LUE pain. Oxycodone 10 mg tab and Tylenol 975c mg. Vital signs stable except for elevated BP: BP (!) 189/86 (BP Location: Left leg)   Pulse 82   Temp 98.1  F (36.7  C) (Oral)   Resp 18  SpO2 94%  RA. 10/10 pain and activity since pt had just transferred contributed elevated BP. PRN Hydralazine 10 mg given. Will monitor. CT of head came back with Preliminary result of:                                                                    RESIDENT PRELIMINARY INTERPRETATION  IMPRESSION: Within the limitations of motion artifact, there is no  acute intracranial pathology.     Will continue to monitor status and intervene as needed.

## 2024-05-02 NOTE — PHARMACY-VANCOMYCIN DOSING SERVICE
Pharmacy Vancomycin Note  Date of Service May 2, 2024  Patient's  1965   59 year old, female    Indication: Bone and Joint Infection  Day of Therapy: Since   Current vancomycin regimen:  1000 mg IV q12h  Current vancomycin monitoring method: AUC  Current vancomycin therapeutic monitoring goal: 400-600 mg*h/L    [employer]ight[employer] Prediction of Current Vancomycin Regimen  Loading dose: N/A  Regimen: 1000 mg IV every 12 hours.  Start time: 22:37 on 2024  Exposure target: AUC24 (range)400-600 mg/L.hr   AUC24,ss: 573 mg/L.hr  Probability of AUC24 > 400: 100 %  Ctrough,ss: 19.9 mg/L  Probability of Ctrough,ss > 20: 48 %  Probability of nephrotoxicity (Lodise CHANDAN ): 17 %      Current estimated CrCl = Estimated Creatinine Clearance: 95.1 mL/min (based on SCr of 0.82 mg/dL).    Creatinine for last 3 days  2024: 10:57 PM Creatinine 0.92 mg/dL  2024:  7:38 AM Creatinine 0.82 mg/dL    Recent Vancomycin Levels (past 3 days)  2024:  7:38 AM Vancomycin 24.9 ug/mL    Vancomycin IV Administrations (past 72 hours)                     vancomycin (VANCOCIN) 1,000 mg in 200 mL dextrose intermittent infusion (mg) 1,000 mg New Bag 24 1037     1,000 mg New Bag 24 2310     1,000 mg New Bag  1136     1,000 mg New Bag 24 2203     1,000 mg New Bag  0901     1,000 mg New Bag 24 2236                    Nephrotoxins and other renal medications (From now, onward)      Start     Dose/Rate Route Frequency Ordered Stop    24 2200  vancomycin (VANCOCIN) 750 mg in sodium chloride 0.9 % 250 mL intermittent infusion         750 mg  over 90 Minutes Intravenous EVERY 12 HOURS 24 1228                 Contrast Orders - past 72 hours (72h ago, onward)      None            Interpretation of levels and current regimen:  Vancomycin level is reflective of -600 although on upper end of goal with predicted trough on upper end of goal, higher % toxicity (17%), and higher Probability of  concentration that trough is above 20 mcg/mL (48%).     Has serum creatinine changed greater than 50% in last 72 hours: No    Urine output:  unable to determine    Renal Function: Stable    Insight[employer] Prediction of Planned New Vancomycin Regimen  Loading dose: N/A  Regimen: 750 mg IV every 12 hours.  Start time: 22:37 on 05/02/2024  Exposure target: AUC24 (range)400-600 mg/L.hr   AUC24,ss: 437 mg/L.hr  Probability of AUC24 > 400: 84 %  Ctrough,ss: 15.2 mg/L  Probability of Ctrough,ss > 20: 1 %  Probability of nephrotoxicity (Lodise CHANDAN 2009): 10 %      Plan:  Decrease Dose to 750 mg IV every 12 hours  Vancomycin monitoring method: AUC  Vancomycin therapeutic monitoring goal: 400-600 mg*h/L  Pharmacy will check vancomycin levels as appropriate in 1-3 Days.  Serum creatinine levels will be ordered a minimum of twice weekly.    Genoveva Roth, [facility]

## 2024-05-03 ENCOUNTER — OFFICE VISIT (OUTPATIENT)
Dept: ORTHOPEDICS | Facility: CLINIC | Age: 59
End: 2024-05-03
Payer: COMMERCIAL

## 2024-05-03 ENCOUNTER — ANCILLARY PROCEDURE (OUTPATIENT)
Dept: GENERAL RADIOLOGY | Facility: CLINIC | Age: 59
End: 2024-05-03
Attending: PHYSICIAN ASSISTANT
Payer: MEDICARE

## 2024-05-03 ENCOUNTER — APPOINTMENT (OUTPATIENT)
Dept: PHYSICAL THERAPY | Facility: SKILLED NURSING FACILITY | Age: 59
DRG: 559 | End: 2024-05-03
Attending: INTERNAL MEDICINE
Payer: COMMERCIAL

## 2024-05-03 DIAGNOSIS — Z96.622 INFECTION ASSOCIATED WITH PROSTHESIS OF LEFT ELBOW JOINT (H): ICD-10-CM

## 2024-05-03 DIAGNOSIS — Z98.890 POST-OPERATIVE STATE: Primary | ICD-10-CM

## 2024-05-03 DIAGNOSIS — M25.522 ELBOW PAIN, LEFT: ICD-10-CM

## 2024-05-03 DIAGNOSIS — T84.59XA INFECTION ASSOCIATED WITH PROSTHESIS OF LEFT ELBOW JOINT (H): ICD-10-CM

## 2024-05-03 LAB
GLUCOSE BLDC GLUCOMTR-MCNC: 138 MG/DL (ref 70–99)
GLUCOSE BLDC GLUCOMTR-MCNC: 140 MG/DL (ref 70–99)
GLUCOSE BLDC GLUCOMTR-MCNC: 153 MG/DL (ref 70–99)
GLUCOSE BLDC GLUCOMTR-MCNC: 156 MG/DL (ref 70–99)
GLUCOSE BLDC GLUCOMTR-MCNC: 180 MG/DL (ref 70–99)

## 2024-05-03 PROCEDURE — 258N000003 HC RX IP 258 OP 636: Performed by: INTERNAL MEDICINE

## 2024-05-03 PROCEDURE — 250N000013 HC RX MED GY IP 250 OP 250 PS 637: Performed by: INTERNAL MEDICINE

## 2024-05-03 PROCEDURE — 250N000013 HC RX MED GY IP 250 OP 250 PS 637

## 2024-05-03 PROCEDURE — 99024 POSTOP FOLLOW-UP VISIT: CPT | Performed by: PHYSICIAN ASSISTANT

## 2024-05-03 PROCEDURE — 97530 THERAPEUTIC ACTIVITIES: CPT | Mod: GP

## 2024-05-03 PROCEDURE — 120N000009 HC R&B SNF

## 2024-05-03 PROCEDURE — 250N000011 HC RX IP 250 OP 636: Mod: JZ | Performed by: INTERNAL MEDICINE

## 2024-05-03 PROCEDURE — 97110 THERAPEUTIC EXERCISES: CPT | Mod: GP

## 2024-05-03 PROCEDURE — 73080 X-RAY EXAM OF ELBOW: CPT | Mod: LT | Performed by: RADIOLOGY

## 2024-05-03 PROCEDURE — 250N000011 HC RX IP 250 OP 636: Performed by: INTERNAL MEDICINE

## 2024-05-03 PROCEDURE — 29065 APPL CST SHO TO HAND LNG ARM: CPT | Mod: 58 | Performed by: PHYSICIAN ASSISTANT

## 2024-05-03 RX ADMIN — LUMATEPERONE 84 MG: 42 CAPSULE ORAL at 22:22

## 2024-05-03 RX ADMIN — VANCOMYCIN HYDROCHLORIDE 750 MG: 10 INJECTION, POWDER, LYOPHILIZED, FOR SOLUTION INTRAVENOUS at 21:05

## 2024-05-03 RX ADMIN — Medication 1 CAPSULE: at 10:00

## 2024-05-03 RX ADMIN — MICONAZOLE NITRATE: 20 POWDER TOPICAL at 21:11

## 2024-05-03 RX ADMIN — Medication 1 CAPSULE: at 21:06

## 2024-05-03 RX ADMIN — OMEPRAZOLE 20 MG: 20 CAPSULE, DELAYED RELEASE ORAL at 10:00

## 2024-05-03 RX ADMIN — GABAPENTIN 600 MG: 300 CAPSULE ORAL at 15:38

## 2024-05-03 RX ADMIN — INSULIN ASPART 1 UNITS: 100 INJECTION, SOLUTION INTRAVENOUS; SUBCUTANEOUS at 11:48

## 2024-05-03 RX ADMIN — ERTAPENEM SODIUM 1 G: 1 INJECTION, POWDER, LYOPHILIZED, FOR SOLUTION INTRAMUSCULAR; INTRAVENOUS at 16:17

## 2024-05-03 RX ADMIN — OXYCODONE HYDROCHLORIDE 10 MG: 5 TABLET ORAL at 09:13

## 2024-05-03 RX ADMIN — ATORVASTATIN CALCIUM 10 MG: 10 TABLET, FILM COATED ORAL at 21:06

## 2024-05-03 RX ADMIN — AMLODIPINE BESYLATE 5 MG: 5 TABLET ORAL at 10:00

## 2024-05-03 RX ADMIN — HEPARIN, PORCINE (PF) 10 UNIT/ML INTRAVENOUS SYRINGE 5 ML: at 16:17

## 2024-05-03 RX ADMIN — MICONAZOLE NITRATE: 20 POWDER TOPICAL at 11:00

## 2024-05-03 RX ADMIN — GABAPENTIN 600 MG: 300 CAPSULE ORAL at 10:00

## 2024-05-03 RX ADMIN — VANCOMYCIN HYDROCHLORIDE 750 MG: 10 INJECTION, POWDER, LYOPHILIZED, FOR SOLUTION INTRAVENOUS at 11:00

## 2024-05-03 RX ADMIN — ACETAMINOPHEN 975 MG: 325 TABLET, FILM COATED ORAL at 09:13

## 2024-05-03 RX ADMIN — CITALOPRAM HYDROBROMIDE 20 MG: 20 TABLET ORAL at 21:05

## 2024-05-03 RX ADMIN — OXYCODONE HYDROCHLORIDE 10 MG: 5 TABLET ORAL at 15:38

## 2024-05-03 RX ADMIN — OXYCODONE HYDROCHLORIDE 10 MG: 5 TABLET ORAL at 21:18

## 2024-05-03 RX ADMIN — INSULIN GLARGINE 5 UNITS: 100 INJECTION, SOLUTION SUBCUTANEOUS at 21:12

## 2024-05-03 RX ADMIN — GABAPENTIN 600 MG: 300 CAPSULE ORAL at 21:06

## 2024-05-03 RX ADMIN — ACETAMINOPHEN 975 MG: 325 TABLET, FILM COATED ORAL at 18:20

## 2024-05-03 RX ADMIN — CLONIDINE HYDROCHLORIDE 0.2 MG: 0.1 TABLET ORAL at 21:06

## 2024-05-03 RX ADMIN — INSULIN ASPART 1 UNITS: 100 INJECTION, SOLUTION INTRAVENOUS; SUBCUTANEOUS at 18:12

## 2024-05-03 RX ADMIN — ASPIRIN 162 MG: 81 TABLET, COATED ORAL at 10:01

## 2024-05-03 ASSESSMENT — ACTIVITIES OF DAILY LIVING (ADL)
ADLS_ACUITY_SCORE: 35
ADLS_ACUITY_SCORE: 35
ADLS_ACUITY_SCORE: 33
ADLS_ACUITY_SCORE: 35

## 2024-05-03 NOTE — PLAN OF CARE
Pt is alert and oriented x4. Able to make needs known to staff.  Assist of one with pivot to BSC.  Single lumen PICC on right arm.  Left arm is in cast with splint dressing CDI. Continent of bowel and bladder. BG check x4. Last check was 140 and 158.  On a regular diet.  Pt denies SOB, chest pain and N/V. Will contiune plan of care.        Patient's most recent vital signs are:     Vital signs:  BP: 130/57  Temp: 98.3  HR: 68  RR: 18  SpO2: 92 %     Patient does not have new respiratory symptoms.  Patient does not have new sore throat.  Patient does not have a fever greater than 99.5.

## 2024-05-03 NOTE — PLAN OF CARE
Goal Outcome Evaluation:  Pt.A&Ox4. Requested and rec'd Tyl./Oxycodone @ 8790 for c/o LUE pain - posterior slab splint / ace wrap and sling in place and elevated. Applied prevalon boots to BLEs. Continent B&B using BSC with assist of 1. BG @ 0200 = 153. Has Ortho appt./ XR this afternoon w/pickup between 1:06 - 1:16 via wheelchair - pt.aware.        Patient's most recent vital signs are:     Vital signs:  BP: 130/57  Temp: 98.3  HR: 68  RR: 18  SpO2: 92 %     Patient does not have new respiratory symptoms.  Patient does not have new sore throat.  Patient does not have a fever greater than 99.5.

## 2024-05-03 NOTE — LETTER
5/3/2024         RE: Mitali Robles  [address]  [address]  [place] [address]        Dear Colleague,    Thank you for referring your patient, Mitali Robles, to the [facility] [place]. Please see a copy of my visit note below.    Date of Service: May 3, 2024    Chief Complaint: Post operative follow up.     Date of Surgery: 4/10/24    Procedure Performed:   1) Removal of left total elbow prosthesis with radical debridement and synovectomy, CPT 42105, 22 modifier   2) Resection of achilles tendon allograft from prior triceps tendon reconstruction CPT 76219  3) Ulnar nerve neurolysis CPT 70421  4) Placement of custom articulating antibiotic spacer     Interval events: Mitali Robles is a 59 year old female who presents today for a postoperative follow up. She reports pain has been moderately well controlled. She states the current splint does not feel well supported and she feels like her elbow is popping.     Patient is currently residing in [facility].  She is followed by infectious disease, currently on IV vancomycin and ertapenem.     The past medical history was reviewed updated in the EMR. This includes medications, surgeries, social history, and review of systems.    Physical examination:  Well-developed, well-nourished and in no acute distress.  Alert and oriented to surroundings.  On examination of the  left elbow, incision is well-healed with sutures in place.  There is no erythema, drainage, or dehiscence. Swelling is Moderate, particularly in the hand. Sensation is intact in median, radial and ulnar nerve distributions. Patient can actively flex and extend all digits and thumb.  Fingers are warm and well-perfused.     Radiographs: Three views of the left elbow were obtained and reviewed. These demonstrate post operative changes of cement spacer to the left elbow. Stable alignment of displaced proximal ulnar fracture.     Assessment: 59 year old female s/p the above  procedures, postoperative course complicated by increased displacement of proximal ulna fracture.    Plan:    - Long-arm cast placed today.  This should stay clean dry and intact for 6 weeks.  Cast cares reviewed. Instructed patient to notify TCU nursing of any rubbing or areas of soreness in the cast, particularly to the upper arm .  -  Antibiotic plan per infectious disease.  Her next follow-up with our infectious disease department is on 5/7/2024.  She also has an appointment already scheduled with her infectious disease doctor back home Dr. Dominguez on 5/23/2024.  -Follow-up in 6 weeks with Dr. Ulises Sarmiento for recheck x-rays out of cast.    SHRUTHI BADILLO PA-C  May 3, 2024 1:54 PM   Orthopaedic Surgery        Cast/splint application    Date/Time: 5/3/2024 3:02 PM    Performed by: Jacques Mack ATC  Authorized by: Shruthi Badillo PA-C    Consent:     Consent obtained:  Verbal    Consent given by:  Patient    Risks discussed:  Discoloration, numbness, pain and swelling  Pre-procedure details:     Sensation:  Normal  Procedure details:     Laterality:  Left    Location:  Elbow    Elbow:  L elbow    Strapping: no      Cast type:  Long arm    Supplies:  Fiberglass  Post-procedure details:     Pain:  Improved    Sensation:  Normal    Patient tolerance of procedure:  Tolerated well, no immediate complications    Patient provided with cast or splint care instructions: Yes

## 2024-05-03 NOTE — PLAN OF CARE
Goal Outcome Evaluation:      Plan of Care Reviewed With: patient    Overall Patient Progress: no changeOverall Patient Progress: no change       Pt alert and oriented x4. Able to make needs and concerns known. Denied chest pain, SOB. Pain reported as 10/10. Tylenol and oxycodone given as needed for pain. Pt with PICC on right forearm, CDI.  Pt with LUE posterior slab splint / ace wrap and sling in place and elevated.  Blood sugar checked with insulin coverage given. Slept between cares. Lunch taken with good appetite, maintained on regula diet. Pt able to take meds whole with thin liquids. Afternoon care done, washed hair and changed pt's clothes for appointment; x-ray of elbow and post-op visit. Continent to bowel and bladder, with 1 assist, using duran and gait belt. With bedside commode in use. Will continue POC.    Patient's most recent vital signs are:     Vital signs:  BP: 114/42  Temp: 98.3  HR: 73  RR: 18  SpO2: 94 %     Patient does not have new respiratory symptoms.  Patient does not have new sore throat.  Patient does not have a fever greater than 99.5.

## 2024-05-03 NOTE — PROGRESS NOTES
Date of Service: May 3, 2024    Chief Complaint: Post operative follow up.     Date of Surgery: 4/10/24    Procedure Performed:   1) Removal of left total elbow prosthesis with radical debridement and synovectomy, CPT 50379, 22 modifier   2) Resection of achilles tendon allograft from prior triceps tendon reconstruction CPT 85870  3) Ulnar nerve neurolysis CPT 96946  4) Placement of custom articulating antibiotic spacer     Interval events: Mitali Robles is a 59 year old female who presents today for a postoperative follow up. She reports pain has been moderately well controlled. She states the current splint does not feel well supported and she feels like her elbow is popping.     Patient is currently residing in [facility].  She is followed by infectious disease, currently on IV vancomycin and ertapenem.     The past medical history was reviewed updated in the EMR. This includes medications, surgeries, social history, and review of systems.    Physical examination:  Well-developed, well-nourished and in no acute distress.  Alert and oriented to surroundings.  On examination of the  left elbow, incision is well-healed with sutures in place.  There is no erythema, drainage, or dehiscence. Swelling is Moderate, particularly in the hand. Sensation is intact in median, radial and ulnar nerve distributions. Patient can actively flex and extend all digits and thumb.  Fingers are warm and well-perfused.     Radiographs: Three views of the left elbow were obtained and reviewed. These demonstrate post operative changes of cement spacer to the left elbow. Stable alignment of displaced proximal ulnar fracture.     Assessment: 59 year old female s/p the above procedures, postoperative course complicated by increased displacement of proximal ulna fracture.    Plan:    - Long-arm cast placed today.  This should stay clean dry and intact for 6 weeks.  Cast cares reviewed. Instructed patient to notify TCU nursing of any  rubbing or areas of soreness in the cast, particularly to the upper arm .  -  Antibiotic plan per infectious disease.  Her next follow-up with our infectious disease department is on 5/7/2024.  She also has an appointment already scheduled with her infectious disease doctor back home Dr. Dominguez on 5/23/2024.  -Follow-up in 6 weeks with Dr. Ulises Sarmiento for recheck x-rays out of cast.    CHANG BULL PA-C  May 3, 2024 1:54 PM   Orthopaedic Surgery

## 2024-05-03 NOTE — PROGRESS NOTES
Cast/splint application    Date/Time: 5/3/2024 3:02 PM    Performed by: Jacques Mack ATC  Authorized by: Shruthi Badillo PA-C    Consent:     Consent obtained:  Verbal    Consent given by:  Patient    Risks discussed:  Discoloration, numbness, pain and swelling  Pre-procedure details:     Sensation:  Normal  Procedure details:     Laterality:  Left    Location:  Elbow    Elbow:  L elbow    Strapping: no      Cast type:  Long arm    Supplies:  Fiberglass  Post-procedure details:     Pain:  Improved    Sensation:  Normal    Patient tolerance of procedure:  Tolerated well, no immediate complications    Patient provided with cast or splint care instructions: Yes

## 2024-05-03 NOTE — NURSING NOTE
Reason For Visit:   Chief Complaint   Patient presents with    Surgical Followup     Post op 1) Removal of left total elbow prosthesis with radical debridement and synovectomy, 2) Resection of achilles tendon allograft from prior triceps tendon reconstruction 3) Ulnar nerve neurolysis 4) Placement of custom articulating antibiotic spacer  DOS: 4/10/24         Primary MD: Alo Giang  Ref. MD: Mary    Age: 59 year old    ?  No      There were no vitals taken for this visit.      Pain Assessment  Patient Currently in Pain: Yes  0-10 Pain Scale: 10  Primary Pain Location: Elbow (left)  Pain Descriptors: Aching, Constant, Burning, Discomfort, Pins and needles  Alleviating Factors: Pain medication    Hand Dominance Evaluation  Hand Dominance: Right          QuickDASH Assessment      2/12/2024     3:29 PM   QuickDASH Main   1. Open a tight or new jar Severe difficulty   2. Do heavy household chores (e.g., wash walls, floors) Moderate difficulty   3. Carry a shopping bag or briefcase No difficulty   4. Wash your back Mild difficulty   5. Use a knife to cut food Moderate difficulty   6. Recreational activities in which you take some force or impact through your arm, shoulder or hand (e.g., golf, hammering, tennis, etc.) Severe difficulty   7. During the past week, to what extent has your arm, shoulder or hand problem interfered with your normal social activities with family, friends, neighbours or groups Extremely   8. During the past week, were you limited in your work or other regular daily activities as a result of your arm, shoulder or hand problem Unable   9. Arm, shoulder or hand pain Severe   10.Tingling (pins and needles) in your arm,shoulder or hand Moderate   11. During the past week, how much difficulty have you had sleeping because of the pain in your arm, shoulder or hand Moderate difficulty   Quickdash Ability Score 59.09          No current outpatient medications on file.       Allergies  "  Allergen Reactions    Morphine Anaphylaxis     Throat swells shut  **Has taken hydrocodone/APAP and hydromorphone in the past during previous hospitalizations with no issues.  Takes oxycodone at home    Succinylcholine Shortness Of Breath     \"it affected my breathing\"    Fentanyl Nausea and Vomiting    Hydromorphone      Received 4/10 in OR without issue (was listed as allergy but has had in past without incident)    Methadone Nausea and Vomiting    Prednisone Swelling     \"it overrides my psych meds and makes me crazy\"    Pregabalin     Quetiapine     Trazodone     Sumatriptan Rash       MARIVEL BARRETT, ATC    "

## 2024-05-04 LAB
GLUCOSE BLDC GLUCOMTR-MCNC: 168 MG/DL (ref 70–99)
GLUCOSE BLDC GLUCOMTR-MCNC: 169 MG/DL (ref 70–99)
GLUCOSE BLDC GLUCOMTR-MCNC: 172 MG/DL (ref 70–99)
GLUCOSE BLDC GLUCOMTR-MCNC: 176 MG/DL (ref 70–99)
GLUCOSE BLDC GLUCOMTR-MCNC: 181 MG/DL (ref 70–99)
HOLD SPECIMEN: NORMAL
HOLD SPECIMEN: NORMAL
VANCOMYCIN SERPL-MCNC: 18.4 UG/ML

## 2024-05-04 PROCEDURE — 250N000013 HC RX MED GY IP 250 OP 250 PS 637: Performed by: INTERNAL MEDICINE

## 2024-05-04 PROCEDURE — 99309 SBSQ NF CARE MODERATE MDM 30: CPT | Performed by: INTERNAL MEDICINE

## 2024-05-04 PROCEDURE — 250N000013 HC RX MED GY IP 250 OP 250 PS 637

## 2024-05-04 PROCEDURE — 120N000009 HC R&B SNF

## 2024-05-04 PROCEDURE — 80202 ASSAY OF VANCOMYCIN: CPT | Performed by: INTERNAL MEDICINE

## 2024-05-04 PROCEDURE — 258N000003 HC RX IP 258 OP 636

## 2024-05-04 PROCEDURE — 250N000011 HC RX IP 250 OP 636: Mod: JZ | Performed by: INTERNAL MEDICINE

## 2024-05-04 PROCEDURE — 36592 COLLECT BLOOD FROM PICC: CPT | Performed by: INTERNAL MEDICINE

## 2024-05-04 PROCEDURE — 258N000003 HC RX IP 258 OP 636: Performed by: INTERNAL MEDICINE

## 2024-05-04 PROCEDURE — 250N000011 HC RX IP 250 OP 636: Performed by: INTERNAL MEDICINE

## 2024-05-04 RX ORDER — SODIUM CHLORIDE 9 MG/ML
INJECTION, SOLUTION INTRAVENOUS
Status: COMPLETED
Start: 2024-05-04 | End: 2024-05-04

## 2024-05-04 RX ADMIN — HEPARIN, PORCINE (PF) 10 UNIT/ML INTRAVENOUS SYRINGE 5 ML: at 17:20

## 2024-05-04 RX ADMIN — INSULIN ASPART 1 UNITS: 100 INJECTION, SOLUTION INTRAVENOUS; SUBCUTANEOUS at 12:07

## 2024-05-04 RX ADMIN — ATORVASTATIN CALCIUM 10 MG: 10 TABLET, FILM COATED ORAL at 21:14

## 2024-05-04 RX ADMIN — SODIUM CHLORIDE 500 ML: 9 INJECTION, SOLUTION INTRAVENOUS at 10:31

## 2024-05-04 RX ADMIN — INSULIN ASPART 1 UNITS: 100 INJECTION, SOLUTION INTRAVENOUS; SUBCUTANEOUS at 08:48

## 2024-05-04 RX ADMIN — MICONAZOLE NITRATE: 20 POWDER TOPICAL at 08:58

## 2024-05-04 RX ADMIN — ASPIRIN 162 MG: 81 TABLET, COATED ORAL at 08:46

## 2024-05-04 RX ADMIN — OXYCODONE HYDROCHLORIDE 10 MG: 5 TABLET ORAL at 08:57

## 2024-05-04 RX ADMIN — CLONIDINE HYDROCHLORIDE 0.2 MG: 0.1 TABLET ORAL at 08:47

## 2024-05-04 RX ADMIN — Medication 1 CAPSULE: at 21:14

## 2024-05-04 RX ADMIN — LUMATEPERONE 84 MG: 42 CAPSULE ORAL at 21:14

## 2024-05-04 RX ADMIN — VANCOMYCIN HYDROCHLORIDE 750 MG: 10 INJECTION, POWDER, LYOPHILIZED, FOR SOLUTION INTRAVENOUS at 10:31

## 2024-05-04 RX ADMIN — Medication 5 ML: at 22:25

## 2024-05-04 RX ADMIN — ERTAPENEM SODIUM 1 G: 1 INJECTION, POWDER, LYOPHILIZED, FOR SOLUTION INTRAMUSCULAR; INTRAVENOUS at 17:15

## 2024-05-04 RX ADMIN — GABAPENTIN 600 MG: 300 CAPSULE ORAL at 08:47

## 2024-05-04 RX ADMIN — OXYCODONE HYDROCHLORIDE 10 MG: 5 TABLET ORAL at 21:46

## 2024-05-04 RX ADMIN — INSULIN GLARGINE 5 UNITS: 100 INJECTION, SOLUTION SUBCUTANEOUS at 21:14

## 2024-05-04 RX ADMIN — AMLODIPINE BESYLATE 5 MG: 5 TABLET ORAL at 08:47

## 2024-05-04 RX ADMIN — CITALOPRAM HYDROBROMIDE 20 MG: 20 TABLET ORAL at 21:14

## 2024-05-04 RX ADMIN — Medication 1 CAPSULE: at 08:46

## 2024-05-04 RX ADMIN — VANCOMYCIN HYDROCHLORIDE 750 MG: 10 INJECTION, POWDER, LYOPHILIZED, FOR SOLUTION INTRAVENOUS at 21:12

## 2024-05-04 RX ADMIN — OXYCODONE HYDROCHLORIDE 10 MG: 5 TABLET ORAL at 13:35

## 2024-05-04 RX ADMIN — OMEPRAZOLE 20 MG: 20 CAPSULE, DELAYED RELEASE ORAL at 08:48

## 2024-05-04 RX ADMIN — FERROUS SULFATE TAB 325 MG (65 MG ELEMENTAL FE) 325 MG: 325 (65 FE) TAB at 08:48

## 2024-05-04 RX ADMIN — OXYCODONE HYDROCHLORIDE 10 MG: 5 TABLET ORAL at 17:27

## 2024-05-04 RX ADMIN — GABAPENTIN 600 MG: 300 CAPSULE ORAL at 13:35

## 2024-05-04 RX ADMIN — MICONAZOLE NITRATE: 20 POWDER TOPICAL at 21:14

## 2024-05-04 RX ADMIN — INSULIN ASPART 1 UNITS: 100 INJECTION, SOLUTION INTRAVENOUS; SUBCUTANEOUS at 18:33

## 2024-05-04 RX ADMIN — Medication 5 ML: at 08:42

## 2024-05-04 RX ADMIN — GABAPENTIN 600 MG: 300 CAPSULE ORAL at 21:14

## 2024-05-04 NOTE — PHARMACY-VANCOMYCIN DOSING SERVICE
Pharmacy Vancomycin Note  Date of Service May 4, 2024  Patient's  1965   59 year old, female    Indication: Bone and Joint Infection, chronic L elbow PJI s/p hardware explant with antibiotic spacer/cement placement 4/10/24   Day of Therapy: since 24  Current vancomycin regimen:  750 mg IV q12h  Current vancomycin monitoring method: AUC  Current vancomycin therapeutic monitoring goal: 400-600 mg*h/L    InsightRX Prediction of Current Vancomycin Regimen  Loading dose: N/A  Regimen: 750 mg IV every 12 hours.  Start time: 09:05 on 2024  Exposure target: AUC24 (range)400-600 mg/L.hr   AUC24,ss: 431 mg/L.hr  Probability of AUC24 > 400: 80 %  Ctrough,ss: 15 mg/L  Probability of Ctrough,ss > 20: 0 %  Probability of nephrotoxicity (Lodise CHANDAN ): 10 %        Current estimated CrCl = Estimated Creatinine Clearance: 95.8 mL/min (based on SCr of 0.82 mg/dL).    Creatinine for last 3 days  2024: 10:57 PM Creatinine 0.92 mg/dL  2024:  7:38 AM Creatinine 0.82 mg/dL    Recent Vancomycin Levels (past 3 days)  2024:  7:38 AM Vancomycin 24.9 ug/mL  2024:  8:43 AM Vancomycin 18.4 ug/mL    Vancomycin IV Administrations (past 72 hours)                     vancomycin (VANCOCIN) 750 mg in sodium chloride 0.9 % 250 mL intermittent infusion (mg) 750 mg New Bag 24 2105     750 mg New Bag  1100     750 mg New Bag 24 2116    vancomycin (VANCOCIN) 1,000 mg in 200 mL dextrose intermittent infusion (mg) 1,000 mg New Bag 24 1037     1,000 mg New Bag 24 2310     1,000 mg New Bag  1136                    Nephrotoxins and other renal medications (From now, onward)      Start     Dose/Rate Route Frequency Ordered Stop    24 2200  vancomycin (VANCOCIN) 750 mg in sodium chloride 0.9 % 250 mL intermittent infusion         750 mg  over 90 Minutes Intravenous EVERY 12 HOURS 24 1228                 Contrast Orders - past 72 hours (72h ago, onward)      None            Interpretation of  levels and current regimen:  Vancomycin level is reflective of -600    Has serum creatinine changed greater than 50% in last 72 hours: No    Urine output:  unable to determine    Renal Function: Stable      Plan:  Continue Current Dose for now. It may need to be increased back to 1000 mg IV q12h since AUC was dropped significantly when the dose was decreased from 1000 mg to 750 mg IV q12h  Vancomycin monitoring method: AUC  Vancomycin therapeutic monitoring goal: 400-600 mg*h/L  Pharmacy will check vancomycin levels as appropriate in 1-3 Days.  Serum creatinine levels will be ordered a minimum of twice weekly.    Naresh Sanchez RP
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
## 2024-05-04 NOTE — PLAN OF CARE
Goal Outcome Evaluation:       VS: VSS  Neuro: intact, alert and orientedx4  Lungs: Denies SOB. Unlabored brerathing on RA  Heart: Denies chest pain  Bowel/Bladder: Continent. BM today  Ambulation/Transfers: A-1 gb/walker for transfer. Pt used BSC.  Diet/Liquids: Regular diet. Appetite good.  LDA: PICC to R arm  Skin: intact. Declined full skin assessment  Pain: Pain to LUE 10/10.  Other:  Vanco level was done today- see result. Repeat on 5/6.  Continue plan of care.  Vital signs:  Temp: 98  F (36.7  C) Temp src: Oral BP: (!) 146/72 Pulse: 69   Resp: 18 SpO2: 97 % O2 Device: None (Room air)

## 2024-05-04 NOTE — PLAN OF CARE
Goal Outcome Evaluation:      Plan of Care Reviewed With: patient    Overall Patient Progress: no changeOverall Patient Progress: no change    FOCUS/GOAL     Bowel management, Bladder management, Medication management, Wound care management, Medical management, Mobility, Skin integrity, and Safety management     ASSESSMENT, INTERVENTIONS AND CONTINUING PLAN FOR GOAL:     Pt is A&OX4, calm, & cooperative with care. Denied CP, SOB, & n/v. Pt transfers A of 1 with walker & GB; was not OOB this shift. Pt has long cast to the LUE. Continent for both B&B; uses BSC. Takes meds whole with thin liquid. R basilic SL PICC intact & dressing CDI. Pt slept well for most of the shift & no other acute concern. Able to make needs known & call light within reach. Continue with plan of care.

## 2024-05-04 NOTE — PLAN OF CARE
Pt is alert and oriented x4. Able to make needs known to staff.  Assist of one with pivot to BSC.  Single lumen PICC on right arm.  Left arm is in cast with splint dressing CDI. Continent of bowel and bladder. On a regular diet.  Pt denies SOB, chest pain and N/V. Will contiune plan of care.        Patient's most recent vital signs are:     Vital signs:  BP: 133/59  Temp: 98.3  HR: 70  RR: 16  SpO2: 95 %     Patient does not have new respiratory symptoms.  Patient does not have new sore throat.  Patient does not have a fever greater than 99.5.

## 2024-05-05 LAB
GLUCOSE BLDC GLUCOMTR-MCNC: 135 MG/DL (ref 70–99)
GLUCOSE BLDC GLUCOMTR-MCNC: 153 MG/DL (ref 70–99)
GLUCOSE BLDC GLUCOMTR-MCNC: 158 MG/DL (ref 70–99)
GLUCOSE BLDC GLUCOMTR-MCNC: 164 MG/DL (ref 70–99)
GLUCOSE BLDC GLUCOMTR-MCNC: 174 MG/DL (ref 70–99)

## 2024-05-05 PROCEDURE — 250N000011 HC RX IP 250 OP 636: Mod: JZ | Performed by: INTERNAL MEDICINE

## 2024-05-05 PROCEDURE — 258N000003 HC RX IP 258 OP 636: Performed by: INTERNAL MEDICINE

## 2024-05-05 PROCEDURE — 250N000013 HC RX MED GY IP 250 OP 250 PS 637: Performed by: INTERNAL MEDICINE

## 2024-05-05 PROCEDURE — 250N000013 HC RX MED GY IP 250 OP 250 PS 637

## 2024-05-05 PROCEDURE — 120N000009 HC R&B SNF

## 2024-05-05 PROCEDURE — 250N000011 HC RX IP 250 OP 636: Performed by: INTERNAL MEDICINE

## 2024-05-05 RX ADMIN — Medication 1 CAPSULE: at 21:14

## 2024-05-05 RX ADMIN — VANCOMYCIN HYDROCHLORIDE 750 MG: 10 INJECTION, POWDER, LYOPHILIZED, FOR SOLUTION INTRAVENOUS at 11:11

## 2024-05-05 RX ADMIN — CITALOPRAM HYDROBROMIDE 20 MG: 20 TABLET ORAL at 21:13

## 2024-05-05 RX ADMIN — HEPARIN, PORCINE (PF) 10 UNIT/ML INTRAVENOUS SYRINGE 10 ML: at 16:43

## 2024-05-05 RX ADMIN — CLONIDINE HYDROCHLORIDE 0.2 MG: 0.1 TABLET ORAL at 21:13

## 2024-05-05 RX ADMIN — INSULIN ASPART 1 UNITS: 100 INJECTION, SOLUTION INTRAVENOUS; SUBCUTANEOUS at 12:25

## 2024-05-05 RX ADMIN — OMEPRAZOLE 20 MG: 20 CAPSULE, DELAYED RELEASE ORAL at 08:23

## 2024-05-05 RX ADMIN — AMLODIPINE BESYLATE 5 MG: 5 TABLET ORAL at 08:19

## 2024-05-05 RX ADMIN — OXYCODONE HYDROCHLORIDE 10 MG: 5 TABLET ORAL at 16:52

## 2024-05-05 RX ADMIN — GABAPENTIN 600 MG: 300 CAPSULE ORAL at 08:19

## 2024-05-05 RX ADMIN — Medication 1 CAPSULE: at 08:20

## 2024-05-05 RX ADMIN — GABAPENTIN 600 MG: 300 CAPSULE ORAL at 14:32

## 2024-05-05 RX ADMIN — MICONAZOLE NITRATE: 20 POWDER TOPICAL at 21:20

## 2024-05-05 RX ADMIN — VANCOMYCIN HYDROCHLORIDE 750 MG: 10 INJECTION, POWDER, LYOPHILIZED, FOR SOLUTION INTRAVENOUS at 21:08

## 2024-05-05 RX ADMIN — INSULIN GLARGINE 5 UNITS: 100 INJECTION, SOLUTION SUBCUTANEOUS at 21:14

## 2024-05-05 RX ADMIN — ATORVASTATIN CALCIUM 10 MG: 10 TABLET, FILM COATED ORAL at 21:14

## 2024-05-05 RX ADMIN — LUMATEPERONE 84 MG: 42 CAPSULE ORAL at 21:13

## 2024-05-05 RX ADMIN — MICONAZOLE NITRATE: 20 POWDER TOPICAL at 08:23

## 2024-05-05 RX ADMIN — INSULIN ASPART 1 UNITS: 100 INJECTION, SOLUTION INTRAVENOUS; SUBCUTANEOUS at 18:43

## 2024-05-05 RX ADMIN — ACETAMINOPHEN 975 MG: 325 TABLET, FILM COATED ORAL at 09:46

## 2024-05-05 RX ADMIN — GABAPENTIN 600 MG: 300 CAPSULE ORAL at 21:13

## 2024-05-05 RX ADMIN — ASPIRIN 162 MG: 81 TABLET, COATED ORAL at 08:20

## 2024-05-05 RX ADMIN — CLONIDINE HYDROCHLORIDE 0.2 MG: 0.1 TABLET ORAL at 08:20

## 2024-05-05 RX ADMIN — OXYCODONE HYDROCHLORIDE 10 MG: 5 TABLET ORAL at 09:46

## 2024-05-05 RX ADMIN — ERTAPENEM SODIUM 1 G: 1 INJECTION, POWDER, LYOPHILIZED, FOR SOLUTION INTRAMUSCULAR; INTRAVENOUS at 16:43

## 2024-05-05 ASSESSMENT — ACTIVITIES OF DAILY LIVING (ADL)
ADLS_ACUITY_SCORE: 35
ADLS_ACUITY_SCORE: 34
ADLS_ACUITY_SCORE: 35
ADLS_ACUITY_SCORE: 34

## 2024-05-05 NOTE — PLAN OF CARE
Goal Outcome Evaluation:      Plan of Care Reviewed With: patient    Overall Patient Progress: no changeOverall Patient Progress: no change      Patient is alert and oriented x4. Able to make needs known. Pt denied SOB, N/V and chest pain. Pt is blood glucose check with insulin given per order parameter. Pt is assist of 1 with walker and gait belt. Pt has right arm PICC line that is patent with intact dressing. Left arm caste is clean, dressing and intact. Call light within reach.    Patient's most recent vital signs are:     Vital signs:  BP: 134/64  Temp: 97.9  HR: 72  RR: 18  SpO2: 97 %     Patient does not have new respiratory symptoms.  Patient does not have new sore throat.  Patient does not have a fever greater than 99.5.               Problem: Fall Injury Risk  Goal: Absence of Fall and Fall-Related Injury  Description: Provide a safe, barrier-free environment that encourages independent activity.    Keep care area uncluttered and well-lighted.    Determine need for increased observation or monitoring.    Avoid use of devices that minimize mobility, such as restraints or indwelling urinary catheter.    Determine need for bed/chair alarms.    Outcome: Progressing     Problem: Infection  Goal: Absence of Infection Signs and Symptoms  Description: Implement transmission-based precautions and isolation, as indicated, to prevent spread of infection.    Obtain cultures prior to initiating antimicrobial therapy, when possible. Do not delay treatment for laboratory results in the presence of high suspicion or clinical indicators.    Administer ordered antimicrobial therapy promptly; reassess need regularly.    Monitor laboratory value, diagnostic test and clinical status trends for signs of infection progression.    Identify early signs of sepsis, such as increased heart rate and decreased blood pressure, as well as changes in mental state, respiratory pattern or peripheral perfusion.    Prepare for rapid sepsis  management, including lactate level, intravenous access, fluid administration and oxygen therapy.    Provide fever-reduction and comfort measures.    Outcome: Progressing     Problem: Pain Chronic (Persistent)  Goal: Optimal Pain Control and Function  Description: Evaluate pain level, effect of treatment and patient response at regular intervals.    Minimize pain stimuli; coordinate care and adjust environment (e.g., light, noise, unnecessary movement); promote sleep/rest.    Match pharmacologic analgesia to severity and type of pain mechanism (e.g., neuropathic, muscle, inflammatory); consider multimodal approach (e.g., nonopioid, opioid, adjuvant).    Provide medication at regular intervals; titrate to patient response.    Manage breakthrough pain with additional doses; consider rotation or switching medicate      Outcome: Progressing     Problem: Posttraumatic Stress Disorder  Goal: Improved Mood Symptoms (Posttraumatic Stress Disorder)  Description: Provide opportunity for patient and family/caregiver to express feelings, stressors and self-perception (e.g., verbalization, journaling).    Convey caring approach, empathy and potential for change; hope is key for recovery.    Utilize existing coping strategies and assist in developing new strategies, such as relaxation, music and problem-solving; assess for ineffective strategies (e.g., substance use, isolation).    Recognize past and present achievements, successes and personal strengths.    Empower participation in planning care and activities, as well as development of attainable goals, to increase self-esteem and feelings of control.    Promote self-care; support balanced sleep, physical activity and nutrition.    Outcome: Progressing     Problem: Psychotic Signs/Symptoms  Goal: Improved Mood Symptoms (Psychotic Signs/Symptoms)  Description: Assess subjective and objective presentation of mood and emotion state.    Encourage and promote emotional awareness,  acceptance and expression of feelings.    Provide psychoeducation and supportive therapy interventions to improve mood and emotions.    Provide opportunities for expression, such as verbalization, reflection, journaling, art and role-playing.    Outcome: Progressing

## 2024-05-05 NOTE — PLAN OF CARE
Goal Outcome Evaluation:    Acquired patient around [id number].      Plan of Care Reviewed With: patient    Overall Patient Progress: improvingOverall Patient Progress: improving    Outcome Evaluation: Patient alert and oriented x4. Able to make needs known, call light within reach. Patient sleeping most of the shift. Patient Ax1 with walker and GB . Continent of bowel and bladder, last BM 5/4, voiding without difficulty, using the bedside commode. Cast to RUE. Edema in bilateral lower extremities. Right PICC single lumen in place and intact. 2am BG- 158.    Continue with plan of care.

## 2024-05-06 ENCOUNTER — APPOINTMENT (OUTPATIENT)
Dept: OCCUPATIONAL THERAPY | Facility: SKILLED NURSING FACILITY | Age: 59
DRG: 559 | End: 2024-05-06
Attending: INTERNAL MEDICINE
Payer: COMMERCIAL

## 2024-05-06 ENCOUNTER — APPOINTMENT (OUTPATIENT)
Dept: PHYSICAL THERAPY | Facility: SKILLED NURSING FACILITY | Age: 59
DRG: 559 | End: 2024-05-06
Attending: INTERNAL MEDICINE
Payer: COMMERCIAL

## 2024-05-06 LAB
ALBUMIN SERPL BCG-MCNC: 3.1 G/DL (ref 3.5–5.2)
ALP SERPL-CCNC: 165 U/L (ref 40–150)
ALT SERPL W P-5'-P-CCNC: 16 U/L (ref 0–50)
ANION GAP SERPL CALCULATED.3IONS-SCNC: 9 MMOL/L (ref 7–15)
AST SERPL W P-5'-P-CCNC: 17 U/L (ref 0–45)
BILIRUB SERPL-MCNC: 0.2 MG/DL
BUN SERPL-MCNC: 12.6 MG/DL (ref 8–23)
CALCIUM SERPL-MCNC: 8.4 MG/DL (ref 8.8–10.2)
CHLORIDE SERPL-SCNC: 104 MMOL/L (ref 98–107)
CREAT SERPL-MCNC: 0.85 MG/DL (ref 0.51–0.95)
CRP SERPL-MCNC: 39.95 MG/L
DEPRECATED HCO3 PLAS-SCNC: 28 MMOL/L (ref 22–29)
EGFRCR SERPLBLD CKD-EPI 2021: 78 ML/MIN/1.73M2
ERYTHROCYTE [DISTWIDTH] IN BLOOD BY AUTOMATED COUNT: 17.9 % (ref 10–15)
GLUCOSE BLDC GLUCOMTR-MCNC: 147 MG/DL (ref 70–99)
GLUCOSE BLDC GLUCOMTR-MCNC: 152 MG/DL (ref 70–99)
GLUCOSE BLDC GLUCOMTR-MCNC: 160 MG/DL (ref 70–99)
GLUCOSE BLDC GLUCOMTR-MCNC: 178 MG/DL (ref 70–99)
GLUCOSE BLDC GLUCOMTR-MCNC: 260 MG/DL (ref 70–99)
GLUCOSE SERPL-MCNC: 140 MG/DL (ref 70–99)
HCT VFR BLD AUTO: 24.2 % (ref 35–47)
HGB BLD-MCNC: 7.4 G/DL (ref 11.7–15.7)
MCH RBC QN AUTO: 25.7 PG (ref 26.5–33)
MCHC RBC AUTO-ENTMCNC: 30.6 G/DL (ref 31.5–36.5)
MCV RBC AUTO: 84 FL (ref 78–100)
PLATELET # BLD AUTO: 485 10E3/UL (ref 150–450)
POTASSIUM SERPL-SCNC: 4.1 MMOL/L (ref 3.4–5.3)
PROT SERPL-MCNC: 5.9 G/DL (ref 6.4–8.3)
RBC # BLD AUTO: 2.88 10E6/UL (ref 3.8–5.2)
SODIUM SERPL-SCNC: 141 MMOL/L (ref 135–145)
VANCOMYCIN SERPL-MCNC: 17.6 UG/ML
WBC # BLD AUTO: 9.5 10E3/UL (ref 4–11)

## 2024-05-06 PROCEDURE — 80202 ASSAY OF VANCOMYCIN: CPT | Performed by: INTERNAL MEDICINE

## 2024-05-06 PROCEDURE — 97112 NEUROMUSCULAR REEDUCATION: CPT | Mod: GP

## 2024-05-06 PROCEDURE — 85027 COMPLETE CBC AUTOMATED: CPT | Performed by: INTERNAL MEDICINE

## 2024-05-06 PROCEDURE — 36592 COLLECT BLOOD FROM PICC: CPT | Performed by: INTERNAL MEDICINE

## 2024-05-06 PROCEDURE — 97530 THERAPEUTIC ACTIVITIES: CPT | Mod: GP

## 2024-05-06 PROCEDURE — 250N000013 HC RX MED GY IP 250 OP 250 PS 637: Performed by: INTERNAL MEDICINE

## 2024-05-06 PROCEDURE — 250N000011 HC RX IP 250 OP 636: Performed by: INTERNAL MEDICINE

## 2024-05-06 PROCEDURE — 250N000013 HC RX MED GY IP 250 OP 250 PS 637

## 2024-05-06 PROCEDURE — 84295 ASSAY OF SERUM SODIUM: CPT | Performed by: INTERNAL MEDICINE

## 2024-05-06 PROCEDURE — 97535 SELF CARE MNGMENT TRAINING: CPT | Mod: GO | Performed by: OCCUPATIONAL THERAPIST

## 2024-05-06 PROCEDURE — 250N000011 HC RX IP 250 OP 636: Mod: JZ | Performed by: INTERNAL MEDICINE

## 2024-05-06 PROCEDURE — 120N000009 HC R&B SNF

## 2024-05-06 PROCEDURE — 97530 THERAPEUTIC ACTIVITIES: CPT | Mod: GO | Performed by: OCCUPATIONAL THERAPIST

## 2024-05-06 PROCEDURE — 258N000003 HC RX IP 258 OP 636: Performed by: INTERNAL MEDICINE

## 2024-05-06 PROCEDURE — 86140 C-REACTIVE PROTEIN: CPT | Performed by: INTERNAL MEDICINE

## 2024-05-06 RX ORDER — VANCOMYCIN HYDROCHLORIDE 1 G/200ML
1000 INJECTION, SOLUTION INTRAVENOUS EVERY 12 HOURS
Status: DISCONTINUED | OUTPATIENT
Start: 2024-05-06 | End: 2024-05-06

## 2024-05-06 RX ADMIN — CLONIDINE HYDROCHLORIDE 0.2 MG: 0.1 TABLET ORAL at 08:36

## 2024-05-06 RX ADMIN — HEPARIN, PORCINE (PF) 10 UNIT/ML INTRAVENOUS SYRINGE 5 ML: at 16:20

## 2024-05-06 RX ADMIN — LUMATEPERONE 84 MG: 42 CAPSULE ORAL at 21:35

## 2024-05-06 RX ADMIN — VANCOMYCIN HYDROCHLORIDE 850 MG: 1 INJECTION, POWDER, LYOPHILIZED, FOR SOLUTION INTRAVENOUS at 22:46

## 2024-05-06 RX ADMIN — INSULIN ASPART 1 UNITS: 100 INJECTION, SOLUTION INTRAVENOUS; SUBCUTANEOUS at 13:06

## 2024-05-06 RX ADMIN — MICONAZOLE NITRATE: 20 POWDER TOPICAL at 08:40

## 2024-05-06 RX ADMIN — Medication 1 CAPSULE: at 08:36

## 2024-05-06 RX ADMIN — GABAPENTIN 600 MG: 300 CAPSULE ORAL at 08:37

## 2024-05-06 RX ADMIN — CITALOPRAM HYDROBROMIDE 20 MG: 20 TABLET ORAL at 21:35

## 2024-05-06 RX ADMIN — OXYCODONE HYDROCHLORIDE 10 MG: 5 TABLET ORAL at 16:20

## 2024-05-06 RX ADMIN — AMLODIPINE BESYLATE 5 MG: 5 TABLET ORAL at 08:37

## 2024-05-06 RX ADMIN — OMEPRAZOLE 20 MG: 20 CAPSULE, DELAYED RELEASE ORAL at 08:37

## 2024-05-06 RX ADMIN — Medication 1 CAPSULE: at 21:35

## 2024-05-06 RX ADMIN — ATORVASTATIN CALCIUM 10 MG: 10 TABLET, FILM COATED ORAL at 21:35

## 2024-05-06 RX ADMIN — INSULIN ASPART 1 UNITS: 100 INJECTION, SOLUTION INTRAVENOUS; SUBCUTANEOUS at 09:17

## 2024-05-06 RX ADMIN — INSULIN GLARGINE 5 UNITS: 100 INJECTION, SOLUTION SUBCUTANEOUS at 21:43

## 2024-05-06 RX ADMIN — INSULIN ASPART 1 UNITS: 100 INJECTION, SOLUTION INTRAVENOUS; SUBCUTANEOUS at 18:41

## 2024-05-06 RX ADMIN — MICONAZOLE NITRATE: 20 POWDER TOPICAL at 21:44

## 2024-05-06 RX ADMIN — ERTAPENEM SODIUM 1 G: 1 INJECTION, POWDER, LYOPHILIZED, FOR SOLUTION INTRAMUSCULAR; INTRAVENOUS at 16:16

## 2024-05-06 RX ADMIN — FERROUS SULFATE TAB 325 MG (65 MG ELEMENTAL FE) 325 MG: 325 (65 FE) TAB at 08:37

## 2024-05-06 RX ADMIN — OXYCODONE HYDROCHLORIDE 10 MG: 5 TABLET ORAL at 09:17

## 2024-05-06 RX ADMIN — GABAPENTIN 600 MG: 300 CAPSULE ORAL at 21:35

## 2024-05-06 RX ADMIN — OXYCODONE HYDROCHLORIDE 10 MG: 5 TABLET ORAL at 04:22

## 2024-05-06 RX ADMIN — GABAPENTIN 600 MG: 300 CAPSULE ORAL at 15:22

## 2024-05-06 RX ADMIN — ASPIRIN 162 MG: 81 TABLET, COATED ORAL at 08:37

## 2024-05-06 RX ADMIN — CLONIDINE HYDROCHLORIDE 0.2 MG: 0.1 TABLET ORAL at 21:35

## 2024-05-06 RX ADMIN — Medication 5 ML: at 08:13

## 2024-05-06 RX ADMIN — VANCOMYCIN HYDROCHLORIDE 850 MG: 1 INJECTION, POWDER, LYOPHILIZED, FOR SOLUTION INTRAVENOUS at 11:20

## 2024-05-06 ASSESSMENT — ACTIVITIES OF DAILY LIVING (ADL)
ADLS_ACUITY_SCORE: 35
ADLS_ACUITY_SCORE: 34
ADLS_ACUITY_SCORE: 34
ADLS_ACUITY_SCORE: 35
ADLS_ACUITY_SCORE: 34
ADLS_ACUITY_SCORE: 35

## 2024-05-06 NOTE — PHARMACY-VANCOMYCIN DOSING SERVICE
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  Start time: 05:08 on 2024  Exposure target: AUC24 (range)400-600 mg/L.hr   AUC24,ss: 438 mg/L.hr  Probability of AUC24 > 400: 83 %  Ctrough,ss: 15.3 mg/L  Probability of Ctrough,ss > 20: 0 %  Probability of nephrotoxicity (Lodise CHANDAN ): 11 %      Current estimated CrCl = Estimated Creatinine Clearance: 92.4 mL/min (based on SCr of 0.85 mg/dL).    Creatinine for last 3 days  2024:  8:13 AM Creatinine 0.85 mg/dL    Recent Vancomycin Levels (past 3 days)  2024:  8:43 AM Vancomycin 18.4 ug/mL  2024:  8:13 AM Vancomycin 17.6 ug/mL    Vancomycin IV Administrations (past 72 hours)                     vancomycin (VANCOCIN) 750 mg in sodium chloride 0.9 % 250 mL intermittent infusion (mg) 750 mg New Bag 24 2108     750 mg New Bag  1111     750 mg New Bag 24 2112     750 mg New Bag  1031     750 mg New Bag 24 2105     750 mg New Bag  1100                    Nephrotoxins and other renal medications (From now, onward)      Start     Dose/Rate Route Frequency Ordered Stop    24 1100  vancomycin (VANCOCIN) 1,000 mg in 200 mL dextrose intermittent infusion         1,000 mg  200 mL/hr over 1 Hours Intravenous EVERY 12 HOURS 24 1023                 Contrast Orders - past 72 hours (72h ago, onward)      None            Interpretation of levels and current regimen:  Vancomycin level is reflective of -600    Has serum creatinine changed greater than 50% in last  72 hours: No    Urine output:  unable to determine    Renal Function: Stable    [facility] Prediction of Planned New Vancomycin Regimen  Loading dose: N/A  Regimen: 1000 mg IV every 12 hours.  Start time: 05:08 on 05/06/2024  Exposure target: AUC24 (range)400-600 mg/L.hr   AUC24,ss: 571 mg/L.hr  Probability of AUC24 > 400: 100 %  Ctrough,ss: 20 mg/L  Probability of Ctrough,ss > 20: 50 %  Probability of nephrotoxicity (Lodise CHANDAN 2009): 17 %    Loading dose: N/A  Regimen: 850 mg IV every 12 hours.  Start time: 05:08 on 05/06/2024  Exposure target: AUC24 (range)400-600 mg/L.hr   AUC24,ss: 493 mg/L.hr  Probability of AUC24 > 400: 98 %  Ctrough,ss: 17.3 mg/L  Probability of Ctrough,ss > 20: 3 %  Probability of nephrotoxicity (Lodise CHANDAN 2009): 13 %        Plan:  Increase Dose from 750 mg to 850 mg IV q12h with patient requiring the dose between 750 mg to 1000 mg IV q12h  Vancomycin monitoring method: AUC  Vancomycin therapeutic monitoring goal: 400-600 mg*h/L  Pharmacy will check vancomycin levels as appropriate in 1-3 Days.  Serum creatinine levels will be ordered a minimum of twice weekly.    Naresh Sanchez RPH

## 2024-05-06 NOTE — CARE PLAN
Shift: 0700 - 1530    BP (!) 157/49 (BP Location: Right leg)   Pulse 73   Temp 98  F (36.7  C) (Oral)   Resp 16   Wt 108.6 kg (239 lb 6.7 oz)   SpO2 98%   BMI 35.36 kg/m      Patient is, AOX4,No acute change or events; VSS, Denies shortness of breath,chest pain, numbness/tingling, nausea/vomiting, no fever or chills. Pt reports left arm pain managed with oxycodone.  Pt is able to make needs know, call light is in reach, continue with POC.

## 2024-05-06 NOTE — PLAN OF CARE
Goal Outcome Evaluation:      Plan of Care Reviewed With: patient    Overall Patient Progress: improvingOverall Patient Progress: improving     Overall pt had no acute issue this shift. Pt is alert and oriented x 4. Able to make needs known. Pt requested and received PRN oxycodone x 1 this shift. Long arm cast to left upper extremity in place. LUE elevated per order. Pt continent of both bowel and bladder. Uses Bedside commode. BS- 160 @ 0200. No complain at this time. Will continue with POC

## 2024-05-06 NOTE — PROGRESS NOTES
CLINICAL NUTRITION SERVICES - REASSESSMENT NOTE     Nutrition Prescription    RECOMMENDATIONS FOR MDs/PROVIDERS TO ORDER:  None    Malnutrition Status:    Patient does not meet two of the established criteria necessary for diagnosing malnutrition    Recommendations already ordered by Registered Dietitian (RD):  Encouraged intakes    Future/Additional Recommendations:  Monitor labs, intakes, and weight trends.     EVALUATION OF THE PROGRESS TOWARD GOALS   Diet: Regular  Intake/[place]: 100% of documented meals.     Pt ordering (on average) 2765 kcal and 81 g protein per day per [facility]. With documented and reported intakes, pt is likely meeting minimum energy and protein needs.     NEW FINDINGS/REVIEW OF SYSTEMS    Nutrition/GI: Pt with watery brown stools since 5/4 per flowsheet. Pt states she is eating well. Wondering about her lantus. States she was told to take it every night no matter her blood sugar and that she has not gotten it over the last couple days. Explained to pt that meds are dosed differently in the hospital and that the RN has documented that pt received lantus every day over last week. Otherwise, no questions or concerns for RD at this time. Commended pt efforts on adequate intakes.     Weights: Pt with limited weight history prior to admission, with 21 lb (9.6%) weight gain over <1 month, 7.5 lb (3%) weight loss in 6 months.     05/02/24 1349 108.6 kg (239 lb 6.7 oz) Bed scale   04/26/24 0920 107.1 kg (236 lb 1.6 oz) Standing scale   04/22/24 1749 111.2 kg (245 lb 3.2 oz) Standing scale     04/20/24 0040 116 kg (255 lb 11.7 oz) Bed scale   04/19/24 1419 111.3 kg (245 lb 4.8 oz) Standing scale   04/13/24 1600 105.5 kg (232 lb 9.4 oz) --   04/10/24 0930 99 kg (218 lb 4.1 oz) --   01/31/24 1357 115.1 kg (253 lb 12 oz)     11/30/23 1602 118 kg (260 lb 2.3 oz)     10/23/23 112 kg (247 lb)       Skin: surgical incisions     Labs reviewed   04/29/24 07:09 05/01/24 22:57 05/02/24 07:38 05/06/24  08:13   Sodium 143 135 141 141   Potassium 3.3 (L) 3.5 3.5 4.1   Urea Nitrogen 9.4 10.5 9.2 12.6   Creatinine 0.84 0.92 0.82 0.85   Albumin 2.7 (L) 2.8 (L) 3.0 (L) 3.1 (L)   CRP Inflammation 24.71 (H)  44.22 (H) 39.95 (H)   Glucose 163 (H) 168 (H) 136 (H) 140 (H)       Medications reviewed: ertapenem, ferrous sulfate inulin (novolog, lantus), culturell, lumateperone, omeprazole, vancomycin, oxycodone PRN  IV Fluids over last 7 days: No    MALNUTRITION  % Intake: No decreased intake noted  % Weight Loss: None noted  Subcutaneous Fat Loss: None observed  Muscle Loss: None observed  Fluid Accumulation/Edema: Mild per flowsheets  Malnutrition Diagnosis: Patient does not meet two of the established criteria necessary for diagnosing malnutrition    Previous Goals   Patient to consume % of nutritionally adequate meal trays TID, or the equivalent with supplements/snacks.  Evaluation: Met    Previous Nutrition Diagnosis  Predicted inadequate nutrient intake related to LOS as evidenced by potential for menu fatigue with prolonged hospitalization.   Evaluation: No change    CURRENT NUTRITION DIAGNOSIS  Predicted inadequate nutrient intake related to LOS as evidenced by potential for menu fatigue with prolonged hospitalization.     INTERVENTIONS  Implementation  Encouraged adequate intakes    Goals  Patient to consume % of nutritionally adequate meal trays TID, or the equivalent with supplements/snacks.    Monitoring/Evaluation  Progress toward goals will be monitored and evaluated per protocol.    Leanne Miguel MS, RDN, LD  TCU/OB/Ortho Clinical Dietitian  Available via phone and Vocera  Phone: [phone]  Vocera: TCU Clinical Dietitian   Weekend/Holiday Vocera: Weekend Holiday Clinical Dietitian [Multi Site Groups]

## 2024-05-06 NOTE — PLAN OF CARE
Pt is alert and oriented x4. Able to make needs known to staff.  Assist of one with pivot to BSC.  Single lumen PICC on right arm. PICC line dressing change during shift. Left arm is in cast,  dressing CDI. Continent of bowel and bladder. On a regular diet.  Pt denies SOB, chest pain and N/V. Will contiune plan of care.      Patient's most recent vital signs are:     Vital signs:  BP: 148/57  Temp: 97.8  HR: 63  RR: 18  SpO2: 94 %     Patient does not have new respiratory symptoms.  Patient does not have new sore throat.  Patient does not have a fever greater than 99.5.

## 2024-05-07 ENCOUNTER — APPOINTMENT (OUTPATIENT)
Dept: OCCUPATIONAL THERAPY | Facility: SKILLED NURSING FACILITY | Age: 59
DRG: 559 | End: 2024-05-07
Attending: INTERNAL MEDICINE
Payer: COMMERCIAL

## 2024-05-07 LAB
GLUCOSE BLDC GLUCOMTR-MCNC: 108 MG/DL (ref 70–99)
GLUCOSE BLDC GLUCOMTR-MCNC: 128 MG/DL (ref 70–99)
GLUCOSE BLDC GLUCOMTR-MCNC: 132 MG/DL (ref 70–99)
GLUCOSE BLDC GLUCOMTR-MCNC: 147 MG/DL (ref 70–99)
GLUCOSE BLDC GLUCOMTR-MCNC: 184 MG/DL (ref 70–99)

## 2024-05-07 PROCEDURE — 120N000009 HC R&B SNF

## 2024-05-07 PROCEDURE — 258N000003 HC RX IP 258 OP 636: Performed by: INTERNAL MEDICINE

## 2024-05-07 PROCEDURE — 250N000013 HC RX MED GY IP 250 OP 250 PS 637

## 2024-05-07 PROCEDURE — 250N000013 HC RX MED GY IP 250 OP 250 PS 637: Performed by: INTERNAL MEDICINE

## 2024-05-07 PROCEDURE — 97535 SELF CARE MNGMENT TRAINING: CPT | Mod: GO | Performed by: OCCUPATIONAL THERAPIST

## 2024-05-07 PROCEDURE — 250N000011 HC RX IP 250 OP 636: Mod: JZ | Performed by: INTERNAL MEDICINE

## 2024-05-07 PROCEDURE — 250N000011 HC RX IP 250 OP 636: Performed by: INTERNAL MEDICINE

## 2024-05-07 RX ORDER — SODIUM CHLORIDE 9 MG/ML
INJECTION, SOLUTION INTRAVENOUS
Status: COMPLETED
Start: 2024-05-07 | End: 2024-05-07

## 2024-05-07 RX ADMIN — MICONAZOLE NITRATE: 20 POWDER TOPICAL at 10:17

## 2024-05-07 RX ADMIN — VANCOMYCIN HYDROCHLORIDE 850 MG: 1 INJECTION, POWDER, LYOPHILIZED, FOR SOLUTION INTRAVENOUS at 10:11

## 2024-05-07 RX ADMIN — VANCOMYCIN HYDROCHLORIDE 850 MG: 1 INJECTION, POWDER, LYOPHILIZED, FOR SOLUTION INTRAVENOUS at 22:51

## 2024-05-07 RX ADMIN — ATORVASTATIN CALCIUM 10 MG: 10 TABLET, FILM COATED ORAL at 21:35

## 2024-05-07 RX ADMIN — ERTAPENEM SODIUM 1 G: 1 INJECTION, POWDER, LYOPHILIZED, FOR SOLUTION INTRAMUSCULAR; INTRAVENOUS at 16:27

## 2024-05-07 RX ADMIN — SODIUM CHLORIDE 500 ML: 9 INJECTION, SOLUTION INTRAVENOUS at 16:27

## 2024-05-07 RX ADMIN — GABAPENTIN 600 MG: 300 CAPSULE ORAL at 19:23

## 2024-05-07 RX ADMIN — AMLODIPINE BESYLATE 5 MG: 5 TABLET ORAL at 10:00

## 2024-05-07 RX ADMIN — CLONIDINE HYDROCHLORIDE 0.2 MG: 0.1 TABLET ORAL at 10:01

## 2024-05-07 RX ADMIN — CITALOPRAM HYDROBROMIDE 20 MG: 20 TABLET ORAL at 21:35

## 2024-05-07 RX ADMIN — OXYCODONE HYDROCHLORIDE 10 MG: 5 TABLET ORAL at 19:22

## 2024-05-07 RX ADMIN — OXYCODONE HYDROCHLORIDE 10 MG: 5 TABLET ORAL at 10:11

## 2024-05-07 RX ADMIN — Medication 1 CAPSULE: at 10:01

## 2024-05-07 RX ADMIN — GABAPENTIN 600 MG: 300 CAPSULE ORAL at 13:42

## 2024-05-07 RX ADMIN — OMEPRAZOLE 20 MG: 20 CAPSULE, DELAYED RELEASE ORAL at 10:01

## 2024-05-07 RX ADMIN — GABAPENTIN 600 MG: 300 CAPSULE ORAL at 10:00

## 2024-05-07 RX ADMIN — LUMATEPERONE 84 MG: 42 CAPSULE ORAL at 21:35

## 2024-05-07 RX ADMIN — HEPARIN, PORCINE (PF) 10 UNIT/ML INTRAVENOUS SYRINGE 5 ML: at 16:27

## 2024-05-07 RX ADMIN — INSULIN GLARGINE 5 UNITS: 100 INJECTION, SOLUTION SUBCUTANEOUS at 21:37

## 2024-05-07 RX ADMIN — ASPIRIN 162 MG: 81 TABLET, COATED ORAL at 10:00

## 2024-05-07 RX ADMIN — INSULIN ASPART 1 UNITS: 100 INJECTION, SOLUTION INTRAVENOUS; SUBCUTANEOUS at 17:47

## 2024-05-07 RX ADMIN — Medication 1 CAPSULE: at 21:35

## 2024-05-07 RX ADMIN — Medication 5 ML: at 00:36

## 2024-05-07 ASSESSMENT — ACTIVITIES OF DAILY LIVING (ADL)
ADLS_ACUITY_SCORE: 35
ADLS_ACUITY_SCORE: 34
ADLS_ACUITY_SCORE: 35
ADLS_ACUITY_SCORE: 34
ADLS_ACUITY_SCORE: 34
ADLS_ACUITY_SCORE: 35
ADLS_ACUITY_SCORE: 35
ADLS_ACUITY_SCORE: 34
ADLS_ACUITY_SCORE: 35
ADLS_ACUITY_SCORE: 34
ADLS_ACUITY_SCORE: 35
ADLS_ACUITY_SCORE: 34

## 2024-05-07 NOTE — PLAN OF CARE
Pt is alert and oriented x4. Able to make needs known to staff.  Assist of one with walker and galt belt.  Single lumen PICC on right arm.  Left arm is in cast,  dressing CDI. Continent of bowel and bladder. On a regular diet.  PRN oxy per pt request for pain. Pt denies SOB, chest pain and N/V. Will contiune plan of care.         Patient's most recent vital signs are:     Vital signs:  BP: 152/55  Temp: 97.3  HR: 69  RR: 16  SpO2: 98 %     Patient does not have new respiratory symptoms.  Patient does not have new sore throat.  Patient does not have a fever greater than 99.5.

## 2024-05-07 NOTE — PROGRESS NOTES
05/07/24 1122   Appointment Info   Signing Clinician's Name / Credentials (OT) SANJUANA Seay   OT Assistant Visit Number 3   Rehab Comments (OT) -30 patient not feeling well this session.   Self-Care/Home Management   Self-Care/Home Mgmt/ADL, Compensatory, Meal Prep Minutes (97396) 15   Treatment Detail/Skilled Intervention OT: Pt. ambulating with nursing from bathroom upon OT arrival demonstrating decreased safety with her mobilty using walker and heavy assist from CNA. Pt. reports just woke up  and feeling very fatigued.  OT intervention focused on plan with patient to increase patient safety.  Plan for patient to use bedside commode or transfer to wheelchair to get to bathroom for toilet transfer. Pt. should have someone present with her for safety when toileting. Pt. in agreement with this plan and CNA present and agreed with recommendation for patient safety. Pt. sat at EOB for oral cares with set up Indep. Pt. unable to participate further due to not feeling well this session. OT also addressed concern with safe discharge to home. Pt. reports fiance will be with her 24/7 and will be assisting with all tasks needed.  OT strongly recommends fiance comes in for family training to determine if safe discharge plan.  Pt. texted fiance regarding this to schedule with OT present although no response at this time.   OT Discharge Planning   OT Plan OT: precautions: cog deficits, LUE NWB, L elb NO ROM, falls, Rx: Need to get fiance here for training to determine if home is safe discharge plan. Pt. to use bedside commode or wheelchair to get to bathroom in morning and late at night versus walking with Nursing as patient demonstrated decreased safety and reports feeling difficult to safely move at these times due to fatigue. Pt. should not be left alone in bathroom.   OT Brief overview of current status OT: Update on Nursing assist with ambulation Ok for nursing to walk to bathroom mid day as patient feels able  with clear path, gait belt and walker for safety as patient has LOB and needs assist for safety when this occurs. Early AM and late PM patient to use bedside commode or wheelchair to bathroom due to fatigue. Pt. is aware of plan and aware of safety concern and is in agreement with plan.  Do not leave patient alone in bathroom at this time. Pt. not feeling well today. OT to continue with patient decreasing frequency to 3x /week to focus on dynamic balance with functional activities.   Total Session Time   Timed Code Treatment Minutes 15   Total Session Time (sum of timed and untimed services) 15   Post Acute Settings Only   What unit is patient on? TCU   OT- Transitional Care Unit Time   Individual Time (minutes) - OT 15   Group Time (minutes) - OT 0   Concurrent Time (minutes) - OT 0   Co-Treatment Time (minutes) - OT 0   TCU Total Session Time (minutes) - OT 15   TCU Daily Total Session Time   OT TCU Daily Total Session Time 15   Rehab TCU Daily Total Session Time 15

## 2024-05-07 NOTE — PROGRESS NOTES
Alert and oriented times four. Due to weakness, patient is only to ambulate with therapy. Bedside commode used for toileting. Continent of bowel and bladder. Last BM 5/6. Takes pills whole. 10mg Oxycodone given at 1015 for 8/10 pain. PRN q 4 hours.  and . Left arm in full sleeve cast. Requested sling from [facility]. IV Vancomycin ran successfully. Right PICC saline locked.     Patient's most recent vital signs are:     Vital signs:  BP: 95/56  Temp: 96.2  HR: 65  RR: 20  SpO2: 93 %     Patient does not have new respiratory symptoms.  Patient does not have new sore throat.  Patient does not have a fever greater than 99.5.

## 2024-05-07 NOTE — PLAN OF CARE
Goal Outcome Evaluation:      Plan of Care Reviewed With: patient    Overall Patient Progress: improvingOverall Patient Progress: improving     Overall pt had  no acute issue this shift. Pt is alert and oriented x 4. Able to make needs known to staffs. Pt denied having chest pain, SOB, N/V, fever and chills. IV vancomycin completed this shift. PICC line flushed and locked with heparin. No request of PRN this shift. Appears comfortably sleeping at this time. Will continue with POC

## 2024-05-08 ENCOUNTER — APPOINTMENT (OUTPATIENT)
Dept: OCCUPATIONAL THERAPY | Facility: SKILLED NURSING FACILITY | Age: 59
DRG: 559 | End: 2024-05-08
Attending: INTERNAL MEDICINE
Payer: COMMERCIAL

## 2024-05-08 ENCOUNTER — APPOINTMENT (OUTPATIENT)
Dept: PHYSICAL THERAPY | Facility: SKILLED NURSING FACILITY | Age: 59
DRG: 559 | End: 2024-05-08
Attending: INTERNAL MEDICINE
Payer: COMMERCIAL

## 2024-05-08 ENCOUNTER — DOCUMENTATION ONLY (OUTPATIENT)
Dept: INFECTIOUS DISEASES | Facility: CLINIC | Age: 59
End: 2024-05-08
Payer: COMMERCIAL

## 2024-05-08 LAB
ALBUMIN UR-MCNC: NEGATIVE MG/DL
APPEARANCE UR: CLEAR
BACTERIA #/AREA URNS HPF: ABNORMAL /HPF
BACTERIA SNV CULT: NO GROWTH
BACTERIA TISS BX CULT: NO GROWTH
BILIRUB UR QL STRIP: NEGATIVE
COLOR UR AUTO: ABNORMAL
GLUCOSE BLDC GLUCOMTR-MCNC: 118 MG/DL (ref 70–99)
GLUCOSE BLDC GLUCOMTR-MCNC: 119 MG/DL (ref 70–99)
GLUCOSE BLDC GLUCOMTR-MCNC: 148 MG/DL (ref 70–99)
GLUCOSE BLDC GLUCOMTR-MCNC: 162 MG/DL (ref 70–99)
GLUCOSE BLDC GLUCOMTR-MCNC: 187 MG/DL (ref 70–99)
GLUCOSE UR STRIP-MCNC: NEGATIVE MG/DL
HGB UR QL STRIP: NEGATIVE
HOLD SPECIMEN: NORMAL
HYALINE CASTS: 1 /LPF
KETONES UR STRIP-MCNC: NEGATIVE MG/DL
LEUKOCYTE ESTERASE UR QL STRIP: NEGATIVE
MUCOUS THREADS #/AREA URNS LPF: PRESENT /LPF
NITRATE UR QL: NEGATIVE
PH UR STRIP: 6 [PH] (ref 5–7)
RBC URINE: <1 /HPF
SP GR UR STRIP: 1.01 (ref 1–1.03)
SQUAMOUS EPITHELIAL: 1 /HPF
UROBILINOGEN UR STRIP-MCNC: NORMAL MG/DL
VANCOMYCIN SERPL-MCNC: 16.8 UG/ML
WBC URINE: 1 /HPF

## 2024-05-08 PROCEDURE — 80202 ASSAY OF VANCOMYCIN: CPT | Performed by: INTERNAL MEDICINE

## 2024-05-08 PROCEDURE — 36592 COLLECT BLOOD FROM PICC: CPT | Performed by: INTERNAL MEDICINE

## 2024-05-08 PROCEDURE — 250N000013 HC RX MED GY IP 250 OP 250 PS 637: Performed by: INTERNAL MEDICINE

## 2024-05-08 PROCEDURE — 97112 NEUROMUSCULAR REEDUCATION: CPT | Mod: GP | Performed by: PHYSICAL THERAPIST

## 2024-05-08 PROCEDURE — 250N000011 HC RX IP 250 OP 636: Performed by: INTERNAL MEDICINE

## 2024-05-08 PROCEDURE — 258N000003 HC RX IP 258 OP 636: Performed by: INTERNAL MEDICINE

## 2024-05-08 PROCEDURE — 81001 URINALYSIS AUTO W/SCOPE: CPT | Performed by: INTERNAL MEDICINE

## 2024-05-08 PROCEDURE — 250N000013 HC RX MED GY IP 250 OP 250 PS 637

## 2024-05-08 PROCEDURE — 250N000011 HC RX IP 250 OP 636: Mod: JZ | Performed by: INTERNAL MEDICINE

## 2024-05-08 PROCEDURE — 97530 THERAPEUTIC ACTIVITIES: CPT | Mod: GO

## 2024-05-08 PROCEDURE — 97530 THERAPEUTIC ACTIVITIES: CPT | Mod: GP | Performed by: PHYSICAL THERAPIST

## 2024-05-08 PROCEDURE — 97110 THERAPEUTIC EXERCISES: CPT | Mod: GP | Performed by: PHYSICAL THERAPIST

## 2024-05-08 PROCEDURE — 120N000009 HC R&B SNF

## 2024-05-08 RX ADMIN — ASPIRIN 162 MG: 81 TABLET, COATED ORAL at 09:05

## 2024-05-08 RX ADMIN — Medication 1 CAPSULE: at 09:05

## 2024-05-08 RX ADMIN — INSULIN ASPART 1 UNITS: 100 INJECTION, SOLUTION INTRAVENOUS; SUBCUTANEOUS at 13:54

## 2024-05-08 RX ADMIN — CITALOPRAM HYDROBROMIDE 20 MG: 20 TABLET ORAL at 20:56

## 2024-05-08 RX ADMIN — OXYCODONE HYDROCHLORIDE 10 MG: 5 TABLET ORAL at 01:01

## 2024-05-08 RX ADMIN — AMLODIPINE BESYLATE 5 MG: 5 TABLET ORAL at 09:05

## 2024-05-08 RX ADMIN — HEPARIN, PORCINE (PF) 10 UNIT/ML INTRAVENOUS SYRINGE 5 ML: at 16:33

## 2024-05-08 RX ADMIN — OMEPRAZOLE 20 MG: 20 CAPSULE, DELAYED RELEASE ORAL at 09:05

## 2024-05-08 RX ADMIN — ATORVASTATIN CALCIUM 10 MG: 10 TABLET, FILM COATED ORAL at 20:57

## 2024-05-08 RX ADMIN — VANCOMYCIN HYDROCHLORIDE 1600 MG: 1 INJECTION, POWDER, LYOPHILIZED, FOR SOLUTION INTRAVENOUS at 12:29

## 2024-05-08 RX ADMIN — LUMATEPERONE 84 MG: 42 CAPSULE ORAL at 20:58

## 2024-05-08 RX ADMIN — OXYCODONE HYDROCHLORIDE 10 MG: 5 TABLET ORAL at 09:12

## 2024-05-08 RX ADMIN — CLONIDINE HYDROCHLORIDE 0.2 MG: 0.1 TABLET ORAL at 09:05

## 2024-05-08 RX ADMIN — Medication 5 ML: at 08:19

## 2024-05-08 RX ADMIN — GABAPENTIN 600 MG: 300 CAPSULE ORAL at 09:05

## 2024-05-08 RX ADMIN — CLONIDINE HYDROCHLORIDE 0.2 MG: 0.1 TABLET ORAL at 20:55

## 2024-05-08 RX ADMIN — Medication 1 CAPSULE: at 20:55

## 2024-05-08 RX ADMIN — FERROUS SULFATE TAB 325 MG (65 MG ELEMENTAL FE) 325 MG: 325 (65 FE) TAB at 09:05

## 2024-05-08 RX ADMIN — INSULIN GLARGINE 5 UNITS: 100 INJECTION, SOLUTION SUBCUTANEOUS at 21:03

## 2024-05-08 RX ADMIN — GABAPENTIN 600 MG: 300 CAPSULE ORAL at 20:56

## 2024-05-08 RX ADMIN — ERTAPENEM SODIUM 1 G: 1 INJECTION, POWDER, LYOPHILIZED, FOR SOLUTION INTRAMUSCULAR; INTRAVENOUS at 16:33

## 2024-05-08 RX ADMIN — MICONAZOLE NITRATE: 20 POWDER TOPICAL at 21:04

## 2024-05-08 RX ADMIN — Medication 5 ML: at 01:00

## 2024-05-08 RX ADMIN — OXYCODONE HYDROCHLORIDE 10 MG: 5 TABLET ORAL at 20:57

## 2024-05-08 RX ADMIN — GABAPENTIN 600 MG: 300 CAPSULE ORAL at 13:54

## 2024-05-08 ASSESSMENT — ACTIVITIES OF DAILY LIVING (ADL)
ADLS_ACUITY_SCORE: 34

## 2024-05-08 NOTE — PROGRESS NOTES
05/08/24 1007   Appointment Info   Signing Clinician's Name / Credentials (PT) Meri Naranjo, PT, DPT   Rehab Comments (PT) pt tired, states not feeling as strong, kidney issues per pt   Therapeutic Procedure/Exercise   Ther. Procedure: strength, endurance, ROM, flexibillity Minutes (85460) 15   Symptoms Noted During/After Treatment fatigue   Treatment Detail/Skilled Intervention PT: standing heel/toe raises x 10, minisquats x 10 RUE support pt tends to clsoe eyes, LE fatigue limiting.  Pt performed side tap in stance, difficult for pt, needing cga , Eber cues to stop after 6 reps due to LE fatigue.   Therapeutic Activity   Therapeutic Activities: dynamic activities to improve functional performance Minutes (44430) 10   Symptoms Noted During/After Treatment Fatigue   Treatment Detail/Skilled Intervention wc mobility with B LEs, managing brakes with sba, to gym and then longer dist back for LE strengthening x 240 ft.  Needs A out of tight room but otherwise SBA.   Neuromuscular Re-education   Neuromuscular Re-Education Minutes (44562) 15   Symptoms Noted During/After Treatment fatigue   Treatment Detail/Skilled Intervention PT: focus on standing balance in bars - EO, x 20 sec, EC x 20 seconds. x 3 reps, then seated rest and then narrow LUCERO, no UE support, x 60 sec x 2, EC x 20 seconds x 2 reps,  increased sway, cga with EC.  De Oliveira by LE fatigue.   PT Discharge Planning   PT Plan PT: COnt SPT transfers no ad, cont wc mobility training stairs as able, and LE strengthening in standing.   Total Session Time   Timed Code Treatment Minutes 40   Total Session Time (sum of timed and untimed services) 40   PT - Transitional Care Unit Time   Individual Time (minutes) - PT 40   Group Time (minutes) - PT 0   Concurrent Time (minutes) - PT 0   Co-Treatment Time (minutes) - PT 0   TCU Total Session Time (minutes) - PT 40   TCU Daily Total Session Time   PT TCU Daily Total Session Time 40   Rehab TCU Daily Total Session Time 40    Transfers: Sit to Stand   Describe Performance in bars RUE support, cga.   Transfers: Chair/Bed transfers   Describe Performance cga, stand pivot, decreased eccentric control stand to sit.

## 2024-05-08 NOTE — PLAN OF CARE
Goal Outcome Evaluation:    Plan of Care Reviewed With: patient    Overall Patient Progress: no change    Outcome Evaluation: LUE pain comfortably managed with Oxycodone 10 mg tab  x 1 this shift. LUE with long arm cast and  sling in place. CMS intact, pt able to move fingers. A of 2 to BSC with gait belt., can be unsteady.  Medium soft BM and voiding adequately. Able to do own perineal care. Pt has no c/o SOB and no s/s of respiratory issue noted at RA. Appear to be sleeping/resting between cares/ meds. Continue with plan of care.    0700: Pt requesting  for provider to assess and advised re ff symptoms: nocturnal incontinence, pain on lower abd and eeling bloated etc. Afebrile. Will elave a E-sticky note.       Patient's most recent vital signs are:     Vital signs:  BP: 114/82  Temp: 97.1  HR: 67  RR: 18  SpO2: 96 %     Patient does not have new respiratory symptoms.  Patient does not have new sore throat.  Patient does not have a fever greater than 99.5.

## 2024-05-08 NOTE — PLAN OF CARE
Goal Outcome Evaluation:  Plan of Care Reviewed With: patient  Overall Patient Progress: no change    Outcome Evaluation: Pt is A&Ox4 w/ some confusion at times. Pt has been complaining of urination issues and some abd pain - Provider aware. UA ordered and collected.    RN Shift: 0700 - 1930    Vital Signs: Temp: 97.3  F (36.3  C) Temp src: Oral BP: (!) 150/58 Pulse: 69   Resp: 18 SpO2: 95 % O2 Device: None (Room air)     CMS/Neuro: A&O x4 - Some confusion at times.     Cardio/Resp: No SOB or chest pain     Output: Continent of B/B - LBM: 5/8/24  Using bedside commode     Activity: Assist x 1 w/ walker and gait belt.      Skin: Intact - No rashes, abrasions, or lesions     Pain: 7/10 - Controlled w/ medication  >>> PRN given x1     Dressing: L arm cast - CDI      LDA: R. Arm PICC - Patent, Currently HL     Diet: Regular, Thin liquids, Medication whole  , 187, & 119     Additional Info: Call light w/in reach and able to make needs known     Plan: Continue POC - Discharge TBD       Manuela Albrecht, RN, BSN, PHN

## 2024-05-08 NOTE — PROGRESS NOTES
05/07/24 1122   Appointment Info   Signing Clinician's Name / Credentials (OT) SANJUANA Seay   OT Assistant Visit Number 3   Assistant Supervision SANJUANA visit observed, intervention appropriate, plan of care reviewed and modified   Rehab Comments (OT) -30 patient not feeling well this session.   OT Goals   Therapy Frequency (OT) 3 times/week   OT Predicted Duration/Target Date for Goal Attainment 05/15/24   OT: Hygiene/Grooming supervision/stand-by assist;within precautions  (in standing or sitting)   OT: Transfer Supervision/stand-by assist;with assistive device;within precautions   OT: Toilet Transfer/Toileting Supervision/stand-by assist;toilet transfer;using adaptive equipment;within precautions;cleaning and garment management   OT: Cognitive Patient/caregiver will verbalize understanding of cognitive assessment results/recommendations as needed for safe discharge planning   OT: Goal 1 Pt. to be SBA with dynamic standing for functional ADL tasks with no concerns of safety   Self-Care/Home Management   Self-Care/Home Mgmt/ADL, Compensatory, Meal Prep Minutes ([id number]) 15   Treatment Detail/Skilled Intervention OT: Pt. ambulating with nursing from bathroom upon OT arrival demonstrating decreased safety with her mobilty using walker and heavy assist from CNA. Pt. reports just woke up  and feeling very fatigued.  OT intervention focused on plan with patient to increase patient safety.  Plan for patient to use bedside commode or transfer to wheelchair to get to bathroom for toilet transfer. Pt. should have someone present with her for safety when toileting. Pt. in agreement with this plan and CNA present and agreed with recommendation for patient safety. Pt. sat at EOB for oral cares with set up Indep. Pt. unable to participate further due to not feeling well this session. OT also addressed concern with safe discharge to home. Pt. reports fiance will be with her 24/7 and will be assisting with all tasks needed.   OT strongly recommends fiance comes in for family training to determine if safe discharge plan.  Pt. texted fiance regarding this to schedule with OT present although no response at this time.   OT Discharge Planning   OT Plan OT: precautions: cog deficits, LUE NWB, L elb NO ROM, falls, Rx: Need to get fiance here for training to determine if home is safe discharge plan. Pt. to use bedside commode or wheelchair to get to bathroom in morning and late at night versus walking with Nursing as patient demonstrated decreased safety and reports feeling difficult to safely move at these times due to fatigue. Pt. should not be left alone in bathroom.   OT Brief overview of current status OT: Update on Nursing assist with ambulation Ok for nursing to walk to bathroom mid day as patient feels able with clear path, gait belt and walker for safety as patient has LOB and needs assist for safety when this occurs. Early AM and late PM patient to use bedside commode or wheelchair to bathroom due to fatigue. Pt. is aware of plan and aware of safety concern and is in agreement with plan.  Do not leave patient alone in bathroom at this time. Pt. not feeling well today. OT to continue with patient decreasing frequency to 3x /week to focus on dynamic balance with functional activities.   Total Session Time   Timed Code Treatment Minutes 15   Total Session Time (sum of timed and untimed services) 15   Post Acute Settings Only   What unit is patient on? TCU   OT- Transitional Care Unit Time   Individual Time (minutes) - OT 15   Group Time (minutes) - OT 0   Concurrent Time (minutes) - OT 0   Co-Treatment Time (minutes) - OT 0   TCU Total Session Time (minutes) - OT 15   TCU Daily Total Session Time   OT TCU Daily Total Session Time 15   Rehab TCU Daily Total Session Time 15

## 2024-05-08 NOTE — PLAN OF CARE
Goal Outcome Evaluation:      Plan of Care Reviewed With: patient    Overall Patient Progress: no change  A/O x 4, afebrile, denied SOB, pain to left arm managed with Oxycodone PRN given x 1 this shift. On IV antibiotics , Vancomycin infusing via right arm PICC. With left long arm cast, wears arm sling, elevated with pillow when in bed, NWB to this arm. Good appetite, BG monitored , on SSI. Comfortable at this time, will continue POC.         Patient's most recent vital signs are:     Vital signs:  BP: 114/82 ( Clonidine held per parameters ordered)   Temp: 97.1  HR: 67  RR: 18  SpO2: 96 %     Patient does not have new respiratory symptoms.  Patient does not have new sore throat.  Patient does not have a fever greater than 99.5.

## 2024-05-08 NOTE — PHARMACY-VANCOMYCIN DOSING SERVICE
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  Start time: 10:51 on 2024  Exposure target: AUC24 (range)400-600 mg/L.hr   AUC24,ss: 491 mg/L.hr  Probability of AUC24 > 400: 98 %  Ctrough,ss: 17.2 mg/L  Probability of Ctrough,ss > 20: 3 %  Probability of nephrotoxicity (Lodise CHANDAN ):   Current estimated CrCl = Estimated Creatinine Clearance: 92.4 mL/min (based on SCr of 0.85 mg/dL).    Creatinine for last 3 days  2024:  8:13 AM Creatinine 0.85 mg/dL    Recent Vancomycin Levels (past 3 days)  2024:  8:13 AM Vancomycin 17.6 ug/mL    Vancomycin IV Administrations (past 72 hours)                     vancomycin (VANCOCIN) 850 mg in sodium chloride 0.9 % 250 mL intermittent infusion (mg) 850 mg New Bag 24 2251     850 mg New Bag  1011     850 mg New Bag 24 2246     850 mg New Bag  1120    vancomycin (VANCOCIN) 750 mg in sodium chloride 0.9 % 250 mL intermittent infusion (mg) 750 mg New Bag 24 2108     750 mg New Bag  1111                    Nephrotoxins and other renal medications (From now, onward)      Start     Dose/Rate Route Frequency Ordered Stop    24 1100  vancomycin (VANCOCIN) 1,600 mg in sodium chloride 0.9 % 500 mL intermittent infusion         1,600 mg  over 120 Minutes Intravenous EVERY 24 HOURS 24 0753                 Contrast Orders - past 72 hours (72h ago, onward)      None            Interpretation of levels and current regimen:  Vancomycin level is reflective of AUC that is therapeutic but would prefer the  probability of nephrotoxicity to be <10%    Has serum creatinine changed greater than 50% in last 72 hours: No    Urine output:  unable to determine    Renal Function: Stable    InsightRX Prediction of Planned New Vancomycin Regimen  Regimen: 1600 mg IV every 12 hours.  Start time: 10:51 on 05/08/2024  Exposure target: AUC24 (range)400-600 mg/L.hr   AUC24,ss: 468 mg/L.hr  Probability of AUC24 > 400: 95 %  Ctrough,ss: 13.9 mg/L  Probability of Ctrough,ss > 20: 0 %  Probability of nephrotoxicity (Lodise CHANDAN 2009): 9 %    Plan:  Change dose to 1600mg q24h (first dose today at 1100  Vancomycin monitoring method: AUC  Vancomycin therapeutic monitoring goal: 400-600 mg*h/L  Pharmacy will check vancomycin levels as appropriate in 1-3 Days.  Serum creatinine levels will be ordered a minimum of twice weekly.    Staci Stevens, [facility]  PharmD,BCPS  May 8, 2024

## 2024-05-08 NOTE — PROGRESS NOTES
Transfer of OPAT Care    Clinic staff resumed responsibility for patients OPAT plan on 05/08/2024.    Medication: Ertapenem, Vancomycin     Infusion company:  TCU     Length of Therapy: 5/22/24     Provider: Dr. Long will follow until patient returns to [place], [place] at which time patient's ID provider Dr. Suresh Dominguez will resume care.    Next Update needed:Patient had an appointment 5/7/24 at 3 PM. Clinic was not able to connect with Mitali or her nursing staff.     If ID follow-up is needed, will need to reschedule or consult inpatient ID     Other Notes: will send to CC to schedule follow up. Need to contact  TCU to arrange appt. [phone] and be sure patient is able to be transported to clinic for appt.       Eugene Holley RN  Infectious Disease on 5/8/2024 at 11:42 AM

## 2024-05-09 ENCOUNTER — APPOINTMENT (OUTPATIENT)
Dept: PHYSICAL THERAPY | Facility: SKILLED NURSING FACILITY | Age: 59
DRG: 559 | End: 2024-05-09
Attending: INTERNAL MEDICINE
Payer: COMMERCIAL

## 2024-05-09 LAB
ALBUMIN SERPL BCG-MCNC: 2.9 G/DL (ref 3.5–5.2)
ALP SERPL-CCNC: 174 U/L (ref 40–150)
ALT SERPL W P-5'-P-CCNC: 12 U/L (ref 0–50)
ANION GAP SERPL CALCULATED.3IONS-SCNC: 10 MMOL/L (ref 7–15)
AST SERPL W P-5'-P-CCNC: 14 U/L (ref 0–45)
BASOPHILS # BLD AUTO: 0.1 10E3/UL (ref 0–0.2)
BASOPHILS NFR BLD AUTO: 1 %
BILIRUB SERPL-MCNC: <0.2 MG/DL
BUN SERPL-MCNC: 8.2 MG/DL (ref 8–23)
CALCIUM SERPL-MCNC: 8.4 MG/DL (ref 8.8–10.2)
CHLORIDE SERPL-SCNC: 101 MMOL/L (ref 98–107)
CREAT SERPL-MCNC: 0.75 MG/DL (ref 0.51–0.95)
CRP SERPL-MCNC: 32.26 MG/L
DEPRECATED HCO3 PLAS-SCNC: 29 MMOL/L (ref 22–29)
EGFRCR SERPLBLD CKD-EPI 2021: >90 ML/MIN/1.73M2
EOSINOPHIL # BLD AUTO: 0.4 10E3/UL (ref 0–0.7)
EOSINOPHIL NFR BLD AUTO: 5 %
ERYTHROCYTE [DISTWIDTH] IN BLOOD BY AUTOMATED COUNT: 17.2 % (ref 10–15)
GLUCOSE BLDC GLUCOMTR-MCNC: 136 MG/DL (ref 70–99)
GLUCOSE BLDC GLUCOMTR-MCNC: 136 MG/DL (ref 70–99)
GLUCOSE BLDC GLUCOMTR-MCNC: 144 MG/DL (ref 70–99)
GLUCOSE BLDC GLUCOMTR-MCNC: 173 MG/DL (ref 70–99)
GLUCOSE BLDC GLUCOMTR-MCNC: 218 MG/DL (ref 70–99)
GLUCOSE SERPL-MCNC: 129 MG/DL (ref 70–99)
HCT VFR BLD AUTO: 23.6 % (ref 35–47)
HGB BLD-MCNC: 7.2 G/DL (ref 11.7–15.7)
IMM GRANULOCYTES # BLD: 0 10E3/UL
IMM GRANULOCYTES NFR BLD: 1 %
LYMPHOCYTES # BLD AUTO: 2 10E3/UL (ref 0.8–5.3)
LYMPHOCYTES NFR BLD AUTO: 26 %
MCH RBC QN AUTO: 25.4 PG (ref 26.5–33)
MCHC RBC AUTO-ENTMCNC: 30.5 G/DL (ref 31.5–36.5)
MCV RBC AUTO: 83 FL (ref 78–100)
MONOCYTES # BLD AUTO: 0.8 10E3/UL (ref 0–1.3)
MONOCYTES NFR BLD AUTO: 11 %
NEUTROPHILS # BLD AUTO: 4.4 10E3/UL (ref 1.6–8.3)
NEUTROPHILS NFR BLD AUTO: 56 %
NRBC # BLD AUTO: 0 10E3/UL
NRBC BLD AUTO-RTO: 0 /100
PLATELET # BLD AUTO: 419 10E3/UL (ref 150–450)
POTASSIUM SERPL-SCNC: 3.2 MMOL/L (ref 3.4–5.3)
POTASSIUM SERPL-SCNC: 3.6 MMOL/L (ref 3.4–5.3)
PROT SERPL-MCNC: 5.6 G/DL (ref 6.4–8.3)
RBC # BLD AUTO: 2.84 10E6/UL (ref 3.8–5.2)
SODIUM SERPL-SCNC: 140 MMOL/L (ref 135–145)
WBC # BLD AUTO: 7.7 10E3/UL (ref 4–11)

## 2024-05-09 PROCEDURE — 97530 THERAPEUTIC ACTIVITIES: CPT | Mod: GP

## 2024-05-09 PROCEDURE — 86140 C-REACTIVE PROTEIN: CPT | Performed by: INTERNAL MEDICINE

## 2024-05-09 PROCEDURE — 250N000013 HC RX MED GY IP 250 OP 250 PS 637

## 2024-05-09 PROCEDURE — 97116 GAIT TRAINING THERAPY: CPT | Mod: GP

## 2024-05-09 PROCEDURE — 250N000013 HC RX MED GY IP 250 OP 250 PS 637: Performed by: INTERNAL MEDICINE

## 2024-05-09 PROCEDURE — 120N000009 HC R&B SNF

## 2024-05-09 PROCEDURE — 85048 AUTOMATED LEUKOCYTE COUNT: CPT | Performed by: INTERNAL MEDICINE

## 2024-05-09 PROCEDURE — 250N000011 HC RX IP 250 OP 636: Performed by: INTERNAL MEDICINE

## 2024-05-09 PROCEDURE — 36592 COLLECT BLOOD FROM PICC: CPT | Performed by: INTERNAL MEDICINE

## 2024-05-09 PROCEDURE — 80053 COMPREHEN METABOLIC PANEL: CPT | Performed by: INTERNAL MEDICINE

## 2024-05-09 PROCEDURE — 258N000003 HC RX IP 258 OP 636: Performed by: INTERNAL MEDICINE

## 2024-05-09 PROCEDURE — 250N000011 HC RX IP 250 OP 636: Mod: JZ | Performed by: INTERNAL MEDICINE

## 2024-05-09 PROCEDURE — 84132 ASSAY OF SERUM POTASSIUM: CPT | Performed by: INTERNAL MEDICINE

## 2024-05-09 RX ORDER — POTASSIUM CHLORIDE 1500 MG/1
40 TABLET, EXTENDED RELEASE ORAL ONCE
Status: COMPLETED | OUTPATIENT
Start: 2024-05-09 | End: 2024-05-09

## 2024-05-09 RX ADMIN — HEPARIN, PORCINE (PF) 10 UNIT/ML INTRAVENOUS SYRINGE 5 ML: at 16:37

## 2024-05-09 RX ADMIN — Medication 1 CAPSULE: at 21:45

## 2024-05-09 RX ADMIN — OXYCODONE HYDROCHLORIDE 10 MG: 5 TABLET ORAL at 22:04

## 2024-05-09 RX ADMIN — CITALOPRAM HYDROBROMIDE 20 MG: 20 TABLET ORAL at 21:45

## 2024-05-09 RX ADMIN — OXYCODONE HYDROCHLORIDE 10 MG: 5 TABLET ORAL at 12:56

## 2024-05-09 RX ADMIN — Medication 5 ML: at 06:42

## 2024-05-09 RX ADMIN — ERTAPENEM SODIUM 1 G: 1 INJECTION, POWDER, LYOPHILIZED, FOR SOLUTION INTRAMUSCULAR; INTRAVENOUS at 16:39

## 2024-05-09 RX ADMIN — MICONAZOLE NITRATE: 20 POWDER TOPICAL at 11:40

## 2024-05-09 RX ADMIN — CLONIDINE HYDROCHLORIDE 0.2 MG: 0.1 TABLET ORAL at 21:46

## 2024-05-09 RX ADMIN — ASPIRIN 162 MG: 81 TABLET, COATED ORAL at 11:32

## 2024-05-09 RX ADMIN — POTASSIUM CHLORIDE 40 MEQ: 1500 TABLET, EXTENDED RELEASE ORAL at 14:37

## 2024-05-09 RX ADMIN — OMEPRAZOLE 20 MG: 20 CAPSULE, DELAYED RELEASE ORAL at 11:32

## 2024-05-09 RX ADMIN — Medication 1 CAPSULE: at 11:32

## 2024-05-09 RX ADMIN — INSULIN ASPART 1 UNITS: 100 INJECTION, SOLUTION INTRAVENOUS; SUBCUTANEOUS at 12:56

## 2024-05-09 RX ADMIN — INSULIN ASPART 1 UNITS: 100 INJECTION, SOLUTION INTRAVENOUS; SUBCUTANEOUS at 17:33

## 2024-05-09 RX ADMIN — LUMATEPERONE 84 MG: 42 CAPSULE ORAL at 21:45

## 2024-05-09 RX ADMIN — ATORVASTATIN CALCIUM 10 MG: 10 TABLET, FILM COATED ORAL at 21:46

## 2024-05-09 RX ADMIN — GABAPENTIN 600 MG: 300 CAPSULE ORAL at 21:46

## 2024-05-09 RX ADMIN — GABAPENTIN 600 MG: 300 CAPSULE ORAL at 14:37

## 2024-05-09 RX ADMIN — GABAPENTIN 600 MG: 300 CAPSULE ORAL at 11:32

## 2024-05-09 RX ADMIN — AMLODIPINE BESYLATE 5 MG: 5 TABLET ORAL at 11:32

## 2024-05-09 RX ADMIN — CLONIDINE HYDROCHLORIDE 0.2 MG: 0.1 TABLET ORAL at 11:32

## 2024-05-09 RX ADMIN — INSULIN GLARGINE 5 UNITS: 100 INJECTION, SOLUTION SUBCUTANEOUS at 21:51

## 2024-05-09 RX ADMIN — VANCOMYCIN HYDROCHLORIDE 1600 MG: 1 INJECTION, POWDER, LYOPHILIZED, FOR SOLUTION INTRAVENOUS at 11:53

## 2024-05-09 ASSESSMENT — ACTIVITIES OF DAILY LIVING (ADL)
ADLS_ACUITY_SCORE: 35
ADLS_ACUITY_SCORE: 35
ADLS_ACUITY_SCORE: 33
ADLS_ACUITY_SCORE: 35
ADLS_ACUITY_SCORE: 33
ADLS_ACUITY_SCORE: 35
ADLS_ACUITY_SCORE: 35
ADLS_ACUITY_SCORE: 34
ADLS_ACUITY_SCORE: 35
ADLS_ACUITY_SCORE: 33
ADLS_ACUITY_SCORE: 35
ADLS_ACUITY_SCORE: 33
ADLS_ACUITY_SCORE: 34
ADLS_ACUITY_SCORE: 35
ADLS_ACUITY_SCORE: 33
ADLS_ACUITY_SCORE: 34
ADLS_ACUITY_SCORE: 35
ADLS_ACUITY_SCORE: 33
ADLS_ACUITY_SCORE: 35

## 2024-05-09 NOTE — PROGRESS NOTES
Discharge plan is to go home after IV ANB.  Boyfriend is suppose to come and do therapy training.  Pt is very unsteady on her feet due to dx and medications.  Pt will proabably be unsafe to live alone.  At this time, plan is to go home.  If boyfriend doesn't come, may have to plan on supervised living.     FRANCISCO J Romo   [facility], Transitional Care Unit   Social Work   [facility] [place] [address] () [phone]

## 2024-05-09 NOTE — PLAN OF CARE
Goal Outcome Evaluation:    VS: BP (!) 146/51 (BP Location: Right leg)   Pulse 66   Temp 97.7  F (36.5  C) (Oral)   Resp 18   Wt 110 kg (242 lb 8 oz)   SpO2 96%   BMI 35.81 kg/m     O2: RA   Output: Voids in toilet   Last BM: 5/8   Activity: Therapy, in bed, TV   Skin: X:  LUE surgery; cast & sling  R PICC   Pain: Oxy x1   CMS:  Neuro: Intact X: pt c/o tingling to LUE near elbow and fingers; Provider aware.  A&O x3   Dressing: Cast CDI  R PICC CDI   Diet: Reg   and 144   LDA: R PICC; IV ABX infused   Equipment: IV pole, pump, BSC, cane   Plan: Con't POC.    Additional Info: K+ 3.2; PO replacement given; lab recheck scheduled    Pt's sling appears tight around right neck area; see if SPD has a foam cover for sling?      Patient's most recent vital signs are:     Vital signs:  BP: 146/51  Temp: 97.7  HR: 66  RR: 18  SpO2: 96 %     Patient does not have new respiratory symptoms.  Patient does not have new sore throat.  Patient does not have a fever greater than 99.5

## 2024-05-09 NOTE — PROGRESS NOTES
SW left a  for [facility] [phone].  Therapies say pt is not SAFE to live on her own.  They are in the room when pt texts boyfriend to find out when he is coming.  Every day it's a new excuse as to why he can't come out now. Nobody feels this is a real person/boyfriend.  Please return call.     FRANCISCO J Romo   [facility], Transitional Care Unit   Social Work   [facility] [place] [address]  () [phone]

## 2024-05-09 NOTE — PROGRESS NOTES
05/09/24 1126   Appointment Info   Signing Clinician's Name / Credentials (OT) SANJUANA Seay   Rehab Comments (OT) Weekly Note   OT Discharge Planning   OT Brief overview of current status OT: Balance continues to be concern for safety.  Last OT session for self cares:  Pt. completed toilet transfer with Min assist for safe transfer and Min assist for balance as patient able to pull down pants. Pt. completed toileting hygiene with close CGA and able to complete hygiene following voiding with CGA. Pt. sat at sink for full sponge bath standing with min assist for lulu hygiene only. Pt. able to sequence task and demonstrates good one handed techniques to complete UE and LE self cares. Pt. doffed UE and LE clothing SBA. Pt. donned underwear/pants with Min assist to manage feet due to socks sticking. Increased time and SBA aysha pull over shirt. Max assist to manage socks. Pt. washed hair at sink with Min assist to fully rinse hair otherwise able to manage full task. Pt. stood at sink for oral cares with Min assist for balance.  Pt. status fluctuates due to balance issues throughout stay.  Pt. continues to be at risk for falls.

## 2024-05-09 NOTE — PLAN OF CARE
Goal Outcome Evaluation:      Plan of Care Reviewed With: patient    Overall Patient Progress: improvingOverall Patient Progress: improving     Overall Pt had no acute issue this shift. Pt. Pt is alert and oriented x 4. Able to make needs known. Denied having discomfort this shift. LUE with long arm cast and sling in place. LUE elevated per order. Pt denied numbness and tingling. PICC line dressing to right upper extremities in place with dressing clean dry and intact. Pt slept through out the shift. No complain at this time. Will continue with POC

## 2024-05-10 ENCOUNTER — APPOINTMENT (OUTPATIENT)
Dept: GENERAL RADIOLOGY | Facility: CLINIC | Age: 59
End: 2024-05-10
Attending: SPECIALIST/TECHNOLOGIST
Payer: COMMERCIAL

## 2024-05-10 ENCOUNTER — APPOINTMENT (OUTPATIENT)
Dept: OCCUPATIONAL THERAPY | Facility: SKILLED NURSING FACILITY | Age: 59
DRG: 559 | End: 2024-05-10
Attending: INTERNAL MEDICINE
Payer: COMMERCIAL

## 2024-05-10 ENCOUNTER — APPOINTMENT (OUTPATIENT)
Dept: PHYSICAL THERAPY | Facility: SKILLED NURSING FACILITY | Age: 59
DRG: 559 | End: 2024-05-10
Attending: INTERNAL MEDICINE
Payer: COMMERCIAL

## 2024-05-10 ENCOUNTER — HOSPITAL ENCOUNTER (EMERGENCY)
Facility: CLINIC | Age: 59
Discharge: STILL ADMITTED OR ADMINISTRATIVE | End: 2024-05-11
Payer: COMMERCIAL

## 2024-05-10 LAB
CRP SERPL-MCNC: 25.81 MG/L
GLUCOSE BLDC GLUCOMTR-MCNC: 127 MG/DL (ref 70–99)
GLUCOSE BLDC GLUCOMTR-MCNC: 149 MG/DL (ref 70–99)
GLUCOSE BLDC GLUCOMTR-MCNC: 154 MG/DL (ref 70–99)
GLUCOSE BLDC GLUCOMTR-MCNC: 167 MG/DL (ref 70–99)
GLUCOSE BLDC GLUCOMTR-MCNC: 179 MG/DL (ref 70–99)
HOLD SPECIMEN: NORMAL
POTASSIUM SERPL-SCNC: 3.6 MMOL/L (ref 3.4–5.3)
VANCOMYCIN SERPL-MCNC: 14.7 UG/ML

## 2024-05-10 PROCEDURE — 250N000013 HC RX MED GY IP 250 OP 250 PS 637

## 2024-05-10 PROCEDURE — 84132 ASSAY OF SERUM POTASSIUM: CPT | Performed by: INTERNAL MEDICINE

## 2024-05-10 PROCEDURE — 120N000009 HC R&B SNF

## 2024-05-10 PROCEDURE — 250N000013 HC RX MED GY IP 250 OP 250 PS 637: Performed by: INTERNAL MEDICINE

## 2024-05-10 PROCEDURE — 97110 THERAPEUTIC EXERCISES: CPT | Mod: GP

## 2024-05-10 PROCEDURE — 99309 SBSQ NF CARE MODERATE MDM 30: CPT | Performed by: INTERNAL MEDICINE

## 2024-05-10 PROCEDURE — 36592 COLLECT BLOOD FROM PICC: CPT | Performed by: INTERNAL MEDICINE

## 2024-05-10 PROCEDURE — 73080 X-RAY EXAM OF ELBOW: CPT | Mod: LT

## 2024-05-10 PROCEDURE — 97535 SELF CARE MNGMENT TRAINING: CPT | Mod: GO

## 2024-05-10 PROCEDURE — 250N000011 HC RX IP 250 OP 636: Mod: JZ | Performed by: INTERNAL MEDICINE

## 2024-05-10 PROCEDURE — 86140 C-REACTIVE PROTEIN: CPT | Performed by: SPECIALIST/TECHNOLOGIST

## 2024-05-10 PROCEDURE — 250N000011 HC RX IP 250 OP 636: Performed by: INTERNAL MEDICINE

## 2024-05-10 PROCEDURE — 80202 ASSAY OF VANCOMYCIN: CPT | Performed by: INTERNAL MEDICINE

## 2024-05-10 PROCEDURE — 258N000003 HC RX IP 258 OP 636: Performed by: INTERNAL MEDICINE

## 2024-05-10 RX ORDER — OXYCODONE HYDROCHLORIDE 5 MG/1
5 TABLET ORAL ONCE
Status: COMPLETED | OUTPATIENT
Start: 2024-05-10 | End: 2024-05-10

## 2024-05-10 RX ORDER — CEFAZOLIN SODIUM 1 G/50ML
1750 SOLUTION INTRAVENOUS EVERY 24 HOURS
Status: DISCONTINUED | OUTPATIENT
Start: 2024-05-11 | End: 2024-05-18

## 2024-05-10 RX ORDER — MIRABEGRON 25 MG/1
25 TABLET, FILM COATED, EXTENDED RELEASE ORAL DAILY
Status: DISCONTINUED | OUTPATIENT
Start: 2024-05-10 | End: 2024-05-24 | Stop reason: HOSPADM

## 2024-05-10 RX ORDER — OXYCODONE HYDROCHLORIDE 5 MG/1
10-15 TABLET ORAL
Status: DISCONTINUED | OUTPATIENT
Start: 2024-05-10 | End: 2024-05-15

## 2024-05-10 RX ADMIN — AMLODIPINE BESYLATE 5 MG: 5 TABLET ORAL at 10:49

## 2024-05-10 RX ADMIN — INSULIN GLARGINE 5 UNITS: 100 INJECTION, SOLUTION SUBCUTANEOUS at 21:52

## 2024-05-10 RX ADMIN — CITALOPRAM HYDROBROMIDE 20 MG: 20 TABLET ORAL at 21:43

## 2024-05-10 RX ADMIN — ERTAPENEM SODIUM 1 G: 1 INJECTION, POWDER, LYOPHILIZED, FOR SOLUTION INTRAMUSCULAR; INTRAVENOUS at 17:11

## 2024-05-10 RX ADMIN — INSULIN ASPART 1 UNITS: 100 INJECTION, SOLUTION INTRAVENOUS; SUBCUTANEOUS at 17:25

## 2024-05-10 RX ADMIN — GABAPENTIN 600 MG: 300 CAPSULE ORAL at 10:49

## 2024-05-10 RX ADMIN — GABAPENTIN 600 MG: 300 CAPSULE ORAL at 15:11

## 2024-05-10 RX ADMIN — CLONIDINE HYDROCHLORIDE 0.2 MG: 0.1 TABLET ORAL at 21:43

## 2024-05-10 RX ADMIN — OXYCODONE HYDROCHLORIDE 5 MG: 5 TABLET ORAL at 17:13

## 2024-05-10 RX ADMIN — ASPIRIN 162 MG: 81 TABLET, COATED ORAL at 10:49

## 2024-05-10 RX ADMIN — OMEPRAZOLE 20 MG: 20 CAPSULE, DELAYED RELEASE ORAL at 10:49

## 2024-05-10 RX ADMIN — INSULIN ASPART 1 UNITS: 100 INJECTION, SOLUTION INTRAVENOUS; SUBCUTANEOUS at 10:48

## 2024-05-10 RX ADMIN — INSULIN ASPART 1 UNITS: 100 INJECTION, SOLUTION INTRAVENOUS; SUBCUTANEOUS at 15:11

## 2024-05-10 RX ADMIN — VANCOMYCIN HYDROCHLORIDE 1600 MG: 1 INJECTION, POWDER, LYOPHILIZED, FOR SOLUTION INTRAVENOUS at 11:53

## 2024-05-10 RX ADMIN — MIRABEGRON 25 MG: 25 TABLET, FILM COATED, EXTENDED RELEASE ORAL at 17:13

## 2024-05-10 RX ADMIN — MICONAZOLE NITRATE: 20 POWDER TOPICAL at 10:53

## 2024-05-10 RX ADMIN — OXYCODONE HYDROCHLORIDE 10 MG: 5 TABLET ORAL at 15:17

## 2024-05-10 RX ADMIN — ATORVASTATIN CALCIUM 10 MG: 10 TABLET, FILM COATED ORAL at 21:42

## 2024-05-10 RX ADMIN — LUMATEPERONE 84 MG: 42 CAPSULE ORAL at 21:45

## 2024-05-10 RX ADMIN — FERROUS SULFATE TAB 325 MG (65 MG ELEMENTAL FE) 325 MG: 325 (65 FE) TAB at 10:50

## 2024-05-10 RX ADMIN — HEPARIN, PORCINE (PF) 10 UNIT/ML INTRAVENOUS SYRINGE 5 ML: at 17:14

## 2024-05-10 RX ADMIN — CLONIDINE HYDROCHLORIDE 0.2 MG: 0.1 TABLET ORAL at 10:50

## 2024-05-10 RX ADMIN — Medication 1 CAPSULE: at 21:43

## 2024-05-10 RX ADMIN — GABAPENTIN 600 MG: 300 CAPSULE ORAL at 21:42

## 2024-05-10 RX ADMIN — Medication 1 CAPSULE: at 10:50

## 2024-05-10 RX ADMIN — OXYCODONE HYDROCHLORIDE 15 MG: 5 TABLET ORAL at 21:51

## 2024-05-10 RX ADMIN — Medication 5 ML: at 07:45

## 2024-05-10 ASSESSMENT — ACTIVITIES OF DAILY LIVING (ADL)
ADLS_ACUITY_SCORE: 33
ADLS_ACUITY_SCORE: 35
ADLS_ACUITY_SCORE: 35
ADLS_ACUITY_SCORE: 33
ADLS_ACUITY_SCORE: 33
ADLS_ACUITY_SCORE: 35
ADLS_ACUITY_SCORE: 33
ADLS_ACUITY_SCORE: 35
ADLS_ACUITY_SCORE: 33
ADLS_ACUITY_SCORE: 33
ADLS_ACUITY_SCORE: 35
ADLS_ACUITY_SCORE: 33

## 2024-05-10 NOTE — PHARMACY-VANCOMYCIN DOSING SERVICE
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  Start time: 11:00 on 05/10/2024  Exposure target: AUC24 (range)400-600 mg/L.hr   AUC24,ss: 402 mg/L.hr  Probability of AUC24 > 400: 49 %  Ctrough,ss: 11.6 mg/L  Probability of Ctrough,ss > 20: 0 %  Probability of nephrotoxicity (Lodise CHANDAN ): 7 %      Current estimated CrCl = Estimated Creatinine Clearance: 105.4 mL/min (based on SCr of 0.75 mg/dL).    Creatinine for last 3 days  2024:  6:43 AM Creatinine 0.75 mg/dL    Recent Vancomycin Levels (past 3 days)  2024:  8:20 AM Vancomycin 16.8 ug/mL  5/10/2024:  7:46 AM Vancomycin 14.7 ug/mL    Vancomycin IV Administrations (past 72 hours)                     vancomycin (VANCOCIN) 1,600 mg in sodium chloride 0.9 % 500 mL intermittent infusion (mg) 1,600 mg New Bag 24 1153     1,600 mg New Bag 24 1229    vancomycin (VANCOCIN) 850 mg in sodium chloride 0.9 % 250 mL intermittent infusion (mg) 850 mg New Bag 24 2251                    Nephrotoxins and other renal medications (From now, onward)      Start     Dose/Rate Route Frequency Ordered Stop    24 1100  vancomycin (VANCOCIN) 1,600 mg in sodium chloride 0.9 % 500 mL intermittent infusion         1,600 mg  over 120 Minutes Intravenous EVERY 24 HOURS 24 0753                 Contrast Orders - past 72 hours (72h ago, onward)      None            Interpretation of levels and current regimen:  Vancomycin level is reflective of therapeutic level but trending down below 400     Has serum  creatinine changed greater than 50% in last 72 hours: No    Urine output:  unable to determine    Renal Function: Stable    InsightRX Prediction of Planned New Vancomycin Regimen  Regimen: 1750 mg IV every 24 hours.  Start time: 11:00 on 05/10/2024  Exposure target: AUC24 (range)400-600 mg/L.hr   AUC24,ss: 435 mg/L.hr  Probability of AUC24 > 400: 79 %  Ctrough,ss: 12.7 mg/L  Probability of Ctrough,ss > 20: 0 %  Probability of nephrotoxicity (Lodise CHANDAN 2009): 8 %    Plan:  Increase Dose to 1750mg q24hr starting 5/11 @ 1000  Vancomycin monitoring method: AUC  Vancomycin therapeutic monitoring goal: 400-600 mg*h/L  Pharmacy will check vancomycin levels as appropriate in 3-5 Days.  Serum creatinine levels will be ordered a minimum of twice weekly.    Staci Stevens [facility]  PharmD,BCPS  May 10, 2024

## 2024-05-10 NOTE — PROGRESS NOTES
"Orthopaedic Surgery Progress Note 05/10/2024    S: Patient currently in [facility]. Patient last seen one week ago for splint change to long arm cast, at that time she also had XR which demonstrated medial epicondyle fracture. She additionally has had elevated CRP  (though downtrending) and continues to receive abx per ID. She currently reports numbness to the pointer finger and pinky finger which started on Saturday (maybe Sunday). Orthopaedic surgery team was alerted to these problems today. Denies fevers, reports chills.      O:  Temp: 98.3  F (36.8  C) Temp src: Oral BP: 135/68 Pulse: 71   Resp: 16 SpO2: 96 % O2 Device: None (Room air)      Exam:  Gen: No acute distress, resting comfortably in bed.  Resp: Non-labored breathing  MSK:  LUE:  - long arm cast in place, no skin breakdown.  - cast removed at bedside. Incision clean, no discharge, no dehiscence. There is erythema surrounding the elbow.   - decreased sensation over median and ulnar nerves in hand.  - SILT radial/axillary nerves  - Fires EPL, FPL, [employer]  - Hand wwp    - Drain dc'ed 4/24.        Recent Labs   Lab 05/09/24  0643 05/06/24  0813   WBC 7.7 9.5   HGB 7.2* 7.4*    485*     No results for input(s): \"CULTURE\" in the last 168 hours.     Imaging:   New L elbow XR obtained today: no significant interval change as compared to XR from 5/3. Spacer in place. Subluxation of radiocapitellar joint and ulnohumeral joint. No new fractures.      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 Sarmiento.    The patient was placed in a new long-arm cast, which was well-padded.  In addition, we let the elbow extend 10 degrees further from the previous 90 degrees of flexion. Patient reports " this cast feels more comfortable.    New radiographs obtained today demonstrate relatively stable alignment of known fractures from previous XR on 5/3. She has new ulnar nerve paresthesias which are almost certainly related to pressure at her cubital tunnel. Her hardware has subsided due to fracture and her elbow is subluxed at radiocapitellar and ulnohumeral joints. Her wound does appear to be healing well, without dehiscence or drainage.  Recurrent infection seems less likely and fracture-related pain as well as motion from spacer and unstable joint are most likely causes of pain.     The patient will be discussed with Dr. Sarmiento regarding next steps. She is very unhappy with her current situation. I discussed with Dr. Carpenter who is hospitalist and will increase her oxycodone temporarily.    Activity: Up with assist.  Weight bearing status: Minimize use of LUE  Antibiotics: Per ID recs  DVT prophylaxis: aspirin 162 x 4 weeks.  Bracing/Splinting: LUE long arm cast to be kept clean, dry, and intact until follow-up.   Elevation: Elevate LUE on pillows to keep on pillows as much as possible. Will help with hand swelling  Wound Care: Keep cast c/d/I until follow up.  Drains: Dc'ed 4/24/24.   Follow-up: Clinic with Dr. Sarmiento on 6/17, with left elbow xrays     Disposition: Per TCU    Discussed with Dr. Diana Pleitez will be discussed with Dr. Sarmiento, staff surgeon.    Nenita Ndiaye MD PGY1  Orthopedic Surgery    I saw the patient and helped cast. I have edited the note. I Have contacted Dr. Sarmiento.    Diana Pleitez MD, PGY-4  Orthopedics  Pager: [phone]

## 2024-05-10 NOTE — PLAN OF CARE
Goal Outcome Evaluation:      Plan of Care Reviewed With: patient    Overall Patient Progress: improving    VS: Temp: 97.7  F (36.5  C) Temp src: Oral BP: (!) 146/51 Pulse: 66   Resp: 18 SpO2: 96 % O2 Device: None (Room air)          O2: On RA   Output: Continent of B/B   Last BM: 5/9/24   Activity: Assist x1 with W/GB    Skin: X LUE cast, ace wraps in sling   Pain: Managed with PRN oxy x1 at bedtime   CMS:  Neuro: Intact  A/O x4, able to communicate needs   Dressing: LUE, PICC dressing CDI   Diet: Regular, BG checks with insulin sliding scale   LDA: R SL PICC patent, infused antibiotics without difficulty, Heparin locked   Equipment: BSC, WW, WC   Plan: Con't POC.    Additional Info: Pt is calm and cooperative with cares. Denies CP, SOB, N/V.   Magnesium is 3.6, AM lab scheduled for 5/10. No acute issues this shift.     Patient does not have new respiratory symptoms.  Patient does not have new sore throat.  Patient does not have a fever greater than 99.5.
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
## 2024-05-10 NOTE — PLAN OF CARE
Goal Outcome Evaluation:  VS: /68 (BP Location: Right leg)   Pulse 71   Temp 98.3  F (36.8  C) (Oral)   Resp 16   Wt 110 kg (242 lb 8 oz)   SpO2 96%   BMI 35.81 kg/m     O2: RA   Output: Voids in BSC DT frequency & urgency  Pt voided 50 mL; PVR 44 mL   Last BM: 5/10 in BSC   Activity: Therapy, in bed, TV   Skin: X:  LUE surgery; cast & sling  R PICC   Pain: Denies meds  Later oxy x1 after LUE xray   CMS:  Neuro: Intact X: pt c/o tingling to LUE near elbow and fingers; Provider aware. Ice packs placed on cast.   Pt had LUE xray; Ortho drs are here to remove cast and make a new plan.   A&O x3   Dressing: Cast CDI  R PICC CDI   Diet: Reg   and 154   LDA: R PICC; IV ABX infused   Equipment: IV pole, pump, BSC, cane   Plan: Con't POC.    Additional Info: K+ 3.6; no replacement needed.  OT placed soft barrier to neck area of sling; comfort improved.      Patient's most recent vital signs are:     Vital signs:  BP: 135/68  Temp: 98.3  HR: 71  RR: 16  SpO2: 96 %     Patient does not have new respiratory symptoms.  Patient does not have new sore throat.  Patient does not have a fever greater than 99.5.

## 2024-05-10 NOTE — PLAN OF CARE
Goal Outcome Evaluation:      Plan of Care Reviewed With: patient    Overall Patient Progress: improvingOverall Patient Progress: improving     Overall Pt had no acute issue this shift. Pt alert and oriented x4. Able to make needs known. Pt denied discomfort. Requires assistance of 1 with transfers using walker and GB. LUE with long arm cast and sling in place. Elevated per order. Pt denied numbness and tingling. Pt slept through out the shift. No complain at this time Will continue with POC

## 2024-05-10 NOTE — CARE PLAN
Patient seen by orthopedic surgery today, and new cast was placed.  Given increased pain, she has oxycodone 10 - 15 mg every 3 hours as needed.  Also starting mirabegron for overactive bladder.      CHINYERE WASHINGTON MD  Hospitalist Service  [facility].

## 2024-05-11 LAB
GLUCOSE BLDC GLUCOMTR-MCNC: 111 MG/DL (ref 70–99)
GLUCOSE BLDC GLUCOMTR-MCNC: 131 MG/DL (ref 70–99)
GLUCOSE BLDC GLUCOMTR-MCNC: 142 MG/DL (ref 70–99)
GLUCOSE BLDC GLUCOMTR-MCNC: 148 MG/DL (ref 70–99)
GLUCOSE BLDC GLUCOMTR-MCNC: 154 MG/DL (ref 70–99)
POTASSIUM SERPL-SCNC: 3.6 MMOL/L (ref 3.4–5.3)

## 2024-05-11 PROCEDURE — 250N000013 HC RX MED GY IP 250 OP 250 PS 637

## 2024-05-11 PROCEDURE — 250N000011 HC RX IP 250 OP 636: Performed by: INTERNAL MEDICINE

## 2024-05-11 PROCEDURE — 120N000009 HC R&B SNF

## 2024-05-11 PROCEDURE — 250N000011 HC RX IP 250 OP 636: Mod: JZ | Performed by: INTERNAL MEDICINE

## 2024-05-11 PROCEDURE — 258N000003 HC RX IP 258 OP 636: Performed by: INTERNAL MEDICINE

## 2024-05-11 PROCEDURE — 250N000013 HC RX MED GY IP 250 OP 250 PS 637: Performed by: INTERNAL MEDICINE

## 2024-05-11 PROCEDURE — 84132 ASSAY OF SERUM POTASSIUM: CPT | Performed by: INTERNAL MEDICINE

## 2024-05-11 PROCEDURE — 36592 COLLECT BLOOD FROM PICC: CPT | Performed by: INTERNAL MEDICINE

## 2024-05-11 RX ORDER — METHOCARBAMOL 500 MG/1
500 TABLET, FILM COATED ORAL 4 TIMES DAILY PRN
Status: DISCONTINUED | OUTPATIENT
Start: 2024-05-11 | End: 2024-05-24 | Stop reason: HOSPADM

## 2024-05-11 RX ORDER — ACETAMINOPHEN 325 MG/1
975 TABLET ORAL 3 TIMES DAILY
Status: DISCONTINUED | OUTPATIENT
Start: 2024-05-11 | End: 2024-05-24 | Stop reason: HOSPADM

## 2024-05-11 RX ADMIN — ASPIRIN 162 MG: 81 TABLET, COATED ORAL at 09:43

## 2024-05-11 RX ADMIN — ERTAPENEM SODIUM 1 G: 1 INJECTION, POWDER, LYOPHILIZED, FOR SOLUTION INTRAMUSCULAR; INTRAVENOUS at 16:46

## 2024-05-11 RX ADMIN — Medication 1 CAPSULE: at 09:43

## 2024-05-11 RX ADMIN — OXYCODONE HYDROCHLORIDE 15 MG: 5 TABLET ORAL at 09:42

## 2024-05-11 RX ADMIN — OXYCODONE HYDROCHLORIDE 15 MG: 5 TABLET ORAL at 19:43

## 2024-05-11 RX ADMIN — LUMATEPERONE 84 MG: 42 CAPSULE ORAL at 19:36

## 2024-05-11 RX ADMIN — MICONAZOLE NITRATE: 20 POWDER TOPICAL at 22:09

## 2024-05-11 RX ADMIN — INSULIN GLARGINE 5 UNITS: 100 INJECTION, SOLUTION SUBCUTANEOUS at 22:15

## 2024-05-11 RX ADMIN — Medication 5 ML: at 07:20

## 2024-05-11 RX ADMIN — INSULIN ASPART 1 UNITS: 100 INJECTION, SOLUTION INTRAVENOUS; SUBCUTANEOUS at 09:41

## 2024-05-11 RX ADMIN — GABAPENTIN 800 MG: 300 CAPSULE ORAL at 19:37

## 2024-05-11 RX ADMIN — Medication 1 CAPSULE: at 22:10

## 2024-05-11 RX ADMIN — AMLODIPINE BESYLATE 5 MG: 5 TABLET ORAL at 09:44

## 2024-05-11 RX ADMIN — CITALOPRAM HYDROBROMIDE 20 MG: 20 TABLET ORAL at 22:11

## 2024-05-11 RX ADMIN — CLONIDINE HYDROCHLORIDE 0.2 MG: 0.1 TABLET ORAL at 09:45

## 2024-05-11 RX ADMIN — CLONIDINE HYDROCHLORIDE 0.2 MG: 0.1 TABLET ORAL at 22:11

## 2024-05-11 RX ADMIN — ACETAMINOPHEN 975 MG: 325 TABLET, FILM COATED ORAL at 09:42

## 2024-05-11 RX ADMIN — METHOCARBAMOL 500 MG: 500 TABLET ORAL at 22:10

## 2024-05-11 RX ADMIN — OXYCODONE HYDROCHLORIDE 15 MG: 5 TABLET ORAL at 12:51

## 2024-05-11 RX ADMIN — HEPARIN, PORCINE (PF) 10 UNIT/ML INTRAVENOUS SYRINGE 10 ML: at 16:46

## 2024-05-11 RX ADMIN — GABAPENTIN 800 MG: 300 CAPSULE ORAL at 13:50

## 2024-05-11 RX ADMIN — ATORVASTATIN CALCIUM 10 MG: 10 TABLET, FILM COATED ORAL at 22:10

## 2024-05-11 RX ADMIN — OMEPRAZOLE 20 MG: 20 CAPSULE, DELAYED RELEASE ORAL at 09:42

## 2024-05-11 RX ADMIN — OXYCODONE HYDROCHLORIDE 15 MG: 5 TABLET ORAL at 00:58

## 2024-05-11 RX ADMIN — VANCOMYCIN HYDROCHLORIDE 1750 MG: 1 INJECTION, POWDER, LYOPHILIZED, FOR SOLUTION INTRAVENOUS at 10:40

## 2024-05-11 RX ADMIN — ACETAMINOPHEN 975 MG: 325 TABLET, FILM COATED ORAL at 19:36

## 2024-05-11 RX ADMIN — GABAPENTIN 600 MG: 300 CAPSULE ORAL at 09:43

## 2024-05-11 RX ADMIN — MIRABEGRON 25 MG: 25 TABLET, FILM COATED, EXTENDED RELEASE ORAL at 09:42

## 2024-05-11 ASSESSMENT — ACTIVITIES OF DAILY LIVING (ADL)
ADLS_ACUITY_SCORE: 33
ADLS_ACUITY_SCORE: 35
ADLS_ACUITY_SCORE: 33
ADLS_ACUITY_SCORE: 35
ADLS_ACUITY_SCORE: 33
ADLS_ACUITY_SCORE: 35
ADLS_ACUITY_SCORE: 33
ADLS_ACUITY_SCORE: 35
ADLS_ACUITY_SCORE: 35
ADLS_ACUITY_SCORE: 33
ADLS_ACUITY_SCORE: 35
ADLS_ACUITY_SCORE: 33
ADLS_ACUITY_SCORE: 33

## 2024-05-11 NOTE — PROGRESS NOTES
End of shift Summary: See flowsheet for VS and detail assessments.     Changes this Shift:      Pulmonary: Pt denies SOB & chest pain. Pt is on RA.     Diet: Regular diet, medication whole with thin liquids.    Output: Pt is voiding adequately using bedside commode, LBM 05/10.     Activity: Pt is assist of 2 pivot.     Skin: LUE arm cast.     Pain: Pt denies pain.     Neuro/CMS: Pt is alert and oriented x 4. Denies numbness and tingling.     Dressings/Drains: Cast CDI.     IV: R PICC.     Additional info: No significant changes on shift.     Plan:  Plan of care ongoing.

## 2024-05-11 NOTE — PLAN OF CARE
Goal Outcome Evaluation:    VS: /51   Pulse 55   Temp 97.9  F (36.6  C) (Oral)   Resp 16   Wt 110 kg (242 lb 8 oz)   SpO2 90%   BMI 35.81 kg/m      O2: RA   Output: Voids in BSC; pt reports that urgency and frequency have improved.   Last BM: 5/10   Activity: In bed, TV  Pt moved rooms  Recommend A-2 until pt is assessed w/ new meds.    Skin: X:  LUE elbow; cast & sling - UTV  R PICC   Pain: Yes. Pain meds given when due. Pt c/o pain management. Provider changed meds and added prns.    CMS:  Neuro: Intact X: pt c/o tingling to L hand and fingers; Ice packs placed on cast; LUE repositioned frequently - pt needs A-1 to do this.   A&O x3   Dressing: Cast CDI  R PICC CDI   Diet: Reg   and 131  Pt delayed eating in case she was having surgery to fuse elbow. Explained to pt that surgery will not happen today since it is not emergent and it's a weekend. Pt ordered late lunch.    LDA: R PICC; IV ABX infused   Equipment: IV pole, pump, BSC, cane   Plan: Con't POC.    Additional Info: K+ 3.6; no replacement needed.     Patient's most recent vital signs are:     Vital signs:  BP: 124/51  Temp: 97.9  HR: 55  RR: 16  SpO2: 90 %     Patient does not have new respiratory symptoms.  Patient does not have new sore throat.  Patient does not have a fever greater than 99.5.

## 2024-05-11 NOTE — PLAN OF CARE
Goal Outcome Evaluation:      Plan of Care Reviewed With: patient    Overall Patient Progress: improvingOverall Patient Progress: improving     Overall Pt had no acute issue this shift. Pt is alert and oriented. Able to make needs known. Requested and received PRN oxycodone x 2 this shift. New cast applied on Pt  at the beginning of the shift by ortho team. Pt denied numbness but complain of tingling. Pt able to wiggle fingers when told to do so. Pt is on regular diet. Appetite good with 100 percent meal consumed this shift. PT is on Iv antibiotics. Infused via PICC to right arm single lumen without issue. Potassium lab ordered for this morning. No complain at this time Will continue  with POC

## 2024-05-11 NOTE — PROGRESS NOTES
Per RN: Patient reporting uncontrolled pain.  EMR reviewed.  Per RN: Patient alert and interactive.  Vitals:    05/10/24 2143 05/11/24 0806 05/11/24 0944 05/11/24 0945   BP: (!) 144/60 116/46 124/51 124/51   BP Location:  Right leg     Pulse:  55     Resp:  16     Temp:  97.9  F (36.6  C)     TempSrc:  Oral     SpO2:  90%     Weight:         Changes:    Increase gabapentin to 800 mg 3 times daily  And Robaxin 500 mg 4 times daily as needed  Schedule acetaminophen 975 3 times daily.  Continue monitor.  Hold aspirin, narcotics for excessive sedation.    Oziel Ibarra MD  [facility]  Contact information available via [facility] Paging/Directory

## 2024-05-12 LAB
GLUCOSE BLDC GLUCOMTR-MCNC: 120 MG/DL (ref 70–99)
GLUCOSE BLDC GLUCOMTR-MCNC: 127 MG/DL (ref 70–99)
GLUCOSE BLDC GLUCOMTR-MCNC: 129 MG/DL (ref 70–99)
GLUCOSE BLDC GLUCOMTR-MCNC: 144 MG/DL (ref 70–99)
GLUCOSE BLDC GLUCOMTR-MCNC: 168 MG/DL (ref 70–99)
GLUCOSE BLDC GLUCOMTR-MCNC: 206 MG/DL (ref 70–99)
HOLD SPECIMEN: NORMAL
POTASSIUM SERPL-SCNC: 3.7 MMOL/L (ref 3.4–5.3)

## 2024-05-12 PROCEDURE — 250N000012 HC RX MED GY IP 250 OP 636 PS 637: Performed by: INTERNAL MEDICINE

## 2024-05-12 PROCEDURE — 36592 COLLECT BLOOD FROM PICC: CPT | Performed by: INTERNAL MEDICINE

## 2024-05-12 PROCEDURE — 250N000013 HC RX MED GY IP 250 OP 250 PS 637: Performed by: INTERNAL MEDICINE

## 2024-05-12 PROCEDURE — 120N000009 HC R&B SNF

## 2024-05-12 PROCEDURE — 250N000013 HC RX MED GY IP 250 OP 250 PS 637

## 2024-05-12 PROCEDURE — 250N000011 HC RX IP 250 OP 636: Performed by: INTERNAL MEDICINE

## 2024-05-12 PROCEDURE — 250N000011 HC RX IP 250 OP 636: Mod: JZ | Performed by: INTERNAL MEDICINE

## 2024-05-12 PROCEDURE — 258N000003 HC RX IP 258 OP 636: Performed by: INTERNAL MEDICINE

## 2024-05-12 PROCEDURE — 84132 ASSAY OF SERUM POTASSIUM: CPT | Performed by: INTERNAL MEDICINE

## 2024-05-12 RX ADMIN — GABAPENTIN 800 MG: 300 CAPSULE ORAL at 22:01

## 2024-05-12 RX ADMIN — FERROUS SULFATE TAB 325 MG (65 MG ELEMENTAL FE) 325 MG: 325 (65 FE) TAB at 10:43

## 2024-05-12 RX ADMIN — HEPARIN, PORCINE (PF) 10 UNIT/ML INTRAVENOUS SYRINGE 5 ML: at 16:56

## 2024-05-12 RX ADMIN — AMLODIPINE BESYLATE 5 MG: 5 TABLET ORAL at 10:42

## 2024-05-12 RX ADMIN — CLONIDINE HYDROCHLORIDE 0.2 MG: 0.1 TABLET ORAL at 10:42

## 2024-05-12 RX ADMIN — OXYCODONE HYDROCHLORIDE 15 MG: 5 TABLET ORAL at 13:44

## 2024-05-12 RX ADMIN — MICONAZOLE NITRATE: 20 POWDER TOPICAL at 22:07

## 2024-05-12 RX ADMIN — CITALOPRAM HYDROBROMIDE 20 MG: 20 TABLET ORAL at 22:02

## 2024-05-12 RX ADMIN — MICONAZOLE NITRATE: 20 POWDER TOPICAL at 10:44

## 2024-05-12 RX ADMIN — MIRABEGRON 25 MG: 25 TABLET, FILM COATED, EXTENDED RELEASE ORAL at 10:43

## 2024-05-12 RX ADMIN — INSULIN ASPART 2 UNITS: 100 INJECTION, SOLUTION INTRAVENOUS; SUBCUTANEOUS at 17:27

## 2024-05-12 RX ADMIN — ATORVASTATIN CALCIUM 10 MG: 10 TABLET, FILM COATED ORAL at 22:02

## 2024-05-12 RX ADMIN — VANCOMYCIN HYDROCHLORIDE 1750 MG: 1 INJECTION, POWDER, LYOPHILIZED, FOR SOLUTION INTRAVENOUS at 10:31

## 2024-05-12 RX ADMIN — OXYCODONE HYDROCHLORIDE 10 MG: 5 TABLET ORAL at 23:40

## 2024-05-12 RX ADMIN — GABAPENTIN 800 MG: 300 CAPSULE ORAL at 13:43

## 2024-05-12 RX ADMIN — LUMATEPERONE 84 MG: 42 CAPSULE ORAL at 22:01

## 2024-05-12 RX ADMIN — INSULIN GLARGINE 5 UNITS: 100 INJECTION, SOLUTION SUBCUTANEOUS at 22:42

## 2024-05-12 RX ADMIN — ERTAPENEM SODIUM 1 G: 1 INJECTION, POWDER, LYOPHILIZED, FOR SOLUTION INTRAMUSCULAR; INTRAVENOUS at 16:59

## 2024-05-12 RX ADMIN — ASPIRIN 162 MG: 81 TABLET, COATED ORAL at 10:43

## 2024-05-12 RX ADMIN — ACETAMINOPHEN 975 MG: 325 TABLET, FILM COATED ORAL at 22:02

## 2024-05-12 RX ADMIN — OXYCODONE HYDROCHLORIDE 15 MG: 5 TABLET ORAL at 17:28

## 2024-05-12 RX ADMIN — OMEPRAZOLE 20 MG: 20 CAPSULE, DELAYED RELEASE ORAL at 10:42

## 2024-05-12 RX ADMIN — OXYCODONE HYDROCHLORIDE 15 MG: 5 TABLET ORAL at 10:43

## 2024-05-12 RX ADMIN — CLONIDINE HYDROCHLORIDE 0.2 MG: 0.1 TABLET ORAL at 22:02

## 2024-05-12 RX ADMIN — ACETAMINOPHEN 975 MG: 325 TABLET, FILM COATED ORAL at 10:43

## 2024-05-12 RX ADMIN — Medication 1 CAPSULE: at 22:02

## 2024-05-12 RX ADMIN — INSULIN ASPART 1 UNITS: 100 INJECTION, SOLUTION INTRAVENOUS; SUBCUTANEOUS at 13:41

## 2024-05-12 RX ADMIN — GABAPENTIN 800 MG: 300 CAPSULE ORAL at 10:43

## 2024-05-12 RX ADMIN — OXYCODONE HYDROCHLORIDE 15 MG: 5 TABLET ORAL at 00:11

## 2024-05-12 RX ADMIN — Medication 5 ML: at 06:40

## 2024-05-12 RX ADMIN — Medication 1 CAPSULE: at 10:42

## 2024-05-12 RX ADMIN — METHOCARBAMOL 500 MG: 500 TABLET ORAL at 10:43

## 2024-05-12 ASSESSMENT — ACTIVITIES OF DAILY LIVING (ADL)
ADLS_ACUITY_SCORE: 35
ADLS_ACUITY_SCORE: 35
ADLS_ACUITY_SCORE: 36
ADLS_ACUITY_SCORE: 36
ADLS_ACUITY_SCORE: 35
ADLS_ACUITY_SCORE: 36
ADLS_ACUITY_SCORE: 35
ADLS_ACUITY_SCORE: 36
ADLS_ACUITY_SCORE: 35
ADLS_ACUITY_SCORE: 35
ADLS_ACUITY_SCORE: 36
ADLS_ACUITY_SCORE: 36
ADLS_ACUITY_SCORE: 35
ADLS_ACUITY_SCORE: 36
ADLS_ACUITY_SCORE: 35
ADLS_ACUITY_SCORE: 36
ADLS_ACUITY_SCORE: 35
ADLS_ACUITY_SCORE: 36
ADLS_ACUITY_SCORE: 35
ADLS_ACUITY_SCORE: 35

## 2024-05-12 NOTE — PROGRESS NOTES
End of shift Summary: See flowsheet for VS and detail assessments.     Changes this Shift:      Pulmonary: Pt denies SOB & chest pain. Pt is on RA.     Diet: Regular diet, medication whole with thin liquids.    Output: Pt is voiding adequately using bedside commode, LBM 05/12.     Activity: Pt is assist of 2 pivot.     Skin: LUE arm cast.     Pain: Pt rates pain 9/10, managed with PRN oxy.     Neuro/CMS: Pt is alert and oriented x 4. Denies numbness and tingling.     Dressings/Drains: Cast CDI.     IV: R PICC.     Additional info: No significant changes on shift.     Plan:  Plan of care ongoing.

## 2024-05-12 NOTE — PLAN OF CARE
Goal Outcome Evaluation:    VS: /49 (BP Location: Right leg)   Pulse 58   Temp 97.8  F (36.6  C) (Axillary)   Resp 16   Wt 110 kg (242 lb 8 oz)   SpO2 92%   BMI 35.81 kg/m     O2: RA   Output: Voids in BSC   Last BM: 5/11   Activity: In bed, TV  A-1 or 2 to BSC    Skin: X:  LUE elbow; cast & sling - UTV  R PICC   Pain: Yes. Pain meds given when due.    CMS:  Neuro: Intact X: pt c/o numbness & tingling to L hand and fingers; Ice packs placed on cast; LUE repositioned frequently - pt needs A-1 to do this.   A&O x3   Dressing: Cast CDI  R PICC CDI   Diet: Reg   and 168  Pt inquired about surgery to fuse elbow. Explained to pt that surgery will not happen today since it is not emergent and a weekend. Pt in agreement and will await news from Dr Sarmiento's team.   LDA: R PICC; IV ABX infused   Equipment: IV pole, pump, BSC, cane   Plan: Con't POC.    Additional Info: K+ 3.7; no replacement needed. Lab ordered 5/13 @ 0600.      Patient's most recent vital signs are:     Vital signs:  BP: 122/49  Temp: 97.8  HR: 58  RR: 16  SpO2: 92 %     Patient does not have new respiratory symptoms.  Patient does not have new sore throat.  Patient does not have a fever greater than 99.5.

## 2024-05-13 ENCOUNTER — APPOINTMENT (OUTPATIENT)
Dept: OCCUPATIONAL THERAPY | Facility: SKILLED NURSING FACILITY | Age: 59
DRG: 559 | End: 2024-05-13
Attending: INTERNAL MEDICINE
Payer: COMMERCIAL

## 2024-05-13 ENCOUNTER — APPOINTMENT (OUTPATIENT)
Dept: PHYSICAL THERAPY | Facility: SKILLED NURSING FACILITY | Age: 59
DRG: 559 | End: 2024-05-13
Attending: INTERNAL MEDICINE
Payer: COMMERCIAL

## 2024-05-13 LAB
ABO/RH(D): NORMAL
ALBUMIN SERPL BCG-MCNC: 2.8 G/DL (ref 3.5–5.2)
ALP SERPL-CCNC: 185 U/L (ref 40–150)
ALT SERPL W P-5'-P-CCNC: 12 U/L (ref 0–50)
ANION GAP SERPL CALCULATED.3IONS-SCNC: 7 MMOL/L (ref 7–15)
ANTIBODY SCREEN: NEGATIVE
AST SERPL W P-5'-P-CCNC: 15 U/L (ref 0–45)
BILIRUB SERPL-MCNC: <0.2 MG/DL
BLD PROD TYP BPU: NORMAL
BLOOD COMPONENT TYPE: NORMAL
BUN SERPL-MCNC: 9.1 MG/DL (ref 8–23)
CALCIUM SERPL-MCNC: 8.4 MG/DL (ref 8.8–10.2)
CHLORIDE SERPL-SCNC: 104 MMOL/L (ref 98–107)
CODING SYSTEM: NORMAL
CREAT SERPL-MCNC: 0.87 MG/DL (ref 0.51–0.95)
CROSSMATCH: NORMAL
CRP SERPL-MCNC: 32.52 MG/L
DEPRECATED HCO3 PLAS-SCNC: 28 MMOL/L (ref 22–29)
EGFRCR SERPLBLD CKD-EPI 2021: 76 ML/MIN/1.73M2
ERYTHROCYTE [DISTWIDTH] IN BLOOD BY AUTOMATED COUNT: 17.1 % (ref 10–15)
GLUCOSE BLDC GLUCOMTR-MCNC: 147 MG/DL (ref 70–99)
GLUCOSE BLDC GLUCOMTR-MCNC: 151 MG/DL (ref 70–99)
GLUCOSE BLDC GLUCOMTR-MCNC: 160 MG/DL (ref 70–99)
GLUCOSE BLDC GLUCOMTR-MCNC: 163 MG/DL (ref 70–99)
GLUCOSE BLDC GLUCOMTR-MCNC: 193 MG/DL (ref 70–99)
GLUCOSE SERPL-MCNC: 130 MG/DL (ref 70–99)
HCT VFR BLD AUTO: 22.6 % (ref 35–47)
HGB BLD-MCNC: 6.8 G/DL (ref 11.7–15.7)
HOLD SPECIMEN: NORMAL
ISSUE DATE AND TIME: NORMAL
MCH RBC QN AUTO: 25.3 PG (ref 26.5–33)
MCHC RBC AUTO-ENTMCNC: 30.1 G/DL (ref 31.5–36.5)
MCV RBC AUTO: 84 FL (ref 78–100)
PLATELET # BLD AUTO: 342 10E3/UL (ref 150–450)
POTASSIUM SERPL-SCNC: 3.8 MMOL/L (ref 3.4–5.3)
PROT SERPL-MCNC: 5.6 G/DL (ref 6.4–8.3)
RBC # BLD AUTO: 2.69 10E6/UL (ref 3.8–5.2)
SODIUM SERPL-SCNC: 139 MMOL/L (ref 135–145)
SPECIMEN EXPIRATION DATE: NORMAL
UNIT ABO/RH: NORMAL
UNIT NUMBER: NORMAL
UNIT STATUS: NORMAL
UNIT TYPE ISBT: 5100
WBC # BLD AUTO: 6.9 10E3/UL (ref 4–11)

## 2024-05-13 PROCEDURE — 97530 THERAPEUTIC ACTIVITIES: CPT | Mod: GP

## 2024-05-13 PROCEDURE — 250N000012 HC RX MED GY IP 250 OP 636 PS 637: Performed by: INTERNAL MEDICINE

## 2024-05-13 PROCEDURE — 250N000013 HC RX MED GY IP 250 OP 250 PS 637: Performed by: INTERNAL MEDICINE

## 2024-05-13 PROCEDURE — 86140 C-REACTIVE PROTEIN: CPT | Performed by: INTERNAL MEDICINE

## 2024-05-13 PROCEDURE — 85027 COMPLETE CBC AUTOMATED: CPT | Performed by: INTERNAL MEDICINE

## 2024-05-13 PROCEDURE — 80053 COMPREHEN METABOLIC PANEL: CPT | Performed by: INTERNAL MEDICINE

## 2024-05-13 PROCEDURE — 97535 SELF CARE MNGMENT TRAINING: CPT | Mod: GO | Performed by: OCCUPATIONAL THERAPIST

## 2024-05-13 PROCEDURE — 97110 THERAPEUTIC EXERCISES: CPT | Mod: GP

## 2024-05-13 PROCEDURE — 36592 COLLECT BLOOD FROM PICC: CPT | Performed by: INTERNAL MEDICINE

## 2024-05-13 PROCEDURE — 120N000009 HC R&B SNF

## 2024-05-13 PROCEDURE — 86923 COMPATIBILITY TEST ELECTRIC: CPT | Performed by: INTERNAL MEDICINE

## 2024-05-13 PROCEDURE — 99310 SBSQ NF CARE HIGH MDM 45: CPT | Performed by: INTERNAL MEDICINE

## 2024-05-13 PROCEDURE — 36415 COLL VENOUS BLD VENIPUNCTURE: CPT | Performed by: INTERNAL MEDICINE

## 2024-05-13 PROCEDURE — 258N000003 HC RX IP 258 OP 636: Performed by: INTERNAL MEDICINE

## 2024-05-13 PROCEDURE — P9016 RBC LEUKOCYTES REDUCED: HCPCS | Performed by: INTERNAL MEDICINE

## 2024-05-13 PROCEDURE — 86900 BLOOD TYPING SEROLOGIC ABO: CPT | Performed by: INTERNAL MEDICINE

## 2024-05-13 PROCEDURE — 250N000011 HC RX IP 250 OP 636: Performed by: INTERNAL MEDICINE

## 2024-05-13 PROCEDURE — 250N000013 HC RX MED GY IP 250 OP 250 PS 637

## 2024-05-13 PROCEDURE — 250N000011 HC RX IP 250 OP 636: Mod: JZ | Performed by: INTERNAL MEDICINE

## 2024-05-13 RX ORDER — SODIUM CHLORIDE 9 MG/ML
INJECTION, SOLUTION INTRAVENOUS
Status: COMPLETED
Start: 2024-05-13 | End: 2024-05-13

## 2024-05-13 RX ADMIN — HEPARIN, PORCINE (PF) 10 UNIT/ML INTRAVENOUS SYRINGE 5 ML: at 17:59

## 2024-05-13 RX ADMIN — CLONIDINE HYDROCHLORIDE 0.2 MG: 0.1 TABLET ORAL at 08:43

## 2024-05-13 RX ADMIN — ERTAPENEM SODIUM 1 G: 1 INJECTION, POWDER, LYOPHILIZED, FOR SOLUTION INTRAMUSCULAR; INTRAVENOUS at 18:31

## 2024-05-13 RX ADMIN — AMLODIPINE BESYLATE 5 MG: 5 TABLET ORAL at 08:43

## 2024-05-13 RX ADMIN — METHOCARBAMOL 500 MG: 500 TABLET ORAL at 09:50

## 2024-05-13 RX ADMIN — VANCOMYCIN HYDROCHLORIDE 1750 MG: 1 INJECTION, POWDER, LYOPHILIZED, FOR SOLUTION INTRAVENOUS at 11:47

## 2024-05-13 RX ADMIN — OXYCODONE HYDROCHLORIDE 15 MG: 5 TABLET ORAL at 17:59

## 2024-05-13 RX ADMIN — METHOCARBAMOL 500 MG: 500 TABLET ORAL at 17:59

## 2024-05-13 RX ADMIN — INSULIN ASPART 1 UNITS: 100 INJECTION, SOLUTION INTRAVENOUS; SUBCUTANEOUS at 08:40

## 2024-05-13 RX ADMIN — ACETAMINOPHEN 975 MG: 325 TABLET, FILM COATED ORAL at 08:43

## 2024-05-13 RX ADMIN — INSULIN ASPART 2 UNITS: 100 INJECTION, SOLUTION INTRAVENOUS; SUBCUTANEOUS at 17:59

## 2024-05-13 RX ADMIN — GABAPENTIN 800 MG: 300 CAPSULE ORAL at 08:43

## 2024-05-13 RX ADMIN — OXYCODONE HYDROCHLORIDE 15 MG: 5 TABLET ORAL at 09:51

## 2024-05-13 RX ADMIN — METHOCARBAMOL 500 MG: 500 TABLET ORAL at 22:45

## 2024-05-13 RX ADMIN — ASPIRIN 162 MG: 81 TABLET, COATED ORAL at 08:42

## 2024-05-13 RX ADMIN — INSULIN GLARGINE 5 UNITS: 100 INJECTION, SOLUTION SUBCUTANEOUS at 21:22

## 2024-05-13 RX ADMIN — LUMATEPERONE 84 MG: 42 CAPSULE ORAL at 20:41

## 2024-05-13 RX ADMIN — MIRABEGRON 25 MG: 25 TABLET, FILM COATED, EXTENDED RELEASE ORAL at 08:43

## 2024-05-13 RX ADMIN — Medication 1 CAPSULE: at 08:43

## 2024-05-13 RX ADMIN — SODIUM CHLORIDE 500 ML: 9 INJECTION, SOLUTION INTRAVENOUS at 11:47

## 2024-05-13 RX ADMIN — Medication 1 CAPSULE: at 20:38

## 2024-05-13 RX ADMIN — ATORVASTATIN CALCIUM 10 MG: 10 TABLET, FILM COATED ORAL at 21:20

## 2024-05-13 RX ADMIN — MICONAZOLE NITRATE: 20 POWDER TOPICAL at 08:46

## 2024-05-13 RX ADMIN — GABAPENTIN 800 MG: 300 CAPSULE ORAL at 20:37

## 2024-05-13 RX ADMIN — CITALOPRAM HYDROBROMIDE 20 MG: 20 TABLET ORAL at 21:20

## 2024-05-13 RX ADMIN — GABAPENTIN 800 MG: 300 CAPSULE ORAL at 14:02

## 2024-05-13 RX ADMIN — CLONIDINE HYDROCHLORIDE 0.2 MG: 0.1 TABLET ORAL at 20:38

## 2024-05-13 RX ADMIN — OXYCODONE HYDROCHLORIDE 15 MG: 5 TABLET ORAL at 21:20

## 2024-05-13 RX ADMIN — SODIUM CHLORIDE 500 ML: 9 INJECTION, SOLUTION INTRAVENOUS at 11:57

## 2024-05-13 RX ADMIN — ACETAMINOPHEN 975 MG: 325 TABLET, FILM COATED ORAL at 20:40

## 2024-05-13 RX ADMIN — OXYCODONE HYDROCHLORIDE 15 MG: 5 TABLET ORAL at 14:28

## 2024-05-13 RX ADMIN — INSULIN ASPART 1 UNITS: 100 INJECTION, SOLUTION INTRAVENOUS; SUBCUTANEOUS at 11:55

## 2024-05-13 RX ADMIN — MICONAZOLE NITRATE: 20 POWDER TOPICAL at 20:37

## 2024-05-13 RX ADMIN — ACETAMINOPHEN 975 MG: 325 TABLET, FILM COATED ORAL at 14:01

## 2024-05-13 RX ADMIN — OMEPRAZOLE 20 MG: 20 CAPSULE, DELAYED RELEASE ORAL at 08:43

## 2024-05-13 RX ADMIN — Medication 5 ML: at 05:42

## 2024-05-13 ASSESSMENT — ACTIVITIES OF DAILY LIVING (ADL)
ADLS_ACUITY_SCORE: 33
ADLS_ACUITY_SCORE: 36
ADLS_ACUITY_SCORE: 36
ADLS_ACUITY_SCORE: 33
ADLS_ACUITY_SCORE: 33
ADLS_ACUITY_SCORE: 36
ADLS_ACUITY_SCORE: 33
ADLS_ACUITY_SCORE: 36
ADLS_ACUITY_SCORE: 33
ADLS_ACUITY_SCORE: 36
ADLS_ACUITY_SCORE: 36
ADLS_ACUITY_SCORE: 33
ADLS_ACUITY_SCORE: 36
ADLS_ACUITY_SCORE: 33
ADLS_ACUITY_SCORE: 36

## 2024-05-13 NOTE — PROGRESS NOTES
SW received a call from Maddison Coronel [phone] stating she was [facility].  Her fax is [phone].  She gave SW phone number for transportation [phone] or [phone] for discharge ride.   We talked about other services but she was new and didn't know too much about other services.  IF SW needed to file a [facility] SW needs to go to website [facility].nd.gov.  If SW needs to get home health.  Use Home and community based services of [facility]. VENICE updated her on where pt is currently with care.     Maddison called back and said she found  Lela @ Human Services [phone] .  She will know more.  She has worked with pt for a long time.  VENICE will call her another day.     FRANCISCO J Romo   [facility], Transitional Care Unit   Social Work   [facility] [place] [address], [address] [place] [address]  () [phone]

## 2024-05-13 NOTE — PROGRESS NOTES
CLINICAL NUTRITION SERVICES - REASSESSMENT NOTE     Nutrition Prescription    RECOMMENDATIONS FOR MDs/PROVIDERS TO ORDER:  None    Malnutrition Status:    Patient does not meet two of the established criteria necessary for diagnosing malnutrition    Recommendations already ordered by Registered Dietitian (RD):  Encouraged adequate protein intakes, provided with list of protein of menu items    Future/Additional Recommendations:  Monitor labs, intakes, and weight trends.     EVALUATION OF THE PROGRESS TOWARD GOALS   Diet: Regular  Intake/[place]: 25 - 100% of documented meals. Poorly documented over last week    Pt ordering (on average) 2480 kcal and 71 g protein per day per [employer]. With documented and reported intakes,  pt is likely meeting >75% minimum energy and 60-65 % protein needs.     NEW FINDINGS/REVIEW OF SYSTEMS    Nutrition/GI: RD met with pt at bedside. Encouraged increased protein intakes. Provided with list of protein content of menu items. Pt denies any GI symptoms, questions or concerns at this time. Offered nutrition supplements, pt politely declined.     Weights: Pt with limited weight history prior to admission, 24 lb (11%) weight gain over 1 month, 3 lb (1%) weight loss in just over 2 weeks during admission to [facility].   05/08/24 1010 110 kg (242 lb 8 oz)    05/02/24 1349 108.6 kg (239 lb 6.7 oz) Bed scale   04/26/24 0920 107.1 kg (236 lb 1.6 oz) Standing scale   04/22/24 1749 111.2 kg (245 lb 3.2 oz) Standing scale      04/20/24 0040 116 kg (255 lb 11.7 oz) Bed scale   04/19/24 1419 111.3 kg (245 lb 4.8 oz) Standing scale   04/13/24 1600 105.5 kg (232 lb 9.4 oz) --   04/10/24 0930 99 kg (218 lb 4.1 oz) --   01/31/24 1357 115.1 kg (253 lb 12 oz)     11/30/23 1602 118 kg (260 lb 2.3 oz)     10/23/23 112 kg (247 lb)      Skin: Surgical incisions     Labs reviewed   05/02/24 07:38 05/06/24 08:13 05/09/24 06:43 05/10/24 07:46 05/13/24 05:44   Sodium 141 141 140  139   Potassium 3.5 4.1 3.2 (L) 3.6  3.8   Urea Nitrogen 9.2 12.6 8.2  9.1   Creatinine 0.82 0.85 0.75  0.87   Albumin 3.0 (L) 3.1 (L) 2.9 (L)  2.8 (L)   Alkaline Phosphatase 138 165 (H) 174 (H)  185 (H)   CRP Inflammation 44.22 (H) 39.95 (H) 32.26 (H) 25.81 (H) 32.52 (H)   Glucose 136 (H) 140 (H) 129 (H)  130 (H)     Medications reviewed: ertapenem, ferrous sulfate inulin (novolog, lantus), culturell, lumateperone, omeprazole, vancomycin, oxycodone PRN  IV Fluids over last 7 days: No    MALNUTRITION  % Intake: Decreased intake does not meet criteria  % Weight Loss: Weight loss does not meet criteria  Subcutaneous Fat Loss: None observed  Muscle Loss: None observed  Fluid Accumulation/Edema: Mild  Malnutrition Diagnosis: Patient does not meet two of the established criteria necessary for diagnosing malnutrition    Previous Goals   Patient to consume % of nutritionally adequate meal trays TID, or the equivalent with supplements/snacks.  Evaluation: Meeting energy but not protein needs    Previous Nutrition Diagnosis  Predicted inadequate nutrient intake related to LOS as evidenced by potential for menu fatigue with prolonged hospitalization.   Evaluation: No longer applicable, nutrition diagnosis changed below    CURRENT NUTRITION DIAGNOSIS  Inadequate protein intake related to undesirable food/beverage choices as evidenced by documented intakes.       INTERVENTIONS  Implementation  Nutrition education for nutrition relationship to health/disease     Goals  Patient to consume % of nutritionally adequate meal trays TID, or the equivalent with supplements/snacks.    Monitoring/Evaluation  Progress toward goals will be monitored and evaluated per protocol.    Leanne Miguel MS, RDN, LD  TCU/OB/Ortho Clinical Dietitian  Available via phone and Vocera  Phone: [phone]  Vocera: TCU Clinical Dietitian   Weekend/Holiday Vocera: Weekend Holiday Clinical Dietitian [Multi Site Groups]

## 2024-05-13 NOTE — PLAN OF CARE
VS: /51   Pulse 61   Temp 97.7  F (36.5  C) (Oral)   Resp 18   Wt 110 kg (242 lb 8 oz)   SpO2 96%   BMI 35.81 kg/m     O2: Stable on RA, denied SOB & CP   Output: Spontaneously voids to bedside commode without difficulty   Last BM: 5/13/24   Activity: A1-2 w/gait belt & walker for stand pivot    Skin: L arm surgical incision MARGIE   Pain: Managed w/ prn oxycodone, tylenol & robaxin   CMS: A/O x4, denied N/T   Dressing: L arm dressing CDI   Diet: Reg/thin/whole  BG checks w/sliding scale   LDA: L PICC running antibiotics/SL   Plan: Continue with therapies and pain mangement   Additional Info:  Hemoglobin 6.8 critical value, blood transfusion ordered and initiated      Goal Outcome Evaluation:           Overall Patient Progress: no changeOverall Patient Progress: no change    Outcome Evaluation: See progress note.

## 2024-05-13 NOTE — PROGRESS NOTES
Ortho Update    I spoke with Dr Sarmiento.  We will get Mitali into his clinic in 1 to 2 weeks for evaluation. We realize that her time at TCU is running out. At this time, we are not planning any procedures.    Diana Pleitez, G4
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
## 2024-05-13 NOTE — PLAN OF CARE
Goal Outcome Evaluation:                    VS:  BP: 118/46  Temp: 98  HR: 62  RR: 17   O2:  91 % On RA   Output: Continent of B/B   Last BM: 5/12/24   Activity: Assist 1 with W/GB    Skin: LUE cast, ace wraps in sling   Pain: PRN oxy x1 at bedtime   CMS:  Neuro: Alert and oriented X4, able to communicate need known to staff. Denies CP,NV,SOB.   Dressing: LUE, PICC dressing changedg    Diet: Regular diet,meds whole thin liquids, BG checks   LDA: R SL PICC patent ,saline locked.   Equipment: BSC, WW, WC   Plan: Con't POC.    Additional Info: Potassium 3.7 redraw AM          Patient's most recent vital signs are:     Vital signs:  BP: 118/46  Temp: 98  HR: 62  RR: 17  SpO2: 91 %     Patient does not have new respiratory symptoms.  Patient does not have new sore throat.  Patient does not have a fever greater than 99.5.

## 2024-05-14 ENCOUNTER — APPOINTMENT (OUTPATIENT)
Dept: PHYSICAL THERAPY | Facility: SKILLED NURSING FACILITY | Age: 59
DRG: 559 | End: 2024-05-14
Attending: INTERNAL MEDICINE
Payer: COMMERCIAL

## 2024-05-14 LAB
GLUCOSE BLDC GLUCOMTR-MCNC: 119 MG/DL (ref 70–99)
GLUCOSE BLDC GLUCOMTR-MCNC: 121 MG/DL (ref 70–99)
GLUCOSE BLDC GLUCOMTR-MCNC: 130 MG/DL (ref 70–99)
GLUCOSE BLDC GLUCOMTR-MCNC: 165 MG/DL (ref 70–99)
GLUCOSE BLDC GLUCOMTR-MCNC: 183 MG/DL (ref 70–99)
HGB BLD-MCNC: 8.2 G/DL (ref 11.7–15.7)
POTASSIUM SERPL-SCNC: 3.8 MMOL/L (ref 3.4–5.3)
VANCOMYCIN SERPL-MCNC: 16 UG/ML

## 2024-05-14 PROCEDURE — 97530 THERAPEUTIC ACTIVITIES: CPT | Mod: GP

## 2024-05-14 PROCEDURE — 250N000013 HC RX MED GY IP 250 OP 250 PS 637: Performed by: INTERNAL MEDICINE

## 2024-05-14 PROCEDURE — 250N000013 HC RX MED GY IP 250 OP 250 PS 637

## 2024-05-14 PROCEDURE — 97116 GAIT TRAINING THERAPY: CPT | Mod: GP

## 2024-05-14 PROCEDURE — 84132 ASSAY OF SERUM POTASSIUM: CPT | Performed by: INTERNAL MEDICINE

## 2024-05-14 PROCEDURE — 120N000009 HC R&B SNF

## 2024-05-14 PROCEDURE — 36592 COLLECT BLOOD FROM PICC: CPT | Performed by: INTERNAL MEDICINE

## 2024-05-14 PROCEDURE — 250N000011 HC RX IP 250 OP 636: Mod: JZ | Performed by: INTERNAL MEDICINE

## 2024-05-14 PROCEDURE — 250N000011 HC RX IP 250 OP 636: Performed by: INTERNAL MEDICINE

## 2024-05-14 PROCEDURE — 85018 HEMOGLOBIN: CPT | Performed by: INTERNAL MEDICINE

## 2024-05-14 PROCEDURE — 99309 SBSQ NF CARE MODERATE MDM 30: CPT | Performed by: INTERNAL MEDICINE

## 2024-05-14 PROCEDURE — 250N000009 HC RX 250: Performed by: INTERNAL MEDICINE

## 2024-05-14 PROCEDURE — 80202 ASSAY OF VANCOMYCIN: CPT | Performed by: INTERNAL MEDICINE

## 2024-05-14 PROCEDURE — 97110 THERAPEUTIC EXERCISES: CPT | Mod: GP

## 2024-05-14 PROCEDURE — 258N000003 HC RX IP 258 OP 636: Performed by: INTERNAL MEDICINE

## 2024-05-14 RX ADMIN — VANCOMYCIN HYDROCHLORIDE 1750 MG: 1 INJECTION, POWDER, LYOPHILIZED, FOR SOLUTION INTRAVENOUS at 10:18

## 2024-05-14 RX ADMIN — Medication 5 ML: at 09:16

## 2024-05-14 RX ADMIN — ACETAMINOPHEN 975 MG: 325 TABLET, FILM COATED ORAL at 14:37

## 2024-05-14 RX ADMIN — GABAPENTIN 800 MG: 300 CAPSULE ORAL at 14:37

## 2024-05-14 RX ADMIN — Medication 1 CAPSULE: at 20:30

## 2024-05-14 RX ADMIN — METHOCARBAMOL 500 MG: 500 TABLET ORAL at 22:40

## 2024-05-14 RX ADMIN — GABAPENTIN 800 MG: 300 CAPSULE ORAL at 08:26

## 2024-05-14 RX ADMIN — METHOCARBAMOL 500 MG: 500 TABLET ORAL at 15:58

## 2024-05-14 RX ADMIN — TUBERCULIN PURIFIED PROTEIN DERIVATIVE 5 UNITS: 5 INJECTION, SOLUTION INTRADERMAL at 08:34

## 2024-05-14 RX ADMIN — OXYCODONE HYDROCHLORIDE 15 MG: 5 TABLET ORAL at 15:58

## 2024-05-14 RX ADMIN — MICONAZOLE NITRATE: 20 POWDER TOPICAL at 20:32

## 2024-05-14 RX ADMIN — OXYCODONE HYDROCHLORIDE 15 MG: 5 TABLET ORAL at 08:27

## 2024-05-14 RX ADMIN — INSULIN GLARGINE 5 UNITS: 100 INJECTION, SOLUTION SUBCUTANEOUS at 22:39

## 2024-05-14 RX ADMIN — FERROUS SULFATE TAB 325 MG (65 MG ELEMENTAL FE) 325 MG: 325 (65 FE) TAB at 08:28

## 2024-05-14 RX ADMIN — CITALOPRAM HYDROBROMIDE 20 MG: 20 TABLET ORAL at 22:40

## 2024-05-14 RX ADMIN — HEPARIN, PORCINE (PF) 10 UNIT/ML INTRAVENOUS SYRINGE 10 ML: at 16:31

## 2024-05-14 RX ADMIN — CLONIDINE HYDROCHLORIDE 0.2 MG: 0.1 TABLET ORAL at 20:30

## 2024-05-14 RX ADMIN — ACETAMINOPHEN 975 MG: 325 TABLET, FILM COATED ORAL at 08:26

## 2024-05-14 RX ADMIN — Medication 5 ML: at 06:33

## 2024-05-14 RX ADMIN — MIRABEGRON 25 MG: 25 TABLET, FILM COATED, EXTENDED RELEASE ORAL at 08:28

## 2024-05-14 RX ADMIN — INSULIN ASPART 1 UNITS: 100 INJECTION, SOLUTION INTRAVENOUS; SUBCUTANEOUS at 17:30

## 2024-05-14 RX ADMIN — AMLODIPINE BESYLATE 5 MG: 5 TABLET ORAL at 08:26

## 2024-05-14 RX ADMIN — GABAPENTIN 800 MG: 300 CAPSULE ORAL at 20:30

## 2024-05-14 RX ADMIN — OXYCODONE HYDROCHLORIDE 15 MG: 5 TABLET ORAL at 20:32

## 2024-05-14 RX ADMIN — LUMATEPERONE 84 MG: 42 CAPSULE ORAL at 20:30

## 2024-05-14 RX ADMIN — CLONIDINE HYDROCHLORIDE 0.2 MG: 0.1 TABLET ORAL at 08:28

## 2024-05-14 RX ADMIN — ERTAPENEM SODIUM 1 G: 1 INJECTION, POWDER, LYOPHILIZED, FOR SOLUTION INTRAMUSCULAR; INTRAVENOUS at 16:28

## 2024-05-14 RX ADMIN — OMEPRAZOLE 20 MG: 20 CAPSULE, DELAYED RELEASE ORAL at 08:26

## 2024-05-14 RX ADMIN — Medication 1 CAPSULE: at 08:27

## 2024-05-14 RX ADMIN — ASPIRIN 162 MG: 81 TABLET, COATED ORAL at 08:26

## 2024-05-14 RX ADMIN — ATORVASTATIN CALCIUM 10 MG: 10 TABLET, FILM COATED ORAL at 22:40

## 2024-05-14 ASSESSMENT — ACTIVITIES OF DAILY LIVING (ADL)
ADLS_ACUITY_SCORE: 33

## 2024-05-14 NOTE — PROGRESS NOTES
VENICE left a  for  Lela @ Human Services [phone].   She called back.  She will be out Wednesday.     FRANCISCO J Romo   [facility], Transitional Care Unit   Social Work   [address] [place] [address], [address] [place] [address]  () [phone]

## 2024-05-14 NOTE — PLAN OF CARE
Goal Outcome Evaluation:    VS: /61   Pulse 64   Temp 97.8  F (36.6  C) (Axillary)   Resp 16   Wt 110 kg (242 lb 8 oz)   SpO2 97%   BMI 35.81 kg/m      O2: 97% RA   Output: Continent B/B   Last BM: 05/14/2024   Activity: A1-2 with gait belt and walker   Skin: L arm incision, L cast, R pressure ulcer heel  L upper arm elevated on pillow, R foot elevated on pillow   Pain: Managed with PRN oxycodone, tylenol & robaxin   CMS: AOx4   Dressing: L arm cast, R pressure ulcer mepilex   Diet: Regular diet; pills whole with thin liquids   LDA: L PICC, saline locked  Vancomycin infused without any difficulties and no adverse reactions noted.   Equipment: L arm cast, R picc   Plan: Continue POC   Additional Info: Pt able to make needs known. Denies CP, SOB, and N/V. Pt is on RN managed potassium replacement protocol. K+ results from today is 3.8, no replacement needed. Orders placed for tomorrow lab draw. Hgb 8.2. Vancomycin lab draw 16.0.  Mantoux done on R forearm. No acute issues this shift. Call light within reach.

## 2024-05-14 NOTE — PROGRESS NOTES
05/14/24 1218   Appointment Info   Signing Clinician's Name / Credentials (OT) SANJUANA Seay   Rehab Comments (OT) OT: Weekly note   OT Discharge Planning   OT Brief overview of current status OT: Pt. seen bedside reporting feeling depressed due to new heavier cast and discussion with orthopedic regarding ambulation or fusion of arm. Pt. hemoglobin 6.8 this session, Patient wanting to go in bathroom but agreed to bedside therapy for hygiene/grooming. Pt. required more assist this session due to size of cast, pain. Pt. used shower cap with set up and Min assist. Pt. doffed/donned UE clothing with Min assist. Max assist donning underwear and socks. OT addressed safety concern for home and falls. Pt. continues to report inez is making his way in town although won't set specific date.OT: Pt. requiring more assist with self cares this session due larger cast following cast change. Pt. continues to be fall risk and would benefit from assist at time of discharge.     Addendum: Pt is CGA transfers for toileting.  She is able to do her lulu cares and manage clothing with SBA-CGA.  However, though pt is a good participant with ADL, her performance can be variable, pt does con't to benefit from assist of one.  She has ambulated to and from the bathroom with OT but uses the bedside commode with aides due to being a falls risk.  See current goals and con't OT per POC.
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
## 2024-05-14 NOTE — PLAN OF CARE
Goal Outcome Evaluation:  No acute issues overnight. Sleeping well throughout shift and has no c/o's or requests as of yet. LUE cast intact and elevated. Continues IV abx via SL picc RUE. Pt.rec'd unit PRBCs yesterday for Hgb.= 6.8. On standard K+ replacement. Vanco level this AM. BG @ 0200 = 165.        Patient's most recent vital signs are:     Vital signs:  BP: 123/52  Temp: 98.1  HR: 59  RR: 18  SpO2: 91 %     Patient does not have new respiratory symptoms.  Patient does not have new sore throat.  Patient does not have a fever greater than 99.5.

## 2024-05-14 NOTE — PROGRESS NOTES
"       Status at Admission - 4/23/24 (initiated but limited due to high BPs), 4/24 (completed) Status at Last Update - 5/8/2024 Current Status - 5/14/2024   Bed Mobility Limited in rolling to L due to LUE NWB, Mod I rolling to R w/BR  Sup <> sit, min A, BR  Rolling is supervision when rolling to R for safety w/LUE. Rolling to L in Min A to maintain LUE NWB.  Sup to/from sit is generally CGA or SBA but min A to L due to limited force production for trunk to achieve upright sitting due to LUE NWB. Rolling and supine to/from sit with hospital bed is supervision to ensure safe positioning of LUE   Pt needs min A for supine to sit to L without bed rail       Transfer STS/SPT from raised bed, SEC with min A to stand then mod A to control descent with sitting.  STS, SPT with quad cane, CGA/min A from lower surfaces and poor safety awareness. Sit to stand with supervision, needing frequent verbal cues to reach back for chair arm rest, to ensure safety of L arm positioning as to not bump it on chair arm rest, and for eccentric control on lowering  Pivot transfers supervision to contact guard due to decreased L knee stability in stance, decreased L step length, and decreased L foot clearance impairing balance throughout      Gait Amb up to 70ft with SEC, w/c follow for safety, repeated min A due to impaired balance, fatigue.  Amb up to 88ft with SEC, generally CGA but frequently requires min A due to LOB. W/c brought behind by therapist for seated rest breaks and safety. Ambulation has been limited to pivot transfers since previous update due to continued balance deficits despite improved strength and consistent safety education  Also concern for pt living alone and refusing discharge elsewhere, high fall risk, and more safe to function at wheelchair level  Goal to re-incorporate short ambulation distances to walk into bathroom due to wheelchair not fitting   Stairs Ascend/descend 3 - 6\" steps, step to pattern, rail on R when " "ascending, varies from CGA to mod A for balance. Unable to perform more due to level of fatigue  Ascend/descend 3 - 6\" steps, R rail, generally CGA but can require mod A with transitions and turns due to impaired balance and poor safety awareness. 6 6\" steps with R rail, min A for balance and force production first step, then able to proceed with contact guard via step to pattern   Wheelchair Propulsion Not tried due to level of fatigue. Would need to use BLE due to NWB LUE  Up to 50ft, use of BLE for propulsion, supervision and cues for navigation and object avoidance. Mod I 100' + with BLEs and RUE as needed, good brake engagement   Outcomes Measures JENSEN on 4/26: 35/56  Needs reassessment Needs reassessment            Assessment of Progress Since Last Update: Overall functional strength and balance have improved, with pt continuing to need supervision to min A for transfers and mobility.   Barriers to Progress: Pt has balance deficits, poor safety awareness   Proposed Solutions to Improve Barriers: Continue to challenge static and dynamic balance deficits, education on mechanics for increased stability to decrease fall risk, continued LE strengthening   Justification for Continued Therapy Services: Patient lives alone and continues to have balance deficits and need education and safety cues for mobility with staff at this time.   Additional Considerations for Safe and Efficient Discharge: Patient refusing to discharge anywhere but home despite continued education on being at a high risk for falls            "

## 2024-05-14 NOTE — PHARMACY-VANCOMYCIN DOSING SERVICE
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  Start time: 11:47 on 2024  Exposure target: AUC24 (range)400-600 mg/L.hr   AUC24,ss: 470 mg/L.hr  Probability of AUC24 > 400: 92 %  Ctrough,ss: 14 mg/L  Probability of Ctrough,ss > 20: 0 %  Probability of nephrotoxicity (Lodise CHANDAN ): 9 %    Current estimated CrCl = Estimated Creatinine Clearance: 90.9 mL/min (based on SCr of 0.87 mg/dL).    Creatinine for last 3 days  2024:  5:44 AM Creatinine 0.87 mg/dL    Recent Vancomycin Levels (past 3 days)  2024:  6:40 AM Vancomycin 16.0 ug/mL    Vancomycin IV Administrations (past 72 hours)                     vancomycin (VANCOCIN) 1,750 mg in sodium chloride 0.9 % 500 mL intermittent infusion (mg) 1,750 mg Given 24 1147     1,750 mg Given 24 1031     1,750 mg Given 24 1040                    Nephrotoxins and other renal medications (From now, onward)      Start     Dose/Rate Route Frequency Ordered Stop    24 1000  vancomycin (VANCOCIN) 1,750 mg in sodium chloride 0.9 % 500 mL intermittent infusion         1,750 mg  over 2 Hours Intravenous EVERY 24 HOURS 05/10/24 1208                 Contrast Orders - past 72 hours (72h ago, onward)      None            Interpretation of levels and current regimen:  Vancomycin level is reflective of -600    Has serum creatinine changed greater than 50% in last 72 hours: No    Urine output:  good urine output    Renal Function:  Stable    Plan:  Continue Current Dose  Vancomycin monitoring method: AUC  Vancomycin therapeutic monitoring goal: 400-600 mg*h/L  Pharmacy will check vancomycin levels as appropriate in 3-5 Days.  Serum creatinine levels will be ordered a minimum of twice weekly.    Miladis Barrett, MauryD

## 2024-05-14 NOTE — PROGRESS NOTES
"   05/13/24 1903   OT Discharge Planning   OT Brief overview of current status OT: Pt. seen bedside reporting feeling depressed due to new heavier cast and discussion with orthopedic regarding ambulation or fusion of arm. Pt. hemoglobin 6.8 this session, Patient wanting to go in bathroom but agreed to bedside therapy for hygiene/grooming. Pt. required more assist this session due to size of cast, pain. Pt. used shower cap with set up and Min assist. Pt. doffed/donned UE clothing with Min assist. Max assist donning underwear and socks. OT addressed safety concern for home and falls. Pt. continues to report inez is making his way in town although won't set specific date.OT: Pt. requiring more assist with self cares this session due larger cast following cast change. Pt. continues to be fall risk and would benefit from assist at time of discharge.   Lower Body Dressing - Ability to dress/undress below the waist, includes fasteners   Patient Performance Partial/moderate assist \"includes weight bearing assist of trunk or limbs\"   Staff Performance One person assist (one person physical assist)   Describe Performance A1 w/ underwear   Toileting Hygiene - Ability to maintain perineal hygiene and adjust clothes   Patient Performance Partial/moderate assist \"includes weight bearing assist of trunk or limbs\"   Staff Performance One person assist (one person physical assist)   Describe Performance A1 to commode       "

## 2024-05-14 NOTE — PLAN OF CARE
Goal Outcome Evaluation:      Plan of Care Reviewed With: patient    Overall Patient Progress: no changeOverall Patient Progress: no change     Pt on bed, with ongoing blood transfusion on right PICC. A&O x4. Dressing on PICC, CDI. Vital signs checked and recorded while on blood transfusion. Oxycodone and Tylenol given x2 for pain 8-9/10. With CDI sling and cast on LUE. Continent with both bladder and bowel, with bedside commode in use. Last BM 05/13/2024. With good appetite, taken regular diet and meds on thin liquids. Able to make needs and concerns known. Activity with 1 assist uses GB and walker. Resting and watching TV between cares. Will POC.    Patient's most recent vital signs are:     Vital signs:  BP: 123/52  Temp: 98.1  HR: 59  RR: 18  SpO2: 91 %     Patient does not have new respiratory symptoms.  Patient does not have new sore throat.  Patient does not have a fever greater than 99.5.

## 2024-05-15 ENCOUNTER — APPOINTMENT (OUTPATIENT)
Dept: PHYSICAL THERAPY | Facility: SKILLED NURSING FACILITY | Age: 59
DRG: 559 | End: 2024-05-15
Attending: INTERNAL MEDICINE
Payer: COMMERCIAL

## 2024-05-15 ENCOUNTER — APPOINTMENT (OUTPATIENT)
Dept: OCCUPATIONAL THERAPY | Facility: SKILLED NURSING FACILITY | Age: 59
DRG: 559 | End: 2024-05-15
Attending: INTERNAL MEDICINE
Payer: COMMERCIAL

## 2024-05-15 LAB
GLUCOSE BLDC GLUCOMTR-MCNC: 118 MG/DL (ref 70–99)
GLUCOSE BLDC GLUCOMTR-MCNC: 144 MG/DL (ref 70–99)
GLUCOSE BLDC GLUCOMTR-MCNC: 144 MG/DL (ref 70–99)
GLUCOSE BLDC GLUCOMTR-MCNC: 146 MG/DL (ref 70–99)
GLUCOSE BLDC GLUCOMTR-MCNC: 147 MG/DL (ref 70–99)
HOLD SPECIMEN: NORMAL
POTASSIUM SERPL-SCNC: 3.8 MMOL/L (ref 3.4–5.3)

## 2024-05-15 PROCEDURE — 36592 COLLECT BLOOD FROM PICC: CPT | Performed by: INTERNAL MEDICINE

## 2024-05-15 PROCEDURE — 97535 SELF CARE MNGMENT TRAINING: CPT | Mod: GO

## 2024-05-15 PROCEDURE — 250N000013 HC RX MED GY IP 250 OP 250 PS 637: Performed by: INTERNAL MEDICINE

## 2024-05-15 PROCEDURE — 258N000003 HC RX IP 258 OP 636: Performed by: INTERNAL MEDICINE

## 2024-05-15 PROCEDURE — 250N000011 HC RX IP 250 OP 636: Mod: JZ | Performed by: INTERNAL MEDICINE

## 2024-05-15 PROCEDURE — 84132 ASSAY OF SERUM POTASSIUM: CPT | Performed by: INTERNAL MEDICINE

## 2024-05-15 PROCEDURE — 120N000009 HC R&B SNF

## 2024-05-15 PROCEDURE — 97530 THERAPEUTIC ACTIVITIES: CPT | Mod: GP

## 2024-05-15 PROCEDURE — 250N000013 HC RX MED GY IP 250 OP 250 PS 637

## 2024-05-15 PROCEDURE — 250N000011 HC RX IP 250 OP 636: Performed by: INTERNAL MEDICINE

## 2024-05-15 RX ORDER — OXYCODONE HYDROCHLORIDE 10 MG/1
10 TABLET ORAL EVERY 4 HOURS PRN
Status: DISCONTINUED | OUTPATIENT
Start: 2024-05-15 | End: 2024-05-24 | Stop reason: HOSPADM

## 2024-05-15 RX ADMIN — GABAPENTIN 800 MG: 300 CAPSULE ORAL at 13:27

## 2024-05-15 RX ADMIN — METHOCARBAMOL 500 MG: 500 TABLET ORAL at 10:44

## 2024-05-15 RX ADMIN — ACETAMINOPHEN 975 MG: 325 TABLET, FILM COATED ORAL at 13:27

## 2024-05-15 RX ADMIN — MICONAZOLE NITRATE: 20 POWDER TOPICAL at 20:53

## 2024-05-15 RX ADMIN — VANCOMYCIN HYDROCHLORIDE 1750 MG: 1 INJECTION, POWDER, LYOPHILIZED, FOR SOLUTION INTRAVENOUS at 10:36

## 2024-05-15 RX ADMIN — Medication 5 ML: at 05:57

## 2024-05-15 RX ADMIN — ACETAMINOPHEN 975 MG: 325 TABLET, FILM COATED ORAL at 09:34

## 2024-05-15 RX ADMIN — ATORVASTATIN CALCIUM 10 MG: 10 TABLET, FILM COATED ORAL at 21:18

## 2024-05-15 RX ADMIN — INSULIN GLARGINE 5 UNITS: 100 INJECTION, SOLUTION SUBCUTANEOUS at 21:22

## 2024-05-15 RX ADMIN — GABAPENTIN 800 MG: 300 CAPSULE ORAL at 09:34

## 2024-05-15 RX ADMIN — OMEPRAZOLE 20 MG: 20 CAPSULE, DELAYED RELEASE ORAL at 09:35

## 2024-05-15 RX ADMIN — CITALOPRAM HYDROBROMIDE 20 MG: 20 TABLET ORAL at 21:18

## 2024-05-15 RX ADMIN — AMLODIPINE BESYLATE 5 MG: 5 TABLET ORAL at 09:35

## 2024-05-15 RX ADMIN — ERTAPENEM SODIUM 1 G: 1 INJECTION, POWDER, LYOPHILIZED, FOR SOLUTION INTRAMUSCULAR; INTRAVENOUS at 17:16

## 2024-05-15 RX ADMIN — Medication 1 CAPSULE: at 09:35

## 2024-05-15 RX ADMIN — LUMATEPERONE 84 MG: 42 CAPSULE ORAL at 19:40

## 2024-05-15 RX ADMIN — CLONIDINE HYDROCHLORIDE 0.2 MG: 0.1 TABLET ORAL at 20:53

## 2024-05-15 RX ADMIN — OXYCODONE HYDROCHLORIDE 15 MG: 5 TABLET ORAL at 10:44

## 2024-05-15 RX ADMIN — Medication 1 CAPSULE: at 20:53

## 2024-05-15 RX ADMIN — INSULIN ASPART 1 UNITS: 100 INJECTION, SOLUTION INTRAVENOUS; SUBCUTANEOUS at 09:32

## 2024-05-15 RX ADMIN — INSULIN ASPART 1 UNITS: 100 INJECTION, SOLUTION INTRAVENOUS; SUBCUTANEOUS at 11:30

## 2024-05-15 RX ADMIN — INSULIN ASPART 1 UNITS: 100 INJECTION, SOLUTION INTRAVENOUS; SUBCUTANEOUS at 17:16

## 2024-05-15 RX ADMIN — ASPIRIN 162 MG: 81 TABLET, COATED ORAL at 09:34

## 2024-05-15 RX ADMIN — CLONIDINE HYDROCHLORIDE 0.2 MG: 0.1 TABLET ORAL at 09:35

## 2024-05-15 RX ADMIN — MIRABEGRON 25 MG: 25 TABLET, FILM COATED, EXTENDED RELEASE ORAL at 09:34

## 2024-05-15 RX ADMIN — GABAPENTIN 800 MG: 300 CAPSULE ORAL at 19:39

## 2024-05-15 RX ADMIN — HEPARIN, PORCINE (PF) 10 UNIT/ML INTRAVENOUS SYRINGE 5 ML: at 17:16

## 2024-05-15 ASSESSMENT — ACTIVITIES OF DAILY LIVING (ADL)
ADLS_ACUITY_SCORE: 31
ADLS_ACUITY_SCORE: 33
ADLS_ACUITY_SCORE: 31
ADLS_ACUITY_SCORE: 33
ADLS_ACUITY_SCORE: 31
ADLS_ACUITY_SCORE: 33
ADLS_ACUITY_SCORE: 31
ADLS_ACUITY_SCORE: 33
ADLS_ACUITY_SCORE: 31
ADLS_ACUITY_SCORE: 33
ADLS_ACUITY_SCORE: 31
ADLS_ACUITY_SCORE: 33
ADLS_ACUITY_SCORE: 31
ADLS_ACUITY_SCORE: 31
ADLS_ACUITY_SCORE: 33

## 2024-05-15 NOTE — PLAN OF CARE
Goal Outcome Evaluation:      Plan of Care Reviewed With: patient    Overall Patient Progress: no changeOverall Patient Progress: no change       Received pt on bed, A&O x4. Able to make concerns and needs known. Denies chest pain or SOB/. Oxycodone and Tylenol given for pain. With LUE sling and cast, elevated with two pillows, assessed as with slight sensation on 4 fingers and negative sensation on pinky, warm to touch, (+) numbness as claimed. UTV dressing or drainage on incision site. With PICC on R arm, dressing CDI, blood returned noted. Assist with 1 with gait belt in use, pivots to bedside commode for bladder and bowel needs. On regular diet, able to take meds whole with thin liquids. Watching TV and calls relatives as diversion activities for pain. Will continue POC.        Patient's most recent vital signs are:     Vital signs:  BP: 133/65  Temp: 97.5  HR: 68  RR: 16  SpO2: 93 %     Patient does not have new respiratory symptoms.  Patient does not have new sore throat.  Patient does not have a fever greater than 99.5.

## 2024-05-15 NOTE — PROGRESS NOTES
"   05/15/24 4753   Appointment Info   Signing Clinician's Name / Credentials (PT) Olesya Delgado PTA   PT Assistant Visit Number 2   Rehab Comments (PT) PT: -5 for toileting   Therapeutic Activity   Therapeutic Activities: dynamic activities to improve functional performance Minutes (31844) 40   Symptoms Noted During/After Treatment Fatigue  (Pt reporting feeling \"confused\". Provider notified.)   Treatment Detail/Skilled Intervention PT: focus on functional activities. see gg....refused stairs today. Pt reporting numbness w/L fingers, Provider notified and will ask for ortho consult   PT Discharge Planning   PT Plan PT: stairs daily. JENSNE as able, balance drills - lateral stepping, target toe taps, LE strengthening   PT Discharge Recommendation (DC Rec) Long term care facility  (Pt refusing LTC and insisting on d/c home.)   PT Rationale for DC Rec Pt currently living alone in an apartment in [place], and continuing to need intermittent safety cues and min assist for balance with mobility, which she will likely need at DC due to her cognitive deficits.   PT Brief overview of current status PT: bed mobility supervision to R, pivots CGA to min A, stairs CGA to min A x 6   PT Equipment Needed at Discharge cane, straight;cane, quad   Total Session Time   Timed Code Treatment Minutes 40   Total Session Time (sum of timed and untimed services) 40   Post Acute Settings Only   What unit is patient on? TCU   PT - Transitional Care Unit Time   Individual Time (minutes) - PT 40   Group Time (minutes) - PT 0   Concurrent Time (minutes) - PT 0   Co-Treatment Time (minutes) - PT 0   TCU Total Session Time (minutes) - PT 40   TCU Daily Total Session Time   PT TCU Daily Total Session Time 40   Rehab TCU Daily Total Session Time 40   Bed Mobility: Sit to lying   Patient Performance Supervision or verbal cues   Staff Performance Set up help only   Describe Performance PT: v/c for LUE awarness, set up w/pillows for comfort and safety.   Bed " Mobility: Lying to sitting on the side of bed   Patient Performance Supervision or verbal cues   Staff Performance Set up help only   Describe Performance PT: sup < sit, BR, time and occasional cues for safety w/LUE as Pt tends to have poor safety awareness   Transfers: Sit to Stand   Patient Performance Supervision or verbal cues   Staff Performance Set up help only   Equipment Used Other (comments)   Describe Performance PT: mass practice of STS from various surfaces, use of BR or arm rest of chair or //. occasional cues for mechanics and safety.   Transfers: Chair/Bed transfers   Patient Performance Steadying Assist   Staff Performance One person assist (one person physical assist)   Describe Performance PT: SPT from EOB <> commode for toileting, UE on BR or arm rest of commode. Pt able to perform w/set up assist, CGA for safety.   Walk 10 Feet - Ability to walk once standing   Patient Performance Steadying Assist   Mobility device used other (comments)  (in //)   Describe Performance PT: in // w/practice of turns to Pt's R to maximize RUE support during turn for improved stability and fall prevention. Improved w/repetition, needing ~25% cues with repetition. pt able to perform 40ft x 3 bouts then 2 bouts x 20ft. Rest breaks between d/t fatigue.

## 2024-05-15 NOTE — PLAN OF CARE
" Pt is on RN managed potassium , K lab results form today is  3.8, orders place for AM draw tomorrow.Patient is alert and oriented x 4. Able to make needs known. Denied chest pain, N/V, SOB. Pt c/o of cast feels tight , numbness on pinky, and no sensation .Writer updated provider. Pt also stated that \" I want to know when her ortho follow is \".  A/1 with gait belt and cane to bedside commode. Continent of B&B. LBM today. Pt c/o of itching in her throat. Updated provider. Edema BLE.Infused IV vancomycin and  ertapenem with out any difficulties.  Eat lunch and dinner with good appetite. Pain managed with PRN oxycodone x1. LUE cast intact and elevated. Call light with in reach. Contine with POC.     Patient's most recent vital signs are:     Vital signs:  BP: 130/55  Temp: 97.5  HR: 75  RR: 18  SpO2: 92 %     Patient does not have new respiratory symptoms.  Patient does not have new sore throat.  Patient does not have a fever greater than 99.5.         "

## 2024-05-15 NOTE — PROGRESS NOTES
VENICE left a  for  Lela @ [facility] [phone].       VENICE told her SW will be out on Thursday but can we talk on Friday at 1 pm?     VENICE did receive a call from Lela today.  We talked about how things were going at home. NOT WELL!  They are a behavior team.  They were transporting her but she was ALMOST falling on them when transporting.  Their leadership said no more. She was falling all the time. She refused more help.  She was in bed most of the time.  Not eating well.  SHE WAS COMMUNICATING WITH PEOPLE FROM [place].  VENICE asked her if VENICE should put in a VA as one day in  presence she wrote out a $400 check to someone and she only had $900 in her account.  Lela said yes, send one in.  VENICE will fill out VA for financial exploitation on Friday.  SW is out on Thursday. Lela said pt wants to move but has unrealistic expectations.  She does live in an apt with 6 steps down.  Can't use w/c.  Our plan is to have Lela look into TCU for [facility].  They take people who get out of hospital but can't go home.  VENICE said she has one week of IV ABX.  Maybe we should send her back to them before skill runs out. Then they can go for guardianship or help her find correction.  Since she is on [place].  There is nothing I can do down here.  We agree to plan and will talk again on Friday. VENICE was told by staff, pt stated boyfriend is dating someone else so he is not coming to pick pt up or live her with her.     FRANCISCO J Romo   [facility], Transitional Care Unit   Social Work   [facility]th [place] [address]  (PH) [phone]

## 2024-05-15 NOTE — PLAN OF CARE
Vital stable .  Potassium 3.8  Continue prior treatment plan.       Saw patient at 1030 am  She feels cast is tight and that current plan for no surgical intervention is some thing she would like to discuss further with ortho     We spoke about ID follow up on 5/22   She declined psychiatry follow up   Messaged Ortho LIILYA    She was open to health psychology and  visit . Ordered

## 2024-05-15 NOTE — PLAN OF CARE
Goal Outcome Evaluation:    No acute issue in the night, pt appears comfortable in bed during safety checks. As for  now no  complains of  pain. LUE cast CDI. RN managed K lab draw scheduled for 0600 H. BG at 0200 H - 118. Call light within reach continue with plan of care.         Patient's most recent vital signs are:     Vital signs:  BP: 133/65  Temp: 97.5  HR: 68  RR: 16  SpO2: 93 %     Patient does not have new respiratory symptoms.  Patient does not have new sore throat.  Patient does not have a fever greater than 99.5.

## 2024-05-15 NOTE — PROGRESS NOTES
Received a request for the patient to be seen today due to tight cast LUE. New cast placed 5/10/24. Informed the on call resident to see the patient.     Sloan Stanford DNP- CNP   Orthopaedic Surgery

## 2024-05-16 ENCOUNTER — APPOINTMENT (OUTPATIENT)
Dept: PHYSICAL THERAPY | Facility: SKILLED NURSING FACILITY | Age: 59
DRG: 559 | End: 2024-05-16
Attending: INTERNAL MEDICINE
Payer: COMMERCIAL

## 2024-05-16 LAB
ALBUMIN SERPL BCG-MCNC: 3.2 G/DL (ref 3.5–5.2)
ALP SERPL-CCNC: 206 U/L (ref 40–150)
ALT SERPL W P-5'-P-CCNC: 13 U/L (ref 0–50)
ANION GAP SERPL CALCULATED.3IONS-SCNC: 9 MMOL/L (ref 7–15)
AST SERPL W P-5'-P-CCNC: 16 U/L (ref 0–45)
BASOPHILS # BLD AUTO: 0.1 10E3/UL (ref 0–0.2)
BASOPHILS NFR BLD AUTO: 1 %
BILIRUB SERPL-MCNC: 0.2 MG/DL
BUN SERPL-MCNC: 10.5 MG/DL (ref 8–23)
CALCIUM SERPL-MCNC: 8.7 MG/DL (ref 8.8–10.2)
CHLORIDE SERPL-SCNC: 105 MMOL/L (ref 98–107)
CREAT SERPL-MCNC: 0.76 MG/DL (ref 0.51–0.95)
CRP SERPL-MCNC: 45.7 MG/L
DEPRECATED HCO3 PLAS-SCNC: 27 MMOL/L (ref 22–29)
EGFRCR SERPLBLD CKD-EPI 2021: 89 ML/MIN/1.73M2
EOSINOPHIL # BLD AUTO: 0.4 10E3/UL (ref 0–0.7)
EOSINOPHIL NFR BLD AUTO: 5 %
ERYTHROCYTE [DISTWIDTH] IN BLOOD BY AUTOMATED COUNT: 17.2 % (ref 10–15)
GLUCOSE BLDC GLUCOMTR-MCNC: 120 MG/DL (ref 70–99)
GLUCOSE BLDC GLUCOMTR-MCNC: 142 MG/DL (ref 70–99)
GLUCOSE BLDC GLUCOMTR-MCNC: 143 MG/DL (ref 70–99)
GLUCOSE BLDC GLUCOMTR-MCNC: 146 MG/DL (ref 70–99)
GLUCOSE BLDC GLUCOMTR-MCNC: 151 MG/DL (ref 70–99)
GLUCOSE SERPL-MCNC: 147 MG/DL (ref 70–99)
HCT VFR BLD AUTO: 26.6 % (ref 35–47)
HGB BLD-MCNC: 8.2 G/DL (ref 11.7–15.7)
IMM GRANULOCYTES # BLD: 0.1 10E3/UL
IMM GRANULOCYTES NFR BLD: 1 %
LYMPHOCYTES # BLD AUTO: 1.7 10E3/UL (ref 0.8–5.3)
LYMPHOCYTES NFR BLD AUTO: 20 %
MCH RBC QN AUTO: 25.9 PG (ref 26.5–33)
MCHC RBC AUTO-ENTMCNC: 30.8 G/DL (ref 31.5–36.5)
MCV RBC AUTO: 84 FL (ref 78–100)
MONOCYTES # BLD AUTO: 0.9 10E3/UL (ref 0–1.3)
MONOCYTES NFR BLD AUTO: 10 %
NEUTROPHILS # BLD AUTO: 5.6 10E3/UL (ref 1.6–8.3)
NEUTROPHILS NFR BLD AUTO: 63 %
NRBC # BLD AUTO: 0 10E3/UL
NRBC BLD AUTO-RTO: 0 /100
PLATELET # BLD AUTO: 354 10E3/UL (ref 150–450)
POTASSIUM SERPL-SCNC: 3.5 MMOL/L (ref 3.4–5.3)
PROT SERPL-MCNC: 5.9 G/DL (ref 6.4–8.3)
RBC # BLD AUTO: 3.17 10E6/UL (ref 3.8–5.2)
SODIUM SERPL-SCNC: 141 MMOL/L (ref 135–145)
WBC # BLD AUTO: 8.8 10E3/UL (ref 4–11)

## 2024-05-16 PROCEDURE — 250N000013 HC RX MED GY IP 250 OP 250 PS 637: Performed by: INTERNAL MEDICINE

## 2024-05-16 PROCEDURE — 250N000011 HC RX IP 250 OP 636: Performed by: INTERNAL MEDICINE

## 2024-05-16 PROCEDURE — 250N000013 HC RX MED GY IP 250 OP 250 PS 637

## 2024-05-16 PROCEDURE — 85025 COMPLETE CBC W/AUTO DIFF WBC: CPT | Performed by: INTERNAL MEDICINE

## 2024-05-16 PROCEDURE — 97750 PHYSICAL PERFORMANCE TEST: CPT | Mod: GP

## 2024-05-16 PROCEDURE — 250N000011 HC RX IP 250 OP 636: Mod: JZ | Performed by: INTERNAL MEDICINE

## 2024-05-16 PROCEDURE — 86140 C-REACTIVE PROTEIN: CPT | Performed by: INTERNAL MEDICINE

## 2024-05-16 PROCEDURE — 36415 COLL VENOUS BLD VENIPUNCTURE: CPT | Performed by: INTERNAL MEDICINE

## 2024-05-16 PROCEDURE — 258N000003 HC RX IP 258 OP 636: Performed by: INTERNAL MEDICINE

## 2024-05-16 PROCEDURE — 120N000009 HC R&B SNF

## 2024-05-16 PROCEDURE — 97530 THERAPEUTIC ACTIVITIES: CPT | Mod: GP

## 2024-05-16 PROCEDURE — 80053 COMPREHEN METABOLIC PANEL: CPT | Performed by: INTERNAL MEDICINE

## 2024-05-16 RX ORDER — CALCIUM CARBONATE 500 MG/1
500 TABLET, CHEWABLE ORAL 2 TIMES DAILY PRN
Status: DISCONTINUED | OUTPATIENT
Start: 2024-05-16 | End: 2024-05-24 | Stop reason: HOSPADM

## 2024-05-16 RX ORDER — PROCHLORPERAZINE MALEATE 5 MG
5 TABLET ORAL ONCE
Qty: 1 TABLET | Refills: 0 | Status: COMPLETED | OUTPATIENT
Start: 2024-05-16 | End: 2024-05-16

## 2024-05-16 RX ADMIN — INSULIN ASPART 1 UNITS: 100 INJECTION, SOLUTION INTRAVENOUS; SUBCUTANEOUS at 13:06

## 2024-05-16 RX ADMIN — CITALOPRAM HYDROBROMIDE 20 MG: 20 TABLET ORAL at 21:54

## 2024-05-16 RX ADMIN — AMLODIPINE BESYLATE 5 MG: 5 TABLET ORAL at 08:55

## 2024-05-16 RX ADMIN — INSULIN GLARGINE 5 UNITS: 100 INJECTION, SOLUTION SUBCUTANEOUS at 22:17

## 2024-05-16 RX ADMIN — GABAPENTIN 800 MG: 300 CAPSULE ORAL at 13:47

## 2024-05-16 RX ADMIN — OXYCODONE HYDROCHLORIDE 15 MG: 10 TABLET ORAL at 15:50

## 2024-05-16 RX ADMIN — METHOCARBAMOL 500 MG: 500 TABLET ORAL at 15:50

## 2024-05-16 RX ADMIN — LUMATEPERONE 84 MG: 42 CAPSULE ORAL at 19:56

## 2024-05-16 RX ADMIN — ASPIRIN 162 MG: 81 TABLET, COATED ORAL at 08:54

## 2024-05-16 RX ADMIN — OXYCODONE HYDROCHLORIDE 10 MG: 10 TABLET ORAL at 20:01

## 2024-05-16 RX ADMIN — HEPARIN, PORCINE (PF) 10 UNIT/ML INTRAVENOUS SYRINGE 5 ML: at 16:19

## 2024-05-16 RX ADMIN — MIRABEGRON 25 MG: 25 TABLET, FILM COATED, EXTENDED RELEASE ORAL at 08:55

## 2024-05-16 RX ADMIN — CALCIUM CARBONATE (ANTACID) CHEW TAB 500 MG 500 MG: 500 CHEW TAB at 15:45

## 2024-05-16 RX ADMIN — FERROUS SULFATE TAB 325 MG (65 MG ELEMENTAL FE) 325 MG: 325 (65 FE) TAB at 08:54

## 2024-05-16 RX ADMIN — GABAPENTIN 800 MG: 300 CAPSULE ORAL at 19:55

## 2024-05-16 RX ADMIN — ACETAMINOPHEN 975 MG: 325 TABLET, FILM COATED ORAL at 08:55

## 2024-05-16 RX ADMIN — INSULIN ASPART 1 UNITS: 100 INJECTION, SOLUTION INTRAVENOUS; SUBCUTANEOUS at 08:55

## 2024-05-16 RX ADMIN — CLONIDINE HYDROCHLORIDE 0.2 MG: 0.1 TABLET ORAL at 21:54

## 2024-05-16 RX ADMIN — Medication 1 CAPSULE: at 08:55

## 2024-05-16 RX ADMIN — ACETAMINOPHEN 975 MG: 325 TABLET, FILM COATED ORAL at 13:47

## 2024-05-16 RX ADMIN — OMEPRAZOLE 20 MG: 20 CAPSULE, DELAYED RELEASE ORAL at 08:54

## 2024-05-16 RX ADMIN — ATORVASTATIN CALCIUM 10 MG: 10 TABLET, FILM COATED ORAL at 21:53

## 2024-05-16 RX ADMIN — Medication 1 CAPSULE: at 21:54

## 2024-05-16 RX ADMIN — CLONIDINE HYDROCHLORIDE 0.2 MG: 0.1 TABLET ORAL at 08:54

## 2024-05-16 RX ADMIN — Medication 5 ML: at 05:58

## 2024-05-16 RX ADMIN — PROCHLORPERAZINE MALEATE 5 MG: 5 TABLET ORAL at 16:19

## 2024-05-16 RX ADMIN — INSULIN ASPART 1 UNITS: 100 INJECTION, SOLUTION INTRAVENOUS; SUBCUTANEOUS at 17:03

## 2024-05-16 RX ADMIN — MICONAZOLE NITRATE: 20 POWDER TOPICAL at 22:18

## 2024-05-16 RX ADMIN — VANCOMYCIN HYDROCHLORIDE 1750 MG: 1 INJECTION, POWDER, LYOPHILIZED, FOR SOLUTION INTRAVENOUS at 10:17

## 2024-05-16 RX ADMIN — GABAPENTIN 800 MG: 300 CAPSULE ORAL at 08:55

## 2024-05-16 RX ADMIN — ERTAPENEM SODIUM 1 G: 1 INJECTION, POWDER, LYOPHILIZED, FOR SOLUTION INTRAMUSCULAR; INTRAVENOUS at 16:19

## 2024-05-16 ASSESSMENT — ACTIVITIES OF DAILY LIVING (ADL)
ADLS_ACUITY_SCORE: 31
ADLS_ACUITY_SCORE: 30
ADLS_ACUITY_SCORE: 31

## 2024-05-16 NOTE — PROGRESS NOTES
Updated patient of newly scheduled ID appt on 5/20 at 7am. Patient is aware and agreeable to going to in person appointment at [facility].     Maddison Fernandez   Patient Care Management Coordinator  Acute Rehabilitation Unit/ Transitional Care Unit.   Ph: [phone]

## 2024-05-16 NOTE — PLAN OF CARE
Goal Outcome Evaluation:      Plan of Care Reviewed With: patient    Overall Patient Progress: no change    Patient AOx4. Takes meds whole with thin liquids. Patient appear to be resting and sleeping during rounds. Right PICC single lumen, CDI. On RN manage Potassium, ordered in AM for re-check. Continent of Bowel and bladder. Ax1 to bedside commode. BG checks x2 147,142. Continue POC.         Patient's most recent vital signs are:     Vital signs:  BP: 137/59  Temp: 98  HR: 61  RR: 18  SpO2: 94 %     Patient does not have new respiratory symptoms.  Patient does not have new sore throat.  Patient does not have a fever greater than 99.5.

## 2024-05-16 NOTE — PLAN OF CARE
Vital stable .  Await ortho input .  No new nursing concerns brought forward  Labs ; cbc and bmp reviewed  ID follow up 5/22  Continue prior treatment plan.       Complains of heart burn( not new )  and nausea. Lack of appetite. No fever . No emesis .   Ordered TUMS and one time compazine     CTM  Dicussed with RN

## 2024-05-16 NOTE — PLAN OF CARE
"Patient is alert and oriented x 4. Able to make needs known. Denied chest pain, SOB. Continent of B&B. LBM today medium soft. Pt is on RN managed potassium replacement protocol. K results form today is 3.5, no replacement needed. Lab orders placed for tomorrow AM draw. Edema BLE.Infused IV vancomycin with out any difficulties.LUE cast intact and elevated. Pt stated that \" I have stomach flu feeling like nauseous, and no appetite\". Soft BM x 2. Pt refused breakfast and lunch.  BG was 143, 146 sliding scale insulin givne.Call light with in reach.Continue with POC.     [id number]:  c/o nausea , offered compazine, and effective., sliding scale insulin given. Pt eat dinner with fair appetite. Pt denied SOB, chest pain, and chills. T 97.8. call light with in reach. Continue with POC.   Patient's most recent vital signs are:     Vital signs:  BP: 171/71  Temp: 98  HR: 69  RR: 20  SpO2: 93 %     Patient does not have new respiratory symptoms.  Patient does not have new sore throat.  Patient does not have a fever greater than 99.5.         "

## 2024-05-17 ENCOUNTER — APPOINTMENT (OUTPATIENT)
Dept: PHYSICAL THERAPY | Facility: SKILLED NURSING FACILITY | Age: 59
DRG: 559 | End: 2024-05-17
Attending: INTERNAL MEDICINE
Payer: COMMERCIAL

## 2024-05-17 LAB
C DIFF TOX B STL QL: NEGATIVE
GLUCOSE BLDC GLUCOMTR-MCNC: 110 MG/DL (ref 70–99)
GLUCOSE BLDC GLUCOMTR-MCNC: 128 MG/DL (ref 70–99)
GLUCOSE BLDC GLUCOMTR-MCNC: 130 MG/DL (ref 70–99)
GLUCOSE BLDC GLUCOMTR-MCNC: 144 MG/DL (ref 70–99)
GLUCOSE BLDC GLUCOMTR-MCNC: 160 MG/DL (ref 70–99)
GLUCOSE BLDC GLUCOMTR-MCNC: 184 MG/DL (ref 70–99)
POTASSIUM SERPL-SCNC: 2.7 MMOL/L (ref 3.4–5.3)
POTASSIUM SERPL-SCNC: 3.1 MMOL/L (ref 3.4–5.3)
POTASSIUM SERPL-SCNC: 3.9 MMOL/L (ref 3.4–5.3)

## 2024-05-17 PROCEDURE — 99309 SBSQ NF CARE MODERATE MDM 30: CPT | Performed by: INTERNAL MEDICINE

## 2024-05-17 PROCEDURE — 250N000011 HC RX IP 250 OP 636: Mod: JZ | Performed by: INTERNAL MEDICINE

## 2024-05-17 PROCEDURE — 250N000013 HC RX MED GY IP 250 OP 250 PS 637: Performed by: INTERNAL MEDICINE

## 2024-05-17 PROCEDURE — 120N000009 HC R&B SNF

## 2024-05-17 PROCEDURE — 97110 THERAPEUTIC EXERCISES: CPT | Mod: GP

## 2024-05-17 PROCEDURE — 250N000013 HC RX MED GY IP 250 OP 250 PS 637

## 2024-05-17 PROCEDURE — 250N000011 HC RX IP 250 OP 636: Performed by: INTERNAL MEDICINE

## 2024-05-17 PROCEDURE — 258N000003 HC RX IP 258 OP 636: Performed by: INTERNAL MEDICINE

## 2024-05-17 PROCEDURE — 36415 COLL VENOUS BLD VENIPUNCTURE: CPT | Performed by: INTERNAL MEDICINE

## 2024-05-17 PROCEDURE — 36592 COLLECT BLOOD FROM PICC: CPT | Performed by: INTERNAL MEDICINE

## 2024-05-17 PROCEDURE — 87493 C DIFF AMPLIFIED PROBE: CPT | Performed by: INTERNAL MEDICINE

## 2024-05-17 PROCEDURE — 84132 ASSAY OF SERUM POTASSIUM: CPT | Performed by: INTERNAL MEDICINE

## 2024-05-17 PROCEDURE — 250N000013 HC RX MED GY IP 250 OP 250 PS 637: Performed by: STUDENT IN AN ORGANIZED HEALTH CARE EDUCATION/TRAINING PROGRAM

## 2024-05-17 RX ORDER — POTASSIUM CHLORIDE 1500 MG/1
20 TABLET, EXTENDED RELEASE ORAL ONCE
Status: COMPLETED | OUTPATIENT
Start: 2024-05-17 | End: 2024-05-17

## 2024-05-17 RX ORDER — POTASSIUM CHLORIDE 1500 MG/1
40 TABLET, EXTENDED RELEASE ORAL ONCE
Status: COMPLETED | OUTPATIENT
Start: 2024-05-17 | End: 2024-05-17

## 2024-05-17 RX ORDER — PROCHLORPERAZINE MALEATE 5 MG
5 TABLET ORAL EVERY 8 HOURS PRN
Status: DISPENSED | OUTPATIENT
Start: 2024-05-17 | End: 2024-05-19

## 2024-05-17 RX ORDER — PROCHLORPERAZINE MALEATE 5 MG
5 TABLET ORAL ONCE
Status: COMPLETED | OUTPATIENT
Start: 2024-05-17 | End: 2024-05-17

## 2024-05-17 RX ORDER — LOPERAMIDE HCL 1 MG/7.5ML
2 SOLUTION ORAL 3 TIMES DAILY PRN
Status: DISPENSED | OUTPATIENT
Start: 2024-05-17 | End: 2024-05-19

## 2024-05-17 RX ADMIN — Medication 1 CAPSULE: at 08:29

## 2024-05-17 RX ADMIN — ASPIRIN 162 MG: 81 TABLET, COATED ORAL at 08:28

## 2024-05-17 RX ADMIN — Medication 5 ML: at 05:46

## 2024-05-17 RX ADMIN — OMEPRAZOLE 20 MG: 20 CAPSULE, DELAYED RELEASE ORAL at 08:29

## 2024-05-17 RX ADMIN — HEPARIN, PORCINE (PF) 10 UNIT/ML INTRAVENOUS SYRINGE 5 ML: at 15:41

## 2024-05-17 RX ADMIN — POTASSIUM CHLORIDE 20 MEQ: 1500 TABLET, EXTENDED RELEASE ORAL at 15:40

## 2024-05-17 RX ADMIN — ACETAMINOPHEN 975 MG: 325 TABLET, FILM COATED ORAL at 21:37

## 2024-05-17 RX ADMIN — CITALOPRAM HYDROBROMIDE 20 MG: 20 TABLET ORAL at 21:37

## 2024-05-17 RX ADMIN — GABAPENTIN 800 MG: 300 CAPSULE ORAL at 13:05

## 2024-05-17 RX ADMIN — GABAPENTIN 800 MG: 300 CAPSULE ORAL at 21:37

## 2024-05-17 RX ADMIN — GABAPENTIN 800 MG: 300 CAPSULE ORAL at 08:29

## 2024-05-17 RX ADMIN — CLONIDINE HYDROCHLORIDE 0.2 MG: 0.1 TABLET ORAL at 08:29

## 2024-05-17 RX ADMIN — METHOCARBAMOL 500 MG: 500 TABLET ORAL at 04:19

## 2024-05-17 RX ADMIN — VANCOMYCIN HYDROCHLORIDE 1750 MG: 1 INJECTION, POWDER, LYOPHILIZED, FOR SOLUTION INTRAVENOUS at 11:01

## 2024-05-17 RX ADMIN — ACETAMINOPHEN 975 MG: 325 TABLET, FILM COATED ORAL at 08:27

## 2024-05-17 RX ADMIN — Medication 1 CAPSULE: at 21:37

## 2024-05-17 RX ADMIN — POTASSIUM CHLORIDE 40 MEQ: 1500 TABLET, EXTENDED RELEASE ORAL at 14:14

## 2024-05-17 RX ADMIN — ERTAPENEM SODIUM 1 G: 1 INJECTION, POWDER, LYOPHILIZED, FOR SOLUTION INTRAMUSCULAR; INTRAVENOUS at 15:40

## 2024-05-17 RX ADMIN — POTASSIUM CHLORIDE 40 MEQ: 1500 TABLET, EXTENDED RELEASE ORAL at 08:30

## 2024-05-17 RX ADMIN — OXYCODONE HYDROCHLORIDE 15 MG: 10 TABLET ORAL at 08:28

## 2024-05-17 RX ADMIN — OXYCODONE HYDROCHLORIDE 15 MG: 10 TABLET ORAL at 18:28

## 2024-05-17 RX ADMIN — ATORVASTATIN CALCIUM 10 MG: 10 TABLET, FILM COATED ORAL at 21:37

## 2024-05-17 RX ADMIN — PROCHLORPERAZINE MALEATE 5 MG: 5 TABLET ORAL at 21:37

## 2024-05-17 RX ADMIN — PROCHLORPERAZINE MALEATE 5 MG: 5 TABLET ORAL at 05:02

## 2024-05-17 RX ADMIN — Medication 5 ML: at 13:05

## 2024-05-17 RX ADMIN — CALCIUM CARBONATE (ANTACID) CHEW TAB 500 MG 500 MG: 500 CHEW TAB at 08:29

## 2024-05-17 RX ADMIN — MIRABEGRON 25 MG: 25 TABLET, FILM COATED, EXTENDED RELEASE ORAL at 08:29

## 2024-05-17 RX ADMIN — ACETAMINOPHEN 975 MG: 325 TABLET, FILM COATED ORAL at 13:05

## 2024-05-17 RX ADMIN — METHOCARBAMOL 500 MG: 500 TABLET ORAL at 18:29

## 2024-05-17 RX ADMIN — OXYCODONE HYDROCHLORIDE 10 MG: 10 TABLET ORAL at 04:19

## 2024-05-17 RX ADMIN — INSULIN GLARGINE 5 UNITS: 100 INJECTION, SOLUTION SUBCUTANEOUS at 21:37

## 2024-05-17 RX ADMIN — LOPERAMIDE HCL 2 MG: 1 SOLUTION ORAL at 15:40

## 2024-05-17 RX ADMIN — PROCHLORPERAZINE MALEATE 5 MG: 5 TABLET ORAL at 13:05

## 2024-05-17 RX ADMIN — INSULIN ASPART 1 UNITS: 100 INJECTION, SOLUTION INTRAVENOUS; SUBCUTANEOUS at 14:14

## 2024-05-17 RX ADMIN — CLONIDINE HYDROCHLORIDE 0.2 MG: 0.1 TABLET ORAL at 21:38

## 2024-05-17 RX ADMIN — AMLODIPINE BESYLATE 5 MG: 5 TABLET ORAL at 08:29

## 2024-05-17 RX ADMIN — LUMATEPERONE 84 MG: 42 CAPSULE ORAL at 21:36

## 2024-05-17 ASSESSMENT — ACTIVITIES OF DAILY LIVING (ADL)
ADLS_ACUITY_SCORE: 31
ADLS_ACUITY_SCORE: 29
ADLS_ACUITY_SCORE: 29
ADLS_ACUITY_SCORE: 31
ADLS_ACUITY_SCORE: 31
ADLS_ACUITY_SCORE: 29
ADLS_ACUITY_SCORE: 31
ADLS_ACUITY_SCORE: 29
ADLS_ACUITY_SCORE: 31
ADLS_ACUITY_SCORE: 29
ADLS_ACUITY_SCORE: 30
ADLS_ACUITY_SCORE: 31
ADLS_ACUITY_SCORE: 30
ADLS_ACUITY_SCORE: 29
ADLS_ACUITY_SCORE: 31
ADLS_ACUITY_SCORE: 29
ADLS_ACUITY_SCORE: 30
ADLS_ACUITY_SCORE: 29

## 2024-05-17 NOTE — PROGRESS NOTES
Evaluate patient's RUE today. Received notice that cast was on too tightly.    Upon speaking to patient, she is pleased with cast at this time. Denies overly tight cast at this time.    Patient is has warm and well perfused digits (cap refill <2), she has sensation in tact across median, ulnar, and radial nerves; patient is able to cross index/middle fingers, able to adduct/abduct digits, able to make thumbs up, struggles to make a ok sign due to cast, however, able to flex and extend at thumb IP joint.    Continue cares in TCU going forward.  Patient has appointment with Dr. Sarmiento on 6/17. PT and OT continue.    Douglas Perez MD  Resident Physician  Orthopedic Surgery

## 2024-05-17 NOTE — PLAN OF CARE
Goal Outcome Evaluation:      Plan of Care Reviewed With: patient    Overall Patient Progress: no change    AOx4. Able to make needs known. BP taken at bedtime: 160/62, no complaints of headache, scheduled clonidine tab given. Left Upper Elbow with cast, CDI, elevated with pillow.Reports with numbness and tingling of fingers, pt. waiting for ortho rounds/ ortho appointment. Right PICC line,CDI.On RN manage Potassium , result: 3.5 . Potassium due for re-check at 6am. BG checks x2 120,110. Continent with bowel and bladder. Uses the Bedside commode. Call light within reach. Continue POC.    0500H- patient complained of feeling sick, weak, in pain, nausea and having chills. VS taken: T:97.7, RR: 18, HR: 78 , BP: 176/69 .Provider notified, Compazine 1 tab once was given P.O. Oxycodone and Robaxin given for pain.       Patient's most recent vital signs are:     Vital signs:  BP: 160/62  Temp: 97.8  HR: 63  RR: 18  SpO2: 98 %     Patient does not have new respiratory symptoms.  Patient does not have new sore throat.  Patient does not have a fever greater than 99.5.

## 2024-05-17 NOTE — PROGRESS NOTES
Patient is having loose stools and nausea  4-5 stools ,watery and foul smelling   Mild lower abd pain intermittent   Loss of appetite         Alert and oriented . In no acute distress .  Chest ; Breath sounds are equal BL. No respiratory distress noted .  Cardiovascular Exam ; S1 & S2 .  GI ; Abdomen is soft and non tender. BS positive .  Skin ; no jaundice noted.  Psych ; Josemanuel mood & affect.        Potassium notes     A/P    Nausea  Loose stools     Check c diff   Compazine 5 mg TID prn for 3 days       Left elbow infection   On vanc and ertapenem   ID follow up on 5/20   Ortho t see for cast today       Discuss with nursing and team round  CTM

## 2024-05-17 NOTE — PROGRESS NOTES
VENICE left a vm for  Lela @ Human Services [phone]. Asking for the contact info for [facility].     VENICE callled [facility].  [phone].  They are ARU  not TCU. She can't go there.      VENICE left a vm for Jeni Foster at [facility] on [address]. [phone]  They have TCU.  VENICE asked her to call VENICE back so we can talk.     VENICE left another vm for Lela and told her about the two places above.     FRANCISCO J Romo   [facility], Transitional Care Unit   Social Work   [facility] [place] [address]  (PH) [phone]

## 2024-05-18 LAB
GLUCOSE BLDC GLUCOMTR-MCNC: 130 MG/DL (ref 70–99)
GLUCOSE BLDC GLUCOMTR-MCNC: 134 MG/DL (ref 70–99)
GLUCOSE BLDC GLUCOMTR-MCNC: 142 MG/DL (ref 70–99)
GLUCOSE BLDC GLUCOMTR-MCNC: 143 MG/DL (ref 70–99)
GLUCOSE BLDC GLUCOMTR-MCNC: 220 MG/DL (ref 70–99)
POTASSIUM SERPL-SCNC: 3.5 MMOL/L (ref 3.4–5.3)
VANCOMYCIN SERPL-MCNC: 13.3 UG/ML

## 2024-05-18 PROCEDURE — 80202 ASSAY OF VANCOMYCIN: CPT | Performed by: INTERNAL MEDICINE

## 2024-05-18 PROCEDURE — 120N000009 HC R&B SNF

## 2024-05-18 PROCEDURE — 250N000013 HC RX MED GY IP 250 OP 250 PS 637: Performed by: INTERNAL MEDICINE

## 2024-05-18 PROCEDURE — 84132 ASSAY OF SERUM POTASSIUM: CPT | Performed by: INTERNAL MEDICINE

## 2024-05-18 PROCEDURE — 250N000013 HC RX MED GY IP 250 OP 250 PS 637

## 2024-05-18 PROCEDURE — 36592 COLLECT BLOOD FROM PICC: CPT | Performed by: INTERNAL MEDICINE

## 2024-05-18 PROCEDURE — 258N000003 HC RX IP 258 OP 636: Performed by: INTERNAL MEDICINE

## 2024-05-18 PROCEDURE — 250N000011 HC RX IP 250 OP 636: Performed by: INTERNAL MEDICINE

## 2024-05-18 PROCEDURE — 250N000011 HC RX IP 250 OP 636: Mod: JZ | Performed by: INTERNAL MEDICINE

## 2024-05-18 RX ADMIN — MIRABEGRON 25 MG: 25 TABLET, FILM COATED, EXTENDED RELEASE ORAL at 08:07

## 2024-05-18 RX ADMIN — ERTAPENEM SODIUM 1 G: 1 INJECTION, POWDER, LYOPHILIZED, FOR SOLUTION INTRAMUSCULAR; INTRAVENOUS at 16:24

## 2024-05-18 RX ADMIN — Medication 5 ML: at 07:40

## 2024-05-18 RX ADMIN — CLONIDINE HYDROCHLORIDE 0.2 MG: 0.1 TABLET ORAL at 21:41

## 2024-05-18 RX ADMIN — INSULIN ASPART 2 UNITS: 100 INJECTION, SOLUTION INTRAVENOUS; SUBCUTANEOUS at 18:41

## 2024-05-18 RX ADMIN — OMEPRAZOLE 20 MG: 20 CAPSULE, DELAYED RELEASE ORAL at 08:07

## 2024-05-18 RX ADMIN — LUMATEPERONE 84 MG: 42 CAPSULE ORAL at 21:43

## 2024-05-18 RX ADMIN — VANCOMYCIN HYDROCHLORIDE 1750 MG: 1 INJECTION, POWDER, LYOPHILIZED, FOR SOLUTION INTRAVENOUS at 09:41

## 2024-05-18 RX ADMIN — ACETAMINOPHEN 975 MG: 325 TABLET, FILM COATED ORAL at 21:42

## 2024-05-18 RX ADMIN — CITALOPRAM HYDROBROMIDE 20 MG: 20 TABLET ORAL at 21:45

## 2024-05-18 RX ADMIN — GABAPENTIN 800 MG: 300 CAPSULE ORAL at 13:26

## 2024-05-18 RX ADMIN — MICONAZOLE NITRATE: 20 POWDER TOPICAL at 08:09

## 2024-05-18 RX ADMIN — CLONIDINE HYDROCHLORIDE 0.2 MG: 0.1 TABLET ORAL at 08:06

## 2024-05-18 RX ADMIN — GABAPENTIN 800 MG: 300 CAPSULE ORAL at 21:39

## 2024-05-18 RX ADMIN — INSULIN ASPART 1 UNITS: 100 INJECTION, SOLUTION INTRAVENOUS; SUBCUTANEOUS at 08:13

## 2024-05-18 RX ADMIN — Medication 1 CAPSULE: at 21:40

## 2024-05-18 RX ADMIN — VANCOMYCIN HYDROCHLORIDE 250 MG: 500 INJECTION, POWDER, LYOPHILIZED, FOR SOLUTION INTRAVENOUS at 12:23

## 2024-05-18 RX ADMIN — ATORVASTATIN CALCIUM 10 MG: 10 TABLET, FILM COATED ORAL at 21:43

## 2024-05-18 RX ADMIN — OXYCODONE HYDROCHLORIDE 15 MG: 10 TABLET ORAL at 03:53

## 2024-05-18 RX ADMIN — GABAPENTIN 800 MG: 300 CAPSULE ORAL at 08:06

## 2024-05-18 RX ADMIN — ACETAMINOPHEN 975 MG: 325 TABLET, FILM COATED ORAL at 08:06

## 2024-05-18 RX ADMIN — INSULIN GLARGINE 5 UNITS: 100 INJECTION, SOLUTION SUBCUTANEOUS at 22:34

## 2024-05-18 RX ADMIN — FERROUS SULFATE TAB 325 MG (65 MG ELEMENTAL FE) 325 MG: 325 (65 FE) TAB at 08:06

## 2024-05-18 RX ADMIN — HEPARIN, PORCINE (PF) 10 UNIT/ML INTRAVENOUS SYRINGE 5 ML: at 16:25

## 2024-05-18 RX ADMIN — ASPIRIN 162 MG: 81 TABLET, COATED ORAL at 08:07

## 2024-05-18 RX ADMIN — ACETAMINOPHEN 975 MG: 325 TABLET, FILM COATED ORAL at 13:27

## 2024-05-18 RX ADMIN — Medication 1 CAPSULE: at 08:07

## 2024-05-18 RX ADMIN — AMLODIPINE BESYLATE 5 MG: 5 TABLET ORAL at 08:07

## 2024-05-18 RX ADMIN — OXYCODONE HYDROCHLORIDE 15 MG: 10 TABLET ORAL at 21:41

## 2024-05-18 ASSESSMENT — ACTIVITIES OF DAILY LIVING (ADL)
ADLS_ACUITY_SCORE: 35
ADLS_ACUITY_SCORE: 40
ADLS_ACUITY_SCORE: 35
ADLS_ACUITY_SCORE: 40
ADLS_ACUITY_SCORE: 35
ADLS_ACUITY_SCORE: 40
ADLS_ACUITY_SCORE: 35

## 2024-05-18 NOTE — PLAN OF CARE
Goal Outcome Evaluation:    Patient is alert and oriented, able to use call light and make needs known, continent for bowel and bladder, assist of 1 with transfers, commode at bed side.  Regular diet, left arm cast in place, NWB, pillow in use, patient requested oxycodone x1 for pain. Potassium managed protocol ongoing, BG check this shift 142, no concern at this time. Patient denied chest pain, SOB, and N/V. Call light in reach, repositioned as tolerated, bed in low position, will continue with the cares as planned.        Patient's most recent vital signs are:     Vital signs:  BP: 157/56  Temp: 98.1  HR: 89  RR: 19  SpO2: 92 %     Patient does not have new respiratory symptoms.  Patient does not have new sore throat.  Patient does not have a fever greater than 99.5.

## 2024-05-18 NOTE — PLAN OF CARE
Pt is alert and oriented x4. Able to make needs known to staff.  Assist of one with pivot to BSC.  Single lumen PICC on right arm. Vancomycin infuse with no issue, additional  dose of 250 mg was given per order.  Left arm is in cast,  dressing CDI. Continent of bowel and bladder.  On a RN manage potassium  protocol. No replacement needed this shift, AM draw was order. On a regular diet. Pt denies SOB, chest pain and N/V. Will contiune plan of care.      Patient's most recent vital signs are:     Vital signs:  BP: 156/74  Temp: 97.8  HR: 70  RR: 18        Patient does not have new respiratory symptoms.  Patient does not have new sore throat.  Patient does not have a fever greater than 99.5.

## 2024-05-18 NOTE — PHARMACY-VANCOMYCIN DOSING SERVICE
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  Exposure target: AUC24 (range)400-600 mg/L.hr   AUC24,ss: 404 mg/L.hr  Probability of AUC24 > 400: 53 %  Ctrough,ss: 11.6 mg/L  Probability of Ctrough,ss > 20: 0 %  Probability of nephrotoxicity (Lodise CHANDAN ): 7 %      Current estimated CrCl = Estimated Creatinine Clearance: 107.4 mL/min (based on SCr of 0.76 mg/dL).    Creatinine for last 3 days  2024:  6:01 AM Creatinine 0.76 mg/dL    Recent Vancomycin Levels (past 3 days)  2024:  7:41 AM Vancomycin 13.3 ug/mL    Vancomycin IV Administrations (past 72 hours)                     vancomycin (VANCOCIN) 1,750 mg in sodium chloride 0.9 % 500 mL intermittent infusion (mg) 1,750 mg Given 24 0941     1,750 mg Given 24 1101     1,750 mg Given 24 1017     1,750 mg Given 05/15/24 1036                    Nephrotoxins and other renal medications (From now, onward)      Start     Dose/Rate Route Frequency Ordered Stop    24 1000  vancomycin (VANCOCIN) 1,750 mg in sodium chloride 0.9 % 500 mL intermittent infusion         1,750 mg  over 2 Hours Intravenous EVERY 24 HOURS 05/10/24 1208                 Contrast Orders - past 72 hours (72h ago, onward)      None            Interpretation of levels and current regimen:  Vancomycin level is reflective of -600, barely at 404    Has serum creatinine changed greater than 50% in last 72 hours: No    Urine output:  good urine output    Renal Function: Stable    InsightRX  Prediction of Planned New Vancomycin Regimen  Loading dose: N/A  Regimen: 2000 mg IV every 24 hours.  Start time: 11:01 on 05/18/2024  Exposure target: AUC24 (range)400-600 mg/L.hr   AUC24,ss: 458 mg/L.hr  Probability of AUC24 > 400: 85 %  Ctrough,ss: 13.1 mg/L  Probability of Ctrough,ss > 20: 0 %  Probability of nephrotoxicity (Lodise CHANDAN 2009): 8 %    Plan:  Increase Dose to 2000mg IV Q24H. Will give additional 250mg dose today since 1750mg dose is already running currently.   Vancomycin monitoring method: AUC  Vancomycin therapeutic monitoring goal: 400-600 mg*h/L  Pharmacy will check vancomycin levels as appropriate in 1-3 Days.  Serum creatinine levels will be ordered a minimum of twice weekly.    Sissy Nobles, PharmD, BCPS

## 2024-05-18 NOTE — PLAN OF CARE
Goal Outcome Evaluation:      Plan of Care Reviewed With: patient    Overall Patient Progress: no change    A/O x 4, afebrile, denied SOB, pain to left arm managed with PRN meds: Oxycodone, Robaxin . On IV antibiotics , Vancomycin and Ertapenem ,given via right arm PICC. With left long arm cast, wears arm sling, elevated with pillow when in bed, NWB to this arm, seen and examined by Ortho today,no new order placed.  With intermittent nausea, Compazine PRN  ordered  x 3 days . Also with complaint of having loose BM's, C-diff  sample sent, came back negative, Imodium PRN ordered and given x 1 .Good appetite, BG monitored , on SSI. Comfortable at this time, will continue POC    Labs: Potassium: 3.1, 2.7, replacements given, rechecked level :3.9 at 2042H, lab ordered for tomorrow per  per RN managed protocol .       Patient's most recent vital signs are:     Vital signs:  BP: 157/56  Temp: 98.1  HR: 89  RR: 19  SpO2: 92 %     Patient does not have new respiratory symptoms.  Patient does not have new sore throat.  Patient does not have a fever greater than 99.5.

## 2024-05-19 ENCOUNTER — APPOINTMENT (OUTPATIENT)
Dept: PHYSICAL THERAPY | Facility: SKILLED NURSING FACILITY | Age: 59
DRG: 559 | End: 2024-05-19
Attending: INTERNAL MEDICINE
Payer: COMMERCIAL

## 2024-05-19 LAB
GLUCOSE BLDC GLUCOMTR-MCNC: 112 MG/DL (ref 70–99)
GLUCOSE BLDC GLUCOMTR-MCNC: 130 MG/DL (ref 70–99)
GLUCOSE BLDC GLUCOMTR-MCNC: 135 MG/DL (ref 70–99)
GLUCOSE BLDC GLUCOMTR-MCNC: 165 MG/DL (ref 70–99)
GLUCOSE BLDC GLUCOMTR-MCNC: 198 MG/DL (ref 70–99)
HOLD SPECIMEN: NORMAL
POTASSIUM SERPL-SCNC: 3.5 MMOL/L (ref 3.4–5.3)

## 2024-05-19 PROCEDURE — 250N000009 HC RX 250: Performed by: INTERNAL MEDICINE

## 2024-05-19 PROCEDURE — 86140 C-REACTIVE PROTEIN: CPT

## 2024-05-19 PROCEDURE — 250N000013 HC RX MED GY IP 250 OP 250 PS 637: Performed by: INTERNAL MEDICINE

## 2024-05-19 PROCEDURE — 250N000011 HC RX IP 250 OP 636: Performed by: INTERNAL MEDICINE

## 2024-05-19 PROCEDURE — 36592 COLLECT BLOOD FROM PICC: CPT | Performed by: INTERNAL MEDICINE

## 2024-05-19 PROCEDURE — 258N000003 HC RX IP 258 OP 636: Performed by: INTERNAL MEDICINE

## 2024-05-19 PROCEDURE — 250N000013 HC RX MED GY IP 250 OP 250 PS 637

## 2024-05-19 PROCEDURE — 250N000011 HC RX IP 250 OP 636: Mod: JZ | Performed by: INTERNAL MEDICINE

## 2024-05-19 PROCEDURE — 120N000009 HC R&B SNF

## 2024-05-19 PROCEDURE — 84132 ASSAY OF SERUM POTASSIUM: CPT | Performed by: INTERNAL MEDICINE

## 2024-05-19 PROCEDURE — 99309 SBSQ NF CARE MODERATE MDM 30: CPT | Performed by: INTERNAL MEDICINE

## 2024-05-19 RX ORDER — AMLODIPINE BESYLATE 10 MG/1
10 TABLET ORAL DAILY
Status: DISCONTINUED | OUTPATIENT
Start: 2024-05-20 | End: 2024-05-23

## 2024-05-19 RX ORDER — OXYMETAZOLINE HYDROCHLORIDE 0.05 G/100ML
2 SPRAY NASAL 2 TIMES DAILY
Qty: 2 ML | Refills: 0 | Status: COMPLETED | OUTPATIENT
Start: 2024-05-19 | End: 2024-05-19

## 2024-05-19 RX ADMIN — CITALOPRAM HYDROBROMIDE 20 MG: 20 TABLET ORAL at 21:14

## 2024-05-19 RX ADMIN — VANCOMYCIN HYDROCHLORIDE 2000 MG: 1 INJECTION, POWDER, LYOPHILIZED, FOR SOLUTION INTRAVENOUS at 09:51

## 2024-05-19 RX ADMIN — ACETAMINOPHEN 975 MG: 325 TABLET, FILM COATED ORAL at 13:12

## 2024-05-19 RX ADMIN — ASPIRIN 162 MG: 81 TABLET, COATED ORAL at 09:49

## 2024-05-19 RX ADMIN — GABAPENTIN 800 MG: 300 CAPSULE ORAL at 21:14

## 2024-05-19 RX ADMIN — OXYMETAZOLINE HYDROCHLORIDE 2 SPRAY: 0.05 SPRAY NASAL at 21:15

## 2024-05-19 RX ADMIN — MIRABEGRON 25 MG: 25 TABLET, FILM COATED, EXTENDED RELEASE ORAL at 09:50

## 2024-05-19 RX ADMIN — OXYCODONE HYDROCHLORIDE 10 MG: 10 TABLET ORAL at 13:13

## 2024-05-19 RX ADMIN — ACETAMINOPHEN 975 MG: 325 TABLET, FILM COATED ORAL at 21:15

## 2024-05-19 RX ADMIN — Medication 1 CAPSULE: at 09:50

## 2024-05-19 RX ADMIN — Medication 5 ML: at 05:38

## 2024-05-19 RX ADMIN — HEPARIN, PORCINE (PF) 10 UNIT/ML INTRAVENOUS SYRINGE 5 ML: at 16:27

## 2024-05-19 RX ADMIN — INSULIN ASPART 1 UNITS: 100 INJECTION, SOLUTION INTRAVENOUS; SUBCUTANEOUS at 12:45

## 2024-05-19 RX ADMIN — ATORVASTATIN CALCIUM 10 MG: 10 TABLET, FILM COATED ORAL at 21:15

## 2024-05-19 RX ADMIN — INSULIN GLARGINE 5 UNITS: 100 INJECTION, SOLUTION SUBCUTANEOUS at 21:15

## 2024-05-19 RX ADMIN — LUMATEPERONE 84 MG: 42 CAPSULE ORAL at 21:14

## 2024-05-19 RX ADMIN — OXYMETAZOLINE HYDROCHLORIDE 2 SPRAY: 0.05 SPRAY NASAL at 12:45

## 2024-05-19 RX ADMIN — CLONIDINE HYDROCHLORIDE 0.2 MG: 0.1 TABLET ORAL at 21:35

## 2024-05-19 RX ADMIN — CLONIDINE HYDROCHLORIDE 0.2 MG: 0.1 TABLET ORAL at 09:49

## 2024-05-19 RX ADMIN — GABAPENTIN 800 MG: 300 CAPSULE ORAL at 13:12

## 2024-05-19 RX ADMIN — ERTAPENEM SODIUM 1 G: 1 INJECTION, POWDER, LYOPHILIZED, FOR SOLUTION INTRAMUSCULAR; INTRAVENOUS at 16:26

## 2024-05-19 RX ADMIN — INSULIN ASPART 2 UNITS: 100 INJECTION, SOLUTION INTRAVENOUS; SUBCUTANEOUS at 17:39

## 2024-05-19 RX ADMIN — Medication 1 CAPSULE: at 21:15

## 2024-05-19 RX ADMIN — OMEPRAZOLE 20 MG: 20 CAPSULE, DELAYED RELEASE ORAL at 09:49

## 2024-05-19 RX ADMIN — GABAPENTIN 800 MG: 300 CAPSULE ORAL at 09:48

## 2024-05-19 RX ADMIN — ACETAMINOPHEN 975 MG: 325 TABLET, FILM COATED ORAL at 09:50

## 2024-05-19 RX ADMIN — MICONAZOLE NITRATE: 20 POWDER TOPICAL at 09:51

## 2024-05-19 ASSESSMENT — ACTIVITIES OF DAILY LIVING (ADL)
ADLS_ACUITY_SCORE: 33
ADLS_ACUITY_SCORE: 35
ADLS_ACUITY_SCORE: 33
ADLS_ACUITY_SCORE: 33
ADLS_ACUITY_SCORE: 35
ADLS_ACUITY_SCORE: 29
ADLS_ACUITY_SCORE: 35
ADLS_ACUITY_SCORE: 33
ADLS_ACUITY_SCORE: 29
ADLS_ACUITY_SCORE: 33
ADLS_ACUITY_SCORE: 33
ADLS_ACUITY_SCORE: 29
ADLS_ACUITY_SCORE: 29
ADLS_ACUITY_SCORE: 33
ADLS_ACUITY_SCORE: 33
ADLS_ACUITY_SCORE: 35
ADLS_ACUITY_SCORE: 33
ADLS_ACUITY_SCORE: 29
ADLS_ACUITY_SCORE: 33
ADLS_ACUITY_SCORE: 33
ADLS_ACUITY_SCORE: 35
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
## 2024-05-19 NOTE — PLAN OF CARE
Goal Outcome Evaluation:      Plan of Care Reviewed With: patient    Overall Patient Progress: no changeOverall Patient Progress: no change     Pt remains alert and oriented x 4 and able to make her needs known, regular diet thin liquids, takes medications whole with water. Blood suger checks with coverages, assist of one using commode, continent with bowel and bladder, last bowel movement is today ; had a medium amount bowel movement, verbalized left arm pain rated at 9/10, brace on left arm is intact; NWB on that arm, oxycodone and schedule tylenol administered , vancomycin administered with no s/sx of infiltration at the IV site , PICC dressing is changed using sterile procedure. Will continue plan of care

## 2024-05-19 NOTE — PLAN OF CARE
Goal Outcome Evaluation:    Patient is alert and oriented, able to use call light and make needs known, continent for bowel and bladder, regulat diet , pills whole, potassium managed protocol in place. Left arm cast CDI, PRN oxycodone given for pain management. Patient slept most time this shift, repositioned as tolerated, BG check 112. No acute issue, call light in reach, bed in low position, will continue with the cares as planned.      Patient's most recent vital signs are:     Vital signs:  BP: 156/74  Temp: 97.8  HR: 70  RR: 18        Patient does not have new respiratory symptoms.  Patient does not have new sore throat.  Patient does not have a fever greater than 99.5.

## 2024-05-20 ENCOUNTER — APPOINTMENT (OUTPATIENT)
Dept: PHYSICAL THERAPY | Facility: SKILLED NURSING FACILITY | Age: 59
DRG: 559 | End: 2024-05-20
Attending: INTERNAL MEDICINE
Payer: COMMERCIAL

## 2024-05-20 ENCOUNTER — OFFICE VISIT (OUTPATIENT)
Dept: INFECTIOUS DISEASES | Facility: CLINIC | Age: 59
End: 2024-05-20
Attending: STUDENT IN AN ORGANIZED HEALTH CARE EDUCATION/TRAINING PROGRAM
Payer: COMMERCIAL

## 2024-05-20 ENCOUNTER — APPOINTMENT (OUTPATIENT)
Dept: OCCUPATIONAL THERAPY | Facility: SKILLED NURSING FACILITY | Age: 59
DRG: 559 | End: 2024-05-20
Attending: INTERNAL MEDICINE
Payer: COMMERCIAL

## 2024-05-20 VITALS
HEART RATE: 59 BPM | DIASTOLIC BLOOD PRESSURE: 75 MMHG | WEIGHT: 256 LBS | SYSTOLIC BLOOD PRESSURE: 167 MMHG | BODY MASS INDEX: 37.8 KG/M2 | TEMPERATURE: 97.8 F

## 2024-05-20 DIAGNOSIS — Z45.2 PICC (PERIPHERALLY INSERTED CENTRAL CATHETER) IN PLACE: ICD-10-CM

## 2024-05-20 DIAGNOSIS — R79.82 ELEVATED C-REACTIVE PROTEIN (CRP): ICD-10-CM

## 2024-05-20 DIAGNOSIS — Z79.2 ENCOUNTER FOR LONG-TERM (CURRENT) USE OF ANTIBIOTICS: ICD-10-CM

## 2024-05-20 DIAGNOSIS — T84.50XA INFECTION OF TOTAL JOINT PROSTHESIS, INITIAL ENCOUNTER (H): Primary | ICD-10-CM

## 2024-05-20 LAB
ALBUMIN SERPL BCG-MCNC: 3.3 G/DL (ref 3.5–5.2)
ALP SERPL-CCNC: 203 U/L (ref 40–150)
ALT SERPL W P-5'-P-CCNC: 12 U/L (ref 0–50)
ANION GAP SERPL CALCULATED.3IONS-SCNC: 11 MMOL/L (ref 7–15)
AST SERPL W P-5'-P-CCNC: 21 U/L (ref 0–45)
BILIRUB SERPL-MCNC: 0.2 MG/DL
BUN SERPL-MCNC: 10 MG/DL (ref 8–23)
CALCIUM SERPL-MCNC: 8.3 MG/DL (ref 8.8–10.2)
CHLORIDE SERPL-SCNC: 99 MMOL/L (ref 98–107)
CREAT SERPL-MCNC: 0.69 MG/DL (ref 0.51–0.95)
CRP SERPL-MCNC: 42.14 MG/L
CRP SERPL-MCNC: 46.77 MG/L
DEPRECATED HCO3 PLAS-SCNC: 29 MMOL/L (ref 22–29)
EGFRCR SERPLBLD CKD-EPI 2021: >90 ML/MIN/1.73M2
ERYTHROCYTE [DISTWIDTH] IN BLOOD BY AUTOMATED COUNT: 16.9 % (ref 10–15)
GLUCOSE BLDC GLUCOMTR-MCNC: 123 MG/DL (ref 70–99)
GLUCOSE BLDC GLUCOMTR-MCNC: 135 MG/DL (ref 70–99)
GLUCOSE BLDC GLUCOMTR-MCNC: 144 MG/DL (ref 70–99)
GLUCOSE BLDC GLUCOMTR-MCNC: 159 MG/DL (ref 70–99)
GLUCOSE BLDC GLUCOMTR-MCNC: 161 MG/DL (ref 70–99)
GLUCOSE BLDC GLUCOMTR-MCNC: 166 MG/DL (ref 70–99)
GLUCOSE SERPL-MCNC: 159 MG/DL (ref 70–99)
HCT VFR BLD AUTO: 27.7 % (ref 35–47)
HGB BLD-MCNC: 8.8 G/DL (ref 11.7–15.7)
MCH RBC QN AUTO: 26 PG (ref 26.5–33)
MCHC RBC AUTO-ENTMCNC: 31.8 G/DL (ref 31.5–36.5)
MCV RBC AUTO: 82 FL (ref 78–100)
PLATELET # BLD AUTO: 292 10E3/UL (ref 150–450)
POTASSIUM SERPL-SCNC: 3.3 MMOL/L (ref 3.4–5.3)
POTASSIUM SERPL-SCNC: 3.4 MMOL/L (ref 3.4–5.3)
PROT SERPL-MCNC: 6.7 G/DL (ref 6.4–8.3)
RBC # BLD AUTO: 3.38 10E6/UL (ref 3.8–5.2)
SODIUM SERPL-SCNC: 139 MMOL/L (ref 135–145)
WBC # BLD AUTO: 6.1 10E3/UL (ref 4–11)

## 2024-05-20 PROCEDURE — 36592 COLLECT BLOOD FROM PICC: CPT | Performed by: INTERNAL MEDICINE

## 2024-05-20 PROCEDURE — G0463 HOSPITAL OUTPT CLINIC VISIT: HCPCS | Mod: 25 | Performed by: STUDENT IN AN ORGANIZED HEALTH CARE EDUCATION/TRAINING PROGRAM

## 2024-05-20 PROCEDURE — 99215 OFFICE O/P EST HI 40 MIN: CPT | Mod: 24 | Performed by: STUDENT IN AN ORGANIZED HEALTH CARE EDUCATION/TRAINING PROGRAM

## 2024-05-20 PROCEDURE — 84132 ASSAY OF SERUM POTASSIUM: CPT | Performed by: INTERNAL MEDICINE

## 2024-05-20 PROCEDURE — 85027 COMPLETE CBC AUTOMATED: CPT | Performed by: INTERNAL MEDICINE

## 2024-05-20 PROCEDURE — 82374 ASSAY BLOOD CARBON DIOXIDE: CPT | Performed by: INTERNAL MEDICINE

## 2024-05-20 PROCEDURE — 120N000009 HC R&B SNF

## 2024-05-20 PROCEDURE — 250N000013 HC RX MED GY IP 250 OP 250 PS 637: Performed by: INTERNAL MEDICINE

## 2024-05-20 PROCEDURE — 250N000011 HC RX IP 250 OP 636: Performed by: INTERNAL MEDICINE

## 2024-05-20 PROCEDURE — 97116 GAIT TRAINING THERAPY: CPT | Mod: GP

## 2024-05-20 PROCEDURE — 97530 THERAPEUTIC ACTIVITIES: CPT | Mod: GP

## 2024-05-20 PROCEDURE — 258N000003 HC RX IP 258 OP 636: Performed by: INTERNAL MEDICINE

## 2024-05-20 PROCEDURE — 97110 THERAPEUTIC EXERCISES: CPT | Mod: GP

## 2024-05-20 PROCEDURE — 250N000011 HC RX IP 250 OP 636: Mod: JZ | Performed by: INTERNAL MEDICINE

## 2024-05-20 PROCEDURE — 250N000013 HC RX MED GY IP 250 OP 250 PS 637

## 2024-05-20 PROCEDURE — 82040 ASSAY OF SERUM ALBUMIN: CPT | Performed by: INTERNAL MEDICINE

## 2024-05-20 PROCEDURE — 97535 SELF CARE MNGMENT TRAINING: CPT | Mod: GO

## 2024-05-20 PROCEDURE — 86140 C-REACTIVE PROTEIN: CPT | Performed by: INTERNAL MEDICINE

## 2024-05-20 PROCEDURE — 250N000012 HC RX MED GY IP 250 OP 636 PS 637: Performed by: INTERNAL MEDICINE

## 2024-05-20 RX ORDER — SACCHAROMYCES BOULARDII 250 MG
250 CAPSULE ORAL 2 TIMES DAILY
Status: DISCONTINUED | OUTPATIENT
Start: 2024-05-20 | End: 2024-05-24 | Stop reason: HOSPADM

## 2024-05-20 RX ORDER — POTASSIUM CHLORIDE 1500 MG/1
40 TABLET, EXTENDED RELEASE ORAL ONCE
Status: COMPLETED | OUTPATIENT
Start: 2024-05-20 | End: 2024-05-20

## 2024-05-20 RX ORDER — FUROSEMIDE 20 MG
20 TABLET ORAL DAILY
Status: DISCONTINUED | OUTPATIENT
Start: 2024-05-20 | End: 2024-05-21

## 2024-05-20 RX ORDER — POTASSIUM CHLORIDE 1500 MG/1
40 TABLET, EXTENDED RELEASE ORAL DAILY
Status: DISCONTINUED | OUTPATIENT
Start: 2024-05-20 | End: 2024-05-21

## 2024-05-20 RX ORDER — PSYLLIUM SEED (WITH DEXTROSE)
2 POWDER (GRAM) ORAL DAILY
Status: DISCONTINUED | OUTPATIENT
Start: 2024-05-20 | End: 2024-05-24 | Stop reason: HOSPADM

## 2024-05-20 RX ORDER — POTASSIUM CHLORIDE 1500 MG/1
40 TABLET, EXTENDED RELEASE ORAL ONCE
Status: DISCONTINUED | OUTPATIENT
Start: 2024-05-20 | End: 2024-05-20

## 2024-05-20 RX ADMIN — INSULIN ASPART 1 UNITS: 100 INJECTION, SOLUTION INTRAVENOUS; SUBCUTANEOUS at 08:20

## 2024-05-20 RX ADMIN — ACETAMINOPHEN 975 MG: 325 TABLET, FILM COATED ORAL at 20:56

## 2024-05-20 RX ADMIN — OMEPRAZOLE 20 MG: 20 CAPSULE, DELAYED RELEASE ORAL at 08:23

## 2024-05-20 RX ADMIN — Medication 1 CAPSULE: at 08:24

## 2024-05-20 RX ADMIN — GABAPENTIN 800 MG: 300 CAPSULE ORAL at 08:23

## 2024-05-20 RX ADMIN — CITALOPRAM HYDROBROMIDE 20 MG: 20 TABLET ORAL at 21:00

## 2024-05-20 RX ADMIN — VANCOMYCIN HYDROCHLORIDE 2000 MG: 1 INJECTION, POWDER, LYOPHILIZED, FOR SOLUTION INTRAVENOUS at 11:37

## 2024-05-20 RX ADMIN — ACETAMINOPHEN 975 MG: 325 TABLET, FILM COATED ORAL at 08:21

## 2024-05-20 RX ADMIN — ACETAMINOPHEN 975 MG: 325 TABLET, FILM COATED ORAL at 15:52

## 2024-05-20 RX ADMIN — LUMATEPERONE 84 MG: 42 CAPSULE ORAL at 21:06

## 2024-05-20 RX ADMIN — ATORVASTATIN CALCIUM 10 MG: 10 TABLET, FILM COATED ORAL at 21:00

## 2024-05-20 RX ADMIN — FERROUS SULFATE TAB 325 MG (65 MG ELEMENTAL FE) 325 MG: 325 (65 FE) TAB at 08:24

## 2024-05-20 RX ADMIN — INSULIN ASPART 1 UNITS: 100 INJECTION, SOLUTION INTRAVENOUS; SUBCUTANEOUS at 12:39

## 2024-05-20 RX ADMIN — CLONIDINE HYDROCHLORIDE 0.2 MG: 0.1 TABLET ORAL at 08:22

## 2024-05-20 RX ADMIN — MIRABEGRON 25 MG: 25 TABLET, FILM COATED, EXTENDED RELEASE ORAL at 08:22

## 2024-05-20 RX ADMIN — ASPIRIN 162 MG: 81 TABLET, COATED ORAL at 08:22

## 2024-05-20 RX ADMIN — POTASSIUM CHLORIDE 40 MEQ: 1500 TABLET, EXTENDED RELEASE ORAL at 12:39

## 2024-05-20 RX ADMIN — INSULIN GLARGINE 5 UNITS: 100 INJECTION, SOLUTION SUBCUTANEOUS at 21:06

## 2024-05-20 RX ADMIN — Medication 250 MG: at 20:57

## 2024-05-20 RX ADMIN — HEPARIN, PORCINE (PF) 10 UNIT/ML INTRAVENOUS SYRINGE 5 ML: at 16:01

## 2024-05-20 RX ADMIN — AMLODIPINE BESYLATE 10 MG: 10 TABLET ORAL at 08:24

## 2024-05-20 RX ADMIN — POTASSIUM CHLORIDE 40 MEQ: 1500 TABLET, EXTENDED RELEASE ORAL at 20:56

## 2024-05-20 RX ADMIN — GABAPENTIN 800 MG: 300 CAPSULE ORAL at 15:52

## 2024-05-20 RX ADMIN — FUROSEMIDE 20 MG: 20 TABLET ORAL at 12:39

## 2024-05-20 RX ADMIN — GABAPENTIN 800 MG: 300 CAPSULE ORAL at 20:57

## 2024-05-20 RX ADMIN — CLONIDINE HYDROCHLORIDE 0.2 MG: 0.1 TABLET ORAL at 20:57

## 2024-05-20 RX ADMIN — OXYCODONE HYDROCHLORIDE 10 MG: 10 TABLET ORAL at 18:40

## 2024-05-20 RX ADMIN — ERTAPENEM SODIUM 1 G: 1 INJECTION, POWDER, LYOPHILIZED, FOR SOLUTION INTRAMUSCULAR; INTRAVENOUS at 15:52

## 2024-05-20 RX ADMIN — MICONAZOLE NITRATE: 20 POWDER TOPICAL at 08:26

## 2024-05-20 ASSESSMENT — ACTIVITIES OF DAILY LIVING (ADL)
BADLS,_PREVIOUS_FUNCTIONAL_LEVEL: USES DEVICE OR EQUIPMENT
ADLS_ACUITY_SCORE: 29
ADLS_ACUITY_SCORE: 29
ADLS_ACUITY_SCORE: 33
ADLS_ACUITY_SCORE: 29
ADLS_ACUITY_SCORE: 33
ADLS_ACUITY_SCORE: 33
ADLS_ACUITY_SCORE: 29
ADLS_ACUITY_SCORE: 29
ADLS_ACUITY_SCORE: 33
ADLS_ACUITY_SCORE: 29
IADLS,_PREVIOUS_FUNCTIONAL_LEVEL: PARTIAL ASSISTANCE
ADLS_ACUITY_SCORE: 33
ADLS_ACUITY_SCORE: 29
ADLS_ACUITY_SCORE: 33
ADLS_ACUITY_SCORE: 33
ADLS_ACUITY_SCORE: 29

## 2024-05-20 ASSESSMENT — PAIN SCALES - GENERAL: PAINLEVEL: EXTREME PAIN (8)

## 2024-05-20 NOTE — NURSING NOTE
Chief Complaint   Patient presents with    FU Hospitalization     BP (!) 167/75   Pulse 59   Temp 97.8  F (36.6  C) (Oral)   Wt 116.1 kg (256 lb)   BMI 37.80 kg/m    Julissa Cummings MA on 5/20/2024 at 7:01 AM

## 2024-05-20 NOTE — PLAN OF CARE
Patient out from the unit alert and oriented at this time. Vitals and BG completed. Dressed in comfortable cloths. Left cell phone and purse behind. Awaiting for her return.
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
## 2024-05-20 NOTE — PROGRESS NOTES
CLINICAL NUTRITION SERVICES - REASSESSMENT NOTE     Nutrition Prescription    RECOMMENDATIONS FOR MDs/PROVIDERS TO ORDER:  Encourage small frequent meals and snacks    Malnutrition Status:    Moderate malnutrition in the context of acute illness    Recommendations already ordered by Registered Dietitian (RD):  Encouraged intakes, especially of protein foods    Future/Additional Recommendations:  Monitor labs, intakes, and weight trends.     EVALUATION OF THE PROGRESS TOWARD GOALS   Diet: Regular  Intake/[place]: mostly 100%, two 0% on 5/16.    Pt ordering (on average) 1580 kcal and 50 g protein per day per [employer]. With documented and reported intakes, pt is likely meeting 60-70% minimum energy and 50-60% protein needs.     NEW FINDINGS/REVIEW OF SYSTEMS    Nutrition/GI: RD met with pt at bedside. Pt reports poor appetite related to nausea and diarrhea. Encouraged pt to continue ordering high protein foods and eat small frequent meals. Pt states she will likely eat better once home at the end of the week.      Weights: Pt with limited weight history prior to admission, 25 lb (11%) weight gain over 1 month, with 3 lb weight loss in 6 months   05/15/24 1403 116.8 kg (257 lb 9.6 oz) Standing scale   05/08/24 1010 110 kg (242 lb 8 oz) Standing scale   05/02/24 1349 108.6 kg (239 lb 6.7 oz) Bed scale   04/26/24 0920 107.1 kg (236 lb 1.6 oz) Standing scale   04/22/24 1749 111.2 kg (245 lb 3.2 oz) Standing scale     04/20/24 116 kg (255 lb 11.7 oz)     04/19/24 1419 111.3 kg (245 lb 4.8 oz) Standing scale   04/13/24 1600 105.5 kg (232 lb 9.4 oz) --   04/10/24 0930 99 kg (218 lb 4.1 oz) --   01/31/24 1357 115.1 kg (253 lb 12 oz)     11/30/23 1602 118 kg (260 lb 2.3 oz)     10/23/23 112 kg (247 lb)        Skin: surgical incisions     Labs reviewed   05/13/24 05:44 05/16/24 06:01 05/17/24 05:48 05/17/24 13:04 05/17/24 20:42 05/19/24 05:40 05/20/24 09:05   Sodium 139 141     139   Potassium 3.8 3.5 3.1 (L) 2.7 (L) 3.9  3.5 3.3 (L)   Urea Nitrogen 9.1 10.5     10.0   Creatinine 0.87 0.76     0.69   CRP Inflammation 32.52 (H) 45.70 (H)    46.77 (H) 42.14 (H)   Glucose 130 (H) 147 (H)     159 (H)      Medications reviewed: ertapenem, ferrous sulfate, insulin (novolog, lantus), culturell, omeprazole, vancomycin, oxycodone PRN  IV Fluids over last 7 days: No    MALNUTRITION  % Intake: < 75% for  >7 days (moderate)  % Weight Loss: Weight loss does not meet criteria  Subcutaneous Fat Loss: None observed  Muscle Loss: Temporal mild and Thoracic region (clavical, acromium bone, deltoid, trapezius, pectoral) mild  Fluid Accumulation/Edema: Unable to assess  Malnutrition Diagnosis: Moderate malnutrition in the context of acute illness    Previous Goals   Patient to consume % of nutritionally adequate meal trays TID, or the equivalent with supplements/snacks.  Evaluation: Not met    Previous Nutrition Diagnosis  Inadequate protein intake related to undesirable food/beverage choices as evidenced by documented intakes.     Evaluation: No longer applicable, nutrition diagnosis changed below    CURRENT NUTRITION DIAGNOSIS  Inadequate oral intake related to poor appetite, nausea, diarrhea as evidenced by documented intakes.     INTERVENTIONS  Implementation  Nutrition education for nutrition relationship to health/disease     Goals  Patient to consume % of nutritionally adequate meal trays TID, or the equivalent with supplements/snacks.    Monitoring/Evaluation  Progress toward goals will be monitored and evaluated per protocol.    Leanne Miguel MS, RDN, LD  TCU/OB/Ortho Clinical Dietitian  Available via phone and Vocera  Phone: [phone]  Vocera: TCU Clinical Dietitian  Weekend/Holiday Vocera: Weekend Holiday Clinical Dietitian [Multi Site Groups]

## 2024-05-20 NOTE — PROGRESS NOTES
"   05/20/24 1547   Appointment Info   Signing Clinician's Name / Credentials (OT) Keira Molinageenacheri OTR/L CBIS   Rehab Comments (OT) OT: re certification   Living Environment   People in Home alone   Current Living Arrangements apartment   Home Accessibility stairs to enter home   Number of Stairs, Main Entrance 6   Transportation Anticipated   (unclear)   Living Environment Comments OT: Pt lives in [place] in a lower level apartment. Pt lives alone, but her fiance Arelis apparently is no longer avail to move in or assist and it is unclear to this writer if he is still in contact w/ pt or not, No pets. pt not working and does not drive, apt has std ht toilet and no grabbars but uses the armrest from ext tub bench to assist w/ standing from toilet, reports having tub/shower combo w/ ext tub bench but no grabbars, reports indep w/ dressing/transfers/bathing and cooking prior to admit but admits she has had mulitple falls (7) in past 6month and used 4ww at home but also owns w/c , provided conflicting info to OT and PT, reported to PT she uses w/c in house but to OT she only uses it for outdoor mob because her apt is small . unclear which is accurate, pt reports she has assist from [facility] for a \"\" who also does laundry and at time picks up pt's groceries, pt reports getting help w/ meds from [facility] but sets them up herself and manages her own finances. reports having hosp bed at home .pt has recent hx of cog deficits so pt may not be reliable .   Self-Care   Usual Activity Tolerance fair   Current Activity Tolerance poor   Fall history within last six months yes   Number of times patient has fallen within last six months 7   General Information   Onset of Illness/Injury or Date of Surgery 04/10/24   Referring Physician Nithya Ortiz MD   Patient/Family Therapy Goal Statement (OT) get home when she is better   Additional Occupational Profile Info/Pertinent History of Current Problem per chart " review:Mitali Robles is a 59 year old female admitted on 4/10/2024. She has hx of bipolar disorder, PTSD, MDD, on high dose benzodiazepines, hyperlipidemia, hypertension, and type 2 diabetes on insulin with recurrent left elbow prosthetic joint infection s/p explantation of L total elbow arthroplasty admitted to ortho post op.,dx:L elbow prosthetic joint infection s/p explantation of L total elbow arthroplasty   Existing Precautions/Restrictions fall;weight bearing  (NWB LUE, casted)   Left Upper Extremity (Weight-bearing Status) non weight-bearing (NWB)  (no ROM L elb)   Right Upper Extremity (Weight-bearing Status) full weight-bearing (FWB)   Left Lower Extremity (Weight-bearing Status) full weight-bearing (FWB)   Right Lower Extremity (Weight-bearing Status) full weight-bearing (FWB)   Cognitive Status Examination   Cognitive Status Comments pt oriented x4 and follows directiions but demo impaired prob solving and impaired anticipation of unsafe situation,pt presents conflicting personal/social hx info indicating pt may have memory deficits, per chart review pt has recent cog deficits due to toxic encephalopathy, recommend cog,assessments   Visual Perception   Visual Impairment/Limitations corrective lenses full-time   Posture   Posture forward head position;protracted shoulders   Range of Motion Comprehensive   Comment, General Range of Motion R dom, L UE casted and elb NO ROM, LUE NWB, Rue AROM WFL   Strength Comprehensive (MMT)   Comment, General Manual Muscle Testing (MMT) Assessment generalized weakness, LUE limited function due to casted/wt bearing/ROM restirictions and pain, , deconditioned   Transfers   Transfer Comments see gg codes   Balance   Balance Comments impaired dynamic standing bal   Activities of Daily Living   Additional Documentation   (see gg codes)   Clinical Impression   Criteria for Skilled Therapeutic Interventions Met (OT) Yes, treatment indicated   OT Diagnosis decreased indep w/  adls/iadls/mob   OT Problem List-Impairments impacting ADL problems related to;activity tolerance impaired;balance;cognition;strength;pain;post-surgical precautions  (LUE NWB/No ROM/limited functional use)   Assessment of Occupational Performance 3-5 Performance Deficits   Identified Performance Deficits drg, transfers, bed mob, bathing, toileting,   Planned Therapy Interventions (OT) ADL retraining;balance training;bed mobility training;cognition;strengthening;transfer training;progressive activity/exercise   Clinical Decision Making Complexity (OT) problem focused assessment/low complexity   Risk & Benefits of therapy have been explained evaluation/treatment results reviewed;care plan/treatment goals reviewed;risks/benefits reviewed;current/potential barriers reviewed;participants voiced agreement with care plan;participants included;patient   Clinical Impression Comments OT: pt functioning below baseline, iniital plan was to return to [place] to her apt w/ support from [facility], however during this TCU stay it became clear that [employer] was not willing to attend therapy for training and does not appear to plan to move in w/ pt or be avail to assist pt, it is unclear to this writer if pt continues to be in contract w/ him or not, so pt needs to be modified indep w/ adls and mob to d/c safely home to live alone in apt, during TCU stay pt had medical issues including illness and extended issues w/ L UE . OT recommended to cont to progress pt w/ level of indep in adls/iADLs and mob and recommendations for any Additional AE needed for safer discharge.   Therapy Certification   Start of Care Date 04/23/24   Certification date from 05/20/24   Certification date to 06/18/24   Medical Diagnosis L elbow prosthetic joint infection s/p explantation of L total elbow arthroplasty   OT Goals   Therapy Frequency (OT) 3 times/week   OT Predicted Duration/Target Date for Goal Attainment 05/27/24   OT Goals Hygiene/Grooming;Upper Body  Dressing;Lower Body Dressing;Upper Body Bathing;Lower Body Bathing;Bed Mobility;Transfers;Toilet Transfer/Toileting;Cognition   OT: Hygiene/Grooming supervision/stand-by assist   OT: Upper Body Dressing within precautions;Modified independent   OT: Lower Body Dressing within precautions;Modified independent   OT: Upper Body Bathing Minimal assist   OT: Lower Body Bathing Minimal assist;using adaptive equipment;with precautions   OT: Bed Mobility within precautions;Modified independent   OT: Transfer with assistive device;Modified independent  (w/ assist device)   OT: Toilet Transfer/Toileting using adaptive equipment;within precautions;Modified independent  (sba-cga w/ assist device)   OT: Goal 1 Pt. to be SBA with dynamic standing for functional ADL tasks with no concerns of safety   OT Discharge Planning   OT Plan OT: precautions: cog deficits, LUE NWB, L elb NO ROM, falls, Rx: full body dressing, transfers, kitchen mob/simple meal prep priority   OT Discharge Recommendation (DC Rec) home with assist;home with home care occupational therapy   Post Acute Settings Only   What unit is patient on? TCU   Bed Mobility: Turning side to side/Roll Left and Right   Describe Performance OT: modified indep   Bed Mobility: Sit to lying   Describe Performance OT: modified indep   Bed Mobility: Lying to sitting on the side of bed   Describe Performance OT: sba- modiifed  w/ HOB elevated and bed rail   Transfers: Sit to Stand   Describe Performance OT: sba, bed rail or grabbar   Transfers: Chair/Bed transfers   Describe Performance OT; sba,   Picking up Object - Ability to bend/stoop while standing.   Reason Not Done Safety concerns   Upper Body Dressing - Ability to dress/undress above the waist, including fasteners   Describe Performance OT: sba, increased time/effort, sling for cast   Lower Body Dressing - Ability to dress/undress below the waist, includes fasteners   Describe Performance OT: variable, best perforance sba w/  increased time/effort   Lower Body Dressing (Putting On/Taking-Off Footwear)   Describe Performance oT: min A w/ one handed technique   Toileting Hygiene - Ability to maintain perineal hygiene and adjust clothes   Describe Performance OT; setup/sba   Toilet transfer - Ability to get on and off a toilet or commode   Describe Performance oT: sba  w/ toilet and grabbar on R   Personal Hygiene - Ability to maintain personal hygiene (combing hair, shaving, apply makeup wash/dry face/hands.   Describe Performance oT: variable from setup to min A   Oral Hygiene - Ability to use suitable items to clean teeth.   Describe Performance OT; modified indep from sitting, sba from standing   Shower/bathe self - Ability to bathe self, includes washing and drying self   Describe Performance OT: sponge bath min A- mod assist  overall for thoroughness, increased time/effort   Tub/Shower Transfer - Ability to get in and out of the tub/shower   Describe Performance OT: NT, sponge bath due to shoulder pain and  heavy cast wearing sling continuously for support

## 2024-05-20 NOTE — PLAN OF CARE
Physical Therapy Re-certification, continued plan of care     05/20/24 1015   Appointment Info   Signing Clinician's Name / Credentials (PT) Maddison Jenkins, PT, DPT   Living Environment   People in Home alone   Current Living Arrangements apartment   Number of Stairs, Main Entrance 6   Stair Railings, Main Entrance railings on both sides of stairs   Transportation Anticipated agency   Living Environment Comments OT: Pt lives in [place] in a lower level apartment. Pt lives alone, with care assistant who helps her run errands and with laundry. Pt not working and does not drive, apt has std ht toilet and no grabbars but uses the armrest from ext tub bench to assist w/ standing from toilet, reports having tub/shower combo w/ ext tub bench but no grab bars, reports indep w/ dressing/transfers/bathing and cooking prior to admit but admits she has had mulitple falls (7) in past 6 month and used 4ww at home but also owns w/c , provided conflicting info to OT and PT, reported to PT she uses w/c in house but to OT she only uses it for outdoor mob because her apt is small. pt reports getting help w/ meds from [facility] but sets them up herself and manages her own finances. reports having hosp bed at home .pt has recent hx of cog deficits so pt may not be reliable .   Self-Care   Usual Activity Tolerance fair   Current Activity Tolerance poor   Equipment Currently Used at Home walker, rolling;shower chair;wheelchair, manual   Fall history within last six months yes   Number of times patient has fallen within last six months 7   Activity/Exercise/Self-Care Comment When falls increased, pt went from walking to primarily mobilizing in wheelchair and walking short distance to toilet, reports primarily sedentary lifestyle.   Post-Acute Assessment Only   Post-Acute Functional Assessment See below   Previous Level of Function/Home Environm   Bathing, Previous Functional Level uses device or equipment   Grooming, Previous Functional  Level independent   Dressing, Previous Functional Level independent   Eating/Feeding, Previous Functional Level independent   Toileting, Previous Functional Level independent   BADLs, Previous Functional Level uses device or equipment   IADLs, Previous Functional Level partial assistance   Bed Mobility, Previous Functional Level independent   Transfers, Previous Functional Level independent   Household Ambulation, Previous Functional Level uses device or equipment   Stairs, Previous Functional Level uses device or equipment   Community Ambulation, Previous Functional Level uses device or equipment   Functional Cognition, Previous Functional Level Pt has memory deficits, causing decreased carry over of learning in therapy sessions   Previous Level of Function Pt intermittently used walker vs wheelchair in home, depending on day and time of day.   General Information   Onset of Illness/Injury or Date of Surgery 04/10/24   Referring Physician Dr. Berry   Patient/Family Therapy Goals Statement (PT) Go home   Pertinent History of Current Problem (include personal factors and/or comorbidities that impact the POC) PMH including fracture dislocation of the left elbow c/b hardware failure and conversion to TEA in 2021 c/b recurrent infections and s/p multiple I&Ds (last 2/2024) who is now s/p explant of TEA and placement of antibiotic spacer on 4/10.  PMH with PTSD.   Existing Precautions/Restrictions fall   Weight-Bearing Status - LUE nonweight-bearing   Heart Disease Risk Factors Lack of physical activity;High blood pressure;Diabetes;Medical history;Overweight   Cognition   Affect/Mental Status (Cognition) WFL   Follows Commands (Cognition) WFL   Memory Deficit (Cognition)   (patient is forgetful and intermittently needs safety cues with mobility)   Integumentary/Edema   Integumentary/Edema Comments LUE cast, NWB   Posture    Posture Forward head position   Range of Motion (ROM)   ROM Comment Pt with pes planus B  feet. Functional L knee valgus (decreasing from previously this admission)   Strength (Manual Muscle Testing)   Strength (Manual Muscle Testing) Deficits observed during functional mobility   Strength Comments Grossly 4/5 BLEs   Balance   Balance other (describe)   Balance Comments Dynamic balance improving. Pt unable to complete JENSEN 5/16, but anticipated score 39/56, improved from 35/56.   Coordination   Coordination no deficits were identified   Muscle Tone   Muscle Tone no deficits were identified   Clinical Impression   Criteria for Skilled Therapeutic Intervention Yes, treatment indicated   PT Diagnosis (PT) Impaired functional mobility, decreased endurance   Influenced by the following impairments LUE NWB, strength deficits, balance deficits, decreased cardiovascular endurance   Functional limitations due to impairments transfers, gait, stairs, bed mobility   Clinical Presentation (PT Evaluation Complexity) stable   Clinical Presentation Rationale Medical stability   Clinical Decision Making (Complexity) low complexity   Planned Therapy Interventions (PT) balance training;bed mobility training;gait training;groups;home exercise program;neuromuscular re-education;patient/family education;stair training;strengthening;stretching;transfer training;wheelchair management/propulsion training;progressive activity/exercise;risk factor education;home program guidelines   Risk & Benefits of therapy have been explained evaluation/treatment results reviewed;care plan/treatment goals reviewed;risks/benefits reviewed;current/potential barriers reviewed;participants voiced agreement with care plan;participants included;patient   Clinical Impression Comments PT: Pt is a 59 year old woman , now with explant total Elbow prosthesis, and NWB LUE. This admission, pt's fracture has progressed,  now in cast, with ortho not recommending surgery at this time. Pt has been consistently participating in physical therapy during this  admission and is making progress with her balance and functional strength. Goals updated.   Therapy Certification   Start of care date 04/23/24   Certification date from 05/20/24   Certification date to 06/18/24   Medical Diagnosis L prosthetic elbow infection, physical deconditioning.   Physical Therapy Goals   PT Frequency 5x/week   PT Predicted Duration/Target Date for Goal Attainment 05/27/24   PT: Bed Mobility Modified independent   PT: Transfers Modified independent;Sit to/from stand;Bed to/from chair;Assistive device   PT: Gait 10 feet;Modified independent   PT: Stairs 6 stairs;Modified independent;Rail on right   PT: Goal 1 Pt able to perform a car tx with sba   PT: Goal 2 Pt  able to perform a HEP Danielle for LE strengthening for improved function at home.   PT: Goal 3 Navigates WC with modified independence and safe parts management in preparation for transfers without cues 150'   Therapeutic Procedure/Exercise   Ther. Procedure: strength, endurance, ROM, flexibillity Minutes (44700) 12   Symptoms Noted During/After Treatment fatigue   Treatment Detail/Skilled Intervention PT: with RUE support on // bar: side stepping x 12' each direction x 2 with seated rest, seated: red theraband resisted marches x 50   Therapeutic Activity   Therapeutic Activities: dynamic activities to improve functional performance Minutes (84087) 10   Treatment Detail/Skilled Intervention PT: attempts to push up from wheelchair with only one brake engaged. Cue to engage L brake, with pt then able to stand, pushing up with RUE with supervision. Good carry over for continued transfers into rest of session. Stands in // bars x 2 with supervision using RUE, sits with good eccentric control. Mod I sit to supine end of session   Gait Training   Gait Training Minutes (76524) 15   Treatment Detail/Skilled Intervention stair navigation 6 with R rail x 3 trials and 3 x 1 with seated rest breaks in between due to increased WOB with HR in 70s and  SpO2 95%. descends at 45 degree angle facing R rail, Step to pattern throughout, Once 3rd trial during ascent, R knee (lead leg) gradually flexes in stance, decreasing L foot clearance - PT provides CGA at this time. Limited to 6 due to increased work of breathing, final trial limited to 3 due to progression of fatigue   Stair Railings present on right side   Level of [place] (Stairs) stand-by assist   Wheelchair Managment/Training   Wheelchair Mgmt/Training Minutes (29168) 8   Treatment Detail/Skilled Intervention 110' with BLEs mod I x 2   PT Discharge Planning   PT Plan PT: short distance ambulation training to walk to her bathroom, balance drills, LE strengthening. stairs   PT Discharge Recommendation (DC Rec) home with assist   PT Rationale for DC Rec Pt refuses long term care and is progressing in her safety and independence with mobility, has  who helps with laundry and shopping   PT Brief overview of current status supervision for transfers, supervision to contact guard for stair navigation   PT Equipment Needed at Discharge cane, straight  (likely needs cane for short ambulation distances, continue to assess)   Total Session Time   Timed Code Treatment Minutes 45   Total Session Time (sum of timed and untimed services) 45   PT - Transitional Care Unit Time   Individual Time (minutes) - PT 45   Group Time (minutes) - PT 0   Concurrent Time (minutes) - PT 0   Co-Treatment Time (minutes) - PT 0   TCU Total Session Time (minutes) - PT 45   TCU Daily Total Session Time   PT TCU Daily Total Session Time 45   Rehab TCU Daily Total Session Time 84   Bed Mobility: Turning side to side/Roll Left and Right   Patient Performance Independent   Describe Performance Mod I with rail   Bed Mobility: Sit to lying   Patient Performance Independent   Describe Performance Mod I with rail   Bed Mobility: Lying to sitting on the side of bed   Patient Performance Independent   Describe Performance mod I with rail    Transfers: Sit to Stand   Patient Performance Supervision or verbal cues   Describe Performance with RUE support   Transfers: Chair/Bed transfers   Patient Performance Supervision or verbal cues   Describe Performance Continues to have small step length, but bilateral knee stability in stance improving   Walk 10 Feet on uneven surfaces - Ability to walk on various surfaces.   Reason Not Done Safety concerns   Walk 50 Feet with Two Turns - Ability to walk at least 50 feet.   Reason Not Done Safety concerns   Walk 150 Feet - Ability to walk 150 feet   Reason Not Done Safety concerns   Wheel 50 Feet - Ability to move wheelchair/scooter   Patient Performance Independent   Wheel 150 Feet - Ability to move wheelchair/scooter   Patient Performance Independent   1 Step (curb) - Ability to go up/down 1 step/curb.   Patient Performance Supervision or verbal cues   4 Steps - Ability to go up/down steps.   Patient Performance Steadying Assist   12 Steps - Ability to go up/down steps.   Reason Not Done Safety concerns   Describe Performance increased work of breathing

## 2024-05-20 NOTE — PLAN OF CARE
Patient alert and oriented. Slept through this shift. Denied any pain or discomfort. Voiding adequately without concern. No SOB or chest pain voiced. Up and dressed up this morning ready for appointment. Vitals stable. No care concerns voiced at this time. Call light within reach. Continue with plan of care.

## 2024-05-20 NOTE — PLAN OF CARE
Goal Outcome Evaluation:  [id number]  Pt. Is A/O x 4, able to make needs known. Call light within reach. Pt. Denies SOB, CP, N/V.No acute changes on this time. Continue POC.

## 2024-05-20 NOTE — PROGRESS NOTES
VENICE received a vm from Jeni Foster at [facility] on [address]. [phone]  They have TCU.  She said they don't take [facility] insurance.  SW called her back and said what about LTC?  She said they only take LTC from their TCU.     SW met with pt and PT.  Pt said she is NOT going into placement.  PT said pt is looking much better.  Pt said she is doing much better that before.     VENICE left a vm for  Lela @ [facility] [phone]. Telling her pt will only be going home.  SW can't find other placement and pt doesn't want to go to placement.     SW met with pt to let her know she won't be going this week.  She said ID told her she should see ID in [place].  They don't need to see her again.  We thought she had an othro appt on the 6th.  That is not the case.  She has one on the 16th.  She has a hand appt on the 29th.  She said she wants to see the hand eval on the same day as othro.  She doesn't want to come down here twice.  Too far away.  SW asked her why not see doctors up in [place].  She said nobody would perform surgery on her in [place] or even [place].   We need to make sure she can use a ABX ball unit with one hand.  Then she can go home.  She would rather go home sooner than later. We will talk to the doctors and find out when discharge can be. VENICE asked her if person whom borrowed $400 gave back the money?  She said yes, they put it directly back into her back account.  They wired it back.     Mitali Lara, FRANCISCO J   [facility], Transitional Care Unit   Social Work   [address]th [place] [address]  (ph) [phone]
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
## 2024-05-21 ENCOUNTER — TELEPHONE (OUTPATIENT)
Dept: ORTHOPEDICS | Facility: CLINIC | Age: 59
End: 2024-05-21
Payer: COMMERCIAL

## 2024-05-21 ENCOUNTER — APPOINTMENT (OUTPATIENT)
Dept: PHYSICAL THERAPY | Facility: SKILLED NURSING FACILITY | Age: 59
DRG: 559 | End: 2024-05-21
Attending: INTERNAL MEDICINE
Payer: COMMERCIAL

## 2024-05-21 LAB
GLUCOSE BLDC GLUCOMTR-MCNC: 121 MG/DL (ref 70–99)
GLUCOSE BLDC GLUCOMTR-MCNC: 129 MG/DL (ref 70–99)
GLUCOSE BLDC GLUCOMTR-MCNC: 147 MG/DL (ref 70–99)
GLUCOSE BLDC GLUCOMTR-MCNC: 150 MG/DL (ref 70–99)
GLUCOSE BLDC GLUCOMTR-MCNC: 155 MG/DL (ref 70–99)
HOLD SPECIMEN: NORMAL
POTASSIUM SERPL-SCNC: 3.5 MMOL/L (ref 3.4–5.3)
VANCOMYCIN SERPL-MCNC: 13.8 UG/ML

## 2024-05-21 PROCEDURE — 250N000013 HC RX MED GY IP 250 OP 250 PS 637: Performed by: INTERNAL MEDICINE

## 2024-05-21 PROCEDURE — 99310 SBSQ NF CARE HIGH MDM 45: CPT | Performed by: INTERNAL MEDICINE

## 2024-05-21 PROCEDURE — 250N000011 HC RX IP 250 OP 636: Performed by: INTERNAL MEDICINE

## 2024-05-21 PROCEDURE — 80202 ASSAY OF VANCOMYCIN: CPT | Performed by: INTERNAL MEDICINE

## 2024-05-21 PROCEDURE — 250N000011 HC RX IP 250 OP 636: Mod: JZ | Performed by: INTERNAL MEDICINE

## 2024-05-21 PROCEDURE — 97116 GAIT TRAINING THERAPY: CPT | Mod: GP

## 2024-05-21 PROCEDURE — 36415 COLL VENOUS BLD VENIPUNCTURE: CPT | Performed by: INTERNAL MEDICINE

## 2024-05-21 PROCEDURE — 97530 THERAPEUTIC ACTIVITIES: CPT | Mod: GP

## 2024-05-21 PROCEDURE — 120N000009 HC R&B SNF

## 2024-05-21 PROCEDURE — 258N000003 HC RX IP 258 OP 636: Performed by: INTERNAL MEDICINE

## 2024-05-21 PROCEDURE — 250N000013 HC RX MED GY IP 250 OP 250 PS 637

## 2024-05-21 PROCEDURE — 36592 COLLECT BLOOD FROM PICC: CPT | Performed by: INTERNAL MEDICINE

## 2024-05-21 PROCEDURE — 84132 ASSAY OF SERUM POTASSIUM: CPT | Performed by: INTERNAL MEDICINE

## 2024-05-21 RX ORDER — LOSARTAN POTASSIUM 25 MG/1
25 TABLET ORAL DAILY
Status: DISCONTINUED | OUTPATIENT
Start: 2024-05-21 | End: 2024-05-24 | Stop reason: HOSPADM

## 2024-05-21 RX ORDER — ONDANSETRON 4 MG/1
4 TABLET, ORALLY DISINTEGRATING ORAL EVERY 6 HOURS PRN
Status: DISCONTINUED | OUTPATIENT
Start: 2024-05-21 | End: 2024-05-24 | Stop reason: HOSPADM

## 2024-05-21 RX ORDER — SPIRONOLACTONE 25 MG/1
25 TABLET ORAL DAILY
Status: DISCONTINUED | OUTPATIENT
Start: 2024-05-22 | End: 2024-05-23

## 2024-05-21 RX ADMIN — LUMATEPERONE 84 MG: 42 CAPSULE ORAL at 19:07

## 2024-05-21 RX ADMIN — ACETAMINOPHEN 975 MG: 325 TABLET, FILM COATED ORAL at 08:53

## 2024-05-21 RX ADMIN — OMEPRAZOLE 20 MG: 20 CAPSULE, DELAYED RELEASE ORAL at 08:52

## 2024-05-21 RX ADMIN — ATORVASTATIN CALCIUM 10 MG: 10 TABLET, FILM COATED ORAL at 21:48

## 2024-05-21 RX ADMIN — ACETAMINOPHEN 975 MG: 325 TABLET, FILM COATED ORAL at 19:06

## 2024-05-21 RX ADMIN — INSULIN ASPART 1 UNITS: 100 INJECTION, SOLUTION INTRAVENOUS; SUBCUTANEOUS at 12:40

## 2024-05-21 RX ADMIN — OXYCODONE HYDROCHLORIDE 10 MG: 10 TABLET ORAL at 16:46

## 2024-05-21 RX ADMIN — CLONIDINE HYDROCHLORIDE 0.2 MG: 0.1 TABLET ORAL at 08:52

## 2024-05-21 RX ADMIN — HEPARIN, PORCINE (PF) 10 UNIT/ML INTRAVENOUS SYRINGE 5 ML: at 16:47

## 2024-05-21 RX ADMIN — Medication 5 ML: at 01:43

## 2024-05-21 RX ADMIN — GABAPENTIN 800 MG: 300 CAPSULE ORAL at 08:52

## 2024-05-21 RX ADMIN — GABAPENTIN 800 MG: 300 CAPSULE ORAL at 19:06

## 2024-05-21 RX ADMIN — GABAPENTIN 800 MG: 300 CAPSULE ORAL at 13:42

## 2024-05-21 RX ADMIN — MIRABEGRON 25 MG: 25 TABLET, FILM COATED, EXTENDED RELEASE ORAL at 08:53

## 2024-05-21 RX ADMIN — ASPIRIN 162 MG: 81 TABLET, COATED ORAL at 08:52

## 2024-05-21 RX ADMIN — Medication 250 MG: at 21:48

## 2024-05-21 RX ADMIN — LOSARTAN POTASSIUM 25 MG: 25 TABLET, FILM COATED ORAL at 13:42

## 2024-05-21 RX ADMIN — CITALOPRAM HYDROBROMIDE 20 MG: 20 TABLET ORAL at 21:48

## 2024-05-21 RX ADMIN — VANCOMYCIN HYDROCHLORIDE 2000 MG: 1 INJECTION, POWDER, LYOPHILIZED, FOR SOLUTION INTRAVENOUS at 09:01

## 2024-05-21 RX ADMIN — Medication 5 ML: at 07:19

## 2024-05-21 RX ADMIN — POTASSIUM CHLORIDE 40 MEQ: 1500 TABLET, EXTENDED RELEASE ORAL at 08:53

## 2024-05-21 RX ADMIN — Medication 250 MG: at 08:53

## 2024-05-21 RX ADMIN — FUROSEMIDE 20 MG: 20 TABLET ORAL at 08:53

## 2024-05-21 RX ADMIN — ERTAPENEM SODIUM 1 G: 1 INJECTION, POWDER, LYOPHILIZED, FOR SOLUTION INTRAMUSCULAR; INTRAVENOUS at 16:42

## 2024-05-21 RX ADMIN — ONDANSETRON 4 MG: 4 TABLET, ORALLY DISINTEGRATING ORAL at 13:42

## 2024-05-21 RX ADMIN — Medication 2 WAFER: at 08:56

## 2024-05-21 RX ADMIN — OXYCODONE HYDROCHLORIDE 10 MG: 10 TABLET ORAL at 09:01

## 2024-05-21 RX ADMIN — ACETAMINOPHEN 975 MG: 325 TABLET, FILM COATED ORAL at 13:41

## 2024-05-21 RX ADMIN — AMLODIPINE BESYLATE 10 MG: 10 TABLET ORAL at 08:52

## 2024-05-21 ASSESSMENT — ACTIVITIES OF DAILY LIVING (ADL)
ADLS_ACUITY_SCORE: 33

## 2024-05-21 NOTE — TELEPHONE ENCOUNTER
Other: Pt had a ID appointment yesterday.  TCU needs clarification on the how to administer her Antibiotics should continue through IV as currently written until seeing ortho on 06/17? Or Can she be transitioned to oral antibiotics sooner?  Please contact Emma to advise  on best practice.  Thank you.     Could we send this information to you in [employer] or would you prefer to receive a phone call?:   Patient would prefer a phone call   Okay to leave a detailed message?: Yes at [phone]

## 2024-05-21 NOTE — PROGRESS NOTES
"             Status at Admission - 4/23/24 (initiated but limited due to high BPs), 4/24 (completed) Status at Last Update - 5/14/2024 Current Status - 5/21/2024   Bed Mobility Limited in rolling to L due to LUE NWB, Mod I rolling to R w/BR  Sup <> sit, min A, BR Rolling and supine to/from sit with hospital bed is supervision to ensure safe positioning of LUE   Pt needs min A for supine to sit to L without bed rail  Independent with bed mobility - good consideration for L cast      Transfer STS/SPT from raised bed, SEC with min A to stand then mod A to control descent with sitting. Sit to stand with supervision, needing frequent verbal cues to reach back for chair arm rest, to ensure safety of L arm positioning as to not bump it on chair arm rest, and for eccentric control on lowering  Pivot transfers supervision to contact guard due to decreased L knee stability in stance, decreased L step length, and decreased L foot clearance impairing balance throughout Stands and pivots independently - able without UE support      Gait Amb up to 70ft with SEC, w/c follow for safety, repeated min A due to impaired balance, fatigue.  Ambulation has been limited to pivot transfers since previous update due to continued balance deficits despite improved strength and consistent safety education  Also concern for pt living alone and refusing discharge elsewhere, high fall risk, and more safe to function at wheelchair level  Goal to re-incorporate short ambulation distances to walk into bathroom due to wheelchair not fitting Ambulating up to 40' with/without cane with supervision, and 195' with cane with wheelchair follow with LOB when conversing with staff to her right in hallway, but able to self recover without assist   Stairs Ascend/descend 3 - 6\" steps, step to pattern, rail on R when ascending, varies from CGA to mod A for balance. Unable to perform more due to level of fatigue 6 6\" steps with R rail, min A for balance and force " "production first step, then able to proceed with contact guard via step to pattern 6 6\" steps with R rail with supervision with step to pattern and good stability   Wheelchair Propulsion Not tried due to level of fatigue. Would need to use BLE due to NWB LUE  Mod I 100' + with BLEs and RUE as needed, good brake engagement Mod I 105' with RUE and BLEs   Outcomes Measures JENSEN on 4/26: 35/56  Needs reassessment Needs reassessment            Assessment of Progress Since Last Update: Overall functional strength and balance significantly improved. Patient now grossly transferring independently and ambulating with supervision using single point cane.   Barriers to Progress: Patient memory for safety considerations with mobility improving. Continuing to progress strength and dynamic balance   Proposed Solutions to Improve Barriers: Continue to challenge static and dynamic balance deficits, continued LE strengthening, endurance with mobility   Justification for Continued Therapy Services: Patient lives alone and will not get therapy services at discharge. Needs to be independent and safe with functional mobility   Additional Considerations for Safe and Efficient Discharge: Patient will need single point cane at discharge and will need ride home                  "

## 2024-05-21 NOTE — PLAN OF CARE
Goal Outcome Evaluation:      Plan of Care Reviewed With: patient    Overall Patient Progress: no change      Pt is alert and oriented x 4, can make needs known. Iv antibiotic administered via right upper single lumen PICC, pt tolerated well. Potassium replaced twice with oral tabs, see lab results next lab draw at 0130 H. Pt continent of bowel and bladder utilizes bed side commode. Stand by assist of 1 with transfer. Left arm long cast intact. Pain managed by Prn Oxycodone , given x 1. Pt denies SOB, CP. Complained of nausea intermittently. No acute issue call light within reach. Continue with plan of care.           Patient's most recent vital signs are:     Vital signs:  BP: 159/62  Temp: 98.3  HR: 62  RR: 18  SpO2: 98 %     Patient does not have new respiratory symptoms.  Patient does not have new sore throat.  Patient does not have a fever greater than 99.5.

## 2024-05-21 NOTE — PLAN OF CARE
Goal Outcome Evaluation:  Pt.A&Ox4. Uses call light appropriately. Continent B&B, using BSC with assist of 1, had large loose BM this shift. Has no c/o's pain, discomfort, CP/SOB. Continues IV abx via RUE picc. Had ID appt.yesterday - see their note. On standard K+ replacement and was replaced yesterday - recheck @ 0145 = 3.5 / next level draw AM 05/22. BG @ 0200 = 155. LUE cast C/D/I / elevated - NWB.        Patient's most recent vital signs are:     Vital signs:  BP: 159/62  Temp: 98.3  HR: 62  RR: 18  SpO2: 98 %     Patient does not have new respiratory symptoms.  Patient does not have new sore throat.  Patient does not have a fever greater than 99.5.

## 2024-05-21 NOTE — PHARMACY-VANCOMYCIN DOSING SERVICE
Pharmacy Vancomycin Note  Date of Service May 21, 2024  Patient's  1965   59 year old, female    Indication:  Bone and Joint Infection; chronic L elbow PJI s/p hardware explant with antibiotic spacer/cement placement 4/10/24  Day of Therapy: started 24  Current vancomycin regimen:  2000 mg (17.2 mg/kg) IV q24h  Current vancomycin monitoring method: AUC  Current vancomycin therapeutic monitoring goal: 400-600 mg*h/L    [employer] Prediction of Current Vancomycin Regimen  Loading dose: N/A  Regimen: 2000 mg IV every 24 hours.  Start time: 11:37 on 2024  Exposure target: AUC24 (range)400-600 mg/L.hr   AUC24,ss: 438 mg/L.hr  Probability of AUC24 > 400: 76 %  Ctrough,ss: 12.4 mg/L  Probability of Ctrough,ss > 20: 0 %  Probability of nephrotoxicity (Lodise CHANDAN ): 8 %      Current estimated CrCl = Estimated Creatinine Clearance: 118.3 mL/min (based on SCr of 0.69 mg/dL).    Creatinine for last 3 days  2024:  9:05 AM Creatinine 0.69 mg/dL    Recent Vancomycin Levels (past 3 days)  2024:  7:32 AM Vancomycin 13.8 ug/mL (20 hour level)    Vancomycin IV Administrations (past 72 hours)                     vancomycin (VANCOCIN) 2,000 mg in sodium chloride 0.9 % 250 mL intermittent infusion (mg) 2,000 mg New Bag 24 1137     2,000 mg New Bag 24 0951    vancomycin (VANCOCIN) 250 mg in sodium chloride 0.9 % 100 mL intermittent infusion (mg) 250 mg New Bag 24 1223    vancomycin (VANCOCIN) 1,750 mg in sodium chloride 0.9 % 500 mL intermittent infusion (mg) 1,750 mg Given 24 0941                    Nephrotoxins and other renal medications (From now, onward)      Start     Dose/Rate Route Frequency Ordered Stop    24 1230  furosemide (LASIX) tablet 20 mg         20 mg Oral DAILY 24 1214      24 1000  vancomycin (VANCOCIN) 2,000 mg in sodium chloride 0.9 % 250 mL intermittent infusion         2,000 mg (central catheter)  over 90 Minutes Intravenous EVERY 24 HOURS  05/18/24 0950                 Contrast Orders - past 72 hours (72h ago, onward)      None            Interpretation of levels and current regimen:  Vancomycin level is reflective of -600    Has serum creatinine changed greater than 50% in last 72 hours: No    Urine output:  unable to determine    Renal Function: Stable      Plan:  Continue Current Dose  Vancomycin monitoring method: AUC  Vancomycin therapeutic monitoring goal: 400-600 mg*h/L  Pharmacy will check vancomycin levels as appropriate in 1-3 Days.  Serum creatinine levels will be ordered a minimum of twice weekly.    Mar Ro, [facility]

## 2024-05-21 NOTE — TELEPHONE ENCOUNTER
[facility] Call Center    Phone Message    May a detailed message be left on voicemail: yes     Reason for Call: Other: Emma from TCU calling regarding Patient is on IV antibiotics and TCU needs clarification to discharge. Does she kassy to continue IV until she sees Dr. Sarmiento or transition into oral med      Action Taken: Message routed to:  [facility] [place]     Travel Screening: Not Applicable

## 2024-05-21 NOTE — PLAN OF CARE
Goal Outcome Evaluation:      Plan of Care Reviewed With: patient    Overall Patient Progress: no changeOverall Patient Progress: no change      Pt remains alert and oriented x 4 and able to make her needs known, PIV on the right arms which runs intermittent ABX, vancomycin administered this shift as scheduled no noted signs and symptoms of phlebitis or infiltration at the IV site , regular diet thin liquids, medications whole with water, continent with bowel and bladder, commode at the bedside for toileting, standby assist of one to the commode. Cast on right arm with a pain rated at 9/10, Oxycodone administered with effect, blood sugar checks with coverages, will continue plan of care
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
## 2024-05-22 ENCOUNTER — APPOINTMENT (OUTPATIENT)
Dept: OCCUPATIONAL THERAPY | Facility: SKILLED NURSING FACILITY | Age: 59
DRG: 559 | End: 2024-05-22
Attending: INTERNAL MEDICINE
Payer: COMMERCIAL

## 2024-05-22 ENCOUNTER — APPOINTMENT (OUTPATIENT)
Dept: PHYSICAL THERAPY | Facility: SKILLED NURSING FACILITY | Age: 59
DRG: 559 | End: 2024-05-22
Attending: INTERNAL MEDICINE
Payer: COMMERCIAL

## 2024-05-22 LAB
GLUCOSE BLDC GLUCOMTR-MCNC: 120 MG/DL (ref 70–99)
GLUCOSE BLDC GLUCOMTR-MCNC: 148 MG/DL (ref 70–99)
GLUCOSE BLDC GLUCOMTR-MCNC: 164 MG/DL (ref 70–99)
GLUCOSE BLDC GLUCOMTR-MCNC: 165 MG/DL (ref 70–99)
GLUCOSE BLDC GLUCOMTR-MCNC: 177 MG/DL (ref 70–99)
HOLD SPECIMEN: NORMAL
POTASSIUM SERPL-SCNC: 3.7 MMOL/L (ref 3.4–5.3)

## 2024-05-22 PROCEDURE — 97110 THERAPEUTIC EXERCISES: CPT | Mod: GP | Performed by: REHABILITATION PRACTITIONER

## 2024-05-22 PROCEDURE — 120N000009 HC R&B SNF

## 2024-05-22 PROCEDURE — 97116 GAIT TRAINING THERAPY: CPT | Mod: GP | Performed by: REHABILITATION PRACTITIONER

## 2024-05-22 PROCEDURE — 250N000011 HC RX IP 250 OP 636: Mod: JZ | Performed by: INTERNAL MEDICINE

## 2024-05-22 PROCEDURE — 250N000013 HC RX MED GY IP 250 OP 250 PS 637: Performed by: INTERNAL MEDICINE

## 2024-05-22 PROCEDURE — 250N000011 HC RX IP 250 OP 636: Performed by: INTERNAL MEDICINE

## 2024-05-22 PROCEDURE — 36415 COLL VENOUS BLD VENIPUNCTURE: CPT | Performed by: INTERNAL MEDICINE

## 2024-05-22 PROCEDURE — 97530 THERAPEUTIC ACTIVITIES: CPT | Mod: GP | Performed by: REHABILITATION PRACTITIONER

## 2024-05-22 PROCEDURE — 250N000013 HC RX MED GY IP 250 OP 250 PS 637

## 2024-05-22 PROCEDURE — 84132 ASSAY OF SERUM POTASSIUM: CPT | Performed by: INTERNAL MEDICINE

## 2024-05-22 PROCEDURE — 258N000003 HC RX IP 258 OP 636: Performed by: INTERNAL MEDICINE

## 2024-05-22 PROCEDURE — 97535 SELF CARE MNGMENT TRAINING: CPT | Mod: GO

## 2024-05-22 RX ORDER — SODIUM CHLORIDE 9 MG/ML
INJECTION, SOLUTION INTRAVENOUS
Status: COMPLETED
Start: 2024-05-22 | End: 2024-05-22

## 2024-05-22 RX ADMIN — INSULIN ASPART 1 UNITS: 100 INJECTION, SOLUTION INTRAVENOUS; SUBCUTANEOUS at 17:42

## 2024-05-22 RX ADMIN — ATORVASTATIN CALCIUM 10 MG: 10 TABLET, FILM COATED ORAL at 21:30

## 2024-05-22 RX ADMIN — GABAPENTIN 800 MG: 300 CAPSULE ORAL at 14:37

## 2024-05-22 RX ADMIN — LOSARTAN POTASSIUM 25 MG: 25 TABLET, FILM COATED ORAL at 09:19

## 2024-05-22 RX ADMIN — ERTAPENEM SODIUM 1 G: 1 INJECTION, POWDER, LYOPHILIZED, FOR SOLUTION INTRAMUSCULAR; INTRAVENOUS at 16:18

## 2024-05-22 RX ADMIN — MIRABEGRON 25 MG: 25 TABLET, FILM COATED, EXTENDED RELEASE ORAL at 09:20

## 2024-05-22 RX ADMIN — CLONIDINE HYDROCHLORIDE 0.2 MG: 0.1 TABLET ORAL at 21:33

## 2024-05-22 RX ADMIN — OXYCODONE HYDROCHLORIDE 15 MG: 10 TABLET ORAL at 09:39

## 2024-05-22 RX ADMIN — Medication 2 WAFER: at 09:18

## 2024-05-22 RX ADMIN — SODIUM CHLORIDE 10 ML: 9 INJECTION, SOLUTION INTRAVENOUS at 12:30

## 2024-05-22 RX ADMIN — GABAPENTIN 800 MG: 300 CAPSULE ORAL at 09:18

## 2024-05-22 RX ADMIN — AMLODIPINE BESYLATE 10 MG: 10 TABLET ORAL at 09:19

## 2024-05-22 RX ADMIN — SPIRONOLACTONE 25 MG: 25 TABLET ORAL at 09:19

## 2024-05-22 RX ADMIN — ACETAMINOPHEN 975 MG: 325 TABLET, FILM COATED ORAL at 14:37

## 2024-05-22 RX ADMIN — CITALOPRAM HYDROBROMIDE 20 MG: 20 TABLET ORAL at 21:33

## 2024-05-22 RX ADMIN — OXYCODONE HYDROCHLORIDE 15 MG: 10 TABLET ORAL at 14:37

## 2024-05-22 RX ADMIN — OMEPRAZOLE 20 MG: 20 CAPSULE, DELAYED RELEASE ORAL at 09:19

## 2024-05-22 RX ADMIN — Medication 250 MG: at 21:30

## 2024-05-22 RX ADMIN — OXYCODONE HYDROCHLORIDE 10 MG: 10 TABLET ORAL at 02:09

## 2024-05-22 RX ADMIN — INSULIN ASPART 1 UNITS: 100 INJECTION, SOLUTION INTRAVENOUS; SUBCUTANEOUS at 12:37

## 2024-05-22 RX ADMIN — ACETAMINOPHEN 975 MG: 325 TABLET, FILM COATED ORAL at 09:19

## 2024-05-22 RX ADMIN — HEPARIN, PORCINE (PF) 10 UNIT/ML INTRAVENOUS SYRINGE 5 ML: at 17:39

## 2024-05-22 RX ADMIN — ASPIRIN 162 MG: 81 TABLET, COATED ORAL at 10:41

## 2024-05-22 RX ADMIN — CLONIDINE HYDROCHLORIDE 0.2 MG: 0.1 TABLET ORAL at 09:19

## 2024-05-22 RX ADMIN — VANCOMYCIN HYDROCHLORIDE 2000 MG: 1 INJECTION, POWDER, LYOPHILIZED, FOR SOLUTION INTRAVENOUS at 12:25

## 2024-05-22 RX ADMIN — LUMATEPERONE 84 MG: 42 CAPSULE ORAL at 21:33

## 2024-05-22 RX ADMIN — OXYCODONE HYDROCHLORIDE 15 MG: 10 TABLET ORAL at 18:45

## 2024-05-22 RX ADMIN — METHOCARBAMOL 500 MG: 500 TABLET ORAL at 18:11

## 2024-05-22 RX ADMIN — ACETAMINOPHEN 975 MG: 325 TABLET, FILM COATED ORAL at 21:29

## 2024-05-22 RX ADMIN — Medication 250 MG: at 09:19

## 2024-05-22 RX ADMIN — Medication 5 ML: at 06:55

## 2024-05-22 RX ADMIN — FERROUS SULFATE TAB 325 MG (65 MG ELEMENTAL FE) 325 MG: 325 (65 FE) TAB at 09:20

## 2024-05-22 RX ADMIN — GABAPENTIN 800 MG: 300 CAPSULE ORAL at 21:29

## 2024-05-22 ASSESSMENT — ACTIVITIES OF DAILY LIVING (ADL)
ADLS_ACUITY_SCORE: 34
ADLS_ACUITY_SCORE: 34
ADLS_ACUITY_SCORE: 33
ADLS_ACUITY_SCORE: 33
ADLS_ACUITY_SCORE: 34
ADLS_ACUITY_SCORE: 33
ADLS_ACUITY_SCORE: 34
ADLS_ACUITY_SCORE: 33
ADLS_ACUITY_SCORE: 34
ADLS_ACUITY_SCORE: 33
ADLS_ACUITY_SCORE: 34
ADLS_ACUITY_SCORE: 33

## 2024-05-22 NOTE — PLAN OF CARE
Goal Outcome Evaluation:         VS: /48 (BP Location: Right leg)   Pulse 56   Temp 97.9  F (36.6  C) (Oral)   Resp 16   Wt 107.7 kg (237 lb 8 oz)   SpO2 92%   BMI 35.07 kg/m       O2: 92% RA   Output: Adequate; no pain on urination   Last BM: 5/22/2024   Activity: Mod I to commode@night; independent w/ cane to bathroom; LUE NWB   Skin: R heel mepilex; scattered abdominal bruising   Pain: PRN oxycodone Q4;    CMS: A/O x4; Denies SOB, chest pain, headache, n/v, new numbness or tingling   Dressing: R heel mepilex   Diet: Reg, thin, pills whole   LDA: R PICC   Equipment: cane   Plan: Continue POC   Additional Info: RN K+ managed- 3.7  Partial bath w/ therapy today                      Acidosis

## 2024-05-22 NOTE — PLAN OF CARE
Goal Outcome Evaluation:  Pt.A&Ox4. Mod I to BSC @ night and to BR during the day w/cane. Continent B&B, had med.loose>soft BM tonight. Medicated with Oxycodone 10g po @ 0210 for c/o back & LUE pain - cast C/D/I. Continues IV abx via RUE pcc. BG @ 0200 = 148. On standard K+ replacement - lab this AM.        Patient's most recent vital signs are:     Vital signs:  BP: 117/50  Temp: 98.4  HR: 60  RR: 16  SpO2: 94 %     Patient does not have new respiratory symptoms.  Patient does not have new sore throat.  Patient does not have a fever greater than 99.5.

## 2024-05-22 NOTE — TELEPHONE ENCOUNTER
" Camila Tan MD  [facility] Rn15 hours ago (4:25 PM)     GT  I am already in contact with TCU hospitalist, and orthopedic team in this regard, both of them are aware.      Keri Maldonado RN Trivedi, Grishma, MD18 hours ago (1:15 PM)     [facility],    Checking in as I see in your note she as to \"continue til 5/22 although will continue antibiotic until receives clarification from Orthopedic (Dr. Sarmiento) regarding next surgery plan before we can stop antibiotic. \"    Let me know what you'd like me to relay to Emma!    Thanks!      Roslyn Luna RN  [facility] Rn18 hours ago (1:12 PM)     ES  Can you please assist? Thank you     "

## 2024-05-22 NOTE — PROGRESS NOTES
05/22/24 1115   Appointment Info   Signing Clinician's Name / Credentials (OT) Keira Garcia OTR/L CBIS   OT Goals   Therapy Frequency (OT) 3 times/week   OT Predicted Duration/Target Date for Goal Attainment 05/27/24   OT Goals OT Goal 2   OT: Hygiene/Grooming Goal Met   OT: Upper Body Dressing Goal Met   OT: Lower Body Dressing Goal Met   OT: Upper Body Bathing Goal Met   OT: Lower Body Bathing Goal Met   OT: Bed Mobility Goal Met   OT: Transfer Goal Met   OT: Toilet Transfer/Toileting Goal Met   OT: Cognitive Completed   OT: Goal 1 Pt. to be modified indep with dynamic standing for functional ADL tasks with no concerns of safety   OT: Goal 2 pt to demo modified indep w/ simple meal prep w/ cane   Self-Care/Home Management   Self-Care/Home Mgmt/ADL, Compensatory, Meal Prep Minutes ([id number]) 40   Treatment Detail/Skilled Intervention OT: pt modified indep in room w/ cane, pt reports planning to sponge bath sitting on ext tub bench in bathroom and dressing on EOB, usiing w/c iin room but doesn't fit in bathroom, pt demo today ub wash/grooming indep, aysha/doff pullover shirt indep from sitting, manages sling inde for LUE/casted, using one handed techniques for adls, LB wash/dressing modified indep   OT Discharge Planning   OT Plan OT: precautions: modified indep in room, ok to use bathroom day and BSC overnight,, LUE NWB, L elb NO ROM, falls, Rx:3x/wk,  kitchen mob/simple meal prep priority, ext tub bench transfers,antic last session mon 5/27, on track for this date   OT Discharge Recommendation (DC Rec) home with assist;home with home care occupational therapy  (recom assist but may not have assist avail, lives alone)   OT Rationale for DC Rec recommend home health aide for bathing   OT Brief overview of current status OT: improving w/ mob and adls, on track.   Total Session Time   Timed Code Treatment Minutes 40   Total Session Time (sum of timed and untimed services) 40   Post Acute Settings Only   What unit is  patient on? TCU   OT- Transitional Care Unit Time   Individual Time (minutes) - OT 40   TCU Total Session Time (minutes) - OT 40   TCU Daily Total Session Time   OT TCU Daily Total Session Time 40   Rehab TCU Daily Total Session Time 83

## 2024-05-22 NOTE — PROGRESS NOTES
VENICE called [facility] [phone].  They will review referral and get back to .  Fax # is [phone]  SW called and they can accept.      VENICE met with pt.  Pt would like to go Friday.  Noon is okay. She was on the phone with CM.  SW updated both.     VENICE received a call from [facility]. They will do it.  He told SW how to request payment form the [facility].  Once they okay then he will call back with confirmation time.     FRANCISCO J Romo   [facility], Transitional Care Unit   Social Work   [facility] [address] [place] [address]  (ph) [phone]

## 2024-05-22 NOTE — TELEPHONE ENCOUNTER
Ok thank you for the clarification.   ===View-only below this line===  ----- Message -----  From: Camila Tan MD  Sent: 5/22/2024   2:11 PM CDT  To: Eugene Holley RN    No. We, Infectious Disease, is following and I am working on to address this - pending some clarification. I will convey to hospitalist and also already in contact in this regard.

## 2024-05-22 NOTE — TELEPHONE ENCOUNTER
Dr Sarmiento responded to RN and PA by sending a communication thread he had with our ID provider Dr Jensen in ID.     Dr Jenesn said that she saw Mitali in the ID clinic 5/20/24 regarding left elbow PJI, for which Mitali is about to complete 6 weeks of antibiotic course (5/22). To her understanding, previous hardware is out, with antibiotic spacer placement. Before stopping antibiotic, she was wondering re: the tentative timeline for next surgery. Mitali has some hairline fracture, so cast in place. Per notes, once antibiotic stops, Mitali will likely be discharged soon back home in [place].     Dr Sarmiento responded that all previous hardware is out, with the exception of some cement in the proximal and distal aspects of her canal from the prior total elbow. There are a few stainless steel cerclage wires he had to place around the humeral and ulnar shafts to fix intraoperative osteotomies/fractures. The antibiotic spacer is a custom articulating one that does have steel pins in it.   Also per Dr Sarmiento, there are no plans for any future surgery with the exception of maybe taking the spacer out. Mitali will be managed for life with a hinged elbow brace or splint. Dr Sarmiento then asked about chronic suppression after completion of antibiotics because she had quite a severe infection.     Dr Jensen responded and said that yes, Mitali should transition to suppressive oral antibiotic. She is going to coordinate regarding her discharge timeline and if sooner, then coordinate with ID in [place], [place] regarding suppressive antibiotic plan and closer follow up afterwards.     RN contacted the TCU nurse Emma and informed of this information.  We do not have a stop-date yet for the IV antibiotics.  Dr Jensen should be helping to manage the antibiotic decision according to the messages above. Hailee Oliveira RN

## 2024-05-22 NOTE — CARE PLAN
Weekly Round Updates (Team meeting/patient not present):    Medical:  Waiting on Ortho to determine IV plan.  Discharge is pending IV plan, therapy is not a barrier to discharge.    Nursing:  Mod I commode at night; can go to bathroom with cane during day.      Therapy:  Mod I in room.  Per OT, Mod I with dressing/spongebathing.  Can be less steady after BP meds, but did not demonstrate during OT session today.  Therapy plans to continue therapy until 5/27 OT 3x/week, PT 5x/week.    Care Coordination:  Lives alone in [place].  County and  aware of concerns regarding patient discharge home alone.  Concern regarding patient being able to manage IV abx independently.

## 2024-05-22 NOTE — PLAN OF CARE
Goal Outcome Evaluation:    Patient is alert and oriented x4. Able to make needs known. Denied CP, SOB, and N/V. PRN oxycodone given for pain management. On regular diet and takes medications whole with thin liquid. Continent of bowel and bladder. Independent to restroom during the day and to commode at night. BG check were 121 and 147. Refused miconazole powder. Call light within reach. No acute issue this shift. Will continue to follow POC.     /55 (BP Location: Right leg)   Pulse 60   Temp 98.4  F (36.9  C) (Oral)   Resp 16   Wt 116.8 kg (257 lb 9.6 oz)   SpO2 94%   BMI 38.04 kg/m

## 2024-05-23 ENCOUNTER — APPOINTMENT (OUTPATIENT)
Dept: PHYSICAL THERAPY | Facility: SKILLED NURSING FACILITY | Age: 59
DRG: 559 | End: 2024-05-23
Attending: INTERNAL MEDICINE
Payer: COMMERCIAL

## 2024-05-23 LAB
ALBUMIN SERPL BCG-MCNC: 3.1 G/DL (ref 3.5–5.2)
ALP SERPL-CCNC: 184 U/L (ref 40–150)
ALT SERPL W P-5'-P-CCNC: 9 U/L (ref 0–50)
ANION GAP SERPL CALCULATED.3IONS-SCNC: 9 MMOL/L (ref 7–15)
AST SERPL W P-5'-P-CCNC: 15 U/L (ref 0–45)
BILIRUB SERPL-MCNC: 0.2 MG/DL
BUN SERPL-MCNC: 12.9 MG/DL (ref 8–23)
CALCIUM SERPL-MCNC: 7.9 MG/DL (ref 8.8–10.2)
CHLORIDE SERPL-SCNC: 99 MMOL/L (ref 98–107)
CREAT SERPL-MCNC: 0.87 MG/DL (ref 0.51–0.95)
CRP SERPL-MCNC: 33.75 MG/L
DEPRECATED HCO3 PLAS-SCNC: 30 MMOL/L (ref 22–29)
EGFRCR SERPLBLD CKD-EPI 2021: 76 ML/MIN/1.73M2
ERYTHROCYTE [DISTWIDTH] IN BLOOD BY AUTOMATED COUNT: 16.7 % (ref 10–15)
FERRITIN SERPL-MCNC: 67 NG/ML (ref 11–328)
GLUCOSE BLDC GLUCOMTR-MCNC: 115 MG/DL (ref 70–99)
GLUCOSE BLDC GLUCOMTR-MCNC: 118 MG/DL (ref 70–99)
GLUCOSE BLDC GLUCOMTR-MCNC: 129 MG/DL (ref 70–99)
GLUCOSE BLDC GLUCOMTR-MCNC: 151 MG/DL (ref 70–99)
GLUCOSE BLDC GLUCOMTR-MCNC: 212 MG/DL (ref 70–99)
GLUCOSE SERPL-MCNC: 144 MG/DL (ref 70–99)
HCT VFR BLD AUTO: 25.7 % (ref 35–47)
HGB BLD-MCNC: 7.9 G/DL (ref 11.7–15.7)
IRON BINDING CAPACITY (ROCHE): 254 UG/DL (ref 240–430)
IRON SATN MFR SERPL: 7 % (ref 15–46)
IRON SERPL-MCNC: 18 UG/DL (ref 37–145)
MCH RBC QN AUTO: 25.6 PG (ref 26.5–33)
MCHC RBC AUTO-ENTMCNC: 30.7 G/DL (ref 31.5–36.5)
MCV RBC AUTO: 83 FL (ref 78–100)
PLATELET # BLD AUTO: 231 10E3/UL (ref 150–450)
POTASSIUM SERPL-SCNC: 4.1 MMOL/L (ref 3.4–5.3)
PROT SERPL-MCNC: 6 G/DL (ref 6.4–8.3)
RBC # BLD AUTO: 3.09 10E6/UL (ref 3.8–5.2)
SODIUM SERPL-SCNC: 138 MMOL/L (ref 135–145)
VIT B12 SERPL-MCNC: 358 PG/ML (ref 232–1245)
WBC # BLD AUTO: 3.9 10E3/UL (ref 4–11)

## 2024-05-23 PROCEDURE — 97116 GAIT TRAINING THERAPY: CPT | Mod: GP

## 2024-05-23 PROCEDURE — 82040 ASSAY OF SERUM ALBUMIN: CPT | Performed by: INTERNAL MEDICINE

## 2024-05-23 PROCEDURE — 97530 THERAPEUTIC ACTIVITIES: CPT | Mod: GP

## 2024-05-23 PROCEDURE — 120N000009 HC R&B SNF

## 2024-05-23 PROCEDURE — 250N000013 HC RX MED GY IP 250 OP 250 PS 637

## 2024-05-23 PROCEDURE — 250N000013 HC RX MED GY IP 250 OP 250 PS 637: Performed by: INTERNAL MEDICINE

## 2024-05-23 PROCEDURE — 82728 ASSAY OF FERRITIN: CPT | Performed by: INTERNAL MEDICINE

## 2024-05-23 PROCEDURE — 250N000011 HC RX IP 250 OP 636: Performed by: INTERNAL MEDICINE

## 2024-05-23 PROCEDURE — 83550 IRON BINDING TEST: CPT | Performed by: INTERNAL MEDICINE

## 2024-05-23 PROCEDURE — 99316 NF DSCHRG MGMT 30 MIN+: CPT | Performed by: INTERNAL MEDICINE

## 2024-05-23 PROCEDURE — 36415 COLL VENOUS BLD VENIPUNCTURE: CPT | Performed by: INTERNAL MEDICINE

## 2024-05-23 PROCEDURE — 86140 C-REACTIVE PROTEIN: CPT | Performed by: INTERNAL MEDICINE

## 2024-05-23 PROCEDURE — 85027 COMPLETE CBC AUTOMATED: CPT | Performed by: INTERNAL MEDICINE

## 2024-05-23 PROCEDURE — 82374 ASSAY BLOOD CARBON DIOXIDE: CPT | Performed by: INTERNAL MEDICINE

## 2024-05-23 PROCEDURE — 82607 VITAMIN B-12: CPT | Performed by: INTERNAL MEDICINE

## 2024-05-23 RX ORDER — METHOCARBAMOL 500 MG/1
500 TABLET, FILM COATED ORAL EVERY 6 HOURS PRN
Qty: 20 TABLET | Refills: 0 | Status: SHIPPED | OUTPATIENT
Start: 2024-05-23

## 2024-05-23 RX ORDER — GABAPENTIN 400 MG/1
800 CAPSULE ORAL 3 TIMES DAILY
Qty: 180 CAPSULE | Refills: 0 | Status: SHIPPED | OUTPATIENT
Start: 2024-05-23

## 2024-05-23 RX ORDER — SACCHAROMYCES BOULARDII 250 MG
250 CAPSULE ORAL 2 TIMES DAILY
Qty: 60 CAPSULE | Refills: 0 | Status: SHIPPED | OUTPATIENT
Start: 2024-05-23

## 2024-05-23 RX ORDER — CLONIDINE HYDROCHLORIDE 0.2 MG/1
0.2 TABLET ORAL 2 TIMES DAILY
Qty: 60 TABLET | Refills: 0 | Status: ON HOLD | OUTPATIENT
Start: 2024-05-23 | End: 2024-06-27

## 2024-05-23 RX ORDER — CITALOPRAM HYDROBROMIDE 20 MG/1
20 TABLET ORAL AT BEDTIME
Qty: 30 TABLET | Refills: 0 | Status: SHIPPED | OUTPATIENT
Start: 2024-05-23

## 2024-05-23 RX ORDER — INSULIN DEGLUDEC 100 U/ML
15 INJECTION, SOLUTION SUBCUTANEOUS AT BEDTIME
Status: ON HOLD | COMMUNITY
Start: 2024-05-23 | End: 2024-06-27

## 2024-05-23 RX ORDER — AMLODIPINE BESYLATE 5 MG/1
5 TABLET ORAL DAILY
Qty: 30 TABLET | Refills: 0 | Status: SHIPPED | OUTPATIENT
Start: 2024-05-24

## 2024-05-23 RX ORDER — OXYCODONE HYDROCHLORIDE 10 MG/1
10 TABLET ORAL EVERY 4 HOURS PRN
Qty: 30 TABLET | Refills: 0 | Status: ON HOLD | OUTPATIENT
Start: 2024-05-23 | End: 2024-06-27

## 2024-05-23 RX ORDER — PSYLLIUM SEED (WITH DEXTROSE)
2 POWDER (GRAM) ORAL DAILY
Qty: 60 WAFER | Refills: 2 | Status: SHIPPED | OUTPATIENT
Start: 2024-05-24

## 2024-05-23 RX ORDER — LOSARTAN POTASSIUM 25 MG/1
25 TABLET ORAL DAILY
Qty: 30 TABLET | Refills: 0 | Status: ON HOLD | OUTPATIENT
Start: 2024-05-24 | End: 2024-06-27

## 2024-05-23 RX ORDER — MIRABEGRON 25 MG/1
25 TABLET, FILM COATED, EXTENDED RELEASE ORAL DAILY
Qty: 30 TABLET | Refills: 0 | Status: ON HOLD | OUTPATIENT
Start: 2024-05-24 | End: 2024-06-27

## 2024-05-23 RX ORDER — AMLODIPINE BESYLATE 5 MG/1
5 TABLET ORAL DAILY
Status: DISCONTINUED | OUTPATIENT
Start: 2024-05-24 | End: 2024-05-24 | Stop reason: HOSPADM

## 2024-05-23 RX ADMIN — Medication 250 MG: at 21:02

## 2024-05-23 RX ADMIN — OXYCODONE HYDROCHLORIDE 10 MG: 10 TABLET ORAL at 21:02

## 2024-05-23 RX ADMIN — GABAPENTIN 800 MG: 300 CAPSULE ORAL at 14:06

## 2024-05-23 RX ADMIN — Medication 5 ML: at 05:52

## 2024-05-23 RX ADMIN — CLONIDINE HYDROCHLORIDE 0.2 MG: 0.1 TABLET ORAL at 21:01

## 2024-05-23 RX ADMIN — LUMATEPERONE 84 MG: 42 CAPSULE ORAL at 19:42

## 2024-05-23 RX ADMIN — MIRABEGRON 25 MG: 25 TABLET, FILM COATED, EXTENDED RELEASE ORAL at 08:28

## 2024-05-23 RX ADMIN — ACETAMINOPHEN 975 MG: 325 TABLET, FILM COATED ORAL at 14:06

## 2024-05-23 RX ADMIN — SPIRONOLACTONE 25 MG: 25 TABLET ORAL at 08:31

## 2024-05-23 RX ADMIN — OMEPRAZOLE 20 MG: 20 CAPSULE, DELAYED RELEASE ORAL at 08:28

## 2024-05-23 RX ADMIN — OXYCODONE HYDROCHLORIDE 10 MG: 10 TABLET ORAL at 10:23

## 2024-05-23 RX ADMIN — ACETAMINOPHEN 975 MG: 325 TABLET, FILM COATED ORAL at 19:42

## 2024-05-23 RX ADMIN — ACETAMINOPHEN 975 MG: 325 TABLET, FILM COATED ORAL at 08:29

## 2024-05-23 RX ADMIN — OXYCODONE HYDROCHLORIDE 10 MG: 10 TABLET ORAL at 02:24

## 2024-05-23 RX ADMIN — GABAPENTIN 800 MG: 300 CAPSULE ORAL at 08:28

## 2024-05-23 RX ADMIN — GABAPENTIN 800 MG: 300 CAPSULE ORAL at 19:42

## 2024-05-23 RX ADMIN — Medication 250 MG: at 08:30

## 2024-05-23 RX ADMIN — ATORVASTATIN CALCIUM 10 MG: 10 TABLET, FILM COATED ORAL at 21:01

## 2024-05-23 RX ADMIN — CITALOPRAM HYDROBROMIDE 20 MG: 20 TABLET ORAL at 21:02

## 2024-05-23 ASSESSMENT — ACTIVITIES OF DAILY LIVING (ADL)
ADLS_ACUITY_SCORE: 33

## 2024-05-23 NOTE — PLAN OF CARE
Goal Outcome Evaluation:      Plan of Care Reviewed With: patient    Overall Patient Progress: no changeOverall Patient Progress: no change    Outcome Evaluation: No change in pt progress this shift.    Pt is A&Ox4, able to make needs known, denied SOB, chest pain, N/T, N/V. C/o pain prn oxy and scheduled tylenol given. Pt is independent in room with cane, continent of bowel and bladder-LBM 5/23. On reg/thin-takes pills whole. ,118. BP meds held this morning due order parameters not being met. Plan is for patient to discharge home tomorrow. No other concerns as of now, continue with POC.     Patient most recent vitals:  /49 (BP Location: Right arm, Patient Position: Supine, Cuff Size: Adult Large)   Pulse 54   Temp 97.8  F (36.6  C) (Oral)   Resp 18   Wt 107.7 kg (237 lb 8 oz)   SpO2 92%   BMI 35.07 kg/m      aching/shooting

## 2024-05-23 NOTE — DISCHARGE SUMMARY
Occupational Therapy Discharge Summary    Reason for therapy discharge:    Pt is planning discharge to home tomorrow.     Progress towards therapy goal(s)/goals met. See goals on Care Plan in [facility] electronic health record for goal details.  Pt's skill level: pt modified indep in room w/ cane, pt reports planning to sponge bath sitting on ext tub bench in bathroom and dressing on EOB, usiing w/c iin room but doesn't fit in bathroom, pt demo today ub wash/grooming indep, aysha/doff pullover shirt indep from sitting, manages sling inde for LUE/casted, using one handed techniques for adls, LB wash/dressing modified indep.  Goals met with exception of working on dynamic mobility for IADL and simple meal prep with cane use.      Therapy recommendation(s):    Continued therapy is recommended.  Rationale/Recommendations:  Pt is a candidate to con't working on IADL skills. Recommend she delegate more challenging tasks at home until UE cast removed and dynamic mobility con't to improve.
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
## 2024-05-23 NOTE — PLAN OF CARE
Goal Outcome Evaluation:    VS: /55 (BP Location: Right arm)   Pulse 63   Temp 97.5  F (36.4  C) (Axillary)   Resp 16   Wt 107.7 kg (237 lb 8 oz)   SpO2 95%   BMI 35.07 kg/m     O2: Stable on RA   Output: Continent B/B   Last BM: 5/22/2024   Activity: A1 cane   Skin: R heel mepilex, scattered abdominal bruising   Pain: PRN oxycodone   CMS: AOx4   Dressing: R heel mepilex   Diet: Regular; Meds whole with thin liquids   LDA: R PICC line removed this shift   Equipment: Cane   Plan: Will continue to follow POC.   Additional Info: Pt able to make needs known. Denies CP, SOB, and N/V. No heparin lock due to PICC removal. RN managed K+ protocol 4.1 today. K+ lab draw for tomorrow put in. Weight requested to be done when awake--will let oncoming staff know. No acute issues this shift. Call light within reach.

## 2024-05-23 NOTE — DISCHARGE INSTRUCTIONS
Diabetes Education  You are scheduled to see Stevie Poe on 5/28/2024 at 9am.    Address [address] [place] [address]    Phone   [phone]     Hospital Follow-Up appointment:   Dr. Ricardo El  You are scheduled on June 4th at 8 am at the [facility].     Address: [address] [place] [id number].     Infectious Disease:     You are scheduled on June 13, 2024 at 2:20 pm with Dr. Dominguez     Address: [address] [place] [id number]    Establishing Internal Medicine Provider:   Dr. Frandy Castano.   You are scheduled on June 17th at 11 am at the [facility]     Address:  [address] [place] [id number]    Orthopedics:   Monday June 17, 2024  Arrive by:   1:15 PM Post-Op Visit with Marcos Sarmiento MD    [facility]   [address] [place] [id number]     [facility]   - [phone].  Fax-[phone]   -Nurse, physical therapy, and occupational therapy,    You will get a call after you have discharged from Transitional care unit to schedule the first home care visit. The home health nurse should come out within the first 24-48 hours. If you don't get a call from them within the first 48 hours, please call to follow up (number above).

## 2024-05-23 NOTE — PLAN OF CARE
Goal Outcome Evaluation:               MDS Pain Assessment    The following is the pain interview as conducted by the TCU RN caring for the patient on May 23, 2024. This assessment is required by the [facility] for all patients in [place] SNF (Skilled Nursing Facilities).     . Pain Presence  Have you had pain or hurting at any time in the last 5 days?   1. Yes    . Pain Frequency  How much of the time have you experienced pain or hurting over the last 5 days?   3. Frequently    . Pain Effect on Sleep  Over the past 5 days, how much of the time has pain made it hard for you to sleep at night?   2. Occasionally    . Pain Interference with Therapy Activities  Over the past 5 days, how often have you limited your participation in rehabilitation therapy sessions due to pain?   1. Rarely or not at all    . Pain Interference with Day-to-Day Activities  Over the past 5 days, have you limited your day-to-day activities (excluding rehabilitation therapy sessions) because of pain?  1. Rarely or not at all    . Pain intensity   Numeric Rating Scale (00-10): Please rate your worst pain over the last 5 days on a zero to ten scale, with zero being no pain and ten as the worst pain you can imagine. 09

## 2024-05-23 NOTE — PLAN OF CARE
Care Coordination:     Writer was assisting to set up follow up appts with [place] provider.     Writer set up PCP, Diabetes Educator, and ID w/ Dr. Dominguez.    SW set up Home Care referral. (See notes and AVS).     30 day med fill from discharge pharmacy- requested they fill meds ASAP for early discharge tomorrow around 10 am.     Maddison Fernandez   Patient Care Management Coordinator  Acute Rehabilitation Unit/ Transitional Care Unit.   Ph: [phone]

## 2024-05-23 NOTE — PROGRESS NOTES
SW has called multiple numbers to try and get the transportation approved for discharge ride home.  SW has been on hold for over 40 minutes.  [facility] keeps sending SW to a different number.  This number didn't have a title on it so unsure if it's the correct number or not. [phone]. SW left a generic message.     SW received email from [place] that stated they can issue transportation for discharge.   SW called [facility], please call Louis at [phone].  We set time.    He called back and asked if we could do it earlier.  SW said yes.  They don't want to be in traffic.     TCU Notice of Medicare Non-Coverage Note:     Met with patient to Introduce self and role.     Discussed Notice of Medicare Non-Coverage and last day of coverage as required for all patients with Medicare coverage during Skilled Nursing Facility (SNF) stays.Informed patient/family that Medicare covers stay until midnight of day before discharge. TCU does not bill for discharge date.    Last coverage day is 5/25/24. Discharge date expected 5/26/24. Pt wants to leave tomorrow.  We agreed so she can get transportation home.     Opportunity provided for questions and education provided regarding potential financial impact to patient.     Patient verbalize(s) understanding of discussion.       Discharge Plan     Discharge Date: 5/24/24  Discharge Disposition: Home .     Discharge Services: [facility] PT/OT/RN    [phone]  ORDERS AND DISCHARGE SUMMARY WILL NEED TO BE FAXED -[phone]      Discharge Supplies: All DME supplied by PT/OT     CADI JIMMY- VENICE will fax discharge summary to Lela [phone] and Angela on 5/24/24.    Discharge Transportation: [facility], please call Louis at [phone].          Mitali Lara, [facility], Transitional Care Unit   Social Work   [facility]th [place] [address]  () [phone]

## 2024-05-23 NOTE — PLAN OF CARE
Goal Outcome Evaluation:        Overall Patient Progress: no change     Pt A & O X 4. Up independently in room with a cane and SBA to ambulate in hallway. Was c/o  generalized pain and given prn Oxycodone at 0224. Sling left arm at all times. Nursing is monitoring and assisting as needed      Ashley Pires RN

## 2024-05-23 NOTE — PLAN OF CARE
Physical Therapy Discharge Summary    Reason for therapy discharge:    All goals and outcomes met, no further needs identified.    Progress towards therapy goal(s). See goals on Care Plan in [facility] electronic health record for goal details.  Goals met    Therapy recommendation(s):    Continued therapy is recommended.  Rationale/Recommendations:  home care for home safety evaluation and continued standing dynamic balance training. Pt given recommended DME of single point cane for use at home. Patient will have assistance from a home care aide who helps with laundry and transportation.

## 2024-05-24 VITALS
SYSTOLIC BLOOD PRESSURE: 140 MMHG | DIASTOLIC BLOOD PRESSURE: 60 MMHG | RESPIRATION RATE: 16 BRPM | BODY MASS INDEX: 35.07 KG/M2 | TEMPERATURE: 97.5 F | HEART RATE: 69 BPM | WEIGHT: 237.5 LBS | OXYGEN SATURATION: 96 %

## 2024-05-24 LAB
GLUCOSE BLDC GLUCOMTR-MCNC: 125 MG/DL (ref 70–99)
GLUCOSE BLDC GLUCOMTR-MCNC: 135 MG/DL (ref 70–99)
GLUCOSE BLDC GLUCOMTR-MCNC: 252 MG/DL (ref 70–99)
POTASSIUM SERPL-SCNC: 4.7 MMOL/L (ref 3.4–5.3)

## 2024-05-24 PROCEDURE — 250N000013 HC RX MED GY IP 250 OP 250 PS 637: Performed by: INTERNAL MEDICINE

## 2024-05-24 PROCEDURE — 84132 ASSAY OF SERUM POTASSIUM: CPT | Performed by: INTERNAL MEDICINE

## 2024-05-24 PROCEDURE — 36415 COLL VENOUS BLD VENIPUNCTURE: CPT | Performed by: INTERNAL MEDICINE

## 2024-05-24 RX ADMIN — Medication 250 MG: at 08:35

## 2024-05-24 RX ADMIN — ACETAMINOPHEN 975 MG: 325 TABLET, FILM COATED ORAL at 08:35

## 2024-05-24 RX ADMIN — GABAPENTIN 800 MG: 300 CAPSULE ORAL at 08:35

## 2024-05-24 RX ADMIN — CLONIDINE HYDROCHLORIDE 0.2 MG: 0.1 TABLET ORAL at 08:39

## 2024-05-24 RX ADMIN — OMEPRAZOLE 20 MG: 20 CAPSULE, DELAYED RELEASE ORAL at 08:35

## 2024-05-24 RX ADMIN — INSULIN ASPART 3 UNITS: 100 INJECTION, SOLUTION INTRAVENOUS; SUBCUTANEOUS at 12:26

## 2024-05-24 RX ADMIN — LOSARTAN POTASSIUM 25 MG: 25 TABLET, FILM COATED ORAL at 08:39

## 2024-05-24 RX ADMIN — FERROUS SULFATE TAB 325 MG (65 MG ELEMENTAL FE) 325 MG: 325 (65 FE) TAB at 08:34

## 2024-05-24 RX ADMIN — MIRABEGRON 25 MG: 25 TABLET, FILM COATED, EXTENDED RELEASE ORAL at 08:34

## 2024-05-24 RX ADMIN — AMLODIPINE BESYLATE 5 MG: 5 TABLET ORAL at 08:39

## 2024-05-24 ASSESSMENT — ACTIVITIES OF DAILY LIVING (ADL)
ADLS_ACUITY_SCORE: 33

## 2024-05-24 NOTE — PLAN OF CARE
Goal Outcome Evaluation:       Pt is alert and oriented X4,able to make needs known to staff. Pt appeared sleeping during rounds. Regular diet,meds whole thin liquids.Pt is A 1 with a cane.Continent bowel and bladder.Left elbow sling.Right heel mepilex. Pt  to discharge 5/24/24 . Pickup at 1000. No acute concerns during shift. Will continue POC.               Patient's most recent vital signs are:     Vital signs:  BP: 144/60  Temp: 97.5  HR: 63  RR: 16  SpO2: 95 %     Patient does not have new respiratory symptoms.  Patient does not have new sore throat.  Patient does not have a fever greater than 99.5.

## 2024-05-24 NOTE — PLAN OF CARE
Reviewed discharge orders and After Visit Summary (AVS) with the patient. Medication orders were reviewed and instructions given as to the frequency to take each med, along with when last medication was given. Patient belongings sent with patient. Patient instructed to follow up with primary physican with any further questions they may have. Patient states they understand discharge orders as they are written and has no questions. Patient was discharged at 11:45 am.    BP (!) 140/60 (BP Location: Right arm)   Pulse 69   Temp 97.5  F (36.4  C) (Axillary)   Resp 16   Wt 107.7 kg (237 lb 8 oz)   SpO2 96%   BMI 35.07 kg/m

## 2024-05-24 NOTE — PROGRESS NOTES
VENICE faxed paperwork to [place]  [phone] Lela [phone].  VENICE left Angela/JIMMY a vm telling her if she needs discharge papers call and give SW fax #.    VENICE check with ride.   is just passing [place]. He is still coming.  VENICE said that is fine.    We are just checking.     VENICE updated nursing and pt.     Mitali Lara, FRANCISCO J   [facility], Transitional Care Unit   Social Work   [facility] [place] [address], 4th [place] [address]  (PH) [phone]

## 2024-06-11 ENCOUNTER — TELEPHONE (OUTPATIENT)
Dept: ORTHOPEDICS | Facility: CLINIC | Age: 59
End: 2024-06-11
Payer: COMMERCIAL

## 2024-06-11 NOTE — LETTER
2024      RE: Mitali Robles   1965  [address] [facility] [place] [address]         Re: [place] Medicaid Transportation Division    UNITED [employer] MEDICARE ADVANTAGE ID# [id number]   Sponsor code [id number]  Group number [id number]    To Whom it May Concern,    Mitali Robles is currently under my professional care for an extensive surgery.  We are requesting transportation to and from her appointment which is scheduled for 2024 for her post operative clinic visit.       24  Arrive 1:15 pm for a 1:30 pm appointment.    [facility]  [address] [place] [address]  4th floor        Sincerely,      Marcos Sarmiento MD

## 2024-06-11 NOTE — TELEPHONE ENCOUNTER
Other: Mitali called Dr. Sarmiento needs to email a letter to [place] Medicaid Transportation Division so she can get transportation to her appointment on 6/17/24. The email address is [email or web address]. If this is not correct there phone number is [phone] option 3. Please double check email address prior to sending. Please call and let Mitali know when this has been done.    Could we send this information to you in [employer] or would you prefer to receive a phone call?:   Patient would prefer a phone call   Okay to leave a detailed message?: Yes at Cell number on file:    Telephone Information:   Mobile [phone]

## 2024-06-12 ENCOUNTER — TELEPHONE (OUTPATIENT)
Dept: ORTHOPEDICS | Facility: CLINIC | Age: 59
End: 2024-06-12
Payer: COMMERCIAL

## 2024-06-12 DIAGNOSIS — T84.59XA INFECTION ASSOCIATED WITH PROSTHESIS OF LEFT ELBOW JOINT (H): Primary | ICD-10-CM

## 2024-06-12 DIAGNOSIS — Z96.622 INFECTION ASSOCIATED WITH PROSTHESIS OF LEFT ELBOW JOINT (H): Primary | ICD-10-CM

## 2024-06-12 DIAGNOSIS — Z98.890 POST-OPERATIVE STATE: ICD-10-CM

## 2024-06-12 NOTE — TELEPHONE ENCOUNTER
Letter drafted and emailed.  RN contacted the Medicaid # provided and verified that this went through.  Someone named Nicole  is coordinating the ride.  Also received confirmation email that they received this letter.    Left voice mail for patient with this information. Hailee Oliveira RN

## 2024-06-12 NOTE — TELEPHONE ENCOUNTER
Other: Patient has sores developing on the elbow, wondering if antibiotics might be needed. Also her cast is very loose. She thinks this may be the cause of the sores. She is in pain. Please call her back.      Could we send this information to you in [employer] or would you prefer to receive a phone call?:   Patient would prefer a phone call   Okay to leave a detailed message?: Yes at Cell number on file:    Telephone Information:   Mobile [phone]

## 2024-06-12 NOTE — TELEPHONE ENCOUNTER
Spoke with patient.  She would be interested in having the cast cut off and splint placed.  She asked if she could take it off to shower and informed her she should not.    She is seeing a Dr Suresh Dominguez tomorrow in Infectious Disease in [place].    Contacted their office at [phone].  They are seeing her at 2:20 tomorrow afternoon.    Contacted the Orthopedics clinic and discussed.  We can send a referral to them via fax at [phone].  Most likely she will have to go to the walk-in clinic.     Referral order faxed to Ortho clinic in [place] to cut off long arm cast, inspect skin, and place her in a long arm posterior slab splint.    Left message for Mitali to expect a call from Ortho and gave her the main number for that clinic. Hailee Oliveira RN

## 2024-06-12 NOTE — TELEPHONE ENCOUNTER
Spoke with patient.  Informed her that the only thing we did on our end was send in the letter and then called them to verify it was received, this was done.  They said someone named Nicole was helping?  Patient is not aware of a Nicole.   She will contact the person who has helped her historically with her rides.  Hailee Oliveira RN

## 2024-06-12 NOTE — TELEPHONE ENCOUNTER
Spoke with Shruthi Badillo PA-C.  Patient is currently in a long arm cast.  What she really should do is have an appointment in the next day or so to removed the cast, have her skin looked at, and then they can place her in an ortho-glass splint and she can be in that until she is seen in our clinic on 6-17-24.   Left voice mail with RN direct number to call back. Hailee Oliveira RN

## 2024-06-12 NOTE — TELEPHONE ENCOUNTER
Other: pt is calling in regards to medical transportation. The numbers provided were either not correct and when pt called medical transportation they told her they didn't know anything about the situation. She would like for care team to reach out to her in regards to this. Pt's appt is Monday 06/17     Could we send this information to you in [employer] or would you prefer to receive a phone call?:   Patient would prefer a phone call   Okay to leave a detailed message?: Yes at Cell number on file:    Telephone Information:   Mobile [phone]

## 2024-06-13 DIAGNOSIS — M25.522 ELBOW PAIN, LEFT: Primary | ICD-10-CM

## 2024-06-13 NOTE — TELEPHONE ENCOUNTER
"2nd message received from Nicole GRANTDelroy Stating   \"In order for her out of state to be approved, her referring Physician would need to fill out the [id number] form and sent it to medical services, the fax number is on the top right corner. Once they receive the form they will evaluate and then approve or deny. Until they decide, I would not be able to make arrangements.\"    RN left patient a long message with this information, she needs to call her referring provider to help her with the ride. This has to be initiated in [place].  If there's any other way for her to get to her appointment here on Monday that would be great. Hailee Oliveira RN      "

## 2024-06-13 NOTE — TELEPHONE ENCOUNTER
Message received from a Nicole Headley from [employer]. She completed the ride request and sent it to [facility]. She said to call and confirm the  with them some time tomorrow to make sure patient is on the list, their number is [phone].  RN left voice mail for patient that RN is NOT in the clinic tomorrow to call for this, so asked patient to please confirm her ride by calling them tomorrow.   Also left our main clinic number if she needs to speak with us. Hailee Oliveira, RN

## 2024-06-14 ENCOUNTER — TELEPHONE (OUTPATIENT)
Dept: ORTHOPEDICS | Facility: CLINIC | Age: 59
End: 2024-06-14
Payer: COMMERCIAL

## 2024-06-14 NOTE — TELEPHONE ENCOUNTER
Other: pt called is needing referring provider to sign authorization for med transportation. Pt is needing it signed and sent to medicaid before pt's appointment. Per pt she doesn't have the form, states that care team should have it. Her appt is on Monday 06/17 @ 120. Please call her back to discuss. Next opening appt isn't until 07/29. Any way possible can squeeze pt in earlier at an earlier date and time? Pt also wants to know if once the cast comes off, does provider want pt to see Dr. Marshall pt's infectious disease provider to follow up w/ after care?      Could we send this information to you in [email or web address] or would you prefer to receive a phone call?:   Patient would prefer a phone call   Okay to leave a detailed message?: Yes at Cell number on file:    Telephone Information:   Mobile [phone]

## 2024-06-14 NOTE — TELEPHONE ENCOUNTER
Patient is asking for a call from Dr. Sarmiento's care team regarding rescheduling her post op on 6/17. She is stating there is no transportation being authorized.

## 2024-06-17 NOTE — TELEPHONE ENCOUNTER
"Left voice mail for patient to call RN.     It's possible Mitali is referring to paperwork needing to be completed by the referring physician in [place].  [place] Medicaid has to approve out of state medical rides.      RN was emailing last week with   Nicole Headley  [employer]  Medicaid Travel & Accommodations  [phone] - Direct  [facility]  [address]  [facility]  [address]    Dr Sarmiento's office had sent them a letter per patient request.    Initially she said \"I have completed this and I ve sent it to [facility]. Please confirm the pickup with them some time tomorrow to make sure she is on the list. Their number is [phone]\"  but then Nicole figured out that the letter was coming from us in [place] and not her referring physician.      Nicole stated:  \"In order for her out of state to be approved, her referring Physician would need to fill out the [id number] form and sent it to medical services, the fax number is on the top right corner. Once they receive the form they will evaluate and then approve or deny. Until they decide, I would not be able to make arrangements.\"    RN spoke with patient 6-17-24.  She informed RN that no one showed up to pick her up to go and see her provider today.   She states her ID specialist placed her on new antibiotics 1 BID for life, she started this.   She had a terrible call with a  on Friday, states they were unhelpful and rude to her in regard to her ride to [place].    She askes that we talk with Angela Lee at [phone], she is the [facility] for internal med.  \"Can't remember MD name there\".  Angela can help more with ride coordination.     Then patient stated that her arm is very loose in the huge long arm cast, and skin is scraping inside, and there is drainage through the cast- kind of seeping and drippy.  She has a social work visit coming up and Mitali will ask this person to help her get to the ER today.  RN strongly advised " getting her to the ER there today for assessment.    She was supposed to get the cast cut off last week in Ortho up there and orders were sent, but she was unable to go due to ride difficulties.     Dr Sarmiento was informed of her status. Hailee Oliveira RN

## 2024-06-18 ENCOUNTER — APPOINTMENT (OUTPATIENT)
Dept: GENERAL RADIOLOGY | Facility: CLINIC | Age: 59
DRG: 492 | End: 2024-06-18
Attending: STUDENT IN AN ORGANIZED HEALTH CARE EDUCATION/TRAINING PROGRAM
Payer: COMMERCIAL

## 2024-06-18 ENCOUNTER — APPOINTMENT (OUTPATIENT)
Dept: GENERAL RADIOLOGY | Facility: CLINIC | Age: 59
DRG: 492 | End: 2024-06-18
Payer: COMMERCIAL

## 2024-06-18 ENCOUNTER — HOSPITAL ENCOUNTER (INPATIENT)
Facility: CLINIC | Age: 59
LOS: 10 days | Discharge: HOME HEALTH | DRG: 492 | End: 2024-06-28
Attending: INTERNAL MEDICINE | Admitting: FAMILY MEDICINE
Payer: COMMERCIAL

## 2024-06-18 DIAGNOSIS — E11.65 TYPE 2 DIABETES MELLITUS WITH HYPERGLYCEMIA, UNSPECIFIED WHETHER LONG TERM INSULIN USE (H): Primary | ICD-10-CM

## 2024-06-18 DIAGNOSIS — E78.5 DYSLIPIDEMIA: ICD-10-CM

## 2024-06-18 DIAGNOSIS — T84.50XA INFECTION OF TOTAL JOINT PROSTHESIS (H): ICD-10-CM

## 2024-06-18 DIAGNOSIS — E11.65 TYPE 2 DIABETES MELLITUS WITH HYPERGLYCEMIA, WITH LONG-TERM CURRENT USE OF INSULIN (H): ICD-10-CM

## 2024-06-18 DIAGNOSIS — T84.50XD INFECTION OF PROSTHETIC JOINT, SUBSEQUENT ENCOUNTER: ICD-10-CM

## 2024-06-18 DIAGNOSIS — K21.00 GASTROESOPHAGEAL REFLUX DISEASE WITH ESOPHAGITIS WITHOUT HEMORRHAGE: ICD-10-CM

## 2024-06-18 DIAGNOSIS — M00.9 JOINT INFECTION (H): ICD-10-CM

## 2024-06-18 DIAGNOSIS — F41.1 GENERALIZED ANXIETY DISORDER: ICD-10-CM

## 2024-06-18 DIAGNOSIS — Z79.4 TYPE 2 DIABETES MELLITUS WITH HYPERGLYCEMIA, WITH LONG-TERM CURRENT USE OF INSULIN (H): ICD-10-CM

## 2024-06-18 DIAGNOSIS — T84.50XS INFECTION OF PROSTHETIC JOINT, SEQUELA: ICD-10-CM

## 2024-06-18 DIAGNOSIS — R53.81 PHYSICAL DECONDITIONING: ICD-10-CM

## 2024-06-18 DIAGNOSIS — L03.114 CELLULITIS OF LEFT ELBOW: ICD-10-CM

## 2024-06-18 DIAGNOSIS — U07.1 INFECTION DUE TO 2019 NOVEL CORONAVIRUS: ICD-10-CM

## 2024-06-18 LAB
ABO/RH(D): NORMAL
ALBUMIN SERPL BCG-MCNC: 3 G/DL (ref 3.5–5.2)
ALBUMIN SERPL BCG-MCNC: 3.1 G/DL (ref 3.5–5.2)
ALBUMIN UR-MCNC: NEGATIVE MG/DL
ALP SERPL-CCNC: 172 U/L (ref 40–150)
ALP SERPL-CCNC: 182 U/L (ref 40–150)
ALT SERPL W P-5'-P-CCNC: 18 U/L (ref 0–50)
ALT SERPL W P-5'-P-CCNC: 19 U/L (ref 0–50)
ANION GAP SERPL CALCULATED.3IONS-SCNC: 10 MMOL/L (ref 7–15)
ANION GAP SERPL CALCULATED.3IONS-SCNC: 13 MMOL/L (ref 7–15)
ANTIBODY SCREEN: NEGATIVE
APAP SERPL-MCNC: <5 UG/ML (ref 10–30)
APPEARANCE UR: CLEAR
AST SERPL W P-5'-P-CCNC: 24 U/L (ref 0–45)
AST SERPL W P-5'-P-CCNC: 29 U/L (ref 0–45)
BILIRUB SERPL-MCNC: 0.2 MG/DL
BILIRUB SERPL-MCNC: 0.2 MG/DL
BILIRUB UR QL STRIP: NEGATIVE
BUN SERPL-MCNC: 16.8 MG/DL (ref 8–23)
BUN SERPL-MCNC: 18.6 MG/DL (ref 8–23)
CALCIUM SERPL-MCNC: 8.2 MG/DL (ref 8.8–10.2)
CALCIUM SERPL-MCNC: 8.6 MG/DL (ref 8.8–10.2)
CHLORIDE SERPL-SCNC: 98 MMOL/L (ref 98–107)
CHLORIDE SERPL-SCNC: 99 MMOL/L (ref 98–107)
COLOR UR AUTO: ABNORMAL
CREAT SERPL-MCNC: 1.1 MG/DL (ref 0.51–0.95)
CREAT SERPL-MCNC: 1.17 MG/DL (ref 0.51–0.95)
CRP SERPL-MCNC: 278.57 MG/L
D DIMER PPP FEU-MCNC: 1.65 UG/ML FEU (ref 0–0.5)
DEPRECATED HCO3 PLAS-SCNC: 22 MMOL/L (ref 22–29)
DEPRECATED HCO3 PLAS-SCNC: 24 MMOL/L (ref 22–29)
EGFRCR SERPLBLD CKD-EPI 2021: 53 ML/MIN/1.73M2
EGFRCR SERPLBLD CKD-EPI 2021: 57 ML/MIN/1.73M2
ERYTHROCYTE [DISTWIDTH] IN BLOOD BY AUTOMATED COUNT: 16.2 % (ref 10–15)
ERYTHROCYTE [DISTWIDTH] IN BLOOD BY AUTOMATED COUNT: 16.3 % (ref 10–15)
ERYTHROCYTE [SEDIMENTATION RATE] IN BLOOD BY WESTERGREN METHOD: 70 MM/HR (ref 0–30)
GLUCOSE BLDC GLUCOMTR-MCNC: 175 MG/DL (ref 70–99)
GLUCOSE BLDC GLUCOMTR-MCNC: 184 MG/DL (ref 70–99)
GLUCOSE BLDC GLUCOMTR-MCNC: 187 MG/DL (ref 70–99)
GLUCOSE BLDC GLUCOMTR-MCNC: 192 MG/DL (ref 70–99)
GLUCOSE BLDC GLUCOMTR-MCNC: 201 MG/DL (ref 70–99)
GLUCOSE BLDC GLUCOMTR-MCNC: 253 MG/DL (ref 70–99)
GLUCOSE SERPL-MCNC: 187 MG/DL (ref 70–99)
GLUCOSE SERPL-MCNC: 201 MG/DL (ref 70–99)
GLUCOSE UR STRIP-MCNC: NEGATIVE MG/DL
HBA1C MFR BLD: 7.4 %
HCT VFR BLD AUTO: 29.2 % (ref 35–47)
HCT VFR BLD AUTO: 31.6 % (ref 35–47)
HGB BLD-MCNC: 8.7 G/DL (ref 11.7–15.7)
HGB BLD-MCNC: 9.2 G/DL (ref 11.7–15.7)
HGB UR QL STRIP: NEGATIVE
INR PPP: 1.18 (ref 0.85–1.15)
INR PPP: 1.18 (ref 0.85–1.15)
KETONES UR STRIP-MCNC: NEGATIVE MG/DL
LEUKOCYTE ESTERASE UR QL STRIP: ABNORMAL
MCH RBC QN AUTO: 24.2 PG (ref 26.5–33)
MCH RBC QN AUTO: 24.4 PG (ref 26.5–33)
MCHC RBC AUTO-ENTMCNC: 29.1 G/DL (ref 31.5–36.5)
MCHC RBC AUTO-ENTMCNC: 29.8 G/DL (ref 31.5–36.5)
MCV RBC AUTO: 82 FL (ref 78–100)
MCV RBC AUTO: 83 FL (ref 78–100)
MUCOUS THREADS #/AREA URNS LPF: PRESENT /LPF
NITRATE UR QL: NEGATIVE
PH UR STRIP: 5.5 [PH] (ref 5–7)
PLATELET # BLD AUTO: 236 10E3/UL (ref 150–450)
PLATELET # BLD AUTO: 248 10E3/UL (ref 150–450)
POTASSIUM SERPL-SCNC: 4.9 MMOL/L (ref 3.4–5.3)
POTASSIUM SERPL-SCNC: 4.9 MMOL/L (ref 3.4–5.3)
PROCALCITONIN SERPL IA-MCNC: 0.4 NG/ML
PROT SERPL-MCNC: 6.6 G/DL (ref 6.4–8.3)
PROT SERPL-MCNC: 6.6 G/DL (ref 6.4–8.3)
RBC # BLD AUTO: 3.57 10E6/UL (ref 3.8–5.2)
RBC # BLD AUTO: 3.8 10E6/UL (ref 3.8–5.2)
RBC URINE: 1 /HPF
SARS-COV-2 RNA RESP QL NAA+PROBE: POSITIVE
SODIUM SERPL-SCNC: 133 MMOL/L (ref 135–145)
SODIUM SERPL-SCNC: 133 MMOL/L (ref 135–145)
SP GR UR STRIP: 1.02 (ref 1–1.03)
SPECIMEN EXPIRATION DATE: NORMAL
SQUAMOUS EPITHELIAL: <1 /HPF
UROBILINOGEN UR STRIP-MCNC: NORMAL MG/DL
WBC # BLD AUTO: 7.9 10E3/UL (ref 4–11)
WBC # BLD AUTO: 9.2 10E3/UL (ref 4–11)
WBC URINE: 2 /HPF

## 2024-06-18 PROCEDURE — 81001 URINALYSIS AUTO W/SCOPE: CPT

## 2024-06-18 PROCEDURE — 258N000003 HC RX IP 258 OP 636: Mod: JZ

## 2024-06-18 PROCEDURE — 250N000012 HC RX MED GY IP 250 OP 636 PS 637

## 2024-06-18 PROCEDURE — 250N000009 HC RX 250: Performed by: STUDENT IN AN ORGANIZED HEALTH CARE EDUCATION/TRAINING PROGRAM

## 2024-06-18 PROCEDURE — 87040 BLOOD CULTURE FOR BACTERIA: CPT

## 2024-06-18 PROCEDURE — 85610 PROTHROMBIN TIME: CPT

## 2024-06-18 PROCEDURE — 86140 C-REACTIVE PROTEIN: CPT

## 2024-06-18 PROCEDURE — 73080 X-RAY EXAM OF ELBOW: CPT | Mod: LT

## 2024-06-18 PROCEDURE — 82962 GLUCOSE BLOOD TEST: CPT

## 2024-06-18 PROCEDURE — 250N000013 HC RX MED GY IP 250 OP 250 PS 637

## 2024-06-18 PROCEDURE — 80143 DRUG ASSAY ACETAMINOPHEN: CPT

## 2024-06-18 PROCEDURE — 85379 FIBRIN DEGRADATION QUANT: CPT

## 2024-06-18 PROCEDURE — 71045 X-RAY EXAM CHEST 1 VIEW: CPT | Mod: 26 | Performed by: RADIOLOGY

## 2024-06-18 PROCEDURE — 250N000013 HC RX MED GY IP 250 OP 250 PS 637: Performed by: STUDENT IN AN ORGANIZED HEALTH CARE EDUCATION/TRAINING PROGRAM

## 2024-06-18 PROCEDURE — 99221 1ST HOSP IP/OBS SF/LOW 40: CPT | Mod: GC

## 2024-06-18 PROCEDURE — 120N000002 HC R&B MED SURG/OB UMMC

## 2024-06-18 PROCEDURE — 71045 X-RAY EXAM CHEST 1 VIEW: CPT

## 2024-06-18 PROCEDURE — 84145 PROCALCITONIN (PCT): CPT

## 2024-06-18 PROCEDURE — 87635 SARS-COV-2 COVID-19 AMP PRB: CPT

## 2024-06-18 PROCEDURE — 85652 RBC SED RATE AUTOMATED: CPT

## 2024-06-18 PROCEDURE — XW033E5 INTRODUCTION OF REMDESIVIR ANTI-INFECTIVE INTO PERIPHERAL VEIN, PERCUTANEOUS APPROACH, NEW TECHNOLOGY GROUP 5: ICD-10-PCS | Performed by: STUDENT IN AN ORGANIZED HEALTH CARE EDUCATION/TRAINING PROGRAM

## 2024-06-18 PROCEDURE — 73090 X-RAY EXAM OF FOREARM: CPT | Mod: LT

## 2024-06-18 PROCEDURE — 84155 ASSAY OF PROTEIN SERUM: CPT

## 2024-06-18 PROCEDURE — 85027 COMPLETE CBC AUTOMATED: CPT

## 2024-06-18 PROCEDURE — 82247 BILIRUBIN TOTAL: CPT

## 2024-06-18 PROCEDURE — 36415 COLL VENOUS BLD VENIPUNCTURE: CPT

## 2024-06-18 PROCEDURE — 93005 ELECTROCARDIOGRAM TRACING: CPT

## 2024-06-18 PROCEDURE — 250N000011 HC RX IP 250 OP 636: Mod: JZ

## 2024-06-18 PROCEDURE — 86900 BLOOD TYPING SEROLOGIC ABO: CPT

## 2024-06-18 PROCEDURE — 99222 1ST HOSP IP/OBS MODERATE 55: CPT | Mod: 24 | Performed by: STUDENT IN AN ORGANIZED HEALTH CARE EDUCATION/TRAINING PROGRAM

## 2024-06-18 PROCEDURE — 83036 HEMOGLOBIN GLYCOSYLATED A1C: CPT

## 2024-06-18 RX ORDER — PROCHLORPERAZINE MALEATE 5 MG
10 TABLET ORAL EVERY 6 HOURS PRN
Status: DISCONTINUED | OUTPATIENT
Start: 2024-06-18 | End: 2024-06-18

## 2024-06-18 RX ORDER — ONDANSETRON 2 MG/ML
4 INJECTION INTRAMUSCULAR; INTRAVENOUS EVERY 6 HOURS PRN
Status: DISCONTINUED | OUTPATIENT
Start: 2024-06-18 | End: 2024-06-24

## 2024-06-18 RX ORDER — DEXTROSE MONOHYDRATE 25 G/50ML
25-50 INJECTION, SOLUTION INTRAVENOUS
Status: DISCONTINUED | OUTPATIENT
Start: 2024-06-18 | End: 2024-06-24

## 2024-06-18 RX ORDER — PROCHLORPERAZINE 25 MG
25 SUPPOSITORY, RECTAL RECTAL EVERY 12 HOURS PRN
Status: DISCONTINUED | OUTPATIENT
Start: 2024-06-18 | End: 2024-06-18

## 2024-06-18 RX ORDER — CITALOPRAM HYDROBROMIDE 20 MG/1
20 TABLET ORAL AT BEDTIME
Status: DISCONTINUED | OUTPATIENT
Start: 2024-06-18 | End: 2024-06-28 | Stop reason: HOSPADM

## 2024-06-18 RX ORDER — OXYCODONE HYDROCHLORIDE 10 MG/1
10 TABLET ORAL EVERY 4 HOURS PRN
Status: DISCONTINUED | OUTPATIENT
Start: 2024-06-18 | End: 2024-06-18

## 2024-06-18 RX ORDER — NALOXONE HYDROCHLORIDE 0.4 MG/ML
0.2 INJECTION, SOLUTION INTRAMUSCULAR; INTRAVENOUS; SUBCUTANEOUS
Status: DISCONTINUED | OUTPATIENT
Start: 2024-06-18 | End: 2024-06-28 | Stop reason: HOSPADM

## 2024-06-18 RX ORDER — ATORVASTATIN CALCIUM 10 MG/1
10 TABLET, FILM COATED ORAL DAILY
Status: DISCONTINUED | OUTPATIENT
Start: 2024-06-18 | End: 2024-06-28 | Stop reason: HOSPADM

## 2024-06-18 RX ORDER — AMLODIPINE BESYLATE 5 MG/1
5 TABLET ORAL DAILY
Status: DISCONTINUED | OUTPATIENT
Start: 2024-06-18 | End: 2024-06-28 | Stop reason: HOSPADM

## 2024-06-18 RX ORDER — SCOLOPAMINE TRANSDERMAL SYSTEM 1 MG/1
1 PATCH, EXTENDED RELEASE TRANSDERMAL
Status: DISCONTINUED | OUTPATIENT
Start: 2024-06-18 | End: 2024-06-19

## 2024-06-18 RX ORDER — ACETAMINOPHEN 650 MG/1
650 SUPPOSITORY RECTAL 3 TIMES DAILY
Status: DISCONTINUED | OUTPATIENT
Start: 2024-06-18 | End: 2024-06-28 | Stop reason: HOSPADM

## 2024-06-18 RX ORDER — ONDANSETRON 4 MG/1
4 TABLET, ORALLY DISINTEGRATING ORAL EVERY 6 HOURS PRN
Status: DISCONTINUED | OUTPATIENT
Start: 2024-06-18 | End: 2024-06-24

## 2024-06-18 RX ORDER — HYDROMORPHONE HCL IN WATER/PF 6 MG/30 ML
0.2 PATIENT CONTROLLED ANALGESIA SYRINGE INTRAVENOUS EVERY 4 HOURS PRN
Status: DISCONTINUED | OUTPATIENT
Start: 2024-06-18 | End: 2024-06-21

## 2024-06-18 RX ORDER — LIDOCAINE 40 MG/G
CREAM TOPICAL
Status: DISCONTINUED | OUTPATIENT
Start: 2024-06-18 | End: 2024-06-28 | Stop reason: HOSPADM

## 2024-06-18 RX ORDER — OXYCODONE HYDROCHLORIDE 5 MG/1
5-10 TABLET ORAL EVERY 4 HOURS PRN
Status: DISCONTINUED | OUTPATIENT
Start: 2024-06-18 | End: 2024-06-19

## 2024-06-18 RX ORDER — ACETAMINOPHEN 325 MG/1
650 TABLET ORAL EVERY 4 HOURS PRN
Status: DISCONTINUED | OUTPATIENT
Start: 2024-06-18 | End: 2024-06-18

## 2024-06-18 RX ORDER — NALOXONE HYDROCHLORIDE 0.4 MG/ML
0.4 INJECTION, SOLUTION INTRAMUSCULAR; INTRAVENOUS; SUBCUTANEOUS
Status: DISCONTINUED | OUTPATIENT
Start: 2024-06-18 | End: 2024-06-28 | Stop reason: HOSPADM

## 2024-06-18 RX ORDER — NICOTINE POLACRILEX 4 MG
15-30 LOZENGE BUCCAL
Status: DISCONTINUED | OUTPATIENT
Start: 2024-06-18 | End: 2024-06-24

## 2024-06-18 RX ORDER — ALBUTEROL SULFATE 90 UG/1
2 AEROSOL, METERED RESPIRATORY (INHALATION) EVERY 4 HOURS PRN
Status: DISCONTINUED | OUTPATIENT
Start: 2024-06-18 | End: 2024-06-28 | Stop reason: HOSPADM

## 2024-06-18 RX ORDER — ACETAMINOPHEN 325 MG/1
975 TABLET ORAL 3 TIMES DAILY
Status: DISCONTINUED | OUTPATIENT
Start: 2024-06-18 | End: 2024-06-28 | Stop reason: HOSPADM

## 2024-06-18 RX ORDER — PROCHLORPERAZINE 25 MG
25 SUPPOSITORY, RECTAL RECTAL EVERY 12 HOURS PRN
Status: DISCONTINUED | OUTPATIENT
Start: 2024-06-18 | End: 2024-06-21

## 2024-06-18 RX ORDER — PROCHLORPERAZINE MALEATE 5 MG
10 TABLET ORAL EVERY 6 HOURS PRN
Status: DISCONTINUED | OUTPATIENT
Start: 2024-06-18 | End: 2024-06-21

## 2024-06-18 RX ORDER — CALCIUM CARBONATE 500 MG/1
1000 TABLET, CHEWABLE ORAL 4 TIMES DAILY PRN
Status: DISCONTINUED | OUTPATIENT
Start: 2024-06-18 | End: 2024-06-28 | Stop reason: HOSPADM

## 2024-06-18 RX ORDER — MIRABEGRON 25 MG/1
25 TABLET, FILM COATED, EXTENDED RELEASE ORAL DAILY
Status: DISCONTINUED | OUTPATIENT
Start: 2024-06-18 | End: 2024-06-28 | Stop reason: HOSPADM

## 2024-06-18 RX ADMIN — SODIUM CHLORIDE 50 ML: 9 INJECTION, SOLUTION INTRAVENOUS at 16:33

## 2024-06-18 RX ADMIN — PROCHLORPERAZINE MALEATE 10 MG: 5 TABLET ORAL at 02:33

## 2024-06-18 RX ADMIN — OXYCODONE HYDROCHLORIDE 10 MG: 10 TABLET ORAL at 06:08

## 2024-06-18 RX ADMIN — HYDROMORPHONE HYDROCHLORIDE 0.2 MG: 0.2 INJECTION, SOLUTION INTRAMUSCULAR; INTRAVENOUS; SUBCUTANEOUS at 09:09

## 2024-06-18 RX ADMIN — SCOPALAMINE 1 PATCH: 1 PATCH, EXTENDED RELEASE TRANSDERMAL at 11:48

## 2024-06-18 RX ADMIN — REMDESIVIR 200 MG: 100 INJECTION, POWDER, LYOPHILIZED, FOR SOLUTION INTRAVENOUS at 16:32

## 2024-06-18 RX ADMIN — OXYCODONE HYDROCHLORIDE 5 MG: 5 TABLET ORAL at 16:32

## 2024-06-18 RX ADMIN — INSULIN GLARGINE 15 UNITS: 100 INJECTION, SOLUTION SUBCUTANEOUS at 22:57

## 2024-06-18 RX ADMIN — INSULIN ASPART 1 UNITS: 100 INJECTION, SOLUTION INTRAVENOUS; SUBCUTANEOUS at 16:54

## 2024-06-18 RX ADMIN — GABAPENTIN 800 MG: 300 CAPSULE ORAL at 05:31

## 2024-06-18 RX ADMIN — INSULIN ASPART 1 UNITS: 100 INJECTION, SOLUTION INTRAVENOUS; SUBCUTANEOUS at 12:00

## 2024-06-18 RX ADMIN — LUMATEPERONE 84 MG: 42 CAPSULE ORAL at 05:31

## 2024-06-18 RX ADMIN — GABAPENTIN 400 MG: 300 CAPSULE ORAL at 21:05

## 2024-06-18 RX ADMIN — LUMATEPERONE 84 MG: 42 CAPSULE ORAL at 22:57

## 2024-06-18 RX ADMIN — CITALOPRAM HYDROBROMIDE 20 MG: 20 TABLET ORAL at 05:31

## 2024-06-18 RX ADMIN — OXYCODONE HYDROCHLORIDE 5 MG: 5 TABLET ORAL at 22:08

## 2024-06-18 RX ADMIN — CLONIDINE HYDROCHLORIDE 1 MG: 0.1 TABLET ORAL at 05:31

## 2024-06-18 RX ADMIN — ATORVASTATIN CALCIUM 10 MG: 10 TABLET, FILM COATED ORAL at 08:46

## 2024-06-18 RX ADMIN — CITALOPRAM HYDROBROMIDE 20 MG: 20 TABLET ORAL at 22:08

## 2024-06-18 RX ADMIN — ACETAMINOPHEN 975 MG: 325 TABLET, FILM COATED ORAL at 21:04

## 2024-06-18 RX ADMIN — OXYCODONE HYDROCHLORIDE 10 MG: 10 TABLET ORAL at 01:13

## 2024-06-18 RX ADMIN — Medication 15 ML: at 22:07

## 2024-06-18 RX ADMIN — SODIUM CHLORIDE, POTASSIUM CHLORIDE, SODIUM LACTATE AND CALCIUM CHLORIDE 1000 ML: 600; 310; 30; 20 INJECTION, SOLUTION INTRAVENOUS at 11:49

## 2024-06-18 ASSESSMENT — ACTIVITIES OF DAILY LIVING (ADL)
ADLS_ACUITY_SCORE: 29
ADLS_ACUITY_SCORE: 30
ADLS_ACUITY_SCORE: 29
ADLS_ACUITY_SCORE: 29
ADLS_ACUITY_SCORE: 30
ADLS_ACUITY_SCORE: 29
ADLS_ACUITY_SCORE: 30
ADLS_ACUITY_SCORE: 38
ADLS_ACUITY_SCORE: 29
DEPENDENT_IADLS:: CLEANING;COOKING;TRANSPORTATION
ADLS_ACUITY_SCORE: 30
ADLS_ACUITY_SCORE: 29

## 2024-06-18 NOTE — PLAN OF CARE
Goal Outcome Evaluation:    Pt. Arrived around 0020. 2 RN skin check complete with Leanne PALMER. Abrasion to L upper arm/shoulder, brusing to R and L anterior shoulders, and peeling to L wrist A&Ox4. VSS. Afebrile. Lung sounds CTA. Maintaining sats on RA. Bowel sounds active, LBM 6/17. CMS and neuro's are intact. Reports numbness to L pinky, otherwise denies. Denies shortness of breath, and chest pain. Given prn Compazine for nausea. LUE and RLE pain managed w/ scheduled meds and prn Oxycodone. Voids spontaneously without difficulty in the BSC. NPO overnight. L arm splint CDI. Pt up to BSC with pivot transfer, states she uses a walker at home, pt also prefers shoes to ambulate instead of socks. PIV is patent and SL in R arm. Call light is within reach, pt able to make needs known and is resting comfortably between cares. Will continue to monitor.  UA and COVID swabs sent to lab.          Pre and Post op Patient Education/Teaching Flowsheet  Relevant Diagnosis:  Right inguinal hernia  Teaching Topic:  Pre and post op teaching  Person(s) Involved in teaching:  Patient     Motivation Level:  Asks Questions:  Yes  Eager to Learn:  Yes  Cooperative:  Yes  Receptive (willing/able to accept information):  Yes  Any cultural factors/Orthodoxy beliefs that may influence understanding or compliance?  No    Patient/caregiver/family demonstrates understanding of the following:  Reason for the appointment, diagnosis, and treatment plan:  Yes  Patient demonstrates understanding of the following:  Pre-op bowel prep:  No  Post-op pain management recommendations (medications, ice compress, binder/athletic supporter (if applicable), etc.:  Yes  Inguinal hernia patients:  Post-op urinary retention- discussed signs/symptoms and visit to ER for Wat[place] catheter placement and to stay in place for at least 48 hours:  Yes  Restrictions:  Yes  Medications to take the day of surgery:  Per PCP  Blood thinner medications discussed and when to stop (if applicable):  Yes  Wound care:  Yes  Diabetes medication management (if applicable):  Per PCP  Which situations necessitate calling provider and whom to contact:  Discussed how to contact the hospital, nurse, and clinic scheduling staff if necessary      Date and time of surgery:  Yes  Location of surgery: [facility] if open, [facility] if robotic. Patient will decide and call to schedule.  History and Physical and any other testing necessary prior to surgery:  Yes  Required time line for completion of History and Physical and any pre-op testing:  Yes  Discuss need for someone to drive patient home and stay with them for 24 hours:  Yes  Pre-op showering/scrub information with Surgical Scrub:  Yes  NPO Guidelines:  NPO per Anesthesia Guidelines    Infection Prevention: Patient demonstrates understanding of the following:  Patient instructed on hand hygiene:  Yes  Surgical  procedure site care will be taught and will be reviewed at the time of discharge  Signs and symptoms of infection taught:  Yes  Wound care reviewed and will be taught at the time of discharge  Central venous catheter care will be taught at the time of discharge (if applicable)    Post-op follow-up:  Instructional materials used/given/mailed:  [place] [facility] Booklet, post op teaching sheet, Map, Soap, and arrival/location information    Surgical instructions mailed to patient

## 2024-06-18 NOTE — PROGRESS NOTES
Orthopaedic Surgery Progress Note 06/18/2024    S: No acute events.  NPO since [place]. Denies numbness or tingling. Questions answered. POC reviewed.    O:  Temp: 97.8  F (36.6  C) Temp src: Oral BP: 117/59 Pulse: 58   Resp: 16 SpO2: 97 % O2 Device: None (Room air)      Exam:  Gen: No acute distress, resting comfortably in bed.  Resp: Non-labored breathing  MSK:  LUE:  - Well fitting posterior slab splint in place, removed and circular area of tissue loss/ulceration. Some SS drainage but no obvious purulent drainage  - SILT medial/radial/ulnar/axillary nerves  - Fires EPL, FPL, Intrinsics  - Radial pulse 2+, hand wwp          Recent Labs   Lab 06/18/24  0108   WBC 9.2   HGB 9.2*          Assessment/Plan: Mitali Robles is a 59 year old female with a PMHx bipolar disorder, chronic pain, T2DM who is well known to this service with a complex orthopedic hx fracture dislocation of the left elbow c/b hardware failure and 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 Sarmiento. Patient presents with worsening left elbow pain, drainage, and wound ulceration concerning for chronic infection.     Plan pending discussion with Dr. Sarmiento today.    Medicine Primary  Activity: Up with assist.  Weight bearing status: NWB LUE  Antibiotics: Per ID  Diet: NPO until plan established  DVT prophylaxis: Hold for possible OR  Bracing/Splinting: New long arm splint applied; keep CDI   Wound Care: keep splint in place, okay to reinforce prn  X-rays: formal XR L elbow, L forearm pending  PT/OT: eval and treat  Labs: Hgb POD1  Consults: PT, OT, ID, Medicine appreciate assistance in caring for this patient.  Follow-up: TBD    Disposition: TBD    Richard Dunaway MD  Orthopaedic Surgery, PGY-4  Pager: [phone]

## 2024-06-18 NOTE — LETTER
Shruthi Bryan RN  [facility] Clinical Coordinator  [phone]    Discharge Orders & Summary 6/28/24  [facility], [place], [place]

## 2024-06-18 NOTE — PROGRESS NOTES
This writer asked pt if she had ordered lunch. Pt stated that she had ordered lunch. Called Dietary; pt has not ordered lunch. This writer reminded pt to order lunch.

## 2024-06-18 NOTE — PROGRESS NOTES
Pt requesting Pepto bismal for nausea and acid reflux.     Pt requesting Lantus is scheduled in evening.     MD paged.

## 2024-06-18 NOTE — CONSULTS
Care Management Initial Consult    General Information  Assessment completed with: Patient,    Type of CM/SW Visit: Initial Assessment    Primary Care Provider verified and updated as needed: Yes (Ritchie Zepeda MD  Internal Medicine, [facility],  [facility] [facility] [place] [place] [address]  Phone: [phone])   Readmission within the last 30 days: previous discharge plan unsuccessful   Return Category: Post-op/Post-procedure complication  Reason for Consult: discharge planning  Advance Care Planning: Advance Care Planning Reviewed: no concerns identified  Health Care Directive Information provided to patient       Communication Assessment  Patient's communication style: spoken language (English or Bilingual)    Hearing Difficulty or Deaf: no   Wear Glasses or Blind: yes    Cognitive  Cognitive/Neuro/Behavioral: .WDL except, level of consciousness, mood/behavior  Level of Consciousness: lethargic  Arousal Level: opens eyes spontaneously  Orientation: oriented x 4, other (see comments) (forgetful)  Mood/Behavior: calm, cooperative  Best Language: 0 - No aphasia  Speech: clear, spontaneous    Living Environment:   People in home: alone     Current living Arrangements: apartment      Able to return to prior arrangements: no       Family/Social Support:  Care provided by: self  Provides care for: no one  Marital Status:   Friend, Other (specify) (therapist)          Description of Support System: Involved    Support Assessment: Adequate social supports, Complicated family dynamics    Current Resources:   Patient receiving home care services: Yes  Skilled Home Care Services: Skilled Nursing, Physical Therapy, Occupational Therapy  Community Resources: DME, Home Care, Housekeeping/Chore Agency, , Transportation Services  Equipment currently used at home: cane, straight, shower chair, walker, standard  Supplies currently used at home: None    Employment/Financial:  Employment Status: disabled         Financial Concerns: none   Referral to Financial Worker: No     Does the patient's insurance plan have a 3 day qualifying hospital stay waiver?  No    Lifestyle & Psychosocial Needs:  Social Determinants of Health     Food Insecurity: No Food Insecurity (2/19/2024)    Received from [facility], [facility]    Hunger Vital Sign     Worried About Running Out of Food in the Last Year: Never true     Ran Out of Food in the Last Year: Never true   Depression: Not at risk (2/12/2024)    PHQ-2     PHQ-2 Score: 0   Recent Concern: Depression - At risk (1/31/2024)    Received from [facility], [facility]    PHQ-2     PHQ-9 Total (Adult): 19   Housing Stability: Low Risk  (2/19/2024)    Received from [facility], [facility]    Housing Stability Vital Sign     Unable to Pay for Housing in the Last Year: No     Number of Places Lived in the Last Year: 1     Unstable Housing in the Last Year: No   Tobacco Use: Medium Risk (6/17/2024)    Received from [facility]    Patient History     Smoking Tobacco Use: Former     Smokeless Tobacco Use: Never     Passive Exposure: Not on file   Financial Resource Strain: Low Risk  (2/19/2024)    Received from [facility], [facility]    Overall Financial Resource Strain (CARDIA)     Difficulty of Paying Living Expenses: Not hard at all   Alcohol Use: Not At Risk (2/17/2024)    Received from [facility], [facility]    AUDIT-C     Frequency of Alcohol Consumption: Never     Average Number of Drinks: Patient does not drink     Frequency of Binge Drinking: Never   Transportation Needs: No Transportation Needs (2/19/2024)    Received from [facility], [facility]    PRAPARE - Transportation     Lack of Transportation (Medical): No     Lack of Transportation  (Non-Medical): No   Physical Activity: Inactive (5/12/2021)    [facility]    Exercise Vital Sign     Days of Exercise per Week: 0 days     Minutes of Exercise per Session: 0 min   Interpersonal Safety: Not At Risk (5/12/2021)    [facility]    Humiliation, Afraid, Rape, and Kick questionnaire     Fear of Current or Ex-Partner: No     Emotionally Abused: No     Physically Abused: No     Sexually Abused: No   Stress: No Stress Concern Present (5/12/2021)    [facility]    Citizen of [place] of Occupational Health - Occupational Stress Questionnaire     Feeling of Stress : Only a little   Social Connections: Socially Isolated (5/12/2021)    [facility] from [facility], [facility]    Social Connection and Isolation Panel [NHANES]     Frequency of Communication with Friends and Family: More than three times a week     Frequency of Social Gatherings with Friends and Family: More than three times a week     Attends Christian Services: Never     Active Member of Clubs or Organizations: No     Attends Club or Organization Meetings: Never     Marital Status:    Health Literacy: Not on file       Functional Status:  Prior to admission patient needed assistance:   Dependent ADLs:: Ambulation-cane, Bathing, Dressing, Independent, Eating, Grooming, Incontinence, Positioning, Transfers, Toileting  Dependent IADLs:: Cleaning, Cooking, Transportation  Assesssment of Functional Status: Not at  functional baseline    Mental Health Status:  Mental Health Status: Current Concern  Mental Health Management: Medication, Individual Therapy, Psychiatrist    Chemical Dependency Status:  Chemical Dependency Status: No Current Concerns           Values/Beliefs:  Spiritual, Cultural Beliefs, Christian Practices,  Values that affect care:            Values/Beliefs Comment: Lenard    Additional Information:  Spoke with patient via phone, due to positive Covid results, to complete CMA.     59 year old female well known to this service with a complex PMH of TEA c/b hardware infection s/p explant and abx spacer, as well as proximal ulna fracture who presents with worsening left elbow pain concerning for chronic infection.     Patient admitted on 6/18 with positive covid and worsening left elbow pain, along with feeling malaise over the past 7-10 days.     Possible surgery on 6/19. [facility] will continue to follow and make appropriate referrals as needed.    Jeni Bean RN, BSN  Care Coordinator, [facility] Orth[facility]  Phone [phone]  Pager [phone]

## 2024-06-18 NOTE — LETTER
Health Information Management Services               Recipient:    TETO Arevalo      Sender:  Jeni Bean RN, BSN  Care Coordinator, [facility]  Phone [phone]  Pager [phone]          Date: June 28, 2024  Patient Name:  Mitali Robles  Patient YOB: 1965  Routing Message:      Discharge Summary      The documents accompanying this notice contain confidential information belonging to the sender.  This information is intended only for the use of the individual or entity named above.  The authorized recipient of this information is prohibited from disclosing this information to any other party and is required to destroy the information after its stated need has been fulfilled, unless otherwise required by state law.      If you are not the intended recipient, you are hereby notified that any disclosure, copy, distribution or action taken in reliance on the contents of these documents is strictly prohibited.  If you have received this document in error, please return it by fax to [phone] with a note on the cover sheet explaining why you are returning it (e.g. not your patient, not your provider, etc.).  If you need further assistance, please call [facility] Centralized Transcription at [phone].  Documents may also be returned by mail to Karisma Kidz, , [address], [place], [place] [address].
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
## 2024-06-18 NOTE — PROGRESS NOTES
Brief Orthopedic Update     Upon Dr. Sarmiento's review of patients chart, he determine she will likely require some form of operative intervention in coming days uli address new elbow wound. Dr. Sarmiento will perform in person wound check Wednesday, 6/19 between/after his cases to assess tissue quality and operative plan.    Assuming tissue is amenable, he will likely add on for for I&D, abx spacer exchange, ulcer excision, and primary closure L elbow on Wednesday, 6/19.     Patient should be made NPO at midnight. Hold DVT ppx for possible operative intervention. Pre op labs complete.    Richard Dunaway MD  Orthopaedic Surgery, PGY-4  Pager: [phone]

## 2024-06-18 NOTE — TELEPHONE ENCOUNTER
"1 day late entry:  RN (writer) spoke with patient 6-17-24.  She informed RN that no one showed up to pick her up to go and see her provider today.   She states her ID specialist placed her on new antibiotics 1 BID for life, she started this.   She had a terrible call with a  on Friday, states they were unhelpful and rude to her in regard to her ride to [place].    She askes that we talk with Angela Lee at [phone], she is the [facility] for internal med.  \"Can't remember MD name there\".  Angela can help more with ride coordination.      Then patient stated that her arm is very loose in the huge long arm cast, and skin is scraping inside, and there is drainage through the cast- kind of eloise and chris.  She has a social work visit coming up and Mitali will ask this person to help her get to the ER today.  RN strongly advised getting her to the ER there today for assessment.    She was supposed to get the cast cut off last week in Ortho up there and orders were sent, but she was unable to go due to ride difficulties.      Dr Sarmiento was informed of her status. Hailee Oliveira RN  "
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
## 2024-06-18 NOTE — LETTER
Shruthi Bryan RN  [facility] Clinical Coordinator  [phone]    Discharge 6/27/24 Orders  Please confirm where labs will be processed. Clinic? Hospital?

## 2024-06-18 NOTE — CONSULTS
Orthopaedic Surgery Consultation    Mitali Robles MRN# [id number]   Age: 59 year old YOB: 1965   Date of Admission:  6/18/2024    Reason for consult: L elbow pain   Requesting physician: Ivan Hayes MD   Level of consult: One-time consult to assist in determining a diagnosis, recommend an appropriate treatment plan and place orders            Impression and Recommendation (Resident / Clinician):   Impression:  Mitali Robles is a 59 year old female well known to this service with a complex PMH of TEA c/b hardware infection s/p explant and abx spacer, as well as proximal ulna fracture who presents with worsening left elbow pain concerning for chronic infection.    She notes increasing symptoms over the past week and a half as well as increasing inflammatory markers concerning for chronic infection of the left elbow.  She remains afebrile and is without leukocytosis.  Discussed multiple treatment options with the patient including repeat I&D, long-term antibiotic suppression via PICC, short course of IV antibiotics during her inpatient stay.     Recomendations:  Operative Plan: None at this time   - NPO    - Labs: CBC, BMP, INR/PTT, Type and Screen   - Hold anticoaguation morning of planned surgery   - Medical clearance for surgery to be performed by Primary Team    Activity: ERIKA TORRES.  Wound Cares/Dressing/Splint: Splint to remain clean, dry, and in place   DVT  PPx: DVT ppx per primary team  Antibiotic: Appreciate ID recs for abx therapy  Consults: Recommend Infectious disease consult to aid in antibiotic selection/duration  Dispo: TBD  Follow Up: TBD    Jm Hughes  Orthopaedic Surgery PGY-2  [phone]      Please page me directly with any questions/concerns during regular weekday hours before 5pm. If there is no response, if it is a weekend, or if it is during evening hours then please page the orthopaedic surgery resident on call.    Attestation:  Staff Torsten         Chief Complaint:    L elbow pain          History of Present Illness (Resident / Clinician):   sherley Robles is a 59 year old female well known to this service with a complex PMH of TEA c/b hardware infection s/p explant and abx spacer, as well as proximal ulna fracture who presents with worsening left elbow pain concerning for chronic infection.    She notes worsening of symptoms and feelings of malaise over the past 7-10 days.  She states her therapist noticed drainage from her cast last week prompting a visit to the emergency department.  Her cast was removed and replaced with a well-padded long-arm splint.  Labs at the outside hospital revealed CBC with no leukocytosis.  She notes a subjective fever and chills intermittently over the past week.  Also notes altered sensation in the left fifth finger. Denies any new trauma to the left elbow.       History obtained from patient interview and chart review.        Past Medical History:     Past Medical History:   Diagnosis Date    Back injury     Depressive disorder     Hearing problem     Hyperlipidemia     Hypertension     Neck injuries     Stomach ulcer              Past Surgical History:     Past Surgical History:   Procedure Laterality Date    BACK SURGERY      IR LUMBAR PUNCTURE  12/22/2020    IRRIGATION AND DEBRIDEMENT ELBOW, PLACE ANTIBIOTIC CEMENT BEADS / SPAC Left 4/10/2024    Procedure: IRRIGATION AND DEBRIDEMENT LEFT ELBOW WITH ANTIBIOTIC SPACER INSERTION;  Surgeon: Marcos Sarmiento MD;  Location: [facility] OR    [facility] SPINE SURGERY PROCEDURE UNLISTED      REMOVE HARDWARE ELBOW Left 4/10/2024    Procedure: EXPLANT LEFT TOTAL ELBOW ARTHROPLASTY;  Surgeon: Marcos Sarmiento MD;  Location:  OR    [facility] HAND/FINGER SURGERY UNLISTED      [facility] SHOULDER SURG PROC UNLISTED      [facility] STOMACH SURGERY PROCEDURE UNLISTED         Reviewed with patient       Social History:     Social History     Socioeconomic History    Marital status:      Spouse name: Not on file    Number of children: Not on  file    Years of education: Not on file    Highest education level: Not on file   Occupational History    Not on file   Tobacco Use    Smoking status: Former     Current packs/day: 0.00     Average packs/day: 1.5 packs/day for 40.2 years (60.3 ttl pk-yrs)     Types: Cigarettes     Start date: 1983     Quit date: 2023     Years since quittin.2    Smokeless tobacco: Never   Substance and Sexual Activity    Alcohol use: No    Drug use: No    Sexual activity: Never   Other Topics Concern    Not on file   Social History Narrative    Not on file     Social Determinants of Health     Financial Resource Strain: Low Risk  (2024)    [facility]    Overall Financial Resource Strain (CARDIA)     Difficulty of Paying Living Expenses: Not hard at all   Food Insecurity: No Food Insecurity (2024)    [facility]    Hunger Vital Sign     Worried About Running Out of Food in the Last Year: Never true     Ran Out of Food in the Last Year: Never true   Transportation Needs: No Transportation Needs (2024)    Received from [facility]    PRAPARE - Transportation     Lack of Transportation (Medical): No     Lack of Transportation (Non-Medical): No   Physical Activity: Inactive (2021)    Received from [facility]    Exercise Vital Sign     Days of Exercise per Week: 0 days     Minutes of Exercise per Session: 0 min   Stress: No Stress Concern Present (2021)    Received from [facility]    [employer] - Occupational Stress Questionnaire     Feeling of Stress : Only a little   Social Connections: Socially Isolated (2021)    Received from [facility], [facility]     "Social Connection and Isolation Panel [NHANES]     Frequency of Communication with Friends and Family: More than three times a week     Frequency of Social Gatherings with Friends and Family: More than three times a week     Attends Mosque Services: Never     Active Member of Clubs or Organizations: No     Attends Club or Organization Meetings: Never     Marital Status:    Interpersonal Safety: Not At Risk (5/12/2021)    Received from [facility], [facility]    Humiliation, Afraid, Rape, and Kick questionnaire     Fear of Current or Ex-Partner: No     Emotionally Abused: No     Physically Abused: No     Sexually Abused: No   Housing Stability: Low Risk  (2/19/2024)    Received from [facility], [facility]    Housing Stability Vital Sign     Unable to Pay for Housing in the Last Year: No     Number of Places Lived in the Last Year: 1     Unstable Housing in the Last Year: No             Family History:   No family history of anesthesia, bleeding or clotting complications.           Allergies:     Allergies   Allergen Reactions    Morphine Anaphylaxis     Throat swells shut  **Has taken hydrocodone/APAP and hydromorphone in the past during previous hospitalizations with no issues.  Takes oxycodone at home    Succinylcholine Shortness Of Breath     \"it affected my breathing\"    Fentanyl Nausea and Vomiting    Hydromorphone      Received 4/10 in OR without issue (was listed as allergy but has had in past without incident)    Methadone Nausea and Vomiting    Prednisone Swelling     \"it overrides my psych meds and makes me crazy\"    Pregabalin     Quetiapine     Trazodone     Sumatriptan Rash             Medications:   Medication reviewed with patient and in chart.  Anticoagulation: None  Antibiotics: None          Review of Systems:   A 12 point ROS was conducted and was otherwise negative except for HPI above.          Physical Exam: "   There were no vitals taken for this visit.  General: Awake, alert, cooperative, no apparent distress, appears stated age  HEENT: Normocephalic, atraumatic, EOMI, no scleral icterus  Respiratory: Breathing non-labored, no wheezing  Cardiovascular: Nontachycardic  Skin: No rashes or lesions  Neurological: A&Ox3, CN II-XII grossly intact    Musculoskeletal:  LUE:   - Well-padded long-arm splint applied.  Not removed for examination but windowed to reveal posterior elbow wounds covered in Xeroform and ABDs  - Fires EPL, intrinsics, FPL with 5/5 strength.   - SILT in axillary, musculocutaneous, radial, ulnar, and median nerve distribution.  Notes altered sensation in fifth finger.  -Radial pulse 1+, fingers wwp with BCR.          Imaging:   Review of x-rays from 6/17 demonstrate: Osseous defects from previous total hip arthroplasty explant.  Findings suspicious for chronic osteomyelitis of the left distal humerus.  Proximal ulna fracture still clearly visible.         Laboratory date:   CBC:  Lab Results   Component Value Date    WBC 9.2 06/18/2024    HGB 9.2 (L) 06/18/2024     06/18/2024       BMP:  Lab Results   Component Value Date     05/23/2024    POTASSIUM 4.7 05/24/2024    CHLORIDE 99 05/23/2024    CO2 30 (H) 05/23/2024    BUN 12.9 05/23/2024    CR 0.87 05/23/2024    ANIONGAP 9 05/23/2024    DIANE 7.9 (L) 05/23/2024     (H) 05/24/2024       Inflammatory Markers:  Lab Results   Component Value Date    WBC 9.2 06/18/2024    WBC 3.9 (L) 05/23/2024    WBC 6.1 05/20/2024    SED 70 (H) 06/18/2024    SED 59 (H) 02/12/2024    SED 51 (H) 10/30/2023

## 2024-06-18 NOTE — PROGRESS NOTES
If pt goes to surgery, pt requesting Aunt, Jessa is called; pt states okay to leave a detailed message on Aunt's phone number: [phone].
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
## 2024-06-18 NOTE — PLAN OF CARE
Goal Outcome Evaluation:           Overall Patient Progress: no changeOverall Patient Progress: no change         Chest, and forearm, and elbow x-ray completed today.     VS: /47 (BP Location: Right arm)   Pulse (!) 49   Temp 97.5  F (36.4  C) (Oral)   Resp 16   Wt 101.7 kg (224 lb 4.8 oz)   SpO2 93%   BMI 33.12 kg/m     Pulse low this AM; possibly due to Clonidine being given this AM. IV bolus administered.    O2: >90% on RA. Denies chest pain and SOB. LS clear and equal bilaterally.   On 1LO2 above >90%   Output: Voiding without difficulty at bedside commode. Voided 1x during shift.    Last BM: Last BM on 6/17. BS present in all x4 quadrants.   Activity: Ax1 with walker and gait belt    Skin: Abrasion L shoulder; bruised BUE's   Pain: Pain managed with PRN IV Dilaudid.    CMS: Baseline numbness and tingling L finger and LLE  Ax4 but forgetful   Dressing: NA   Diet: Regular diet  BG checks  Will be NPO at midnight   Intermittent nausea; seaband applied; Scopolamine patch applied   LDA: R PIV SL prior to infusing LR bolus 250 ml/hr   Equipment: IV pole. Personal belongings. Walker    Plan: Continue with plan: possible surgery tomorrow, monitor for sedation, monitor low pulse   Additional Info: Special Precautions due to Covid +    On continuous CAPNO    Once IV bolus complete, repeat BP recommended; oncoming RN notified.    If pt goes to surgery, pt requesting [place] Jessa is called; pt states okay to leave a detailed message on Aunt's phone number: [phone].

## 2024-06-19 LAB
ALBUMIN SERPL BCG-MCNC: 3 G/DL (ref 3.5–5.2)
ALLEN'S TEST: YES
ALP SERPL-CCNC: 170 U/L (ref 40–150)
ALT SERPL W P-5'-P-CCNC: 16 U/L (ref 0–50)
ANION GAP SERPL CALCULATED.3IONS-SCNC: 9 MMOL/L (ref 7–15)
AST SERPL W P-5'-P-CCNC: 21 U/L (ref 0–45)
ATRIAL RATE - MUSE: 50 BPM
BASE EXCESS BLDA CALC-SCNC: 2.1 MMOL/L (ref -3–3)
BASE EXCESS BLDV CALC-SCNC: 1.6 MMOL/L (ref -3–3)
BILIRUB SERPL-MCNC: 0.2 MG/DL
BUN SERPL-MCNC: 12.9 MG/DL (ref 8–23)
CALCIUM SERPL-MCNC: 8.6 MG/DL (ref 8.8–10.2)
CHLORIDE SERPL-SCNC: 103 MMOL/L (ref 98–107)
COHGB MFR BLD: 96.1 % (ref 96–97)
CREAT SERPL-MCNC: 1.01 MG/DL (ref 0.51–0.95)
CRP SERPL-MCNC: 194.59 MG/L
DEPRECATED HCO3 PLAS-SCNC: 25 MMOL/L (ref 22–29)
DIASTOLIC BLOOD PRESSURE - MUSE: NORMAL MMHG
EGFRCR SERPLBLD CKD-EPI 2021: 63 ML/MIN/1.73M2
ERYTHROCYTE [DISTWIDTH] IN BLOOD BY AUTOMATED COUNT: 16.2 % (ref 10–15)
GLUCOSE BLDC GLUCOMTR-MCNC: 125 MG/DL (ref 70–99)
GLUCOSE BLDC GLUCOMTR-MCNC: 140 MG/DL (ref 70–99)
GLUCOSE BLDC GLUCOMTR-MCNC: 150 MG/DL (ref 70–99)
GLUCOSE BLDC GLUCOMTR-MCNC: 157 MG/DL (ref 70–99)
GLUCOSE BLDC GLUCOMTR-MCNC: 177 MG/DL (ref 70–99)
GLUCOSE BLDC GLUCOMTR-MCNC: 182 MG/DL (ref 70–99)
GLUCOSE BLDC GLUCOMTR-MCNC: 218 MG/DL (ref 70–99)
GLUCOSE SERPL-MCNC: 154 MG/DL (ref 70–99)
HCO3 BLD-SCNC: 28 MMOL/L (ref 21–28)
HCO3 BLDV-SCNC: 30 MMOL/L (ref 21–28)
HCT VFR BLD AUTO: 31.3 % (ref 35–47)
HGB BLD-MCNC: 9.1 G/DL (ref 11.7–15.7)
INR PPP: 1.14 (ref 0.85–1.15)
INTERPRETATION ECG - MUSE: NORMAL
MCH RBC QN AUTO: 24.5 PG (ref 26.5–33)
MCHC RBC AUTO-ENTMCNC: 29.1 G/DL (ref 31.5–36.5)
MCV RBC AUTO: 84 FL (ref 78–100)
O2/TOTAL GAS SETTING VFR VENT: 1 %
O2/TOTAL GAS SETTING VFR VENT: 40 %
OXYHGB MFR BLDV: 45 % (ref 70–75)
P AXIS - MUSE: 97 DEGREES
PCO2 BLD: 50 MM HG (ref 35–45)
PCO2 BLDV: 65 MM HG (ref 40–50)
PH BLD: 7.36 [PH] (ref 7.35–7.45)
PH BLDV: 7.27 [PH] (ref 7.32–7.43)
PLATELET # BLD AUTO: 273 10E3/UL (ref 150–450)
PO2 BLD: 78 MM HG (ref 80–105)
PO2 BLDV: 30 MM HG (ref 25–47)
POTASSIUM SERPL-SCNC: 5.2 MMOL/L (ref 3.4–5.3)
PR INTERVAL - MUSE: 182 MS
PROT SERPL-MCNC: 6.5 G/DL (ref 6.4–8.3)
QRS DURATION - MUSE: 136 MS
QT - MUSE: 508 MS
QTC - MUSE: 463 MS
R AXIS - MUSE: 39 DEGREES
RBC # BLD AUTO: 3.71 10E6/UL (ref 3.8–5.2)
SAO2 % BLDA: 94 % (ref 92–100)
SAO2 % BLDV: 45.8 % (ref 70–75)
SODIUM SERPL-SCNC: 137 MMOL/L (ref 135–145)
SYSTOLIC BLOOD PRESSURE - MUSE: NORMAL MMHG
T AXIS - MUSE: 35 DEGREES
VENTRICULAR RATE- MUSE: 50 BPM
WBC # BLD AUTO: 6.3 10E3/UL (ref 4–11)

## 2024-06-19 PROCEDURE — 80053 COMPREHEN METABOLIC PANEL: CPT

## 2024-06-19 PROCEDURE — 36415 COLL VENOUS BLD VENIPUNCTURE: CPT

## 2024-06-19 PROCEDURE — 36600 WITHDRAWAL OF ARTERIAL BLOOD: CPT

## 2024-06-19 PROCEDURE — 86140 C-REACTIVE PROTEIN: CPT

## 2024-06-19 PROCEDURE — 999N000157 HC STATISTIC RCP TIME EA 10 MIN

## 2024-06-19 PROCEDURE — 93005 ELECTROCARDIOGRAM TRACING: CPT

## 2024-06-19 PROCEDURE — 99233 SBSQ HOSP IP/OBS HIGH 50: CPT | Mod: 24 | Performed by: STUDENT IN AN ORGANIZED HEALTH CARE EDUCATION/TRAINING PROGRAM

## 2024-06-19 PROCEDURE — 85610 PROTHROMBIN TIME: CPT

## 2024-06-19 PROCEDURE — 258N000003 HC RX IP 258 OP 636: Mod: JZ

## 2024-06-19 PROCEDURE — 120N000002 HC R&B MED SURG/OB UMMC

## 2024-06-19 PROCEDURE — 99231 SBSQ HOSP IP/OBS SF/LOW 25: CPT | Mod: GC

## 2024-06-19 PROCEDURE — 93010 ELECTROCARDIOGRAM REPORT: CPT | Performed by: INTERNAL MEDICINE

## 2024-06-19 PROCEDURE — 250N000011 HC RX IP 250 OP 636: Mod: JZ

## 2024-06-19 PROCEDURE — 85027 COMPLETE CBC AUTOMATED: CPT

## 2024-06-19 PROCEDURE — 250N000013 HC RX MED GY IP 250 OP 250 PS 637

## 2024-06-19 PROCEDURE — 250N000013 HC RX MED GY IP 250 OP 250 PS 637: Performed by: STUDENT IN AN ORGANIZED HEALTH CARE EDUCATION/TRAINING PROGRAM

## 2024-06-19 PROCEDURE — 82805 BLOOD GASES W/O2 SATURATION: CPT

## 2024-06-19 PROCEDURE — 258N000003 HC RX IP 258 OP 636: Mod: JZ | Performed by: INTERNAL MEDICINE

## 2024-06-19 RX ORDER — OXYCODONE HYDROCHLORIDE 5 MG/1
5 TABLET ORAL EVERY 4 HOURS PRN
Status: DISCONTINUED | OUTPATIENT
Start: 2024-06-19 | End: 2024-06-19

## 2024-06-19 RX ORDER — OXYCODONE HYDROCHLORIDE 5 MG/1
5-10 TABLET ORAL EVERY 4 HOURS PRN
Status: DISCONTINUED | OUTPATIENT
Start: 2024-06-19 | End: 2024-06-28 | Stop reason: HOSPADM

## 2024-06-19 RX ORDER — SODIUM CHLORIDE, SODIUM LACTATE, POTASSIUM CHLORIDE, CALCIUM CHLORIDE 600; 310; 30; 20 MG/100ML; MG/100ML; MG/100ML; MG/100ML
INJECTION, SOLUTION INTRAVENOUS
Status: COMPLETED
Start: 2024-06-19 | End: 2024-06-19

## 2024-06-19 RX ORDER — ENOXAPARIN SODIUM 100 MG/ML
40 INJECTION SUBCUTANEOUS EVERY 24 HOURS
Status: DISCONTINUED | OUTPATIENT
Start: 2024-06-19 | End: 2024-06-22 | Stop reason: DRUGHIGH

## 2024-06-19 RX ORDER — DIAZEPAM 10 MG
30 TABLET ORAL AT BEDTIME
Status: ON HOLD | COMMUNITY
End: 2024-06-27

## 2024-06-19 RX ORDER — DICLOXACILLIN SODIUM 250 MG
500 CAPSULE ORAL 2 TIMES DAILY
Status: ON HOLD | COMMUNITY
End: 2024-06-27

## 2024-06-19 RX ADMIN — ATORVASTATIN CALCIUM 10 MG: 10 TABLET, FILM COATED ORAL at 09:02

## 2024-06-19 RX ADMIN — CLONIDINE HYDROCHLORIDE 0.8 MG: 0.1 TABLET ORAL at 22:32

## 2024-06-19 RX ADMIN — ACETAMINOPHEN 975 MG: 325 TABLET, FILM COATED ORAL at 09:01

## 2024-06-19 RX ADMIN — OXYCODONE HYDROCHLORIDE 5 MG: 5 TABLET ORAL at 00:45

## 2024-06-19 RX ADMIN — Medication 15 ML: at 17:21

## 2024-06-19 RX ADMIN — SODIUM CHLORIDE, POTASSIUM CHLORIDE, SODIUM LACTATE AND CALCIUM CHLORIDE 1000 ML: 600; 310; 30; 20 INJECTION, SOLUTION INTRAVENOUS at 09:02

## 2024-06-19 RX ADMIN — OXYCODONE HYDROCHLORIDE 10 MG: 5 TABLET ORAL at 17:26

## 2024-06-19 RX ADMIN — ACETAMINOPHEN 975 MG: 325 TABLET, FILM COATED ORAL at 14:24

## 2024-06-19 RX ADMIN — INSULIN ASPART 1 UNITS: 100 INJECTION, SOLUTION INTRAVENOUS; SUBCUTANEOUS at 18:30

## 2024-06-19 RX ADMIN — LUMATEPERONE 84 MG: 42 CAPSULE ORAL at 22:41

## 2024-06-19 RX ADMIN — INSULIN GLARGINE 15 UNITS: 100 INJECTION, SOLUTION SUBCUTANEOUS at 22:34

## 2024-06-19 RX ADMIN — OXYCODONE HYDROCHLORIDE 10 MG: 5 TABLET ORAL at 22:33

## 2024-06-19 RX ADMIN — CITALOPRAM HYDROBROMIDE 20 MG: 20 TABLET ORAL at 22:33

## 2024-06-19 RX ADMIN — ACETAMINOPHEN 975 MG: 325 TABLET, FILM COATED ORAL at 20:34

## 2024-06-19 RX ADMIN — AMLODIPINE BESYLATE 5 MG: 5 TABLET ORAL at 09:02

## 2024-06-19 RX ADMIN — REMDESIVIR 100 MG: 100 INJECTION, POWDER, LYOPHILIZED, FOR SOLUTION INTRAVENOUS at 17:06

## 2024-06-19 RX ADMIN — SODIUM CHLORIDE 50 ML: 9 INJECTION, SOLUTION INTRAVENOUS at 17:03

## 2024-06-19 RX ADMIN — OXYCODONE HYDROCHLORIDE 5 MG: 5 TABLET ORAL at 12:28

## 2024-06-19 ASSESSMENT — ACTIVITIES OF DAILY LIVING (ADL)
ADLS_ACUITY_SCORE: 35
ADLS_ACUITY_SCORE: 30
ADLS_ACUITY_SCORE: 35
ADLS_ACUITY_SCORE: 30
ADLS_ACUITY_SCORE: 35
ADLS_ACUITY_SCORE: 30
ADLS_ACUITY_SCORE: 35
ADLS_ACUITY_SCORE: 30
ADLS_ACUITY_SCORE: 30
ADLS_ACUITY_SCORE: 35
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
## 2024-06-19 NOTE — PROGRESS NOTES
Ortho Progress Note.    Due to OR lack of availability and ongoing surgical scheduling, we will postpone surgical intervention for the L elbow at this time.    Patient will be given a diet at this time. No longer NPO.    .Assessment and plan discussed with:  - Dr. Sarmiento, Staff    Douglas Perez MD  Resident Physician  Orthopedic Surgery  Pager: [phone]     Please page me directly with any questions/concerns during regular weekday hours before 5 pm. If there is no response, if it is a weekend, or if it is during evening hours then please page the orthopedic surgery resident on call.

## 2024-06-19 NOTE — PLAN OF CARE
VS: /66 (BP Location: Right arm)   Pulse 60   Temp 97.7  F (36.5  C) (Oral)   Resp 15   Wt 101.7 kg (224 lb 4.8 oz)   SpO2 95%   BMI 33.12 kg/m     O2: Room air   Output: Continent of bowel and bladder   Last BM: 6/18/24   Activity: Up with assist to commode   Skin: L elbow wound   Pain: Managed with medication, ice and positioning   CMS: Baseline neuropathy   Dressing: Splint to LUE changed this morning by MD   Diet: NPO    LDA: R PIV SL   Equipment: IV Pump, commode, walker, gait belt   Plan: Possible OR today    Additional Info: Patient is alert and oriented. Somewhat lethargic earlier this shift, this has resolved. NPO for possible OR today. If not going to OR, patient will be returned to regular diet by early afternoon. 1:28 PM  Patient ambulated with gait belt off oxygen SBA to toilet in room. When return to bed, SpO2 96% on room air, pule 96. Patient steady with ambulation.   2:42 PM  Order placed for patient to have regular diet. Patient informed and meal ordered.  Call light in reach. Vital signs taken. See flowsheet.

## 2024-06-19 NOTE — PLAN OF CARE
Goal Outcome Evaluation:      Plan of Care Reviewed With: patient    Overall Patient Progress: no changeOverall Patient Progress: no change      VS: /56 (BP Location: Right arm, Patient Position: Semi-Amaya's, Cuff Size: Adult Large)   Pulse 56   Temp 98  F (36.7  C) (Axillary)   Resp 16   Wt 101.7 kg (224 lb 4.8 oz)   SpO2 95%   BMI 33.12 kg/m      O2: SpO2 >95% on 1LPM NC   Output: Voids spontaneously in bedside commode, denies difficulty   Last BM: 6/18   Activity: Ax1 w/ alker, GB   Skin: Visible skin intact   Pain: 8/10 pain in (R) leg and (L) arm   CMS: Intact, numbness in hands and feet at baseline   Dressing: (L) arm ace wrapped   Diet: Carb count, BG    LDA: Scopolamine patch on (R) bicep, (R) PIV SL   Equipment: IV pole, personal belongings, BSC, walker   Plan: Continue POC   Additional Info: NPO at midnight for poss. procedure today.

## 2024-06-19 NOTE — PROGRESS NOTES
Orthopaedic Surgery Progress Note 06/19/2024    S: No acute events, a little more difficult to arouse per nursing but patient able to wake up and state her name and where she is then falls back asleep. NPO since MN. Denies numbness or tingling. Questions answered. POC reviewed.    O:  Temp: 98  F (36.7  C) Temp src: Axillary BP: 108/56 Pulse: 56   Resp: 16 SpO2: 95 % O2 Device: Nasal cannula Oxygen Delivery: 2 LPM    Exam:  Gen: No acute distress, resting comfortably in bed.  Resp: Non-labored breathing  MSK:  LUE:  - Well fitting posterior slab splint in place  - SILT medial/radial/ulnar/axillary nerves  - Fires EPL, FPL, Intrinsics  - Radial pulse 2+, hand wwp          Recent Labs   Lab 06/19/24  0635 06/18/24  0748 06/18/24  0108   WBC 6.3 7.9 9.2   HGB 9.1* 8.7* 9.2*    236 248       Assessment/Plan: Mitali Robles is a 59 year old female with a PMHx bipolar disorder, chronic pain, T2DM who is well known to this service with a complex orthopedic hx fracture dislocation of the left elbow c/b hardware failure and 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 Sarmiento. Patient presents with worsening left elbow pain, drainage, and wound ulceration concerning for chronic infection.     Plan for OR today with Dr. Sarmiento    Medicine Primary  Activity: Up with assist.  Weight bearing status: NWB LUE  Antibiotics: Per ID  Diet: NPO   DVT prophylaxis: Hold for possible OR  Bracing/Splinting: New long arm splint applied; keep CDI   Wound Care: keep splint in place, okay to reinforce prn  X-rays: formal XR L elbow, L forearm pending  PT/OT: eval and treat  Labs: Hgb POD1  Consults: PT, OT, ID, Medicine appreciate assistance in caring for this patient.  Follow-up: TBD    Disposition: TBD    Frank Chamberlain MD  Orthopedic Surgery PGY4

## 2024-06-19 NOTE — PROGRESS NOTES
[id number]    Sats remained upper 90s on 1L NC, continent BB, LBM 6/18, SBA w/gait belt to bathroom. OR scheduled for tomorrow at 1515. Provider increased oxycodone from 5mg q4h to 5-10 mg q4hr per pt request. Pain managed w/ scheduled tylenol & 10 mg oxycodone. Splint/plaster cast replaced this morning & ace wrapped. R PIV SL    NPO status at midnight for procedure tomorrow    Able to use call light appropriately and make needs known, no new medical concerns at this time.
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
## 2024-06-19 NOTE — PROGRESS NOTES
"[facility]   [facility]RI[facility]    SUBJECTIVE    0700 Received notification from RN that patient somnolent, difficult to arouse    \"pt has been difficult to arouse this morning. She was receiving PRN oxy and her last dose was given at 0045. Since then she has been sleeping hard. Woke up sluggish and answered all orientation questions correctly, but is getting harder to arouse. VSS, last  at 0600\"    Patient initially only briefly opened eyes and then fell back asleep. Mumbled responses    After 15 minutes, she became more alert and was speaking spontaneously in full and coherent sentences.     When able to answer questions, denied chest pain, dyspnea. \"I'm tired and my leg and arm hurt\"    OBJECTIVE:    /46  HR 49  RR 10  SPO2   82% on RA,   89% on 1L NC,   100% on 4L NC     Initial Exam:   General: Somnolent, difficult to arouse  Skin: Pale, diaphoretic  Eyes: Extra-ocular muscles intact, pupils equal and reactive.  CV: Bradycardic, cool extremities, peripheral pulses are palpable   Respiratory: Bradypnea. LCTAB  Neuro: Opens eyes briefly to verbal, confused verbal response, withdraws to pain (GSC11)    Follow up exam  General: awake, alert  Skin: pale, diaphoretic  CV: Bradycardic, cool extremities, strong peripheral pulses  Respiratory: Normal rate, LCTAB  Neuro: Opens eyes spontaneously, fully oriented verbal response, obeys commands (GSC 15)    ASSESSMENT/PLAN:    # Episode of decreased LOC  # Acute hypoxic respiratory failure  Initially difficult to arouse with respiratory depression, baseline bradycardia, and softer pressures but no phyllis hypotension. Diaphoretic and cool extremities. POC BG normal. CMP and CBC from this AM unchanged from day prior. EKG sinus bradycardia with baseline RBBB. MAR reviewed for sedating medications: last gabapentin dose was 10pm last night, clonidine >24 hours ago, dilaudid 9am yesterday, oxycodone 1am. Scopolamine patch in place. No benzodiazepines. " Patient's mental status improved after increasing O2. Due to signs of poor perfusion on exam, will give bolus. Most likely iatrogenic however no sedating medications given in the last 6 hours, versus hypoxia    Patient is stable and appropriate to remain on current unit.     VBG  EKG  - sinus real, unchanged RBBB  Remove scopolamine patch  1L LR @ 500cc/hr  HOLD oxycodone, gabapentin, dilaudid this morning - reassess soon    Please see daily rounding note for full A/P.    MD Diana Syed MD  7:35 AM

## 2024-06-19 NOTE — PLAN OF CARE
VS: Visit Vitals  /53 (BP Location: Right arm)   Pulse (!) 49   Temp 97.5  F (36.4  C) (Oral)   Resp 15       O2: Room Air   Output: Normal urinary habits   Last BM: 6/18 pt complaining of Diarrhea -got orders for pepto per providers orders.     Activity: One assist with gait belt walker, pivot to commode   Up for meals? In bed    Skin: Warm, abrasion/excoriation;peeling/scaling, LUE. Rash in groin, pt refused Micotin powder.   Pain: Given oxycodone and acetaminophen for pain control.    CMS: AO x 4 (forgetful and confused at times)   Dressing: Splint in place LUE   Diet: Regular, NPO at 0000    LDA: Saline locked R PIV   Equipment: Gait belt, rolling walker, bedside commode   Plan: Possible surgery tomorrow    Additional Info: Held clonidine due to parameters given from the provider.     *Provider notified of pulse* (Pulse was 48 at the time)

## 2024-06-19 NOTE — PHARMACY-ADMISSION MEDICATION HISTORY
Pharmacist Admission Medication History    Admission medication history is complete. The information provided in this note is only as accurate as the sources available at the time of the update.    Information Source(s):   - Discharge summary from  TCU 5/24  - Fill history  - Chart review     Pertinent Information:   - Patient was not interviewed, attempted to call room x3 with no answer. List was updated using the sources above. Unable to confirm last doses/adherence or any non-prescription medications.   -Diazepam: this was discontinued during last admission/TCU stay, however it does appear patient filled this medication 5/29/24 (diazepam 10 mg #90 for 30 DS). Per notes, patient reports taking 30 mg at bedtime   -Clonidine: this was changed to 0.2 mg BID during last admission/TCU stay, however it does appear patient filled enough for 1 mg/d last 5/29/24 (clonidine 0.3 mg #90 for 30 DS, clonidine 0.1 #30 for 30 DS). Per notes, patient reports taking 1 mg at bedtime   - Dicloxacillin: patient was prescribed chronic suppressive therapy 500 mg BID by [facility] 6/13/24. Unable to confirm if patient did start this     Changes made to PTA medication list:  Added: dicloxacillin, diazepam (see above)   Deleted: None  Changed: clonidine (see above)     Allergies reviewed with patient and updates made in EHR: yes    Medication History Completed By: PARTH ANDRES RPH 6/19/2024 11:15 AM    PTA Med List   Medication Sig Last Dose    acetaminophen (TYLENOL) 325 MG tablet Take 2 tablets (650 mg) by mouth every 4 hours as needed for other (For optimal non-opioid multimodal pain management to improve pain control.) Unknown    albuterol (PROAIR HFA/PROVENTIL HFA/VENTOLIN HFA) 108 (90 BASE) MCG/ACT Inhaler Inhale 2 puffs into the lungs as needed Unknown    amLODIPine (NORVASC) 5 MG tablet Take 1 tablet (5 mg) by mouth daily Unknown    atorvastatin (LIPITOR) 10 MG tablet Take 10 mg by mouth daily Unknown    citalopram  (CELEXA) 20 MG tablet Take 1 tablet (20 mg) by mouth at bedtime Unknown    diazepam (VALIUM) 10 MG tablet Take 30 mg by mouth at bedtime Unknown    dicloxacillin (DYNAPEN) 250 MG capsule Take 500 mg by mouth 2 times daily Unknown    gabapentin (NEURONTIN) 400 MG capsule Take 2 capsules (800 mg) by mouth 3 times daily Unknown    insulin aspart (NOVOLOG FLEXPEN) 100 UNIT/ML pen Inject 4-12 Units Subcutaneous 3 times daily (with meals) + correction Unknown    insulin degludec (TRESIBA FLEXTOUCH) 100 UNIT/ML pen Inject 15 Units Subcutaneous at bedtime May gradually increase dose if glucose elevated to your previous dose of 25 units daily. Unknown    losartan (COZAAR) 25 MG tablet Take 1 tablet (25 mg) by mouth daily Unknown    methocarbamol (ROBAXIN) 500 MG tablet Take 1 tablet (500 mg) by mouth every 6 hours as needed for muscle spasms Unknown    miconazole (MICATIN) 2 % external powder Apply topically 2 times daily Apply to groin area after washing area with soap and water and drying bid Unknown    mirabegron (MYRBETRIQ) 25 MG 24 hr tablet Take 1 tablet (25 mg) by mouth daily Unknown    omeprazole (PRILOSEC) 20 MG DR capsule Take 20 mg by mouth daily Unknown    oxyCODONE IR (ROXICODONE) 10 MG tablet Take 1 tablet (10 mg) by mouth every 4 hours as needed for moderate to severe pain Unknown    polyethylene glycol (MIRALAX) 17 GM/Dose powder Take 17 g by mouth daily Unknown    psyllium (METAMUCIL) WAFR Take 2 Wafers by mouth daily Unknown    saccharomyces boulardii (FLORASTOR) 250 MG capsule Take 1 capsule (250 mg) by mouth 2 times daily Unknown

## 2024-06-20 ENCOUNTER — ANESTHESIA EVENT (OUTPATIENT)
Dept: SURGERY | Facility: CLINIC | Age: 59
DRG: 492 | End: 2024-06-20
Payer: COMMERCIAL

## 2024-06-20 ENCOUNTER — ANESTHESIA (OUTPATIENT)
Dept: SURGERY | Facility: CLINIC | Age: 59
DRG: 492 | End: 2024-06-20
Payer: COMMERCIAL

## 2024-06-20 ENCOUNTER — APPOINTMENT (OUTPATIENT)
Dept: GENERAL RADIOLOGY | Facility: CLINIC | Age: 59
DRG: 492 | End: 2024-06-20
Attending: STUDENT IN AN ORGANIZED HEALTH CARE EDUCATION/TRAINING PROGRAM
Payer: COMMERCIAL

## 2024-06-20 ENCOUNTER — HOME INFUSION (PRE-WILLOW HOME INFUSION) (OUTPATIENT)
Dept: PHARMACY | Facility: CLINIC | Age: 59
End: 2024-06-20
Payer: COMMERCIAL

## 2024-06-20 LAB
ALBUMIN SERPL BCG-MCNC: 3.2 G/DL (ref 3.5–5.2)
ALP SERPL-CCNC: 178 U/L (ref 40–150)
ALT SERPL W P-5'-P-CCNC: 15 U/L (ref 0–50)
ANION GAP SERPL CALCULATED.3IONS-SCNC: 10 MMOL/L (ref 7–15)
AST SERPL W P-5'-P-CCNC: 19 U/L (ref 0–45)
ATRIAL RATE - MUSE: 52 BPM
BACTERIA SPEC CULT: NORMAL
BILIRUB SERPL-MCNC: <0.2 MG/DL
BUN SERPL-MCNC: 8.8 MG/DL (ref 8–23)
CALCIUM SERPL-MCNC: 9.4 MG/DL (ref 8.8–10.2)
CHLORIDE SERPL-SCNC: 103 MMOL/L (ref 98–107)
CREAT SERPL-MCNC: 0.92 MG/DL (ref 0.51–0.95)
DEPRECATED HCO3 PLAS-SCNC: 27 MMOL/L (ref 22–29)
DIASTOLIC BLOOD PRESSURE - MUSE: NORMAL MMHG
EGFRCR SERPLBLD CKD-EPI 2021: 71 ML/MIN/1.73M2
ERYTHROCYTE [DISTWIDTH] IN BLOOD BY AUTOMATED COUNT: 16 % (ref 10–15)
GLUCOSE BLDC GLUCOMTR-MCNC: 153 MG/DL (ref 70–99)
GLUCOSE BLDC GLUCOMTR-MCNC: 159 MG/DL (ref 70–99)
GLUCOSE BLDC GLUCOMTR-MCNC: 161 MG/DL (ref 70–99)
GLUCOSE BLDC GLUCOMTR-MCNC: 168 MG/DL (ref 70–99)
GLUCOSE BLDC GLUCOMTR-MCNC: 177 MG/DL (ref 70–99)
GLUCOSE BLDC GLUCOMTR-MCNC: 212 MG/DL (ref 70–99)
GLUCOSE SERPL-MCNC: 219 MG/DL (ref 70–99)
GRAM STAIN RESULT: NORMAL
HCT VFR BLD AUTO: 30.9 % (ref 35–47)
HGB BLD-MCNC: 9.1 G/DL (ref 11.7–15.7)
HOLD SPECIMEN: NORMAL
INR PPP: 1.03 (ref 0.85–1.15)
INTERPRETATION ECG - MUSE: NORMAL
MCH RBC QN AUTO: 24.3 PG (ref 26.5–33)
MCHC RBC AUTO-ENTMCNC: 29.4 G/DL (ref 31.5–36.5)
MCV RBC AUTO: 82 FL (ref 78–100)
P AXIS - MUSE: 66 DEGREES
PLATELET # BLD AUTO: 311 10E3/UL (ref 150–450)
POTASSIUM SERPL-SCNC: 4.6 MMOL/L (ref 3.4–5.3)
PR INTERVAL - MUSE: 186 MS
PROT SERPL-MCNC: 6.8 G/DL (ref 6.4–8.3)
QRS DURATION - MUSE: 138 MS
QT - MUSE: 486 MS
QTC - MUSE: 451 MS
R AXIS - MUSE: 38 DEGREES
RBC # BLD AUTO: 3.75 10E6/UL (ref 3.8–5.2)
SODIUM SERPL-SCNC: 140 MMOL/L (ref 135–145)
SYSTOLIC BLOOD PRESSURE - MUSE: NORMAL MMHG
T AXIS - MUSE: 14 DEGREES
VENTRICULAR RATE- MUSE: 52 BPM
WBC # BLD AUTO: 8.1 10E3/UL (ref 4–11)

## 2024-06-20 PROCEDURE — 0RPM04Z REMOVAL OF INTERNAL FIXATION DEVICE FROM LEFT ELBOW JOINT, OPEN APPROACH: ICD-10-PCS | Performed by: STUDENT IN AN ORGANIZED HEALTH CARE EDUCATION/TRAINING PROGRAM

## 2024-06-20 PROCEDURE — 120N000002 HC R&B MED SURG/OB UMMC

## 2024-06-20 PROCEDURE — 87186 SC STD MICRODIL/AGAR DIL: CPT | Performed by: STUDENT IN AN ORGANIZED HEALTH CARE EDUCATION/TRAINING PROGRAM

## 2024-06-20 PROCEDURE — 250N000009 HC RX 250: Performed by: NURSE ANESTHETIST, CERTIFIED REGISTERED

## 2024-06-20 PROCEDURE — 250N000011 HC RX IP 250 OP 636: Mod: JZ | Performed by: ANESTHESIOLOGY

## 2024-06-20 PROCEDURE — 360N000076 HC SURGERY LEVEL 3, PER MIN: Performed by: STUDENT IN AN ORGANIZED HEALTH CARE EDUCATION/TRAINING PROGRAM

## 2024-06-20 PROCEDURE — 99231 SBSQ HOSP IP/OBS SF/LOW 25: CPT | Mod: GC

## 2024-06-20 PROCEDURE — 24115 EXC/CRTG B1 CST/TUM HUM AGRF: CPT | Performed by: ANESTHESIOLOGY

## 2024-06-20 PROCEDURE — 99207 PR NO CHARGE LOS: CPT | Performed by: STUDENT IN AN ORGANIZED HEALTH CARE EDUCATION/TRAINING PROGRAM

## 2024-06-20 PROCEDURE — 250N000011 HC RX IP 250 OP 636: Mod: JZ | Performed by: NURSE ANESTHETIST, CERTIFIED REGISTERED

## 2024-06-20 PROCEDURE — 250N000011 HC RX IP 250 OP 636: Mod: JZ

## 2024-06-20 PROCEDURE — 250N000011 HC RX IP 250 OP 636: Performed by: STUDENT IN AN ORGANIZED HEALTH CARE EDUCATION/TRAINING PROGRAM

## 2024-06-20 PROCEDURE — 258N000003 HC RX IP 258 OP 636: Mod: JZ | Performed by: NURSE ANESTHETIST, CERTIFIED REGISTERED

## 2024-06-20 PROCEDURE — 710N000010 HC RECOVERY PHASE 1, LEVEL 2, PER MIN: Performed by: STUDENT IN AN ORGANIZED HEALTH CARE EDUCATION/TRAINING PROGRAM

## 2024-06-20 PROCEDURE — 36415 COLL VENOUS BLD VENIPUNCTURE: CPT

## 2024-06-20 PROCEDURE — 0PBL0ZZ EXCISION OF LEFT ULNA, OPEN APPROACH: ICD-10-PCS | Performed by: STUDENT IN AN ORGANIZED HEALTH CARE EDUCATION/TRAINING PROGRAM

## 2024-06-20 PROCEDURE — 250N000009 HC RX 250: Performed by: STUDENT IN AN ORGANIZED HEALTH CARE EDUCATION/TRAINING PROGRAM

## 2024-06-20 PROCEDURE — 370N000017 HC ANESTHESIA TECHNICAL FEE, PER MIN: Performed by: STUDENT IN AN ORGANIZED HEALTH CARE EDUCATION/TRAINING PROGRAM

## 2024-06-20 PROCEDURE — 87102 FUNGUS ISOLATION CULTURE: CPT | Performed by: STUDENT IN AN ORGANIZED HEALTH CARE EDUCATION/TRAINING PROGRAM

## 2024-06-20 PROCEDURE — 85027 COMPLETE CBC AUTOMATED: CPT

## 2024-06-20 PROCEDURE — 0RPM08Z REMOVAL OF SPACER FROM LEFT ELBOW JOINT, OPEN APPROACH: ICD-10-PCS | Performed by: STUDENT IN AN ORGANIZED HEALTH CARE EDUCATION/TRAINING PROGRAM

## 2024-06-20 PROCEDURE — 999N000141 HC STATISTIC PRE-PROCEDURE NURSING ASSESSMENT: Performed by: STUDENT IN AN ORGANIZED HEALTH CARE EDUCATION/TRAINING PROGRAM

## 2024-06-20 PROCEDURE — 87205 SMEAR GRAM STAIN: CPT | Performed by: STUDENT IN AN ORGANIZED HEALTH CARE EDUCATION/TRAINING PROGRAM

## 2024-06-20 PROCEDURE — 85610 PROTHROMBIN TIME: CPT

## 2024-06-20 PROCEDURE — 258N000001 HC RX 258: Performed by: STUDENT IN AN ORGANIZED HEALTH CARE EDUCATION/TRAINING PROGRAM

## 2024-06-20 PROCEDURE — 250N000013 HC RX MED GY IP 250 OP 250 PS 637

## 2024-06-20 PROCEDURE — 82374 ASSAY BLOOD CARBON DIOXIDE: CPT

## 2024-06-20 PROCEDURE — 999N000065 XR ELBOW PORT LEFT 2 VIEWS: Mod: LT

## 2024-06-20 PROCEDURE — 250N000012 HC RX MED GY IP 250 OP 636 PS 637: Performed by: ANESTHESIOLOGY

## 2024-06-20 PROCEDURE — 250N000025 HC SEVOFLURANE, PER MIN: Performed by: STUDENT IN AN ORGANIZED HEALTH CARE EDUCATION/TRAINING PROGRAM

## 2024-06-20 PROCEDURE — 82247 BILIRUBIN TOTAL: CPT

## 2024-06-20 PROCEDURE — 24115 EXC/CRTG B1 CST/TUM HUM AGRF: CPT | Performed by: NURSE ANESTHETIST, CERTIFIED REGISTERED

## 2024-06-20 PROCEDURE — 272N000001 HC OR GENERAL SUPPLY STERILE: Performed by: STUDENT IN AN ORGANIZED HEALTH CARE EDUCATION/TRAINING PROGRAM

## 2024-06-20 PROCEDURE — 0PBG0ZZ EXCISION OF LEFT HUMERAL SHAFT, OPEN APPROACH: ICD-10-PCS | Performed by: STUDENT IN AN ORGANIZED HEALTH CARE EDUCATION/TRAINING PROGRAM

## 2024-06-20 PROCEDURE — 250N000013 HC RX MED GY IP 250 OP 250 PS 637: Performed by: STUDENT IN AN ORGANIZED HEALTH CARE EDUCATION/TRAINING PROGRAM

## 2024-06-20 RX ORDER — VANCOMYCIN HYDROCHLORIDE 1 G/200ML
1000 INJECTION, SOLUTION INTRAVENOUS EVERY 12 HOURS
Status: DISCONTINUED | OUTPATIENT
Start: 2024-06-20 | End: 2024-06-22

## 2024-06-20 RX ORDER — CEFAZOLIN SODIUM/WATER 2 G/20 ML
SYRINGE (ML) INTRAVENOUS PRN
Status: DISCONTINUED | OUTPATIENT
Start: 2024-06-20 | End: 2024-06-20

## 2024-06-20 RX ORDER — LIDOCAINE HYDROCHLORIDE 20 MG/ML
INJECTION, SOLUTION INFILTRATION; PERINEURAL PRN
Status: DISCONTINUED | OUTPATIENT
Start: 2024-06-20 | End: 2024-06-20

## 2024-06-20 RX ORDER — SODIUM CHLORIDE, SODIUM LACTATE, POTASSIUM CHLORIDE, CALCIUM CHLORIDE 600; 310; 30; 20 MG/100ML; MG/100ML; MG/100ML; MG/100ML
INJECTION, SOLUTION INTRAVENOUS CONTINUOUS
Status: DISCONTINUED | OUTPATIENT
Start: 2024-06-20 | End: 2024-06-20 | Stop reason: HOSPADM

## 2024-06-20 RX ORDER — FENTANYL CITRATE 50 UG/ML
25 INJECTION, SOLUTION INTRAMUSCULAR; INTRAVENOUS EVERY 5 MIN PRN
Status: DISCONTINUED | OUTPATIENT
Start: 2024-06-20 | End: 2024-06-20 | Stop reason: HOSPADM

## 2024-06-20 RX ORDER — ONDANSETRON 4 MG/1
4 TABLET, ORALLY DISINTEGRATING ORAL EVERY 30 MIN PRN
Status: DISCONTINUED | OUTPATIENT
Start: 2024-06-20 | End: 2024-06-20 | Stop reason: HOSPADM

## 2024-06-20 RX ORDER — FENTANYL CITRATE 50 UG/ML
INJECTION, SOLUTION INTRAMUSCULAR; INTRAVENOUS PRN
Status: DISCONTINUED | OUTPATIENT
Start: 2024-06-20 | End: 2024-06-20

## 2024-06-20 RX ORDER — VANCOMYCIN HYDROCHLORIDE 1 G/20ML
INJECTION, POWDER, LYOPHILIZED, FOR SOLUTION INTRAVENOUS PRN
Status: DISCONTINUED | OUTPATIENT
Start: 2024-06-20 | End: 2024-06-20 | Stop reason: HOSPADM

## 2024-06-20 RX ORDER — SODIUM CHLORIDE, SODIUM LACTATE, POTASSIUM CHLORIDE, CALCIUM CHLORIDE 600; 310; 30; 20 MG/100ML; MG/100ML; MG/100ML; MG/100ML
INJECTION, SOLUTION INTRAVENOUS CONTINUOUS PRN
Status: DISCONTINUED | OUTPATIENT
Start: 2024-06-20 | End: 2024-06-20

## 2024-06-20 RX ORDER — APREPITANT 40 MG/1
40 CAPSULE ORAL ONCE
Status: COMPLETED | OUTPATIENT
Start: 2024-06-20 | End: 2024-06-20

## 2024-06-20 RX ORDER — FENTANYL CITRATE 50 UG/ML
50 INJECTION, SOLUTION INTRAMUSCULAR; INTRAVENOUS EVERY 5 MIN PRN
Status: DISCONTINUED | OUTPATIENT
Start: 2024-06-20 | End: 2024-06-20 | Stop reason: HOSPADM

## 2024-06-20 RX ORDER — HYDROMORPHONE HYDROCHLORIDE 1 MG/ML
0.4 INJECTION, SOLUTION INTRAMUSCULAR; INTRAVENOUS; SUBCUTANEOUS EVERY 5 MIN PRN
Status: DISCONTINUED | OUTPATIENT
Start: 2024-06-20 | End: 2024-06-20 | Stop reason: HOSPADM

## 2024-06-20 RX ORDER — PROPOFOL 10 MG/ML
INJECTION, EMULSION INTRAVENOUS CONTINUOUS PRN
Status: DISCONTINUED | OUTPATIENT
Start: 2024-06-20 | End: 2024-06-20

## 2024-06-20 RX ORDER — HYDROMORPHONE HYDROCHLORIDE 1 MG/ML
0.2 INJECTION, SOLUTION INTRAMUSCULAR; INTRAVENOUS; SUBCUTANEOUS EVERY 5 MIN PRN
Status: DISCONTINUED | OUTPATIENT
Start: 2024-06-20 | End: 2024-06-20 | Stop reason: HOSPADM

## 2024-06-20 RX ORDER — MAGNESIUM HYDROXIDE 1200 MG/15ML
LIQUID ORAL PRN
Status: DISCONTINUED | OUTPATIENT
Start: 2024-06-20 | End: 2024-06-20 | Stop reason: HOSPADM

## 2024-06-20 RX ORDER — ONDANSETRON 2 MG/ML
INJECTION INTRAMUSCULAR; INTRAVENOUS PRN
Status: DISCONTINUED | OUTPATIENT
Start: 2024-06-20 | End: 2024-06-20

## 2024-06-20 RX ORDER — PROPOFOL 10 MG/ML
INJECTION, EMULSION INTRAVENOUS PRN
Status: DISCONTINUED | OUTPATIENT
Start: 2024-06-20 | End: 2024-06-20

## 2024-06-20 RX ORDER — HYDROMORPHONE HCL IN WATER/PF 6 MG/30 ML
0.2 PATIENT CONTROLLED ANALGESIA SYRINGE INTRAVENOUS
Status: COMPLETED | OUTPATIENT
Start: 2024-06-20 | End: 2024-06-20

## 2024-06-20 RX ORDER — ONDANSETRON 2 MG/ML
4 INJECTION INTRAMUSCULAR; INTRAVENOUS EVERY 30 MIN PRN
Status: DISCONTINUED | OUTPATIENT
Start: 2024-06-20 | End: 2024-06-20 | Stop reason: HOSPADM

## 2024-06-20 RX ORDER — EPHEDRINE SULFATE 50 MG/ML
INJECTION, SOLUTION INTRAMUSCULAR; INTRAVENOUS; SUBCUTANEOUS PRN
Status: DISCONTINUED | OUTPATIENT
Start: 2024-06-20 | End: 2024-06-20

## 2024-06-20 RX ORDER — NALOXONE HYDROCHLORIDE 0.4 MG/ML
0.1 INJECTION, SOLUTION INTRAMUSCULAR; INTRAVENOUS; SUBCUTANEOUS
Status: DISCONTINUED | OUTPATIENT
Start: 2024-06-20 | End: 2024-06-20 | Stop reason: HOSPADM

## 2024-06-20 RX ORDER — CEFTRIAXONE 2 G/1
2 INJECTION, POWDER, FOR SOLUTION INTRAMUSCULAR; INTRAVENOUS EVERY 24 HOURS
Status: DISCONTINUED | OUTPATIENT
Start: 2024-06-20 | End: 2024-06-23

## 2024-06-20 RX ORDER — DEXAMETHASONE SODIUM PHOSPHATE 4 MG/ML
4 INJECTION, SOLUTION INTRA-ARTICULAR; INTRALESIONAL; INTRAMUSCULAR; INTRAVENOUS; SOFT TISSUE
Status: DISCONTINUED | OUTPATIENT
Start: 2024-06-20 | End: 2024-06-20 | Stop reason: HOSPADM

## 2024-06-20 RX ADMIN — FENTANYL CITRATE 100 MCG: 50 INJECTION INTRAMUSCULAR; INTRAVENOUS at 16:31

## 2024-06-20 RX ADMIN — OXYCODONE HYDROCHLORIDE 10 MG: 5 TABLET ORAL at 13:02

## 2024-06-20 RX ADMIN — ACETAMINOPHEN 975 MG: 325 TABLET, FILM COATED ORAL at 08:59

## 2024-06-20 RX ADMIN — APREPITANT 40 MG: 40 CAPSULE ORAL at 15:31

## 2024-06-20 RX ADMIN — HYDROMORPHONE HYDROCHLORIDE 0.25 MG: 1 INJECTION, SOLUTION INTRAMUSCULAR; INTRAVENOUS; SUBCUTANEOUS at 17:27

## 2024-06-20 RX ADMIN — HYDROMORPHONE HYDROCHLORIDE 0.25 MG: 1 INJECTION, SOLUTION INTRAMUSCULAR; INTRAVENOUS; SUBCUTANEOUS at 17:44

## 2024-06-20 RX ADMIN — HYDROMORPHONE HYDROCHLORIDE 0.25 MG: 1 INJECTION, SOLUTION INTRAMUSCULAR; INTRAVENOUS; SUBCUTANEOUS at 17:20

## 2024-06-20 RX ADMIN — LUMATEPERONE 84 MG: 42 CAPSULE ORAL at 22:17

## 2024-06-20 RX ADMIN — CEFTRIAXONE SODIUM 2 G: 2 INJECTION, POWDER, FOR SOLUTION INTRAMUSCULAR; INTRAVENOUS at 21:54

## 2024-06-20 RX ADMIN — INSULIN ASPART 1 UNITS: 100 INJECTION, SOLUTION INTRAVENOUS; SUBCUTANEOUS at 12:29

## 2024-06-20 RX ADMIN — INSULIN GLARGINE 15 UNITS: 100 INJECTION, SOLUTION SUBCUTANEOUS at 22:18

## 2024-06-20 RX ADMIN — AMLODIPINE BESYLATE 5 MG: 5 TABLET ORAL at 08:59

## 2024-06-20 RX ADMIN — OXYCODONE HYDROCHLORIDE 10 MG: 5 TABLET ORAL at 08:59

## 2024-06-20 RX ADMIN — Medication 100 MG: at 16:31

## 2024-06-20 RX ADMIN — Medication 15 ML: at 22:17

## 2024-06-20 RX ADMIN — HYDROMORPHONE HYDROCHLORIDE 0.2 MG: 0.2 INJECTION, SOLUTION INTRAMUSCULAR; INTRAVENOUS; SUBCUTANEOUS at 23:36

## 2024-06-20 RX ADMIN — OXYCODONE HYDROCHLORIDE 10 MG: 5 TABLET ORAL at 21:52

## 2024-06-20 RX ADMIN — HYDROMORPHONE HYDROCHLORIDE 0.4 MG: 1 INJECTION, SOLUTION INTRAMUSCULAR; INTRAVENOUS; SUBCUTANEOUS at 20:08

## 2024-06-20 RX ADMIN — FENTANYL CITRATE 25 MCG: 50 INJECTION INTRAMUSCULAR; INTRAVENOUS at 20:03

## 2024-06-20 RX ADMIN — ATORVASTATIN CALCIUM 10 MG: 10 TABLET, FILM COATED ORAL at 08:59

## 2024-06-20 RX ADMIN — CITALOPRAM HYDROBROMIDE 20 MG: 20 TABLET ORAL at 21:52

## 2024-06-20 RX ADMIN — HYDROMORPHONE HYDROCHLORIDE 0.25 MG: 1 INJECTION, SOLUTION INTRAMUSCULAR; INTRAVENOUS; SUBCUTANEOUS at 17:52

## 2024-06-20 RX ADMIN — DEXMEDETOMIDINE HYDROCHLORIDE 20 MCG: 100 INJECTION, SOLUTION INTRAVENOUS at 18:39

## 2024-06-20 RX ADMIN — ONDANSETRON 4 MG: 2 INJECTION INTRAMUSCULAR; INTRAVENOUS at 18:48

## 2024-06-20 RX ADMIN — Medication 2 G: at 16:38

## 2024-06-20 RX ADMIN — MICONAZOLE NITRATE: 20 POWDER TOPICAL at 08:58

## 2024-06-20 RX ADMIN — FENTANYL CITRATE 25 MCG: 50 INJECTION INTRAMUSCULAR; INTRAVENOUS at 19:43

## 2024-06-20 RX ADMIN — PROPOFOL 50 MCG/KG/MIN: 10 INJECTION, EMULSION INTRAVENOUS at 16:43

## 2024-06-20 RX ADMIN — VANCOMYCIN HYDROCHLORIDE 1000 MG: 1 INJECTION, SOLUTION INTRAVENOUS at 23:36

## 2024-06-20 RX ADMIN — PROPOFOL 200 MG: 10 INJECTION, EMULSION INTRAVENOUS at 16:31

## 2024-06-20 RX ADMIN — ACETAMINOPHEN 975 MG: 325 TABLET, FILM COATED ORAL at 13:02

## 2024-06-20 RX ADMIN — INSULIN ASPART 1 UNITS: 100 INJECTION, SOLUTION INTRAVENOUS; SUBCUTANEOUS at 08:59

## 2024-06-20 RX ADMIN — FENTANYL CITRATE 25 MCG: 50 INJECTION INTRAMUSCULAR; INTRAVENOUS at 19:53

## 2024-06-20 RX ADMIN — PHENYLEPHRINE HYDROCHLORIDE 0.2 MCG/KG/MIN: 10 INJECTION INTRAVENOUS at 16:38

## 2024-06-20 RX ADMIN — EPHEDRINE SULFATE 5 MG: 5 INJECTION INTRAVENOUS at 17:15

## 2024-06-20 RX ADMIN — PHENYLEPHRINE HYDROCHLORIDE 100 MCG: 10 INJECTION INTRAVENOUS at 16:37

## 2024-06-20 RX ADMIN — SODIUM CHLORIDE, POTASSIUM CHLORIDE, SODIUM LACTATE AND CALCIUM CHLORIDE: 600; 310; 30; 20 INJECTION, SOLUTION INTRAVENOUS at 16:04

## 2024-06-20 RX ADMIN — LIDOCAINE HYDROCHLORIDE 100 MG: 20 INJECTION, SOLUTION INFILTRATION; PERINEURAL at 16:31

## 2024-06-20 RX ADMIN — SUGAMMADEX 200 MG: 100 INJECTION, SOLUTION INTRAVENOUS at 18:59

## 2024-06-20 RX ADMIN — PHENYLEPHRINE HYDROCHLORIDE 100 MCG: 10 INJECTION INTRAVENOUS at 18:29

## 2024-06-20 RX ADMIN — CLONIDINE HYDROCHLORIDE 0.8 MG: 0.1 TABLET ORAL at 21:51

## 2024-06-20 ASSESSMENT — ACTIVITIES OF DAILY LIVING (ADL)
ADLS_ACUITY_SCORE: 38
ADLS_ACUITY_SCORE: 38
ADLS_ACUITY_SCORE: 35
ADLS_ACUITY_SCORE: 38
ADLS_ACUITY_SCORE: 35
ADLS_ACUITY_SCORE: 38

## 2024-06-20 ASSESSMENT — COPD QUESTIONNAIRES: COPD: 1

## 2024-06-20 NOTE — OP NOTE
Patient: Mitali Robles  : 1965  MRN: [id number]    DATE OF OPERATION: 2024      OPERATIVE REPORT       PREOPERATIVE DIAGNOSIS:  1) Full thickness pressure wound left posterior elbow  2) Deep periprosthetic left elbow joint infection     POSTOPERATIVE DIAGNOSIS:  1) Full thickness pressure wound left posterior elbow  2) Deep periprosthetic left elbow joint infection     PROCEDURE:  1) Excisional debridement of left posterior elbow wound, skin and subcutaneous tissue, 4x4 cm  2) Resection of elbow joint (arthrectomy)  3) Excisional debridement of left elbow joint, including subcutaneous tissue, muscle, fascia, and bone  4) Removal of antibiotic spacer left elbow  5) Removal of deep hardware left humerus and ulna    SURGEON:  Marcos Sarmiento MD     ASSISTANT(S):  Frank Chamberlain MD    ANESTHESIA:  General     IMPLANTS:   None    ESTIMATED BLOOD LOSS:  400cc    DVT PROPHYLAXIS:  SCDs    TOURNIQUET TIME:  46 minutes     SPECIMENS REMOVED:  Cultures of synovium, humeral bone, and humeral intramedullary canal    INTRAOPERATIVE FINDINGS:  4x4 cm full thickness pressure wound at the posterior elbow with exposed olecranon. Thick cloudy serosanguinous and synovial drainage, likely purulent. Completely devitalized proximal ulna and olecranon fracture fragments that had displaced posteriorly causing the wound. Devitalized fracture fragments of the medial humeral condyle and posterior humeral cortex. Copious loose debris in and around the bones as if the bone were disintegrating. Intramedullary canals with fibrinous membrane.     COMPLICATIONS:  None    DISPOSITION:  Stable to PACU.     INDICATIONS:  The patient is a 59 year old female with multiple medical comorbidities who was referred to me for a chronically draining elbow wound in the setting of prior left total elbow arthroplasty. The patient has a complex surgical history of the left elbow including multiple prior surgeries for fracture and instability. She  underwent a left total elbow arthroplasty for post-traumatic arthritis 3/2/21 and then triceps tendon reconstruction with achilles allograft 4/27/21. She developed the draining wound about 4-5 months postop. I first saw her in October 2023 and performed an aspiration of the elbow consistent with deep prosthetic joint infection. Unfortunately her diabetes was not well-controlled making any surgery high risk. She was managed with antibiotics by her infectious disease doctor locally while working to correct her HbA1c. She underwent two superficial I&Ds at [place] with recurrence each time of drainage. She also had been hospitalized for sepsis due to the elbow. She underwent a explant of the total elbow, resection of the tendon allograft, and antibiotic spacer placement by me 4/10/24. There was significant intraoperative bone loss and fracture due to the cemented components. She was immobilized postoperatively but unfortunately developed a draining full thickness pressure ulcer with exposed bone. She is indicated for irrigation and debridement of the wound and joint, antibiotic spacer removal, hardware removal, and wound closure.    The indications for surgery were discussed with the patient. The benefits, risks, and alternatives of operative management were discussed in detail with the patient. The patient understands that the risks of surgery include, but are not limited to: infection, bleeding, injury to nearby structures (such as nerves, blood vessels, and tendons), recurrent wounds, wound healing issues, need for additional surgery, possible amputation, pain, stiffness, scarring, need for rehabilitation, and anesthetic complications.  Patient expressed understanding and elected to proceed with surgery. All questions were answered to the patient's satisfaction.    Consent was obtained for the procedure.     DESCRIPTION OF PROCEDURE IN DETAIL:  The patient was seen in the preoperative care unit. Patient identity,  consent, procedure to be performed, and operative site were verified with the patient. The left upper extremity was marked.     The patient was brought to the operating room and general anesthesia was induced. She was positioned right lateral decubitus on a half body bean bag. All bony prominences were well-padded.  The left upper extremity was placed on an armboard. SCDs were placed on the bilateral lower extremities. A well-padded tourniquet was placed on the upper arm.     The left upper extremity was prepped and draped in the usual sterile fashion. A timeout was performed confirming the correct patient, procedure, operative site, and antibiotics. All were in agreement.    The limb was exsanguinated and the tourniquet inflated to 250 mmHg.  A longitudinal incision was made over the posterior elbow through the prior scar and along the lateral margin of the ulcer.  Sharp dissection was carried down through subcutaneous tissues to the ulna. The ECU and FCU interval was split exposing the bone.  The olecranon and proximal ulna had previously fractured and displaced posteriorly from the weight of the arm causing the ulcer. This exposed bone appeared infected and the remainder of the bone appeared devitalized. Further dissection into the joint revealed loose synovium and fibrinous membrane. This was removed and debrided with a rongeur and sent for culture. Further dissection was carried proximally through scar and triceps musculature to expose the distal humerus. The antibiotic spacer from the joint and the intramedullary canals was removed intact. There was fibrinous pseudomembrane from within the intramedullary canals which was debrided sharply with curettes and rongeurs this was sent for culture. The medial distal humeral condyle and posterior cortex of the metaphysis had fractured off and were entrapped in scar. These were excised with electrocautery and a rongeur. This along with a portion of the intact humeral  metaphysis was sent for culture. After removing the spacer and humeral fragments, we evaluated the elbow in flexion and extension. Excess pressure on the skin remained from the proximal ulna. Given that it was devitalized, exposed, without any triceps or ligamentous attachments, decision was made to excise it. This completed the resection of the elbow joint. The proximal metaphyseal spike of the ulna was resected with a rongeur. The cerclage wires, two around the ulna, and three around the humerus were then cut and removed intact. All hardware from within the joint was removed. Sharp excisional debridement was then performed of all bony surfaces, the intramedullary canal, and of the scarred pseudocapsule of the joint joint with curettes, a kang, and rongeur. All loose devitalized tissue was removed. The tourniquet was deflated. The wound was then copiously irrigated with 3 liters of normal saline. 1 liter of Irrisept was then irrigated into the wound, followed by another liter of normal saline. The joint was then soaked in dilute betadine for 3 minutes. It was then washed out again by 2 liters of normal saline. At the conclusion, all remaining bony surfaces and soft tissue appeared healthy without evidence of infection. Attention was then turned to the wound. An incision was designed along the medial edge of the wound and the skin edge ellipsed and excised. This allowed for primary closure of the incision. No tension was noted on the skin and there were no bony prominences causing pressure on the subcutaneous tissues. A deep drain was placed and sutured in place. 1 gram of vancomycin powder was placed into the wound. Hemostasis was assured. The deep tissues and what remained of the fascia was closed with 0-PDS. The deep dermal tissues were closed with 2-0 PDS. The skin was then closed with interrupted 3-0 nylon horizontal mattress sutures. A sterile dressing was applied. The arm was then placed in a very well-padded  long arm splint with struts at 30 degrees of flexion. The posterior slab was bubbled out using a roll of cast padding to avoid any pressure on the posterior elbow.     All needle and sponge counts were correct at the end of the procedure. There were no complications.     The patient was awoken from anesthesia and taken to the postoperative recovery unit in stable condition.     I was present and scrubbed for the entire procedure.     POSTOPERATIVE PLAN:  The patient will be admitted for postoperative pain control, monitoring, and antibiotics. Infectious disease will be consulted. Appreciate their recommendations. She will be strict non-weight bearing of the left elbow. Drain will be removed when output is less than 30cc for two shifts. Wound will be checked and dressing changed before she discharges. She will return to clinic in 2-3 weeks for suture removal when wound is healed. We will have orthotics bring a hinged elbow brace to clinic to see if this fits her. If not, she will need to see hand therapy to obtain a custom thermoplastic splint. Elbow must be maintained at 30 degrees shy of full extension at all times.    Marcos Sarmiento MD     Hand, Upper Extremity & Microvascular Surgery  Department of Orthopedic Surgery  [facility]
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
## 2024-06-20 NOTE — PLAN OF CARE
Pt is A/O x4, SBA with gait belt, cont of B/B and positive for Covid. R PIV saline lock, diabetic. Pt is here due to chronic L elbow prosthetic joint infection, is having surgery today around 3pm. Pain managed with oxycodone. Pt been on NPO status since midnight.  BG @ 0600 was 153, insulin not given due to pt NPO status. LBM 6/18, no acute concern at this time.

## 2024-06-20 NOTE — PLAN OF CARE
Goal Outcome Evaluation:      Plan of Care Reviewed With: patient    Overall Patient Progress: no change    Outcome Evaluation: Pt scheduled for the OR today around 1500.    VS:       Pt A/O X 4. Afebrile. VSS./54 (BP Location: Right arm)   Pulse 51   Temp 97.8  F (36.6  C) (Oral)   Resp 14   Wt 101.7 kg (224 lb 4.8 oz)   SpO2 95%   BMI 33.12 kg/m    Lungs- clear bilaterally with both anterior and lateral. IS encouraged. Denies nausea, shortness of breath, and chest pain.     Output:       Bowels- active in all four quadrants. Voids spontaneously without difficulty in the bathroom.      Activity:       Pt up SBA with walker and gb.     Skin:   Scattered bruising across shoulders.     Pain:       Has pain in the L shoulder and given oxycodone, ICE applied, and is tolerating well.      CMS:       CMS and Neuro's are intact. Numbness, no tingling in all extremities, pt states this is baseline.      Dressing:       ACE wrapped L shoulder/arm and CDI.     Diet:       Pt is on a NPO diet.      LDA:       PIV is patent in the R forearm and SL.      Equipment:       Pt belongings, iv pole. Pt denies PCD's on BLE's. Bilateral heels are elevated off the bed.     Plan:       Pt is able to make needs known and the call light is within the pt's reach. Continue to monitor.       Additional Info:

## 2024-06-20 NOTE — PROGRESS NOTES
BRIEF ID NOTE    Patient is on the OR schedule today for I&D with ortho. Recommend to start IV antibiotics (vancomycin and ceftriaxone as previously noted) either at time of surgery or immediately afterward. Anticipate patient will need prolonged abx course, but attempting to increase culture yield of recurrence PJI by minimizing pre-procedure abx.    ID will continue to follow.    Stone Hubbard MD on 6/20/2024 at 10:53 AM

## 2024-06-20 NOTE — ANESTHESIA PREPROCEDURE EVALUATION
"Anesthesia Pre-Procedure Evaluation    Patient: Mitali Robles   MRN: [id number] : 1965        Procedure : Procedure(s):  Irrigation And Debridement Elbow, Exchange antibiotic spacer          Past Medical History:   Diagnosis Date    Back injury     Depressive disorder     Hearing problem     Hyperlipidemia     Hypertension     Neck injuries     Stomach ulcer       Past Surgical History:   Procedure Laterality Date    BACK SURGERY      IR LUMBAR PUNCTURE  2020    IRRIGATION AND DEBRIDEMENT ELBOW, PLACE ANTIBIOTIC CEMENT BEADS / SPAC Left 4/10/2024    Procedure: IRRIGATION AND DEBRIDEMENT LEFT ELBOW WITH ANTIBIOTIC SPACER INSERTION;  Surgeon: Marcos Sarmiento MD;  Location: [facility] OR    [facility] SPINE SURGERY PROCEDURE UNLISTED      REMOVE HARDWARE ELBOW Left 4/10/2024    Procedure: EXPLANT LEFT TOTAL ELBOW ARTHROPLASTY;  Surgeon: Marcos Sarmiento MD;  Location:  OR    [facility] HAND/FINGER SURGERY UNLISTED      [facility] SHOULDER SURG PROC UNLISTED      [facility] STOMACH SURGERY PROCEDURE UNLISTED        Allergies   Allergen Reactions    Morphine Anaphylaxis     Throat swells shut  **Has taken hydrocodone/APAP and hydromorphone in the past during previous hospitalizations with no issues.  Takes oxycodone at home    Succinylcholine Shortness Of Breath     \"it affected my breathing\"    Fentanyl Nausea and Vomiting    Hydromorphone      Received 4/10 in OR without issue (was listed as allergy but has had in past without incident)    Methadone Nausea and Vomiting    Prednisone Swelling     \"it overrides my psych meds and makes me crazy\"    Pregabalin     Quetiapine     Trazodone     Sumatriptan Rash      Social History     Tobacco Use    Smoking status: Former     Current packs/day: 0.00     Average packs/day: 1.5 packs/day for 40.2 years (60.3 ttl pk-yrs)     Types: Cigarettes     Start date: 1983     Quit date: 2023     Years since quittin.2    Smokeless tobacco: Never   Substance Use Topics    Alcohol use: No      Wt " Readings from Last 1 Encounters:   06/18/24 101.7 kg (224 lb 4.8 oz)        Anesthesia Evaluation    Type: General.    History of anesthetic complications   shivering.    ROS/MED HX  ENT/Pulmonary:     (+)                          COPD,    recent URI,          Neurologic:     (+)    peripheral neuropathy,  migraines,                          Cardiovascular:     (+)  hypertension- -   -  - -                                   (-) CAD   METS/Exercise Tolerance:     Hematologic:  - neg hematologic  ROS   (+)      anemia,          Musculoskeletal: Comment: Left elbow prosthesis infection s/p washouts - neg musculoskeletal ROS     GI/Hepatic:       Renal/Genitourinary:     (+) renal disease,             Endo:     (+)  type II DM,             Obesity,       Psychiatric/Substance Use:     (+) psychiatric history depression, bipolar and anxiety       Infectious Disease:       Malignancy:       Other:            Physical Exam    Airway        Mallampati: II   TM distance: > 3 FB    Mouth opening: > 3 cm    Respiratory Devices and Support         Dental       (+) Multiple visibly decayed, broken teeth    B=Bridge, C=Chipped, L=Loose, M=Missing    Cardiovascular          Rhythm and rate: regular and normal     Pulmonary           breath sounds clear to auscultation           OUTSIDE LABS:  CBC:   Lab Results   Component Value Date    WBC 8.1 06/20/2024    WBC 6.3 06/19/2024    HGB 9.1 (L) 06/20/2024    HGB 9.1 (L) 06/19/2024    HCT 30.9 (L) 06/20/2024    HCT 31.3 (L) 06/19/2024     06/20/2024     06/19/2024     BMP:   Lab Results   Component Value Date     06/20/2024     06/19/2024    POTASSIUM 4.6 06/20/2024    POTASSIUM 5.2 06/19/2024    CHLORIDE 103 06/20/2024    CHLORIDE 103 06/19/2024    CO2 27 06/20/2024    CO2 25 06/19/2024    BUN 8.8 06/20/2024    BUN 12.9 06/19/2024    CR 0.92 06/20/2024    CR 1.01 (H) 06/19/2024     (H) 06/20/2024     (H) 06/20/2024     COAGS:   Lab Results  "  Component Value Date    INR 1.03 06/20/2024     POC: No results found for: \"BGM\", \"HCG\", \"HCGS\"  HEPATIC:   Lab Results   Component Value Date    ALBUMIN 3.2 (L) 06/20/2024    PROTTOTAL 6.8 06/20/2024    ALT 15 06/20/2024    AST 19 06/20/2024    ALKPHOS 178 (H) 06/20/2024    BILITOTAL <0.2 06/20/2024    LOBO 34 04/14/2024     OTHER:   Lab Results   Component Value Date    PH 7.36 06/19/2024    LACT 1.9 04/16/2024    A1C 7.4 (H) 06/18/2024    DIANE 9.4 06/20/2024    PHOS 4.3 04/22/2024    MAG 1.6 (L) 04/22/2024    TSH 6.71 (H) 04/13/2024    T4 0.76 (L) 04/13/2024    SED 70 (H) 06/18/2024       Anesthesia Plan    ASA Status:  3    NPO Status:  NPO Appropriate    Anesthesia Type: General.     - Airway: ETT   Induction: Intravenous, Propofol.   Maintenance: Balanced.        Consents    Anesthesia Plan(s) and associated risks, benefits, and realistic alternatives discussed. Questions answered and patient/representative(s) expressed understanding.     - Discussed: Risks, Benefits and Alternatives for the PROCEDURE were discussed     - Discussed with:  Patient            Postoperative Care    Pain management: IV analgesics, Oral pain medications, Multi-modal analgesia.   PONV prophylaxis: Ondansetron (or other 5HT-3), Dexamethasone or Solumedrol, Background Propofol Infusion, Aprepitant     Comments:               Daniela Villa MD    I have reviewed the pertinent notes and labs in the chart from the past 30 days and (re)examined the patient.  Any updates or changes from those notes are reflected in this note.             "

## 2024-06-20 NOTE — PROGRESS NOTES
Therapy: IV ABX  Insurance: [facility] Medicare + [place] Medicaid   Co-Insurance: 80/20  Max Out of Pocket: $8850.00  Met: $5690.80    Patient does not have coverage for IV ABX with his [facility] Medicare plan however plan will cover for per jeremy, supplies and nursing. Drug must go to Pt's part D and copay will apply (Copay for Vanco and Ceftriaxone 2 gm q24 is $0.00).     In reference to [facility] for admission 06/18/2024 date to checkIV ABX coverage.   Please contact Intake with any questions, [phone] or In Basket pool, [facility] ([phone]).

## 2024-06-20 NOTE — ANESTHESIA PROCEDURE NOTES
Airway       Patient location during procedure: OR       Procedure Start/Stop Times: 6/20/2024 4:31 PM  Staff -        Anesthesiologist:  Kurtis Jarrett MD       CRNA: Heather Anderson APRN CRNA       Performed By: CRNA  Consent for Airway        Urgency: elective  Indications and Patient Condition       Indications for airway management: lulu-procedural       Induction type:intravenous       Mask difficulty assessment: 1 - vent by mask    Final Airway Details       Final airway type: endotracheal airway       Successful airway: ETT - single  Endotracheal Airway Details        ETT size (mm): 6.5       Cuffed: yes       Successful intubation technique: video laryngoscopy       VL Blade Size: MAC 3       Grade View of Cords: 1       Adjucts: stylet       Position: Right       Measured from: gums/teeth       Secured at (cm): 21       Bite block used: None    Post intubation assessment        Placement verified by: capnometry, equal breath sounds and chest rise        Number of attempts at approach: 1       Secured with: tape       Ease of procedure: easy       Dentition: Unchanged and Intact    Medication(s) Administered   Medication Administration Time: 6/20/2024 4:31 PM

## 2024-06-21 ENCOUNTER — APPOINTMENT (OUTPATIENT)
Dept: OCCUPATIONAL THERAPY | Facility: CLINIC | Age: 59
DRG: 492 | End: 2024-06-21
Payer: COMMERCIAL

## 2024-06-21 LAB
ALBUMIN SERPL BCG-MCNC: 3.1 G/DL (ref 3.5–5.2)
ALP SERPL-CCNC: 167 U/L (ref 40–150)
ALT SERPL W P-5'-P-CCNC: 13 U/L (ref 0–50)
ANION GAP SERPL CALCULATED.3IONS-SCNC: 13 MMOL/L (ref 7–15)
AST SERPL W P-5'-P-CCNC: 19 U/L (ref 0–45)
BILIRUB SERPL-MCNC: 0.2 MG/DL
BUN SERPL-MCNC: 7.1 MG/DL (ref 8–23)
CALCIUM SERPL-MCNC: 8.5 MG/DL (ref 8.8–10.2)
CHLORIDE SERPL-SCNC: 100 MMOL/L (ref 98–107)
CREAT SERPL-MCNC: 0.93 MG/DL (ref 0.51–0.95)
CRP SERPL-MCNC: 95.09 MG/L
DEPRECATED HCO3 PLAS-SCNC: 27 MMOL/L (ref 22–29)
EGFRCR SERPLBLD CKD-EPI 2021: 70 ML/MIN/1.73M2
ERYTHROCYTE [DISTWIDTH] IN BLOOD BY AUTOMATED COUNT: 16 % (ref 10–15)
GLUCOSE BLDC GLUCOMTR-MCNC: 124 MG/DL (ref 70–99)
GLUCOSE BLDC GLUCOMTR-MCNC: 128 MG/DL (ref 70–99)
GLUCOSE BLDC GLUCOMTR-MCNC: 167 MG/DL (ref 70–99)
GLUCOSE BLDC GLUCOMTR-MCNC: 178 MG/DL (ref 70–99)
GLUCOSE BLDC GLUCOMTR-MCNC: 231 MG/DL (ref 70–99)
GLUCOSE SERPL-MCNC: 146 MG/DL (ref 70–99)
HCT VFR BLD AUTO: 30 % (ref 35–47)
HGB BLD-MCNC: 8.8 G/DL (ref 11.7–15.7)
INR PPP: 1.22 (ref 0.85–1.15)
MCH RBC QN AUTO: 24.4 PG (ref 26.5–33)
MCHC RBC AUTO-ENTMCNC: 29.3 G/DL (ref 31.5–36.5)
MCV RBC AUTO: 83 FL (ref 78–100)
PLATELET # BLD AUTO: 313 10E3/UL (ref 150–450)
POTASSIUM SERPL-SCNC: 4.4 MMOL/L (ref 3.4–5.3)
PROT SERPL-MCNC: 6.7 G/DL (ref 6.4–8.3)
RBC # BLD AUTO: 3.61 10E6/UL (ref 3.8–5.2)
SODIUM SERPL-SCNC: 140 MMOL/L (ref 135–145)
WBC # BLD AUTO: 7.6 10E3/UL (ref 4–11)

## 2024-06-21 PROCEDURE — 36415 COLL VENOUS BLD VENIPUNCTURE: CPT

## 2024-06-21 PROCEDURE — 85027 COMPLETE CBC AUTOMATED: CPT

## 2024-06-21 PROCEDURE — 250N000011 HC RX IP 250 OP 636: Mod: JZ

## 2024-06-21 PROCEDURE — 97530 THERAPEUTIC ACTIVITIES: CPT | Mod: GO

## 2024-06-21 PROCEDURE — 97166 OT EVAL MOD COMPLEX 45 MIN: CPT | Mod: GO

## 2024-06-21 PROCEDURE — 85610 PROTHROMBIN TIME: CPT

## 2024-06-21 PROCEDURE — 99231 SBSQ HOSP IP/OBS SF/LOW 25: CPT | Mod: GC

## 2024-06-21 PROCEDURE — 258N000003 HC RX IP 258 OP 636: Mod: JZ

## 2024-06-21 PROCEDURE — 250N000013 HC RX MED GY IP 250 OP 250 PS 637

## 2024-06-21 PROCEDURE — 99233 SBSQ HOSP IP/OBS HIGH 50: CPT | Mod: 24 | Performed by: STUDENT IN AN ORGANIZED HEALTH CARE EDUCATION/TRAINING PROGRAM

## 2024-06-21 PROCEDURE — 80053 COMPREHEN METABOLIC PANEL: CPT

## 2024-06-21 PROCEDURE — 120N000002 HC R&B MED SURG/OB UMMC

## 2024-06-21 PROCEDURE — 250N000012 HC RX MED GY IP 250 OP 636 PS 637

## 2024-06-21 PROCEDURE — 97535 SELF CARE MNGMENT TRAINING: CPT | Mod: GO

## 2024-06-21 PROCEDURE — 250N000013 HC RX MED GY IP 250 OP 250 PS 637: Performed by: STUDENT IN AN ORGANIZED HEALTH CARE EDUCATION/TRAINING PROGRAM

## 2024-06-21 PROCEDURE — 86140 C-REACTIVE PROTEIN: CPT

## 2024-06-21 RX ORDER — GABAPENTIN 300 MG/1
300 CAPSULE ORAL 3 TIMES DAILY
Status: DISCONTINUED | OUTPATIENT
Start: 2024-06-21 | End: 2024-06-24

## 2024-06-21 RX ORDER — HYDROMORPHONE HCL IN WATER/PF 6 MG/30 ML
0.2 PATIENT CONTROLLED ANALGESIA SYRINGE INTRAVENOUS EVERY 6 HOURS PRN
Status: DISCONTINUED | OUTPATIENT
Start: 2024-06-21 | End: 2024-06-22

## 2024-06-21 RX ORDER — KETOROLAC TROMETHAMINE 15 MG/ML
15 INJECTION, SOLUTION INTRAMUSCULAR; INTRAVENOUS EVERY 6 HOURS PRN
Status: DISPENSED | OUTPATIENT
Start: 2024-06-21 | End: 2024-06-26

## 2024-06-21 RX ADMIN — REMDESIVIR 100 MG: 100 INJECTION, POWDER, LYOPHILIZED, FOR SOLUTION INTRAVENOUS at 12:38

## 2024-06-21 RX ADMIN — Medication 15 ML: at 13:57

## 2024-06-21 RX ADMIN — GABAPENTIN 300 MG: 300 CAPSULE ORAL at 21:44

## 2024-06-21 RX ADMIN — ACETAMINOPHEN 975 MG: 325 TABLET, FILM COATED ORAL at 21:43

## 2024-06-21 RX ADMIN — ACETAMINOPHEN 975 MG: 325 TABLET, FILM COATED ORAL at 14:17

## 2024-06-21 RX ADMIN — GABAPENTIN 300 MG: 300 CAPSULE ORAL at 14:17

## 2024-06-21 RX ADMIN — INSULIN ASPART 1 UNITS: 100 INJECTION, SOLUTION INTRAVENOUS; SUBCUTANEOUS at 17:50

## 2024-06-21 RX ADMIN — OXYCODONE HYDROCHLORIDE 10 MG: 5 TABLET ORAL at 17:47

## 2024-06-21 RX ADMIN — CEFTRIAXONE SODIUM 2 G: 2 INJECTION, POWDER, FOR SOLUTION INTRAMUSCULAR; INTRAVENOUS at 21:41

## 2024-06-21 RX ADMIN — ATORVASTATIN CALCIUM 10 MG: 10 TABLET, FILM COATED ORAL at 08:55

## 2024-06-21 RX ADMIN — VANCOMYCIN HYDROCHLORIDE 1000 MG: 1 INJECTION, SOLUTION INTRAVENOUS at 10:40

## 2024-06-21 RX ADMIN — OXYCODONE HYDROCHLORIDE 10 MG: 5 TABLET ORAL at 02:34

## 2024-06-21 RX ADMIN — SODIUM CHLORIDE 50 ML: 9 INJECTION, SOLUTION INTRAVENOUS at 12:40

## 2024-06-21 RX ADMIN — CITALOPRAM HYDROBROMIDE 20 MG: 20 TABLET ORAL at 21:44

## 2024-06-21 RX ADMIN — OXYCODONE HYDROCHLORIDE 10 MG: 5 TABLET ORAL at 06:44

## 2024-06-21 RX ADMIN — ENOXAPARIN SODIUM 40 MG: 40 INJECTION SUBCUTANEOUS at 14:18

## 2024-06-21 RX ADMIN — LUMATEPERONE 84 MG: 42 CAPSULE ORAL at 22:47

## 2024-06-21 RX ADMIN — INSULIN GLARGINE 15 UNITS: 100 INJECTION, SOLUTION SUBCUTANEOUS at 23:38

## 2024-06-21 RX ADMIN — VANCOMYCIN HYDROCHLORIDE 1000 MG: 1 INJECTION, SOLUTION INTRAVENOUS at 22:48

## 2024-06-21 RX ADMIN — HYDROMORPHONE HYDROCHLORIDE 0.2 MG: 0.2 INJECTION, SOLUTION INTRAMUSCULAR; INTRAVENOUS; SUBCUTANEOUS at 23:02

## 2024-06-21 RX ADMIN — ACETAMINOPHEN 975 MG: 325 TABLET, FILM COATED ORAL at 08:54

## 2024-06-21 RX ADMIN — HYDROMORPHONE HYDROCHLORIDE 0.2 MG: 0.2 INJECTION, SOLUTION INTRAMUSCULAR; INTRAVENOUS; SUBCUTANEOUS at 10:35

## 2024-06-21 ASSESSMENT — ACTIVITIES OF DAILY LIVING (ADL)
ADLS_ACUITY_SCORE: 36
ADLS_ACUITY_SCORE: 38
ADLS_ACUITY_SCORE: 36
ADLS_ACUITY_SCORE: 38
ADLS_ACUITY_SCORE: 36
ADLS_ACUITY_SCORE: 38
ADLS_ACUITY_SCORE: 35
ADLS_ACUITY_SCORE: 36
ADLS_ACUITY_SCORE: 36
ADLS_ACUITY_SCORE: 38
ADLS_ACUITY_SCORE: 36
ADLS_ACUITY_SCORE: 36
ADLS_ACUITY_SCORE: 35

## 2024-06-21 NOTE — PROGRESS NOTES
Care Management Follow Up    Length of Stay (days): 3    Expected Discharge Date: 06/22/2024     Concerns to be Addressed: discharge planning, home safety     Patient plan of care discussed at interdisciplinary rounds: Yes    Anticipated Discharge Disposition: Skilled Nursing Facility     Anticipated Discharge Services: Transportation Services  Anticipated Discharge DME: None    Patient/family educated on Medicare website which has current facility and service quality ratings: no  Education Provided on the Discharge Plan: Yes  Patient/Family in Agreement with the Plan: yes    Referrals Placed by CM/SW:    Private pay costs discussed: Not applicable    Additional Information:    Updated [facility] that there is no definite plan for discharge date yet.  ID final rec's pending.  Per liaison they will contact [place] with coverage and costs.     RNCC to continue to follow for final plan, PICC placement and [facility] teaching.     Lyn Rosado RN, BA   6/21/2024  Nurse Coordinator, [facility]      Social Work and Care Management Department       SEARCHABLE in [facility] - search CARE COORDINATOR       [place] [address] [place] ([phone]) Saturday & Sunday; ([phone]) [facility] Recognized Holidays     Units: 5A Onc [phone] - [phone] RNCC,  5A Onc 5220 thru 5240 RNCC, 5C OFFSERVICE [phone] RNCC & 5C OFF SERVICE [phone] RNCC       Units: 6B Vocera, 6C Card 6401 thru 6420 RNCC, 6C Card 6502 thru 6514 RNCC & 6C Card 6515 thru 6519 RNCC        Units: 7A SOT RNCC Vocera, 7B Med Surg Vocera, 7C Med Surg 7401 thru 7418 RNCC & 7C Med Surg 7502 thru 7521 RNCC       Units: 6A Vocera & 4A CVICU Vocera, 4C MICU Vocera, and 4E SICU Vocera         Units: 5 Ortho Vocera & 5 Med Surg Vocera        Units: 6 Med Surg Vocera & 8 Med Surg Vocera
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
## 2024-06-21 NOTE — PROGRESS NOTES
Orthopaedic Surgery Progress Note 06/21/2024    E: No acute events overnight.    S: Endorsing some pain in the left elbow. Denies numbness or tingling. Plan of care reviewed and questions answered    O:  Temp: 97.5  F (36.4  C) Temp src: Oral BP: 99/52 Pulse: 56   Resp: (!) 9 SpO2: 98 % O2 Device: Nasal cannula Oxygen Delivery: 2 LPM    Exam:  Gen: No acute distress, resting comfortably in bed.  Resp: Non-labored breathing    MSK:  LUE  Dressing c/d/I  Splint in place  SILT in ax,m,r,u distributions  Fires FPL, EPL, IO   Fingers WWP    Drain output: 60/30.    Recent Labs   Lab 06/20/24  0811 06/19/24  0635 06/18/24  0748   WBC 8.1 6.3 7.9   HGB 9.1* 9.1* 8.7*    273 236       Culture results: pending    X-rays: complete    Assessment/Plan: Mitali Robles is a 59 year old female with a PMHx bipolar disorder, chronic pain, T2DM who is well known to this service with a complex orthopedic hx fracture dislocation of the left elbow c/b hardware failure and 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 Sarmiento.      Now s/p I&D with removal of antibiotic spacer and primary closure on 6/20 with Dr. Sarmiento     Medicine Primary  Activity: Up with assist.  Weight bearing status: NWB LUE  Antibiotics: Per ID  Diet: ADAT  DVT prophylaxis: ok to resume lovenox POD1  Bracing/Splinting: New long arm splint applied; keep CDI   Wound Care: keep splint in place, okay to reinforce prn  Drain: record output per shift, will remove when less than 30cc over 2 shifts.   X-rays: XR elbow ordered   PT/OT: eval and treat  Labs: Hgb POD1  Consults: PT, OT, ID, Medicine appreciate assistance in caring for this patient.  Follow-up: TBD     Disposition: TBD     Frank Chamberlain MD  Orthopedic Surgery PGY4

## 2024-06-21 NOTE — PLAN OF CARE
Physical Therapy: Orders received. Chart reviewed and discussed with care team.? Physical Therapy not indicated as patient mobilizing well with supervision-SBA with OT at this time. Patient recently at TCU and then discharged home with HH PT. OT to continue progressing patient while in hospital with plans to discharge home. Recommend continued HH PT for improved strength, activity tolerance, safety, and independence with functional mobility when patient discharges home.? Defer discharge recommendations to OT.? Will complete orders.

## 2024-06-21 NOTE — ANESTHESIA CARE TRANSFER NOTE
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    Anesthesia Type:   General     Note:    Oropharynx: spontaneously breathing  Level of Consciousness: awake  Oxygen Supplementation: face mask  Level of Supplemental Oxygen (L/min / FiO2): 6  Independent Airway: airway patency satisfactory and stable  Dentition: dentition unchanged  Vital Signs Stable: post-procedure vital signs reviewed and stable  Report to RN Given: handoff report given  Patient transferred to: PACU    Handoff Report: Identifed the Patient, Identified the Reponsible Provider, Reviewed the pertinent medical history, Discussed the surgical course, Reviewed Intra-OP anesthesia mangement and issues during anesthesia, Set expectations for post-procedure period and Allowed opportunity for questions and acknowledgement of understanding      Vitals:  Vitals Value Taken Time   /80 06/20/24 2045   Temp 36.8  C (98.2  F) 06/20/24 2045   Pulse 91 06/20/24 2045   Resp 10 06/20/24 2045   SpO2 98 % 06/20/24 2045   Vitals shown include unfiled device data.    Electronically Signed By: SAI Coleman CRNA  June 20, 2024  9:14 PM

## 2024-06-21 NOTE — PLAN OF CARE
Goal Outcome Evaluation:      VS: Blood pressure 92/50, pulse 51, temperature 97.5  F (36.4  C), temperature source Oral, resp. rate 15, weight 101.7 kg (224 lb 4.8 oz), SpO2 96%.   O2: 2 LPM   Output: Voids adequately in the bathroom   Last BM: 6/18 per pt   Activity: Ax1 with GB   Skin: LUE surgical wound   Pain: Managed with PRN IV Dilaudid, oxycodone, and scheduled medications   CMS: A&O x 4.  Report numbness and tingling in R pinky finger and baseline numbness in all extremities.    Dressing: CDI, BAILEY drain   Diet: Regular diet, BG checks   LDA: RLE PIV TKO   Equipment: IV pole, personal equipment   Plan: Continue POC.   Additional Info: Special precautions - COVID 19

## 2024-06-21 NOTE — BRIEF OP NOTE
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  (H24) [T84.59XA, Z96.622]  Post-operative diagnosis Same as pre-operative diagnosis    Procedure: Irrigation And Debridement Elbow, Removal of antibiotic cement spacer, Left - Elbow    Surgeon: Surgeons and Role:     * Marcos Sarmiento MD - Primary     * Frank Chamberlain MD - Resident - Assisting  Anesthesia: General   Estimated Blood Loss: 400 ml    Drains: Zain-Akbar  Specimens:   ID Type Source Tests Collected by Time Destination   A : left elbow synovium Tissue Elbow, Left ANAEROBIC BACTERIAL CULTURE ROUTINE, GRAM STAIN, FUNGAL OR YEAST CULTURE ROUTINE, AEROBIC BACTERIAL CULTURE ROUTINE Marcos Sarmiento MD 6/20/2024  5:12 PM    B : left humerus bone Bone Biopsy Humerus, Left ANAEROBIC BACTERIAL CULTURE ROUTINE, GRAM STAIN, FUNGAL OR YEAST CULTURE ROUTINE, AEROBIC BACTERIAL CULTURE ROUTINE Marcos Sarmiento MD 6/20/2024  5:17 PM    C : left humeral canal Tissue Humerus, Left ANAEROBIC BACTERIAL CULTURE ROUTINE, GRAM STAIN, FUNGAL OR YEAST CULTURE ROUTINE, AEROBIC BACTERIAL CULTURE ROUTINE Marcos Sarmiento MD 6/20/2024  5:27 PM      Findings:   Evidence of infection. No areas of gross purulence or abscess. Multiple cultures taken.   Complications: None.  Implants: * No implants in log *      Assessment/Plan: Mitali Robles is a 59 year old female with a PMHx bipolar disorder, chronic pain, T2DM who is well known to this service with a complex orthopedic hx fracture dislocation of the left elbow c/b hardware failure and 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 Sarmiento.     Now s/p I&D with removal of antibiotic spacer and primary closure on 6/20 with Dr. Sarmiento     Medicine Primary  Activity: Up with assist.  Weight bearing status: NWB  LUE  Antibiotics: Per ID  Diet: ADAT  DVT prophylaxis: ok to resume lovenox POD1  Bracing/Splinting: New long arm splint applied; keep CDI   Wound Care: keep splint in place, okay to reinforce prn  Drain: record output per shift, will remove when less than 30cc over 2 shifts.   X-rays: XR elbow ordered   PT/OT: eval and treat  Labs: Hgb POD1  Consults: PT, OT, ID, Medicine appreciate assistance in caring for this patient.  Follow-up: TBD     Disposition: TBD     Frank Chamberlain MD  Orthopedic Surgery PGY4

## 2024-06-21 NOTE — PROGRESS NOTES
06/21/24 0830   Appointment Info   Signing Clinician's Name / Credentials (OT) Yoly Doss, OTR/L   Rehab Comments (OT) NWB LUE; long arm splint   Living Environment   People in Home alone   Current Living Arrangements apartment   Home Accessibility stairs to enter home   Number of Stairs, Main Entrance 6   Stair Railings, Main Entrance railings safe and in good condition   Living Environment Comments 6 [address] apartment with tub shower   Self-Care   Usual Activity Tolerance fair   Current Activity Tolerance fair   Equipment Currently Used at Home tub bench;cane, straight   Fall history within last six months yes   Number of times patient has fallen within last six months 3   Activity/Exercise/Self-Care Comment PCA 3 days/wk assists with bathing and IADLs; 1 day/wk cleaning lady; HHPT 2 days/wk; ind with dressing, toileting and cooking. Pt recently dc from [facility] 3 weeks ago.   General Information   Onset of Illness/Injury or Date of Surgery 06/18/24   Referring Physician Dr. Springer   Patient/Family Therapy Goal Statement (OT) better balance   Additional Occupational Profile Info/Pertinent History of Current Problem per chart,  59 year old female admitted on 6/18/2024. 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   Existing Precautions/Restrictions fall;weight bearing   Left Upper Extremity (Weight-bearing Status) non weight-bearing (NWB)   Right Upper Extremity (Weight-bearing Status) full weight-bearing (FWB)   Left Lower Extremity (Weight-bearing Status) full weight-bearing (FWB)   Right Lower Extremity (Weight-bearing Status) full weight-bearing (FWB)   General Observations and Info long arm splint   Cognitive Status Examination   Orientation Status orientation to person, place and time   Affect/Mental Status (Cognitive) WFL   Follows  Commands WFL   Visual Perception   Visual Impairment/Limitations corrective lenses full-time   Sensory   Sensory Quick Adds left UE   Pain Assessment   Patient Currently in Pain Yes, see Vital Sign flowsheet   Posture   Posture not impaired   Range of Motion Comprehensive   Comment, General Range of Motion LUE in splint   Strength Comprehensive (MMT)   Comment, General Manual Muscle Testing (MMT) Assessment generalized weakness   Muscle Tone Assessment   Muscle Tone Quick Adds No deficits were identified   Coordination   Upper Extremity Coordination Left UE impaired   Coordination Comments LUE in splint   Bed Mobility   Bed Mobility supine-sit;sit-supine   Supine-Sit Benson (Bed Mobility) contact guard   Sit-Supine Benson (Bed Mobility) contact guard   Transfers   Transfers sit-stand transfer;toilet transfer   Sit-Stand Transfer   Sit-Stand Benson (Transfers) contact guard   Toilet Transfer   Benson Level (Toilet Transfer) contact guard   Balance   Balance Assessment no deficits identified   Activities of Daily Living   BADL Assessment/Intervention toileting   Toileting   Benson Level (Toileting) contact guard assist   Clinical Impression   Criteria for Skilled Therapeutic Interventions Met (OT) Yes, treatment indicated   OT Diagnosis decreased ADL ind   Influenced by the following impairments s/p elbow surgery   OT Problem List-Impairments impacting ADL problems related to;activity tolerance impaired;pain;post-surgical precautions   Assessment of Occupational Performance 3-5 Performance Deficits   Identified Performance Deficits dressing, toileting, showering   Planned Therapy Interventions (OT) ADL retraining   Clinical Decision Making Complexity (OT) detailed assessment/moderate complexity   Risk & Benefits of therapy have been explained evaluation/treatment results reviewed;patient   OT Total Evaluation Time   OT Eval, Moderate Complexity Minutes (08099) 10   OT Goals   Therapy  Frequency (OT) Daily   OT: Upper Body Dressing Modified independent;within precautions   OT: Lower Body Dressing Modified independent;within precautions   OT: Bed Mobility Modified independent;supine to/from sitting;within precautions   OT: Transfer Modified independent;with assistive device;within precautions   OT: Toilet Transfer/Toileting Modified independent;using adaptive equipment;within precautions   OT: Goal 1 Pt will complete 6 stairs SBA   OT: Goal 2 Pt will complete tub transfer with tub bench SBA   Self-Care/Home Management   Self-Care/Home Mgmt/ADL, Compensatory, Meal Prep Minutes (70791) 8   Symptoms Noted During/After Treatment (Meal Preparation/Planning Training) fatigue;increased pain   Treatment Detail/Skilled Intervention Pt supine in bed upon OT arrival and agreeable to therapy. Educ on role of OT and post surgical precautions. Educ on safe toileting using harlan tech. Pt SBA toileting including clothing management using harlan tech while standing. Pt left supine in bed with needs in reach and RN present.   Therapeutic Activities   Therapeutic Activity Minutes (12905) 10   Treatment Detail/Skilled Intervention Focus on progressing safety and ind with functional mobility. Educ on safe bed mob and positioning. SBA supine<>sit bed mob with elevated HOB. Educ on safe transfers. Pt declining use of SEC at this time and wanting to use IV pole. Pt SBA STS from EOB, bed and toilet transfers. Pt SBA ambulation bed<>BR with IV pole.   Symptoms noted during/after treatment fatigue;increased pain   OT Discharge Planning   OT Plan FB dressing, toileting, flat bed mob, tub transfer, stairs, ambulation with SEC (bring cane)   OT Discharge Recommendation (DC Rec) home with assist;other (see comments)  (HHPT)   OT Rationale for DC Rec Pt has a hx of LUE surgery and was recently discharged from TCU 3 weeks ago. Pt has PCA and a cleaning service weekly with plans to continue HHPT after dc. Anticipate that pt will  progress to dc home with A from PCA with I/ADLs 3 days/week and HHPT.   OT Brief overview of current status SBA in room ambulation and toileting   Total Session Time   Timed Code Treatment Minutes 18   Total Session Time (sum of timed and untimed services) 28

## 2024-06-21 NOTE — ANESTHESIA POSTPROCEDURE EVALUATION
Patient: Mitali Robles    Procedure: Procedure(s):  Irrigation And Debridement Elbow, Removal of antibiotic cement spacer       Anesthesia Type:  General    Note:  Disposition: Outpatient   Postop Pain Control: Uneventful            Sign Out: Well controlled pain   PONV: No   Neuro/Psych: Uneventful            Sign Out: Acceptable/Baseline neuro status   Airway/Respiratory: Uneventful            Sign Out: Acceptable/Baseline resp. status   CV/Hemodynamics: Uneventful            Sign Out: Acceptable CV status; No obvious hypovolemia; No obvious fluid overload   Other NRE: NONE   DID A NON-ROUTINE EVENT OCCUR?            Last vitals:  Vitals Value Taken Time   /80 06/20/24 2045   Temp 36.8  C (98.2  F) 06/20/24 2045   Pulse 91 06/20/24 2045   Resp 10 06/20/24 2045   SpO2 98 % 06/20/24 2045   Vitals shown include unfiled device data.    Electronically Signed By: Kurtis Jarrett MD  June 20, 2024  9:16 PM

## 2024-06-21 NOTE — PROGRESS NOTES
Home Infusion  Received referral from Jeni [facility] for IV Vancomycin and Rocephin.  Benefits verified.  Patient has [employer] Medicare insurance and is covered 80% towards her out of pocket maximum of $8850.00 (Currently met $5690.20). No co-pay for Vancomycin and Rocephin. Once met in full she will be covered 100%.  Called and spoke with Mitali to review home infusion services, review benefits and offer choice of providers.  Patient would like to remain in the [facility] system and will use [facility] for home infusion.  Confirmed discharge address, phone, and emergency contact information. Patient is COVID-19 positive but denies recent travel in the house hold. Confirmed allergies. [facility] has been seeing the patient.     Mitali is willing to learn and manage home IV therapy.  Questions answered.  Plan for [facility] to provide home care services after discharge.    [facility] will continue to follow until discharge and update pt once final orders are determined.    Thank you for the referral    Dale Dykes LPN, Coordinator   [facility]   [email or web address]  Office: [phone]
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
## 2024-06-21 NOTE — PHARMACY-VANCOMYCIN DOSING SERVICE
Pharmacy Vancomycin Initial Note  Date of Service 2024  Patient's  1965  59 year old, female    Indication: Bone and Joint Infection    Current estimated CrCl = Estimated Creatinine Clearance: 82.5 mL/min (based on SCr of 0.92 mg/dL).    Creatinine for last 3 days  2024:  1:08 AM Creatinine 1.17 mg/dL;  7:48 AM Creatinine 1.10 mg/dL  2024:  6:35 AM Creatinine 1.01 mg/dL  2024:  8:11 AM Creatinine 0.92 mg/dL    Recent Vancomycin Level(s) for last 3 days  No results found for requested labs within last 3 days.      Vancomycin IV Administrations (past 72 hours)        No vancomycin orders with administrations in past 72 hours.                    Nephrotoxins and other renal medications (From now, onward)      Start     Dose/Rate Route Frequency Ordered Stop    24 2200  vancomycin (VANCOCIN) 1,000 mg in 200 mL dextrose intermittent infusion         1,000 mg  200 mL/hr over 1 Hours Intravenous EVERY 12 HOURS 24 2116              Contrast Orders - past 72 hours (72h ago, onward)      None            Insight[employer] Prediction of Planned Initial Vancomycin Regimen  Regimen: 1000 mg IV every 12 hours.  Start time: 21:15 on 2024  Exposure target: AUC24 (range)400-600 mg/L.hr   AUC24,ss: 473 mg/L.hr  Probability of AUC24 > 400: 69 %  Ctrough,ss: 15.6 mg/L  Probability of Ctrough,ss > 20: 26 %  Probability of nephrotoxicity (Lodise CHANDAN ): 11 %        Plan:  Start vancomycin  1000 mg IV q12h.   Vancomycin monitoring method: AUC  Vancomycin therapeutic monitoring goal: 400-600 mg*h/L  Pharmacy will check vancomycin levels as appropriate in 1-3 Days.    Serum creatinine levels will be ordered daily for the first week of therapy and at least twice weekly for subsequent weeks.      Robert Rodriguez, [facility]

## 2024-06-21 NOTE — PROGRESS NOTES
[facility] MEDICINE   BRIEF PROGRESS NOTE    SUBJECTIVE  Assessed patient at bedside for post procedure check. Patient states feels overall well. However, does state that she is developing some pain in her left arm plus procedure. Pain is currently feeling pain mostly on left arm although does share extensively about previous back surgeries and pain. Patient was sleeping calmly and restfully prior to examiner looking patient up at which point she endorsed pain.      OBJECTIVE:  /69 (BP Location: Right arm)   Pulse 90   Temp 97.6  F (36.4  C) (Oral)   Resp (!) 8   Wt 101.7 kg (224 lb 4.8 oz)   SpO2 96%   BMI 33.12 kg/m      Exam:   General: Alert and oriented, in no acute distress.  Skin: Warm and dry, no abnormalities noted.  ENT: Speech intact, nasal passages open, no hearing impairment noted.  CV: No cyanosis or pallor, warm and well perfused.  Respiratory: No respiratory distress, no accessory muscle use. Clear to auscultation bilaterally  Psychiatric: Mood and affect appear normal.   Extremities: Warm, able to move all four extremities at will. IV site noted in right foot. Able to move left arm fingers. Normal capillary refill of fingers on left hand. Surgical site clean dry and intact covered and OR dressing.      ASSESSMENT/PLAN:    # Infection associated with prosthesis of left elbow joint s/p Irrigation And Debridement Elbow, Removal of antibiotic cement spacer on 6/20  patient is currently doing well recovering appropriately postoperatively was previously receiving oxycodone, Tylenol, gabapentin, and Dilaudid. However, there was concern for oversedation and of previous hospitalization complicated by toxic metabolic encephalopathy the required AC admission. Thus, abundance of caution will provide one time IV hydromorphone dose and continue to monitor.    Consider resumption of gabapentin  IV 0.2 mg hydromorphone now  continue Tylenol and oxycodone    Please see daily rounding note for full  A/P.  Renetta White MD  10:32 PM

## 2024-06-21 NOTE — PLAN OF CARE
Goal Outcome Evaluation:      Plan of Care Reviewed With: patient    Overall Patient Progress: improvingOverall Patient Progress: improving    Outcome Evaluation: left elbow surgery done. arrived to the unit around 2100. AXO X 4. Stable on  2 lpm via NC. Ambulated to the bathroom with assit of 2 gait belt. Chronic back pain 10/10 per PT report, left elbow pain 8/10 managed with PRN meds. One time dose of IV dilaudid given and Oxycodone.  Voided 300, .LBM 6/18. Left arm cast in place covered with Ace wrap. Baseline numbness to all extremities. Reg diet, BG checks. Special precaution. Makes needs known, call light within reach. Count to monitor

## 2024-06-22 ENCOUNTER — APPOINTMENT (OUTPATIENT)
Dept: GENERAL RADIOLOGY | Facility: CLINIC | Age: 59
DRG: 492 | End: 2024-06-22
Attending: STUDENT IN AN ORGANIZED HEALTH CARE EDUCATION/TRAINING PROGRAM
Payer: COMMERCIAL

## 2024-06-22 ENCOUNTER — APPOINTMENT (OUTPATIENT)
Dept: OCCUPATIONAL THERAPY | Facility: CLINIC | Age: 59
DRG: 492 | End: 2024-06-22
Attending: INTERNAL MEDICINE
Payer: COMMERCIAL

## 2024-06-22 LAB
ALBUMIN SERPL BCG-MCNC: 3.2 G/DL (ref 3.5–5.2)
ALP SERPL-CCNC: 173 U/L (ref 40–150)
ALT SERPL W P-5'-P-CCNC: 9 U/L (ref 0–50)
ANION GAP SERPL CALCULATED.3IONS-SCNC: 9 MMOL/L (ref 7–15)
AST SERPL W P-5'-P-CCNC: 19 U/L (ref 0–45)
BILIRUB SERPL-MCNC: 0.2 MG/DL
BUN SERPL-MCNC: 6.5 MG/DL (ref 8–23)
CALCIUM SERPL-MCNC: 8.3 MG/DL (ref 8.8–10.2)
CHLORIDE SERPL-SCNC: 102 MMOL/L (ref 98–107)
CREAT SERPL-MCNC: 0.84 MG/DL (ref 0.51–0.95)
CRP SERPL-MCNC: 126.82 MG/L
DEPRECATED HCO3 PLAS-SCNC: 28 MMOL/L (ref 22–29)
EGFRCR SERPLBLD CKD-EPI 2021: 79 ML/MIN/1.73M2
ERYTHROCYTE [DISTWIDTH] IN BLOOD BY AUTOMATED COUNT: 16.3 % (ref 10–15)
FERRITIN SERPL-MCNC: 145 NG/ML (ref 11–328)
GLUCOSE BLDC GLUCOMTR-MCNC: 164 MG/DL (ref 70–99)
GLUCOSE BLDC GLUCOMTR-MCNC: 183 MG/DL (ref 70–99)
GLUCOSE BLDC GLUCOMTR-MCNC: 189 MG/DL (ref 70–99)
GLUCOSE BLDC GLUCOMTR-MCNC: 213 MG/DL (ref 70–99)
GLUCOSE BLDC GLUCOMTR-MCNC: 213 MG/DL (ref 70–99)
GLUCOSE BLDC GLUCOMTR-MCNC: 214 MG/DL (ref 70–99)
GLUCOSE BLDC GLUCOMTR-MCNC: 229 MG/DL (ref 70–99)
GLUCOSE SERPL-MCNC: 207 MG/DL (ref 70–99)
HCT VFR BLD AUTO: 31.7 % (ref 35–47)
HGB BLD-MCNC: 9.3 G/DL (ref 11.7–15.7)
INR PPP: 0.99 (ref 0.85–1.15)
MCH RBC QN AUTO: 24.5 PG (ref 26.5–33)
MCHC RBC AUTO-ENTMCNC: 29.3 G/DL (ref 31.5–36.5)
MCV RBC AUTO: 84 FL (ref 78–100)
NT-PROBNP SERPL-MCNC: 533 PG/ML (ref 0–900)
PLATELET # BLD AUTO: 360 10E3/UL (ref 150–450)
POTASSIUM SERPL-SCNC: 4.6 MMOL/L (ref 3.4–5.3)
PROCALCITONIN SERPL IA-MCNC: 0.08 NG/ML
PROT SERPL-MCNC: 6.8 G/DL (ref 6.4–8.3)
RBC # BLD AUTO: 3.79 10E6/UL (ref 3.8–5.2)
SODIUM SERPL-SCNC: 139 MMOL/L (ref 135–145)
TROPONIN T SERPL HS-MCNC: 22 NG/L
VANCOMYCIN SERPL-MCNC: 18.1 UG/ML
WBC # BLD AUTO: 11.7 10E3/UL (ref 4–11)

## 2024-06-22 PROCEDURE — 250N000013 HC RX MED GY IP 250 OP 250 PS 637: Performed by: STUDENT IN AN ORGANIZED HEALTH CARE EDUCATION/TRAINING PROGRAM

## 2024-06-22 PROCEDURE — 83880 ASSAY OF NATRIURETIC PEPTIDE: CPT | Performed by: STUDENT IN AN ORGANIZED HEALTH CARE EDUCATION/TRAINING PROGRAM

## 2024-06-22 PROCEDURE — 71045 X-RAY EXAM CHEST 1 VIEW: CPT | Mod: 26 | Performed by: STUDENT IN AN ORGANIZED HEALTH CARE EDUCATION/TRAINING PROGRAM

## 2024-06-22 PROCEDURE — 250N000011 HC RX IP 250 OP 636: Performed by: STUDENT IN AN ORGANIZED HEALTH CARE EDUCATION/TRAINING PROGRAM

## 2024-06-22 PROCEDURE — 250N000013 HC RX MED GY IP 250 OP 250 PS 637

## 2024-06-22 PROCEDURE — 97530 THERAPEUTIC ACTIVITIES: CPT | Mod: GO

## 2024-06-22 PROCEDURE — 93010 ELECTROCARDIOGRAM REPORT: CPT | Performed by: INTERNAL MEDICINE

## 2024-06-22 PROCEDURE — 36415 COLL VENOUS BLD VENIPUNCTURE: CPT

## 2024-06-22 PROCEDURE — 86140 C-REACTIVE PROTEIN: CPT

## 2024-06-22 PROCEDURE — 80053 COMPREHEN METABOLIC PANEL: CPT

## 2024-06-22 PROCEDURE — 84484 ASSAY OF TROPONIN QUANT: CPT | Performed by: STUDENT IN AN ORGANIZED HEALTH CARE EDUCATION/TRAINING PROGRAM

## 2024-06-22 PROCEDURE — 85027 COMPLETE CBC AUTOMATED: CPT

## 2024-06-22 PROCEDURE — 258N000003 HC RX IP 258 OP 636: Mod: JZ

## 2024-06-22 PROCEDURE — 999N000127 HC STATISTIC PERIPHERAL IV START W US GUIDANCE

## 2024-06-22 PROCEDURE — 97535 SELF CARE MNGMENT TRAINING: CPT | Mod: GO

## 2024-06-22 PROCEDURE — 99231 SBSQ HOSP IP/OBS SF/LOW 25: CPT | Mod: GC

## 2024-06-22 PROCEDURE — 80202 ASSAY OF VANCOMYCIN: CPT | Performed by: FAMILY MEDICINE

## 2024-06-22 PROCEDURE — 36415 COLL VENOUS BLD VENIPUNCTURE: CPT | Performed by: STUDENT IN AN ORGANIZED HEALTH CARE EDUCATION/TRAINING PROGRAM

## 2024-06-22 PROCEDURE — 71045 X-RAY EXAM CHEST 1 VIEW: CPT

## 2024-06-22 PROCEDURE — 120N000002 HC R&B MED SURG/OB UMMC

## 2024-06-22 PROCEDURE — 250N000011 HC RX IP 250 OP 636: Mod: JZ

## 2024-06-22 PROCEDURE — 82728 ASSAY OF FERRITIN: CPT

## 2024-06-22 PROCEDURE — 84145 PROCALCITONIN (PCT): CPT | Performed by: STUDENT IN AN ORGANIZED HEALTH CARE EDUCATION/TRAINING PROGRAM

## 2024-06-22 PROCEDURE — 85610 PROTHROMBIN TIME: CPT

## 2024-06-22 RX ORDER — SENNOSIDES 8.6 MG
1 TABLET ORAL DAILY PRN
Status: DISCONTINUED | OUTPATIENT
Start: 2024-06-22 | End: 2024-06-28 | Stop reason: HOSPADM

## 2024-06-22 RX ORDER — IOPAMIDOL 755 MG/ML
100 INJECTION, SOLUTION INTRAVASCULAR ONCE
Status: DISCONTINUED | OUTPATIENT
Start: 2024-06-22 | End: 2024-06-28 | Stop reason: HOSPADM

## 2024-06-22 RX ORDER — ENOXAPARIN SODIUM 100 MG/ML
1 INJECTION SUBCUTANEOUS EVERY 12 HOURS
Status: DISCONTINUED | OUTPATIENT
Start: 2024-06-22 | End: 2024-06-25

## 2024-06-22 RX ORDER — LIDOCAINE 40 MG/G
CREAM TOPICAL
Status: ACTIVE | OUTPATIENT
Start: 2024-06-22 | End: 2024-06-25

## 2024-06-22 RX ORDER — LIDOCAINE HYDROCHLORIDE 20 MG/ML
JELLY TOPICAL EVERY 4 HOURS PRN
Status: DISCONTINUED | OUTPATIENT
Start: 2024-06-22 | End: 2024-06-28 | Stop reason: HOSPADM

## 2024-06-22 RX ADMIN — INSULIN ASPART 1 UNITS: 100 INJECTION, SOLUTION INTRAVENOUS; SUBCUTANEOUS at 17:33

## 2024-06-22 RX ADMIN — OXYCODONE HYDROCHLORIDE 10 MG: 5 TABLET ORAL at 22:40

## 2024-06-22 RX ADMIN — CEFTRIAXONE SODIUM 2 G: 2 INJECTION, POWDER, FOR SOLUTION INTRAMUSCULAR; INTRAVENOUS at 22:41

## 2024-06-22 RX ADMIN — OXYCODONE HYDROCHLORIDE 10 MG: 5 TABLET ORAL at 17:30

## 2024-06-22 RX ADMIN — GABAPENTIN 300 MG: 300 CAPSULE ORAL at 08:24

## 2024-06-22 RX ADMIN — VANCOMYCIN HYDROCHLORIDE 1000 MG: 1 INJECTION, SOLUTION INTRAVENOUS at 11:19

## 2024-06-22 RX ADMIN — OXYCODONE HYDROCHLORIDE 10 MG: 5 TABLET ORAL at 05:33

## 2024-06-22 RX ADMIN — INSULIN GLARGINE 15 UNITS: 100 INJECTION, SOLUTION SUBCUTANEOUS at 22:12

## 2024-06-22 RX ADMIN — ACETAMINOPHEN 975 MG: 325 TABLET, FILM COATED ORAL at 08:23

## 2024-06-22 RX ADMIN — LUMATEPERONE 84 MG: 42 CAPSULE ORAL at 22:12

## 2024-06-22 RX ADMIN — REMDESIVIR 100 MG: 100 INJECTION, POWDER, LYOPHILIZED, FOR SOLUTION INTRAVENOUS at 13:22

## 2024-06-22 RX ADMIN — ENOXAPARIN SODIUM 40 MG: 40 INJECTION SUBCUTANEOUS at 14:09

## 2024-06-22 RX ADMIN — INSULIN ASPART 1 UNITS: 100 INJECTION, SOLUTION INTRAVENOUS; SUBCUTANEOUS at 09:59

## 2024-06-22 RX ADMIN — CITALOPRAM HYDROBROMIDE 20 MG: 20 TABLET ORAL at 22:12

## 2024-06-22 RX ADMIN — GABAPENTIN 300 MG: 300 CAPSULE ORAL at 14:08

## 2024-06-22 RX ADMIN — OXYCODONE HYDROCHLORIDE 10 MG: 5 TABLET ORAL at 11:29

## 2024-06-22 RX ADMIN — ACETAMINOPHEN 975 MG: 325 TABLET, FILM COATED ORAL at 19:55

## 2024-06-22 RX ADMIN — ONDANSETRON 4 MG: 4 TABLET, ORALLY DISINTEGRATING ORAL at 19:56

## 2024-06-22 RX ADMIN — INSULIN ASPART 1 UNITS: 100 INJECTION, SOLUTION INTRAVENOUS; SUBCUTANEOUS at 13:28

## 2024-06-22 RX ADMIN — ATORVASTATIN CALCIUM 10 MG: 10 TABLET, FILM COATED ORAL at 08:24

## 2024-06-22 RX ADMIN — ACETAMINOPHEN 975 MG: 325 TABLET, FILM COATED ORAL at 14:07

## 2024-06-22 RX ADMIN — GABAPENTIN 300 MG: 300 CAPSULE ORAL at 19:56

## 2024-06-22 RX ADMIN — SODIUM CHLORIDE 50 ML: 9 INJECTION, SOLUTION INTRAVENOUS at 13:27

## 2024-06-22 ASSESSMENT — ACTIVITIES OF DAILY LIVING (ADL)
ADLS_ACUITY_SCORE: 36

## 2024-06-22 NOTE — PROGRESS NOTES
Brief Note:    RN paged noting patient with worsening chest pain, cough, and palpitations. Tachycardia to 127. Hasn't received albuterol today.     Assess for ACS and PE:  - stat ECG  - stat troponin  - stat CT PE      MD [facility] Resident, PGY-3    ------------------------------------------------------------------  Writer (cross cover) went to assess pt    She notes increase CP - tightness, intermitt dry/int productive cough, chills, bilateral leg pain.     BP (!) 171/81 (BP Location: Right arm)   Pulse 101   Temp 98.2  F (36.8  C) (Oral)   Resp 12   Wt 101.7 kg (224 lb 4.8 oz)   SpO2 94%   BMI 33.12 kg/m      Requiring 3 L NC    Exam   Diaphoretic  RRR  Lungs CTAB  BLE without edema, erythema, tenderness    Will follow workup above.     Rekha Crooks MD  [facility], PGY-3

## 2024-06-22 NOTE — CONSULTS
"Consult received for Vascular access care.  See LDA for details.  For additional needs place \"Nursing to Consult for Vascular Access\" [id number] order in EPIC.   "
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
## 2024-06-22 NOTE — PHARMACY-VANCOMYCIN DOSING SERVICE
Pharmacy Vancomycin Note  Date of Service 2024  Patient's  1965   59 year old, female    Indication: Bone and Joint Infection  Day of Therapy: started 2024  Current vancomycin regimen:  1000 mg IV q12h  Current vancomycin monitoring method: AUC  Current vancomycin therapeutic monitoring goal: 400-600 mg*h/L    InsightRX Prediction of Current Vancomycin Regimen  Loading dose: N/A  Regimen: 1000 mg IV every 12 hours.  Start time: 23:19 on 2024  Exposure target: AUC24 (range)400-600 mg/L.hr   AUC24,ss: 670 mg/L.hr  Probability of AUC24 > 400: 100 %  Ctrough,ss: 22.2 mg/L  Probability of Ctrough,ss > 20: 70 %  Probability of nephrotoxicity (Lodise CHANDAN ): 21 %      Current estimated CrCl = Estimated Creatinine Clearance: 90.4 mL/min (based on SCr of 0.84 mg/dL).    Creatinine for last 3 days  2024:  8:11 AM Creatinine 0.92 mg/dL  2024:  9:01 AM Creatinine 0.93 mg/dL  2024: 10:42 AM Creatinine 0.84 mg/dL    Recent Vancomycin Levels (past 3 days)  2024: 10:42 AM Vancomycin 18.1 ug/mL    Vancomycin IV Administrations (past 72 hours)                     vancomycin (VANCOCIN) 1,000 mg in 200 mL dextrose intermittent infusion (mg) 1,000 mg New Bag 24 1119     1,000 mg New Bag 24 2248     1,000 mg New Bag  1040     1,000 mg New Bag 24 2336                    Nephrotoxins and other renal medications (From now, onward)      Start     Dose/Rate Route Frequency Ordered Stop    24 1637  ketorolac (TORADOL) injection 15 mg         15 mg Intravenous EVERY 6 HOURS PRN 24 1638 24 1636    24 2200  vancomycin (VANCOCIN) 1,000 mg in 200 mL dextrose intermittent infusion         1,000 mg  200 mL/hr over 1 Hours Intravenous EVERY 12 HOURS 24 2116                 Contrast Orders - past 72 hours (72h ago, onward)      None            Interpretation of levels and current regimen:  Vancomycin level is reflective of AUC greater than 600    Has  serum creatinine changed greater than 50% in last 72 hours: No    Urine output:  unable to determine    Renal Function: Stable    InsightRX Prediction of Planned New Vancomycin Regimen  Loading dose: N/A  Regimen: 750 mg IV every 12 hours.  Start time: 23:19 on 06/22/2024  Exposure target: AUC24 (range)400-600 mg/L.hr   AUC24,ss: 509 mg/L.hr  Probability of AUC24 > 400: 94 %  Ctrough,ss: 16.9 mg/L  Probability of Ctrough,ss > 20: 20 %  Probability of nephrotoxicity (Lodise CHANDAN 2009): 13 %      Plan:  Decrease Dose to 750 mg (7.4 mg/kg) q12h  Vancomycin monitoring method: AUC  Vancomycin therapeutic monitoring goal: 400-600 mg*h/L  Pharmacy will check vancomycin levels as appropriate in 3-5 Days.  Serum creatinine levels will be ordered daily for the first week of therapy and at least twice weekly for subsequent weeks.    VLAD CLAROS [facility]

## 2024-06-22 NOTE — PLAN OF CARE
Goal Outcome Evaluation:      VS: Blood pressure (!) 171/81, pulse 101, temperature 98.2  F (36.8  C), temperature source Oral, resp. rate 12, weight 101.7 kg (224 lb 4.8 oz), SpO2 94%.   O2: 3 LPM   Output: Voids adequately in the bathroom   Last BM: 6/21, per pt   Activity: Ax1 with GB   Skin: LUE surgical wound   Pain: Managed with PRN oxycodone, and scheduled medications   CMS: A&O x 4  Report numbness and tingling in R pinky finger and baseline numbness in lower extremities.    Dressing: CDI   Diet: Regular diet, BG checks   LDA: RLE PIV TKO   Equipment: IV pole, personal equipment   Plan: Continue POC. PICC line to be placed around 1500, per pt.    Additional Info: Special precautions - COVID 19

## 2024-06-22 NOTE — PLAN OF CARE
Goal Outcome Evaluation: L Elbow pain/mobility    Plan of Care Reviewed With: patient  Overall Patient Progress: improving    VS:     Vital signs:  Temp: 98.1  F (36.7  C) Temp src: Oral BP: 127/80 Pulse: 108   Resp: 19 SpO2: 95 % O2 Device: Nasal cannula 3L    Output:     Continent of bowel and bladder.  Voids spontaneously without  difficulty in the bathroom.   Last BM on 6/21/2024. Abdomen soft and not tender. Bowel sound normoactive and she is passing gas.  C/O having diarrhea from Remdesiver and has PRN Pepto Bismol.   Activity:     A1 with GB. Weakness observed  when assisted to the bathroom twice, denied dizziness, light-headedness, SOB, chestpain.   Skin: Looks pale. No open area noted. Refused antifungal powder. No redness seen on breastfold and groin   Pain:     Intermittent aching, sore and throbbing pain on L elbow rated 9/10, controlled with PRN IV Dilaudid given 1x this shift and PRN Oxycodone with relief. Warm pad provided per request.   CMS:     Numbness on L pinky. MD is aware. TN has also neuropathy on BLE.   Dressing: LUE wrapped with ace, c/d/I.   Diet: Regular and tolerated.   LDA: PIV on the R foot, patent  and saline locked.     IV site got infiltrated after IV Vancomycin. Pt complaining of pain, site is a little swollen with redness. Crosscover notified. RLE elevated on pillow.   Equipment: BAILEY drain patent, draining and emptied 30 ml sanguinous output.   Plan: Pain Mgt, PT/OT, monitor drain output    Additional Info:     TN is DM II with ISS and carb counting. BG at 0200 was 134.    She has ongoing Remdesiver for Covid for 5 doses, last dose will be on 6/23/24.

## 2024-06-23 ENCOUNTER — APPOINTMENT (OUTPATIENT)
Dept: CT IMAGING | Facility: CLINIC | Age: 59
DRG: 492 | End: 2024-06-23
Payer: COMMERCIAL

## 2024-06-23 ENCOUNTER — APPOINTMENT (OUTPATIENT)
Dept: CARDIOLOGY | Facility: CLINIC | Age: 59
DRG: 492 | End: 2024-06-23
Payer: COMMERCIAL

## 2024-06-23 LAB
ALBUMIN SERPL BCG-MCNC: 3.2 G/DL (ref 3.5–5.2)
ALP SERPL-CCNC: 167 U/L (ref 40–150)
ALT SERPL W P-5'-P-CCNC: 7 U/L (ref 0–50)
ANION GAP SERPL CALCULATED.3IONS-SCNC: 9 MMOL/L (ref 7–15)
AST SERPL W P-5'-P-CCNC: 16 U/L (ref 0–45)
BACTERIA BLD CULT: NO GROWTH
BACTERIA BLD CULT: NO GROWTH
BILIRUB SERPL-MCNC: 0.2 MG/DL
BUN SERPL-MCNC: 7.4 MG/DL (ref 8–23)
CALCIUM SERPL-MCNC: 8.6 MG/DL (ref 8.8–10.2)
CHLORIDE SERPL-SCNC: 100 MMOL/L (ref 98–107)
CREAT SERPL-MCNC: 0.75 MG/DL (ref 0.51–0.95)
CRP SERPL-MCNC: 167.61 MG/L
D DIMER PPP FEU-MCNC: 1.18 UG/ML FEU (ref 0–0.5)
DEPRECATED HCO3 PLAS-SCNC: 28 MMOL/L (ref 22–29)
EGFRCR SERPLBLD CKD-EPI 2021: >90 ML/MIN/1.73M2
ERYTHROCYTE [DISTWIDTH] IN BLOOD BY AUTOMATED COUNT: 16.4 % (ref 10–15)
GLUCOSE BLDC GLUCOMTR-MCNC: 148 MG/DL (ref 70–99)
GLUCOSE BLDC GLUCOMTR-MCNC: 181 MG/DL (ref 70–99)
GLUCOSE BLDC GLUCOMTR-MCNC: 213 MG/DL (ref 70–99)
GLUCOSE BLDC GLUCOMTR-MCNC: 230 MG/DL (ref 70–99)
GLUCOSE BLDC GLUCOMTR-MCNC: 338 MG/DL (ref 70–99)
GLUCOSE BLDC GLUCOMTR-MCNC: 386 MG/DL (ref 70–99)
GLUCOSE SERPL-MCNC: 186 MG/DL (ref 70–99)
HCT VFR BLD AUTO: 29.1 % (ref 35–47)
HGB BLD-MCNC: 8.5 G/DL (ref 11.7–15.7)
LVEF ECHO: NORMAL
MCH RBC QN AUTO: 24.5 PG (ref 26.5–33)
MCHC RBC AUTO-ENTMCNC: 29.2 G/DL (ref 31.5–36.5)
MCV RBC AUTO: 84 FL (ref 78–100)
PLATELET # BLD AUTO: 379 10E3/UL (ref 150–450)
POTASSIUM SERPL-SCNC: 4.4 MMOL/L (ref 3.4–5.3)
PROT SERPL-MCNC: 6.9 G/DL (ref 6.4–8.3)
RBC # BLD AUTO: 3.47 10E6/UL (ref 3.8–5.2)
SODIUM SERPL-SCNC: 137 MMOL/L (ref 135–145)
TROPONIN T SERPL HS-MCNC: 17 NG/L
WBC # BLD AUTO: 13.8 10E3/UL (ref 4–11)

## 2024-06-23 PROCEDURE — 250N000013 HC RX MED GY IP 250 OP 250 PS 637

## 2024-06-23 PROCEDURE — 71250 CT THORAX DX C-: CPT | Mod: 26 | Performed by: RADIOLOGY

## 2024-06-23 PROCEDURE — 250N000011 HC RX IP 250 OP 636: Mod: JZ

## 2024-06-23 PROCEDURE — 36415 COLL VENOUS BLD VENIPUNCTURE: CPT

## 2024-06-23 PROCEDURE — 36569 INSJ PICC 5 YR+ W/O IMAGING: CPT

## 2024-06-23 PROCEDURE — 74176 CT ABD & PELVIS W/O CONTRAST: CPT | Mod: 26 | Performed by: RADIOLOGY

## 2024-06-23 PROCEDURE — 250N000011 HC RX IP 250 OP 636

## 2024-06-23 PROCEDURE — 36415 COLL VENOUS BLD VENIPUNCTURE: CPT | Performed by: FAMILY MEDICINE

## 2024-06-23 PROCEDURE — 93306 TTE W/DOPPLER COMPLETE: CPT

## 2024-06-23 PROCEDURE — 71250 CT THORAX DX C-: CPT

## 2024-06-23 PROCEDURE — 84484 ASSAY OF TROPONIN QUANT: CPT

## 2024-06-23 PROCEDURE — 82565 ASSAY OF CREATININE: CPT

## 2024-06-23 PROCEDURE — 250N000012 HC RX MED GY IP 250 OP 636 PS 637

## 2024-06-23 PROCEDURE — 250N000013 HC RX MED GY IP 250 OP 250 PS 637: Performed by: STUDENT IN AN ORGANIZED HEALTH CARE EDUCATION/TRAINING PROGRAM

## 2024-06-23 PROCEDURE — 999N000040 HC STATISTIC CONSULT NO CHARGE VASC ACCESS

## 2024-06-23 PROCEDURE — 93306 TTE W/DOPPLER COMPLETE: CPT | Mod: 26 | Performed by: INTERNAL MEDICINE

## 2024-06-23 PROCEDURE — 250N000009 HC RX 250: Performed by: STUDENT IN AN ORGANIZED HEALTH CARE EDUCATION/TRAINING PROGRAM

## 2024-06-23 PROCEDURE — 258N000003 HC RX IP 258 OP 636: Mod: JZ

## 2024-06-23 PROCEDURE — 99231 SBSQ HOSP IP/OBS SF/LOW 25: CPT | Mod: GC

## 2024-06-23 PROCEDURE — 93005 ELECTROCARDIOGRAM TRACING: CPT

## 2024-06-23 PROCEDURE — 258N000003 HC RX IP 258 OP 636: Mod: JZ | Performed by: FAMILY MEDICINE

## 2024-06-23 PROCEDURE — 250N000011 HC RX IP 250 OP 636: Mod: JZ | Performed by: FAMILY MEDICINE

## 2024-06-23 PROCEDURE — 272N000454 HC KIT, 3FR SV SINGLE LUMEN POWERPICC

## 2024-06-23 PROCEDURE — 120N000002 HC R&B MED SURG/OB UMMC

## 2024-06-23 PROCEDURE — 93010 ELECTROCARDIOGRAM REPORT: CPT | Mod: 76 | Performed by: INTERNAL MEDICINE

## 2024-06-23 PROCEDURE — 85027 COMPLETE CBC AUTOMATED: CPT

## 2024-06-23 PROCEDURE — 85379 FIBRIN DEGRADATION QUANT: CPT | Performed by: FAMILY MEDICINE

## 2024-06-23 PROCEDURE — 86140 C-REACTIVE PROTEIN: CPT

## 2024-06-23 PROCEDURE — 250N000011 HC RX IP 250 OP 636: Mod: JZ | Performed by: STUDENT IN AN ORGANIZED HEALTH CARE EDUCATION/TRAINING PROGRAM

## 2024-06-23 RX ORDER — LIDOCAINE 4 G/G
2 PATCH TOPICAL
Status: DISCONTINUED | OUTPATIENT
Start: 2024-06-23 | End: 2024-06-28 | Stop reason: HOSPADM

## 2024-06-23 RX ORDER — CYCLOBENZAPRINE HCL 10 MG
10 TABLET ORAL 3 TIMES DAILY
Status: DISCONTINUED | OUTPATIENT
Start: 2024-06-23 | End: 2024-06-28 | Stop reason: HOSPADM

## 2024-06-23 RX ORDER — HEPARIN SODIUM,PORCINE 10 UNIT/ML
5-20 VIAL (ML) INTRAVENOUS
Status: DISCONTINUED | OUTPATIENT
Start: 2024-06-23 | End: 2024-06-28 | Stop reason: HOSPADM

## 2024-06-23 RX ORDER — HEPARIN SODIUM,PORCINE 10 UNIT/ML
5-20 VIAL (ML) INTRAVENOUS EVERY 24 HOURS
Status: DISCONTINUED | OUTPATIENT
Start: 2024-06-23 | End: 2024-06-28 | Stop reason: HOSPADM

## 2024-06-23 RX ORDER — IOPAMIDOL 755 MG/ML
100 INJECTION, SOLUTION INTRAVASCULAR ONCE
Status: COMPLETED | OUTPATIENT
Start: 2024-06-23 | End: 2024-06-23

## 2024-06-23 RX ORDER — CEFEPIME HYDROCHLORIDE 2 G/1
2 INJECTION, POWDER, FOR SOLUTION INTRAVENOUS EVERY 8 HOURS
Status: DISCONTINUED | OUTPATIENT
Start: 2024-06-23 | End: 2024-06-25

## 2024-06-23 RX ADMIN — ENOXAPARIN SODIUM 100 MG: 100 INJECTION SUBCUTANEOUS at 22:41

## 2024-06-23 RX ADMIN — ACETAMINOPHEN 975 MG: 325 TABLET, FILM COATED ORAL at 08:56

## 2024-06-23 RX ADMIN — OXYCODONE HYDROCHLORIDE 10 MG: 5 TABLET ORAL at 22:41

## 2024-06-23 RX ADMIN — OXYCODONE HYDROCHLORIDE 10 MG: 5 TABLET ORAL at 17:08

## 2024-06-23 RX ADMIN — KETOROLAC TROMETHAMINE 15 MG: 15 INJECTION, SOLUTION INTRAMUSCULAR; INTRAVENOUS at 05:30

## 2024-06-23 RX ADMIN — DEXAMETHASONE 6 MG: 2 TABLET ORAL at 01:30

## 2024-06-23 RX ADMIN — GABAPENTIN 300 MG: 300 CAPSULE ORAL at 19:57

## 2024-06-23 RX ADMIN — CITALOPRAM HYDROBROMIDE 20 MG: 20 TABLET ORAL at 22:41

## 2024-06-23 RX ADMIN — LUMATEPERONE 84 MG: 42 CAPSULE ORAL at 22:42

## 2024-06-23 RX ADMIN — KETOROLAC TROMETHAMINE 15 MG: 15 INJECTION, SOLUTION INTRAMUSCULAR; INTRAVENOUS at 14:34

## 2024-06-23 RX ADMIN — SODIUM CHLORIDE 50 ML: 9 INJECTION, SOLUTION INTRAVENOUS at 13:15

## 2024-06-23 RX ADMIN — ACETAMINOPHEN 975 MG: 325 TABLET, FILM COATED ORAL at 19:56

## 2024-06-23 RX ADMIN — CEFEPIME 2 G: 2 INJECTION, POWDER, FOR SOLUTION INTRAVENOUS at 17:09

## 2024-06-23 RX ADMIN — INSULIN GLARGINE 15 UNITS: 100 INJECTION, SOLUTION SUBCUTANEOUS at 22:41

## 2024-06-23 RX ADMIN — REMDESIVIR 100 MG: 100 INJECTION, POWDER, LYOPHILIZED, FOR SOLUTION INTRAVENOUS at 13:06

## 2024-06-23 RX ADMIN — LIDOCAINE 2 PATCH: 4 PATCH TOPICAL at 14:35

## 2024-06-23 RX ADMIN — ACETAMINOPHEN 975 MG: 325 TABLET, FILM COATED ORAL at 14:45

## 2024-06-23 RX ADMIN — VANCOMYCIN HYDROCHLORIDE 750 MG: 10 INJECTION, POWDER, LYOPHILIZED, FOR SOLUTION INTRAVENOUS at 00:38

## 2024-06-23 RX ADMIN — OXYCODONE HYDROCHLORIDE 10 MG: 5 TABLET ORAL at 11:07

## 2024-06-23 RX ADMIN — VANCOMYCIN HYDROCHLORIDE 750 MG: 10 INJECTION, POWDER, LYOPHILIZED, FOR SOLUTION INTRAVENOUS at 11:16

## 2024-06-23 RX ADMIN — CYCLOBENZAPRINE 10 MG: 10 TABLET, FILM COATED ORAL at 14:45

## 2024-06-23 RX ADMIN — OXYCODONE HYDROCHLORIDE 10 MG: 5 TABLET ORAL at 06:58

## 2024-06-23 RX ADMIN — SENNOSIDES 1 TABLET: 8.6 TABLET, FILM COATED ORAL at 11:24

## 2024-06-23 RX ADMIN — DEXAMETHASONE 6 MG: 2 TABLET ORAL at 14:50

## 2024-06-23 RX ADMIN — ENOXAPARIN SODIUM 100 MG: 100 INJECTION SUBCUTANEOUS at 11:09

## 2024-06-23 RX ADMIN — LIDOCAINE HYDROCHLORIDE ANHYDROUS 1.5 ML: 10 INJECTION, SOLUTION INFILTRATION at 12:41

## 2024-06-23 RX ADMIN — ATORVASTATIN CALCIUM 10 MG: 10 TABLET, FILM COATED ORAL at 08:57

## 2024-06-23 RX ADMIN — INSULIN ASPART 1 UNITS: 100 INJECTION, SOLUTION INTRAVENOUS; SUBCUTANEOUS at 17:09

## 2024-06-23 RX ADMIN — HEPARIN, PORCINE (PF) 10 UNIT/ML INTRAVENOUS SYRINGE 5 ML: at 14:42

## 2024-06-23 RX ADMIN — INSULIN ASPART 1 UNITS: 100 INJECTION, SOLUTION INTRAVENOUS; SUBCUTANEOUS at 11:11

## 2024-06-23 RX ADMIN — VANCOMYCIN HYDROCHLORIDE 750 MG: 10 INJECTION, POWDER, LYOPHILIZED, FOR SOLUTION INTRAVENOUS at 22:40

## 2024-06-23 RX ADMIN — GABAPENTIN 300 MG: 300 CAPSULE ORAL at 08:57

## 2024-06-23 RX ADMIN — CYCLOBENZAPRINE 10 MG: 10 TABLET, FILM COATED ORAL at 19:57

## 2024-06-23 RX ADMIN — ENOXAPARIN SODIUM 100 MG: 100 INJECTION SUBCUTANEOUS at 01:16

## 2024-06-23 RX ADMIN — GABAPENTIN 300 MG: 300 CAPSULE ORAL at 14:45

## 2024-06-23 ASSESSMENT — ACTIVITIES OF DAILY LIVING (ADL)
ADLS_ACUITY_SCORE: 36
ADLS_ACUITY_SCORE: 34
ADLS_ACUITY_SCORE: 36

## 2024-06-23 NOTE — PLAN OF CARE
Goal Outcome Evaluation:      VS: Blood pressure (!) 157/76, pulse 86, temperature 98.8  F (37.1  C), temperature source Axillary, resp. rate 14, weight 101.7 kg (224 lb 4.8 oz), SpO2 100%.   O2: 3 LPM   Output: Voids spontaneously without  difficulty in the bathroom.    Last BM: 6/21/24   Activity: Ax1 with GB. NWB on the LUE. Arm elevated with pillow.   Skin: LUE surgical wound. Bruise and swelling on R upper arm from extravasation.    Pain: C/o of constant throbbing back/shoulder pain 10/10. Manages with PRN Toradol 1x, Oxycodone, and scheduled medications. Lidocaine patches on her bilateral shoulders.    CMS: A&O x 4. Numbness on L pinky. MD is aware. TN has also neuropathy on BLE from her diabetes.   Dressing: CDI   Diet: Regular diet   LDA: R PICC heparin locked   Equipment: Walker, IV pole, personal equipment.    Plan: Continue POC.    Additional Info:

## 2024-06-23 NOTE — PLAN OF CARE
Goal Outcome Evaluation: L elbow pain/ Mobility/ Respiratory Status    Plan of Care Reviewed With: patient  Overall Patient Progress: no change    VS:     Vital signs:  Temp: 98.8  F (37.1  C) Temp src: Axillary BP: (!) 157/76 Pulse: 86   Resp: 14 SpO2: 100 % O2 Device: Nasal cannula Oxygen Delivery: 3 LPM     Breathing comfortably. No signs of distress.  O2 maintained at 3L NC overnight.   Output:     Continent of bowel and bladder.  Voids spontaneously without  difficulty in the bathroom.   Last BM on 6/21/2024. C/O having diarrhea from Remdesiver and has PRN Pepto Bismol.   Activity:     A1 with GB. Feeling weak  when OOB to bathroom, denied dizziness, light-headedness, SOB, chestpain.    NWB on the LUE. Arm elevated with pillow.   Skin: Looks pale. No open area noted.    Pain:     Intermittent aching, sore and throbbing pain on L elbow rated 9/10. PRN IV Toradol given 1x. Warm pad in place.     TN was drowsy with Oxycodone 10 mg PRN. She can hardly open her eyes  but able to answer questions appropriately.   CMS:     Numbness on L pinky. MD is aware. TN has also neuropathy on BLE from her diabetes.   Dressing: LUE wrapped with ace, c/d/I.   Diet: Regular and tolerated.   LDA: R PIV patent  and saline locked.      Bruises redness. blisters and swelling still apparent on the extravasation site on the RUE. Arm kept elevated. Coldpack applied.   Equipment: Walker used on ambulation.   Plan: Pain Mgt, PT/OT, monitor extravasation site. BG monitoring.   Additional Info:      ISS and carb counting for DM II. BG at 0200 was 144.     She has ongoing Remdesiver for Covid for 5 doses, last dose will be on 6/23/24.

## 2024-06-23 NOTE — PLAN OF CARE
Problem: Adult Inpatient Plan of Care  Goal: Absence of Hospital-Acquired Illness or Injury  Outcome: Progressing  Intervention: Identify and Manage Fall Risk  Recent Flowsheet Documentation  Taken 6/22/2024 1630 by Sylvie Mcbride RN  Safety Promotion/Fall Prevention:   supervised activity   safety round/check completed   nonskid shoes/slippers when out of bed   increase visualization of patient   clutter free environment maintained   assistive device/personal items within reach   activity supervised  Intervention: Prevent Skin Injury  Recent Flowsheet Documentation  Taken 6/22/2024 1630 by Sylvie Mcbride RN  Body Position: position changed independently  Skin Protection: adhesive use limited  Device Skin Pressure Protection: tubing/devices free from skin contact  Intervention: Prevent and Manage VTE (Venous Thromboembolism) Risk  Recent Flowsheet Documentation  Taken 6/22/2024 1630 by Sylvie Mcbride RN  VTE Prevention/Management: patient refused intervention  Intervention: Prevent Infection  Recent Flowsheet Documentation  Taken 6/22/2024 1630 by Sylvie Mcbride RN  Infection Prevention:   personal protective equipment utilized   rest/sleep promoted   hand hygiene promoted   equipment surfaces disinfected  Goal: Optimal Comfort and Wellbeing  Outcome: Progressing  Intervention: Monitor Pain and Promote Comfort  Recent Flowsheet Documentation  Taken 6/22/2024 2240 by Sylvie Mcbride RN  Pain Management Interventions: medication (see MAR)  Goal: Readiness for Transition of Care  Outcome: Progressing  Flowsheets (Taken 6/22/2024 2359)  Anticipated Changes Related to Illness: other (see comments)  Concerns to be Addressed:   discharge planning   home safety  Intervention: Mutually Develop Transition Plan  Recent Flowsheet Documentation  Taken 6/22/2024 7069 by Sylvie Mcbride RN  Anticipated Changes Related to Illness: other (see comments)  Concerns to be Addressed:   discharge planning    home safety     Problem: Suicide Risk  Goal: Absence of Self-Harm  Outcome: Progressing  Intervention: Assess Risk to Self and Maintain Safety  Recent Flowsheet Documentation  Taken 6/22/2024 1630 by Sylvie Mcbride RN  Enhanced Safety Measures:   pain management   review medications for side effects with activity  Intervention: Promote Psychosocial Wellbeing  Recent Flowsheet Documentation  Taken 6/22/2024 1630 by Sylvie Mcbride RN  Supportive Measures:   active listening utilized   positive reinforcement provided   relaxation techniques promoted   self-care encouraged   verbalization of feelings encouraged     Problem: Pain Acute  Goal: Optimal Pain Control and Function  Outcome: Progressing  Intervention: Develop Pain Management Plan  Recent Flowsheet Documentation  Taken 6/22/2024 2240 by Sylvie Mcbride RN  Pain Management Interventions: medication (see MAR)  Intervention: Prevent or Manage Pain  Recent Flowsheet Documentation  Taken 6/22/2024 1630 by Sylvie Mcbride RN  Medication Review/Management: medications reviewed  Intervention: Optimize Psychosocial Wellbeing  Recent Flowsheet Documentation  Taken 6/22/2024 1630 by Sylvie Mcbride RN  Supportive Measures:   active listening utilized   positive reinforcement provided   relaxation techniques promoted   self-care encouraged   verbalization of feelings encouraged     Problem: Fall Injury Risk  Goal: Absence of Fall and Fall-Related Injury  Outcome: Progressing  Intervention: Identify and Manage Contributors  Recent Flowsheet Documentation  Taken 6/22/2024 1630 by Sylvie Mcbride RN  Medication Review/Management: medications reviewed  Intervention: Promote Injury-Free Environment  Recent Flowsheet Documentation  Taken 6/22/2024 1630 by Sylvie Mcbride RN  Safety Promotion/Fall Prevention:   supervised activity   safety round/check completed   nonskid shoes/slippers when out of bed   increase visualization of patient   clutter  free environment maintained   assistive device/personal items within reach   activity supervised   Goal Outcome Evaluation:        During shift pt. Went down for CT of the chest Right IV infiltrated. Protocol reviewed Hot pack applied. MD came up to assess pt. Arm. Pt. Complained about pain. Pain medication given. CT and ultrasound rescheduled for tomorrow. Pt. On IV antibiotics. PRN Zofran given for nausea this shift. Continue POC.

## 2024-06-23 NOTE — PROGRESS NOTES
[facility]   BRIEF PROGRESS NOTE    SUBJECTIVE  Assessed pt for baseline at start of shift, at which time she was saturating appropriately on 3L NC, still tachycardic. Was sweaty, weak (unable to sit up or roll over sufficiently for lung exam), nauseous.     Later notified that she was unable to have stat CTPE (ordered earlier in day for concern for PE), given IV (which was quite difficult to place) had infiltrated, and IV contrast had extravasated into Mitali's arm.     Spoke with overnight attending Dr. Robertson regarding this pt.       OBJECTIVE:  BP (!) 171/81 (BP Location: Right arm)   Pulse 101   Temp 98.2  F (36.8  C) (Oral)   Resp 12   Wt 101.7 kg (224 lb 4.8 oz)   SpO2 94%   BMI 33.12 kg/m      Exam:   Will go assess pt soon.       ASSESSMENT/PLAN:    # Contrast extravasation  Reviewed protocol in intranet.   - Cold compresses per protocol  - Leave IV in place (unless has already been removed)     #Hypoxia  #Tachycardia  #Labored breathing   #Weakness  Considering diagnoses including bacterial pneumonia (less likely given procal 0.08 this morning, from 0.4 on admission), afebrile, already on broad-spectrum antibiotics. Covid pneumonia is possible, uncertain how long Mitali has had covid, will assess with non-contrast CT chest. If scattered infiltrates consistent with covid pneumonia, will treat with steroids. Will also check ferritin, CRP for concern for cytokine storm. Pulmonary embolism is also possible. Wells' score for PE is 6, moderate risk. Covid, recent immobilization, recent orthopedic surgery, new tachycardia (pronounced given her baseline heartrate is in 50s), and oxygen requirement today. Ideally would have CTPE with contrast, as was ordered stat this afternoon. Unforunately, due to lack of IV access, not feasible tonight. Will do our best with an alternate workup as below, and will increase to treatment-dose lovenox tonight.   - CT chest, non-contrast, stat  - DVT ultrasound,  "both legs  - Echocardiogram (may not be available overnight)  - Add-on CRP, ferritin (to assess for cytokine storm)  - D-dimer  - Increase lovenox to treatment-dosing  - If imaging concerning for covid pneumonia, will initiate steroid treatment, balancing risk with known orthopedic infection.     JOSI Shaevr MD     Addendum 10:30pm    Went to see pt with colleague Dr. White. When we arrived, pt had just refused phlebotomy for ordered d-dimer. Attempted to  pt regarding importance of evaluating and potentially treating for a blood clot, which could be a life-threatening issue. Mitali then begins telling us repeatedly that she has a DNR and that this means if she is at risk for dying we must allow her to die. Attempted to clarify the role of a DNR, and that she was describing something more aligned with a hospice or comfort cares approach. She states that she is going to die. I ask if she is ready to die now, and she says no she is not near death as she is not that sick. I agreed, and clarified that her DNR does not mean we should not be treating her for acute illness, she agrees.     She describes extreme frustration with repeat pokes for blood draws and IV access, and is quite disappointed that she does not yet have a PICC. States refusal for any further attempts to draw blood or place Ivs tonight. With this in mind, offered compromise - treat empirically for potential clot with lovenox, assess lungs with non-con CT, postpone any non-emergent interventions requiring blood access. Mitali briefly agreeable to this, then rescinds agreement, stating that she is very tired and all of the pokes she's had today are \"nearing abuse.\" She states that she is not willing to have a shot tonight, not willing to be transported for a scan given she is nervous she will fall while being moved to wheelchair or onto exam table. States it is important for us to know that she is in charge. 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     I stated agreement with her, that she is in charge, and as long as she understands the risks, she is able to decline anything. When asked to state the risks of declining our recommended tests and interventions, she states that she could potentially have a blood clot, which, if not treated, could lead to her death.     While my medical opinion is that the aforementioned interventions are indicated, it is also my assessment that Mitali has the capacity needed to decline. Reminded her that we can come back or change course if she changes her mind.     Plan:   - Empirically treat for potential covid-associated cytokine storm with 6mg dexamethasone PO x1, can continue for 6-10 days total if clinical team decides with Mitali that this is the best path forward.   - RN to place cold pack on infiltrated IV site (heat pack currently in place, per protocol should be cold pack.) IV has already been removed.        Please see daily rounding note for full A/P.  A. Latoya Shaver MD  9:52 PM

## 2024-06-23 NOTE — CONSULTS
"PICC placed in the right upper extremity for anticipated multiple weeks of antibiotic therapy. Patient tolerated well and denies pain. Small red spot noted prior to insertion, lateral to insertion site measuring approximately 1cm. Tip location verified at the cavoatrial junction (CAJ) using ECG technology. PICC is ready to use. To contact the [facility]ascular Access team enter a \"nursing to consult for vascular access\" [id number] order in [facility].       "

## 2024-06-23 NOTE — PROGRESS NOTES
[facility]   BRIEF PROGRESS NOTE    SUBJECTIVE  Reports that she has worsening back pain and headache. The pain started in her left shoulder but is now shooting across her shoulder blades and down her back. She also notes that she has a headache. Oxycodone has not resolved the pain, and she states that Tylenol does not help. Denies chest pain and shortness of breath.     OBJECTIVE:  BP (!) 167/85 (BP Location: Right arm)   Pulse 112   Temp 97.5  F (36.4  C) (Oral)   Resp 14   Wt 101.7 kg (224 lb 4.8 oz)   SpO2 100%   BMI 33.12 kg/m      Exam:   General: Alert and oriented, in no acute distress.  Skin: Warm and diaphoretic.  Eyes: Extra-ocular muscles intact, pupils equal and reactive.  ENT: Speech intact, nasal passages open, no hearing impairment noted.  CV: No cyanosis or pallor, warm and well perfused.  Respiratory: No respiratory distress, no accessory muscle use.  Neuro: Gait and station normal, comprehension intact. Gross and fine motor skills intact.   Psychiatric: Mood and affect appear normal.   MSK: Muscular tenderness over left supraspinatus as well as bilateral posterior intercostal muscles, and spinous processes from the thoracic to the lumbar spine. No localized tenderness.   Extremities: Warm, able to move all four extremities at will.    ASSESSMENT/PLAN:  # Back pain, acute-on-chronic  # Headache  Back pain is chronic. Worsened after recent surgery. Started in left shoulder after surgery on left elbow. Physical examination revealed diffuse muscular pain that could not be localized to a specific area. Possible related to myalgias from Covid in addition to acute muscle spasms secondary to recent surgery and bed rest during admission. Headache likely secondary to Covid infection.     Lidocaine patches for bilateral shoulders  Flexeril 10 mg TID (hold if becomes somnolent or develops AMS)  Toradol 15 mg q6hr PRN (already ordered)    Please see daily rounding note for full  A/P.  Staci Arvizu DO, MPH  1:36 PM

## 2024-06-23 NOTE — PROGRESS NOTES
ID brief note    Primary reached out re steroids. Overnight Mitali was hypoxic and tachycardic. They;ve been unable to do labs and imaging as pt wanted to hold off, overnt provider was concerned for covid associated cytokine storm and pt recvd 1 dose of Dexa. Primary q re continuing dexa.    Reviewed overnt notes - pt with chest  tightness, intermitt dry/int productive cough, chills, palpitations.     Appears to be on 3-4 L O2 now, was on 12 L prior. CRP 95-->126-->167.     CXR yesterday w Bibasilar opacities, slightly increased on the left compared to prior radiograph from 6/18/2024 may represent atelectasis/consolidation    WBC 7-->11--13.8. Procal 0.08 yesterday.     It appears ID was previously holding off on dexamethasone given relatively mild disease, history of steroid intolerance, and active infection.     At this point given excalated oxygen requirements, symptoms, imaging and inflammatory markers feel benefit of steroids >risk from infection perspective. Would defer to primary on clarifying/weighing on steroid intolerance, but from infection perspective ok to continue Dex 6mg daily for upto 10 days or till discharge whichever is sooner.    Noted Lactose fermenting GNB on tissue cultures so far. Would continue Vanc, ceftriaxone     I have reviewed interval events, vital, labs, images, cultures and antibiotics      Radha Pearl  Staff Physician  Division of Infectious Diseases

## 2024-06-23 NOTE — PROGRESS NOTES
Pt BP has been high seen flowsheet. Provider notify, CT, lab, and EKG ordered. Pt co of pounding headache with L shoulder pain. Pt BP did decrease to 175/73 and provider notify. Pt is on the cart ready to go down to CT. CT will call for her when they are ready.

## 2024-06-23 NOTE — PROGRESS NOTES
ID brief note    Noted op cultures now w  E cloacae in broth only and Staph epi.     Switched from Ceftriaxone to Cefepime 2g Iv Q8H given this is an inducible amp c beta lactamase , per IDSA guidelines (ordered for you)      Radha Pearl  Staff Physician  Division of Infectious Diseases

## 2024-06-23 NOTE — PROCEDURES
[facility]    Single Lumen PICC Placement    Date/Time: 6/23/2024 12:29 PM    Performed by: Peggy Mclean RN  Authorized by: Alondra Springer MD  Indications: vascular access      UNIVERSAL PROTOCOL   Site Marked: Yes  Prior Images Obtained and Reviewed:  Yes  Required items: Required blood products, implants, devices and special equipment available    Patient identity confirmed:  Verbally with patient, arm band and hospital-assigned identification number  NA - No sedation, light sedation, or local anesthesia  Confirmation Checklist:  Patient's identity using two indicators, relevant allergies, procedure was appropriate and matched the consent or emergent situation and correct equipment/implants were available  Time out: Immediately prior to the procedure a time out was called    Universal Protocol: the Joint Commission Universal Protocol was followed    Preparation: Patient was prepped and draped in usual sterile fashion    ESBL (mL):  2     ANESTHESIA    Anesthesia:  Local infiltration  Local Anesthetic:  Lidocaine 1% without epinephrine  Anesthetic Total (mL):  1.5      SEDATION    Patient Sedated: No        Preparation: skin prepped with ChloraPrep  Skin prep agent: skin prep agent completely dried prior to procedure  Sterile barriers: maximum sterile barriers were used: cap, mask, sterile gown, sterile gloves, and large sterile sheet  Hand hygiene: hand hygiene performed prior to central venous catheter insertion  Type of line used: PICC  Catheter type: single lumen  Lumen type: power PICC and non-valved  Catheter size: 3 Fr  Brand: Bard  Lot number: [id number]  Placement method: venipuncture, MST, ultrasound and tip navigation system  Number of attempts: 1  Difficulty threading catheter: no  Successful placement: yes  Orientation: right  Catheter to Vein (%): 42  Location: basilic vein  Tip Location: SVC/RA Junction  Arm circumference: adults 15 cm  Extremity  "circumference: 40  Visible catheter length: 1  Total catheter length: 41  Dressing and securement: blood cleaned with CHG, alcohol impregnated caps, gloves changed prior to final dressing, chlorhexidine disc applied, transparent securement dressing and statlock  Post procedure assessment: blood return through all ports, free fluid flow and placement verified by 3CG technology  PROCEDURE   Patient Tolerance:  Patient tolerated the procedure well with no immediate complicationsDescribe Procedure: PICC placed in the right upper extremity for anticipated multiple weeks of antibiotic therapy. Patient tolerated well and denies pain. Small red spot noted prior to insertion, lateral to insertion site measuring approximately 1cm. Tip location verified at the cavoatrial junction (CAJ) using ECG technology. PICC is ready to use. To contact the [facility] Vascular Access team enter a \"nursing to consult for vascular access\" [id number] order in [facility].     Disposal: sharps and needle count correct at the end of procedure, needles and guidewire disposed in sharps container        "

## 2024-06-23 NOTE — PROGRESS NOTES
[facility]   [facility]    SUBJECTIVE  Page by nurse for elevated BP of 190/86 ,  with back pain, pounding headaches and visual changes.       OBJECTIVE:  BP (!) 175/73 (BP Location: Right arm)   Pulse 101   Temp 98.7  F (37.1  C) (Oral)   Resp 14   Wt 101.7 kg (224 lb 4.8 oz)   SpO2 93%   BMI 33.12 kg/m      Exam:   General: Alert and oriented, in mild acute distress from pain, laying on bed   Skin: Warm and dry, no abnormalities noted.  Eyes: Extra-ocular muscles intact, pupils equal and reactive.  ENT: Speech intact, nasal passages open, no hearing impairment noted.  CV: No cyanosis or pallor, warm and well perfused.  Respiratory: No respiratory distress, no accessory muscle use.  MSK: Tenderness to palpation along thoracic and lumbar spine.   Neuro: Gait and station normal, comprehension intact. Gross and fine motor skills intact. Patient was able to walk to restroom and back without complication.   Psychiatric: Mood and affect appear normal.   Extremities: Warm, able to move all four extremities at will.      ASSESSMENT/PLAN:    #Tachycardia   # Visual changes   # Hypertension, BP of 190/86  # Back pain  # Headache   Went to assess the patient at bedside. , SP02 100% on 3L NC O2. Patient endorses back pain between her scapula radiating down to her tailbone, pounding headaches, and visual changes. Denies any chest pain, SOB, dizziness, nausea or vomiting. Ddx included hypertensive urgency, PE, aortic dissection, and ACS. Ordered stat ECG, trop, and CTPE. Trop 17 (22 previously) and EKG revealing sinus tachycardia with RBBB and ST elevation consider right ventricular involvement. Cardiology was page and discuss EKG result. Inform that ST elevation does not meet criteria. Low concerns for STEMI with EKG. Additionally, discuss that with patient complain of back pain more concern for aortic dissection and agrees with CTPE. Recommends repeat EKG after CTPE.     CTPE  Repeat  EKG after CTPE is completed   Cardiology will continue to follow     Please see daily rounding note for full A/P.  Ash Triana MD  6:42 PM
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
## 2024-06-24 ENCOUNTER — APPOINTMENT (OUTPATIENT)
Dept: NUCLEAR MEDICINE | Facility: CLINIC | Age: 59
DRG: 492 | End: 2024-06-24
Payer: COMMERCIAL

## 2024-06-24 LAB
ALBUMIN SERPL BCG-MCNC: 3.3 G/DL (ref 3.5–5.2)
ALP SERPL-CCNC: 141 U/L (ref 40–150)
ALT SERPL W P-5'-P-CCNC: 8 U/L (ref 0–50)
ANION GAP SERPL CALCULATED.3IONS-SCNC: 9 MMOL/L (ref 7–15)
AST SERPL W P-5'-P-CCNC: 12 U/L (ref 0–45)
ATRIAL RATE - MUSE: 101 BPM
ATRIAL RATE - MUSE: 111 BPM
ATRIAL RATE - MUSE: 94 BPM
BACTERIA TISS BX CULT: ABNORMAL
BILIRUB SERPL-MCNC: 0.2 MG/DL
BUN SERPL-MCNC: 14.6 MG/DL (ref 8–23)
CALCIUM SERPL-MCNC: 8.6 MG/DL (ref 8.8–10.2)
CHLORIDE SERPL-SCNC: 102 MMOL/L (ref 98–107)
CREAT SERPL-MCNC: 0.79 MG/DL (ref 0.51–0.95)
DEPRECATED HCO3 PLAS-SCNC: 28 MMOL/L (ref 22–29)
DIASTOLIC BLOOD PRESSURE - MUSE: NORMAL MMHG
EGFRCR SERPLBLD CKD-EPI 2021: 85 ML/MIN/1.73M2
ERYTHROCYTE [DISTWIDTH] IN BLOOD BY AUTOMATED COUNT: 15.9 % (ref 10–15)
GLUCOSE BLDC GLUCOMTR-MCNC: 243 MG/DL (ref 70–99)
GLUCOSE BLDC GLUCOMTR-MCNC: 258 MG/DL (ref 70–99)
GLUCOSE BLDC GLUCOMTR-MCNC: 273 MG/DL (ref 70–99)
GLUCOSE BLDC GLUCOMTR-MCNC: 291 MG/DL (ref 70–99)
GLUCOSE BLDC GLUCOMTR-MCNC: 307 MG/DL (ref 70–99)
GLUCOSE BLDC GLUCOMTR-MCNC: 331 MG/DL (ref 70–99)
GLUCOSE BLDC GLUCOMTR-MCNC: 408 MG/DL (ref 70–99)
GLUCOSE SERPL-MCNC: 233 MG/DL (ref 70–99)
HCT VFR BLD AUTO: 27.3 % (ref 35–47)
HGB BLD-MCNC: 8.1 G/DL (ref 11.7–15.7)
INTERPRETATION ECG - MUSE: NORMAL
MCH RBC QN AUTO: 24.5 PG (ref 26.5–33)
MCHC RBC AUTO-ENTMCNC: 29.7 G/DL (ref 31.5–36.5)
MCV RBC AUTO: 83 FL (ref 78–100)
P AXIS - MUSE: 101 DEGREES
P AXIS - MUSE: 80 DEGREES
P AXIS - MUSE: 86 DEGREES
PLATELET # BLD AUTO: 433 10E3/UL (ref 150–450)
POTASSIUM SERPL-SCNC: 4.7 MMOL/L (ref 3.4–5.3)
PR INTERVAL - MUSE: 180 MS
PR INTERVAL - MUSE: 202 MS
PR INTERVAL - MUSE: 224 MS
PROT SERPL-MCNC: 6.8 G/DL (ref 6.4–8.3)
QRS DURATION - MUSE: 126 MS
QRS DURATION - MUSE: 134 MS
QRS DURATION - MUSE: 154 MS
QT - MUSE: 370 MS
QT - MUSE: 408 MS
QT - MUSE: 456 MS
QTC - MUSE: 479 MS
QTC - MUSE: 510 MS
QTC - MUSE: 620 MS
R AXIS - MUSE: 43 DEGREES
R AXIS - MUSE: 51 DEGREES
R AXIS - MUSE: 57 DEGREES
RBC # BLD AUTO: 3.31 10E6/UL (ref 3.8–5.2)
SODIUM SERPL-SCNC: 139 MMOL/L (ref 135–145)
SYSTOLIC BLOOD PRESSURE - MUSE: NORMAL MMHG
T AXIS - MUSE: 24 DEGREES
T AXIS - MUSE: 63 DEGREES
T AXIS - MUSE: 64 DEGREES
VENTRICULAR RATE- MUSE: 101 BPM
VENTRICULAR RATE- MUSE: 111 BPM
VENTRICULAR RATE- MUSE: 94 BPM
WBC # BLD AUTO: 8.1 10E3/UL (ref 4–11)

## 2024-06-24 PROCEDURE — 78580 LUNG PERFUSION IMAGING: CPT

## 2024-06-24 PROCEDURE — 250N000012 HC RX MED GY IP 250 OP 636 PS 637

## 2024-06-24 PROCEDURE — 250N000013 HC RX MED GY IP 250 OP 250 PS 637

## 2024-06-24 PROCEDURE — 250N000011 HC RX IP 250 OP 636: Mod: JZ | Performed by: STUDENT IN AN ORGANIZED HEALTH CARE EDUCATION/TRAINING PROGRAM

## 2024-06-24 PROCEDURE — 36592 COLLECT BLOOD FROM PICC: CPT

## 2024-06-24 PROCEDURE — 120N000002 HC R&B MED SURG/OB UMMC

## 2024-06-24 PROCEDURE — 258N000003 HC RX IP 258 OP 636: Mod: JZ | Performed by: FAMILY MEDICINE

## 2024-06-24 PROCEDURE — 99233 SBSQ HOSP IP/OBS HIGH 50: CPT | Mod: 24 | Performed by: STUDENT IN AN ORGANIZED HEALTH CARE EDUCATION/TRAINING PROGRAM

## 2024-06-24 PROCEDURE — 85048 AUTOMATED LEUKOCYTE COUNT: CPT

## 2024-06-24 PROCEDURE — 250N000011 HC RX IP 250 OP 636: Mod: JZ

## 2024-06-24 PROCEDURE — 250N000011 HC RX IP 250 OP 636

## 2024-06-24 PROCEDURE — 99231 SBSQ HOSP IP/OBS SF/LOW 25: CPT | Mod: GC

## 2024-06-24 PROCEDURE — 250N000011 HC RX IP 250 OP 636: Mod: JZ | Performed by: FAMILY MEDICINE

## 2024-06-24 PROCEDURE — 78580 LUNG PERFUSION IMAGING: CPT | Mod: 26 | Performed by: RADIOLOGY

## 2024-06-24 PROCEDURE — 250N000013 HC RX MED GY IP 250 OP 250 PS 637: Performed by: STUDENT IN AN ORGANIZED HEALTH CARE EDUCATION/TRAINING PROGRAM

## 2024-06-24 PROCEDURE — 80053 COMPREHEN METABOLIC PANEL: CPT

## 2024-06-24 PROCEDURE — 343N000001 HC RX 343: Performed by: FAMILY MEDICINE

## 2024-06-24 PROCEDURE — A9540 TC99M MAA: HCPCS | Performed by: FAMILY MEDICINE

## 2024-06-24 RX ORDER — LORAZEPAM 0.5 MG/1
0.5 TABLET ORAL
Status: COMPLETED | OUTPATIENT
Start: 2024-06-24 | End: 2024-06-24

## 2024-06-24 RX ORDER — HYDROXYZINE HYDROCHLORIDE 50 MG/1
50 TABLET, FILM COATED ORAL ONCE
Status: DISCONTINUED | OUTPATIENT
Start: 2024-06-24 | End: 2024-06-24

## 2024-06-24 RX ORDER — DEXTROSE MONOHYDRATE 25 G/50ML
25-50 INJECTION, SOLUTION INTRAVENOUS
Status: DISCONTINUED | OUTPATIENT
Start: 2024-06-24 | End: 2024-06-28 | Stop reason: HOSPADM

## 2024-06-24 RX ORDER — CLONIDINE HYDROCHLORIDE 0.3 MG/1
0.3 TABLET ORAL 2 TIMES DAILY
Status: DISCONTINUED | OUTPATIENT
Start: 2024-06-24 | End: 2024-06-28 | Stop reason: HOSPADM

## 2024-06-24 RX ORDER — HYDROMORPHONE HCL IN WATER/PF 6 MG/30 ML
0.2 PATIENT CONTROLLED ANALGESIA SYRINGE INTRAVENOUS ONCE
Status: CANCELLED | OUTPATIENT
Start: 2024-06-24

## 2024-06-24 RX ORDER — NICOTINE POLACRILEX 4 MG
15-30 LOZENGE BUCCAL
Status: DISCONTINUED | OUTPATIENT
Start: 2024-06-24 | End: 2024-06-28 | Stop reason: HOSPADM

## 2024-06-24 RX ADMIN — HEPARIN, PORCINE (PF) 10 UNIT/ML INTRAVENOUS SYRINGE 5 ML: at 02:10

## 2024-06-24 RX ADMIN — DEXAMETHASONE 6 MG: 2 TABLET ORAL at 08:31

## 2024-06-24 RX ADMIN — GABAPENTIN 400 MG: 300 CAPSULE ORAL at 20:36

## 2024-06-24 RX ADMIN — LUMATEPERONE 84 MG: 42 CAPSULE ORAL at 22:09

## 2024-06-24 RX ADMIN — CLONIDINE HYDROCHLORIDE 0.3 MG: 0.3 TABLET ORAL at 20:36

## 2024-06-24 RX ADMIN — LIDOCAINE 2 PATCH: 4 PATCH TOPICAL at 08:27

## 2024-06-24 RX ADMIN — LORAZEPAM 0.5 MG: 0.5 TABLET ORAL at 13:46

## 2024-06-24 RX ADMIN — CYCLOBENZAPRINE 10 MG: 10 TABLET, FILM COATED ORAL at 08:28

## 2024-06-24 RX ADMIN — CEFEPIME 2 G: 2 INJECTION, POWDER, FOR SOLUTION INTRAVENOUS at 00:23

## 2024-06-24 RX ADMIN — VANCOMYCIN HYDROCHLORIDE 750 MG: 10 INJECTION, POWDER, LYOPHILIZED, FOR SOLUTION INTRAVENOUS at 10:36

## 2024-06-24 RX ADMIN — ACETAMINOPHEN 975 MG: 325 TABLET, FILM COATED ORAL at 08:28

## 2024-06-24 RX ADMIN — ENOXAPARIN SODIUM 100 MG: 100 INJECTION SUBCUTANEOUS at 10:36

## 2024-06-24 RX ADMIN — KETOROLAC TROMETHAMINE 15 MG: 15 INJECTION, SOLUTION INTRAMUSCULAR; INTRAVENOUS at 13:46

## 2024-06-24 RX ADMIN — KIT FOR THE PREPARATION OF TECHNETIUM TC 99M ALBUMIN AGGREGATED 6.2 MILLICURIE: 2.5 INJECTION, POWDER, FOR SOLUTION INTRAVENOUS at 15:51

## 2024-06-24 RX ADMIN — CEFEPIME 2 G: 2 INJECTION, POWDER, FOR SOLUTION INTRAVENOUS at 16:37

## 2024-06-24 RX ADMIN — INSULIN ASPART 7 UNITS: 100 INJECTION, SOLUTION INTRAVENOUS; SUBCUTANEOUS at 17:30

## 2024-06-24 RX ADMIN — CITALOPRAM HYDROBROMIDE 20 MG: 20 TABLET ORAL at 22:09

## 2024-06-24 RX ADMIN — INSULIN GLARGINE 20 UNITS: 100 INJECTION, SOLUTION SUBCUTANEOUS at 23:09

## 2024-06-24 RX ADMIN — GABAPENTIN 300 MG: 300 CAPSULE ORAL at 08:29

## 2024-06-24 RX ADMIN — INSULIN ASPART 5 UNITS: 100 INJECTION, SOLUTION INTRAVENOUS; SUBCUTANEOUS at 08:55

## 2024-06-24 RX ADMIN — ACETAMINOPHEN 975 MG: 325 TABLET, FILM COATED ORAL at 20:34

## 2024-06-24 RX ADMIN — ACETAMINOPHEN 975 MG: 325 TABLET, FILM COATED ORAL at 16:37

## 2024-06-24 RX ADMIN — AMLODIPINE BESYLATE 5 MG: 5 TABLET ORAL at 08:32

## 2024-06-24 RX ADMIN — CEFEPIME 2 G: 2 INJECTION, POWDER, FOR SOLUTION INTRAVENOUS at 08:29

## 2024-06-24 RX ADMIN — OXYCODONE HYDROCHLORIDE 10 MG: 5 TABLET ORAL at 08:51

## 2024-06-24 RX ADMIN — INSULIN ASPART 5 UNITS: 100 INJECTION, SOLUTION INTRAVENOUS; SUBCUTANEOUS at 13:46

## 2024-06-24 RX ADMIN — CYCLOBENZAPRINE 10 MG: 10 TABLET, FILM COATED ORAL at 20:35

## 2024-06-24 RX ADMIN — ATORVASTATIN CALCIUM 10 MG: 10 TABLET, FILM COATED ORAL at 08:28

## 2024-06-24 RX ADMIN — GABAPENTIN 400 MG: 300 CAPSULE ORAL at 16:37

## 2024-06-24 RX ADMIN — CLONIDINE HYDROCHLORIDE 0.3 MG: 0.3 TABLET ORAL at 10:35

## 2024-06-24 RX ADMIN — CYCLOBENZAPRINE 10 MG: 10 TABLET, FILM COATED ORAL at 16:37

## 2024-06-24 RX ADMIN — LORAZEPAM 0.5 MG: 0.5 TABLET ORAL at 15:02

## 2024-06-24 ASSESSMENT — ACTIVITIES OF DAILY LIVING (ADL)
ADLS_ACUITY_SCORE: 33
ADLS_ACUITY_SCORE: 34
ADLS_ACUITY_SCORE: 33
ADLS_ACUITY_SCORE: 34
ADLS_ACUITY_SCORE: 33
ADLS_ACUITY_SCORE: 34
ADLS_ACUITY_SCORE: 33
ADLS_ACUITY_SCORE: 34
ADLS_ACUITY_SCORE: 33
ADLS_ACUITY_SCORE: 34
ADLS_ACUITY_SCORE: 33
ADLS_ACUITY_SCORE: 34

## 2024-06-24 NOTE — PLAN OF CARE
VS: BP (!) 189/80 (BP Location: Right leg)   Pulse 101   Temp 98.7  F (37.1  C) (Oral)   Resp 14   Wt 101.7 kg (224 lb 4.8 oz)   SpO2 97%   BMI 33.12 kg/m     O2: 3L NC   Output: Continent of bowel and bladder   Last BM: 6/21   Activity: A1 GB   Skin: Surgical site: LUE   Pain: Managed with PRN oxy   CMS: Alert and orientedx4, Numbness in L pinky (provider knows)   Dressing: CDI   Diet: Reg/Thin   LDA: R PICC   Equipment: IV pole   Plan: BP elevated, EKG and CT scan completed   Additional Info: Covid

## 2024-06-24 NOTE — PLAN OF CARE
Goal Outcome Evaluation:      Plan of Care Reviewed With: patient          Outcome Evaluation: ptatient is alert and oriented x 4. Call light appropraite, denies SOB,  N/V, she on 1L oxyen via N/C, desats to high 80s at rest but recovers quickly. Elevated BP, PRN Clonidine given, trending down, had Lung scan perfusion done, negative for PE, elevated Blood sugars, sliding scale and Lantus given.      VS: BP (!) 163/64 (BP Location: Right leg, Patient Position: Semi-Amaya's)   Pulse 98   Temp 97.5  F (36.4  C) (Axillary)   Resp 18   Wt 101.7 kg (224 lb 4.8 oz)   SpO2 100%   BMI 33.12 kg/m       O2: 100 on R/A   Output: Continent of bowel and bladder   Last BM: 6/24/24   Activity: Up with 1 assist , NWB to LUE   Up for meals? bedside   Skin: Looks pale   Pain: Oxycodone given for    CMS: Numbness to Left Pinky   Dressing: Intact,    Diet: Regular diet   LDA: R PICC: clean dry and intact, extravasation   Equipment: Walker for ambulation, but refusing   Plan: Cont. cont   Additional Info:

## 2024-06-24 NOTE — PLAN OF CARE
Goal Outcome Evaluation:L elbow pain/mobility/respiratory status    Plan of Care Reviewed With: patient  Overall Patient Progress: no change   VS:     Vital signs:  Temp: 99  F (37.2  C) Temp src: Axillary BP: 111/72 Pulse: 81   Resp: 18 SpO2: 95 % O2 Device: Nasal cannula Oxygen Delivery: 1 LPM     Breathing comfortably. No signs of distress. Denied SOB.  O2 decreased to 1L  at NC with O2sat of 95%.   Output:     Continent of bowel and bladder.  Voids spontaneously without  difficulty in the bathroom.   Last BM on 6/21/2024.    Activity:     A1 with GB. Still feeling weak and tired tapan when OOB to bathroom. Denied dizziness, light-headedness, SOB, chestpain.     NWB on the LUE. Arm elevated with pillow.   Skin: Looks pale. No open area noted.    Pain:     Intermittent aching, sore and throbbing pain on L elbow rated 9/10. Also complained of back pain and headache. Warm pad applied to back. TN has PRN   Oxycodone 10 mg and was given  at  2241. TN looks drowsy after taking Oxycodone. She was drifting during conversation but able to answer questions appropriately.   CMS:     Numbness on L pinky. MD is aware. TN has also neuropathy on BLE from her diabetes.   Dressing: LUE wrapped with ace, c/d/I.   Diet: Regular and tolerated.   LDA: R PICC patent  and saline/heparin locked.      Bruises, pink and brown discoloration noted on extravasation site on the RUE. Swelling has gone down. Pt denied pain. Arm kept elevated.    Equipment: Walker used on ambulation.   Plan: Pain Mgt, PT/OT, monitor extravasation site. BG monitoring.   Additional Info:      ISS and carb counting for DM II. BG at 0530 was 273 and was given with Novolog 8 units as 1x dose and 3 units per sliding scale.    Pt is on Decadron 6 mg daily as recommended by ID as part of Covid treatment.

## 2024-06-24 NOTE — INTERIM SUMMARY
Discussed with primary provider Latoya Shaver about using PICC line for CTPE. Patient has a small caliber PICC in place and only a power piccs are appropriate for a contrast bolus utilized for CTPE exams. The small caliber PICC will not stay in place and handle an injection at the rate required for a diagnostic exam.     Discussed additional options for imaging including a non-contrast exam, placing a new IV to try to inject, or a VQ scan (limited to nuclear medicine technician on call hours).     Provider will discuss with patient and likely proceed with a non-contrast exam of the chest, with possible VQ exam to follow pending clinical concern/exam.    Casandra Bryan  PGY-4  6/23/24 9:23 PM
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
## 2024-06-24 NOTE — PROGRESS NOTES
"[facility]   BRIEF PROGRESS NOTE    SUBJECTIVE  Called by RN: patient is refusing scan due to anxiety  Received the lorazepam 0.5 mg a little over an hour ago. Still feeling \"really anxious\" and \"isn't feeling anything\"    OBJECTIVE:  BP (!) 163/64 (BP Location: Right leg, Patient Position: Semi-Amaya's)   Pulse 98   Temp 97.5  F (36.4  C) (Axillary)   Resp 18   Wt 101.7 kg (224 lb 4.8 oz)   SpO2 100%   BMI 33.12 kg/m      Exam:   General: Alert and oriented, in no acute distress.  Respiratory: No respiratory distress, no accessory muscle use.  Neuro: Gait and station normal, comprehension intact. Gross and fine motor skills intact.   Psychiatric: Mood and affect appear normal.       ASSESSMENT/PLAN:    # Pre procedure anxiety  Based on current VS and mental status, safe for additional lorazepam dose of 0.5mg. Holding gabapentin, flexeril until she returns from VQ scan    Add'l Lorazepam 0.5 mg oral now  Patient aware she should not use oxycodone for 6-8 hours after receiving lorazepam      Please see daily rounding note for full A/P.  Diana Lyons MD  2:55 PM    "

## 2024-06-24 NOTE — PROGRESS NOTES
[facility]   BRIEF PROGRESS NOTE    SUBJECTIVE  Following up on earlier note.   In the interim:   - Unable to perform CT A/P with contrast due to PICC size. Contrast load requires power PICC. Discussed with CT tech, on-call radiology resident. Radiology resident also discussed with their staff. Options for the time being include V/Q scan (not available overnight, will not give information about aorta), CT A/P without contrast (may give information regarding PE, Covid, aorta, though less sensitive), placing new IV and pursuing CT with contrast with provider wearing lead in the CT suite to hold the IV in place (high likelihood for compromising IV, pt known to be very difficult to get access on.)   - New EKG, stable from prior.   - Normal echocardiogram today   - Troponin 17, stable-improved from recent baseline (values between 17-25 over last 2 months)   - Pt reports back pain which originates at left shoulder, travels inferiorly along left side of her back and moves towards midline while approaching the lower-back.   - Pt reports severe headache   - Pt reports ringing in ears     Went to see pt with Dr. White for start-of-shift assessment appx 9pm.       OBJECTIVE:  BP (!) 189/80 (BP Location: Right leg)   Pulse 101   Temp 98.7  F (37.1  C) (Oral)   Resp 14   Wt 101.7 kg (224 lb 4.8 oz)   SpO2 93%   BMI 33.12 kg/m      Exam:   General: Alert and oriented. Appears improved from yesterday evening, not diaphoretic. No apparent distress. Saturating in normal range on room air.   Skin: Warm and dry, no abnormalities noted.  Eyes: Extra-ocular muscles intact.  ENT: Speech intact, nasal passages open, no hearing impairment noted.  CV: No cyanosis or pallor noted. Normal cardiac sounds.   Respiratory: No respiratory distress, no accessory muscle use. Superior anterior lung fields clear.   Abd: soft, non-distended, endorses tenderness but appears comfortable throughout exam.   Neuro: Recently able to  ambulate to bathroom with minimal assist, not assessed during this visit. Answers questions appropriately. Able to wiggle toes, flex ankles, lift each leg against gravity, and drag heel upwards along bed while bending knees. Sensation intact in bilateral feet.   Psychiatric: Mood and affect appear normal. Reflective, apologizes for yesterday's demeanor.   Extremities: LUE bandaged. RUE with area of induration from contrast extravasation.       ASSESSMENT/PLAN:    # Tachycardia  # Hypertensive urgency   # Back pain   # EKG changes without evidence for acute cardiac ischemia   Pt is currently being treated empirically for Covid pneumonia/cytokine storm as well as pulmonary embolism. Pt does seem improved clinically today, is more intact cognitively, less weak, no longer diaphoretic, able to maintain oxygen saturations without supplemental o2. Many logistical challenges in acquiring indicated imaging studies. Today, developed worsening shoulder pain, back pain, headache. EKG findings initially concerning for cardiac ischemia; reassured by stable troponin as well as cardiology consultants' opinion that does not meet criteria for acute ischemia. Normal echocardiogram today as well. Cardiology consultant did mention possibility of aortic dissection as part of differential given back pain. Back pain does appear to be chronic, and originates from a previous surgical site at left shoulder. Reassured by normal neurological exam. Pt does not endorse a ripping or tearing sensation. Ideally, would assess with contrast CT, as the team has been attempting to do for well over 24 hours now. Given lower clinical concern, as well as lack of access to contrast imaging for this patient tonight, will proceed with non-contrast imaging.     Non-contrast CT chest abdomen now  Nuclear medicine V/Q scan tomorrow morning  Continue with plan of care     Please see daily rounding note for full A/P.  A. Latoya Shaver MD  9:22 PM

## 2024-06-25 LAB
ALBUMIN SERPL BCG-MCNC: 3.1 G/DL (ref 3.5–5.2)
ALP SERPL-CCNC: 145 U/L (ref 40–150)
ALT SERPL W P-5'-P-CCNC: 8 U/L (ref 0–50)
ANION GAP SERPL CALCULATED.3IONS-SCNC: 7 MMOL/L (ref 7–15)
AST SERPL W P-5'-P-CCNC: 11 U/L (ref 0–45)
BACTERIA TISS BX CULT: NO GROWTH
BILIRUB SERPL-MCNC: <0.2 MG/DL
BUN SERPL-MCNC: 23.1 MG/DL (ref 8–23)
CALCIUM SERPL-MCNC: 8.5 MG/DL (ref 8.8–10.2)
CHLORIDE SERPL-SCNC: 101 MMOL/L (ref 98–107)
CREAT SERPL-MCNC: 0.79 MG/DL (ref 0.51–0.95)
CRP SERPL-MCNC: 45.64 MG/L
DEPRECATED HCO3 PLAS-SCNC: 29 MMOL/L (ref 22–29)
EGFRCR SERPLBLD CKD-EPI 2021: 85 ML/MIN/1.73M2
ERYTHROCYTE [DISTWIDTH] IN BLOOD BY AUTOMATED COUNT: 15.8 % (ref 10–15)
GLUCOSE BLDC GLUCOMTR-MCNC: 300 MG/DL (ref 70–99)
GLUCOSE BLDC GLUCOMTR-MCNC: 315 MG/DL (ref 70–99)
GLUCOSE BLDC GLUCOMTR-MCNC: 334 MG/DL (ref 70–99)
GLUCOSE BLDC GLUCOMTR-MCNC: 341 MG/DL (ref 70–99)
GLUCOSE BLDC GLUCOMTR-MCNC: 366 MG/DL (ref 70–99)
GLUCOSE SERPL-MCNC: 313 MG/DL (ref 70–99)
HCT VFR BLD AUTO: 25 % (ref 35–47)
HGB BLD-MCNC: 7.6 G/DL (ref 11.7–15.7)
MCH RBC QN AUTO: 24.6 PG (ref 26.5–33)
MCHC RBC AUTO-ENTMCNC: 30.4 G/DL (ref 31.5–36.5)
MCV RBC AUTO: 81 FL (ref 78–100)
PLATELET # BLD AUTO: 454 10E3/UL (ref 150–450)
POTASSIUM SERPL-SCNC: 4.9 MMOL/L (ref 3.4–5.3)
PROT SERPL-MCNC: 6.5 G/DL (ref 6.4–8.3)
RBC # BLD AUTO: 3.09 10E6/UL (ref 3.8–5.2)
SODIUM SERPL-SCNC: 137 MMOL/L (ref 135–145)
VANCOMYCIN SERPL-MCNC: 18.8 UG/ML
WBC # BLD AUTO: 10.1 10E3/UL (ref 4–11)

## 2024-06-25 PROCEDURE — 250N000011 HC RX IP 250 OP 636: Performed by: FAMILY MEDICINE

## 2024-06-25 PROCEDURE — 250N000011 HC RX IP 250 OP 636: Mod: JZ

## 2024-06-25 PROCEDURE — 250N000013 HC RX MED GY IP 250 OP 250 PS 637

## 2024-06-25 PROCEDURE — 120N000002 HC R&B MED SURG/OB UMMC

## 2024-06-25 PROCEDURE — 99232 SBSQ HOSP IP/OBS MODERATE 35: CPT | Mod: GC

## 2024-06-25 PROCEDURE — 86140 C-REACTIVE PROTEIN: CPT

## 2024-06-25 PROCEDURE — 85027 COMPLETE CBC AUTOMATED: CPT

## 2024-06-25 PROCEDURE — 250N000011 HC RX IP 250 OP 636: Mod: JZ | Performed by: STUDENT IN AN ORGANIZED HEALTH CARE EDUCATION/TRAINING PROGRAM

## 2024-06-25 PROCEDURE — 80202 ASSAY OF VANCOMYCIN: CPT | Performed by: FAMILY MEDICINE

## 2024-06-25 PROCEDURE — 80053 COMPREHEN METABOLIC PANEL: CPT

## 2024-06-25 PROCEDURE — 250N000013 HC RX MED GY IP 250 OP 250 PS 637: Performed by: STUDENT IN AN ORGANIZED HEALTH CARE EDUCATION/TRAINING PROGRAM

## 2024-06-25 PROCEDURE — 250N000012 HC RX MED GY IP 250 OP 636 PS 637

## 2024-06-25 PROCEDURE — 36592 COLLECT BLOOD FROM PICC: CPT

## 2024-06-25 PROCEDURE — 99233 SBSQ HOSP IP/OBS HIGH 50: CPT | Mod: 24 | Performed by: STUDENT IN AN ORGANIZED HEALTH CARE EDUCATION/TRAINING PROGRAM

## 2024-06-25 PROCEDURE — 2894A VOIDCORRECT: CPT | Mod: 24 | Performed by: STUDENT IN AN ORGANIZED HEALTH CARE EDUCATION/TRAINING PROGRAM

## 2024-06-25 PROCEDURE — 258N000003 HC RX IP 258 OP 636: Mod: JZ | Performed by: FAMILY MEDICINE

## 2024-06-25 PROCEDURE — 250N000011 HC RX IP 250 OP 636

## 2024-06-25 RX ORDER — CEFEPIME HYDROCHLORIDE 2 G/1
2 INJECTION, POWDER, FOR SOLUTION INTRAVENOUS EVERY 12 HOURS
Status: DISCONTINUED | OUTPATIENT
Start: 2024-06-25 | End: 2024-06-28 | Stop reason: HOSPADM

## 2024-06-25 RX ORDER — ENOXAPARIN SODIUM 100 MG/ML
40 INJECTION SUBCUTANEOUS EVERY 24 HOURS
Status: DISCONTINUED | OUTPATIENT
Start: 2024-06-26 | End: 2024-06-26

## 2024-06-25 RX ADMIN — ENOXAPARIN SODIUM 100 MG: 100 INJECTION SUBCUTANEOUS at 10:15

## 2024-06-25 RX ADMIN — CYCLOBENZAPRINE 10 MG: 10 TABLET, FILM COATED ORAL at 09:01

## 2024-06-25 RX ADMIN — GABAPENTIN 400 MG: 300 CAPSULE ORAL at 13:52

## 2024-06-25 RX ADMIN — ATORVASTATIN CALCIUM 10 MG: 10 TABLET, FILM COATED ORAL at 09:00

## 2024-06-25 RX ADMIN — INSULIN ASPART 8 UNITS: 100 INJECTION, SOLUTION INTRAVENOUS; SUBCUTANEOUS at 19:11

## 2024-06-25 RX ADMIN — ENOXAPARIN SODIUM 100 MG: 100 INJECTION SUBCUTANEOUS at 00:19

## 2024-06-25 RX ADMIN — ACETAMINOPHEN 975 MG: 325 TABLET, FILM COATED ORAL at 08:59

## 2024-06-25 RX ADMIN — OXYCODONE HYDROCHLORIDE 10 MG: 5 TABLET ORAL at 17:40

## 2024-06-25 RX ADMIN — CYCLOBENZAPRINE 10 MG: 10 TABLET, FILM COATED ORAL at 13:52

## 2024-06-25 RX ADMIN — CEFEPIME 2 G: 2 INJECTION, POWDER, FOR SOLUTION INTRAVENOUS at 21:10

## 2024-06-25 RX ADMIN — INSULIN ASPART 10 UNITS: 100 INJECTION, SOLUTION INTRAVENOUS; SUBCUTANEOUS at 13:13

## 2024-06-25 RX ADMIN — CLONIDINE HYDROCHLORIDE 0.3 MG: 0.3 TABLET ORAL at 09:01

## 2024-06-25 RX ADMIN — INSULIN ASPART 7 UNITS: 100 INJECTION, SOLUTION INTRAVENOUS; SUBCUTANEOUS at 09:07

## 2024-06-25 RX ADMIN — KETOROLAC TROMETHAMINE 15 MG: 15 INJECTION, SOLUTION INTRAMUSCULAR; INTRAVENOUS at 21:12

## 2024-06-25 RX ADMIN — OXYCODONE HYDROCHLORIDE 10 MG: 5 TABLET ORAL at 21:52

## 2024-06-25 RX ADMIN — KETOROLAC TROMETHAMINE 15 MG: 15 INJECTION, SOLUTION INTRAMUSCULAR; INTRAVENOUS at 13:44

## 2024-06-25 RX ADMIN — HEPARIN, PORCINE (PF) 10 UNIT/ML INTRAVENOUS SYRINGE 5 ML: at 06:48

## 2024-06-25 RX ADMIN — GABAPENTIN 400 MG: 300 CAPSULE ORAL at 21:11

## 2024-06-25 RX ADMIN — VANCOMYCIN HYDROCHLORIDE 750 MG: 10 INJECTION, POWDER, LYOPHILIZED, FOR SOLUTION INTRAVENOUS at 00:23

## 2024-06-25 RX ADMIN — ACETAMINOPHEN 975 MG: 325 TABLET, FILM COATED ORAL at 21:12

## 2024-06-25 RX ADMIN — DEXAMETHASONE 6 MG: 2 TABLET ORAL at 09:00

## 2024-06-25 RX ADMIN — ACETAMINOPHEN 975 MG: 325 TABLET, FILM COATED ORAL at 13:52

## 2024-06-25 RX ADMIN — AMLODIPINE BESYLATE 5 MG: 5 TABLET ORAL at 09:00

## 2024-06-25 RX ADMIN — CYCLOBENZAPRINE 10 MG: 10 TABLET, FILM COATED ORAL at 21:12

## 2024-06-25 RX ADMIN — CITALOPRAM HYDROBROMIDE 20 MG: 20 TABLET ORAL at 21:11

## 2024-06-25 RX ADMIN — CEFEPIME 2 G: 2 INJECTION, POWDER, FOR SOLUTION INTRAVENOUS at 09:09

## 2024-06-25 RX ADMIN — GABAPENTIN 400 MG: 300 CAPSULE ORAL at 09:00

## 2024-06-25 RX ADMIN — CLONIDINE HYDROCHLORIDE 0.3 MG: 0.3 TABLET ORAL at 21:11

## 2024-06-25 RX ADMIN — CEFEPIME 2 G: 2 INJECTION, POWDER, FOR SOLUTION INTRAVENOUS at 04:12

## 2024-06-25 RX ADMIN — OXYCODONE HYDROCHLORIDE 10 MG: 5 TABLET ORAL at 09:01

## 2024-06-25 RX ADMIN — HEPARIN, PORCINE (PF) 10 UNIT/ML INTRAVENOUS SYRINGE 5 ML: at 10:30

## 2024-06-25 RX ADMIN — HEPARIN, PORCINE (PF) 10 UNIT/ML INTRAVENOUS SYRINGE 5 ML: at 13:45

## 2024-06-25 ASSESSMENT — ACTIVITIES OF DAILY LIVING (ADL)
ADLS_ACUITY_SCORE: 33
ADLS_ACUITY_SCORE: 31
ADLS_ACUITY_SCORE: 33
ADLS_ACUITY_SCORE: 33
ADLS_ACUITY_SCORE: 31
ADLS_ACUITY_SCORE: 33
ADLS_ACUITY_SCORE: 31
ADLS_ACUITY_SCORE: 31
ADLS_ACUITY_SCORE: 33
ADLS_ACUITY_SCORE: 31
ADLS_ACUITY_SCORE: 33

## 2024-06-25 NOTE — PLAN OF CARE
Goal Outcome Evaluation:      VS: Visit Vitals  BP (!) 171/60 (BP Location: Right leg, Patient Position: Semi-Amaya's)   Pulse 56   Temp 97.8  F (36.6  C) (Axillary)   Resp 16       O2: Room air    Output: Normal urinary output.     Last BM: 6/25 Pt stated they were formed and uniform in size and shape   Activity: 1 assist, NWB to LUE.     Up for meals? Yes, with tray set up   Skin: See comments    Pain: Managed with pain meds (See MAR)    CMS: had to yell her name very loudly and tap on her right foot to get her to open her eyes.  Patient required sternal rubs twice to get her to awaken.  Pt needs to be reoriented to time and situation frequently.   Dressing: Clean, dry, intact.     Diet: Regular    LDA: PICC, single lumen, right forearm.     Equipment: Walker, gait belt, IV pole    Plan: Surgery, continue POC    Additional Info: Pt continues to be heavily sedated, pulse oxygen trending from 94 to 82 o2.  Changed out the pulse ox sticker to ensure it stayed on her finger correctly.       *BP should not be taken on either arm*  See skin notes.

## 2024-06-25 NOTE — CARE PLAN
06/25/24 1330   Fall Event   Patient Assessed By nurse   Family/Designated Caregiver Notified of Fall Yes   Fall Prevention Plan Updated N/a

## 2024-06-25 NOTE — PROGRESS NOTES
Patient had a witnessed fall around 1330 while using the toilet. Pt denies any head injury or other injury to LUE. Provider made aware. Charge nurse made aware. Compass was filed. Pt was given pain medications. Currently without any other concerns and declines any other interventions. Continue to monitor.

## 2024-06-25 NOTE — PROVIDER NOTIFICATION
"Was notified by prior RN that patient had a fall around 1330pm and provider was notified. Pt states she hit her L shoulder back and arm, and is a bit sore. Denies hitting head. No visible injuries noted. Pt made statement to writer that \"no-one came to assess me after I fell. Doctors were in here before this morning but not since I fell\". Nurse manager requested that writer follow up to ensure provider was made aware and that pt was assessed. Messaged Ash Triana with [facility] who confirmed he was aware and stated they would check on patient \"in a bit\" for assessment.     Received call back from provider regarding note placed. MD informed writer that \"charge nurse did not inform provider that patient injured herself. Charge nurse stated she did not lose consciousness and did not have new injuries\". Also notified writer he was in middle of sign-out so could not assess patient immediately. Notified of pt complaints regarding new soreness from fall. Provider to assess at bedside.    "

## 2024-06-25 NOTE — PROGRESS NOTES
Was notify by Charge RN ~1:30 pm that patient fell and witnessed by nurse. Denies any LOC, new or visible injuries. Was informed that pain medication was given and would monitor.     ~5:30 pm: Notify by Latoya Liang RN if I was aware of the patient's fall this afternoon and stated yes. Although, I was never informed of Mitali's new left shoulder and arm pain as noted by Latoya Liang. Went to assess the patient at bedside. Patient denies any LOC or head trauma but reports falling onto her left side when reaching for the hand rail in the bathroom. She states the nurse was in the room and assisted her back up. Pain medications and heating pad given. She reports her pain is a lot better. Will continue to monitor. Night resident informed of situation.     Ash Triana MD

## 2024-06-25 NOTE — DISCHARGE SUMMARY
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 prosthetic joint, subsequent encounter       !! - Potential duplicate medications found. Please discuss with provider.        CONTINUE these medications which have CHANGED    Details   cloNIDine (CATAPRES) 0.3 MG tablet Take 1 tablet (0.3 mg) by mouth 2 times daily  Qty: 30 tablet, Refills: 0    Associated Diagnoses: Generalized anxiety disorder      insulin degludec (TRESIBA FLEXTOUCH) 100 UNIT/ML pen Inject 25 Units Subcutaneous every morning  Qty: 15 mL, Refills: 0    Associated Diagnoses: Type 2 diabetes mellitus with hyperglycemia, with long-term current use of insulin (H)      oxyCODONE (ROXICODONE) 5 MG tablet Take 1 tablet (5 mg) by mouth every 4 hours as needed for moderate pain or severe pain  Qty: 12 tablet, Refills: 0    Associated Diagnoses: Joint infection (H)           CONTINUE these medications which have NOT CHANGED    Details   !! acetaminophen (TYLENOL) 325 MG tablet Take 2 tablets (650 mg) by mouth every 4 hours as needed for other (For optimal non-opioid multimodal pain management to improve pain control.)  Qty: 100 tablet     Associated Diagnoses: Status post surgery      albuterol (PROAIR HFA/PROVENTIL HFA/VENTOLIN HFA) 108 (90 BASE) MCG/ACT Inhaler Inhale 2 puffs into the lungs as needed      amLODIPine (NORVASC) 5 MG tablet Take 1 tablet (5 mg) by mouth daily  Qty: 30 tablet, Refills: 0    Associated Diagnoses: Essential hypertension      atorvastatin (LIPITOR) 10 MG tablet Take 10 mg by mouth daily      citalopram (CELEXA) 20 MG tablet Take 1 tablet (20 mg) by mouth at bedtime  Qty: 30 tablet, Refills: 0    Associated Diagnoses: Major depressive disorder, recurrent episode, moderate (H)      gabapentin (NEURONTIN) 400 MG capsule Take 2 capsules (800 mg) by mouth 3 times daily  Qty: 180 capsule, Refills: 0    Associated Diagnoses: Neuropathy of left hand; Chronic low back pain, unspecified back pain laterality, unspecified whether sciatica present      insulin aspart (NOVOLOG FLEXPEN) 100 UNIT/ML pen Inject 4-12 Units Subcutaneous 3 times daily (with meals) + correction      methocarbamol (ROBAXIN) 500 MG tablet Take 1 tablet (500 mg) by mouth every 6 hours as needed for muscle spasms  Qty: 20 tablet, Refills: 0    Associated Diagnoses: Status post surgery      miconazole (MICATIN) 2 % external powder Apply topically 2 times daily Apply to groin area after washing area with soap and water and drying bid  Qty: 71 g, Refills: 1    Associated Diagnoses: Intertriginous skin ulcer, limited to breakdown of skin (H)      omeprazole (PRILOSEC) 20 MG DR capsule Take 20 mg by mouth daily      polyethylene glycol (MIRALAX) 17 GM/Dose powder Take 17 g by mouth daily    Associated Diagnoses: Status post surgery      psyllium (METAMUCIL) WAFR Take 2 Wafers by mouth daily  Qty: 60 Wafer, Refills: 2    Associated Diagnoses: Loose stools      saccharomyces boulardii (FLORASTOR) 250 MG capsule Take 1 capsule (250 mg) by mouth 2 times daily  Qty: 60 capsule, Refills: 0    Associated Diagnoses: Loose stools      lumateperone (CAPLYTA) 42 MG capsule Take  "2 capsules (84 mg) by mouth at bedtime  Qty: 60 capsule, Refills: 0    Associated Diagnoses: Bipolar disease, chronic (H)       !! - Potential duplicate medications found. Please discuss with provider.        STOP taking these medications       diazepam (VALIUM) 10 MG tablet Comments:   Reason for Stopping:         dicloxacillin (DYNAPEN) 250 MG capsule Comments:   Reason for Stopping:         losartan (COZAAR) 25 MG tablet Comments:   Reason for Stopping:         mirabegron (MYRBETRIQ) 25 MG 24 hr tablet Comments:   Reason for Stopping:             Allergies   Allergies   Allergen Reactions    Morphine Anaphylaxis     Throat swells shut  **Has taken hydrocodone/APAP and hydromorphone in the past during previous hospitalizations with no issues.  Takes oxycodone at home    Succinylcholine Shortness Of Breath     \"it affected my breathing\"    Fentanyl Nausea and Vomiting    Hydromorphone      Received 4/10 in OR without issue (was listed as allergy but has had in past without incident)    Methadone Nausea and Vomiting    Prednisone Swelling     \"it overrides my psych meds and makes me crazy\"    Pregabalin     Quetiapine     Trazodone     Sumatriptan Rash     "
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
## 2024-06-25 NOTE — PHARMACY-VANCOMYCIN DOSING SERVICE
Pharmacy Vancomycin Note  Date of Service 2024  Patient's  1965   59 year old, female    Indication: Bone and Joint Infection  Day of Therapy: started 24  Current vancomycin regimen:  750 mg IV q12h  Current vancomycin monitoring method: AUC  Current vancomycin therapeutic monitoring goal: 400-600 mg*h/L    InsightRX Prediction of Current Vancomycin Regimen  Regimen: 750 mg IV every 12 hours.  Exposure target: AUC24 (range)400-600 mg/L.hr   AUC24,ss: 458 mg/L.hr  Probability of AUC24 > 400: 89 %  Ctrough,ss: 15 mg/L  Probability of Ctrough,ss > 20: 2 %  Probability of nephrotoxicity (Lodise CHANDAN ): 10 %      Current estimated CrCl = Estimated Creatinine Clearance: 96.1 mL/min (based on SCr of 0.79 mg/dL).    Creatinine for last 3 days  2024: 10:42 AM Creatinine 0.84 mg/dL  2024:  5:46 AM Creatinine 0.75 mg/dL  2024: 11:04 AM Creatinine 0.79 mg/dL  2024:  6:51 AM Creatinine 0.79 mg/dL    Recent Vancomycin Levels (past 3 days)  2024: 10:42 AM Vancomycin 18.1 ug/mL  2024:  6:51 AM Vancomycin 18.8 ug/mL    Vancomycin IV Administrations (past 72 hours)                     vancomycin (VANCOCIN) 750 mg in sodium chloride 0.9 % 250 mL intermittent infusion (mg) 750 mg New Bag 24 0023     750 mg New Bag 24 1036     750 mg New Bag 24 2240     750 mg New Bag  1116     750 mg $Started  0038    vancomycin (VANCOCIN) 1,000 mg in 200 mL dextrose intermittent infusion (mg) 1,000 mg New Bag 24 1119                    Nephrotoxins and other renal medications (From now, onward)      Start     Dose/Rate Route Frequency Ordered Stop    24 2300  vancomycin (VANCOCIN) 750 mg in sodium chloride 0.9 % 250 mL intermittent infusion         750 mg  over 90 Minutes Intravenous EVERY 12 HOURS 24 1205      24 1637  ketorolac (TORADOL) injection 15 mg         15 mg Intravenous EVERY 6 HOURS PRN 24 1638 24 1636               Contrast Orders  - past 72 hours (72h ago, onward)      Start     Dose/Rate Route Frequency Stop    06/24/24 1400  technetium pertechnetate with albumin (Tc99m MAA) radioisotope injection 4.8-7.2 millicurie         4.8-7.2 millicurie Intravenous ONCE 06/24/24 1551    06/23/24 1800  iopamidol (ISOVUE-370) solution 100 mL         100 mL Intravenous ONCE 06/23/24 1949    06/22/24 2030  iopamidol (ISOVUE-370) solution 100 mL         100 mL Intravenous ONCE              Interpretation of levels and current regimen:  Vancomycin level is reflective of -600    Has serum creatinine changed greater than 50% in last 72 hours: No    Urine output:  unable to determine    Renal Function: Stable      Plan:  Continue Current Dose  Vancomycin monitoring method: AUC  Vancomycin therapeutic monitoring goal: 400-600 mg*h/L  Pharmacy will check vancomycin levels as appropriate in 3-5 Days.  Serum creatinine levels will be ordered a minimum of twice weekly.    Devin Kaye RP

## 2024-06-25 NOTE — PROGRESS NOTES
Care Management Follow Up    Length of Stay (days): 7    Expected Discharge Date: 06/25/2024     Concerns to be Addressed: discharge planning, home safety     Patient plan of care discussed at interdisciplinary rounds: Yes    Anticipated Discharge Disposition: Skilled Nursing Facility     Anticipated Discharge Services: Transportation Services  Anticipated Discharge DME: None    Patient/family educated on Medicare website which has current facility and service quality ratings: no  Education Provided on the Discharge Plan: Yes  Patient/Family in Agreement with the Plan: yes    Referrals Placed by CM/SW:      [facility]  Phone # [phone]  Fax # [phone]     Patient's clinic , [place] [phone].      CADI JIMMY- VENICE will fax discharge summary to [place] [phone] and [place] on 5/24/24.      Discharge Transportation: Recovery transit, please call [facility] at [phone].      Current Home Care Agency:  [facility]  Home PT/OT/RN   Phone: [phone]  Fax:[phone]     Private pay costs discussed: Not applicable    Additional Information:  Plan for discharge to home with home infusion and home care. Patient accept to [facility] service. Patient's current home care agency is [facility] [place] [employer] [facility]. Will resume care for physical therapy and add skilled nursing for home infusion. Once medically cleared orders will be released to [facility] and [facility].    Patient had a witnessed fall today at 1330 while using the bathroom.    No further discharge concerns at this time. [facility] will continue to follow.    Jeni Bean RN, BSN  Care Coordinator, [facility]  Phone [phone]  Pager [phone]
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
## 2024-06-25 NOTE — PLAN OF CARE
Goal Outcome Evaluation:      VS: Blood pressure 130/41, pulse 50, temperature 97.5  F (36.4  C), temperature source Oral, resp. rate 16, weight 101.7 kg (224 lb 4.8 oz), SpO2 98%.   O2: RA   Output: Voids adequately in the bathroom.   Last BM: 6/25/24   Activity: A x 1, NWB to LUE.   Skin: LUE surgical wound, extravasation on RUE.    Pain: Managed with PRN and scheduled medication.    CMS: A&O x 4. Numbness to L pinky, baseline neuropathy in BLE.    Dressing: CDI   Diet: Regular   LDA: PICC, single lumen, right forearm.   Equipment: Personal belongings, IV pole, walker, gait belt   Plan: Continue POC.    Additional Info: Pt had a witnessed fall around 1330 while using the toilet. No LOC. Charge nurse informed. Provider informed. Compass filed. Pain medication was given. Has no other concerns at this time. Continue to monitor.

## 2024-06-26 ENCOUNTER — APPOINTMENT (OUTPATIENT)
Dept: OCCUPATIONAL THERAPY | Facility: CLINIC | Age: 59
DRG: 492 | End: 2024-06-26
Attending: INTERNAL MEDICINE
Payer: COMMERCIAL

## 2024-06-26 LAB
ABO/RH(D): NORMAL
ALBUMIN SERPL BCG-MCNC: 3 G/DL (ref 3.5–5.2)
ALP SERPL-CCNC: 142 U/L (ref 40–150)
ALT SERPL W P-5'-P-CCNC: 13 U/L (ref 0–50)
ANION GAP SERPL CALCULATED.3IONS-SCNC: 8 MMOL/L (ref 7–15)
ANTIBODY SCREEN: NEGATIVE
AST SERPL W P-5'-P-CCNC: 14 U/L (ref 0–45)
BILIRUB SERPL-MCNC: <0.2 MG/DL
BLD PROD TYP BPU: NORMAL
BLOOD COMPONENT TYPE: NORMAL
BUN SERPL-MCNC: 24.9 MG/DL (ref 8–23)
CALCIUM SERPL-MCNC: 8.5 MG/DL (ref 8.8–10.2)
CHLORIDE SERPL-SCNC: 101 MMOL/L (ref 98–107)
CODING SYSTEM: NORMAL
CREAT SERPL-MCNC: 0.89 MG/DL (ref 0.51–0.95)
CROSSMATCH: NORMAL
DEPRECATED HCO3 PLAS-SCNC: 31 MMOL/L (ref 22–29)
EGFRCR SERPLBLD CKD-EPI 2021: 74 ML/MIN/1.73M2
ERYTHROCYTE [DISTWIDTH] IN BLOOD BY AUTOMATED COUNT: 15.8 % (ref 10–15)
GLUCOSE BLDC GLUCOMTR-MCNC: 178 MG/DL (ref 70–99)
GLUCOSE BLDC GLUCOMTR-MCNC: 209 MG/DL (ref 70–99)
GLUCOSE BLDC GLUCOMTR-MCNC: 209 MG/DL (ref 70–99)
GLUCOSE BLDC GLUCOMTR-MCNC: 258 MG/DL (ref 70–99)
GLUCOSE BLDC GLUCOMTR-MCNC: 266 MG/DL (ref 70–99)
GLUCOSE BLDC GLUCOMTR-MCNC: 293 MG/DL (ref 70–99)
GLUCOSE BLDC GLUCOMTR-MCNC: 356 MG/DL (ref 70–99)
GLUCOSE BLDC GLUCOMTR-MCNC: 398 MG/DL (ref 70–99)
GLUCOSE SERPL-MCNC: 247 MG/DL (ref 70–99)
HCT VFR BLD AUTO: 23.2 % (ref 35–47)
HGB BLD-MCNC: 6.9 G/DL (ref 11.7–15.7)
HGB BLD-MCNC: 7 G/DL (ref 11.7–15.7)
ISSUE DATE AND TIME: NORMAL
MCH RBC QN AUTO: 24.1 PG (ref 26.5–33)
MCHC RBC AUTO-ENTMCNC: 30.2 G/DL (ref 31.5–36.5)
MCV RBC AUTO: 80 FL (ref 78–100)
PLATELET # BLD AUTO: 416 10E3/UL (ref 150–450)
POTASSIUM SERPL-SCNC: 4.7 MMOL/L (ref 3.4–5.3)
PROT SERPL-MCNC: 6.1 G/DL (ref 6.4–8.3)
RBC # BLD AUTO: 2.9 10E6/UL (ref 3.8–5.2)
SODIUM SERPL-SCNC: 140 MMOL/L (ref 135–145)
SPECIMEN EXPIRATION DATE: NORMAL
UNIT ABO/RH: NORMAL
UNIT NUMBER: NORMAL
UNIT STATUS: NORMAL
UNIT TYPE ISBT: 5100
WBC # BLD AUTO: 11.2 10E3/UL (ref 4–11)

## 2024-06-26 PROCEDURE — 250N000013 HC RX MED GY IP 250 OP 250 PS 637

## 2024-06-26 PROCEDURE — P9016 RBC LEUKOCYTES REDUCED: HCPCS

## 2024-06-26 PROCEDURE — 250N000011 HC RX IP 250 OP 636: Performed by: FAMILY MEDICINE

## 2024-06-26 PROCEDURE — 97530 THERAPEUTIC ACTIVITIES: CPT | Mod: GO

## 2024-06-26 PROCEDURE — 250N000011 HC RX IP 250 OP 636: Mod: JZ

## 2024-06-26 PROCEDURE — 250N000011 HC RX IP 250 OP 636: Mod: JZ | Performed by: STUDENT IN AN ORGANIZED HEALTH CARE EDUCATION/TRAINING PROGRAM

## 2024-06-26 PROCEDURE — 120N000002 HC R&B MED SURG/OB UMMC

## 2024-06-26 PROCEDURE — 85041 AUTOMATED RBC COUNT: CPT

## 2024-06-26 PROCEDURE — 97535 SELF CARE MNGMENT TRAINING: CPT | Mod: GO

## 2024-06-26 PROCEDURE — 99232 SBSQ HOSP IP/OBS MODERATE 35: CPT | Mod: GC

## 2024-06-26 PROCEDURE — 258N000003 HC RX IP 258 OP 636: Performed by: FAMILY MEDICINE

## 2024-06-26 PROCEDURE — 86900 BLOOD TYPING SEROLOGIC ABO: CPT

## 2024-06-26 PROCEDURE — 250N000013 HC RX MED GY IP 250 OP 250 PS 637: Performed by: STUDENT IN AN ORGANIZED HEALTH CARE EDUCATION/TRAINING PROGRAM

## 2024-06-26 PROCEDURE — 85018 HEMOGLOBIN: CPT

## 2024-06-26 PROCEDURE — 258N000003 HC RX IP 258 OP 636

## 2024-06-26 PROCEDURE — 36415 COLL VENOUS BLD VENIPUNCTURE: CPT

## 2024-06-26 PROCEDURE — 80053 COMPREHEN METABOLIC PANEL: CPT

## 2024-06-26 PROCEDURE — 250N000011 HC RX IP 250 OP 636

## 2024-06-26 PROCEDURE — 36592 COLLECT BLOOD FROM PICC: CPT

## 2024-06-26 PROCEDURE — 86923 COMPATIBILITY TEST ELECTRIC: CPT

## 2024-06-26 RX ORDER — METHYLPREDNISOLONE SODIUM SUCCINATE 125 MG/2ML
125 INJECTION, POWDER, LYOPHILIZED, FOR SOLUTION INTRAMUSCULAR; INTRAVENOUS
Status: DISCONTINUED | OUTPATIENT
Start: 2024-06-26 | End: 2024-06-28 | Stop reason: HOSPADM

## 2024-06-26 RX ORDER — MULTIPLE VITAMINS W/ MINERALS TAB 9MG-400MCG
1 TAB ORAL DAILY
Status: DISCONTINUED | OUTPATIENT
Start: 2024-06-26 | End: 2024-06-28 | Stop reason: HOSPADM

## 2024-06-26 RX ORDER — OXYCODONE HYDROCHLORIDE 5 MG/1
5 TABLET ORAL ONCE
Status: COMPLETED | OUTPATIENT
Start: 2024-06-26 | End: 2024-06-27

## 2024-06-26 RX ORDER — DIPHENHYDRAMINE HYDROCHLORIDE 50 MG/ML
50 INJECTION INTRAMUSCULAR; INTRAVENOUS
Status: DISCONTINUED | OUTPATIENT
Start: 2024-06-26 | End: 2024-06-28 | Stop reason: HOSPADM

## 2024-06-26 RX ORDER — FOLIC ACID 1 MG/1
1 TABLET ORAL DAILY
Status: DISCONTINUED | OUTPATIENT
Start: 2024-06-26 | End: 2024-06-28 | Stop reason: HOSPADM

## 2024-06-26 RX ADMIN — KETOROLAC TROMETHAMINE 15 MG: 15 INJECTION, SOLUTION INTRAMUSCULAR; INTRAVENOUS at 11:54

## 2024-06-26 RX ADMIN — OXYCODONE HYDROCHLORIDE 10 MG: 5 TABLET ORAL at 08:08

## 2024-06-26 RX ADMIN — INSULIN ASPART 3 UNITS: 100 INJECTION, SOLUTION INTRAVENOUS; SUBCUTANEOUS at 09:58

## 2024-06-26 RX ADMIN — KETOROLAC TROMETHAMINE 15 MG: 15 INJECTION, SOLUTION INTRAMUSCULAR; INTRAVENOUS at 06:11

## 2024-06-26 RX ADMIN — VANCOMYCIN HYDROCHLORIDE 750 MG: 10 INJECTION, POWDER, LYOPHILIZED, FOR SOLUTION INTRAVENOUS at 01:05

## 2024-06-26 RX ADMIN — SODIUM CHLORIDE 25 MG: 9 INJECTION, SOLUTION INTRAVENOUS at 12:00

## 2024-06-26 RX ADMIN — CLONIDINE HYDROCHLORIDE 0.3 MG: 0.3 TABLET ORAL at 20:13

## 2024-06-26 RX ADMIN — OXYCODONE HYDROCHLORIDE 10 MG: 5 TABLET ORAL at 16:59

## 2024-06-26 RX ADMIN — VANCOMYCIN HYDROCHLORIDE 750 MG: 10 INJECTION, POWDER, LYOPHILIZED, FOR SOLUTION INTRAVENOUS at 12:40

## 2024-06-26 RX ADMIN — HEPARIN, PORCINE (PF) 10 UNIT/ML INTRAVENOUS SYRINGE 5 ML: at 03:50

## 2024-06-26 RX ADMIN — GABAPENTIN 400 MG: 300 CAPSULE ORAL at 20:13

## 2024-06-26 RX ADMIN — AMLODIPINE BESYLATE 5 MG: 5 TABLET ORAL at 08:09

## 2024-06-26 RX ADMIN — HEPARIN, PORCINE (PF) 10 UNIT/ML INTRAVENOUS SYRINGE 5 ML: at 07:37

## 2024-06-26 RX ADMIN — ACETAMINOPHEN 975 MG: 325 TABLET, FILM COATED ORAL at 14:41

## 2024-06-26 RX ADMIN — CYCLOBENZAPRINE 10 MG: 10 TABLET, FILM COATED ORAL at 08:08

## 2024-06-26 RX ADMIN — LUMATEPERONE 84 MG: 42 CAPSULE ORAL at 01:05

## 2024-06-26 RX ADMIN — ATORVASTATIN CALCIUM 10 MG: 10 TABLET, FILM COATED ORAL at 08:09

## 2024-06-26 RX ADMIN — ACETAMINOPHEN 975 MG: 325 TABLET, FILM COATED ORAL at 20:13

## 2024-06-26 RX ADMIN — CEFEPIME 2 G: 2 INJECTION, POWDER, FOR SOLUTION INTRAVENOUS at 08:09

## 2024-06-26 RX ADMIN — INSULIN ASPART 2 UNITS: 100 INJECTION, SOLUTION INTRAVENOUS; SUBCUTANEOUS at 17:00

## 2024-06-26 RX ADMIN — GABAPENTIN 400 MG: 300 CAPSULE ORAL at 08:09

## 2024-06-26 RX ADMIN — Medication 1 TABLET: at 11:53

## 2024-06-26 RX ADMIN — CYCLOBENZAPRINE 10 MG: 10 TABLET, FILM COATED ORAL at 14:41

## 2024-06-26 RX ADMIN — OXYCODONE HYDROCHLORIDE 10 MG: 5 TABLET ORAL at 21:45

## 2024-06-26 RX ADMIN — INSULIN ASPART 7 UNITS: 100 INJECTION, SOLUTION INTRAVENOUS; SUBCUTANEOUS at 14:44

## 2024-06-26 RX ADMIN — CITALOPRAM HYDROBROMIDE 20 MG: 20 TABLET ORAL at 21:45

## 2024-06-26 RX ADMIN — SODIUM CHLORIDE 500 MG: 9 INJECTION, SOLUTION INTRAVENOUS at 14:28

## 2024-06-26 RX ADMIN — CLONIDINE HYDROCHLORIDE 0.3 MG: 0.3 TABLET ORAL at 08:09

## 2024-06-26 RX ADMIN — LUMATEPERONE 84 MG: 42 CAPSULE ORAL at 21:51

## 2024-06-26 RX ADMIN — GABAPENTIN 400 MG: 300 CAPSULE ORAL at 14:41

## 2024-06-26 RX ADMIN — CYCLOBENZAPRINE 10 MG: 10 TABLET, FILM COATED ORAL at 20:13

## 2024-06-26 RX ADMIN — CEFEPIME 2 G: 2 INJECTION, POWDER, FOR SOLUTION INTRAVENOUS at 20:14

## 2024-06-26 RX ADMIN — FOLIC ACID 1 MG: 1 TABLET ORAL at 11:53

## 2024-06-26 RX ADMIN — ACETAMINOPHEN 975 MG: 325 TABLET, FILM COATED ORAL at 08:08

## 2024-06-26 ASSESSMENT — ACTIVITIES OF DAILY LIVING (ADL)
ADLS_ACUITY_SCORE: 32
ADLS_ACUITY_SCORE: 35
ADLS_ACUITY_SCORE: 31
ADLS_ACUITY_SCORE: 32
ADLS_ACUITY_SCORE: 31
ADLS_ACUITY_SCORE: 32
ADLS_ACUITY_SCORE: 35
ADLS_ACUITY_SCORE: 32
ADLS_ACUITY_SCORE: 35
ADLS_ACUITY_SCORE: 32
ADLS_ACUITY_SCORE: 32
ADLS_ACUITY_SCORE: 34
ADLS_ACUITY_SCORE: 35
ADLS_ACUITY_SCORE: 32
ADLS_ACUITY_SCORE: 35
ADLS_ACUITY_SCORE: 31
ADLS_ACUITY_SCORE: 32
ADLS_ACUITY_SCORE: 34
ADLS_ACUITY_SCORE: 34
ADLS_ACUITY_SCORE: 32
ADLS_ACUITY_SCORE: 34

## 2024-06-26 NOTE — PROGRESS NOTES
Patient has been assessed for Home Oxygen needs. Oxygen readings:    *Pulse oximetry (SpO2) = 100% on room air at rest while awake.    *SpO2 improved to NA% on NA liters/minute at rest.    *SpO2 = 94% on room air during activity/with exercise.    *SpO2 improved to NA% on NAliters/minute during activity/with exercise.

## 2024-06-26 NOTE — PLAN OF CARE
Goal Outcome Evaluation: L elbow pain and mobility, VS, BG    Plan of Care Reviewed With: patient  Overall Patient Progress: improving     VS:     Temp: 97.8  F (36.6  C) Temp src: Oral BP: (!) 167/62 Pulse: (!) 45   Resp: 16 SpO2: 95 % O2 Device: None (Room air)     Remains bradycardic. Assisted to the bathroom, denied dizziness, light-headedness, SOB and chestpain.    Breathing comfortably. No signs of distress. O2Sat bet 95-97% at RA. LS CTA.   Output:     Continent of bowel and bladder.  Voids spontaneously without  difficulty in the bathroom.   Last BM on 6/25/2024.    Activity:     A1 with GB. Weakness observed when up to the bathroom.  NWB on the LUE. Arm elevated with pillow.   Skin: Looks pale. No open area noted.    Pain:     Intermittent aching of  L elbow. Warm pad applied. Denied back pain and headache. TN has PRN Oxycodone 10 mg and IV Toradol.    CMS:     Numbness on L pinky still apparent. Neuropathy on BLE is baseline R/T  diabetes.   Dressing: LUE wrapped with ace, c/d/I.   Diet: Regular and tolerated.   LDA: R PICC patent  and saline/heparin locked.      Bruises, pink and brown discoloration on extravasation site on the RUE improving. Pt denied pain. Arm kept elevated.    Equipment: Walker used on ambulation.   Plan: Pain Mgt, PT/OT, monitor extravasation site. BG monitoring.   Additional Info:      ISS and carb counting for DM II. BG at 0200 was 357 and was given with Novolog 7 units as 1x dose  as ordered. Rechecked was 258 at 0630.

## 2024-06-26 NOTE — PROGRESS NOTES
[facility]   BRIEF PROGRESS NOTE    SUBJECTIVE  Notified that patient was having active sanguinous drainage from incision site. Went to assess at bedside. Patient states that she had fall around 1430 bumped arm at that time. States used bathroom ~1800 and did not note bleeding at that time. Not sure when bleeding started. Feels pain is below baseline right now. Able to move fingers. No fevers. No chills. Feels generally at baseline except bleeding    Wants to eat dinner and requests help with opening BBQ sauce packet.      OBJECTIVE:  /41 (BP Location: Right arm)   Pulse (!) 47   Temp 96.9  F (36.1  C) (Axillary)   Resp 16   Wt 101.7 kg (224 lb 4.8 oz)   SpO2 99%   BMI 33.12 kg/m      Exam:   General: Alert and oriented, in no acute distress.  Skin: Warm and dry, no abnormalities noted.  ENT: Speech intact, nasal passages open, no hearing impairment noted.  CV: No cyanosis or pallor, warm and well perfused.  Respiratory: No respiratory distress, no accessory muscle use.  Neuro: moves digits of left hand freely  Psychiatric: Mood and affect appear normal.   Left Arm: bandage with sanguinous drainage without purulence onto ACE bandage and pillow. Moves fingers. <2 second capillary refill. Sensation in digits 1-4 normal. Decreased sensation digit 5 (baseline)  See media tab for photos      ASSESSMENT/PLAN:  # Left elbow pain  # Chronic left elbow prosthetic joint infection   # S/P explant of TEA and antibiotic spacer placement 4/10/24   # s/p I&D with removal of antibiotic spacer and primary closure on 6/20/24  Admitted with five-day history of worsening left elbow pain with malaise. XR with findings suspicious for chronic osteomyelitis. s/p I&D with removal of antibiotic spacer and primary closure on 6/20 with Dr. Sarmiento. Today has sanguinous drainage unclear etiology, possibly broken suture? after minor fall. RN reports has discussed with ortho to reassess if any additional management or  procedural management indicated who will evaluate tonight.    Orthopedics to further assess arm  No clear indication for blood work  No need for additional antibiotics at this time  RN to notify for precipitous changes (heavier bleeding)    Please see daily rounding note for full A/P.  Renetta White MD  9:32 PM

## 2024-06-26 NOTE — PROVIDER NOTIFICATION
Notified Dr. Jm Hughes with orthopedics that patient is having active sanguinous drainage from incision site- unable to tell where it is coming from as pt is splinted and ACE wrapped. Requested provider assess at bedside. Notified [facility] team as well.

## 2024-06-26 NOTE — PROGRESS NOTES
Orthopedic update    Patient states she had a ground-level fall around 1430 sustaining an impact to her left arm.  At the time she did not note significant increase in pain or any drainage through her splint.  Around 2100, orthopedics team was paged due to increasing bloody drainage near the proximal aspect of her splint at soaked with surrounding bedsheets.    I visited the patient at bedside.  No new numbness, tingling, or weakness in the left upper extremity.  She does not feel lightheaded.  States her pain was slightly increased from previous.  Her splint had numerous clots near the proximal posterior aspect that were covered in blood.  I removed her splint.  Her surgical incision appears completely closed with sutures in place.  There was some bleeding through the incision near the proximal aspect of her wound.  I applied pressure for a couple of minutes which appeared to control the bleeding.  I reapplied a long-arm posterior slab splint with numerous ABDs over the incision.  The patient tolerated this procedure well and had no complaints following.    Please continue plan per last progress note.    Staff: Torsten Hughes MD   PGY2 - Orthopaedic Surgery

## 2024-06-26 NOTE — PROGRESS NOTES
New dressing applied to left elbow. Dressing saturated with sanguinous drainage to all the padded ABDs and through the ace wrap. Incision intact with no dehiscence or drainage from the incision. Patient had a fall yesterday and stating that she fell on her left arm. Later, dressing was changed around 10 pm by the resident Jm Hughes. Possible a residual drainage from yesterday. Seem comfortable currently and might need a new splint.    Sloan Stanford, SHERRI-CNP   Orthopaedic Surgery

## 2024-06-26 NOTE — PLAN OF CARE
Goal Outcome Evaluation:      VS: VSS ex BP elevated and bradycardic at times  Temp: 97.8  F (36.6  C) Temp src: Oral BP: (!) 163/56 Pulse: (!) 47   Resp: 16 SpO2: 93 % O2 Device: None (Room air)     O2: >90% on RA, denies SOB or CP. Cough improved. LS slightly diminished. Pulse oximetry. Tends to desat overnight at times. Planning for 6 minute walk test tomorrow to determine O2 needs. States CP improved.    Output: Voiding without difficulty in BR   Last BM: LBM 6/25   Activity: Pt up with SBA and gait belt (pt refuses at times)- pt fell in BR today this afternoon using grab bar. Pt seems unsteady on feet at times. Denies dizziness when up.    Skin: Skin intact ex for LUE incision. Prior extravasation site marked to RUE and improving. Small marcellus to R foot noted from prior extravasation as well per pt.   Pain: Pain to LUE managed with oxycodone 10 mg and toradol PRN. Scheduled tylenol, flexeril, and gabapentin .    CMS: Numbness to pinky unchanged. Baseline neuropathy BLE and BUE.   Dressing: Dressing to LUE- needed to be changed by ortho this evening due to large amount of bloody drainage. CDI.    Diet: Regular diet with BG checks- BG remains elevated. Lantus given. On sliding scale insulin. Carb coverage also given.   LDA: PICC RUE infusing TKO between abx- heparin lock if needed.   Equipment: IV pole, gait belt, personal belongings, pulse ox   Plan: Discharge to home with home care and [facility] possibly tomorrow if medically stable. Pt will need 6 minute walk test to determine O2 needs.    Additional Info: Needs another BG check at 0000- pt ate dinner around 950 and so PM BG is inaccurate. Sliding scale insulin rescheduled for MN.  Watch patient for oversedation with oxycodone overnight. Seems OK during daytime but tends to get confused and sedated overnights.
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
## 2024-06-27 ENCOUNTER — APPOINTMENT (OUTPATIENT)
Dept: OCCUPATIONAL THERAPY | Facility: CLINIC | Age: 59
DRG: 492 | End: 2024-06-27
Attending: INTERNAL MEDICINE
Payer: COMMERCIAL

## 2024-06-27 LAB
ALBUMIN SERPL BCG-MCNC: 3 G/DL (ref 3.5–5.2)
ALP SERPL-CCNC: 138 U/L (ref 40–150)
ALT SERPL W P-5'-P-CCNC: 16 U/L (ref 0–50)
ANION GAP SERPL CALCULATED.3IONS-SCNC: 5 MMOL/L (ref 7–15)
AST SERPL W P-5'-P-CCNC: 18 U/L (ref 0–45)
BACTERIA BONE ANAEROBE+AEROBE CULT: NO GROWTH
BACTERIA BONE ANAEROBE+AEROBE CULT: NORMAL
BACTERIA TISS BX CULT: ABNORMAL
BACTERIA TISS BX CULT: NORMAL
BILIRUB SERPL-MCNC: 0.2 MG/DL
BUN SERPL-MCNC: 22.7 MG/DL (ref 8–23)
CALCIUM SERPL-MCNC: 8 MG/DL (ref 8.8–10.2)
CHLORIDE SERPL-SCNC: 104 MMOL/L (ref 98–107)
CREAT SERPL-MCNC: 0.88 MG/DL (ref 0.51–0.95)
CRP SERPL-MCNC: 12.89 MG/L
DEPRECATED HCO3 PLAS-SCNC: 30 MMOL/L (ref 22–29)
EGFRCR SERPLBLD CKD-EPI 2021: 75 ML/MIN/1.73M2
ERYTHROCYTE [DISTWIDTH] IN BLOOD BY AUTOMATED COUNT: 15.7 % (ref 10–15)
GLUCOSE BLDC GLUCOMTR-MCNC: 104 MG/DL (ref 70–99)
GLUCOSE BLDC GLUCOMTR-MCNC: 130 MG/DL (ref 70–99)
GLUCOSE BLDC GLUCOMTR-MCNC: 183 MG/DL (ref 70–99)
GLUCOSE BLDC GLUCOMTR-MCNC: 71 MG/DL (ref 70–99)
GLUCOSE BLDC GLUCOMTR-MCNC: 81 MG/DL (ref 70–99)
GLUCOSE SERPL-MCNC: 129 MG/DL (ref 70–99)
HCT VFR BLD AUTO: 25.7 % (ref 35–47)
HGB BLD-MCNC: 7.9 G/DL (ref 11.7–15.7)
HGB BLD-MCNC: 8.3 G/DL (ref 11.7–15.7)
HOLD SPECIMEN: NORMAL
MCH RBC QN AUTO: 25.4 PG (ref 26.5–33)
MCHC RBC AUTO-ENTMCNC: 30.7 G/DL (ref 31.5–36.5)
MCV RBC AUTO: 83 FL (ref 78–100)
PLATELET # BLD AUTO: 385 10E3/UL (ref 150–450)
POTASSIUM SERPL-SCNC: 4.7 MMOL/L (ref 3.4–5.3)
PROT SERPL-MCNC: 5.8 G/DL (ref 6.4–8.3)
RBC # BLD AUTO: 3.11 10E6/UL (ref 3.8–5.2)
SODIUM SERPL-SCNC: 139 MMOL/L (ref 135–145)
WBC # BLD AUTO: 13.5 10E3/UL (ref 4–11)

## 2024-06-27 PROCEDURE — 250N000011 HC RX IP 250 OP 636: Performed by: STUDENT IN AN ORGANIZED HEALTH CARE EDUCATION/TRAINING PROGRAM

## 2024-06-27 PROCEDURE — 80053 COMPREHEN METABOLIC PANEL: CPT

## 2024-06-27 PROCEDURE — 250N000013 HC RX MED GY IP 250 OP 250 PS 637

## 2024-06-27 PROCEDURE — 36592 COLLECT BLOOD FROM PICC: CPT

## 2024-06-27 PROCEDURE — 258N000003 HC RX IP 258 OP 636: Performed by: FAMILY MEDICINE

## 2024-06-27 PROCEDURE — 250N000011 HC RX IP 250 OP 636: Performed by: FAMILY MEDICINE

## 2024-06-27 PROCEDURE — 99232 SBSQ HOSP IP/OBS MODERATE 35: CPT | Performed by: STUDENT IN AN ORGANIZED HEALTH CARE EDUCATION/TRAINING PROGRAM

## 2024-06-27 PROCEDURE — 85018 HEMOGLOBIN: CPT

## 2024-06-27 PROCEDURE — 85027 COMPLETE CBC AUTOMATED: CPT

## 2024-06-27 PROCEDURE — 250N000013 HC RX MED GY IP 250 OP 250 PS 637: Performed by: STUDENT IN AN ORGANIZED HEALTH CARE EDUCATION/TRAINING PROGRAM

## 2024-06-27 PROCEDURE — 86140 C-REACTIVE PROTEIN: CPT

## 2024-06-27 PROCEDURE — 120N000002 HC R&B MED SURG/OB UMMC

## 2024-06-27 PROCEDURE — 97530 THERAPEUTIC ACTIVITIES: CPT | Mod: GO

## 2024-06-27 RX ORDER — CLONIDINE HYDROCHLORIDE 0.3 MG/1
0.3 TABLET ORAL 2 TIMES DAILY
Qty: 30 TABLET | Refills: 0 | Status: SHIPPED | OUTPATIENT
Start: 2024-06-27

## 2024-06-27 RX ORDER — CYCLOBENZAPRINE HCL 10 MG
10 TABLET ORAL 3 TIMES DAILY
Qty: 30 TABLET | Refills: 0 | Status: SHIPPED | OUTPATIENT
Start: 2024-06-27

## 2024-06-27 RX ORDER — INSULIN DEGLUDEC 100 U/ML
25 INJECTION, SOLUTION SUBCUTANEOUS EVERY MORNING
Qty: 15 ML | Refills: 0 | Status: SHIPPED | OUTPATIENT
Start: 2024-06-28

## 2024-06-27 RX ORDER — OXYCODONE HYDROCHLORIDE 5 MG/1
5 TABLET ORAL EVERY 4 HOURS PRN
Qty: 12 TABLET | Refills: 0 | Status: SHIPPED | OUTPATIENT
Start: 2024-06-27

## 2024-06-27 RX ORDER — MULTIPLE VITAMINS W/ MINERALS TAB 9MG-400MCG
1 TAB ORAL DAILY
Qty: 90 TABLET | Refills: 0 | Status: SHIPPED | OUTPATIENT
Start: 2024-06-28

## 2024-06-27 RX ORDER — SENNOSIDES 8.6 MG
1 TABLET ORAL DAILY PRN
Qty: 60 TABLET | Refills: 0 | Status: SHIPPED | OUTPATIENT
Start: 2024-06-27

## 2024-06-27 RX ORDER — FOLIC ACID 1 MG/1
1 TABLET ORAL DAILY
Qty: 30 TABLET | Refills: 0 | Status: SHIPPED | OUTPATIENT
Start: 2024-06-28

## 2024-06-27 RX ORDER — LIDOCAINE 4 G/G
2 PATCH TOPICAL EVERY 24 HOURS
Qty: 30 PATCH | Refills: 0 | Status: SHIPPED | OUTPATIENT
Start: 2024-06-27

## 2024-06-27 RX ORDER — NALOXONE HYDROCHLORIDE 0.4 MG/ML
0.2 INJECTION, SOLUTION INTRAMUSCULAR; INTRAVENOUS; SUBCUTANEOUS PRN
Qty: 4 ML | Refills: 0 | Status: CANCELLED | OUTPATIENT
Start: 2024-06-27

## 2024-06-27 RX ORDER — ACETAMINOPHEN 325 MG/1
975 TABLET ORAL 3 TIMES DAILY
Qty: 270 TABLET | Refills: 0 | Status: SHIPPED | OUTPATIENT
Start: 2024-06-27 | End: 2024-07-27

## 2024-06-27 RX ORDER — CALCIUM CARBONATE 500 MG/1
1 TABLET, CHEWABLE ORAL 4 TIMES DAILY PRN
Qty: 30 TABLET | Refills: 0 | Status: SHIPPED | OUTPATIENT
Start: 2024-06-27

## 2024-06-27 RX ORDER — CEFEPIME HYDROCHLORIDE 2 G/1
2 INJECTION, POWDER, FOR SOLUTION INTRAVENOUS EVERY 12 HOURS
Qty: 375 ML | Refills: 0 | Status: ACTIVE | OUTPATIENT
Start: 2024-06-27 | End: 2024-07-12

## 2024-06-27 RX ADMIN — CLONIDINE HYDROCHLORIDE 0.3 MG: 0.3 TABLET ORAL at 09:03

## 2024-06-27 RX ADMIN — HEPARIN, PORCINE (PF) 10 UNIT/ML INTRAVENOUS SYRINGE 5 ML: at 15:10

## 2024-06-27 RX ADMIN — GABAPENTIN 400 MG: 300 CAPSULE ORAL at 20:16

## 2024-06-27 RX ADMIN — VANCOMYCIN HYDROCHLORIDE 750 MG: 10 INJECTION, POWDER, LYOPHILIZED, FOR SOLUTION INTRAVENOUS at 04:41

## 2024-06-27 RX ADMIN — ACETAMINOPHEN 975 MG: 325 TABLET, FILM COATED ORAL at 20:15

## 2024-06-27 RX ADMIN — AMLODIPINE BESYLATE 5 MG: 5 TABLET ORAL at 09:03

## 2024-06-27 RX ADMIN — VANCOMYCIN HYDROCHLORIDE 750 MG: 10 INJECTION, POWDER, LYOPHILIZED, FOR SOLUTION INTRAVENOUS at 13:40

## 2024-06-27 RX ADMIN — GABAPENTIN 400 MG: 300 CAPSULE ORAL at 09:04

## 2024-06-27 RX ADMIN — OXYCODONE HYDROCHLORIDE 10 MG: 5 TABLET ORAL at 04:41

## 2024-06-27 RX ADMIN — Medication 1 TABLET: at 09:04

## 2024-06-27 RX ADMIN — CYCLOBENZAPRINE 10 MG: 10 TABLET, FILM COATED ORAL at 09:04

## 2024-06-27 RX ADMIN — CITALOPRAM HYDROBROMIDE 20 MG: 20 TABLET ORAL at 22:49

## 2024-06-27 RX ADMIN — ATORVASTATIN CALCIUM 10 MG: 10 TABLET, FILM COATED ORAL at 09:04

## 2024-06-27 RX ADMIN — OXYCODONE HYDROCHLORIDE 5 MG: 5 TABLET ORAL at 00:47

## 2024-06-27 RX ADMIN — ACETAMINOPHEN 975 MG: 325 TABLET, FILM COATED ORAL at 09:03

## 2024-06-27 RX ADMIN — GABAPENTIN 400 MG: 300 CAPSULE ORAL at 13:40

## 2024-06-27 RX ADMIN — LUMATEPERONE 84 MG: 42 CAPSULE ORAL at 22:49

## 2024-06-27 RX ADMIN — HEPARIN, PORCINE (PF) 10 UNIT/ML INTRAVENOUS SYRINGE 5 ML: at 11:37

## 2024-06-27 RX ADMIN — OXYCODONE HYDROCHLORIDE 10 MG: 5 TABLET ORAL at 09:04

## 2024-06-27 RX ADMIN — FOLIC ACID 1 MG: 1 TABLET ORAL at 09:03

## 2024-06-27 RX ADMIN — CEFEPIME 2 G: 2 INJECTION, POWDER, FOR SOLUTION INTRAVENOUS at 20:16

## 2024-06-27 RX ADMIN — ACETAMINOPHEN 975 MG: 325 TABLET, FILM COATED ORAL at 13:40

## 2024-06-27 RX ADMIN — CYCLOBENZAPRINE 10 MG: 10 TABLET, FILM COATED ORAL at 20:16

## 2024-06-27 RX ADMIN — CEFEPIME 2 G: 2 INJECTION, POWDER, FOR SOLUTION INTRAVENOUS at 09:05

## 2024-06-27 RX ADMIN — OXYCODONE HYDROCHLORIDE 10 MG: 5 TABLET ORAL at 15:11

## 2024-06-27 RX ADMIN — OXYCODONE HYDROCHLORIDE 10 MG: 5 TABLET ORAL at 20:15

## 2024-06-27 RX ADMIN — CLONIDINE HYDROCHLORIDE 0.3 MG: 0.3 TABLET ORAL at 20:16

## 2024-06-27 RX ADMIN — CYCLOBENZAPRINE 10 MG: 10 TABLET, FILM COATED ORAL at 13:40

## 2024-06-27 ASSESSMENT — ACTIVITIES OF DAILY LIVING (ADL)
ADLS_ACUITY_SCORE: 37
ADLS_ACUITY_SCORE: 35
ADLS_ACUITY_SCORE: 37
ADLS_ACUITY_SCORE: 35
ADLS_ACUITY_SCORE: 37
ADLS_ACUITY_SCORE: 35
ADLS_ACUITY_SCORE: 35
ADLS_ACUITY_SCORE: 37
ADLS_ACUITY_SCORE: 35
ADLS_ACUITY_SCORE: 37
ADLS_ACUITY_SCORE: 35

## 2024-06-27 NOTE — PROGRESS NOTES
Brief Progress Note    2215  Hgb recheck returned at 6.9. Consented patient for blood transfusion. Patient reported that she had her dressing changed by ortho earlier today and continued bleeding was noted, though she feels like the bleeding has slowed now. She is feeling tired and having more pain after her dressing change that has lasted all evening. Received pain meds including 10 mg oxycodone at 2145, but says this has not helped. She says she has noticed increased pain after discontinuation of Toradol.   - Order Type and screen, 1U pRBCs  - Give additional one time dose 5 mg oxycodone now. Use heat packs for muscle aches.  - If needs further dressing changes, would consider pre-medication prior or try to time her PRN oxycodone for 30 min prior to dressing change.    Darnell Severino MD

## 2024-06-27 NOTE — PROGRESS NOTES
[facility]    Patient to discharge home with IV Vancomycin 750mg Q12H via HP and IV Cefepime 2GM Q12HR IVP. [facility] providing CL supplies and IV meds.     Met with patient at bedside. Pt appeared ready and able to learn/perform tasks for IV therapy. She reported she has given IV ABX via a HP and the IVP method before. She stated she is confident in administering these herself.  Using visual tool, reviewed with patient the SAS method, connecting/disconnecting HP and to administer Cefepime IVP over 10 minutes. Reviewed that she should look at med label and pay attention to discard after date. Educated need to pull old stock forward when she gets a new delivery. Patient declined practicing how to hook up/disconnect flushes on practice extension as she has done this previously. Informed her that supplies will be delivered to the hospital. Reviewed that medication needs to be refrigerated and will be delivered to the hospital on ice so she can get it home safely. Answered all questions. [facility] brochure, extension set and mesh sleeve left at bedside. Patient verbalized understanding.     Patient ready for discharge from a [facility] standpoint.    Cassie Chu RN, BSN   [facility]  [email or web address]  Cell: LUZ MARIA 8-5* [phone]  Office: 24/7* [phone]

## 2024-06-27 NOTE — PLAN OF CARE
Goal Outcome Evaluation:      Plan of Care Reviewed With: patient    Overall Patient Progress: no changeOverall Patient Progress: no change    VS: BP (!) 149/69 (BP Location: Left leg)   Pulse 58   Temp 98.9  F (37.2  C) (Axillary)   Resp 18   Wt 101.7 kg (224 lb 4.8 oz)   SpO2 93%   BMI 33.12 kg/m       O2: SpO2 > 90% and stable on RA. LS clear and equal bilaterally. Denies chest pain and SOB.    Output: Voids spontaneously without difficulty to bathroom.   Last BM: LBM 6/26. denies abdominal discomfort.   Activity: Up with A X1 with gait belt. Pt is very unsteady needs WAR all the time.    Skin: WDL except, L elbow incision.    Pain: Pain managed with prn oxycodone and scheduled tylenol. Pt got additional 5 mg oxycodone for breakthrough pain but pt seem lethargic. Will administer as pt is more awake.      CMS: Intact, AOx4. Denies numbness and tingling.   Dressing: R elbow surgical incision changed per NP this evening. No drainage noted this shift.    Diet: Regular diet. Denies nausea/vomiting.    LDA: PICC infusing abx on R upper arm.    Equipment: IV pole, personal belongings,    Plan: special precautions maintained / Continue with plan of cares. Call light within reach, pt able to make needs known.    Additional Info: Pt's Hgb 6.9. notified adriana's. Order obtained for blood transfusion. Will continue to monitor.

## 2024-06-27 NOTE — PHARMACY
[facility]  Parenteral ANtibiotic Review at Departure from Acute Care Collaborative Note     Patient: Mitali Robles  MRN: [id number]  Allergies: Morphine, Succinylcholine, Fentanyl, Hydromorphone, Methadone, Prednisone, Pregabalin, Quetiapine, Trazodone, and Sumatriptan    Current Location: [facility]  OPAT to be provided by: [facility]       Line Type: PICC    Diagnosis/Indications: Chronic L elbow PJI with extensive osteomyelitis/bone destruction s/p removal of antibiotic spacer, bone/tissue excision, and removal of all remaining hardware on 6/20/2024  Organism(s): Enterobacter cloacae complex, MRSE  MRDO? Yes - other  Pending Cultures/Microbiological Tests: yes 6/20/2024 L humerus intraoperative culture finalization    Inpatient ID involved in developing OPAT plan: Yes - discharge OPAT plan has no changes from ID provider, Dr. Stone Hubbard, OPAT plan charted on 6/25/2024    Outpatient ID Follow-up: Referred to outside ID provider for follow-up  Designated Provider: Kimberly Newman and Suresh Dominguez at [facility] will follow up as ID provider    Antimicrobial Regimen / Route Anticipated  Duration Start Date Stop /  Reassess Date   Cefepime 2 g every 12 hours/IV ~2-6 weeks per ID provider 6/20/2024 Tentative 7/12/2024 (if [place] KEVIN negro transition to an oral antibiotic regimen); if not, then course will need to be extended   Vancomycin  mg every 12 hours/IV ~2-6 weeks per ID provider  6/20/2024 Tentative 7/12/2024 (if [place] KEVIN negro transition to an oral antibiotic regimen); if not, then course will need to be extended     Laboratory Tests and Monitoring Frequency: CBC with Diff, CMP, CRP Once Weekly    Therapeutic Drug Monitoring: Vancomycin   - Drug: Vancomycin   - Goal(s): -600   - Level Type & Frequency: Please obtain serum vancomycin level (ideally trough) at least once weekly; may increase serum level monitoring  frequency (e.g., twice weekly) with regimen changes, labile renal function, and/or addition of nephrotoxic medications   - Level(s) last checked date: 6/25/2024    Imaging/Miscellaneous Monitoring: None    ID Pharmacist Interventions: None                          Rita Murrieta, PharmD, BCIDP  Pager: [phone]

## 2024-06-27 NOTE — PLAN OF CARE
2300 to 0700  Patient is lethargic.  Patient is also bradycardic.  Blood transfusion started around 0000 and ended around 0400. No adverse reactions. Provider paged concerning end time and Hemoglobin redraw.   Prn Oxycodone given for pain.   Vancomycin rescheduled due to blood transfusion.  Left arm elevated on pillow.   BP taken on left leg.  Call light within reach.  Special precautions maintained.
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
## 2024-06-27 NOTE — PLAN OF CARE
Occupational Therapy Discharge Summary    Reason for therapy discharge:    Discharged to home with home therapy.    Progress towards therapy goal(s). See goals on Care Plan in [facility] electronic health record for goal details.  Most goals met, all goals addressed    Therapy recommendation(s):    Pt declining TCU and very much desiring to go home with resumption of previous home cares. Pt reports support from sister as well, and her fiance will be coming to stay with her as of July 2nd. Pt would benefit from ongoing home PT/OT/RN/HHA. Suggested pt continue to use wheelchair for mobility around home as much as possible to reduce risk of falls. Pt would benefit from having assist/supervision for stairs to get in/out of apartment.

## 2024-06-27 NOTE — PLAN OF CARE
Goal Outcome Evaluation:      Plan of Care Reviewed With: patient    Overall Patient Progress: improving    Outcome Evaluation: Pt alert and more energized today, HGB 7.9 in AM and recheck at noon 8.3. Pt still having generalized pain and increased pain in LUE partially controlled with 10 mg oxycodone and scheduled analgesia. Pt hopeful to discharge home this afternoon, however could not get authorization on medical ride home because of lack of primary physician notifying insurance of need for this encounter. [facility] delivered antibiotics to hospital and they are being stored in pt room in fridge from facilities. Pt able to make needs known and call light within reach.      VS: /58 (BP Location: Left leg)   Pulse 57   Temp 98.6  F (37  C) (Axillary)   Resp 16   Wt 101.7 kg (224 lb 4.8 oz)   SpO2 96%   BMI 33.12 kg/m      O2: >92% on RA   Output: Voiding adequate amounts in BR   Last BM: 6/27   Activity: Up with 1x assist with gait belt   Up for meals? Yes   Skin: Surgical incision to LUE   Pain: Chronic back pain and increased pain in LUE partially controlled with 10 mg oxycodone and scheduled tylenol, flexeril and gabapentin   CMS: Baseline neuropathy in BLE and R pinky finger   Dressing: Surgical dressing to LUE is CDI and splint integrity verified by Ortho resource.   Diet: Regular   LDA: PICC to RUE is HL   Equipment: IV pole, GB, and pt belongings in room   Plan: Pt discharging home tomorrow once ride can be arranged with insurance approval   Additional Info:

## 2024-06-27 NOTE — PROGRESS NOTES
No noticeable drainage from her left elbow dressing today. Left elbow dressing with ace wrap is intact. Patient was able to align her left arm using the right hand. Wiggles left fingers without difficulty. Expressed being comfortable with the splint except for ongoing pain.     Sloan Stanford DNP-CNP   Orthopaedic Surgery

## 2024-06-27 NOTE — PLAN OF CARE
Goal Outcome Evaluation:      Plan of Care Reviewed With: patient    Overall Patient Progress: improving    Outcome Evaluation: Pt lethargic and pale in AM, HGB 7.0 but denied feeling symptomatic. Medicine updated, orders obtained, iron started, pt denied symptoms of reaction. Pain controlled with PRN toradol and 10 mg oxycodone along with scheduled analgesia. Walk test completed, pt maintained o2 sats in 90's while ambulating in room to bathroom and using toilet. Medicine team updated. Pt had drainage from surgical dressing, ortho notified and dressing changed per ortho APRN.

## 2024-06-27 NOTE — PROGRESS NOTES
Care Management Follow Up    Length of Stay (days): 9    Expected Discharge Date: 06/27/2024     Concerns to be Addressed: discharge planning, home safety     Patient plan of care discussed at interdisciplinary rounds: Yes    Anticipated Discharge Disposition: Home with home care and home infusion.    [facility]  Phone # [phone]  Fax # [phone]     [facility]  Home PT/OT/RN/HHA  Phone: [phone]  Fax:[phone]     Anticipated Discharge Services:   [facility], please call Louis at [phone]   Anticipated Discharge DME: None    Patient/family educated on Medicare website which has current facility and service quality ratings: no  Education Provided on the Discharge Plan: Yes  Patient/Family in Agreement with the Plan: yes    Private pay costs discussed: Not applicable    Additional Information:  Spoke with patient via phone due to positive covid on 6/18/2024. Plan for patient to discharge to home this evening once transportation can be approved through [place] state:  [email or web address]  Ph: [phone] option 3  Currently patient's transportation benefits are being denied due to patient not receiving prior authorization for medical treatment out of the state of [place]. RNCC working on getting acceptance for transportation due to the emergent transfer from a [place] ED to the [facility] ED.    Patient will discharge once home IV antibiotics are delivered to the patient in the hospital. IV antibiotic teaching to be completed by JULISSA RN Liaison. Signed orders faxed to [facility].     Patient states she has adequate help at home with her sister and fiance. Louis with [facility] is able to assist patient into her home, which has stairs. No further discharge concerns at this time. RNCC available as needed.    Patient's clinic , [place] [phone].      TETO MILLARD will fax discharge summary to Lela  [phone] and Angela on 5/24/24.     Discharge Transportation: [facility], please call [facility] at [phone].     Jeni Bean RN, BSN  Care Coordinator,  Ortho  Phone [phone]  Pager [phone]

## 2024-06-27 NOTE — DISCHARGE INSTRUCTIONS
We have referrals in place for your home health care needs including Physical therapy, wound cares, IV infusions for antibiotics and ongoing help for continuing treatment.     Please start taking Tresiba 25 U once daily in the morning come tomorrow morning, and use your current Aspart for your diabetes.     You can discontinue your mirabegon as you have not been taking this medication for awhile. You can take Multivitamins and Folic acid to help make more blood cells.     For follow up plans, you should see your primary care doctor within 7 days of discharge for more medication refills. You can ask for more oxycodone at that time.     Please follow up with the Infectious Disease team in [place] within 2 weeks of discharge to discuss if you need to continue IV antibiotics or just go back to oral medications.      You were also given a referral to follow up with Orthopedic team to check your elbow a 1-2 weeks after discharge.     Reasons to call your primary care doctor or seek prompt medical care:   -Fever of 100.4F on thermometer  -New or worsening elbow pain, swelling, warmth or redness  -Chest pain, shortness of breath, or feeling like you will pass out  -Abdominal pain with nausea and vomiting where you cannot hold down food  -Please contact your primary care provider if you are concerned about symptoms but unsure    Dr. Sarmiento's clinic will reach out to you to schedule early next week with a plan for follow up.

## 2024-06-28 VITALS
RESPIRATION RATE: 16 BRPM | BODY MASS INDEX: 33.12 KG/M2 | HEART RATE: 67 BPM | OXYGEN SATURATION: 96 % | DIASTOLIC BLOOD PRESSURE: 58 MMHG | SYSTOLIC BLOOD PRESSURE: 145 MMHG | TEMPERATURE: 97.7 F | WEIGHT: 224.3 LBS

## 2024-06-28 LAB
ALBUMIN SERPL BCG-MCNC: 2.9 G/DL (ref 3.5–5.2)
ALP SERPL-CCNC: 129 U/L (ref 40–150)
ALT SERPL W P-5'-P-CCNC: 14 U/L (ref 0–50)
ANION GAP SERPL CALCULATED.3IONS-SCNC: 6 MMOL/L (ref 7–15)
AST SERPL W P-5'-P-CCNC: 14 U/L (ref 0–45)
BILIRUB SERPL-MCNC: 0.2 MG/DL
BUN SERPL-MCNC: 19.5 MG/DL (ref 8–23)
CALCIUM SERPL-MCNC: 8.1 MG/DL (ref 8.8–10.2)
CHLORIDE SERPL-SCNC: 104 MMOL/L (ref 98–107)
CREAT SERPL-MCNC: 0.86 MG/DL (ref 0.51–0.95)
DEPRECATED HCO3 PLAS-SCNC: 30 MMOL/L (ref 22–29)
EGFRCR SERPLBLD CKD-EPI 2021: 77 ML/MIN/1.73M2
ERYTHROCYTE [DISTWIDTH] IN BLOOD BY AUTOMATED COUNT: 16.5 % (ref 10–15)
GLUCOSE BLDC GLUCOMTR-MCNC: 103 MG/DL (ref 70–99)
GLUCOSE BLDC GLUCOMTR-MCNC: 139 MG/DL (ref 70–99)
GLUCOSE SERPL-MCNC: 123 MG/DL (ref 70–99)
HCT VFR BLD AUTO: 26.8 % (ref 35–47)
HGB BLD-MCNC: 8.1 G/DL (ref 11.7–15.7)
MCH RBC QN AUTO: 25.1 PG (ref 26.5–33)
MCHC RBC AUTO-ENTMCNC: 30.2 G/DL (ref 31.5–36.5)
MCV RBC AUTO: 83 FL (ref 78–100)
PLATELET # BLD AUTO: 409 10E3/UL (ref 150–450)
POTASSIUM SERPL-SCNC: 4 MMOL/L (ref 3.4–5.3)
PROT SERPL-MCNC: 5.6 G/DL (ref 6.4–8.3)
RBC # BLD AUTO: 3.23 10E6/UL (ref 3.8–5.2)
SODIUM SERPL-SCNC: 140 MMOL/L (ref 135–145)
VANCOMYCIN SERPL-MCNC: 20.6 UG/ML
WBC # BLD AUTO: 14.6 10E3/UL (ref 4–11)

## 2024-06-28 PROCEDURE — 250N000013 HC RX MED GY IP 250 OP 250 PS 637

## 2024-06-28 PROCEDURE — 250N000011 HC RX IP 250 OP 636: Performed by: FAMILY MEDICINE

## 2024-06-28 PROCEDURE — 80202 ASSAY OF VANCOMYCIN: CPT | Performed by: STUDENT IN AN ORGANIZED HEALTH CARE EDUCATION/TRAINING PROGRAM

## 2024-06-28 PROCEDURE — 85027 COMPLETE CBC AUTOMATED: CPT

## 2024-06-28 PROCEDURE — 258N000003 HC RX IP 258 OP 636: Performed by: FAMILY MEDICINE

## 2024-06-28 PROCEDURE — 80053 COMPREHEN METABOLIC PANEL: CPT

## 2024-06-28 PROCEDURE — 999N000007 HC SITE CHECK

## 2024-06-28 PROCEDURE — 36592 COLLECT BLOOD FROM PICC: CPT | Performed by: STUDENT IN AN ORGANIZED HEALTH CARE EDUCATION/TRAINING PROGRAM

## 2024-06-28 PROCEDURE — 99239 HOSP IP/OBS DSCHRG MGMT >30: CPT | Mod: GC

## 2024-06-28 PROCEDURE — 250N000011 HC RX IP 250 OP 636: Performed by: STUDENT IN AN ORGANIZED HEALTH CARE EDUCATION/TRAINING PROGRAM

## 2024-06-28 PROCEDURE — 250N000013 HC RX MED GY IP 250 OP 250 PS 637: Performed by: STUDENT IN AN ORGANIZED HEALTH CARE EDUCATION/TRAINING PROGRAM

## 2024-06-28 RX ADMIN — GABAPENTIN 400 MG: 300 CAPSULE ORAL at 09:16

## 2024-06-28 RX ADMIN — CEFEPIME 2 G: 2 INJECTION, POWDER, FOR SOLUTION INTRAVENOUS at 09:18

## 2024-06-28 RX ADMIN — VANCOMYCIN HYDROCHLORIDE 750 MG: 10 INJECTION, POWDER, LYOPHILIZED, FOR SOLUTION INTRAVENOUS at 02:51

## 2024-06-28 RX ADMIN — CLONIDINE HYDROCHLORIDE 0.3 MG: 0.3 TABLET ORAL at 09:16

## 2024-06-28 RX ADMIN — AMLODIPINE BESYLATE 5 MG: 5 TABLET ORAL at 09:16

## 2024-06-28 RX ADMIN — CYCLOBENZAPRINE 10 MG: 10 TABLET, FILM COATED ORAL at 09:16

## 2024-06-28 RX ADMIN — LIDOCAINE 2 PATCH: 4 PATCH TOPICAL at 09:17

## 2024-06-28 RX ADMIN — Medication 1 TABLET: at 09:16

## 2024-06-28 RX ADMIN — FOLIC ACID 1 MG: 1 TABLET ORAL at 09:16

## 2024-06-28 RX ADMIN — ACETAMINOPHEN 975 MG: 325 TABLET, FILM COATED ORAL at 09:17

## 2024-06-28 RX ADMIN — OXYCODONE HYDROCHLORIDE 10 MG: 5 TABLET ORAL at 09:55

## 2024-06-28 ASSESSMENT — ACTIVITIES OF DAILY LIVING (ADL)
ADLS_ACUITY_SCORE: 37

## 2024-06-28 NOTE — PLAN OF CARE
Goal Outcome Evaluation:    Pt  discharged at  this time with her medications and antibiotics put on ice as requested by her  at home, PICC  line intact and flushed, discharge instructions was done and paper work given to patient

## 2024-06-28 NOTE — PLAN OF CARE
Goal Outcome Evaluation:    VS: /47 (BP Location: Left leg)   Pulse 59   Temp 97.7  F (36.5  C) (Axillary)   Resp 16   Wt 101.7 kg (224 lb 4.8 oz)   SpO2 95%   BMI 33.12 kg/m       O2: SpO2 > 90% and stable on RA. LS clear and equal bilaterally. Denies chest pain and SOB.    Output: Voids spontaneously without difficulty to bathroom.   Last BM: LBM 6/27. denies abdominal discomfort.   Activity: Up with A X1 with gait belt. Pt is very unsteady needs WAR all the time.    Skin: WDL except, L elbow incision.    Pain: Pain managed with prn oxycodone and scheduled tylenol.    CMS: Intact, AOx4. Baseline numbness on BLE's and LUE.    Dressing: R elbow surgical incision CDI.    Diet: Regular diet. Denies nausea/vomiting.    LDA: PICC infusing abx on R upper arm.    Equipment: IV pole, personal belongings,    Plan: Discharge home tomorrow. All ABX in a patient room for discharge. Special precautions maintained / Continue with plan of cares. Call light within reach, pt able to make needs known.    Additional Info: HGB 8.3.

## 2024-06-28 NOTE — PROGRESS NOTES
End of shift Summary: See flowsheet for VS and detail assessments.     Changes this Shift:      Pulmonary: Pt denies SOB & chest pain. Pt is on RA.     Diet: Regular diet, medication whole with thin liquids.    Output: Pt is voiding adequately up to bathroom, LBM 06/28.     Activity: Pt is assist of 1 with gait belt.     Skin: LUE surgical incision.     Pain: Pt denies pain.     Neuro/CMS: Pt is alert and oriented x 4. Denies numbness and tingling.     Dressings/Drains: CDI, RUE soft cast.     IV: R PICC infusing TKO.     Additional info: Special precautions maintained. No significant changes on shift.     Plan:  Plan of care ongoing.

## 2024-06-28 NOTE — PHARMACY-VANCOMYCIN DOSING SERVICE
Pharmacy Vancomycin Note  Date of Service 2024  Patient's  1965   59 year old, female    Indication: Bone and Joint Infection  Day of Therapy: started 2024  Current vancomycin regimen:  750 mg IV q12h  Current vancomycin monitoring method: AUC  Current vancomycin therapeutic monitoring goal: 400-600 mg*h/L    Insight[employer] Prediction of Current Vancomycin Regimen  Loading dose: N/A  Regimen: 750 mg IV every 12 hours.  Start time: 14:51 on 2024  Exposure target: AUC24 (range)400-600 mg/L.hr   AUC24,ss: 474 mg/L.hr  Probability of AUC24 > 400: 96 %  Ctrough,ss: 15.9 mg/L  Probability of Ctrough,ss > 20: 5 %  Probability of nephrotoxicity (Lodise CHANDAN ): 11 %      Current estimated CrCl = Estimated Creatinine Clearance: 88.3 mL/min (based on SCr of 0.86 mg/dL).    Creatinine for last 3 days  2024:  7:41 AM Creatinine 0.89 mg/dL  2024:  7:18 AM Creatinine 0.88 mg/dL  2024:  7:35 AM Creatinine 0.86 mg/dL    Recent Vancomycin Levels (past 3 days)  2024:  7:35 AM Vancomycin 20.6 ug/mL    Vancomycin IV Administrations (past 72 hours)                     vancomycin (VANCOCIN) 750 mg in sodium chloride 0.9 % 250 mL intermittent infusion (mg) 750 mg New Bag 24 0251     750 mg New Bag 24 1340     750 mg New Bag  0441     750 mg New Bag 24 1240     750 mg New Bag  0105                    Nephrotoxins and other renal medications (From now, onward)      Start     Dose/Rate Route Frequency Ordered Stop    24 0000  vancomycin 750 mg         750 mg Intravenous EVERY 12 HOURS 24 1100 24 2359    24 2300  vancomycin (VANCOCIN) 750 mg in sodium chloride 0.9 % 250 mL intermittent infusion         750 mg  over 90 Minutes Intravenous EVERY 12 HOURS 24 1205                 Contrast Orders - past 72 hours (72h ago, onward)      Start     Dose/Rate Route Frequency Stop    24 2030  iopamidol (ISOVUE-370) solution 100 mL         100 mL  Intravenous ONCE              Interpretation of levels and current regimen:  Vancomycin level is reflective of -600    Has serum creatinine changed greater than 50% in last 72 hours: No    Urine output:  unable to determine    Renal Function: Stable      Plan:  Continue Current Dose  Vancomycin monitoring method: AUC  Vancomycin therapeutic monitoring goal: 400-600 mg*h/L  Pharmacy will check vancomycin levels as appropriate in 5-7 Days.  Serum creatinine levels will be ordered a minimum of twice weekly.    VLAD CLAROS RPH

## 2024-06-28 NOTE — PROGRESS NOTES
Care Management Discharge Note    Discharge Date: 06/28/2024       Discharge Disposition: Home with home infusion and home care.    Discharge Services: Transportation Services    Discharge DME: None    Discharge Transportation: health plan transportation    Private pay costs discussed: transportation costs    Does the patient's insurance plan have a 3 day qualifying hospital stay waiver?  No    PAS Confirmation Code:  [id number]  Patient/family educated on Medicare website which has current facility and service quality ratings: no    Education Provided on the Discharge Plan: Yes  Persons Notified of Discharge Plans: patient  Patient/Family in Agreement with the Plan: yes    Handoff Referral Completed: Yes    Additional Information:  Met with patient at bedside after speaking with patient's [place] Medicaid CM, it was determined that they will not pay for patient's transportation back to [place]. Patient was asked 3 days ago to start trying to secure transportation to home. Patient has not worked on getting transportation home. Patient does not have any friends or family that drive. Patient states she cannot afford to pay for transportation. The following information was discussed with this writer's supervisor, Dexter Rahman, for further direction regarding advocating transportation for this patient.    When patient receives treatment outside the state of [place], she must obtain prior authorization from [place] Medicaid for transportation. Patient was at a [facility] and then transferred to the [facility] via ambulance. Continued to speak with several CM's throughout [place] Medicaid. Received the same answer. [place] Medicaid will not pay for patient's transportation due to patient not informing [place] Medicaid she having medical services outside of [place]. This writer reached out to UR, to see if they were able to reach out to [place] Medicaid and advocate for paid transportation. UR stated, since patient did not inform [place] medicaid  of admit, it is too late to get transportation covered because [place] medicaid does not do retro authorizations. Informed patient of the above information and that she will need to secure transportation to home in [place]. Patient was offered a bus ticket to [place]. Patient states she is unable to take a bus because she requires SBA to use the restroom. Patient was offered shelter resources for [place]. Patient was willing to accept shelter resources. Patient wanted to know about ambulance transport back to [place]. I explained that there is no guarantee insurance would pay for a stretcher transport to [place]. Patient has Medicare and Medicaid [place]. Medicare does not generally pay for transportation. Patient was offered a wheelchair transport to [place] because she did not qualify for a stretcher transport. This writer made it very clear to patient that her insurance would more than likely declined to pay for this transportation and she would then receive a bill. Patient understands she may receive a bill for the wheelchair transport to home. Patient was agreeable to take the wheelchair transport with full knowledge she should anticipate a bill for this transport.     Wheelchair transport set up through  [facility], with [place]. Estimated  time is between 1100 and 1200. Patient has 6 stairs to enter home. Patient states her sister will be available to assist with stairs. Patient has a suitcase and her IV antibiotics to take home with her. IV antibiotics supplied by [facility], ph: [phone], fax: [phone]. Patient notified of medications needing to be refrigerated at home.     Pt has a hx of LUE surgery and was recently discharged from TCU 3 weeks ago. Pt has PCA and a cleaning service weekly with plans to continue HHPT after dc. Anticipate that pt will progress to dc home with A from PCA with I/ADLs 3 days/week and HHPT.     Home care services through [facility]. PT/OT/RN/HHA. Ph:  [phone], fax: [phone].    TETO CM-  Discharge summary faxed to Lela [phone] and Angela.    Utilization Review, Ali: [phone]    Patient discharged with all of her belongings via wheelchair twith [facility]. RNCC answered all of patient questions prior to discharge. No further discharge concerns. RNCC available as needed.      Jeni Bean RN, BSN  Care Coordinator,  Ortho  Phone [phone]  Pager [phone]

## 2024-07-09 ENCOUNTER — TELEPHONE (OUTPATIENT)
Dept: ORTHOPEDICS | Facility: CLINIC | Age: 59
End: 2024-07-09
Payer: COMMERCIAL

## 2024-07-09 DIAGNOSIS — M01.X22: Primary | ICD-10-CM

## 2024-07-09 NOTE — TELEPHONE ENCOUNTER
[facility] Call Center    Phone Message    May a detailed message be left on voicemail: yes     Reason for Call:   Patient want Doctor to know that She in Pain with Her Left Arm . She been Drinking Alcohol. She Don't Drink She Stated. She has No Other Options She Stated. Please call Patient. Thanks  Action Taken: Message routed to:  Other:  SURGICAL DOCTORS    Travel Screening: Not Applicable     Date of Service:

## 2024-07-09 NOTE — LETTER
July 9, 2024      RE: Mitali Robles  [address] [facility] [address]         To Whom it May Concern,    This is a letter to explain the necessity of Mitali needing transportation to our [place] clinic.     Mitali Robles is a current patient of the hand surgeon Dr Marcos Sarmiento.  She had to have emergent surgery 6-20-24 for a severe left elbow infection and post operative wound debridement.  This was a second surgery that resulted from complications from a post operative infection.  She is a highly complex case and needs to follow up with Dr Sarmiento here in [place] since none of her other providers in [place] are able to care for her condition at this complex level. Her providers in [place] have referred her to the [facility] and Dr Sarmiento for her Orthopedic elbow surgery and follow up cares.     Her appointment is on 7-16-24 in our [facility] location at 2 pm.   [place] [address] [facility] [facility] [address].  Main# [phone].    Her clinic in [place] will be submitting the transportation requests.       Sincerely,      Hailee Oliveira,  BROOKLYNN, RN

## 2024-07-09 NOTE — TELEPHONE ENCOUNTER
RN was working in patient chart on transportation and noticed this message came in.  10:07 AM  Attempted to call patient back, no answer, left voice mail with RN phone number. Hailee Oliveira RN

## 2024-07-09 NOTE — TELEPHONE ENCOUNTER
10:13 AM   Pt missed call from Hailee, please call pt back.  Pt of Dr. Sarmiento.   Intake documented this in the incorrect encounter.    Date of surgery was on: 6-20-24 with Dr Sarmiento  1) Full thickness pressure wound left posterior elbow  2) Deep periprosthetic left elbow joint infection     Dr Sarmiento performed  1) Excisional debridement of left posterior elbow wound, skin and subcutaneous tissue, 4x4 cm  2) Resection of elbow joint (arthrectomy)  3) Excisional debridement of left elbow joint, including subcutaneous tissue, muscle, fascia, and bone  4) Removal of antibiotic spacer left elbow  5) Removal of deep hardware left humerus and ulna     12:03 pm 2nd attempt to reach patient.  She did not answer. Left another voice mail stating she can call RN back directly, no need to go through call center.  Left her the direct number again. Hailee Oliveira RN

## 2024-07-09 NOTE — TELEPHONE ENCOUNTER
"Spoke with Maddison MARX, ([facility] in [facility]) re transportation for Mitali to see Dr Sarmiento on 7-16-24 in [place].      Informed her that there needs to be a request placed on a form ([id number])? and sent to [place] Medicaid so they are able to review and approve. However, RN is unsure of the entire process for transportation request  since we are not the clinic that usually arranges this for her.    In past there was another RN up there that has helped and navigated through the process, her name is Annalise Bertrand RN at [facility] in [place], main line there is [phone].    Provided date, time of appointment on 7-16-24 and address of [facility], and phone number there.     We are happy to provide statement or letter of necessity but unsure where they want this sent?      Provided also the contact name and number for the person named Nicole Headley  Support [employer]ists  Medicaid Travel & Accommodations  [phone] - direct  [facility]  [address]  [facility]  [address]    Will draft a letter for patient but not sure where need to send yet... the previous request was to just \"send it to Medicaid\".  Letter drafted and sent to Medicaid Nicole Headley via email, but still unsure if it was the best place. Hailee Oliveira, ABBIE    "

## 2024-07-10 NOTE — TELEPHONE ENCOUNTER
7-10-24 11:23 AM Message received from the [place] Medicaid transportation office stating they do not have a transportation request yet from any [place] provider.    Left voice mail at office of Maddison MARX to see what the status is for getting transportation secured. Hailee Oliveira RN

## 2024-07-10 NOTE — TELEPHONE ENCOUNTER
7-10-24 1:15 pm  Left 3rd voice mail for patient to return call to RN re: pain symptoms. Hailee Oliveira RN

## 2024-07-10 NOTE — TELEPHONE ENCOUNTER
Attempted to reach Uyen Bertrand RN to see if she is available, and she is out of the office, unsure when she will return.  Left message for any coordinator to call RN back. Hailee Oliveira RN

## 2024-07-11 NOTE — TELEPHONE ENCOUNTER
Spoke with FRANCISCO J Washburn in [place].  [phone]. She was able to get a hold of Mitali's provider yesterday and they were going to send a STAT request for transportation yesterday to [place] Medicaid.  She is going to follow up with Nicole in the Medicaid transportation office today to see if it went through. Hailee Oliveira RN

## 2024-07-11 NOTE — TELEPHONE ENCOUNTER
"Attempted to call patient and she answered,  spoke with patient.   She denies being the person that called here to this clinic, she thinks someone else called here.      Admits her pain level is at 9/10, constant daily pain at this high level since her last surgery.  It affects her whole left arm.     She is taking Tylenol 2000 mg \"each time- every 5 hours\" and using heat.  She is also using a lidocaine patch and Gabapentin 800 mg TID.  She does not have any more Robaxin or Oxycodone.  She admits that she tried to drink alcohol just once, and it helped her a little with the pain but she hasn't done it again.     Propping her arm up feels the best. She is not doing a lot with her arm right now.     Her Vancomycin dose was increased and makes her very nauseated.  She has nausea, hot, then cold, and feels chills.  She has some diarrhea from the antibiotic.    Denies fever. States there's a nurse coming out there daily and she saw a doctor today and her temp has been normal.       Patient states she cannot go to the ER in [place]. They do not want to touch her at all.  She cannot even go there for post op pain issues.  \"The last time I was there the doctor didn't even want to wipe the blood off of my arm\".    Advised her that she should not use that much Tylenol.  The dose should not exceed 3000 mg daily or she will make her liver very sick.  She verbalized understanding but there is nothing else for her to use for pain.     Advised to avoid alcohol.  She will avoid it, she-didn't like it.     Advised that  this is ongoing post op pain issue, will message Dr Sarmiento who should be coming home from his trip soon.     She knows we are working on her ride here to [place] for 7-16-24.      If she cannot get there, she would like a phone call visit.     She uses the [facility] on [address] in [place].   Hailee Oliveira RN         "

## 2024-07-15 ENCOUNTER — TELEPHONE (OUTPATIENT)
Dept: ORTHOPEDICS | Facility: CLINIC | Age: 59
End: 2024-07-15
Payer: COMMERCIAL

## 2024-07-15 NOTE — TELEPHONE ENCOUNTER
Other: Patient is calling because she has still not been able to get approval from Medicaid for a ride to the clinic tomorrow. Her and her  have been in contact with medicaid. She would like to speak with Hailee again. She has questions about sutures and if her infectious disease doctor should remove them? She is wondering if Dr. Sarmiento knows that a nurse has been coming out and checking her blood a few times a week and if he has seen any of those results? She says she is still having a lot of pain. Please call her back.      Could we send this information to you in [facility] or would you prefer to receive a phone call?:   Patient would prefer a phone call   Okay to leave a detailed message?: Yes at Cell number on file:    Telephone Information:   Mobile [phone]

## 2024-07-15 NOTE — TELEPHONE ENCOUNTER
Mitali informed RN that her ride was approved by [place] Medicaid and she can plan on a ride for 7-22-24.  Appointment scheduled.  MD informed.  See other telephone encounter from today also. Hailee Oliveira RN

## 2024-07-15 NOTE — TELEPHONE ENCOUNTER
Pt is scheduled to see Dr. Sarmiento tomorrow for a post op.  She was supposed to go in, but her Medical Transportation is dragging their feet in getting her ride approved.  Mitali needs to be see regarding her sutures and care. Mitali has been working on this with Hailee.  Please give Hailee the number for the transport team.     [phone]  it should prompt you to where you need to go or an get an .      Per Mitali, She'll need to be dupree due to the infection in her body and the meds which are making her sick.      Please call Mitali to update her on this.     Thank you for your effort.

## 2024-07-15 NOTE — TELEPHONE ENCOUNTER
RN called [phone].  They routed call to [phone].  Transport office states that they have not received an approval from the MD in [place] yet.      The person stated there's a lot of back and forth messages.  They state that the Medical Services office are the ones that are supposed to send the approval to them, and send the paperwork to them. They need this in order to schedule anything. They suggest we try talking to Medical Services office at [phone] to see what is reason for the delay.      RN called this number and it was for a Radha Turcios.  Left her a detailed voice mail with RN direct call back number. Hailee Oliveira RN

## 2024-07-15 NOTE — TELEPHONE ENCOUNTER
1:16 pm  Attempted to call patient and no answer. Left voice mail for her to call RN back.  Let her know that we will change visit tomorrow to telephone. Dr Sarmiento also saw message and states if we can move appointment to the next week that would still be OK.  Asked who we should be talking with re: getting new ride set up for next week. Hailee Oliveira, RN

## 2024-07-15 NOTE — TELEPHONE ENCOUNTER
Spoke with patient.  She finally has approval for transportation on 7-22-24.  Gave her date, time and location of appointment with Dr Sarmiento for 12 noon on 7-22-24 at [facility].  Telephone visit cancelled for [place] office for tomorrow.    Patient states they will try to get down here the night before, gave her lodging information for the [facility] 1 block from [facility]. Hailee Oliveira RN

## 2024-07-16 NOTE — TELEPHONE ENCOUNTER
Please see TE from 7/15/24 where this was resolved.     Shameka Juarez, EMMIE, LAT, ATC  Certified Athletic Trainer

## 2024-07-18 ENCOUNTER — TELEPHONE (OUTPATIENT)
Dept: ORTHOPEDICS | Facility: CLINIC | Age: 59
End: 2024-07-18
Payer: COMMERCIAL

## 2024-07-18 DIAGNOSIS — Z98.890 POST-OPERATIVE STATE: ICD-10-CM

## 2024-07-18 DIAGNOSIS — Z96.622 INFECTION ASSOCIATED WITH PROSTHESIS OF LEFT ELBOW JOINT (H): Primary | ICD-10-CM

## 2024-07-18 DIAGNOSIS — T84.59XA INFECTION ASSOCIATED WITH PROSTHESIS OF LEFT ELBOW JOINT (H): Primary | ICD-10-CM

## 2024-07-18 LAB
BACTERIA BONE ANAEROBE+AEROBE CULT: NO GROWTH
BACTERIA TISS BX CULT: NO GROWTH
BACTERIA TISS BX CULT: NO GROWTH

## 2024-07-18 NOTE — TELEPHONE ENCOUNTER
"7-18-24 8:16 AM.  Patient contacted RN directly to report that she had a fall at 3 AM today.  She missed sitting back into her wheelchair by 1 inch and ended up on the floor.  She fell onto her bottom and states she hit her head.  She had been vomiting a lot, and proceeded to vomit after her fall onto herself and the floor.  Her emergency lifeline button was across the room so she had to scoot herself through the mess to get her button.  She did not fall onto her elbow. She was on the floor for an hour, fire department came to help and lifted her under her arms into her chair.  They did a neuro check on her to make sure she was OK before leaving the house.    Patient had to clean up the vomit from herself and her floor.  By the time she got back into bed it was 5 AM, and she did a blood glucose test, states it was at 557. She had informed her DM educator of what was going on with it, and they are having her increase her insulin. She used to be on only 2 units, now she has been taking 35 units.  Educator states she could go up to 45 to 50 units.     She has not been eating due to vomiting, she has been drinking water and that's it.  \"I've been so sick since last Thursday\"  She had a day of severe diarrhea and vomiting last Thursday.      She states she thinks this has been happening from a combo of  her Vancomycin and her severe pain.  The Vanco dose was recently decreased and she took her first decreased dose last night.  She is on Zofran for nausea, it is \"like eating candy\".      She states her pain is so severe, it is contributing to her nausea and vomiting also.  The pain is nothing like she has ever experienced in all of her 22 past surgeries. She has \"85 screws\" in her spine and this pain is nothing compared to what that was like.   She states it is shooting and constant nerve pain and very severe.  She is taking Gabapentin, Valium, Xanax, Clonidine , and needs to take Tylenol 2000 mg every 5 hours. She knows " "this is too much for her liver but he states \"I am a DNR and when my liver goes it goes\".    She states that her bandage on her arm near her bicep is way too tight due to some swelling, and something is now poking on her hand.  Her home health nurse is coming today and she will talk with her about it to make sure her bandage is not cutting off circulation.     She knows she needs to probably be on an antibiotic for life.      She has been speaking with a psych therapist and keeps telling them she doesn't want to be around anymore.  Her pain is so severe, and she feels so sick all the time she doesn't think life is worth it right now.  She states she is \"OK losing her arm\" and if that is what needs to happen she is ready for that.  She hopes Dr Sarmiento has some answers soon.      She doesn't know how she will make the 4 hour trip here in the car.  Her brother is coming with her to help with her hotel staty, they'll be at the [facility] in [place].      Patient will inform Dr Dominguez's office today of the fall and her feeling so ill on the Vancomycin.  (Unsure if they are checking her for C-Diff?).  Also will inform Dr Sarmiento of her severe pain, symptoms and concerns.  Hailee Oliveira RN                  "

## 2024-07-22 ENCOUNTER — OFFICE VISIT (OUTPATIENT)
Dept: ORTHOPEDICS | Facility: CLINIC | Age: 59
End: 2024-07-22
Payer: COMMERCIAL

## 2024-07-22 ENCOUNTER — ANCILLARY PROCEDURE (OUTPATIENT)
Dept: GENERAL RADIOLOGY | Facility: CLINIC | Age: 59
End: 2024-07-22
Attending: STUDENT IN AN ORGANIZED HEALTH CARE EDUCATION/TRAINING PROGRAM
Payer: COMMERCIAL

## 2024-07-22 DIAGNOSIS — T84.50XD INFECTION OF PROSTHETIC JOINT, SUBSEQUENT ENCOUNTER: ICD-10-CM

## 2024-07-22 DIAGNOSIS — T84.50XS INFECTION OF PROSTHETIC JOINT, SEQUELA: ICD-10-CM

## 2024-07-22 PROCEDURE — 99024 POSTOP FOLLOW-UP VISIT: CPT | Performed by: STUDENT IN AN ORGANIZED HEALTH CARE EDUCATION/TRAINING PROGRAM

## 2024-07-22 PROCEDURE — 73080 X-RAY EXAM OF ELBOW: CPT | Mod: LT | Performed by: RADIOLOGY

## 2024-07-22 RX ORDER — OXYCODONE HYDROCHLORIDE 5 MG/1
5 TABLET ORAL EVERY 6 HOURS PRN
Qty: 12 TABLET | Refills: 0 | Status: SHIPPED | OUTPATIENT
Start: 2024-07-22 | End: 2024-07-25

## 2024-07-22 NOTE — LETTER
2024      Mitali Robles  [address]  [address]  [place] [address]      Dear Colleague,    Thank you for referring your patient, Mitali Robles, to the [facility] [place]. Please see a copy of my visit note below.    Orthopaedic Surgery Hand Clinic Progress Note    Patient Name: Mitali Robles  MRN: [id number]  : 1965  Date: 2024    Date of Surgery: 2024    Surgery Performed: Excisional debridement of left posterior elbow wound, resection of bone, removal antibiotic spacer, removal of hardware.    Subjective:  Ms. Mitali Robles is a 59 year old female who returns 1 month status post above.  She is at home in [place].  She has had significant issues with pain of the elbow as well as what sounds like symptoms related to both diabetes and side effects of antibiotics.  She is on IV vancomycin. She was not able to make last week's appointment due to transportation issues which continue to be a struggle.  Diminished sensation of the small finger remains unchanged.    Objective:  There were no vitals taken for this visit.    General: alert, appropriate, NAD  HEENT: NC/AT  CV: RRR by pulse  Pulm: Unlabored on RA  MSK:  Incision is c/d/I  Nylon sutures in place  No erythema, drainage, evidence of infection  No threatened skin or wounds  Elbow grossly unstable, position of comfort in supination at approximately 45 degrees  Intact EPL, FPL, HI  Diminished sensation in small finger, limited active FDP of small finger  Intact median, radial sensation  WWP CR< 2s    Imaging:  Xrs today reviewed by me demonstrates post-surgical changes s/p bone resection and antibiotic spacer removal.     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    Wound appears excellent and is fully healed.  Nylon sutures removed today.  Okay to begin showering, use gentle soap and water.  Pat dry.  No soaking, submerging, prolonged water exposure x 4 weeks.    Patient's elbow pain is secondary to gross instability and bony crepitus.  The patient was fitted with a hinged elbow brace that was locked at 45 degrees.  The patient was very happy with this and stated provided at least 75% improvement in her elbow pain.  She should use this at all times. She was instructed on how to lock and unlock it if needed.  Avoid any direct pressure on the posterior elbow.    Continue IV antibiotics.  Appreciate infectious disease recommendations.  I anticipate that she will need to be on chronic suppression.    I emphasized the necessity of a primary care physician to help coordinate her care and better manage her diabetes.  I advised that many of her phone calls are secondary to medical issues that are best directed to an internal medicine or family medicine provider.  The patient states that she has a visit arranged in August.    All questions answered.  The patient voiced understanding and agreement.  Follow-up with me in 2 months for repeat evaluation.  X-rays of the left elbow needed at that time.    Marcos Sarmiento MD    Hand & Upper Extremity Surgery  Department of Orthopedic Surgery  [facility]        Again, thank you for allowing me to participate in the care of your patient.        Sincerely,        Marcos Sarmiento MD

## 2024-07-22 NOTE — NURSING NOTE
Reason For Visit:   Chief Complaint   Patient presents with    Surgical Followup     Post op   1) Excisional debridement of left posterior elbow wound, skin and subcutaneous tissue  2) Resection of elbow joint (arthrectomy)  3) Excisional debridement of left elbow joint, including subcutaneous tissue, muscle, fascia, and bone  4) Removal of antibiotic spacer left elbow  5) Removal of deep hardware left humerus and ulna  DOS: 6/20/24         Primary MD: Marilee Ref-Primary, Physician  Ref. MD: Mary    Age: 59 year old    ?  No      There were no vitals taken for this visit.      Pain Assessment  Patient Currently in Pain: Yes  0-10 Pain Scale: 9  Primary Pain Location: Arm (Pain from left bicep down to fingers)  Pain Descriptors: Constant, Stabbing, Tingling, Numbness, Shooting, Radiating    Hand Dominance Evaluation  Hand Dominance: Right          QuickDASH Assessment      7/22/2024    11:41 AM   QuickDASH Main   1. Open a tight or new jar Unable   2. Do heavy household chores (e.g., wash walls, floors) Severe difficulty   3. Carry a shopping bag or briefcase Severe difficulty   4. Wash your back Unable   5. Use a knife to cut food Moderate difficulty   6. Recreational activities in which you take some force or impact through your arm, shoulder or hand (e.g., golf, hammering, tennis, etc.) Unable   7. During the past week, to what extent has your arm, shoulder or hand problem interfered with your normal social activities with family, friends, neighbours or groups Extremely   8. During the past week, were you limited in your work or other regular daily activities as a result of your arm, shoulder or hand problem Very limited   9. Arm, shoulder or hand pain Extreme   10.Tingling (pins and needles) in your arm,shoulder or hand Severe   11. During the past week, how much difficulty have you had sleeping because of the pain in your arm, shoulder or hand Severe difficulty   Quickdash Ability Score 84.09           Current Outpatient Medications   Medication Sig Dispense Refill    acetaminophen (TYLENOL) 325 MG tablet Take 3 tablets (975 mg) by mouth 3 times daily for 90 doses 270 tablet 0    acetaminophen (TYLENOL) 325 MG tablet Take 2 tablets (650 mg) by mouth every 4 hours as needed for other (For optimal non-opioid multimodal pain management to improve pain control.) 100 tablet     albuterol (PROAIR HFA/PROVENTIL HFA/VENTOLIN HFA) 108 (90 BASE) MCG/ACT Inhaler Inhale 2 puffs into the lungs as needed      amLODIPine (NORVASC) 5 MG tablet Take 1 tablet (5 mg) by mouth daily 30 tablet 0    atorvastatin (LIPITOR) 10 MG tablet Take 10 mg by mouth daily      bismuth subsalicylate (PEPTO BISMOL) 262 MG/15ML suspension Take 15 mLs by mouth every 8 hours as needed for indigestion or diarrhea 946 mL 0    calcium carbonate (TUMS) 500 MG chewable tablet Take 1 tablet (500 mg) by mouth 4 times daily as needed for heartburn 30 tablet 0    citalopram (CELEXA) 20 MG tablet Take 1 tablet (20 mg) by mouth at bedtime 30 tablet 0    cloNIDine (CATAPRES) 0.3 MG tablet Take 1 tablet (0.3 mg) by mouth 2 times daily 30 tablet 0    cyclobenzaprine (FLEXERIL) 10 MG tablet Take 1 tablet (10 mg) by mouth 3 times daily 30 tablet 0    folic acid (FOLVITE) 1 MG tablet Take 1 tablet (1 mg) by mouth daily 30 tablet 0    gabapentin (NEURONTIN) 400 MG capsule Take 2 capsules (800 mg) by mouth 3 times daily 180 capsule 0    insulin aspart (NOVOLOG FLEXPEN) 100 UNIT/ML pen Inject 4-12 Units Subcutaneous 3 times daily (with meals) + correction      insulin degludec (TRESIBA FLEXTOUCH) 100 UNIT/ML pen Inject 25 Units Subcutaneous every morning 15 mL 0    insulin pen needle (32G X 6 MM) 32G X 6 MM miscellaneous Use 1 pen needle daily or as directed. 100 each 0    Lidocaine (LIDOCARE) 4 % Patch Place 2 patches onto the skin every 24 hours To prevent lidocaine toxicity, patient should be patch free for 12 hrs daily. 30 patch 0    lumateperone (CAPLYTA) 42 MG  "capsule Take 2 capsules (84 mg) by mouth at bedtime 60 capsule 0    methocarbamol (ROBAXIN) 500 MG tablet Take 1 tablet (500 mg) by mouth every 6 hours as needed for muscle spasms 20 tablet 0    miconazole (MICATIN) 2 % external powder Apply topically 2 times daily Apply to groin area after washing area with soap and water and drying bid 71 g 1    multivitamin w/minerals (THERA-VIT-M) tablet Take 1 tablet by mouth daily 90 tablet 0    omeprazole (PRILOSEC) 20 MG DR capsule Take 20 mg by mouth daily      oxyCODONE (ROXICODONE) 5 MG tablet Take 1 tablet (5 mg) by mouth every 4 hours as needed for moderate pain or severe pain 12 tablet 0    polyethylene glycol (MIRALAX) 17 GM/Dose powder Take 17 g by mouth daily      psyllium (METAMUCIL) WAFR Take 2 Wafers by mouth daily 60 Wafer 2    saccharomyces boulardii (FLORASTOR) 250 MG capsule Take 1 capsule (250 mg) by mouth 2 times daily 60 capsule 0    sennosides (SENOKOT) 8.6 MG tablet Take 1 tablet by mouth daily as needed for constipation 60 tablet 0       Allergies   Allergen Reactions    Morphine Anaphylaxis     Throat swells shut  **Has taken hydrocodone/APAP and hydromorphone in the past during previous hospitalizations with no issues.  Takes oxycodone at home    Succinylcholine Shortness Of Breath     \"it affected my breathing\"    Fentanyl Nausea and Vomiting    Hydromorphone      Received 4/10 in OR without issue (was listed as allergy but has had in past without incident)    Methadone Nausea and Vomiting    Prednisone Swelling     \"it overrides my psych meds and makes me crazy\"    Pregabalin     Quetiapine     Trazodone     Sumatriptan Rash       Radha Copeland, ATC    "

## 2024-07-27 NOTE — PROGRESS NOTES
Orthopaedic Surgery Hand Clinic Progress Note    Patient Name: Mitali Robles  MRN: [id number]  : 1965  Date: 2024    Date of Surgery: 2024    Surgery Performed: Excisional debridement of left posterior elbow wound, resection of bone, removal antibiotic spacer, removal of hardware.    Subjective:  Ms. Mitali Robles is a 59 year old female who returns 1 month status post above.  She is at home in [place].  She has had significant issues with pain of the elbow as well as what sounds like symptoms related to both diabetes and side effects of antibiotics.  She is on IV vancomycin. She was not able to make last week's appointment due to transportation issues which continue to be a struggle.  Diminished sensation of the small finger remains unchanged.    Objective:  There were no vitals taken for this visit.    General: alert, appropriate, NAD  HEENT: NC/AT  CV: RRR by pulse  Pulm: Unlabored on RA  MSK:  Incision is c/d/I  Nylon sutures in place  No erythema, drainage, evidence of infection  No threatened skin or wounds  Elbow grossly unstable, position of comfort in supination at approximately 45 degrees  Intact EPL, FPL, HI  Diminished sensation in small finger, limited active FDP of small finger  Intact median, radial sensation  WWP CR< 2s    Imaging:  Xrs today reviewed by me demonstrates post-surgical changes s/p bone resection and antibiotic spacer removal.     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    Wound appears excellent and is fully healed.  Nylon sutures removed today.  Okay to begin showering, use gentle soap and water.  Pat dry.  No soaking, submerging, prolonged water exposure x 4 weeks.    Patient's elbow pain is secondary to  gross instability and bony crepitus.  The patient was fitted with a hinged elbow brace that was locked at 45 degrees.  The patient was very happy with this and stated provided at least 75% improvement in her elbow pain.  She should use this at all times. She was instructed on how to lock and unlock it if needed.  Avoid any direct pressure on the posterior elbow.    Continue IV antibiotics.  Appreciate infectious disease recommendations.  I anticipate that she will need to be on chronic suppression.    I emphasized the necessity of a primary care physician to help coordinate her care and better manage her diabetes.  I advised that many of her phone calls are secondary to medical issues that are best directed to an internal medicine or family medicine provider.  The patient states that she has a visit arranged in August.    All questions answered.  The patient voiced understanding and agreement.  Follow-up with me in 2 months for repeat evaluation.  X-rays of the left elbow needed at that time.    Marcos Sarmiento MD    Hand & Upper Extremity Surgery  Department of Orthopedic Surgery  [facility]

## 2024-09-11 ENCOUNTER — TELEPHONE (OUTPATIENT)
Dept: ORTHOPEDICS | Facility: CLINIC | Age: 59
End: 2024-09-11
Payer: COMMERCIAL

## 2024-09-11 NOTE — TELEPHONE ENCOUNTER
POST OP    Pt has great concern of her LEFT POST OP arm.    For the last 10 days, pt has had edema.    Therapy has cancelled pt's appointments, due to the swelling.    Pt stated the pain is radiating into her fingers.    Pt has indentation from her brace, due to the edema, which is of great concern, as the pt is diabetic.     So far, there is no open or broken skin.    Red? Yes.    Using Neosporin under pt's brace, after pt's shower.     Please CALL pt, as soon as possible, to discuss. Thank you.

## 2024-09-12 NOTE — TELEPHONE ENCOUNTER
9-12-24 9:00 AM  Left voice mail for patient to call RN back, left direct call-back phone number. Hailee Oliveira RN

## 2024-09-12 NOTE — TELEPHONE ENCOUNTER
9-12-24 11:50 AM  Spoke with patient.   She apologized but she had to drink wine last night for her left hand pain.   (Discussed with patient re: this is not healthy for her and she knows it but states she had to do it).  States she is unable to take Tylenol or NSAIDS due to medical history  She hasn't drank wine like this since [place].  Having left hand nerve pain.     Swelling of left arm started 10 days ago. No known injury. No falls or strain on the left arm.  Swelling from left bicep to just below elbow.    L Hand not swollen but it hurts with severe nerve pain  Non-pitting edema, it does NOT leave a dent when pressing on it.   Skin not red or hot, no drainage or wounds anywhere.  No fever.  The crease on her arm near elbow has become really deep.  She is elevating on pillow as much as possible.   Denies chest pain or difficulty breathing.     Cardiology sees her in November to check heart function from previously abnormal EKG.     Patient messaged ID yesterday to inform of swollen arm since she had picc line out 7-19-24.  They only gave her 30 days of oral antibiotics after that so she hasn't been on antibiotics for over a month. She asked them if she is supposed to be on something.  Still waiting on call back from them.    She has not seen PCP yet, because she needs an internal med specialist.  Her first appointment with them is scheduled for 9-23-24.    Wearing hinged elbow brace but it is very tight from swelling, it is hurting and causing pressure points on skin. Skin is red where the brace is touching but not broken. Using thin layer neosporin on red spots.  Can she take it off and use something else?    Nurse checks elbow site every week, sometimes 2 x week.     Advised patient that the best thing for her today is to check in the ER today for potential DVT.  She needs to probably make sure she doesn't have a blood clot in her arm.  Explained it could be serious, it might not be this at all, but it  should be ruled out.    She understands but is stuck in her apartment. Her  and such are not available.    She has someone visiting her on Sunday and could maybe help on Monday.   Then she said she could call her home health nurse to see if she could get help in getting to hospital today. RN informed her this would be best.      Will inform Dr Sarmiento.    RN is out of office rest of day today, but there is a nurse in clinic covering messages if needed.  Patient verbalizes understanding.   Hailee Oliveira RN

## 2024-09-13 ENCOUNTER — TELEPHONE (OUTPATIENT)
Dept: ORTHOPEDICS | Facility: CLINIC | Age: 59
End: 2024-09-13
Payer: COMMERCIAL

## 2024-09-13 NOTE — TELEPHONE ENCOUNTER
Other: Patient is calling because her arm is swelling, elbow is warm to the touch, and is having a lot of pain. She is going to go to the hospital today. Just wanted to leave FYI for Dr. Sarmiento.      Could we send this information to you in [employer] or would you prefer to receive a phone call?:   Patient would prefer a phone call   Okay to leave a detailed message?: Yes at Cell number on file:    Telephone Information:   Mobile [phone]   Patient will request the hospital reach out to Dr. Sarmiento for her.

## 2024-09-13 NOTE — TELEPHONE ENCOUNTER
Attempted to phone patient back, no answer during call. VM left with call back instructions.    Patient was recommended by ABBIE Stephen with Dr. Sarmiento to present to ED on 9/11 for DVT r/o.     VM left with patient advising she present to emergency department STAT regarding symptoms reported.    Yuliya Bello RN on 9/13/2024 at 1:14 PM

## 2024-09-16 ENCOUNTER — TELEPHONE (OUTPATIENT)
Dept: ORTHOPEDICS | Facility: CLINIC | Age: 59
End: 2024-09-16
Payer: COMMERCIAL

## 2024-09-16 NOTE — TELEPHONE ENCOUNTER
Other: Patient is calling in because she needs direction on what she should be doing for her blood clots. Please call patient when able.      Could we send this information to you in [employer] or would you prefer to receive a phone call?:   Patient would prefer a phone call   Okay to leave a detailed message?: Yes at Cell number on file:    Telephone Information:   Mobile [phone]

## 2024-09-16 NOTE — TELEPHONE ENCOUNTER
Spoke with patient.  She is back home.  Her nurse visited her this morning.  She is having trouble getting out of bed, legs are swollen.  She had a fall this weekend onto her left arm.  She states the hospital put her brace on wrong, the fire department had to put it back on the right way this weekend.  She will see Dr Price tomorrow and neurology on Thursday of this week.  She does not have any discharge summary paperwork from the hospital with directions as to what to do next.   Advised her to keep taking her new blood thinner, and go to her appointments this week and go from there.  Also keep her visit with her new PCP next week on Monday.   She states they are looking for a handicapped accessible apartment for her. She does NOT want to go into any facility.  She also thinks she wants to go onto a hospice program and she will be discussing this with her providers.   Her elbow is otherwise doing well right now other than the swelling in her left arm.   Dr Sarmiento updated. Hailee Oliveira RN

## 2024-10-07 ENCOUNTER — VIRTUAL VISIT (OUTPATIENT)
Dept: ORTHOPEDICS | Facility: CLINIC | Age: 59
End: 2024-10-07
Payer: COMMERCIAL

## 2024-10-07 DIAGNOSIS — T84.50XD INFECTION OF PROSTHETIC JOINT, SUBSEQUENT ENCOUNTER: Primary | ICD-10-CM

## 2024-10-07 PROCEDURE — 99442 PR PHYSICIAN TELEPHONE EVALUATION 11-20 MIN: CPT | Mod: 93 | Performed by: STUDENT IN AN ORGANIZED HEALTH CARE EDUCATION/TRAINING PROGRAM

## 2024-10-07 NOTE — PROGRESS NOTES
Mitali is a 59 year old who is being evaluated via a billable telephone visit.      Originating Location (pt. Location): Home    Distant Location (provider location):  On-site        Phone call duration: 15 minutes  Signed Electronically by: Marcos Sarmiento MD      Orthopaedic Surgery Hand Clinic Progress Note    Patient Name: Mitali Robles  MRN: [id number]  : 1965  Date: Oct 7, 2024    4/10/24  Removal of left TEA, achilles allograft, ulnar nerve neurolysis, placement of articulating spacer     2024  Surgery Performed: Excisional debridement of left posterior elbow wound, resection of bone, removal antibiotic spacer, removal of hardware.    Subjective:  Ms. Mitali Robles is a 59 year old female who follows 6 months s/p explant TEA and 4 months s/p removal of spacer and treatment of elbow wound. All things considered, she is doing ok.  She states she is doing better. Her left elbow is fully healed.  There are no wounds.  She continues to use the hinged elbow brace except for showers. She cannot put it on herself and requires help.  She does report some pain from about the biceps, forearm, into her fingers. However she is not taking any pain meds. She also states that her small finger continues to be dead with numbness.  She states that she can flex it but cannot make a full fist with it. Gabapentin does not help. She did have a DVT of the left upper extremity, likely provoked from strapping the brace too tight.  Hospitalized -9/15 for this, she is on Eliquis.  She states she remains on antibiotics (doxycycline, ciprofloxacin), although ID note from Dr. Dominguez states no further antibiotics are needed. Per his note 24, he states the elbow is no longer an issue. However he had to send Mitali to the emergency department from his clinic due to weakness and failure to thrive needing significant assistance just for ambulation.  His note  was reviewed in full.  The patient was then admitted until  9/27 and is apparently now home with her mother and brother and awaiting home care services.     Objective (not performed today but per report and chart review)  There were no vitals taken for this visit.    General: alert, appropriate, NAD  HEENT: NC/AT  CV: RRR by pulse  Pulm: Unlabored on RA  MSK:  Incision is c/d/I  No erythema, drainage, evidence of infection  No threatened skin or wounds  Elbow grossly unstable, position of comfort in supination at approximately 45 degrees  Intact EPL, FPL, HI  Diminished sensation in small finger, limited active FDP of small finger  Intact median, radial sensation  WWP CR< 2s    Imaging:  Xrs 9/13/24 from [facility] reviewed, which demonstrates interval heterotopic bone formation status post hardware removal and distal humerus and proximal ulna resections.    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    Patient's pain is stable. Doing well with the hinged elbow brace. She is doing better.    Advised that she likely will always have some discomfort of the elbow due to the gross instability.  Anticipate that will continue to improve as more heterotopic bone forms.  I recommended continued monitoring of the small finger numbness and diminished active flexion.  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  Advised that neurologic recovery can take 1 to 1.5 years and may not be complete. If she develops any swelling of the LUE again, she should seek work up and care.    Will defer any antibiotic treatment to Dr. Dominguez.    I again emphasized the necessity of a primary care physician to help coordinate her care. I agree with Dr. Dominguez that home  services are likely not sufficient and that placement seems necessary.     All questions answered.  The patient voiced understanding and agreement. At this point she may follow-up as needed. She knows to reach out if any concerns or questions arise.    I spent a total of 30 minutes with Mitali Robles during today's office visit. Over 50% of this time was spent counseling the patient, performing the history and physical exam, and coordinating care. Remainder of time spent on chart review on documentation.    Marcos Sarmiento MD    Hand & Upper Extremity Surgery  Department of Orthopedic Surgery  [facility]

## 2024-10-07 NOTE — LETTER
10/7/2024      Mitali Robles  [address]  [address]  [place] [address]      Dear Colleague,    Thank you for referring your patient, Mitali Robles, to the [facility] [place]. Please see a copy of my visit note below.    Mitali is a 59 year old who is being evaluated via a billable telephone visit.      Originating Location (pt. Location): Home    Distant Location (provider location):  On-site        Phone call duration: 15 minutes  Signed Electronically by: Marcos Sarmiento MD      Orthopaedic Surgery Hand Clinic Progress Note    Patient Name: Mitali Robles  MRN: [id number]  : 1965  Date: Oct 7, 2024    4/10/24  Removal of left TEA, achilles allograft, ulnar nerve neurolysis, placement of articulating spacer     2024  Surgery Performed: Excisional debridement of left posterior elbow wound, resection of bone, removal antibiotic spacer, removal of hardware.    Subjective:  Ms. Mitali Robles is a 59 year old female who follows 6 months s/p explant TEA and 4 months s/p removal of spacer and treatment of elbow wound. All things considered, she is doing ok.  She states she is doing better. Her left elbow is fully healed.  There are no wounds.  She continues to use the hinged elbow brace except for showers. She cannot put it on herself and requires help.  She does report some pain from about the biceps, forearm, into her fingers. However she is not taking any pain meds. She also states that her small finger continues to be dead with numbness.  She states that she can flex it but cannot make a full fist with it. Gabapentin does not help. She did have a DVT of the left upper extremity, likely provoked from strapping the brace too tight.  Hospitalized -9/15 for this, she is on Eliquis.  She states she remains on antibiotics (doxycycline, ciprofloxacin), although ID note from Dr. Dominguez states no further antibiotics are needed. Per his note 24, he states the elbow is no  longer an issue. However he had to send Mitali to the emergency department from his clinic due to weakness and failure to thrive needing significant assistance just for ambulation.  His note 9/17 was reviewed in full.  The patient was then admitted until 9/27 and is apparently now home with her mother and brother and awaiting home care services.     Objective (not performed today but per report and chart review)  There were no vitals taken for this visit.    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    Patient's pain is stable. Doing well with the hinged elbow brace. She is doing better.    Advised that she likely will always have some discomfort of the elbow due to the gross instability.  Anticipate that will continue to improve as more heterotopic bone forms.  I recommended continued monitoring of the small finger numbness and diminished active flexion.  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  Advised that neurologic recovery can take 1 to 1.5 years and may not be complete. If she develops  any swelling of the LUE again, she should seek work up and care.    Will defer any antibiotic treatment to Dr. Dominguez.    I again emphasized the necessity of a primary care physician to help coordinate her care. I agree with Dr. Dominguez that home services are likely not sufficient and that placement seems necessary.     All questions answered.  The patient voiced understanding and agreement. At this point she may follow-up as needed. She knows to reach out if any concerns or questions arise.    I spent a total of 30 minutes with Mitali RICARDO Robles during today's office visit. Over 50% of this time was spent counseling the patient, performing the history and physical exam, and coordinating care. Remainder of time spent on chart review on documentation.    Marcos Sarmiento MD    Hand & Upper Extremity Surgery  Department of Orthopedic Surgery  [facility]        Again, thank you for allowing me to participate in the care of your patient.        Sincerely,        Marcos Sarmiento MD

## 2024-10-07 NOTE — NURSING NOTE
Reason For Visit:   Chief Complaint   Patient presents with    RECHECK     Follow-up Excisional debridement of left posterior elbow wound, resection of bone, removal antibiotic spacer, removal of hardware. DOS: 6/20/24       Primary MD: Marilee Ref-Primary, Physician  Ref. MD: Mary    Age: 59 year old    ?  No      There were no vitals taken for this visit.      Pain Assessment  Patient Currently in Pain: Yes  0-10 Pain Scale: 8  Primary Pain Location: Elbow  Pain Descriptors: Sore, Constant    Hand Dominance Evaluation  Hand Dominance: Right          QuickDASH Assessment      7/22/2024    11:41 AM   QuickDASH Main   1. Open a tight or new jar Unable   2. Do heavy household chores (e.g., wash walls, floors) Severe difficulty   3. Carry a shopping bag or briefcase Severe difficulty   4. Wash your back Unable   5. Use a knife to cut food Moderate difficulty   6. Recreational activities in which you take some force or impact through your arm, shoulder or hand (e.g., golf, hammering, tennis, etc.) Unable   7. During the past week, to what extent has your arm, shoulder or hand problem interfered with your normal social activities with family, friends, neighbours or groups Extremely   8. During the past week, were you limited in your work or other regular daily activities as a result of your arm, shoulder or hand problem Very limited   9. Arm, shoulder or hand pain Extreme   10.Tingling (pins and needles) in your arm,shoulder or hand Severe   11. During the past week, how much difficulty have you had sleeping because of the pain in your arm, shoulder or hand Severe difficulty   Quickdash Ability Score 84.09          Current Outpatient Medications   Medication Sig Dispense Refill    acetaminophen (TYLENOL) 325 MG tablet Take 2 tablets (650 mg) by mouth every 4 hours as needed for other (For optimal non-opioid multimodal pain management to improve pain control.) 100 tablet     albuterol (PROAIR HFA/PROVENTIL  HFA/VENTOLIN HFA) 108 (90 BASE) MCG/ACT Inhaler Inhale 2 puffs into the lungs as needed      amLODIPine (NORVASC) 5 MG tablet Take 1 tablet (5 mg) by mouth daily 30 tablet 0    atorvastatin (LIPITOR) 10 MG tablet Take 10 mg by mouth daily      bismuth subsalicylate (PEPTO BISMOL) 262 MG/15ML suspension Take 15 mLs by mouth every 8 hours as needed for indigestion or diarrhea 946 mL 0    calcium carbonate (TUMS) 500 MG chewable tablet Take 1 tablet (500 mg) by mouth 4 times daily as needed for heartburn 30 tablet 0    citalopram (CELEXA) 20 MG tablet Take 1 tablet (20 mg) by mouth at bedtime 30 tablet 0    cloNIDine (CATAPRES) 0.3 MG tablet Take 1 tablet (0.3 mg) by mouth 2 times daily 30 tablet 0    cyclobenzaprine (FLEXERIL) 10 MG tablet Take 1 tablet (10 mg) by mouth 3 times daily 30 tablet 0    folic acid (FOLVITE) 1 MG tablet Take 1 tablet (1 mg) by mouth daily 30 tablet 0    gabapentin (NEURONTIN) 400 MG capsule Take 2 capsules (800 mg) by mouth 3 times daily 180 capsule 0    insulin aspart (NOVOLOG FLEXPEN) 100 UNIT/ML pen Inject 4-12 Units Subcutaneous 3 times daily (with meals) + correction      insulin degludec (TRESIBA FLEXTOUCH) 100 UNIT/ML pen Inject 25 Units Subcutaneous every morning 15 mL 0    insulin pen needle (32G X 6 MM) 32G X 6 MM miscellaneous Use 1 pen needle daily or as directed. 100 each 0    Lidocaine (LIDOCARE) 4 % Patch Place 2 patches onto the skin every 24 hours To prevent lidocaine toxicity, patient should be patch free for 12 hrs daily. 30 patch 0    lumateperone (CAPLYTA) 42 MG capsule Take 2 capsules (84 mg) by mouth at bedtime 60 capsule 0    methocarbamol (ROBAXIN) 500 MG tablet Take 1 tablet (500 mg) by mouth every 6 hours as needed for muscle spasms 20 tablet 0    miconazole (MICATIN) 2 % external powder Apply topically 2 times daily Apply to groin area after washing area with soap and water and drying bid 71 g 1    multivitamin w/minerals (THERA-VIT-M) tablet Take 1 tablet by  "mouth daily 90 tablet 0    omeprazole (PRILOSEC) 20 MG DR capsule Take 20 mg by mouth daily      oxyCODONE (ROXICODONE) 5 MG tablet Take 1 tablet (5 mg) by mouth every 4 hours as needed for moderate pain or severe pain 12 tablet 0    polyethylene glycol (MIRALAX) 17 GM/Dose powder Take 17 g by mouth daily      psyllium (METAMUCIL) WAFR Take 2 Wafers by mouth daily 60 Wafer 2    saccharomyces boulardii (FLORASTOR) 250 MG capsule Take 1 capsule (250 mg) by mouth 2 times daily 60 capsule 0    sennosides (SENOKOT) 8.6 MG tablet Take 1 tablet by mouth daily as needed for constipation 60 tablet 0       Allergies   Allergen Reactions    Morphine Anaphylaxis     Throat swells shut  **Has taken hydrocodone/APAP and hydromorphone in the past during previous hospitalizations with no issues.  Takes oxycodone at home    Succinylcholine Shortness Of Breath     \"it affected my breathing\"    Fentanyl Nausea and Vomiting    Hydromorphone      Received 4/10 in OR without issue (was listed as allergy but has had in past without incident)    Methadone Nausea and Vomiting    Prednisone Swelling     \"it overrides my psych meds and makes me crazy\"    Pregabalin     Quetiapine     Trazodone     Sumatriptan Rash       Radha Copeland, ATC    "

## 2025-03-19 ENCOUNTER — TELEPHONE (OUTPATIENT)
Dept: ORTHOPEDICS | Facility: CLINIC | Age: 60
End: 2025-03-19
Payer: COMMERCIAL

## 2025-03-19 NOTE — TELEPHONE ENCOUNTER
Leanne, OT with [facility] in [facility], called regarding an on-boarding evaluation done with Mitali yesterday. They are going to be making at-home OT visits.    Mitali has been doing surprisingly well lately.  She now has a daily caregiver.  She never ended up leaving her apartment, is still living in same tiny basement room with stair access.      Mitali stated she was supposed to wear her left hinged elbow brace for her lifetime per Dr Sarmiento.      Patient wearing her brace extremely loose, patient stated if it is strapped down normally then it causes her arm to swell. Brace hangs down to her wrist because it is loose, so it's not really providing much benefit.     Leanne is advising a passive ROM program and also swelling management with some compression. RN asked if they could have the brace re-fitted if needed and she said they could, but they want to try working on some compression and ROM first.      If Dr Sarmiento has any comments or new orders we will let Leanne know. Hailee Oliveira RN

## 2025-03-19 NOTE — TELEPHONE ENCOUNTER
Per Dr Sarmiento, I think the brace is most helpful as it does support some motion of the elbow. However, could consider an OT static splint as well that she could use interchangeably, like at night.     3-19-25 1:20 pm  Contacted OT office and left Dr Sarmiento's message for Leanne with their reception staff.  They also took RN direct number if she had any questions about the response. Hailee Oliveira RN

## (undated) DEVICE — SOL ISOPROPYL RUBBING ALCOHOL USP 70% 4OZ HDX-20 I0020

## (undated) DEVICE — Device

## (undated) DEVICE — GLOVE BIOGEL PI ULTRATOUCH G SZ 7.0 42170

## (undated) DEVICE — SOL NACL 0.9% IRRIG 1000ML BOTTLE 2F7124

## (undated) DEVICE — DRAIN JACKSON PRATT ROUND W/TROCAR 15FR LF JP-HUR151

## (undated) DEVICE — SPECIMEN CONTAINER 5OZ STERILE 2600SA

## (undated) DEVICE — SU ETHILON 3-0 PS-1 18" 1663H

## (undated) DEVICE — ESU PENCIL W/SMOKE EVAC NEPTUNE STRYKER 0703-046-000

## (undated) DEVICE — PREP DYNA-HEX 4% CHG SCRUB 4OZ BOTTLE MDS098710

## (undated) DEVICE — DRAPE U-POUCH 34X29" 1067

## (undated) DEVICE — CAST PADDING 4" UNSTERILE 9044

## (undated) DEVICE — SOL BACTISURE WOUND LAVAGE 1L BAG 00-8887-002-00

## (undated) DEVICE — SPONGE LAP 18X18" X8435

## (undated) DEVICE — SU PDS II 0 CT-1 27" Z340H

## (undated) DEVICE — DRAPE STERI TOWEL LG 1010

## (undated) DEVICE — SUCTION MANIFOLD NEPTUNE 2 SYS 4 PORT 0702-020-000

## (undated) DEVICE — BNDG ELASTIC 4" DBL LENGTH UNSTERILE 6611-14

## (undated) DEVICE — ESU GROUND PAD ADULT W/CORD E7507

## (undated) DEVICE — SOL WATER IRRIG 1000ML BOTTLE 2F7114

## (undated) DEVICE — BLADE KNIFE SURG 10 371110

## (undated) DEVICE — SYR 50ML CATH TIP W/O NDL 309620

## (undated) DEVICE — CAST PLASTER SPLINT 5X30" 7395

## (undated) DEVICE — APPLICATORS COTTON TIP 6"X2 STERILE LF C15053-006

## (undated) DEVICE — TUBING IRRIG CYSTO/BLADDER SET 81" LF 2C4040

## (undated) DEVICE — SU DERMABOND PRINEO 42CM CLR422US

## (undated) DEVICE — VESSEL LOOPS BLUE MAXI

## (undated) DEVICE — COVER CAMERA IN-LIGHT DISP LT-C02

## (undated) DEVICE — SU PDS II 3-0 PS-2 18" Z497G

## (undated) DEVICE — DRAIN CHEST TUBE 24FR STR 8024

## (undated) DEVICE — SUCTION IRR SYSTEM W/O TIP INTERPULSE HANDPIECE 0210-100-000

## (undated) DEVICE — SOL NACL 0.9% IRRIG 3000ML BAG 2B7477

## (undated) DEVICE — BLADE KNIFE SURG 15 371115

## (undated) DEVICE — GLOVE BIOGEL PI MICRO INDICATOR UNDERGLOVE SZ 7.5 48975

## (undated) DEVICE — LINEN ORTHO PACK 5446

## (undated) DEVICE — ESU PENCIL W/HOLSTER E2350H

## (undated) DEVICE — STIMULATOR NERVE VARI-STIM III 8562010

## (undated) DEVICE — DRAPE C-ARM W/STRAPS 42X72" 07-CA104

## (undated) DEVICE — BONE CLEANING TIP INTERPULSE  0210-010-000

## (undated) DEVICE — PREP CHLORAPREP 26ML TINTED HI-LITE ORANGE 930815

## (undated) DEVICE — LINEN TOWEL PACK X5 5464

## (undated) DEVICE — BONE CLEANING TIP INTERPULSE FEMORAL CANAL 0210-008-000

## (undated) DEVICE — BRUSH SURGICAL SCRUB W/4% CHLORHEXIDINE GLUCONATE SOL 4458A

## (undated) DEVICE — TRAY PREP DRY SKIN SCRUB 067

## (undated) DEVICE — NDL 16GA 1.5" 305198

## (undated) DEVICE — DRAIN JACKSON PRATT RESERVOIR 400ML SU130-1000

## (undated) DEVICE — GOWN XLG DISP 9545

## (undated) DEVICE — SU PDS II 1 CTX 36" Z371T

## (undated) DEVICE — DRSG KERLIX FLUFFS X5

## (undated) DEVICE — DRAIN JACKSON PRATT RESERVOIR 100ML SU130-1305

## (undated) DEVICE — TUBING SUCTION MEDI-VAC SOFT 3/16"X20' N520A

## (undated) DEVICE — STRAP KNEE/BODY 31143004

## (undated) DEVICE — TUBE CULTURE AEROBIC/ANAEROBIC W/O SWABS A.C.T.I.1. 12401

## (undated) DEVICE — SLING ARM XLG 79-99158

## (undated) DEVICE — DRSG ABDOMINAL 07 1/2X8" 7197D

## (undated) DEVICE — DRAPE STOCKINETTE IMPERVIOUS 12" 1587

## (undated) DEVICE — CAST PADDING 4" STERILE 9044S

## (undated) DEVICE — PREP POVIDONE-IODINE 10% SOLUTION 4OZ BOTTLE MDS093944

## (undated) DEVICE — SU PDS II 2-0 CT-2 27"  Z333H

## (undated) DEVICE — TOURNIQUET CUFF 18" REPRO RED 60-7070-103

## (undated) DEVICE — SUCTION TIP YANKAUER STR K87

## (undated) DEVICE — SU PDS II 2-0 CT-1 27" Z339H

## (undated) DEVICE — TOURNIQUET CUFF 24" YELLOW LF 5921-024-135

## (undated) DEVICE — PREP POVIDONE-IODINE 7.5% SCRUB 4OZ BOTTLE MDS093945

## (undated) DEVICE — CLEANSER JET LAVAGE IRRISEPT 0.05% CHG IRRISEPT45USA

## (undated) DEVICE — ESU CORD BIPOLAR GREEN 10-4000

## (undated) DEVICE — BONE CEMENT MIXEVAC III HI VAC KIT  0206-015-000

## (undated) DEVICE — BLADE SAW SAGITTAL STRK 13X90X0.89MM HD SYS 6 6113-089-090

## (undated) DEVICE — DRAPE CONVERTORS U-DRAPE 60X72" 8476

## (undated) DEVICE — DRAIN JACKSON PRATT CHANNEL 19FR ROUND HUBLESS SIL JP-2230

## (undated) RX ORDER — ACETAMINOPHEN 325 MG/1
TABLET ORAL
Status: DISPENSED
Start: 2024-04-10

## (undated) RX ORDER — FENTANYL CITRATE-0.9 % NACL/PF 10 MCG/ML
PLASTIC BAG, INJECTION (ML) INTRAVENOUS
Status: DISPENSED
Start: 2024-04-10

## (undated) RX ORDER — CEFAZOLIN SODIUM/WATER 2 G/20 ML
SYRINGE (ML) INTRAVENOUS
Status: DISPENSED
Start: 2024-06-20

## (undated) RX ORDER — CELECOXIB 200 MG/1
CAPSULE ORAL
Status: DISPENSED
Start: 2024-04-10

## (undated) RX ORDER — ONDANSETRON 2 MG/ML
INJECTION INTRAMUSCULAR; INTRAVENOUS
Status: DISPENSED
Start: 2024-04-10

## (undated) RX ORDER — TOBRAMYCIN 1.2 G/30ML
INJECTION, POWDER, LYOPHILIZED, FOR SOLUTION INTRAVENOUS
Status: DISPENSED
Start: 2024-04-10

## (undated) RX ORDER — ACETAMINOPHEN 325 MG/1
TABLET ORAL
Status: DISPENSED
Start: 2024-04-11

## (undated) RX ORDER — DEXAMETHASONE SODIUM PHOSPHATE 4 MG/ML
INJECTION, SOLUTION INTRA-ARTICULAR; INTRALESIONAL; INTRAMUSCULAR; INTRAVENOUS; SOFT TISSUE
Status: DISPENSED
Start: 2024-06-20

## (undated) RX ORDER — LORAZEPAM 2 MG/ML
INJECTION INTRAMUSCULAR
Status: DISPENSED
Start: 2024-04-10

## (undated) RX ORDER — LIDOCAINE HYDROCHLORIDE 10 MG/ML
INJECTION, SOLUTION EPIDURAL; INFILTRATION; INTRACAUDAL; PERINEURAL
Status: DISPENSED
Start: 2023-10-30

## (undated) RX ORDER — EPHEDRINE SULFATE 50 MG/ML
INJECTION, SOLUTION INTRAMUSCULAR; INTRAVENOUS; SUBCUTANEOUS
Status: DISPENSED
Start: 2024-06-20

## (undated) RX ORDER — KETOROLAC TROMETHAMINE 30 MG/ML
INJECTION, SOLUTION INTRAMUSCULAR; INTRAVENOUS
Status: DISPENSED
Start: 2024-06-20

## (undated) RX ORDER — FENTANYL CITRATE 50 UG/ML
INJECTION, SOLUTION INTRAMUSCULAR; INTRAVENOUS
Status: DISPENSED
Start: 2024-06-20

## (undated) RX ORDER — PROPOFOL 10 MG/ML
INJECTION, EMULSION INTRAVENOUS
Status: DISPENSED
Start: 2024-04-10

## (undated) RX ORDER — HYDROMORPHONE HYDROCHLORIDE 1 MG/ML
INJECTION, SOLUTION INTRAMUSCULAR; INTRAVENOUS; SUBCUTANEOUS
Status: DISPENSED
Start: 2024-06-20

## (undated) RX ORDER — PROPOFOL 10 MG/ML
INJECTION, EMULSION INTRAVENOUS
Status: DISPENSED
Start: 2024-06-20

## (undated) RX ORDER — CEFAZOLIN SODIUM/WATER 2 G/20 ML
SYRINGE (ML) INTRAVENOUS
Status: DISPENSED
Start: 2024-04-10

## (undated) RX ORDER — APREPITANT 40 MG/1
CAPSULE ORAL
Status: DISPENSED
Start: 2024-06-20

## (undated) RX ORDER — HYDROMORPHONE HYDROCHLORIDE 1 MG/ML
INJECTION, SOLUTION INTRAMUSCULAR; INTRAVENOUS; SUBCUTANEOUS
Status: DISPENSED
Start: 2024-04-10

## (undated) RX ORDER — VANCOMYCIN HYDROCHLORIDE 1 G/20ML
INJECTION, POWDER, LYOPHILIZED, FOR SOLUTION INTRAVENOUS
Status: DISPENSED
Start: 2024-06-20

## (undated) RX ORDER — CEFAZOLIN SODIUM 1 G/3ML
INJECTION, POWDER, FOR SOLUTION INTRAMUSCULAR; INTRAVENOUS
Status: DISPENSED
Start: 2024-04-10

## (undated) RX ORDER — ESMOLOL HYDROCHLORIDE 10 MG/ML
INJECTION INTRAVENOUS
Status: DISPENSED
Start: 2024-06-20

## (undated) RX ORDER — ONDANSETRON 2 MG/ML
INJECTION INTRAMUSCULAR; INTRAVENOUS
Status: DISPENSED
Start: 2024-06-20

## (undated) RX ORDER — DEXAMETHASONE SODIUM PHOSPHATE 4 MG/ML
INJECTION, SOLUTION INTRA-ARTICULAR; INTRALESIONAL; INTRAMUSCULAR; INTRAVENOUS; SOFT TISSUE
Status: DISPENSED
Start: 2024-04-10

## (undated) RX ORDER — EPHEDRINE SULFATE 50 MG/ML
INJECTION, SOLUTION INTRAMUSCULAR; INTRAVENOUS; SUBCUTANEOUS
Status: DISPENSED
Start: 2024-04-10

## (undated) RX ORDER — FENTANYL CITRATE-0.9 % NACL/PF 10 MCG/ML
PLASTIC BAG, INJECTION (ML) INTRAVENOUS
Status: DISPENSED
Start: 2024-06-20

## (undated) RX ORDER — TOBRAMYCIN 1.2 G/30ML
INJECTION, POWDER, LYOPHILIZED, FOR SOLUTION INTRAVENOUS
Status: DISPENSED
Start: 2024-06-20

## (undated) RX ORDER — VANCOMYCIN HYDROCHLORIDE 1 G/20ML
INJECTION, POWDER, LYOPHILIZED, FOR SOLUTION INTRAVENOUS
Status: DISPENSED
Start: 2024-04-10

## (undated) RX ORDER — FENTANYL CITRATE 50 UG/ML
INJECTION, SOLUTION INTRAMUSCULAR; INTRAVENOUS
Status: DISPENSED
Start: 2024-04-10